# Patient Record
Sex: MALE | Race: WHITE | NOT HISPANIC OR LATINO | Employment: OTHER | URBAN - METROPOLITAN AREA
[De-identification: names, ages, dates, MRNs, and addresses within clinical notes are randomized per-mention and may not be internally consistent; named-entity substitution may affect disease eponyms.]

---

## 2017-01-18 ENCOUNTER — OFFICE VISIT (OUTPATIENT)
Dept: FAMILY MEDICINE | Facility: CLINIC | Age: 67
End: 2017-01-18

## 2017-01-18 VITALS
TEMPERATURE: 98.7 F | BODY MASS INDEX: 32.27 KG/M2 | HEART RATE: 76 BPM | RESPIRATION RATE: 15 BRPM | OXYGEN SATURATION: 97 % | HEIGHT: 76 IN | WEIGHT: 265 LBS | SYSTOLIC BLOOD PRESSURE: 162 MMHG | DIASTOLIC BLOOD PRESSURE: 90 MMHG

## 2017-01-18 DIAGNOSIS — I10 BENIGN ESSENTIAL HYPERTENSION: Primary | ICD-10-CM

## 2017-01-18 DIAGNOSIS — Z85.47 HX OF TESTICULAR CANCER: ICD-10-CM

## 2017-01-18 PROCEDURE — 99203 OFFICE O/P NEW LOW 30 MIN: CPT | Performed by: INTERNAL MEDICINE

## 2017-01-18 PROCEDURE — 36415 COLL VENOUS BLD VENIPUNCTURE: CPT | Performed by: INTERNAL MEDICINE

## 2017-01-18 PROCEDURE — 80053 COMPREHEN METABOLIC PANEL: CPT | Performed by: INTERNAL MEDICINE

## 2017-01-18 RX ORDER — LOSARTAN POTASSIUM AND HYDROCHLOROTHIAZIDE 12.5; 1 MG/1; MG/1
TABLET ORAL
Qty: 30 TABLET | Refills: 1 | Status: SHIPPED | OUTPATIENT
Start: 2017-01-18 | End: 2017-06-01

## 2017-01-18 RX ORDER — LOSARTAN POTASSIUM AND HYDROCHLOROTHIAZIDE 12.5; 5 MG/1; MG/1
1 TABLET ORAL DAILY
Qty: 30 TABLET | Refills: 1 | Status: CANCELLED | OUTPATIENT
Start: 2017-01-18

## 2017-01-18 NOTE — NURSING NOTE
"Chief Complaint   Patient presents with     Hypertension       Initial /98 mmHg  Pulse 76  Temp(Src) 98.7  F (37.1  C) (Oral)  Resp 15  Ht 6' 4\" (1.93 m)  Wt 265 lb (120.203 kg)  BMI 32.27 kg/m2  SpO2 97% Estimated body mass index is 32.27 kg/(m^2) as calculated from the following:    Height as of this encounter: 6' 4\" (1.93 m).    Weight as of this encounter: 265 lb (120.203 kg).  BP completed using cuff size: large    "

## 2017-01-18 NOTE — Clinical Note
New pt , elevated BLOOD PRESSURE; was to start med and return for Nurse BLOOD PRESSURE visit in 2 weeks.

## 2017-01-18 NOTE — LETTER
March 20, 2017      Kevan Connelly  [address] [address] [address]        Dear Mr. Kevan Connelly,    Labs reviewed. Continue current meds and healthy lifestyle choices; diet and exercise.     Sincerely,     Davina Reaves MD

## 2017-01-18 NOTE — PATIENT INSTRUCTIONS
"After starting Losartan, please schedule or appear for a \"nurse-only\" follow-up in 2-3 weeks to recheck your blood pressure. A nurse-only visit is of no charge to you.  "

## 2017-01-18 NOTE — MR AVS SNAPSHOT
"              After Visit Summary   1/18/2017    Kevan Connelly    MRN: [id number]           Patient Information     Date Of Birth          1950        Visit Information        Provider Department      1/18/2017 11:30 AM Davina Reaves MD [facility]        Today's Diagnoses     Benign essential hypertension    -  1     Hx of testicular cancer           Care Instructions    After starting Losartan, please schedule or appear for a \"nurse-only\" follow-up in 2-3 weeks to recheck your blood pressure. A nurse-only visit is of no charge to you.        Follow-ups after your visit        Who to contact     If you have questions or need follow up information about today's clinic visit or your schedule please contact [facility] directly at [phone].  Normal or non-critical lab and imaging results will be communicated to you by MyChart, letter or phone within 4 business days after the clinic has received the results. If you do not hear from us within 7 days, please contact the clinic through Centrafusehart or phone. If you have a critical or abnormal lab result, we will notify you by phone as soon as possible.  Submit refill requests through Go800 or call your pharmacy and they will forward the refill request to us. Please allow 3 business days for your refill to be completed.          Additional Information About Your Visit        Centrafusehart Information     [email or web address] lets you send messages to your doctor, view your test results, renew your prescriptions, schedule appointments and more. To sign up, go to [email or web address] . Click on \"Log in\" on the left side of the screen, which will take you to the Welcome page. Then click on \"Sign up Now\" on the right side of the page.     You will be asked to enter the access code listed below, as well as some personal information. Please follow the directions to create your username and password.     Your access code is: [email or web address]  Expires: " "2017 12:30 PM     Your access code will  in 90 days. If you need help or a new code, please call your [facility] or [phone].        Care EveryWhere ID     This is your Care EveryWhere ID. This could be used by other organizations to access your [facility] medical records  [id number]        Your Vitals Were     Pulse Temperature Respirations Height BMI (Body Mass Index) Pulse Oximetry    76 98.7  F (37.1  C) (Oral) 15 6' 4\" (1.93 m) 32.27 kg/m2 97%       Blood Pressure from Last 3 Encounters:   17 162/90    Weight from Last 3 Encounters:   17 265 lb (120.203 kg)              We Performed the Following     Comprehensive metabolic panel          Today's Medication Changes          These changes are accurate as of: 17 12:30 PM.  If you have any questions, ask your nurse or doctor.               Start taking these medicines.        Dose/Directions    losartan-hydrochlorothiazide 100-12.5 MG per tablet   Commonly known as:  HYZAAR   Used for:  Benign essential hypertension   Started by:  Davina Reaves MD        Start with 1/2 tablet daily  For 2 weeks then increase to full tablet if still elevated.   Quantity:  30 tablet   Refills:  1            Where to get your medicines      These medications were sent to [facility] [address] [place] [address]     Phone:  [phone]    - losartan-hydrochlorothiazide 100-12.5 MG per tablet             Primary Care Provider Office Phone # Fax #    Davina Reaves [phone]       [facility] [address]  [facility] [address]        Thank you!     Thank you for choosing [facility]  for your care. Our goal is always to provide you with excellent care. Hearing back from our patients is one way we can continue to improve our services. Please take a few minutes to complete the written survey that you may receive in the " mail after your visit with us. Thank you!             Your Updated Medication List - Protect others around you: Learn how to safely use, store and throw away your medicines at www.disposemymeds.org.          This list is accurate as of: 1/18/17 12:30 PM.  Always use your most recent med list.                   Brand Name Dispense Instructions for use    losartan-hydrochlorothiazide 100-12.5 MG per tablet    HYZAAR    30 tablet    Start with 1/2 tablet daily  For 2 weeks then increase to full tablet if still elevated.

## 2017-01-18 NOTE — PROGRESS NOTES
New to [facility]  No insurance- addressed concerns.  SUBJECTIVE:                                                    Kevan Connelly is a 66 year old male who presents to clinic today for the following health issues:      Hypertension Follow-up    Outpatient blood pressures are not being checked but feels that they are high     Low Salt Diet: not monitoring salt    BP Readings from Last 3 Encounters:   01/18/17 162/90           Amount of exercise or physical activity: does do weight training at home and walks the stairs in his building.     Problems taking medications regularly: No    Medication side effects: states in the past has tried Lisinopril but it caused a severe cough.     Diet: regular (no restrictions)      HISTORY OF PRESENT ILLNESS:    Hypertension:  The patient is here today to discuss his blood pressure. Although he has not been checking his blood pressure, he can feel that it is high. Historically, he has tried Lisinopril and a hydrochlorothiazide combination medication, both of which were helpful, but he requests a different medication than Lisinopril because it caused chronic cough. He reports no troublesome side effects from the hydrochlorothiazide combination medication and would consider trying it again. However, he cannot recall the combination medication's name. No chest pain, shortness of breath, skipping beats, or headaches.      Problem list and histories reviewed & adjusted, as indicated.  Additional history: as documented    Labs reviewed in EPIC  Problem list, Medication list, Allergies, and Medical/Social/Surgical histories reviewed in [facility] and updated as appropriate.      REVIEW OF SYSTEMS:  C: NEGATIVE for fever, chills, or change in weight  R: NEGATIVE for significant cough or SOB  CV: POSITIVE for Hypertension; NEGATIVE for chest pain, palpitations or peripheral edema  : Hx of Testicular Cancer - Treated with 17 rounds of Radiation; NEGATIVE for unusual urinary symptoms  M:  "NEGATIVE for significant arthralgias or myalgia  N: NEGATIVE for weakness, dizziness or paresthesias  P: NEGATIVE for changes in mood or affect    This document serves as a record of the services and decisions personally performed and made by Davina Reaves MD. It was created on her behalf by Berna Franco, a trained medical scribe. The creation of this document is based the provider's statements to the medical scribe.  Berna Franco, January 18, 2017 12:12 PM     OBJECTIVE:                                                    /90 mmHg  Pulse 76  Temp(Src) 98.7  F (37.1  C) (Oral)  Resp 15  Ht 1.93 m (6' 4\")  Wt 120.203 kg (265 lb)  BMI 32.27 kg/m2  SpO2 97%  Body mass index is 32.27 kg/(m^2).    EXAM:  GENERAL: Patient appears healthy, alert and not distressed.  EYES: Eyes appear grossly normal to inspection, with normal conjunctivae and sclerae  RESP: Lungs are clear to auscultation - no rales, rhonchi or wheezes present  CV: Regular rate and rhythm, normal S1 S2 heart sounds, no ectopy, no peripheral edema present, peripheral pulses normal, no carotid bruit.  ABDOMEN: Soft and nontender throughout, no hepatosplenomegaly, no masses, normal bowel sounds  MS: No gross musculoskeletal defects noted, no edema, gait is age appropriate without ataxia  NEURO: Patient exhibits normal strength and tone, normal speech and mentation  PSYCH: Mentation appears normal, affect is normal/bright      Diagnostic Test Results:  No results found for this or any previous visit (from the past 24 hour(s)).      ASSESSMENT/PLAN:                                                    (I10) Benign essential hypertension  (primary encounter diagnosis)  Comment: Prior use of Lisinopril - chronic cough; Off of medications for several years; Start taking half a tablet of Losartan-HCTZ 100-12.5 MG once daily and follow-up in 2-3 weeks for a nurse blood pressure visit. Anticipate needing full dose to get to goal;  Plan: Comprehensive " metabolic panel,         losartan-hydrochlorothiazide (HYZAAR) 100-12.5 MG per tablet          (Z85.47) Hx of testicular cancer  Comment: Right sided - diagnosed and treated in 1990; Treated with 17 rounds of Radiation therapy.  Plan: noted    MEDICATIONS:   Orders Placed This Encounter   Medications     losartan-hydrochlorothiazide (HYZAAR) 100-12.5 MG per tablet     Sig: Start with 1/2 tablet daily  For 2 weeks then increase to full tablet if still elevated.     Dispense:  30 tablet     Refill:  1     There are no discontinued medications.       - Continue other medications without change  FUTURE APPOINTMENTS:       - Follow-up nurse-only visit in 2-3 weeks to recheck blood pressure    The information in this document, created by a medical scribe for me, accurately reflects the services I personally performed and the decisions made by me. I have reviewed and approved this document for accuracy.  Dr. Davina Reaves, 12:30 PM, January 18, 2017    Davina Reaves MD  Internal Medicine  [facility]

## 2017-01-19 LAB
ALBUMIN SERPL-MCNC: 3.9 G/DL (ref 3.4–5)
ALP SERPL-CCNC: 61 U/L (ref 40–150)
ALT SERPL W P-5'-P-CCNC: 23 U/L (ref 0–70)
ANION GAP SERPL CALCULATED.3IONS-SCNC: 6 MMOL/L (ref 3–14)
AST SERPL W P-5'-P-CCNC: 13 U/L (ref 0–45)
BILIRUB SERPL-MCNC: 1 MG/DL (ref 0.2–1.3)
BUN SERPL-MCNC: 21 MG/DL (ref 7–30)
CALCIUM SERPL-MCNC: 9 MG/DL (ref 8.5–10.1)
CHLORIDE SERPL-SCNC: 108 MMOL/L (ref 94–109)
CO2 SERPL-SCNC: 27 MMOL/L (ref 20–32)
CREAT SERPL-MCNC: 1.23 MG/DL (ref 0.66–1.25)
GFR SERPL CREATININE-BSD FRML MDRD: 59 ML/MIN/1.7M2
GLUCOSE SERPL-MCNC: 92 MG/DL (ref 70–99)
POTASSIUM SERPL-SCNC: 4.5 MMOL/L (ref 3.4–5.3)
PROT SERPL-MCNC: 6.9 G/DL (ref 6.8–8.8)
SODIUM SERPL-SCNC: 141 MMOL/L (ref 133–144)

## 2017-02-07 ENCOUNTER — ALLIED HEALTH/NURSE VISIT (OUTPATIENT)
Dept: NURSING | Facility: CLINIC | Age: 67
End: 2017-02-07

## 2017-02-07 VITALS — SYSTOLIC BLOOD PRESSURE: 124 MMHG | HEART RATE: 74 BPM | DIASTOLIC BLOOD PRESSURE: 84 MMHG

## 2017-02-07 DIAGNOSIS — Z01.30 BP CHECK: Primary | ICD-10-CM

## 2017-02-07 PROCEDURE — 99207 ZZC NO CHARGE NURSE ONLY: CPT

## 2017-02-07 NOTE — NURSING NOTE
Kevan Connelly is a 66 year old male who comes in today for a Blood Pressure check because of new medication and ongoing blood pressure monitoring.    Vitals as recorded, a large cuff was used.    Patient is taking medication as prescribed  Patient is tolerating medications well.  Patient is not monitoring Blood Pressure at home.      Current complaints: none    Disposition: results routed to Dr. Reaves    Pt stated that he will come back in 2-3 weeks for another BP check. If BP is still good at this time, pt would like to discuss a different medication that he could possibly be started on, one that is a little less expensive.   Bertha Eduardo MA

## 2017-03-27 ENCOUNTER — ALLIED HEALTH/NURSE VISIT (OUTPATIENT)
Dept: NURSING | Facility: CLINIC | Age: 67
End: 2017-03-27

## 2017-03-27 VITALS — HEART RATE: 76 BPM | SYSTOLIC BLOOD PRESSURE: 124 MMHG | DIASTOLIC BLOOD PRESSURE: 80 MMHG

## 2017-03-27 DIAGNOSIS — Z01.30 BP CHECK: Primary | ICD-10-CM

## 2017-03-27 PROCEDURE — 99207 ZZC NO CHARGE NURSE ONLY: CPT

## 2017-03-27 NOTE — MR AVS SNAPSHOT
"              After Visit Summary   3/27/2017    Kevan Connelly    MRN: [id number]           Patient Information     Date Of Birth          1950        Visit Information        Provider Department      3/27/2017 9:00 AM  [facility]        Today's Diagnoses     BP check    -  1       Follow-ups after your visit        Who to contact     If you have questions or need follow up information about today's clinic visit or your schedule please contact [facility] directly at [phone].  Normal or non-critical lab and imaging results will be communicated to you by MyChart, letter or phone within 4 business days after the clinic has received the results. If you do not hear from us within 7 days, please contact the clinic through Modalityhart or phone. If you have a critical or abnormal lab result, we will notify you by phone as soon as possible.  Submit refill requests through Reeher or call your pharmacy and they will forward the refill request to us. Please allow 3 business days for your refill to be completed.          Additional Information About Your Visit        MyChart Information     Reeher lets you send messages to your doctor, view your test results, renew your prescriptions, schedule appointments and more. To sign up, go to [email or web address] . Click on \"Log in\" on the left side of the screen, which will take you to the Welcome page. Then click on \"Sign up Now\" on the right side of the page.     You will be asked to enter the access code listed below, as well as some personal information. Please follow the directions to create your username and password.     Your access code is: [email or web address]  Expires: 2017  1:30 PM     Your access code will  in 90 days. If you need help or a new code, please call your [facility] or [phone].        Care EveryWhere ID     This is your Care EveryWhere ID. This could be used by other organizations to access your " [facility] medical records  [id number]        Your Vitals Were     Pulse                   76            Blood Pressure from Last 3 Encounters:   03/27/17 124/80   02/07/17 124/84   01/18/17 162/90    Weight from Last 3 Encounters:   01/18/17 265 lb (120.2 kg)              Today, you had the following     No orders found for display       Primary Care Provider Office Phone # Fax #    Davina Reaves [phone]       [facility] [address]  [facility] [address]        Thank you!     Thank you for choosing [facility]  for your care. Our goal is always to provide you with excellent care. Hearing back from our patients is one way we can continue to improve our services. Please take a few minutes to complete the written survey that you may receive in the mail after your visit with us. Thank you!             Your Updated Medication List - Protect others around you: Learn how to safely use, store and throw away your medicines at www.disposemymeds.org.          This list is accurate as of: 3/27/17  9:11 AM.  Always use your most recent med list.                   Brand Name Dispense Instructions for use    losartan-hydrochlorothiazide 100-12.5 MG per tablet    HYZAAR    30 tablet    Start with 1/2 tablet daily  For 2 weeks then increase to full tablet if still elevated.

## 2017-03-27 NOTE — NURSING NOTE
Kevan Connelly is a 66 year old male who comes in today for a Blood Pressure check because of ongoing blood pressure monitoring.    Vitals as recorded, a large cuff was used.    Patient is taking medication as prescribed  Patient is tolerating medications well.  Patient is not monitoring Blood Pressure at home.      Current complaints: none    Disposition: results routed to MD/AP    Pt stated that since his BP readings have been good, he would like to discuss changing to a different medication that is less expensive.   Bertha Eduardo MA

## 2017-03-29 ENCOUNTER — TELEPHONE (OUTPATIENT)
Dept: FAMILY MEDICINE | Facility: CLINIC | Age: 67
End: 2017-03-29

## 2017-03-30 NOTE — TELEPHONE ENCOUNTER
Previously on Lisinopril/HCTZ but pt reported cough.  Pt could ask pharmacist if it would be cheaper to split the Losartan and HCTZ up.  Otherwise, follow up appt (pt seen on one occasion) to assess other options.

## 2017-03-30 NOTE — TELEPHONE ENCOUNTER
Losartan is the less expensive in that class of drug. Discussed pharmacy options with patient and compared with [facility] and [facility]. Patient did try [facility], but would have to be member for the 4.00 list of medications.     Patient will transfer prescription to pharmacy of choice when deciding where to go. Currently taking 1/2 tab of Losartan - HCTZ. Advised when ready for refill and still taking 1/2 tab, will discuss dosage at that time.    Vero Huff RN

## 2017-05-30 ENCOUNTER — ALLIED HEALTH/NURSE VISIT (OUTPATIENT)
Dept: NURSING | Facility: CLINIC | Age: 67
End: 2017-05-30

## 2017-05-30 VITALS — HEART RATE: 84 BPM | SYSTOLIC BLOOD PRESSURE: 130 MMHG | DIASTOLIC BLOOD PRESSURE: 79 MMHG

## 2017-05-30 DIAGNOSIS — I10 BENIGN ESSENTIAL HYPERTENSION: ICD-10-CM

## 2017-05-30 DIAGNOSIS — I10 BENIGN ESSENTIAL HYPERTENSION: Primary | ICD-10-CM

## 2017-05-30 PROCEDURE — 99207 ZZC NO CHARGE NURSE ONLY: CPT

## 2017-05-30 NOTE — MR AVS SNAPSHOT
"              After Visit Summary   2017    Kevan Connelly    MRN: [id number]           Patient Information     Date Of Birth          1950        Visit Information        Provider Department      2017 11:30 AM  NURSE [facility]        Today's Diagnoses     Benign essential hypertension    -  1       Follow-ups after your visit        Who to contact     If you have questions or need follow up information about today's clinic visit or your schedule please contact [facility] directly at [phone].  Normal or non-critical lab and imaging results will be communicated to you by Aristos Logichart, letter or phone within 4 business days after the clinic has received the results. If you do not hear from us within 7 days, please contact the clinic through Aristos Logichart or phone. If you have a critical or abnormal lab result, we will notify you by phone as soon as possible.  Submit refill requests through Powerlytics or call your pharmacy and they will forward the refill request to us. Please allow 3 business days for your refill to be completed.          Additional Information About Your Visit        MyChart Information     Powerlytics lets you send messages to your doctor, view your test results, renew your prescriptions, schedule appointments and more. To sign up, go to [email or web address] . Click on \"Log in\" on the left side of the screen, which will take you to the Welcome page. Then click on \"Sign up Now\" on the right side of the page.     You will be asked to enter the access code listed below, as well as some personal information. Please follow the directions to create your username and password.     Your access code is: [email or web address]  Expires: 2017 11:58 AM     Your access code will  in 90 days. If you need help or a new code, please call your [facility] or [phone].        Care EveryWhere ID     This is your Care EveryWhere ID. This could be used by other " organizations to access your [facility] medical records  [id number]        Your Vitals Were     Pulse                   84            Blood Pressure from Last 3 Encounters:   05/30/17 130/79   03/27/17 124/80   02/07/17 124/84    Weight from Last 3 Encounters:   01/18/17 265 lb (120.2 kg)              Today, you had the following     No orders found for display       Primary Care Provider Office Phone # Fax #    Davina Reaves [phone]       [facility] [address]  [facility] [address]        Thank you!     Thank you for choosing [facility]  for your care. Our goal is always to provide you with excellent care. Hearing back from our patients is one way we can continue to improve our services. Please take a few minutes to complete the written survey that you may receive in the mail after your visit with us. Thank you!             Your Updated Medication List - Protect others around you: Learn how to safely use, store and throw away your medicines at www.disposemymeds.org.          This list is accurate as of: 5/30/17 11:58 AM.  Always use your most recent med list.                   Brand Name Dispense Instructions for use    losartan-hydrochlorothiazide 100-12.5 MG per tablet    HYZAAR    30 tablet    Start with 1/2 tablet daily  For 2 weeks then increase to full tablet if still elevated.

## 2017-05-30 NOTE — NURSING NOTE
Kevan Connelly is a 66 year old male who comes in today for a Blood Pressure check because of ongoing blood pressure monitoring.    *Document pulse and BP  *Use new set of vitals button for multiple readings.  *Use extended vitals for orthostatic    Vitals as recorded, a large cuff was used.    Patient is taking medication as prescribed  Patient is tolerating medications well.  Patient is not monitoring Blood Pressure at home.     Current complaints: none    Disposition: results routed to MD/MYNOR

## 2017-05-30 NOTE — PROGRESS NOTES
Kevan Connelly is a 66 year old male who comes in today for a Blood Pressure check because of {BPREASONS:354473}.    *Document pulse and BP  *Use new set of vitals button for multiple readings.  *Use extended vitals for orthostatic    Vitals as recorded, a {BP CUFFS:765702} cuff was used.    Patient {IS/IS NOT:9024} taking medication as prescribed  Patient {IS/IS NOT:9024} tolerating medications well.  Patient {IS/IS NOT:9024} monitoring Blood Pressure at home.  Average readings if yes are ***    Current complaints: {BPSX:930203}    Disposition: {MCN DISPOSITIONS:949790}

## 2017-06-01 RX ORDER — LOSARTAN POTASSIUM AND HYDROCHLOROTHIAZIDE 12.5; 1 MG/1; MG/1
1 TABLET ORAL DAILY
Qty: 30 TABLET | Refills: 3 | COMMUNITY
Start: 2017-06-01 | End: 2017-10-09

## 2017-07-07 ENCOUNTER — ALLIED HEALTH/NURSE VISIT (OUTPATIENT)
Dept: NURSING | Facility: CLINIC | Age: 67
End: 2017-07-07

## 2017-07-07 VITALS — DIASTOLIC BLOOD PRESSURE: 84 MMHG | HEART RATE: 64 BPM | SYSTOLIC BLOOD PRESSURE: 128 MMHG

## 2017-07-07 DIAGNOSIS — I10 BENIGN ESSENTIAL HYPERTENSION: Primary | ICD-10-CM

## 2017-07-07 PROCEDURE — 99207 ZZC NO CHARGE NURSE ONLY: CPT

## 2017-07-07 NOTE — PROGRESS NOTES
Kevan Connelly is a 66 year old male who comes in today for a Blood Pressure check because of ongoing blood pressure monitoring.    *Document pulse and BP  *Use new set of vitals button for multiple readings.  *Use extended vitals for orthostatic    Vitals as recorded, a large cuff was used.    Patient is taking medication as prescribed  Patient is tolerating medications well.  Patient is not monitoring Blood Pressure at home.      Current complaints: none    Disposition: results routed to MD/MYNOR COBB M.A.

## 2017-07-07 NOTE — MR AVS SNAPSHOT
"              After Visit Summary   7/7/2017    Kevan Connelly    MRN: [id number]           Patient Information     Date Of Birth          1950        Visit Information        Provider Department      7/7/2017 8:30 AM  NURSE [facility]        Today's Diagnoses     Benign essential hypertension    -  1       Follow-ups after your visit        Your next 10 appointments already scheduled     Jul 07, 2017  8:30 AM CDT   Nurse Only with  NURSE   [facility]    [address] [facility] [address] [phone]              Who to contact     If you have questions or need follow up information about today's clinic visit or your schedule please contact [facility] directly at [phone].  Normal or non-critical lab and imaging results will be communicated to you by MyChart, letter or phone within 4 business days after the clinic has received the results. If you do not hear from us within 7 days, please contact the clinic through MyChart or phone. If you have a critical or abnormal lab result, we will notify you by phone as soon as possible.  Submit refill requests through PayDragon or call your pharmacy and they will forward the refill request to us. Please allow 3 business days for your refill to be completed.          Additional Information About Your Visit        [employer] Information     PayDragon lets you send messages to your doctor, view your test results, renew your prescriptions, schedule appointments and more. To sign up, go to [email or web address] . Click on \"Log in\" on the left side of the screen, which will take you to the Welcome page. Then click on \"Sign up Now\" on the right side of the page.     You will be asked to enter the access code listed below, as well as some personal information. Please follow the directions to create your username and password.     Your access code is: [email or web address]  Expires: 8/28/2017 " 11:58 AM     Your access code will  in 90 days. If you need help or a new code, please call your [place] clinic or [phone].        Care EveryWhere ID     This is your Care EveryWhere ID. This could be used by other organizations to access your [place] medical records  [id number]        Your Vitals Were     Pulse                   64            Blood Pressure from Last 3 Encounters:   17 128/84   17 130/79   17 124/80    Weight from Last 3 Encounters:   17 265 lb (120.2 kg)              Today, you had the following     No orders found for display       Primary Care Provider Office Phone # Fax #    Davina Zeny Reaves [phone]       [facility] [address] [address] [facility] [address]        Equal Access to Services     POLINA MEJIA : Hadii aad ku hadasho Soomaali, waaxda luqadaha, qaybta kaalmada adeegyada, waxay maryin hayaabay mkie . So [facility] [phone].    ATENCIÓN: Si habla español, tiene a luna disposición servicios gratuitos de asistencia lingüística. Ernesto al [phone].    We comply with applicable federal civil rights laws and [place] laws. We do not discriminate on the basis of race, color, national origin, age, disability sex, sexual orientation or gender identity.            Thank you!     Thank you for choosing [facility]  for your care. Our goal is always to provide you with excellent care. Hearing back from our patients is one way we can continue to improve our services. Please take a few minutes to complete the written survey that you may receive in the mail after your visit with us. Thank you!             Your Updated Medication List - Protect others around you: Learn how to safely use, store and throw away your medicines at www.disposemymeds.org.          This list is accurate as of: 17  8:20 AM.  Always use your most recent med list.                   Brand Name Dispense Instructions for use  Diagnosis    losartan-hydrochlorothiazide 100-12.5 MG per tablet    HYZAAR    30 tablet    1/2 tablet daily    Benign essential hypertension

## 2017-10-09 ENCOUNTER — OFFICE VISIT (OUTPATIENT)
Dept: FAMILY MEDICINE | Facility: CLINIC | Age: 67
End: 2017-10-09
Payer: MEDICARE

## 2017-10-09 VITALS
WEIGHT: 280 LBS | RESPIRATION RATE: 16 BRPM | BODY MASS INDEX: 34.08 KG/M2 | DIASTOLIC BLOOD PRESSURE: 78 MMHG | HEART RATE: 85 BPM | SYSTOLIC BLOOD PRESSURE: 118 MMHG | OXYGEN SATURATION: 98 % | TEMPERATURE: 98 F

## 2017-10-09 DIAGNOSIS — Z51.81 ENCOUNTER FOR MONITORING DIURETIC THERAPY: ICD-10-CM

## 2017-10-09 DIAGNOSIS — Z85.47 HX OF TESTICULAR CANCER: ICD-10-CM

## 2017-10-09 DIAGNOSIS — Z79.899 ENCOUNTER FOR MONITORING DIURETIC THERAPY: ICD-10-CM

## 2017-10-09 DIAGNOSIS — N52.35 ERECTILE DYSFUNCTION FOLLOWING RADIATION THERAPY: ICD-10-CM

## 2017-10-09 DIAGNOSIS — I10 BENIGN ESSENTIAL HYPERTENSION: Primary | ICD-10-CM

## 2017-10-09 DIAGNOSIS — Z12.11 SPECIAL SCREENING FOR MALIGNANT NEOPLASMS, COLON: ICD-10-CM

## 2017-10-09 DIAGNOSIS — Z23 ENCOUNTER FOR IMMUNIZATION: ICD-10-CM

## 2017-10-09 DIAGNOSIS — Z13.6 CARDIOVASCULAR SCREENING; LDL GOAL LESS THAN 130: ICD-10-CM

## 2017-10-09 DIAGNOSIS — Z11.59 ENCOUNTER FOR HEPATITIS C SCREENING TEST FOR LOW RISK PATIENT: ICD-10-CM

## 2017-10-09 PROCEDURE — 80061 LIPID PANEL: CPT | Performed by: INTERNAL MEDICINE

## 2017-10-09 PROCEDURE — 80053 COMPREHEN METABOLIC PANEL: CPT | Performed by: INTERNAL MEDICINE

## 2017-10-09 PROCEDURE — 82043 UR ALBUMIN QUANTITATIVE: CPT | Performed by: INTERNAL MEDICINE

## 2017-10-09 PROCEDURE — 36415 COLL VENOUS BLD VENIPUNCTURE: CPT | Performed by: INTERNAL MEDICINE

## 2017-10-09 PROCEDURE — G0472 HEP C SCREEN HIGH RISK/OTHER: HCPCS | Performed by: INTERNAL MEDICINE

## 2017-10-09 PROCEDURE — 99215 OFFICE O/P EST HI 40 MIN: CPT | Performed by: INTERNAL MEDICINE

## 2017-10-09 RX ORDER — SILDENAFIL CITRATE 20 MG/1
20 TABLET ORAL DAILY PRN
Qty: 30 TABLET | Refills: 3 | Status: ON HOLD | OUTPATIENT
Start: 2017-10-09 | End: 2022-05-16

## 2017-10-09 RX ORDER — LOSARTAN POTASSIUM AND HYDROCHLOROTHIAZIDE 12.5; 1 MG/1; MG/1
1 TABLET ORAL DAILY
Qty: 45 TABLET | Refills: 3 | Status: SHIPPED | OUTPATIENT
Start: 2017-10-09 | End: 2018-12-29

## 2017-10-09 NOTE — NURSING NOTE
"Chief Complaint   Patient presents with     Hypertension     Consult       Initial /78  Pulse 85  Temp 98  F (36.7  C) (Oral)  Resp 16  Wt 280 lb (127 kg)  SpO2 98%  BMI 34.08 kg/m2 Estimated body mass index is 34.08 kg/(m^2) as calculated from the following:    Height as of 1/18/17: 6' 4\" (1.93 m).    Weight as of this encounter: 280 lb (127 kg).  Medication Reconciliation: complete      "

## 2017-10-09 NOTE — PROGRESS NOTES
SUBJECTIVE:   Kevan Connelly is a 67 year old male who presents to clinic today for the following health issues:      Hypertension Follow-up    Outpatient blood pressures are not being checked.    Low Salt Diet: not monitoring salt    Patient is currently taking Losartan-HCTZ 100-12.5 mg qd   BP Readings from Last 3 Encounters:   10/09/17 118/78   07/07/17 128/84   05/30/17 130/79       Amount of exercise or physical activity: None    Problems taking medications regularly: No    Medication side effects: none    Diet: regular (no restrictions)    Problem list and histories reviewed & adjusted, as indicated.  Additional history: as documented    Labs reviewed in EPIC    Reviewed and updated as needed this visit by clinical staffTobacco  Allergies  Meds  Med Hx  Surg Hx  Fam Hx  Soc Hx      Reviewed and updated as needed this visit by Provider       ROS:  CONSTITUTIONAL: NEGATIVE for fever, chills, change in weight  INTEGUMENTARY/SKIN: NEGATIVE for worrisome rashes, moles or lesions  ENT/MOUTH: NEGATIVE for ear, mouth and throat problems  RESP:NEGATIVE for significant cough or SOB  CV: Hypertension - use of losartan-HCTZ; NEGATIVE for chest pain, palpitations or peripheral edema  GI: NEGATIVE for nausea, abdominal pain, heartburn, or change in bowel habits  : POSITIVE for erectile dysfunction; Hx of testicular cancer, right-sided (1990) - treated with 17 rounds of radiation; negative for dysuria, hematuria, decreased urinary stream  MUSCULOSKELETAL: NEGATIVE for significant arthralgias or myalgia  NEURO: NEGATIVE for weakness, dizziness or paresthesias  PSYCHIATRIC: NEGATIVE for changes in mood or affect    This document serves as a record of the services and decisions personally performed and made by Davina Reaves MD. It was created on her behalf by Dianne Michael, a trained medical scribe. The creation of this document is based on the provider's statements to the medical scribe.   Dianne Michael,  8:59 AM, October 9, 2017    OBJECTIVE:     /78  Pulse 85  Temp 98  F (36.7  C) (Oral)  Resp 16  Wt 127 kg (280 lb)  SpO2 98%  BMI 34.08 kg/m2  Body mass index is 34.08 kg/(m^2).     EXAM:  GENERAL: healthy, alert and no distress  HENT: normal cephalic/atraumatic, ear canals and TM's normal, nose and mouth without ulcers or lesions, oropharynx clear and oral mucous membranes moist  RESP: lungs clear to auscultation - no rales, rhonchi or wheezes  CV: regular rates and rhythm, normal S1 S2, no murmur, peripheral pulses strong and no peripheral edema  ABDOMEN: soft, nontender, without hepatosplenomegaly or masses and bowel sounds normal  MS: extremities normal- no gross deformities noted  SKIN: no suspicious lesions or rashes  NEURO: Normal strength and tone, sensory exam grossly normal and mentation intact  PSYCH: mentation appears normal and affect normal/bright    Diagnostic Test Results:  none     ASSESSMENT/PLAN:     (I10) Benign essential hypertension  (primary encounter diagnosis)  Comment: past use of ACE- Cough; off meds for several years; start 1/2 tab and nurse bp visit in 2 weeks.  Plan: losartan-hydrochlorothiazide (HYZAAR) 100-12.5         MG per tablet, Albumin Random Urine         Quantitative with Creat Ratio    (N52.35) Erectile dysfunction following radiation therapy  Comment: multifactorial; XRT 1990 related to Testicular cancer; has taken viagra in the past (approx 5 years ago) and would like to try it again ; he specifically requests the generic Sildenafil as recommended by pharmacy; we discussed the lower dose is designed to take several times per day for treatment of pulmonary hyperension  Plan: sildenafil (REVATIO) 20 MG tablet    Monitor diuretic therapy  Pt on diuretic for hypertension management.  Continue same meds; lab orders to include renal function and potassium.    Hx of testicular cancer  Received radiation treatment  Reporting issues with erectile dysfunction as a  "result.    (Z12.11) Special screening for malignant neoplasms, colon  Comment: pt is overdue for a colonoscopy (10/5/2002); agrees to look into getting this scheduled at this time; referral provided.  Plan: GASTROENTEROLOGY ADULT REF PROCEDURE ONLY    (Z11.59) Encounter for hepatitis C screening test for low risk patient  Comment: Hepatitis C screening recommended for adults born between 1945 and 1965  Plan: Hepatitis C antibody    (Z13.6) CARDIOVASCULAR SCREENING; LDL GOAL LESS THAN 130  Comment: LDL at goal in past; pt is not fasting for labs today, had a couple pieces of \"monkey\" bread for breakfast  Plan: Comprehensive metabolic panel, Lipid panel         reflex to direct LDL    (Z23) Encounter for immunization  Comment: Due for Tetansu, Sr influenza and PCV-13; PPSV due 2018 (6-12 months)  Plan: immunizations.    MEDICATIONS:   Orders Placed This Encounter   Medications     losartan-hydrochlorothiazide (HYZAAR) 100-12.5 MG per tablet     Sig: Take 1 tablet by mouth daily 1/2 tablet daily     Dispense:  45 tablet     Refill:  3     sildenafil (REVATIO) 20 MG tablet     Sig: Take 1 tablet (20 mg) by mouth daily as needed     Dispense:  30 tablet     Refill:  3     Medications Discontinued During This Encounter   Medication Reason     losartan-hydrochlorothiazide (HYZAAR) 100-12.5 MG per tablet Reorder       Davina Reaves MD  Internal Medicine  [facility] [place]    The information in this document, created by a medical scribe for me, accurately reflects the services I personally performed and the decisions made by me. I have reviewed and approved this document for accuracy.  Dr. Davina Reaves, 9:15 AM, October 9, 2017    "

## 2017-10-09 NOTE — MR AVS SNAPSHOT
After Visit Summary   10/9/2017    Kevan Connelly    MRN: [id number]           Patient Information     Date Of Birth          1950        Visit Information        Provider Department      10/9/2017 8:30 AM Davina Reaves MD [facility] [place]        Today's Diagnoses     Benign essential hypertension    -  1    Erectile dysfunction following radiation therapy        Special screening for malignant neoplasms, colon        Encounter for hepatitis C screening test for low risk patient        CARDIOVASCULAR SCREENING; LDL GOAL LESS THAN 130        Encounter for immunization          Care Instructions    Due for immunizations:L  Due for Tetansu, Sr influenza and PCV-13; PPSV due 2018( 6-12 months)    Regular exercise encouraged   Healthy diet encouraged          Follow-ups after your visit        Additional Services     GASTROENTEROLOGY ADULT REF PROCEDURE ONLY       Last Lab Result: Creatinine (mg/dL)       Date                     Value                 01/18/2017               1.23             ----------  There is no height or weight on file to calculate BMI.      Patient will be contacted to schedule procedure.     Please be aware that coverage of these services is subject to the terms and limitations of your health insurance plan.  Call member services at your health plan with any benefit or coverage questions.  Any procedures must be performed at a [place] facility OR coordinated by your clinic's referral office.    Please bring the following with you to your appointment:    (1) Any X-Rays, CTs or MRIs which have been performed.  Contact the facility where they were done to arrange for  prior to your scheduled appointment.    (2) List of current medications   (3) This referral request   (4) Any documents/labs given to you for this referral                  Who to contact     If you have questions or need follow up information about today's clinic visit or your schedule  "please contact [facility] directly at [phone].  Normal or non-critical lab and imaging results will be communicated to you by MyChart, letter or phone within 4 business days after the clinic has received the results. If you do not hear from us within 7 days, please contact the clinic through Boomrhart or phone. If you have a critical or abnormal lab result, we will notify you by phone as soon as possible.  Submit refill requests through AppSocially or call your pharmacy and they will forward the refill request to us. Please allow 3 business days for your refill to be completed.          Additional Information About Your Visit        [employer] Information     AppSocially lets you send messages to your doctor, view your test results, renew your prescriptions, schedule appointments and more. To sign up, go to [email or web address] . Click on \"Log in\" on the left side of the screen, which will take you to the Welcome page. Then click on \"Sign up Now\" on the right side of the page.     You will be asked to enter the access code listed below, as well as some personal information. Please follow the directions to create your username and password.     Your access code is: [email or web address]  Expires: 2018  9:12 AM     Your access code will  in 90 days. If you need help or a new code, please call your [place] clinic or [phone].        Care EveryWhere ID     This is your Care EveryWhere ID. This could be used by other organizations to access your [place] medical records  [id number]        Your Vitals Were     Pulse Temperature Respirations Pulse Oximetry BMI (Body Mass Index)       85 98  F (36.7  C) (Oral) 16 98% 34.08 kg/m2        Blood Pressure from Last 3 Encounters:   10/09/17 118/78   17 128/84   17 130/79    Weight from Last 3 Encounters:   10/09/17 280 lb (127 kg)   17 265 lb (120.2 kg)              We Performed the Following     Albumin Random Urine Quantitative with Creat " Ratio     Comprehensive metabolic panel     GASTROENTEROLOGY ADULT REF PROCEDURE ONLY     Hepatitis C antibody     Lipid panel reflex to direct LDL          Today's Medication Changes          These changes are accurate as of: 10/9/17  9:12 AM.  If you have any questions, ask your nurse or doctor.               Start taking these medicines.        Dose/Directions    sildenafil 20 MG tablet   Commonly known as:  REVATIO   Used for:  Erectile dysfunction following radiation therapy   Started by:  Davina Reaves MD        Dose:  20 mg   Take 1 tablet (20 mg) by mouth daily as needed   Quantity:  30 tablet   Refills:  3            Where to get your medicines      These medications were sent to [facility] [facility] [address] [facility]  [address] [facility] [address]     Phone:  [phone]     losartan-hydrochlorothiazide 100-12.5 MG per tablet    sildenafil 20 MG tablet                Primary Care Provider Office Phone # Fax #    Davina Reaves [phone] [address] [facility] [address]        Equal Access to Services     [facility] HospitalCOLIN AH: Hadii aad ku hadasho Soomaali, waaxda luqadaha, qaybta kaalmada adeegyada, waxay idiin hayaan adeeg kharash la'gael . So [facility] [phone].    ATENCIÓN: Si habla español, tiene a luna disposición servicios gratuitos de asistencia lingüística. [facility] [phone].    We comply with applicable federal civil rights laws and [place] laws. We do not discriminate on the basis of race, color, national origin, age, disability, sex, sexual orientation, or gender identity.            Thank you!     Thank you for choosing [facility]  for your care. Our goal is always to provide you with excellent care. Hearing back from our patients is one way we can continue to improve our services. Please take a few minutes to complete the written survey that you may receive in the mail after your visit with us.  Thank you!             Your Updated Medication List - Protect others around you: Learn how to safely use, store and throw away your medicines at www.disposemymeds.org.          This list is accurate as of: 10/9/17  9:12 AM.  Always use your most recent med list.                   Brand Name Dispense Instructions for use Diagnosis    losartan-hydrochlorothiazide 100-12.5 MG per tablet    HYZAAR    45 tablet    Take 1 tablet by mouth daily 1/2 tablet daily    Benign essential hypertension       sildenafil 20 MG tablet    REVATIO    30 tablet    Take 1 tablet (20 mg) by mouth daily as needed    Erectile dysfunction following radiation therapy

## 2017-10-10 LAB
ALBUMIN SERPL-MCNC: 3.8 G/DL (ref 3.4–5)
ALP SERPL-CCNC: 52 U/L (ref 40–150)
ALT SERPL W P-5'-P-CCNC: 27 U/L (ref 0–70)
ANION GAP SERPL CALCULATED.3IONS-SCNC: 9 MMOL/L (ref 3–14)
AST SERPL W P-5'-P-CCNC: 13 U/L (ref 0–45)
BILIRUB SERPL-MCNC: 1 MG/DL (ref 0.2–1.3)
BUN SERPL-MCNC: 21 MG/DL (ref 7–30)
CALCIUM SERPL-MCNC: 8.7 MG/DL (ref 8.5–10.1)
CHLORIDE SERPL-SCNC: 109 MMOL/L (ref 94–109)
CHOLEST SERPL-MCNC: 171 MG/DL
CO2 SERPL-SCNC: 23 MMOL/L (ref 20–32)
CREAT SERPL-MCNC: 1.28 MG/DL (ref 0.66–1.25)
CREAT UR-MCNC: 143 MG/DL
GFR SERPL CREATININE-BSD FRML MDRD: 56 ML/MIN/1.7M2
GLUCOSE SERPL-MCNC: 102 MG/DL (ref 70–99)
HCV AB SERPL QL IA: NONREACTIVE
HDLC SERPL-MCNC: 45 MG/DL
LDLC SERPL CALC-MCNC: 92 MG/DL
MICROALBUMIN UR-MCNC: 8 MG/L
MICROALBUMIN/CREAT UR: 5.34 MG/G CR (ref 0–17)
NONHDLC SERPL-MCNC: 126 MG/DL
POTASSIUM SERPL-SCNC: 4.6 MMOL/L (ref 3.4–5.3)
PROT SERPL-MCNC: 6.7 G/DL (ref 6.8–8.8)
SODIUM SERPL-SCNC: 141 MMOL/L (ref 133–144)
TRIGL SERPL-MCNC: 170 MG/DL

## 2017-10-15 PROBLEM — Z79.899 ENCOUNTER FOR MONITORING DIURETIC THERAPY: Status: ACTIVE | Noted: 2017-10-15

## 2017-10-15 PROBLEM — N52.35 ERECTILE DYSFUNCTION FOLLOWING RADIATION THERAPY: Status: ACTIVE | Noted: 2017-10-15

## 2017-10-15 PROBLEM — Z51.81 ENCOUNTER FOR MONITORING DIURETIC THERAPY: Status: ACTIVE | Noted: 2017-10-15

## 2017-10-19 ENCOUNTER — TELEPHONE (OUTPATIENT)
Dept: FAMILY MEDICINE | Facility: CLINIC | Age: 67
End: 2017-10-19

## 2017-11-28 ENCOUNTER — HOSPITAL ENCOUNTER (OUTPATIENT)
Facility: CLINIC | Age: 67
Discharge: HOME | End: 2017-11-28
Attending: INTERNAL MEDICINE | Admitting: INTERNAL MEDICINE
Payer: MEDICARE

## 2017-11-28 VITALS
SYSTOLIC BLOOD PRESSURE: 112 MMHG | DIASTOLIC BLOOD PRESSURE: 85 MMHG | HEIGHT: 76 IN | RESPIRATION RATE: 13 BRPM | WEIGHT: 270 LBS | BODY MASS INDEX: 32.88 KG/M2 | OXYGEN SATURATION: 95 %

## 2017-11-28 LAB — COLONOSCOPY: NORMAL

## 2017-11-28 PROCEDURE — 88305 TISSUE EXAM BY PATHOLOGIST: CPT | Performed by: INTERNAL MEDICINE

## 2017-11-28 PROCEDURE — 25000128 H RX IP 250 OP 636: Performed by: INTERNAL MEDICINE

## 2017-11-28 PROCEDURE — G0500 MOD SEDAT ENDO SERVICE >5YRS: HCPCS | Performed by: INTERNAL MEDICINE

## 2017-11-28 PROCEDURE — 88305 TISSUE EXAM BY PATHOLOGIST: CPT | Mod: 26 | Performed by: INTERNAL MEDICINE

## 2017-11-28 PROCEDURE — 45385 COLONOSCOPY W/LESION REMOVAL: CPT | Performed by: INTERNAL MEDICINE

## 2017-11-28 RX ORDER — LIDOCAINE 40 MG/G
CREAM TOPICAL
Status: DISCONTINUED | OUTPATIENT
Start: 2017-11-28 | End: 2017-11-28 | Stop reason: HOSPADM

## 2017-11-28 RX ORDER — NALOXONE HYDROCHLORIDE 0.4 MG/ML
.1-.4 INJECTION, SOLUTION INTRAMUSCULAR; INTRAVENOUS; SUBCUTANEOUS
Status: DISCONTINUED | OUTPATIENT
Start: 2017-11-28 | End: 2017-11-28 | Stop reason: HOSPADM

## 2017-11-28 RX ORDER — ONDANSETRON 2 MG/ML
4 INJECTION INTRAMUSCULAR; INTRAVENOUS EVERY 6 HOURS PRN
Status: DISCONTINUED | OUTPATIENT
Start: 2017-11-28 | End: 2017-11-28 | Stop reason: HOSPADM

## 2017-11-28 RX ORDER — ONDANSETRON 4 MG/1
4 TABLET, ORALLY DISINTEGRATING ORAL EVERY 6 HOURS PRN
Status: DISCONTINUED | OUTPATIENT
Start: 2017-11-28 | End: 2017-11-28 | Stop reason: HOSPADM

## 2017-11-28 RX ORDER — FLUMAZENIL 0.1 MG/ML
0.2 INJECTION, SOLUTION INTRAVENOUS
Status: DISCONTINUED | OUTPATIENT
Start: 2017-11-28 | End: 2017-11-28 | Stop reason: HOSPADM

## 2017-11-28 RX ORDER — FENTANYL CITRATE 50 UG/ML
INJECTION, SOLUTION INTRAMUSCULAR; INTRAVENOUS PRN
Status: DISCONTINUED | OUTPATIENT
Start: 2017-11-28 | End: 2017-11-28 | Stop reason: HOSPADM

## 2017-11-28 RX ORDER — ONDANSETRON 2 MG/ML
4 INJECTION INTRAMUSCULAR; INTRAVENOUS
Status: DISCONTINUED | OUTPATIENT
Start: 2017-11-28 | End: 2017-11-28 | Stop reason: HOSPADM

## 2017-11-28 NOTE — LETTER
November 14, 2017  Kevan Connelly  [address] [address] [address]        Thank you for choosing [facility]. You are scheduled for the following service.   Date:  Tuesday, November 28       Procedure: COLONOSCOPY  Doctor:   Dr. Karl Horne         Arrival Time:   11:30am *check in at Emergency/Endoscopy desk*  Procedure Time:  12noon    Location:   [facility]        Endoscopy Department, First Floor (Enter through ER Doors) *         [address] [place], [place] [address]      [phone] or [phone] ([facility]) to reschedule    NuLYTELY  PREP  Colonoscopy is the most accurate test to detect colon polyps and colon cancer; and the only test where polyps can be removed. During this procedure, a doctor examines the lining of your large intestine and rectum through a flexible tube.     Transportation  Arrange for a ride for the day of your procedures with a responsible adult.  A taxi ride is not an option unless you are accompanied by a responsible adult. If you fail to arrange transportation with a responsible adult, your procedure will be cancelled and rescheduled.    Fill your enclosed prescription for NuLYTELY  at your local pharmacy. Please call our office at [phone] if you did not receive a prescription.      PREPARATION FOR COLONOSCOPY    7 days before:    Discontinue fiber supplements and medications containing iron. This includes Metamucil  and Fibercon ; and multivitamins with iron.  3 days before:    Begin a low-fiber diet. A low-fiber diet helps making the cleanout more effective. For additional details on low-fiber diet, please refer to the table on the last page.  2 days before:    Continue the low-fiber diet.     Drink at least 8 glasses of water throughout the day.     Stop eating solid foods at 11:45 pm.  1 day before:    In the morning: begin a clear liquid diet (liquids you can see through).      Examples of a clear liquid diet include: water, clear broth or bouillon, Gatorade, Pedialyte or Powerade, carbonated and non-carbonated soft drinks (Sprite , 7-Up , ginger ale), strained fruit juices without pulp (apple, white grape, white cranberry), Jell-O  and popsicles.     The following are not allowed on a clear liquid diet: red liquids, alcoholic beverages, coffee, dairy products (milk, creamer, and yogurt), protein shakes, creamy broths, juice with pulp and chewing tobacco.    At 4pm: drink 1 (one) 8 oz glass of NuLYTELY  solution every 15 minutes until half the bottle (approximately 8 glasses of 8 oz) is gone. Keep the solution refrigerated. Do not drink any other liquids while you are drinking the NuLYTELY  solution.    Over the course of the evening, drink an additional   liter of clear liquids and continue clear liquid diet.    COLON CLEANSING TIPS: drink adequate amounts of fluids before and after your colon cleansing to prevent dehydration. Stay near a toilet because you will have diarrhea. Even if you are sitting on the toilet, continue to drink the cleansing solution every 15 minutes. If you feel nauseous or vomit, rinse your mouth with water, take a 15 to 30-minute-break and then continue drinking the solution. You will be uncomfortable until the stool has flushed from your colon (in about 2 to 4 hours). You may feel chilled.    DAY OF YOUR PROCEDURE  You may take all of your morning medications including blood pressure medications, blood thinners (if you have not been instructed to stop these by our office), methadone, and anti-seizure medications with sips of water 3 hours prior to your procedure or earlier. Do not take insulin or vitamins prior to your procedure. Continue the clear liquid diet.    6 hours prior: drink 1 (one) 8 oz glass of NuLYTELY  solution every 15 minutes until the remaining solution (approximately 8 glasses of 8 oz) is gone. Keep the solution refrigerated.      4 hours  prior:   o STOP consuming all liquids   o Do not take anything by mouth during this time.   o Allow extra time to travel to your procedure as you may need to stop and use a restroom along the way.  You are ready for the procedure, if you followed all instructions and your stool is no longer formed, but clear or yellow liquid. If you are unsure whether your colon is clean, please call our office at [phone] before you leave for your appointment.    Bring the following to your procedure:  - Insurance Card/Photo ID.   - List of current medications including over-the-counter medications and supplements.   - Your rescue inhaler if you currently use one to control asthma.    Canceling or rescheduling your appointment:   If you must cancel or reschedule your appointment, please call [phone] as soon as possible.    COLONOSCOPY PRE-PROCEDURE CHECKLIST  If you have diabetes, ask your regular doctor for diet and medication restrictions.  If you take an anticoagulant or anti-platelet medication (such as Coumadin , Lovenox , Pradaxa , Xarelto , Eliquis , etc.), please call your primary doctor for advice on holding this medication.  If you take aspirin you may continue to do so.  If you are or may be pregnant, please discuss the risks and benefits of this procedure with your doctor.    What happens during a colonoscopy?    Plan to spend up to two hours, starting at registration time, at the endoscopy center the day of your procedure. The colonoscopy takes an average of 15 to 30 minutes. Recovery time is about 30 minutes.    Before the exam:    You will change into a gown.    Your medical history and medication list will be reviewed with you, unless that has been done over the phone prior to the procedure.     A nurse will insert an intravenous (IV) line into your hand or arm.    The doctor will meet with you and will give you a consent form to sign.    During the exam:     Medicine will be given through the IV line to  help you relax.     Your heart rate and oxygen levels will be monitored. If your blood pressure is low, you may be given fluids through the IV line.     The doctor will insert a flexible hollow tube, called a colonoscope, into your rectum. The scope will be advanced slowly through the large intestine (colon).    You may have a feeling of fullness or pressure.     If an abnormal tissue or a polyp is found, the doctor may remove it through the endoscope for closer examination, or biopsy. Tissue removal is painless.    After the exam:           Any tissue samples removed during the exam will be sent to a lab for evaluation. It may take 5-7 working days for you to be notified of the results.     A nurse will provide you with complete discharge instructions before you leave the endoscopy center. Be sure to ask the nurse for specific instructions if you take blood thinners such as Aspirin, Coumadin or Plavix.     The doctor will prepare a full report for you and for the physician who referred you for the procedure.     Your doctor will talk with you about the initial results of your exam.      Medication given during the exam will prohibit you from driving for the rest of the day.     Following the exam, you may resume your normal diet. Your first meal should be light, no greasy foods. Avoid alcohol until the next day.     You may resume your regular activities the day after the procedure.     LOW-FIBER DIET  Foods RECOMMENDED Foods to AVOID   Breads, Cereal, Rice and Pasta:   White bread, rolls, biscuits, croissant and ijeoma toast.   Waffles, Vincentian toast and pancakes.   White rice, noodles, pasta, macaroni and peeled cooked potatoes.   Plain crackers and saltines.   Cooked cereals: farina, cream of rice.   Cold cereals: Puffed Rice , Rice Krispies , Corn Flakes  and Special K    Breads, Cereal, Rice and Pasta:   Breads or rolls with nuts, seeds or fruit.   Whole wheat, pumpernickel, rye breads and cornbread.   Potatoes  with skin, brown or wild rice, and kasha (buckwheat).     Vegetables:   Tender cooked and canned vegetables without seeds: carrots, asparagus tips, green or wax beans, pumpkin, spinach, lima beans. Vegetables:   Raw or steamed vegetables.   Vegetables with seeds.   Sauerkraut.   Winter squash, peas, broccoli, Brussel sprouts, cabbage, onions, cauliflower, baked beans, peas and corn.   Fruits:   Strained fruit juice.   Canned fruit, except pineapple.   Ripe bananas and melon. Fruits:   Prunes and prune juice.   Raw fruits.   Dried fruits: figs, dates and raisins.   Milk/Dairy:   Milk: plain or flavored.   Yogurt, custard and ice cream.   Cheese and cottage cheese Milk/Dairy:     Meat and other proteins:   ground, well-cooked tender beef, lamb, ham, veal, pork, fish, poultry and organ meats.   Eggs.   Peanut butter without nuts. Meat and other proteins:   Tough, fibrous meats with gristle.   Dry beans, peas and lentils.   Peanut butter with nuts.   Tofu.   Fats, Snack, Sweets, Condiments and Beverages:   Margarine, butter, oils, mayonnaise, sour cream and salad dressing, plain gravy.   Sugar, hard candy, clear jelly, honey and syrup.   Spices, cooked herbs, bouillon, broth and soups made with allowed vegetable, ketchup and mustard.   Coffee, tea and carbonated drinks.   Plain cakes, cookies and pretzels.   Gelatin, plain puddings, custard, ice cream, sherbet and popsicles. Fats, Snack, Sweets, Condiments and Beverages:   Nuts, seeds and coconut.   Jam, marmalade and preserves.   Pickles, olives, relish and horseradish.   All desserts containing nuts, seeds, dried fruit and coconut; or made from whole grains or bran.   Candy made with nuts or seeds.   Popcorn.       DIRECTIONS TO THE ENDOSCOPY DEPARTMENT    From the north ([facility])  Take [address] South, exit on [address] . Get into the left hand yudith, turn left (east), go one-half mile to [address] and turn left. Go north to the first  stoplight, take a right on [address] and follow it to the Emergency entrance.    From the south ([facility])  Take 35N to the [address]E split and exit on [address] . On [address] , turn left (west) to [address]. Turn right (north) on [address]. Go north to the first stoplight, take a right on [address] and follow it to the Emergency entrance.    From the east via 35E ([facility])  Take [address] south to [place] [address] exit. Turn right on [address] . Go west to [address]. Turn right (north) on [address]. Go to the first stoplight, take a right and follow on [address] to the Emergency entrance.    From the east via [address] ([facility])  Take [address] to [address]. Turn left (south) on [address] to [address]. Turn left (east) on [address] and follow it to the Emergency entrance.    From the west via [address] ([place], [place])  Take [address] east to [address]. Turn right (south) on [address] to [address]. Turn left (east) on [address] and follow it to the Emergency entrance.

## 2017-11-28 NOTE — H&P
Pre-Endoscopy History and Physical     Kevan Connelly MRN# [id number]   YOB: 1950 Age: 67 year old     Date of Procedure: 11/28/2017  Primary care provider: Davina Reaves  Type of Endoscopy: Colonoscopy with possible biopsy, possible polypectomy  Reason for Procedure: screen  Type of Anesthesia Anticipated: Conscious Sedation    HPI:    Kevan is a 67 year old male who will be undergoing the above procedure.      A history and physical has been performed. The patient's medications and allergies have been reviewed. The risks and benefits of the procedure and the sedation options and risks were discussed with the patient.  All questions were answered and informed consent was obtained.      He denies a personal or family history of anesthesia complications or bleeding disorders.     Patient Active Problem List   Diagnosis     Hx of testicular cancer     Benign essential hypertension     Erectile dysfunction following radiation therapy     Encounter for monitoring diuretic therapy        Past Medical History:   Diagnosis Date     Cancer (H)     testicular cancer     Hypertension         Past Surgical History:   Procedure Laterality Date     GENITOURINARY SURGERY  1990    testicular cancer     ORTHOPEDIC SURGERY  1970's    right knee surgery        Social History   Substance Use Topics     Smoking status: Never Smoker     Smokeless tobacco: Never Used     Alcohol use Yes       Family History   Problem Relation Age of Onset     Colon Cancer Father      Coronary Artery Disease Maternal Grandmother        Prior to Admission medications    Medication Sig Start Date End Date Taking? Authorizing Provider   losartan-hydrochlorothiazide (HYZAAR) 100-12.5 MG per tablet Take 1 tablet by mouth daily 1/2 tablet daily 10/9/17  Yes Davina Reaves MD   sildenafil (REVATIO) 20 MG tablet Take 1 tablet (20 mg) by mouth daily as needed 10/9/17   Davina Reaves MD       Allergies   Allergen Reactions  "    Lisinopril Cough     Very persistent cough        REVIEW OF SYSTEMS:   5 point ROS negative except as noted above in HPI, including Gen., Resp., CV, GI &  system review.    PHYSICAL EXAM:   Ht 1.93 m (6' 4\")  Wt 122.5 kg (270 lb)  BMI 32.87 kg/m2 Estimated body mass index is 32.87 kg/(m^2) as calculated from the following:    Height as of this encounter: 1.93 m (6' 4\").    Weight as of this encounter: 122.5 kg (270 lb).   GENERAL APPEARANCE: alert, and oriented  MENTAL STATUS: alert  AIRWAY EXAM: Mallampatti Class I (visualization of the soft palate, fauces, uvula, anterior and posterior pillars)  RESP: lungs clear to auscultation - no rales, rhonchi or wheezes  CV: regular rates and rhythm  DIAGNOSTICS:    Not indicated    IMPRESSION   ASA Class 2 - Mild systemic disease    PLAN:   Plan for Colonoscopy with possible biopsy, possible polypectomy. We discussed the risks, benefits and alternatives and the patient wished to proceed.    The above has been forwarded to the consulting provider.      Signed Electronically by: Karl Horne  November 28, 2017          "

## 2017-11-29 LAB — COPATH REPORT: NORMAL

## 2017-12-09 PROBLEM — K63.5 COLON POLYPS: Status: ACTIVE | Noted: 2017-12-09

## 2018-08-01 ENCOUNTER — OFFICE VISIT (OUTPATIENT)
Dept: FAMILY MEDICINE | Facility: CLINIC | Age: 68
End: 2018-08-01
Payer: MEDICARE

## 2018-08-01 VITALS
WEIGHT: 277 LBS | BODY MASS INDEX: 33.73 KG/M2 | RESPIRATION RATE: 16 BRPM | SYSTOLIC BLOOD PRESSURE: 138 MMHG | DIASTOLIC BLOOD PRESSURE: 86 MMHG | HEIGHT: 76 IN | TEMPERATURE: 98.4 F | HEART RATE: 80 BPM | OXYGEN SATURATION: 98 %

## 2018-08-01 DIAGNOSIS — I10 BENIGN ESSENTIAL HYPERTENSION: ICD-10-CM

## 2018-08-01 DIAGNOSIS — M62.838 TRAPEZIUS MUSCLE SPASM: Primary | ICD-10-CM

## 2018-08-01 PROCEDURE — 99213 OFFICE O/P EST LOW 20 MIN: CPT | Performed by: PHYSICIAN ASSISTANT

## 2018-08-01 RX ORDER — CYCLOBENZAPRINE HCL 10 MG
10 TABLET ORAL
Qty: 14 TABLET | Refills: 1 | Status: SHIPPED | OUTPATIENT
Start: 2018-08-01 | End: 2019-01-22

## 2018-08-01 NOTE — PROGRESS NOTES
SUBJECTIVE:   Kevan Connelly is a 67 year old male who presents to clinic today for the following health issues:      Musculoskeletal problem/pain      Duration: about 2 weeks    Description  Location: left side upper back/shoulder    Intensity:  Mild-mod, dull ache    Accompanying signs and symptoms: occ radiation of pain to neck and down spine, sometimes it feels numb when the pain is radiating, sometimes there is a shooting pain if he moves a certain way    History  Previous similar problem: no   Previous evaluation:  none    Precipitating or alleviating factors:  Trauma or overuse: no   Aggravating factors include: rolling over in bed at night, sitting for long periods of time    Therapies tried and outcome: heat and ibuprofen help a little bit, and ice and topical lidocaine don't help    Patient with Left trapezius pain for past 2 weeks.   No trauma or overuse of area. No previous injury to that area.  Pain peaked about 3-4 days ago but has reduced since then.  Pain described as a dull discomfort. Rated at 1/10 at rest.  Pain radiates to neck and occasionally into scapular area.  Worse with certain movements such as turning his head or sleeping on his right side.  He has tried baby aspirin, ibuprofen 1000 mg once, and lidocaine patch without relief of symptoms.  States that hot shower will relieve pain for short period.  Denies CP, SOB, palpations, radiculopathy symptoms, fever/chills, or other symptoms.    Problem list and histories reviewed & adjusted, as indicated.  Additional history: as documented    Patient Active Problem List   Diagnosis     Hx of testicular cancer     Benign essential hypertension     Erectile dysfunction following radiation therapy     Encounter for monitoring diuretic therapy     Colon polyps     Past Surgical History:   Procedure Laterality Date     GENITOURINARY SURGERY  1990    testicular cancer     ORTHOPEDIC SURGERY  1970's    right knee surgery        Social History  "  Substance Use Topics     Smoking status: Never Smoker     Smokeless tobacco: Never Used     Alcohol use Yes     Family History   Problem Relation Age of Onset     Colon Cancer Father      Coronary Artery Disease Maternal Grandmother          Current Outpatient Prescriptions   Medication Sig Dispense Refill     losartan-hydrochlorothiazide (HYZAAR) 100-12.5 MG per tablet Take 1 tablet by mouth daily 1/2 tablet daily 45 tablet 3     sildenafil (REVATIO) 20 MG tablet Take 1 tablet (20 mg) by mouth daily as needed 30 tablet 3     Allergies   Allergen Reactions     Lisinopril Cough     Very persistent cough       Reviewed and updated as needed this visit by clinical staff  Tobacco  Allergies  Meds  Med Hx  Surg Hx  Fam Hx  Soc Hx      Reviewed and updated as needed this visit by Provider         ROS:  Constitutional, HEENT, cardiovascular, pulmonary, gi and gu systems are negative, except as otherwise noted.    OBJECTIVE:     /86 (BP Location: Right arm, Patient Position: Chair, Cuff Size: Adult Large)  Pulse 80  Temp 98.4  F (36.9  C) (Oral)  Resp 16  Ht 6' 4\" (1.93 m)  Wt 277 lb (125.6 kg)  SpO2 98%  BMI 33.72 kg/m2  Body mass index is 33.72 kg/(m^2).  GENERAL: healthy, alert and no distress  NECK: no adenopathy, no asymmetry, masses, or scars and thyroid normal to palpation. Decreased active ROM of neck due to stiffness. Full passive ROM.  RESP: lungs clear to auscultation - no rales, rhonchi or wheezes  CV: regular rate and rhythm, normal S1 S2, no S3 or S4, no murmur, click or rub  MS: no gross musculoskeletal defects noted, no edema. Full active ROM and 5/5 strength in bilateral UE. Neurovascular intact.   BACK: no CVA tenderness, no paralumbar tenderness. Tenderness to palpation of left trapezius.    Diagnostic Test Results:  none     ASSESSMENT/PLAN:   1. Trapezius muscle spasm  Left shoulder and neck pain for past two weeks. Consistent with trapezius muscle spasm  Gave referral to " PT  Prescribed flexeril 10mg to use PRN at night for pain relief. Instructed patient to not drink alcohol or drive on this medication.  Recommended patient take ibuprofen 800mg TID with meals PRN for pain relief. Also recommended using hot packs on the area.  Follow up if pain worsens or does not improve.  - [facility]  - cyclobenzaprine (FLEXERIL) 10 MG tablet; Take 1 tablet (10 mg) by mouth nightly as needed for muscle spasms  Dispense: 14 tablet; Refill: 1    2. Benign essential hypertension  BP elevated on initial reading today. Improved on second reading at end of visit.   Patient states home readings are around 130/80s.   Continue to monitor.     I have discussed the patient's presenting complaint(s) with the physician assistant student and agree with the history, physical exam and plan as documented above. Her progress note reflects our assessment and plan.    Risks, benefits and alternatives were discussed with patient. Agreeable to the plan of care.        Lela Rutledge PA-C  [facility]

## 2018-08-01 NOTE — MR AVS SNAPSHOT
After Visit Summary   8/1/2018    Kevan Connelly    MRN: [id number]           Patient Information     Date Of Birth          1950        Visit Information        Provider Department      8/1/2018 11:10 AM Lela Rutledge PA-C [facility]        Today's Diagnoses     Trapezius muscle spasm    -  1    Benign essential hypertension           Follow-ups after your visit        Additional Services     [facility], HAND, AND CHIROPRACTIC REFERRAL       **This order will print in the [facility] Scheduling Office**    Physical Therapy, Hand Therapy and Chiropractic Care are available through:    *[facility]  *[facility]  *[facility]    Call one number to schedule at any of the above locations: [phone].    Your provider has referred you to: Physical Therapy at [facility] or [facility]    Indication/Reason for Referral: Neck Pain  Onset of Illness: 2 weeks  Therapy Orders: Evaluate and Treat  Special Programs:   Special Request:     Taina Her      Additional Comments for the Therapist or Chiropractor:     Please be aware that coverage of these services is subject to the terms and limitations of your health insurance plan.  Call member services at your health plan with any benefit or coverage questions.      Please bring the following to your appointment:    *Your personal calendar for scheduling future appointments  *Comfortable clothing                  Follow-up notes from your care team     Return in about 1 week (around 8/8/2018) for If symptoms worsen or fail to improve.      Who to contact     If you have questions or need follow up information about today's clinic visit or your schedule please contact [facility] directly at [phone].  Normal or non-critical lab and imaging results will be communicated to you by MyChart, letter or phone within 4 business days after the clinic has received the results. If you do  "not hear from us within 7 days, please contact the clinic through [employer] or phone. If you have a critical or abnormal lab result, we will notify you by phone as soon as possible.  Submit refill requests through [employer] or call your pharmacy and they will forward the refill request to us. Please allow 3 business days for your refill to be completed.          Additional Information About Your Visit        [employer] Information     [employer] lets you send messages to your doctor, view your test results, renew your prescriptions, schedule appointments and more. To sign up, go to [email or web address] . Click on \"Log in\" on the left side of the screen, which will take you to the Welcome page. Then click on \"Sign up Now\" on the right side of the page.     You will be asked to enter the access code listed below, as well as some personal information. Please follow the directions to create your username and password.     Your access code is: [email or web address]  Expires: 10/30/2018 11:01 AM     Your access code will  in 90 days. If you need help or a new code, please call your [place] clinic or [phone].        Care EveryWhere ID     This is your Care EveryWhere ID. This could be used by other organizations to access your [place] medical records  [id number]        Your Vitals Were     Pulse Temperature Respirations Height Pulse Oximetry BMI (Body Mass Index)    80 98.4  F (36.9  C) (Oral) 16 6' 4\" (1.93 m) 98% 33.72 kg/m2       Blood Pressure from Last 3 Encounters:   18 (!) 152/98   17 112/85   10/09/17 118/78    Weight from Last 3 Encounters:   18 277 lb (125.6 kg)   17 270 lb (122.5 kg)   10/09/17 280 lb (127 kg)              We Performed the Following     [facility], HAND, AND CHIROPRACTIC REFERRAL          Today's Medication Changes          These changes are accurate as of 18 11:42 AM.  If you have any questions, ask your nurse or doctor.               Start taking these medicines.        " Dose/Directions    cyclobenzaprine 10 MG tablet   Commonly known as:  FLEXERIL   Used for:  Trapezius muscle spasm   Started by:  Lela Rutledge PA-C        Dose:  10 mg   Take 1 tablet (10 mg) by mouth nightly as needed for muscle spasms   Quantity:  14 tablet   Refills:  1            Where to get your medicines      These medications were sent to [facility] [place], [place] - [address]  [address] [facility] [address]     Phone:  [phone]     cyclobenzaprine 10 MG tablet                Primary Care Provider Office Phone # Fax #    Davina Zeny Reaves [phone] [address]  [facility] [address]        Equal Access to Services     [facility]: Hadii david esquivel hadasho Soomaali, waaxda luqadaha, qaybta kaalmada adeegyada, kerrie mike . So [facility] [phone].    ATENCIÓN: Si habla español, tiene a luna disposición servicios gratuitos de asistencia lingüística. [facility] [phone].    We comply with applicable federal civil rights laws and [place] laws. We do not discriminate on the basis of race, color, national origin, age, disability, sex, sexual orientation, or gender identity.            Thank you!     Thank you for choosing [facility]  for your care. Our goal is always to provide you with excellent care. Hearing back from our patients is one way we can continue to improve our services. Please take a few minutes to complete the written survey that you may receive in the mail after your visit with us. Thank you!             Your Updated Medication List - Protect others around you: Learn how to safely use, store and throw away your medicines at www.disposemymeds.org.          This list is accurate as of 8/1/18 11:42 AM.  Always use your most recent med list.                   Brand Name Dispense Instructions for use Diagnosis    cyclobenzaprine 10 MG tablet    FLEXERIL    14 tablet    Take 1  tablet (10 mg) by mouth nightly as needed for muscle spasms    Trapezius muscle spasm       losartan-hydrochlorothiazide 100-12.5 MG per tablet    HYZAAR    45 tablet    Take 1 tablet by mouth daily 1/2 tablet daily    Benign essential hypertension       sildenafil 20 MG tablet    REVATIO    30 tablet    Take 1 tablet (20 mg) by mouth daily as needed    Erectile dysfunction following radiation therapy

## 2018-12-29 DIAGNOSIS — I10 BENIGN ESSENTIAL HYPERTENSION: ICD-10-CM

## 2018-12-30 NOTE — TELEPHONE ENCOUNTER
"Requested Prescriptions   Pending Prescriptions Disp Refills     losartan-hydrochlorothiazide (HYZAAR) 100-12.5 MG tablet [Pharmacy Med Name: LOSARTAN/-12.5MG TAB]  Last Written Prescription Date:  10/09/2017  Last Fill Quantity: 45 tablet,  # refills: 3   Last office visit: 8/1/2018 with prescribing provider:  Lela Rutledge PA-C    Future Office Visit:   Next 5 appointments (look out 90 days)    Jan 22, 2019  7:30 AM CST  PHYSICAL with Davina Reaves MD  [facility] [address] [facility] [address] [phone]          45 tablet 3     Sig: TAKE ONE TABLET BY MOUTH ONCE DAILY TAKE  1/2  TABLET  DAILY  AS  DIRECTED    Angiotensin-II Receptors Failed - 12/29/2018 11:53 AM       Failed - Normal serum creatinine on file in past 12 months    Recent Labs   Lab Test 10/09/17  0915   CR 1.28*            Failed - Normal serum potassium on file in past 12 months    Recent Labs   Lab Test 10/09/17  0915   POTASSIUM 4.6                   Passed - Blood pressure under 140/90 in past 12 months    BP Readings from Last 3 Encounters:   08/01/18 138/86   11/28/17 112/85   10/09/17 118/78                Passed - Recent (12 mo) or future (30 days) visit within the authorizing provider's specialty    Patient had office visit in the last 12 months or has a visit in the next 30 days with authorizing provider or within the authorizing provider's specialty.  See \"Patient Info\" tab in inbasket, or \"Choose Columns\" in Meds & Orders section of the refill encounter.             Passed - Patient is age 18 or older          "

## 2018-12-31 RX ORDER — LOSARTAN POTASSIUM AND HYDROCHLOROTHIAZIDE 12.5; 1 MG/1; MG/1
1 TABLET ORAL DAILY
Qty: 45 TABLET | Refills: 3 | Status: SHIPPED | OUTPATIENT
Start: 2018-12-31 | End: 2019-01-22 | Stop reason: ALTCHOICE

## 2018-12-31 NOTE — TELEPHONE ENCOUNTER
Routing refill request to provider for review/approval because:  Failed RN Refill Protocol: Labs out of range    Kaylee E., Triage RN

## 2019-01-22 ENCOUNTER — OFFICE VISIT (OUTPATIENT)
Dept: FAMILY MEDICINE | Facility: CLINIC | Age: 69
End: 2019-01-22
Payer: COMMERCIAL

## 2019-01-22 VITALS
BODY MASS INDEX: 33.97 KG/M2 | DIASTOLIC BLOOD PRESSURE: 74 MMHG | HEART RATE: 89 BPM | RESPIRATION RATE: 18 BRPM | WEIGHT: 273.2 LBS | HEIGHT: 75 IN | SYSTOLIC BLOOD PRESSURE: 128 MMHG | TEMPERATURE: 97.8 F | OXYGEN SATURATION: 99 %

## 2019-01-22 DIAGNOSIS — I10 BENIGN ESSENTIAL HYPERTENSION: ICD-10-CM

## 2019-01-22 DIAGNOSIS — Z85.47 HX OF TESTICULAR CANCER: ICD-10-CM

## 2019-01-22 DIAGNOSIS — Z13.6 CARDIOVASCULAR SCREENING; LDL GOAL LESS THAN 130: ICD-10-CM

## 2019-01-22 DIAGNOSIS — Z51.81 ENCOUNTER FOR MONITORING DIURETIC THERAPY: ICD-10-CM

## 2019-01-22 DIAGNOSIS — Z23 TETANUS-DIPHTHERIA (TD) VACCINATION: ICD-10-CM

## 2019-01-22 DIAGNOSIS — Z00.00 MEDICARE ANNUAL WELLNESS VISIT, SUBSEQUENT: Primary | ICD-10-CM

## 2019-01-22 DIAGNOSIS — N52.35 ERECTILE DYSFUNCTION FOLLOWING RADIATION THERAPY: ICD-10-CM

## 2019-01-22 DIAGNOSIS — Z23 NEED FOR PROPHYLACTIC VACCINATION AND INOCULATION AGAINST INFLUENZA: ICD-10-CM

## 2019-01-22 DIAGNOSIS — Z79.899 ENCOUNTER FOR MONITORING DIURETIC THERAPY: ICD-10-CM

## 2019-01-22 LAB
ALBUMIN SERPL-MCNC: 4 G/DL (ref 3.4–5)
ALP SERPL-CCNC: 51 U/L (ref 40–150)
ALT SERPL W P-5'-P-CCNC: 27 U/L (ref 0–70)
ANION GAP SERPL CALCULATED.3IONS-SCNC: 6 MMOL/L (ref 3–14)
AST SERPL W P-5'-P-CCNC: 21 U/L (ref 0–45)
BILIRUB SERPL-MCNC: 1.3 MG/DL (ref 0.2–1.3)
BUN SERPL-MCNC: 27 MG/DL (ref 7–30)
CALCIUM SERPL-MCNC: 9 MG/DL (ref 8.5–10.1)
CHLORIDE SERPL-SCNC: 108 MMOL/L (ref 94–109)
CHOLEST SERPL-MCNC: 179 MG/DL
CO2 SERPL-SCNC: 23 MMOL/L (ref 20–32)
CREAT SERPL-MCNC: 1.3 MG/DL (ref 0.66–1.25)
CREAT UR-MCNC: 175 MG/DL
GFR SERPL CREATININE-BSD FRML MDRD: 57 ML/MIN/{1.73_M2}
GLUCOSE SERPL-MCNC: 94 MG/DL (ref 70–99)
HDLC SERPL-MCNC: 42 MG/DL
LDLC SERPL CALC-MCNC: 120 MG/DL
MICROALBUMIN UR-MCNC: 7 MG/L
MICROALBUMIN/CREAT UR: 3.8 MG/G CR (ref 0–17)
NONHDLC SERPL-MCNC: 137 MG/DL
POTASSIUM SERPL-SCNC: 4.5 MMOL/L (ref 3.4–5.3)
PROT SERPL-MCNC: 7.2 G/DL (ref 6.8–8.8)
SODIUM SERPL-SCNC: 137 MMOL/L (ref 133–144)
TRIGL SERPL-MCNC: 84 MG/DL

## 2019-01-22 PROCEDURE — 82043 UR ALBUMIN QUANTITATIVE: CPT | Performed by: INTERNAL MEDICINE

## 2019-01-22 PROCEDURE — 90670 PCV13 VACCINE IM: CPT | Performed by: INTERNAL MEDICINE

## 2019-01-22 PROCEDURE — 90471 IMMUNIZATION ADMIN: CPT | Performed by: INTERNAL MEDICINE

## 2019-01-22 PROCEDURE — 80053 COMPREHEN METABOLIC PANEL: CPT | Performed by: INTERNAL MEDICINE

## 2019-01-22 PROCEDURE — G0439 PPPS, SUBSEQ VISIT: HCPCS | Performed by: INTERNAL MEDICINE

## 2019-01-22 PROCEDURE — 36415 COLL VENOUS BLD VENIPUNCTURE: CPT | Performed by: INTERNAL MEDICINE

## 2019-01-22 PROCEDURE — 90714 TD VACC NO PRESV 7 YRS+ IM: CPT | Mod: GY | Performed by: INTERNAL MEDICINE

## 2019-01-22 PROCEDURE — 80061 LIPID PANEL: CPT | Performed by: INTERNAL MEDICINE

## 2019-01-22 PROCEDURE — G0009 ADMIN PNEUMOCOCCAL VACCINE: HCPCS | Mod: 59 | Performed by: INTERNAL MEDICINE

## 2019-01-22 PROCEDURE — 99213 OFFICE O/P EST LOW 20 MIN: CPT | Mod: 25 | Performed by: INTERNAL MEDICINE

## 2019-01-22 RX ORDER — LOSARTAN POTASSIUM AND HYDROCHLOROTHIAZIDE 12.5; 5 MG/1; MG/1
1 TABLET ORAL DAILY
Qty: 90 TABLET | Refills: 3 | Status: SHIPPED | OUTPATIENT
Start: 2019-01-22 | End: 2019-01-29 | Stop reason: ALTCHOICE

## 2019-01-22 ASSESSMENT — ENCOUNTER SYMPTOMS
HEADACHES: 0
SHORTNESS OF BREATH: 0
JOINT SWELLING: 0
CONSTIPATION: 0
PALPITATIONS: 0
HEMATURIA: 0
MYALGIAS: 0
EYE PAIN: 0
DIZZINESS: 0
PARESTHESIAS: 0
CHILLS: 0
DIARRHEA: 0
NAUSEA: 0
SORE THROAT: 0
ARTHRALGIAS: 0
FREQUENCY: 0
COUGH: 0
HEMATOCHEZIA: 0
HEARTBURN: 0
DYSURIA: 0
ABDOMINAL PAIN: 0

## 2019-01-22 ASSESSMENT — ACTIVITIES OF DAILY LIVING (ADL): CURRENT_FUNCTION: NO ASSISTANCE NEEDED

## 2019-01-22 ASSESSMENT — MIFFLIN-ST. JEOR: SCORE: 2096.92

## 2019-01-22 NOTE — PATIENT INSTRUCTIONS
Preventive Health Recommendations:     See your health care provider every year to    Review health changes.     Discuss preventive care.      Review your medicines if your doctor has prescribed any.    Talk with your health care provider about whether you should have a test to screen for prostate cancer (PSA).    Every 3 years, have a diabetes test (fasting glucose). If you are at risk for diabetes, you should have this test more often.    Every 5 years, have a cholesterol test. Have this test more often if you are at risk for high cholesterol or heart disease.     Every 10 years, have a colonoscopy. Or, have a yearly FIT test (stool test). These exams will check for colon cancer.    Talk to with your health care provider about screening for Abdominal Aortic Aneurysm if you have a family history of AAA or have a history of smoking.  Shots:     Get a flu shot each year.     Get a tetanus shot every 10 years.     Talk to your doctor about your pneumonia vaccines. There are now two you should receive - Pneumovax (PPSV 23) and Prevnar (PCV 13).    Talk to your pharmacist about a shingles vaccine.     Talk to your doctor about the hepatitis B vaccine.  Nutrition:     Eat at least 5 servings of fruits and vegetables each day.     Eat whole-grain bread, whole-wheat pasta and brown rice instead of white grains and rice.     Get adequate Calcium and Vitamin D.   Lifestyle    Exercise for at least 150 minutes a week (30 minutes a day, 5 days a week). This will help you control your weight and prevent disease.     Limit alcohol to one drink per day.     No smoking.     Wear sunscreen to prevent skin cancer.     See your dentist every six months for an exam and cleaning.     See your eye doctor every 1 to 2 years to screen for conditions such as glaucoma, macular degeneration and cataracts.    Personalized Prevention Plan  You are due for the preventive services outlined below.  Your care team is available to assist you in  scheduling these services.  If you have already completed any of these items, please share that information with your care team to update in your medical record.    Health Maintenance Due   Topic Date Due     Zoster (Chicken Pox) Vaccine (1 of 2) 10/05/2002     Diptheria Tetanus Pertussis (DTAP/TDAP/TD) Vaccine (2 - Td) 11/12/2012     AORTIC ANEURYSM SCREENING (SYSTEM ASSIGNED)  10/05/2017     FALL RISK ASSESSMENT  10/09/2018     Depression Assessment 2 - yearly  10/09/2018

## 2019-01-22 NOTE — PROGRESS NOTES
"SUBJECTIVE:   Kevan Connelly is a 68 year old male who presents for Preventive Visit.  Are you in the first 12 months of your Medicare coverage?  No    Annual Wellness Visit     In general, how would you rate your overall health?  Good    Frequency of exercise:  2-3 days/week    Duration of exercise:  30-45 minutes    Do you usually eat at least 4 servings of fruit and vegetables a day, include whole grains    & fiber and avoid regularly eating high fat or \"junk\" foods?  No    Taking medications regularly:  Yes    Medication side effects:  None    Ability to successfully perform activities of daily living:  No assistance needed    Home Safety:  No safety concerns identified    Hearing Impairment:  Find that men's voices are easier to understand than woman's    In the past 6 months, have you been bothered by leaking of urine?  No    In general, how would you rate your overall mental or emotional health?  Excellent    PHQ-2 Total Score: 0    Additional concerns today:  No    Do you feel safe in your environment? Yes    Do you have a Health Care Directive? No: Advance care planning was reviewed with patient; patient declined at this time.      Fall risk  Fallen 2 or more times in the past year?: No  Any fall with injury in the past year?: No    Cognitive Screening   1) Repeat 3 items (Leader, Season, Table)    2) Clock draw: NORMAL  3) 3 item recall: Recalls 3 objects  Results: 3 items recalled: COGNITIVE IMPAIRMENT LESS LIKELY    Mini-CogTM Copyright S Ap[employer]. Licensed by the author for use in [facility]; reprinted with permission (amy@.[facility]). All rights reserved.      Do you have sleep apnea, excessive snoring or daytime drowsiness?: no    Reviewed and updated as needed this visit by clinical staff  Tobacco  Allergies  Meds  Problems  Med Hx  Surg Hx  Fam Hx  Soc Hx          Reviewed and updated as needed this visit by Provider  Tobacco  Allergies  Meds  Problems  Med Hx  Surg Hx  Fam " Hx        Social History     Tobacco Use     Smoking status: Never Smoker     Smokeless tobacco: Never Used   Substance Use Topics     Alcohol use: Yes       Alcohol Use 1/22/2019   If you drink alcohol do you typically have greater than 3 drinks per day OR greater than 7 drinks per week? No       Current providers sharing in care for this patient include:   Patient Care Team:  Davina Reaves MD as PCP - General (Internal Medicine)  Lela Rutledge PA-C as PCP - Assigned PCP    The following health maintenance items are reviewed in Epic and correct as of today:  Health Maintenance   Topic Date Due     ZOSTER IMMUNIZATION (1 of 2) 10/05/2002     AORTIC ANEURYSM SCREENING (SYSTEM ASSIGNED)  10/05/2017     FALL RISK ASSESSMENT  01/22/2020     PHQ-2 Q1 YR  01/22/2020     COLONOSCOPY Q3 YR  11/28/2020     ADVANCE DIRECTIVE PLANNING Q5 YRS  01/18/2022     LIPID SCREEN Q5 YR MALE (SYSTEM ASSIGNED)  01/22/2024     DTAP/TDAP/TD IMMUNIZATION (3 - Td) 01/22/2029     INFLUENZA VACCINE  Completed     HEPATITIS C SCREENING  Completed     IPV IMMUNIZATION  Aged Out     MENINGITIS IMMUNIZATION  Aged Out     Labs reviewed in EPIC  BP Readings from Last 3 Encounters:       01/22/19 128/74   08/01/18 138/86    Wt Readings from Last 3 Encounters:   01/22/19 123.9 kg (273 lb 3.2 oz)   08/01/18 125.6 kg (277 lb)   11/28/17 122.5 kg (270 lb)                  Patient Active Problem List   Diagnosis     Hx of testicular cancer     Benign essential hypertension     Erectile dysfunction following radiation therapy     Encounter for monitoring diuretic therapy     Colon polyps     Past Surgical History:   Procedure Laterality Date     GENITOURINARY SURGERY  1990    testicular cancer     ORTHOPEDIC SURGERY  1970's    right knee surgery        Social History     Tobacco Use     Smoking status: Never Smoker     Smokeless tobacco: Never Used   Substance Use Topics     Alcohol use: Yes     Family History   Problem Relation Age of  "Onset     Colon Cancer Father      Coronary Artery Disease Maternal Grandmother          Current Outpatient Medications   Medication Sig Dispense Refill     losartan-hydrochlorothiazide (HYZAAR) 50-12.5 MG tablet Take 1 tablet by mouth daily 90 tablet 3     sildenafil (REVATIO) 20 MG tablet Take 1 tablet (20 mg) by mouth daily as needed 30 tablet 3     Allergies   Allergen Reactions     Lisinopril Cough     Very persistent cough       Review of Systems   Constitutional: Negative for chills.   HENT: Negative for congestion, ear pain, hearing loss and sore throat.    Eyes: Negative for pain and visual disturbance.   Respiratory: Negative for cough and shortness of breath.    Cardiovascular: Negative for chest pain, palpitations and peripheral edema.   Gastrointestinal: Negative for abdominal pain, constipation, diarrhea, heartburn, hematochezia and nausea.   Genitourinary: Positive for impotence. Negative for discharge, dysuria, frequency, genital sores, hematuria and urgency.   Musculoskeletal: Negative for arthralgias, joint swelling and myalgias.   Skin: Negative for rash.   Neurological: Negative for dizziness, headaches and paresthesias.   Psychiatric/Behavioral: Negative for mood changes.         OBJECTIVE:   /74   Pulse 89   Temp 97.8  F (36.6  C) (Oral)   Resp 18   Ht 1.892 m (6' 2.5\")   Wt 123.9 kg (273 lb 3.2 oz)   SpO2 99%   BMI 34.61 kg/m   Estimated body mass index is 34.61 kg/m  as calculated from the following:    Height as of this encounter: 1.892 m (6' 2.5\").    Weight as of this encounter: 123.9 kg (273 lb 3.2 oz).  Physical Exam  GENERAL: healthy, alert and no distress  NECK: no adenopathy, no asymmetry, masses, or scars and thyroid normal to palpation  RESP: lungs clear to auscultation - no rales, rhonchi or wheezes  CV: regular rate and rhythm, normal S1 S2, no S3 or S4, no murmur, click or rub, no peripheral edema and peripheral pulses strong  ABDOMEN: soft, nontender, no " hepatosplenomegaly, no masses and bowel sounds normal  MS: no gross musculoskeletal defects noted, no edema  SKIN: no suspicious lesions or rashes  NEURO: Normal strength and tone, mentation intact and speech normal  PSYCH: mentation appears normal, affect normal/bright      ASSESSMENT / PLAN:   (Z00.00) Medicare annual wellness visit, subsequent  (primary encounter diagnosis)  Comment: wellness visit   Plan: reviewed HCM    (Z85.47) Hx of testicular cancer  Comment: hx, no recurrence  Plan:     (Z51.81,  Z79.899) Encounter for monitoring diuretic therapy  Comment: pt on Hyzaar; monitor potassium and renal function  Plan:     (N52.35) Erectile dysfunction following radiation therapy  Comment: noted; related to past treatment for testicular cancer.   Plan: may use Viagra if desired; he is not on any nitrates.     (I10) Benign essential hypertension  Comment: well controlled; periodic monitoring  Plan: losartan-hydrochlorothiazide (HYZAAR) 50-12.5         MG tablet, Albumin Random Urine Quantitative         with Creat Ratio, Comprehensive metabolic panel          (Z13.6) CARDIOVASCULAR SCREENING; LDL GOAL LESS THAN 130  Comment:   Plan: Comprehensive metabolic panel, Lipid panel         reflex to direct LDL Fasting          (Z23) Need for prophylactic vaccination and inoculation against influenza  Comment: Pt request. CDC guidelines met   Plan: Pneumococcal vaccine 13 valent PCV13 IM         (Prevnar) [97782], Vaccine Administration,         Initial [40848]          (Z23) Tetanus-diphtheria (Td) vaccination  Comment: due to up date Tetanus; prefers TD rather than TDAP  Plan: TD PRESERV FREE, IM (7+ YRS)          End of Life Planning:  Patient currently has an advanced directive: as noted    COUNSELING:  Reviewed preventive health counseling, as reflected in patient instructions       Regular exercise       Healthy diet/nutrition       Immunizations    Vaccinated for: Influenza and Pneumococcal          BP Readings  "from Last 1 Encounters:   01/29/19 102/62     Estimated body mass index is 34.61 kg/m  as calculated from the following:    Height as of this encounter: 1.892 m (6' 2.5\").    Weight as of this encounter: 123.9 kg (273 lb 3.2 oz).      Weight management plan: Discussed healthy diet and exercise guidelines     reports that  has never smoked. he has never used smokeless tobacco.      Appropriate preventive services were discussed with this patient, including applicable screening as appropriate for cardiovascular disease, diabetes, osteopenia/osteoporosis, and glaucoma.  As appropriate for age/gender, discussed screening for colorectal cancer, prostate cancer, breast cancer, and cervical cancer. Checklist reviewing preventive services available has been given to the patient.    Reviewed patients plan of care and provided an AVS. The Basic Care Plan (routine screening as documented in Health Maintenance) for Kevan meets the Care Plan requirement. This Care Plan has been established and reviewed with the Patient.    Counseling Resources:  ATP IV Guidelines  Pooled Cohorts Equation Calculator  Breast Cancer Risk Calculator  FRAX Risk Assessment  ICSI Preventive Guidelines  Dietary Guidelines for Americans, 2010  USDA's MyPlate  ASA Prophylaxis  Lung CA Screening    Davina Reaves MD  Internal Medicine   [facility] [place]  15 minutes in addition to HEALTH CARE MAINTENANCE are spent with patient, over 50% of that time spent providing counselling, discussing and reviewing medical conditions/concerns, meds and potential side effects.    "

## 2019-01-29 ENCOUNTER — ALLIED HEALTH/NURSE VISIT (OUTPATIENT)
Dept: NURSING | Facility: CLINIC | Age: 69
End: 2019-01-29
Payer: COMMERCIAL

## 2019-01-29 VITALS — HEART RATE: 62 BPM | DIASTOLIC BLOOD PRESSURE: 62 MMHG | SYSTOLIC BLOOD PRESSURE: 102 MMHG

## 2019-01-29 DIAGNOSIS — I10 BENIGN ESSENTIAL HYPERTENSION: Primary | ICD-10-CM

## 2019-01-29 PROCEDURE — 99207 ZZC NO CHARGE NURSE ONLY: CPT

## 2019-01-29 RX ORDER — LOSARTAN POTASSIUM 50 MG/1
50 TABLET ORAL DAILY
Qty: 30 TABLET | Refills: 0 | Status: SHIPPED | OUTPATIENT
Start: 2019-01-29 | End: 2019-02-11

## 2019-01-29 NOTE — PROGRESS NOTES
Pt in for nurse visit due to low BLOOD PRESSURE. See nurse only visit for today. 1/29/2019   discussed with nurse and can change to Losartan, remove diuretic, monitor for other health changes and then reassess bp in 2 week with PCP

## 2019-01-29 NOTE — PROGRESS NOTES
Huddled with Dr. Reaves and medication changed   will provide #30, see how that goes and then see him in 2 weeks on that medication;  monitor for other changes, illness, etc.     Huddled with Dr. Reaves and new prescription was sent to [facility].  Called and left a message for pt that it was sent to our pharmacy.     appt scheduled for 2-3 weeks from now with Dr. Reaves.

## 2019-02-11 ENCOUNTER — OFFICE VISIT (OUTPATIENT)
Dept: FAMILY MEDICINE | Facility: CLINIC | Age: 69
End: 2019-02-11
Payer: COMMERCIAL

## 2019-02-11 VITALS
WEIGHT: 277 LBS | HEART RATE: 61 BPM | DIASTOLIC BLOOD PRESSURE: 70 MMHG | BODY MASS INDEX: 35.09 KG/M2 | OXYGEN SATURATION: 100 % | SYSTOLIC BLOOD PRESSURE: 128 MMHG | RESPIRATION RATE: 17 BRPM

## 2019-02-11 DIAGNOSIS — E66.01 MORBID OBESITY (H): ICD-10-CM

## 2019-02-11 DIAGNOSIS — I10 ESSENTIAL HYPERTENSION: Primary | ICD-10-CM

## 2019-02-11 DIAGNOSIS — I10 BENIGN ESSENTIAL HYPERTENSION: ICD-10-CM

## 2019-02-11 PROCEDURE — 99213 OFFICE O/P EST LOW 20 MIN: CPT | Performed by: INTERNAL MEDICINE

## 2019-02-11 RX ORDER — LOSARTAN POTASSIUM 50 MG/1
50 TABLET ORAL DAILY
Qty: 90 TABLET | Refills: 3 | Status: SHIPPED | OUTPATIENT
Start: 2019-02-11 | End: 2020-02-25

## 2019-02-11 NOTE — PROGRESS NOTES
Chief Complaint   Patient presents with     Hypertension       SUBJECTIVE:   Kevan Connelly is a 68 year old male who presents to clinic today for the following health issues:      Hypertension Follow-up      Outpatient blood pressures being checked at home 110/74 to 134/82     Low Salt Diet: no added salt      Amount of exercise or physical activity: 2-3 days/week for an average of 45-60 minutes    Problems taking medications regularly: No    Medication side effects: is not sure but has had some swelling in his right lower leg and foot- improved    Diet: is trying to do 2000 to 2400 calories per day   Trying to increase protein.     Current Outpatient Medications   Medication Sig Dispense Refill     losartan (COZAAR) 50 MG tablet Take 1 tablet (50 mg) by mouth daily 30 tablet 0     sildenafil (REVATIO) 20 MG tablet Take 1 tablet (20 mg) by mouth daily as needed 30 tablet 3     Diuretic has been removed. Feeling better on this regimen      Problem list and histories reviewed & adjusted, as indicated.  Additional history: as documented    Patient Active Problem List   Diagnosis     Hx of testicular cancer     Benign essential hypertension     Erectile dysfunction following radiation therapy     Encounter for monitoring diuretic therapy     Colon polyps     Obesity (BMI 35.0-39.9) with comorbidity (H)     Past Surgical History:   Procedure Laterality Date     GENITOURINARY SURGERY  1990    testicular cancer     ORTHOPEDIC SURGERY  1970's    right knee surgery        Social History     Tobacco Use     Smoking status: Never Smoker     Smokeless tobacco: Never Used   Substance Use Topics     Alcohol use: Yes     Family History   Problem Relation Age of Onset     Colon Cancer Father      Coronary Artery Disease Maternal Grandmother          Current Outpatient Medications   Medication Sig Dispense Refill     losartan (COZAAR) 50 MG tablet Take 1 tablet (50 mg) by mouth daily 90 tablet 3     sildenafil (REVATIO) 20 MG  tablet Take 1 tablet (20 mg) by mouth daily as needed 30 tablet 3     Allergies   Allergen Reactions     Lisinopril Cough     Very persistent cough     BP Readings from Last 3 Encounters:   02/11/19 128/70   01/29/19 102/62   01/22/19 128/74    Wt Readings from Last 3 Encounters:   02/11/19 125.6 kg (277 lb)   01/22/19 123.9 kg (273 lb 3.2 oz)   08/01/18 125.6 kg (277 lb)                  Labs reviewed in EPIC    Reviewed and updated as needed this visit by clinical staff  Tobacco  Allergies  Meds  Med Hx  Surg Hx  Fam Hx  Soc Hx      Reviewed and updated as needed this visit by Provider  Tobacco  Allergies  Meds  Problems  Med Hx  Surg Hx  Fam Hx         ROS:  CONSTITUTIONAL: feeling well;   RESP: NEGATIVE for significant cough or SOB  CV: bp better controlled; diuretic removed;   GI: denies GI complaints  MUSCULOSKELETAL: initially noticed some swelling  PSYCHIATRIC: no changes    OBJECTIVE:     /70   Pulse 61   Resp 17   Wt 125.6 kg (277 lb)   SpO2 100%   BMI 35.09 kg/m    Body mass index is 35.09 kg/m .  GENERAL: healthy, alert and no distress  RESP: lungs clear to auscultation - no rales, rhonchi or wheezes  CV: regular rate and rhythm, normal S1 S2, no S3 or S4, no murmur, click or rub, no peripheral edema and peripheral pulses strong  MS: no gross musculoskeletal defects noted, no edema  PSYCH: mentation appears normal, affect normal/bright      ASSESSMENT/PLAN:     (I10) Essential hypertension  (primary encounter diagnosis)  Comment: BLOOD PRESSURE well controlled on ARB also no longer on diuretic; no low bp readings; may have had slight edema initially but has normalized with bp well controlled.   Plan: Basic metabolic panel;  losartan (COZAAR) 50 MG tablet        Continue on same medication    (E66.01) Morbid obesity (H)  Comment: Body mass index is 35.09 kg/m .   Plan: continue regular activity and healthy diet      Davina Reaves MD  Internal Medicine   [facility]  [place]

## 2019-05-30 ENCOUNTER — OFFICE VISIT (OUTPATIENT)
Dept: PEDIATRICS | Facility: CLINIC | Age: 69
End: 2019-05-30
Payer: COMMERCIAL

## 2019-05-30 ENCOUNTER — TELEPHONE (OUTPATIENT)
Dept: FAMILY MEDICINE | Facility: CLINIC | Age: 69
End: 2019-05-30

## 2019-05-30 VITALS
HEART RATE: 58 BPM | OXYGEN SATURATION: 97 % | BODY MASS INDEX: 33.42 KG/M2 | WEIGHT: 268.8 LBS | DIASTOLIC BLOOD PRESSURE: 80 MMHG | TEMPERATURE: 97 F | HEIGHT: 75 IN | SYSTOLIC BLOOD PRESSURE: 114 MMHG

## 2019-05-30 DIAGNOSIS — R06.9 ABNORMALITY OF BREATHING: ICD-10-CM

## 2019-05-30 DIAGNOSIS — I48.91 ATRIAL FIBRILLATION, UNSPECIFIED TYPE (H): Primary | ICD-10-CM

## 2019-05-30 DIAGNOSIS — R07.9 CHEST PAIN, UNSPECIFIED TYPE: ICD-10-CM

## 2019-05-30 LAB — NT-PROBNP SERPL-MCNC: 3787 PG/ML (ref 0–125)

## 2019-05-30 PROCEDURE — 99214 OFFICE O/P EST MOD 30 MIN: CPT | Performed by: INTERNAL MEDICINE

## 2019-05-30 PROCEDURE — 36415 COLL VENOUS BLD VENIPUNCTURE: CPT | Performed by: INTERNAL MEDICINE

## 2019-05-30 PROCEDURE — 93000 ELECTROCARDIOGRAM COMPLETE: CPT | Performed by: INTERNAL MEDICINE

## 2019-05-30 PROCEDURE — 84443 ASSAY THYROID STIM HORMONE: CPT | Performed by: INTERNAL MEDICINE

## 2019-05-30 PROCEDURE — 80048 BASIC METABOLIC PNL TOTAL CA: CPT | Performed by: INTERNAL MEDICINE

## 2019-05-30 PROCEDURE — 83880 ASSAY OF NATRIURETIC PEPTIDE: CPT | Performed by: INTERNAL MEDICINE

## 2019-05-30 RX ORDER — WARFARIN SODIUM 5 MG/1
5 TABLET ORAL DAILY
Qty: 30 TABLET | Refills: 1 | Status: SHIPPED | OUTPATIENT
Start: 2019-05-30 | End: 2019-06-19

## 2019-05-30 RX ORDER — METOPROLOL SUCCINATE 25 MG/1
25 TABLET, EXTENDED RELEASE ORAL DAILY
Qty: 30 TABLET | Refills: 1 | Status: SHIPPED | OUTPATIENT
Start: 2019-05-30 | End: 2019-06-05

## 2019-05-30 ASSESSMENT — MIFFLIN-ST. JEOR: SCORE: 2076.96

## 2019-05-30 NOTE — PROGRESS NOTES
Subjective     Kevan Connelly is a 68 year old male who presents to clinic today for the following health issues:    HPI   Insomnia      Duration: 4-6 weeks    Description  Frequency of insomnia:  nightly  Time to fall asleep: 30 minutes  Middle of night awakening:  nightly  Early morning awakening:  nightly    Accompanying signs and symptoms:  Fatigue, waking with SOB and chest discomfort    History  Similar episodes in past:  no   Previous evaluation/sleep study:  no     Precipitating or alleviating factors:  New stressful situation: no   Caffeine intake after lunchtime: YES- rarely  OTC decongestants: no   Any new medications: no, but hydrochlorothiazide stopped in February    Therapies tried and outcome: none    Patient reports for about 4-6 weeks he is only getting 2 hours of sleep, waking with shortness of breath; he has difficulty breathing but not in a sweat, slight discomfort in his chest like a slight pressure/burning but not like heart burn. He then has trouble going back to sleep. Sitting upright and getting up relieves the shortness of breath and chest sx abates a little bit but is still there.Comes back when he lies back down.   Has occasional shortness of breath when climbing stairs or bending over to tie shoes. Occasional chest discomfort during the day. Chest discomfort happens with normal activity. Is not exercising now since he has not had energy or motivation. Occasional swelling worse than typical but not as bad normally. Was taken off hydrochlorothiazide recently since her was hypotensive. FHx of heart attack in maternal grandmother and hypertension. Is not eating as much since he has decreased appetite but has been trying to eat better. BP monitor has not been working at home. Occasionally will get heart burn but not as much as prior.  Has noticed his heart beating irregularly occasionally.       Patient Active Problem List   Diagnosis     Hx of testicular cancer     Benign essential  hypertension     Erectile dysfunction following radiation therapy     Encounter for monitoring diuretic therapy     Colon polyps     Obesity (BMI 35.0-39.9) with comorbidity (H)     Past Surgical History:   Procedure Laterality Date     GENITOURINARY SURGERY  1990    testicular cancer     ORTHOPEDIC SURGERY  1970's    right knee surgery        Social History     Tobacco Use     Smoking status: Never Smoker     Smokeless tobacco: Never Used   Substance Use Topics     Alcohol use: Yes     Family History   Problem Relation Age of Onset     Colon Cancer Father      Coronary Artery Disease Maternal Grandmother          Current Outpatient Medications   Medication Sig Dispense Refill     losartan (COZAAR) 50 MG tablet Take 1 tablet (50 mg) by mouth daily 90 tablet 3     sildenafil (REVATIO) 20 MG tablet Take 1 tablet (20 mg) by mouth daily as needed 30 tablet 3     Allergies   Allergen Reactions     Lisinopril Cough     Very persistent cough     BP Readings from Last 3 Encounters:   05/30/19 114/80   02/11/19 128/70   01/29/19 102/62    Wt Readings from Last 3 Encounters:   05/30/19 121.9 kg (268 lb 12.8 oz)   02/11/19 125.6 kg (277 lb)   01/22/19 123.9 kg (273 lb 3.2 oz)                    Reviewed and updated as needed this visit by Provider         Review of Systems   ROS COMP: Constitutional, HEENT, cardiovascular, pulmonary, GI, , musculoskeletal, neuro, skin, endocrine and psych systems are negative, except as otherwise noted.    This document serves as a record of the services and decisions personally performed and made by Magui Phillips MD. It was created on her behalf by Craole Flores, a trained medical scribe. The creation of this document is based the provider's statements to the medical scribe.    Carole Flores May 30, 2019 10:28 AM      Objective    /80 (BP Location: Right arm, Patient Position: Sitting, Cuff Size: Adult Large)   Pulse 58   Temp 97  F (36.1  C) (Tympanic)   Ht 1.892 m (6'  "2.5\")   Wt 121.9 kg (268 lb 12.8 oz)   SpO2 97%   BMI 34.05 kg/m    Body mass index is 34.05 kg/m .  Physical Exam   GENERAL:  alert and no distress  RESP: lungs clear to auscultation - no rales, rhonchi or wheezes  CV: Irregularly Irregular , normal S1 S2, no S3 or S4, no murmur, click or rub.  HR significantly faster on auscultation than HR on vitals.    ABDOMEN: soft, nontender,  and bowel sounds normal  MS: no gross musculoskeletal defects noted, 1+ edema bilaterally   PSYCH: mentation appears normal, affect normal/bright        Diagnostic Test Results:  Labs reviewed in Epic  No results found for this or any previous visit (from the past 24 hour(s)).  EKG - Atrial Fibrillation           Assessment & Plan     (I48.91) Atrial fibrillation, unspecified type (H)  (primary encounter diagnosis)  -- EKG showed afib, new dx for patient. Reviewed dx with patient today including risks of a fib  --reviewed typical causes for afib, usual treatments including cardioversion vs rate control.  Due to duration of symptoms patient is currently not a cardioversion candidate; will start on metoprolol for rate control.  Could benefit from cardiology consult in 1-2 months to see if he would be a candidate for cardioversion   -schedule echocardiogram to evaluate for cardiomegaly and valvular issues that could be contributing; these would also limit his viability as a candidate for cardioversion.    -- labs today to evaluate thyroid and electrolytes  -- unclear if chest pressure is due to angina from tachycardia.  Will order stress test.  Must be lexiscan due to afib.    -- based on CHADS-vasc 2 score patient qualifies for anticoagulation  -- will start on Xarelto; patient to contact me if not covered under insurance and will switch   -- suspect this is why his BP dropped recently   -- advised patient he needs to get a functioning BP cuff or get his checked in order to check BP over the next few weeks along with HR   -- " "schedule with cardiologist in 2 months   -pt will benefit from short interval f/u with PCP to monitor symptoms   Plan: rivaroxaban ANTICOAGULANT (XARELTO) 20 MG TABS         tablet, metoprolol succinate ER (TOPROL-XL) 25         MG 24 hr tablet, Basic metabolic panel, TSH         with free T4 reflex, BNP-N terminal pro,         Echocardiogram Complete, NM Lexiscan stress         test      (R07.9) Chest pain, unspecified type  -- see above discussion   Plan: NM Lexiscan stress test      (R06.9) Abnormality of breathing   -- see above discussion   Plan: BNP-N terminal pro            Follow up with PCP Dr. Reaves on        BMI:   Estimated body mass index is 34.05 kg/m  as calculated from the following:    Height as of this encounter: 1.892 m (6' 2.5\").    Weight as of this encounter: 121.9 kg (268 lb 12.8 oz).   Weight management plan: Discussed healthy diet and exercise guidelines    No follow-ups on file.      The information in this document, created by the medical scribe for me, accurately reflects the services I personally performed and the decisions made by me. I have reviewed and approved this document for accuracy prior to leaving the patient care area.  Magui Phillips MD  [facility]    "

## 2019-05-30 NOTE — TELEPHONE ENCOUNTER
Reason for Call:  Other prescription    Detailed comments: was given a script for Xeralto and the med is to expensive. It is covered by insurance but he has a 400 dollar deductable and will not pay that much for the med. He would like an alternative that is not so expensive.     Phone Number Patient can be reached at: Home number on file [phone] (home)    Best Time: any    Can we leave a detailed message on this number? YES    Call taken on 5/30/2019 at 12:55 PM by Lovely Rodriguez

## 2019-05-30 NOTE — PATIENT INSTRUCTIONS
Bring blood pressures and heart rates into clinic in 1 week for appointment with Dr. Reaves.     Start on Metoprolol 25 MG (can take morning or dinner) and get a functioning BP cuff. Either bring it into a pharmacy and see if they can adjust it or get a new one.     Start on Xarelto (with dinner) (anticoagulation medication). If you insurance does not cover let me know and I will change it.     Schedule echocardiogram and stress test (stop metoprolol one day prior to the procedure).      Schedule with cardiologist in about a month or two about opinion on flipping your heart out of a fib.

## 2019-05-30 NOTE — TELEPHONE ENCOUNTER
Hes going to have to do coumadin then.  I'll send in a prescription.  Please help him schedule an INR appt on Monday. He should take coumadin at bedtime.   Magui Phillips MD

## 2019-05-30 NOTE — TELEPHONE ENCOUNTER
Please advise.     Triage spoke with pharmacy medication issues is d/t  High cost deductible. Would you like to try apixaban (Eliquis) to see if this is a little cheaper?    Katelyn JON RN, BSN, PHN

## 2019-05-31 LAB
ANION GAP SERPL CALCULATED.3IONS-SCNC: 7 MMOL/L (ref 3–14)
BUN SERPL-MCNC: 23 MG/DL (ref 7–30)
CALCIUM SERPL-MCNC: 9.1 MG/DL (ref 8.5–10.1)
CHLORIDE SERPL-SCNC: 109 MMOL/L (ref 94–109)
CO2 SERPL-SCNC: 22 MMOL/L (ref 20–32)
CREAT SERPL-MCNC: 1.3 MG/DL (ref 0.66–1.25)
GFR SERPL CREATININE-BSD FRML MDRD: 57 ML/MIN/{1.73_M2}
GLUCOSE SERPL-MCNC: 90 MG/DL (ref 70–99)
POTASSIUM SERPL-SCNC: 4.6 MMOL/L (ref 3.4–5.3)
SODIUM SERPL-SCNC: 138 MMOL/L (ref 133–144)
TSH SERPL DL<=0.005 MIU/L-ACNC: 1.86 MU/L (ref 0.4–4)

## 2019-06-02 ENCOUNTER — TELEPHONE (OUTPATIENT)
Dept: PEDIATRICS | Facility: CLINIC | Age: 69
End: 2019-06-02

## 2019-06-02 ENCOUNTER — TELEPHONE (OUTPATIENT)
Dept: FAMILY MEDICINE | Facility: CLINIC | Age: 69
End: 2019-06-02

## 2019-06-02 DIAGNOSIS — I50.9 ACUTE CONGESTIVE HEART FAILURE, UNSPECIFIED HEART FAILURE TYPE (H): Primary | ICD-10-CM

## 2019-06-02 RX ORDER — POTASSIUM CHLORIDE 1500 MG/1
20 TABLET, EXTENDED RELEASE ORAL DAILY
Qty: 30 TABLET | Refills: 0 | Status: SHIPPED | OUTPATIENT
Start: 2019-06-02 | End: 2019-07-02

## 2019-06-02 RX ORDER — FUROSEMIDE 40 MG
40 TABLET ORAL DAILY
Qty: 30 TABLET | Refills: 0 | Status: SHIPPED | OUTPATIENT
Start: 2019-06-02 | End: 2019-07-02

## 2019-06-02 NOTE — TELEPHONE ENCOUNTER
Talked with patient.  Bp running 120-150 with HR in 80's.  Shortness of breath is not improved but not worse. He does not feel any palpitations.    Based on lack of improvement in shortness of breath with improvement in HR and elevated BNP will start him on lasix and potassium.  Will need BMP at visit next week.      D/w patient if worsening he needs to go to the ER for IV lasix and HR control.      Magui Phillips MD

## 2019-06-02 NOTE — TELEPHONE ENCOUNTER
I called patient and left him a message. If he has not called back by Monday please call him.   I would like to know how he is feeling, specifically:    1) BP and Heart rates  2) symptoms of shortness of breath and chest pressure    You can let him know that his electrolytes and thyroid tests are normal.  His BNP, a test for heart failure, is elevated.  If he is still short of breath, especially at night I think we need to start him on a low dose of a diuretic for a few days.      I would like for him to either see me or Dr. Reaves the end of this week.  Please help him schedule this.   Its ok to add him to the end of my schedule on Friday if needed, but would give preference to him being seen by his PCP.      Maugi Phillips MD

## 2019-06-03 ENCOUNTER — ANTICOAGULATION THERAPY VISIT (OUTPATIENT)
Dept: NURSING | Facility: CLINIC | Age: 69
End: 2019-06-03
Payer: COMMERCIAL

## 2019-06-03 DIAGNOSIS — Z79.01 LONG TERM CURRENT USE OF ANTICOAGULANT THERAPY: Primary | ICD-10-CM

## 2019-06-03 DIAGNOSIS — I48.91 ATRIAL FIBRILLATION (H): ICD-10-CM

## 2019-06-03 LAB — INR POINT OF CARE: 1.5 (ref 0.86–1.14)

## 2019-06-03 PROCEDURE — 99207 ZZC NO CHARGE NURSE ONLY: CPT

## 2019-06-03 PROCEDURE — 36416 COLLJ CAPILLARY BLOOD SPEC: CPT

## 2019-06-03 PROCEDURE — 85610 PROTHROMBIN TIME: CPT | Mod: QW

## 2019-06-03 NOTE — PROGRESS NOTES
ANTICOAGULATION INITIAL CLINIC VISIT    Patient Name:  Kevan Connelly  Date:  6/3/2019  Referred by: Dr. Phillips  Contact Type:  Face to Face  Patient is accompanied in the office by his wife.    SUBJECTIVE:      Patient Findings     Comments:   The patient was assessed for   diet, medication,   missed or extra doses,   bruising or bleeding,   with no problem findings.    He lives close to the  Clinic and would like to have his INRs done there.          Clinical Outcomes     Comments:   The patient was assessed for   diet, medication,   missed or extra doses,   bruising or bleeding,   with no problem findings.    He lives close to the  Clinic and would like to have his INRs done there.            OBJECTIVE    INR Protime   Date Value Ref Range Status   2019 1.5 (A) 0.86 - 1.14 Final       ASSESSMENT / PLAN  INR assessment THER    Recheck INR In: 4 DAYS    INR Location Clinic      Anticoagulation Summary  As of 6/3/2019    INR goal:   2.0-3.0   TTR:   --   INR used for dosin.5! (6/3/2019)   Warfarin maintenance plan:   No maintenance plan   Full warfarin instructions:   6/3: 7.5 mg; : 7.5 mg; 6: 7.5 mg; 6/6: 7.5 mg   Plan last modified:   Noris Sher, RN (6/3/2019)   Next INR check:   2019   Target end date:   Indefinite    Indications    Long term current use of anticoagulant therapy [Z79.01]  Atrial fibrillation (H) [I48.91]             Anticoagulation Episode Summary     INR check location:   Anticoagulation Clinic    Preferred lab:       Send INR reminders to:   [facility]    Comments:   5mg tabs - mariam /       Anticoagulation Care Providers     Provider Role Specialty Phone number    Davina Reaves MD  Internal Medicine [phone]            See the Encounter Report to view Anticoagulation Flowsheet and Dosing Calendar (Go to Encounters tab in chart review, and find the Anticoagulation Therapy Visit)    Coumadin education was completed today.  Topics covered  include:  -Introduction to coumadin  -Proper Administration  -INR Testing  -Sign/Symptoms of Bleeding  -Signs/Symptoms of Clot Formation or Stroke  -Dietary Intake of Vitamin K  -Drug Interactions  -Anticoagulation Identification (bracelet, necklace or wallet card)  -Future Surgery  -Effects of Alcohol, Tobacco, and Exercise on Coumadin    Coumadin Education Booklet and Coumadin Identification Wallet Card were given to the patient.    INR is sub therapeutic today.  Patient will take 7.5 mg MTuWTh which will increase weekly total to 45mg.  Follow up in 4 days or sooner if needed.     Discussed signs of clotting with patient and when to seek care.     Dosage adjustment made based on physician directed care plan.      Noris Sher RN

## 2019-06-05 ENCOUNTER — OFFICE VISIT (OUTPATIENT)
Dept: FAMILY MEDICINE | Facility: CLINIC | Age: 69
End: 2019-06-05
Payer: COMMERCIAL

## 2019-06-05 VITALS
DIASTOLIC BLOOD PRESSURE: 74 MMHG | TEMPERATURE: 97.6 F | SYSTOLIC BLOOD PRESSURE: 128 MMHG | HEART RATE: 100 BPM | OXYGEN SATURATION: 96 % | RESPIRATION RATE: 17 BRPM | WEIGHT: 267.5 LBS | BODY MASS INDEX: 33.89 KG/M2

## 2019-06-05 DIAGNOSIS — R79.89 ELEVATED BRAIN NATRIURETIC PEPTIDE (BNP) LEVEL: ICD-10-CM

## 2019-06-05 DIAGNOSIS — I50.9 ACUTE HEART FAILURE, UNSPECIFIED HEART FAILURE TYPE (H): ICD-10-CM

## 2019-06-05 DIAGNOSIS — I48.91 ATRIAL FIBRILLATION, UNSPECIFIED TYPE (H): Primary | ICD-10-CM

## 2019-06-05 DIAGNOSIS — G47.9 SLEEP DISTURBANCE: ICD-10-CM

## 2019-06-05 DIAGNOSIS — R06.02 SOB (SHORTNESS OF BREATH): ICD-10-CM

## 2019-06-05 LAB
ANION GAP SERPL CALCULATED.3IONS-SCNC: 11 MMOL/L (ref 3–14)
BUN SERPL-MCNC: 29 MG/DL (ref 7–30)
CALCIUM SERPL-MCNC: 9.1 MG/DL (ref 8.5–10.1)
CHLORIDE SERPL-SCNC: 111 MMOL/L (ref 94–109)
CO2 SERPL-SCNC: 21 MMOL/L (ref 20–32)
CREAT SERPL-MCNC: 1.54 MG/DL (ref 0.66–1.25)
GFR SERPL CREATININE-BSD FRML MDRD: 46 ML/MIN/{1.73_M2}
GLUCOSE SERPL-MCNC: 101 MG/DL (ref 70–99)
NT-PROBNP SERPL-MCNC: 4674 PG/ML (ref 0–125)
POTASSIUM SERPL-SCNC: 5 MMOL/L (ref 3.4–5.3)
SODIUM SERPL-SCNC: 143 MMOL/L (ref 133–144)

## 2019-06-05 PROCEDURE — 80048 BASIC METABOLIC PNL TOTAL CA: CPT | Performed by: INTERNAL MEDICINE

## 2019-06-05 PROCEDURE — 83880 ASSAY OF NATRIURETIC PEPTIDE: CPT | Performed by: INTERNAL MEDICINE

## 2019-06-05 PROCEDURE — 99215 OFFICE O/P EST HI 40 MIN: CPT | Performed by: INTERNAL MEDICINE

## 2019-06-05 PROCEDURE — 36415 COLL VENOUS BLD VENIPUNCTURE: CPT | Performed by: INTERNAL MEDICINE

## 2019-06-05 RX ORDER — METOPROLOL SUCCINATE 25 MG/1
25 TABLET, EXTENDED RELEASE ORAL 2 TIMES DAILY
Qty: 60 TABLET | Refills: 1 | Status: SHIPPED | OUTPATIENT
Start: 2019-06-05 | End: 2019-06-17 | Stop reason: DRUGHIGH

## 2019-06-05 NOTE — PROGRESS NOTES
"Subjective     Kevan Connelly is a 68 year old male who presents to clinic today for the following health issues:    HPI   Chief Complaint   Patient presents with     Atrial Fib     follow up on A fib and on coumadin     Sleep Problem     Shortness of Breath         Amount of exercise or physical activity: None    Problems taking medications regularly: No    Medication side effects: none    Diet: regular (no restrictions)      SOB: has been persistent and trouble walking up stairs and can walk about 1/2 block before getting sob and having to rest.   Blood pressures have been running 119-151/    Sleep: has been having trouble for about 6-8 weeks. Is able to fall asleep and then wakes up every couple hours and notes a \"wave sensation in his chest that starts at the sternum and moves upward and makes if feel like he has to gasp to get his breath.              Chart reviewed- pt seen in [place] 5/31/2019    (I48.91) Atrial fibrillation, unspecified type (H)  (primary encounter diagnosis)  -- EKG showed afib, new dx for patient. Reviewed dx with patient today including risks of a fib  --reviewed typical causes for afib, usual treatments including cardioversion vs rate control.  Due to duration of symptoms patient is currently not a cardioversion candidate; will start on metoprolol for rate control.  Could benefit from cardiology consult in 1-2 months to see if he would be a candidate for cardioversion   -schedule echocardiogram to evaluate for cardiomegaly and valvular issues that could be contributing; these would also limit his viability as a candidate for cardioversion.    -- labs today to evaluate thyroid and electrolytes  -- unclear if chest pressure is due to angina from tachycardia.  Will order stress test.  Must be lexiscan due to afib.    -- based on CHADS-vasc 2 score patient qualifies for anticoagulation  -- will start on Xarelto; patient to contact me if not covered under insurance and will switch   -- " suspect this is why his BP dropped recently   -- advised patient he needs to get a functioning BP cuff or get his checked in order to check BP over the next few weeks along with HR   -- schedule with cardiologist in 2 months   -pt will benefit from short interval f/u with PCP to monitor symptoms   Plan: rivaroxaban ANTICOAGULANT (XARELTO) 20 MG TABS         tablet, metoprolol succinate ER (TOPROL-XL) 25         MG 24 hr tablet, Basic metabolic panel, TSH         with free T4 reflex, BNP-N terminal pro,         Echocardiogram Complete, NM Lexiscan stress         test     Reviewed EKG-135 rate, ECHO,    Patient Active Problem List   Diagnosis     Hx of testicular cancer     Benign essential hypertension     Erectile dysfunction following radiation therapy     Encounter for monitoring diuretic therapy     Colon polyps     Obesity (BMI 35.0-39.9) with comorbidity (H)     Long term current use of anticoagulant therapy     Atrial fibrillation (H)     CHF (congestive heart failure) (H)     Past Surgical History:   Procedure Laterality Date     ANESTHESIA CARDIOVERSION N/A 6/26/2019    Procedure: ANESTHESIA, FOR CARDIOVERSION DR. LE;  Surgeon: GENERIC ANESTHESIA PROVIDER;  Location:  OR     GENITOURINARY SURGERY  1990    testicular cancer     ORTHOPEDIC SURGERY  1970's    right knee surgery        Social History     Tobacco Use     Smoking status: Never Smoker     Smokeless tobacco: Never Used   Substance Use Topics     Alcohol use: Yes     Family History   Problem Relation Age of Onset     Colon Cancer Father      Coronary Artery Disease Maternal Grandmother          Current Outpatient Medications   Medication Sig Dispense Refill     losartan (COZAAR) 50 MG tablet Take 1 tablet (50 mg) by mouth daily 90 tablet 3     sildenafil (REVATIO) 20 MG tablet Take 1 tablet (20 mg) by mouth daily as needed 30 tablet 3     amiodarone (PACERONE/CODARONE) 200 MG tablet Amiodarone 400 mg PO BID for 3 days, then decrease to 400 mg  PO daily for 4 days, and then decrease to 200 mg PO daily. 60 tablet 0     furosemide (LASIX) 40 MG tablet Take 1 tablet (40 mg) by mouth daily 30 tablet 0     metoprolol succinate ER (TOPROL-XL) 50 MG 24 hr tablet 50 mg PO bid 60 tablet 1     potassium chloride ER (K-TAB) 20 MEQ CR tablet Take 1 tablet (20 mEq) by mouth daily 30 tablet 0     warfarin (COUMADIN) 5 MG tablet Take 5 mg by mouth daily or as directed by INR Clinic 90 tablet 0     Allergies   Allergen Reactions     Lisinopril Cough     Very persistent cough     BP Readings from Last 3 Encounters:   07/02/19 147/81   06/26/19 (!) 114/93   06/21/19 (!) 128/94    Wt Readings from Last 3 Encounters:   07/02/19 120.7 kg (266 lb)   06/26/19 122.5 kg (270 lb)   06/21/19 122.5 kg (270 lb)              Reviewed and updated as needed this visit by Provider  Tobacco  Allergies  Meds  Problems  Med Hx  Surg Hx  Fam Hx         Review of Systems   ROS COMP: CONSTITUTIONAL: NEGATIVE for fever, chills, change in weight  ENT/MOUTH: NEGATIVE for ear, mouth and throat problems  RESP: NEGATIVE for significant cough or SOB  CV: atrial fibrillation, tachycardia; .dyspnea;   GI: NEGATIVE for nausea, abdominal pain, heartburn, or change in bowel habits  MUSCULOSKELETAL: NEGATIVE for significant arthralgias or myalgia  NEURO: NEGATIVE for weakness, dizziness or paresthesias  ENDOCRINE: NEGATIVE for temperature intolerance, skin/hair changes  HEME/ALLERGY/IMMUNE: NEGATIVE for bleeding problems  PSYCHIATRIC: NEGATIVE for changes in mood or affect      Objective    /82   Pulse 100   Temp 97.6  F (36.4  C) (Oral)   Resp 17   Wt 121.3 kg (267 lb 8 oz)   SpO2 96%   BMI 33.89 kg/m    Body mass index is 33.89 kg/m .  Physical Exam   GENERAL: healthy, alert and no distress  NECK: no adenopathy, no asymmetry, masses, or scars and thyroid normal to palpation  RESP: lungs clear to auscultation - no rales, rhonchi or wheezes  CV: tachycardia; .irreg .irreg   ABDOMEN: soft,  "nontender, no hepatosplenomegaly, no masses and bowel sounds normal  MS: no gross musculoskeletal defects noted, no edema  SKIN: no bruising  NEURO: Normal strength and tone, mentation intact and speech normal  PSYCH: mentation appears normal, affect normal/bright        Assessment & Plan     (R79.89) Elevated brain natriuretic peptide (BNP) level  (primary encounter diagnosis)  Comment: noted with new onset Atrial fibrillation; has ECHO and stress test scheduled; cardiology consulted,  BNP was significantly elevated ; reassess  Plan: BNP-N terminal pro          (I48.91) Atrial fibrillation, unspecified type (H)  Comment: New Dx 5/30/2019; opted for  Warfarin and INR mgmt due to cost. set to see INR-RM on Friday this week; rate control with Toprol-XL  Plan: BNP-N terminal pro, Basic metabolic panel,         metoprolol succinate ER (TOPROL-XL) 25 MG 24 hr        tablet          (I50.9) Acute heart failure, unspecified heart failure type (H)   Comment: noted with new onset Atrial Fibrillation.  Plan: BNP-N terminal pro monitor; diuretic use reviewed    (R06.02) SOB (shortness of breath)  Comment: suspect it is related to new Atrial Fibrillation; also on diuretic ( started 3-4 days ago) monitor symptoms as BB increased.  ECHO and stress test set up  Plan: reviewed use of medications; many new meds related to new Atrial Fibrillation.    (G47.9) Sleep disturbance  Comment: fairly new issue; may be related to A. Fib;  monitor symptoms; ok to try Melatonin short term for now  Plan: Melatonin use reviewed     BMI:   Estimated body mass index is 33.89 kg/m  as calculated from the following:    Height as of 5/30/19: 1.892 m (6' 2.5\").    Weight as of this encounter: 121.3 kg (267 lb 8 oz).   Weight management plan: Discussed healthy diet and exercise guidelines        Return in about 1 month (around 7/3/2019).    Davina Reaves MD  Internal Medicine   [facility] [place]  40 minutes is spent with patient, over " 50% of that time spent providing counselling, discussing and reviewing meds and potential side effects.

## 2019-06-07 ENCOUNTER — ANTICOAGULATION THERAPY VISIT (OUTPATIENT)
Dept: NURSING | Facility: CLINIC | Age: 69
End: 2019-06-07
Payer: COMMERCIAL

## 2019-06-07 DIAGNOSIS — I48.91 ATRIAL FIBRILLATION (H): ICD-10-CM

## 2019-06-07 DIAGNOSIS — Z79.01 LONG TERM CURRENT USE OF ANTICOAGULANT THERAPY: ICD-10-CM

## 2019-06-07 LAB — INR POINT OF CARE: 3.2 (ref 0.86–1.14)

## 2019-06-07 PROCEDURE — 85610 PROTHROMBIN TIME: CPT | Mod: QW

## 2019-06-07 PROCEDURE — 36416 COLLJ CAPILLARY BLOOD SPEC: CPT

## 2019-06-07 PROCEDURE — 99207 ZZC NO CHARGE NURSE ONLY: CPT

## 2019-06-07 NOTE — PROGRESS NOTES
ANTICOAGULATION FOLLOW-UP CLINIC VISIT    Patient Name:  Kevan Connelly  Date:  6/7/2019  Contact Type:  Face to Face    SUBJECTIVE:  Patient Findings     Comments:   New a-fib patient, feeling very tired with the new diagnosis. Reviewed printed education materials, no questions or concerns at this point. The patient was assessed for diet, medication, and activity level changes, missed or extra doses, bruising or bleeding, with no problem findings.  Kaylee LEONARD RN  Anticoagulation Clinic  [place]          Clinical Outcomes     Negatives:   Major bleeding event, Thromboembolic event, Anticoagulation-related hospital admission, Anticoagulation-related ED visit, Anticoagulation-related fatality    Comments:   New a-fib patient, feeling very tired with the new diagnosis. Reviewed printed education materials, no questions or concerns at this point. The patient was assessed for diet, medication, and activity level changes, missed or extra doses, bruising or bleeding, with no problem findings.  Kaylee LEONARD RN  Anticoagulation Clinic  [place]             OBJECTIVE    INR Protime   Date Value Ref Range Status   06/07/2019 3.2 (A) 0.86 - 1.14 Final       ASSESSMENT / PLAN  INR assessment SUPRA    Recheck INR In: 5 DAYS    INR Location Clinic      Anticoagulation Summary  As of 6/7/2019    INR goal:   2.0-3.0   TTR:   --   INR used for dosing:   3.2! (6/7/2019)   Warfarin maintenance plan:   7.5 mg (5 mg x 1.5) every Mon, Wed, Fri; 5 mg (5 mg x 1) all other days   Full warfarin instructions:   6/7: 5 mg; Otherwise 7.5 mg every Mon, Wed, Fri; 5 mg all other days   Weekly warfarin total:   42.5 mg   Plan last modified:   Kaylee Ramey RN (6/7/2019)   Next INR check:   6/12/2019   Target end date:   Indefinite    Indications    Long term current use of anticoagulant therapy [Z79.01]  Atrial fibrillation (H) [I48.91]             Anticoagulation Episode Summary     INR check location:   Anticoagulation Clinic     Preferred lab:       Send INR reminders to:   [facility]    Comments:   5mg tabs - mariam /       Anticoagulation Care Providers     Provider Role Specialty Phone number    Davina Reaves MD  Internal Medicine [phone]            See the Encounter Report to view Anticoagulation Flowsheet and Dosing Calendar (Go to Encounters tab in chart review, and find the Anticoagulation Therapy Visit)    Dosage adjustment made based on physician directed care plan.    Kaylee Ramey RN

## 2019-06-10 ENCOUNTER — HOSPITAL ENCOUNTER (OUTPATIENT)
Dept: NUCLEAR MEDICINE | Facility: CLINIC | Age: 69
Setting detail: NUCLEAR MEDICINE
End: 2019-06-10
Attending: INTERNAL MEDICINE
Payer: COMMERCIAL

## 2019-06-10 ENCOUNTER — HOSPITAL ENCOUNTER (OUTPATIENT)
Dept: CARDIOLOGY | Facility: CLINIC | Age: 69
Discharge: HOME | End: 2019-06-10
Attending: INTERNAL MEDICINE | Admitting: INTERNAL MEDICINE
Payer: COMMERCIAL

## 2019-06-10 VITALS — SYSTOLIC BLOOD PRESSURE: 127 MMHG | HEART RATE: 118 BPM | DIASTOLIC BLOOD PRESSURE: 96 MMHG

## 2019-06-10 DIAGNOSIS — I48.91 ATRIAL FIBRILLATION, UNSPECIFIED TYPE (H): ICD-10-CM

## 2019-06-10 DIAGNOSIS — R07.9 CHEST PAIN, UNSPECIFIED TYPE: ICD-10-CM

## 2019-06-10 PROCEDURE — 93018 CV STRESS TEST I&R ONLY: CPT | Performed by: INTERNAL MEDICINE

## 2019-06-10 PROCEDURE — 78452 HT MUSCLE IMAGE SPECT MULT: CPT | Mod: 26 | Performed by: INTERNAL MEDICINE

## 2019-06-10 PROCEDURE — 25000128 H RX IP 250 OP 636

## 2019-06-10 PROCEDURE — 78452 HT MUSCLE IMAGE SPECT MULT: CPT

## 2019-06-10 PROCEDURE — 93017 CV STRESS TEST TRACING ONLY: CPT

## 2019-06-10 PROCEDURE — 93016 CV STRESS TEST SUPVJ ONLY: CPT | Performed by: INTERNAL MEDICINE

## 2019-06-10 PROCEDURE — A9502 TC99M TETROFOSMIN: HCPCS | Performed by: INTERNAL MEDICINE

## 2019-06-10 PROCEDURE — 34300033 ZZH RX 343: Performed by: INTERNAL MEDICINE

## 2019-06-10 RX ORDER — REGADENOSON 0.08 MG/ML
INJECTION, SOLUTION INTRAVENOUS
Status: COMPLETED
Start: 2019-06-10 | End: 2019-06-10

## 2019-06-10 RX ADMIN — REGADENOSON 0.4 MG: 0.08 INJECTION, SOLUTION INTRAVENOUS at 10:22

## 2019-06-10 RX ADMIN — TETROFOSMIN 10.4 MCI.: 1.38 INJECTION, POWDER, LYOPHILIZED, FOR SOLUTION INTRAVENOUS at 09:00

## 2019-06-10 RX ADMIN — TETROFOSMIN 31.2 MCI.: 1.38 INJECTION, POWDER, LYOPHILIZED, FOR SOLUTION INTRAVENOUS at 10:33

## 2019-06-10 NOTE — PROGRESS NOTES
Pre-procedure:  Are you having any pain or shortness of breath (prior to starting)? SOB (pt states it is common with his AFib)  Initial vital signs: /96, , RR 18  Allergies reviewed: yes   Rhythm: A. fib  Medications taken within 48 hours of procedure: No   Any nitrates within the last 48 hours:No  Last Caffeine: 48 hours ago  Lung sounds: CTA, no wheezing, crackles or rtx  Health History (COPD, Asthma, etc): N/A           Procedure: Lexiscan  Reaction/symptoms after receiving Saranya injection: Shortness of breath  Intensity of Pain: 0  Rhythm: AFib  1. Vital Signs:/93, , RR 18  2. Vital Signs:/95, , RR 18    Reversal agent: N/A    Post:   Resolution of symptoms?: YES  Vital signs: /96 , , RR 18  Rhythm: AFib  Walk: NO  Comment: Pt states he is feeling back to baseline  Return to Radiology

## 2019-06-12 ENCOUNTER — ANTICOAGULATION THERAPY VISIT (OUTPATIENT)
Dept: NURSING | Facility: CLINIC | Age: 69
End: 2019-06-12
Payer: COMMERCIAL

## 2019-06-12 DIAGNOSIS — I48.91 ATRIAL FIBRILLATION (H): ICD-10-CM

## 2019-06-12 DIAGNOSIS — Z79.01 LONG TERM CURRENT USE OF ANTICOAGULANT THERAPY: ICD-10-CM

## 2019-06-12 LAB — INR POINT OF CARE: 4.3 (ref 0.86–1.14)

## 2019-06-12 PROCEDURE — 85610 PROTHROMBIN TIME: CPT | Mod: QW

## 2019-06-12 PROCEDURE — 36416 COLLJ CAPILLARY BLOOD SPEC: CPT

## 2019-06-12 PROCEDURE — 99207 ZZC NO CHARGE NURSE ONLY: CPT

## 2019-06-12 NOTE — PROGRESS NOTES
ANTICOAGULATION FOLLOW-UP CLINIC VISIT    Patient Name:  Kevan Connelly  Date:  2019  Contact Type:  Face to Face    SUBJECTIVE:  Patient Findings     Comments:   Patient early in anticoagulation therapy. Will use calendar this week to checo when and what dark green veggies he is eating to help him determine his maintenance diet. Reduced today's dose of warfarin and adjusted maintenance dosing per protocol. Will recheck INR in 1 week.  Kaylee LEONARD RN  Anticoagulation Clinic  [place]          Clinical Outcomes     Negatives:   Major bleeding event, Thromboembolic event, Anticoagulation-related hospital admission, Anticoagulation-related ED visit, Anticoagulation-related fatality    Comments:   Patient early in anticoagulation therapy. Will use calendar this week to checo when and what dark green veggies he is eating to help him determine his maintenance diet. Reduced today's dose of warfarin and adjusted maintenance dosing per protocol. Will recheck INR in 1 week.  Kaylee LEONARD RN  Anticoagulation Clinic  [place]             OBJECTIVE    INR Protime   Date Value Ref Range Status   2019 4.3 (A) 0.86 - 1.14 Final       ASSESSMENT / PLAN  INR assessment SUPRA    Recheck INR In: 1 WEEK    INR Location Clinic      Anticoagulation Summary  As of 2019    INR goal:   2.0-3.0   TTR:   0.0 % (3 d)   INR used for dosin.3! (2019)   Warfarin maintenance plan:   7.5 mg (5 mg x 1.5) every Mon; 5 mg (5 mg x 1) all other days   Full warfarin instructions:   : 2.5 mg; Otherwise 7.5 mg every Mon; 5 mg all other days   Weekly warfarin total:   37.5 mg   Plan last modified:   Kaylee Ramey RN (2019)   Next INR check:   2019   Target end date:   Indefinite    Indications    Long term current use of anticoagulant therapy [Z79.01]  Atrial fibrillation (H) [I48.91]             Anticoagulation Episode Summary     INR check location:   Anticoagulation Clinic    Preferred lab:       Send INR  reminders to:   [facility]    Comments:   5mg tabs - mariam /       Anticoagulation Care Providers     Provider Role Specialty Phone number    Davina Reaves MD  Internal Medicine [phone]            See the Encounter Report to view Anticoagulation Flowsheet and Dosing Calendar (Go to Encounters tab in chart review, and find the Anticoagulation Therapy Visit)    Dosage adjustment made based on physician directed care plan.    Kaylee Ramey RN

## 2019-06-17 ENCOUNTER — TELEPHONE (OUTPATIENT)
Dept: FAMILY MEDICINE | Facility: CLINIC | Age: 69
End: 2019-06-17

## 2019-06-17 DIAGNOSIS — I48.91 ATRIAL FIBRILLATION, UNSPECIFIED TYPE (H): Primary | ICD-10-CM

## 2019-06-17 DIAGNOSIS — I50.9 ACUTE HEART FAILURE, UNSPECIFIED HEART FAILURE TYPE (H): ICD-10-CM

## 2019-06-17 DIAGNOSIS — I10 BENIGN ESSENTIAL HYPERTENSION: ICD-10-CM

## 2019-06-17 RX ORDER — METOPROLOL SUCCINATE 50 MG/1
TABLET, EXTENDED RELEASE ORAL
Qty: 60 TABLET | Refills: 1 | Status: SHIPPED | OUTPATIENT
Start: 2019-06-17 | End: 2019-07-02

## 2019-06-17 NOTE — TELEPHONE ENCOUNTER
Notes on stress test from Dr. Phillips: FYI- his EF is really low.  I previously ordered an echo, not sure when its scheduled.  I've not let him know the result.     Huddled with Dr. Reaves. She will call the patient with results. Asked this writer to call Cardiology to see if appt can be moved up from 8/2/19, in light of EF 29%.     Called Cardiology at [phone].   Transferred to Dr. Hernandez's Nurse, Swapna: She can have him seen sooner by another provider if ok? Friday, June 21st at at 9:15 in [place]. Dr. Vanessa. She will call patient to see if he is ok to reschedule.     Kaylee LEONARD, Triage RN

## 2019-06-17 NOTE — TELEPHONE ENCOUNTER
Reason for Call:  Request for results: Test    Name of test or procedure: Chemical Stress Test    Date of test of procedure: 6/12/19    Location of the test or procedure:  Lab    OK to leave the result message on voice mail or with a family member? YES    Phone number Patient can be reached at:  Cell number on file:    Telephone Information:   Mobile [phone]       Additional comments: Pt would like a call back to receive his test results     Call taken on 6/17/2019 at 8:04 AM by Ramona Mcdonough

## 2019-06-18 NOTE — TELEPHONE ENCOUNTER
Reviewed results of stress test with pt. Via phone.   EF 29%, perfusion preserved   His heart rate continues to be 120's  He is willing to increase Metoprolol Succinate.  Current dose is Metoprolol XR 25 mg BID.  Increase to Metoprolol XR 50 mg  BID and titrate to 100 mg BID as tolerated; this will allow the HR to slow down in order to improve ECHO imaging and chamber measurements.   Suspect cardiology will ultimately change to Coreg or other rate lowering agent.     Cardiology appt has been moved up . He is aware of new time and will plan to  Arrive 15 minutes prior to appt. On 6/21/2019 at [facility] to see Dr. Vanessa.     He has INR appt on Wednesday and will also have BLOOD PRESSURE and HR checked that day.     Davina Reaves MD  Internal Medicine  electronically signed

## 2019-06-19 ENCOUNTER — ALLIED HEALTH/NURSE VISIT (OUTPATIENT)
Dept: NURSING | Facility: CLINIC | Age: 69
End: 2019-06-19

## 2019-06-19 ENCOUNTER — ANTICOAGULATION THERAPY VISIT (OUTPATIENT)
Dept: NURSING | Facility: CLINIC | Age: 69
End: 2019-06-19
Payer: COMMERCIAL

## 2019-06-19 VITALS — HEART RATE: 59 BPM | SYSTOLIC BLOOD PRESSURE: 130 MMHG | DIASTOLIC BLOOD PRESSURE: 74 MMHG | RESPIRATION RATE: 16 BRPM

## 2019-06-19 DIAGNOSIS — I48.91 ATRIAL FIBRILLATION (H): ICD-10-CM

## 2019-06-19 DIAGNOSIS — Z79.01 LONG TERM CURRENT USE OF ANTICOAGULANT THERAPY: ICD-10-CM

## 2019-06-19 DIAGNOSIS — R00.9 HEART RATE PROBLEM: Primary | ICD-10-CM

## 2019-06-19 DIAGNOSIS — I48.91 ATRIAL FIBRILLATION, UNSPECIFIED TYPE (H): ICD-10-CM

## 2019-06-19 LAB — INR POINT OF CARE: 3.6 (ref 0.86–1.14)

## 2019-06-19 PROCEDURE — 85610 PROTHROMBIN TIME: CPT | Mod: QW

## 2019-06-19 PROCEDURE — 99207 ZZC NO CHARGE NURSE ONLY: CPT | Performed by: INTERNAL MEDICINE

## 2019-06-19 PROCEDURE — 36416 COLLJ CAPILLARY BLOOD SPEC: CPT

## 2019-06-19 PROCEDURE — 99207 ZZC NO CHARGE NURSE ONLY: CPT

## 2019-06-19 RX ORDER — WARFARIN SODIUM 5 MG/1
TABLET ORAL
Qty: 90 TABLET | Refills: 0 | Status: SHIPPED | OUTPATIENT
Start: 2019-06-19 | End: 2019-08-28

## 2019-06-19 NOTE — NURSING NOTE
"Patient in clinic today for INR testing. PCP asked this nurse to check blood pressure and HR. As patient's HR in a-fib has been elevated and he has an echo scheduled for tomorrow and a high hr will make it difficult to get clear results. She increased metoprolol to 50mg BID to titrate up to 100 mg BID as tolerated.  Blood pressure machine tried 3 times to get bp and hr but ended with error. Pulse oximeter also couldn't fix a regular heart rate. Took Manual bp and hr for final vitals. Checked HR twice to ensure adequacy as it is very irregular. It was 55 first time and 59 second time. States since making the med change yesterday, he doesn't \"notice as much of a presence on my chest\". Otherwise, no dizziness or lightheadedness noted, even when changing positions.    Upon review of med list patient states he misunderstood the dosing directions and he went directly to 100 mg BID.     Huddled with Dr. Reaves: as long as patient is not dizzy upon standing, he should continue with current dose and proceed with echo tomorrow. Patient stated understanding and is in agreement with plan.    Kaylee LEONARD Triage RN    "

## 2019-06-19 NOTE — PROGRESS NOTES
ANTICOAGULATION FOLLOW-UP CLINIC VISIT    Patient Name:  Kevan Connelly  Date:  6/19/2019  Contact Type:  Face to Face    SUBJECTIVE:  Patient Findings     Comments:   A day or two after last visit he did have a big clot in his right nares twice, but no nose bleed. Been ok since then. Patient did not eat many veggies this week but plans to add more into his diet, understands green veggies with vit K will lower his INR. Did decrease his warfarin today and the maintenance dose and will recheck INR in 2 weeks. Patient stated understanding and is in agreement with plan.  Kaylee LEONARD RN  Anticoagulation Clinic  [place]            Clinical Outcomes     Negatives:   Major bleeding event, Thromboembolic event, Anticoagulation-related hospital admission, Anticoagulation-related ED visit, Anticoagulation-related fatality    Comments:   A day or two after last visit he did have a big clot in his right nares twice, but no nose bleed. Been ok since then. Patient did not eat many veggies this week but plans to add more into his diet, understands green veggies with vit K will lower his INR. Did decrease his warfarin today and the maintenance dose and will recheck INR in 2 weeks. Patient stated understanding and is in agreement with plan.  Kaylee LEONARD RN  Anticoagulation Clinic  [place]               OBJECTIVE    INR Protime   Date Value Ref Range Status   06/19/2019 3.6 (A) 0.86 - 1.14 Final       ASSESSMENT / PLAN  INR assessment SUPRA    Recheck INR In: 2 WEEKS    INR Location Clinic      Anticoagulation Summary  As of 6/19/2019    INR goal:   2.0-3.0   TTR:   0.0 % (1.4 wk)   INR used for dosing:   3.6! (6/19/2019)   Warfarin maintenance plan:   5 mg (5 mg x 1) every day   Full warfarin instructions:   6/26: 2.5 mg; Otherwise 5 mg every day   Weekly warfarin total:   35 mg   Plan last modified:   Kaylee Ramey RN (6/19/2019)   Next INR check:   7/3/2019   Target end date:   Indefinite    Indications    Long term  current use of anticoagulant therapy [Z79.01]  Atrial fibrillation (H) [I48.91]             Anticoagulation Episode Summary     INR check location:   Anticoagulation Clinic    Preferred lab:       Send INR reminders to:   [facility]    Comments:   5mg tabs - mariam /       Anticoagulation Care Providers     Provider Role Specialty Phone number    Davina Reaves MD  Internal Medicine [phone]            See the Encounter Report to view Anticoagulation Flowsheet and Dosing Calendar (Go to Encounters tab in chart review, and find the Anticoagulation Therapy Visit)    Dosage adjustment made based on physician directed care plan.    Kaylee Ramey RN

## 2019-06-20 ENCOUNTER — HOSPITAL ENCOUNTER (OUTPATIENT)
Dept: CARDIOLOGY | Facility: CLINIC | Age: 69
Discharge: HOME | End: 2019-06-20
Attending: INTERNAL MEDICINE | Admitting: INTERNAL MEDICINE
Payer: COMMERCIAL

## 2019-06-20 DIAGNOSIS — I48.91 ATRIAL FIBRILLATION, UNSPECIFIED TYPE (H): ICD-10-CM

## 2019-06-20 PROCEDURE — 40000264 ECHOCARDIOGRAM COMPLETE

## 2019-06-20 PROCEDURE — 25500064 ZZH RX 255 OP 636: Performed by: INTERNAL MEDICINE

## 2019-06-20 PROCEDURE — 93306 TTE W/DOPPLER COMPLETE: CPT | Mod: 26 | Performed by: INTERNAL MEDICINE

## 2019-06-20 RX ADMIN — HUMAN ALBUMIN MICROSPHERES AND PERFLUTREN 3 ML: 10; .22 INJECTION, SOLUTION INTRAVENOUS at 08:53

## 2019-06-21 ENCOUNTER — OFFICE VISIT (OUTPATIENT)
Dept: CARDIOLOGY | Facility: CLINIC | Age: 69
End: 2019-06-21
Payer: COMMERCIAL

## 2019-06-21 VITALS
SYSTOLIC BLOOD PRESSURE: 128 MMHG | HEIGHT: 76 IN | WEIGHT: 270 LBS | OXYGEN SATURATION: 98 % | HEART RATE: 84 BPM | DIASTOLIC BLOOD PRESSURE: 94 MMHG | BODY MASS INDEX: 32.88 KG/M2

## 2019-06-21 DIAGNOSIS — I48.91 ATRIAL FIBRILLATION, UNSPECIFIED TYPE (H): Primary | ICD-10-CM

## 2019-06-21 PROCEDURE — 99204 OFFICE O/P NEW MOD 45 MIN: CPT | Performed by: INTERNAL MEDICINE

## 2019-06-21 ASSESSMENT — MIFFLIN-ST. JEOR: SCORE: 2106.21

## 2019-06-21 NOTE — LETTER
6/21/2019    Davina Reaves MD  [id number] [facility] [address]    RE: Kevan Connelly       Dear Colleague,    I had the pleasure of seeing Kevan Connelly in the [facility].    HPI and Plan:   See dictation(#[id number])    Orders Placed This Encounter   Procedures     Follow-Up with Cardiac Advanced Practice Provider     Cardioversion     Transesophageal Echocardiogram       No orders of the defined types were placed in this encounter.      There are no discontinued medications.      Encounter Diagnosis   Name Primary?     Atrial fibrillation, unspecified type (H) Yes       CURRENT MEDICATIONS:  Current Outpatient Medications   Medication Sig Dispense Refill     furosemide (LASIX) 40 MG tablet Take 1 tablet (40 mg) by mouth daily 30 tablet 0     losartan (COZAAR) 50 MG tablet Take 1 tablet (50 mg) by mouth daily 90 tablet 3     metoprolol succinate ER (TOPROL-XL) 50 MG 24 hr tablet 50 mg bid, titrate to 100 mg bid with goal of minimum HR 70 and Minimum ; see cardiology 6/21/2019 60 tablet 1     potassium chloride ER (K-TAB) 20 MEQ CR tablet Take 1 tablet (20 mEq) by mouth daily 30 tablet 0     sildenafil (REVATIO) 20 MG tablet Take 1 tablet (20 mg) by mouth daily as needed 30 tablet 3     warfarin (COUMADIN) 5 MG tablet Take 5 mg by mouth daily or as directed by INR Clinic 90 tablet 0       ALLERGIES     Allergies   Allergen Reactions     Lisinopril Cough     Very persistent cough       PAST MEDICAL HISTORY:  Past Medical History:   Diagnosis Date     Atrial fibrillation (H) 6/3/2019     Benign essential hypertension 1/18/2017    past use of ACE- Cough;  Had been on ARB-diuretic but experienced lower bp readings;  BLOOD PRESSURE now well controlled on ARB also no longer on diuretic; no low bp readings; may have had slight edema initially but has normalized with bp well controlled on ARB alone.      Cancer (H)     testicular cancer     CHF (congestive heart  failure) (H) 6/5/2019     Long term current use of anticoagulant therapy 6/3/2019     Obesity (BMI 35.0-39.9) with comorbidity (H) 2/11/2019       PAST SURGICAL HISTORY:  Past Surgical History:   Procedure Laterality Date     GENITOURINARY SURGERY  1990    testicular cancer     ORTHOPEDIC SURGERY  1970's    right knee surgery        FAMILY HISTORY:  Family History   Problem Relation Age of Onset     Colon Cancer Father      Coronary Artery Disease Maternal Grandmother        SOCIAL HISTORY:  Social History     Socioeconomic History     Marital status:      Spouse name: None     Number of children: None     Years of education: None     Highest education level: None   Occupational History     None   Social Needs     Financial resource strain: None     Food insecurity:     Worry: None     Inability: None     Transportation needs:     Medical: None     Non-medical: None   Tobacco Use     Smoking status: Never Smoker     Smokeless tobacco: Never Used   Substance and Sexual Activity     Alcohol use: Yes     Drug use: No     Sexual activity: Yes     Partners: Female   Lifestyle     Physical activity:     Days per week: None     Minutes per session: None     Stress: None   Relationships     Social connections:     Talks on phone: None     Gets together: None     Attends Baptism service: None     Active member of club or organization: None     Attends meetings of clubs or organizations: None     Relationship status: None     Intimate partner violence:     Fear of current or ex partner: None     Emotionally abused: None     Physically abused: None     Forced sexual activity: None   Other Topics Concern     Parent/sibling w/ CABG, MI or angioplasty before 65F 55M? Yes   Social History Narrative     None       Review of Systems:  Skin:  Negative       Eyes:  Negative      ENT:  Negative      Respiratory:  Positive for shortness of breath;dyspnea on exertion     Cardiovascular:  Negative for;chest  "pain;lightheadedness;dizziness Positive for;palpitations;edema;fatigue    Gastroenterology: Negative      Genitourinary:  Negative      Musculoskeletal:  Negative      Neurologic:  Negative      Psychiatric:  Negative      Heme/Lymph/Imm:  Negative      Endocrine:  Negative        Physical Exam:  Vitals: BP (!) 128/94 (BP Location: Right arm, Patient Position: Sitting, Cuff Size: Adult Regular)   Pulse 84   Ht 1.93 m (6' 4\")   Wt 122.5 kg (270 lb)   SpO2 98%   BMI 32.87 kg/m       Constitutional:           Skin:             Head:           Eyes:           Lymph:      ENT:           Neck:           Respiratory:            Cardiac:                                                           GI:           Extremities and Muscular Skeletal:                 Neurological:           Psych:             CC  No referring provider defined for this encounter.                    Thank you for allowing me to participate in the care of your patient.      Sincerely,     Tom Vanessa MD     [facility]    cc:   No referring provider defined for this encounter.        "

## 2019-06-21 NOTE — PROGRESS NOTES
HPI and Plan:   See dictation(#[id number])    Orders Placed This Encounter   Procedures     Follow-Up with Cardiac Advanced Practice Provider     Cardioversion     Transesophageal Echocardiogram       No orders of the defined types were placed in this encounter.      There are no discontinued medications.      Encounter Diagnosis   Name Primary?     Atrial fibrillation, unspecified type (H) Yes       CURRENT MEDICATIONS:  Current Outpatient Medications   Medication Sig Dispense Refill     furosemide (LASIX) 40 MG tablet Take 1 tablet (40 mg) by mouth daily 30 tablet 0     losartan (COZAAR) 50 MG tablet Take 1 tablet (50 mg) by mouth daily 90 tablet 3     metoprolol succinate ER (TOPROL-XL) 50 MG 24 hr tablet 50 mg bid, titrate to 100 mg bid with goal of minimum HR 70 and Minimum ; see cardiology 6/21/2019 60 tablet 1     potassium chloride ER (K-TAB) 20 MEQ CR tablet Take 1 tablet (20 mEq) by mouth daily 30 tablet 0     sildenafil (REVATIO) 20 MG tablet Take 1 tablet (20 mg) by mouth daily as needed 30 tablet 3     warfarin (COUMADIN) 5 MG tablet Take 5 mg by mouth daily or as directed by INR Clinic 90 tablet 0       ALLERGIES     Allergies   Allergen Reactions     Lisinopril Cough     Very persistent cough       PAST MEDICAL HISTORY:  Past Medical History:   Diagnosis Date     Atrial fibrillation (H) 6/3/2019     Benign essential hypertension 1/18/2017    past use of ACE- Cough;  Had been on ARB-diuretic but experienced lower bp readings;  BLOOD PRESSURE now well controlled on ARB also no longer on diuretic; no low bp readings; may have had slight edema initially but has normalized with bp well controlled on ARB alone.      Cancer (H)     testicular cancer     CHF (congestive heart failure) (H) 6/5/2019     Long term current use of anticoagulant therapy 6/3/2019     Obesity (BMI 35.0-39.9) with comorbidity (H) 2/11/2019       PAST SURGICAL HISTORY:  Past Surgical History:   Procedure Laterality Date      GENITOURINARY SURGERY  1990    testicular cancer     ORTHOPEDIC SURGERY  1970's    right knee surgery        FAMILY HISTORY:  Family History   Problem Relation Age of Onset     Colon Cancer Father      Coronary Artery Disease Maternal Grandmother        SOCIAL HISTORY:  Social History     Socioeconomic History     Marital status:      Spouse name: None     Number of children: None     Years of education: None     Highest education level: None   Occupational History     None   Social Needs     Financial resource strain: None     Food insecurity:     Worry: None     Inability: None     Transportation needs:     Medical: None     Non-medical: None   Tobacco Use     Smoking status: Never Smoker     Smokeless tobacco: Never Used   Substance and Sexual Activity     Alcohol use: Yes     Drug use: No     Sexual activity: Yes     Partners: Female   Lifestyle     Physical activity:     Days per week: None     Minutes per session: None     Stress: None   Relationships     Social connections:     Talks on phone: None     Gets together: None     Attends Confucianism service: None     Active member of club or organization: None     Attends meetings of clubs or organizations: None     Relationship status: None     Intimate partner violence:     Fear of current or ex partner: None     Emotionally abused: None     Physically abused: None     Forced sexual activity: None   Other Topics Concern     Parent/sibling w/ CABG, MI or angioplasty before 65F 55M? Yes   Social History Narrative     None       Review of Systems:  Skin:  Negative       Eyes:  Negative      ENT:  Negative      Respiratory:  Positive for shortness of breath;dyspnea on exertion     Cardiovascular:  Negative for;chest pain;lightheadedness;dizziness Positive for;palpitations;edema;fatigue    Gastroenterology: Negative      Genitourinary:  Negative      Musculoskeletal:  Negative      Neurologic:  Negative      Psychiatric:  Negative      Heme/Lymph/Imm:  Negative  "     Endocrine:  Negative        Physical Exam:  Vitals: BP (!) 128/94 (BP Location: Right arm, Patient Position: Sitting, Cuff Size: Adult Regular)   Pulse 84   Ht 1.93 m (6' 4\")   Wt 122.5 kg (270 lb)   SpO2 98%   BMI 32.87 kg/m      Constitutional:           Skin:             Head:           Eyes:           Lymph:      ENT:           Neck:           Respiratory:            Cardiac:                                                           GI:           Extremities and Muscular Skeletal:                 Neurological:           Psych:             CC  No referring provider defined for this encounter.                  "

## 2019-06-21 NOTE — LETTER
6/21/2019      Davina Reaves MD  [address]  [facility] [address]      RE: Kevan Connelly       Dear Colleague,    I had the pleasure of seeing Kevan Connelly in the [facility].    Service Date: 06/21/2019      REASON FOR CARDIOLOGY VISIT:  Newly diagnosed atrial fibrillation and cardiomyopathy.      HISTORY OF PRESENT ILLNESS:  Mr. Connelly is a very pleasant 68-year-old gentleman who is establishing cardiology care.  The patient was recently diagnosed with cardiomyopathy and atrial fibrillation.  He is accompanied by his wife.  The patient tells me he was feeling quite well to March this year, and then he started slowly feeling shortness of breath with exertion like climbing stairs, he started also noting orthopnea and had to sit up in the middle of the night and he would also feel some fluttering sensation in his chest, especially when he would lie down.  End of May, patient was seen by Dr. Phillips and was diagnosed with new atrial fibrillation with rapid ventricular rate.  He was started on Lasix, he was started on metoprolol, losartan.  Initially he was on Xarelto, but because of cost issues this was switched to Coumadin.  He underwent echocardiogram that showed moderately reduced LV systolic function with LVEF of 33%.  There was moderate global hypokinesia.  The LV was mildly dilated, mildly decreased RV systolic function.  The left atrium was noted to be severely enlarged and right atrium to be moderate to severely dilated, mild mitral regurgitation.  Subsequently, the patient's metoprolol dose was increased, and now for the last few days he is taking 200 mg cumulative dose of Toprol- in the morning, 100 in the night.  He also underwent a Lexiscan stress test which showed no evidence of ischemia or infarct.  LVEF was 29%.  The patient tells me that after his annual physical examination in February this year, he joined a [employer], and was  exercising on a regular basis and felt quite well until March and April this year.  He can still feel shortness of breath with exertion.  He also gets fatigued.  His orthopnea has improved.  He actually lost 10 pounds of weight in last 2-3 weeks.  He denies any bleeding issues.  No trouble swallowing food or any history of esophageal stricture or peptic ulcer disease.  He does not use tobacco.  His CHADS2-VASc score is 3 (age, hypertension, congestive heart failure).  He does not have diabetes or previous history of stroke or known coronary artery disease.  One of his brothers had coronary artery disease in mid to late 60s.  His wife does report that he sometimes mildly snores, but patient otherwise feels quite well during the daytime without any particular daytime somnolence.  No significant alcohol consumption.      PHYSICAL EXAMINATION:   VITAL SIGNS:  Blood pressure 128/94, heart rate 84 irregular, weight 270 pounds, BMI 32.87.   GENERAL:  The patient appears pleasant, comfortable.   NECK:  Normal JVP, no bruit.   CARDIOVASCULAR SYSTEM:  Irregular, normal rate, no murmur, rub or gallop, JVP does not appear elevated, negative hepatojugular reflux.   RESPIRATORY SYSTEM:  Clear to auscultation bilaterally.   GASTROINTESTINAL SYSTEM:  Abdomen soft, nontender.   EXTREMITIES:  No pitting pedal edema.   NEUROLOGICAL:  Alert, oriented x3.   PSYCH:  Normal affect.   SKIN:  No obvious rash.   HEENT:  No pallor or icterus.      EKG done on 05/30 was personally reviewed by me and shows atrial fibrillation with rapid ventricular rate with ventricular rates of 135 beats per minute.      IMPRESSION AND PLAN:  A very pleasant 68-year-old gentleman with newly diagnosed atrial fibrillation with rapid ventricular rate, newly diagnosed cardiomyopathy with moderately reduced LV systolic function with other comorbidities of hypertension, underlying chronic kidney disease stage III.  I had a long discussion with the patient regarding  the nature of his symptoms.  My clinical suspicion is that the cardiomyopathy is most likely tachycardia-mediated, especially that the stress test showed no evidence of ischemia or infarct and he does not have any particular anginal symptoms.  We discussed at length about rate versus rhythm control strategy.  We also talked about stroke prophylaxis.  Given elevated CHADS2-VASc score, oral anticoagulation is recommended.  Due to cost issues, patient is on Coumadin.  Regarding rate versus rhythm control strategy, given that he has cardiomyopathy and ventricular rates are difficult to control even with a higher dose of beta blocker and he is symptomatic, probably from both atrial fibrillation and also high-dose beta blocker, we discussed option of rhythm control strategy.  We discussed option of oral anticoagulation-guided rhythm control versus IBETH-guided.  Given that he is symptomatic, I would prefer to have an expedited rhythm control strategy, and for this we chose the option of IBETH-guided cardioversion.  Common adverse effects of IBETH-guided cardioversion including but not limited to risk of esophageal injury, risk of sedation, risk of kori-cardioversion stroke and skin burns were discussed with the patient.  We will try to get this done in the next few days.  At this time, I recommend continuing metoprolol at the current dose, losartan and Lasix.  He should also continue Coumadin.  I also recommend close followup subsequent to cardioversion.  We also discussed that his left atrium is severely enlarged, so that does somewhat decrease the chances of successful maintenance of sinus rhythm.  In case he is not able to maintain sinus rhythm, other options like use of antiarrhythmic drug versus even AV node ablation with pacemaker implantation could be discussed.  At that time, I would recommend review with Electrophysiology.  In the followup, I recommend that patient be seen by an BRYAN within a week to 10 days after the  cardioversion, and he will also need a subsequent followup of his LV function, preferably in 6-8 weeks after successful cardioversion, which could be further set up in the next followup visit.  I would also suggest a sleep study, but this could also be pursued subsequently once the cardioversion is accomplished.      1.  Newly diagnosed atrial fibrillation with rapid ventricular rate, symptomatic.  CHADS2-VASc score of 3.   2.  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  He appears euvolemic.  A Lexiscan stress test showed no evidence of ischemia or infarct.   3.  Hypertension.   4.  Chronic kidney disease being followed through primary care physician's office.      RECOMMENDATIONS:   1.  Continue Coumadin and Toprol-XL and losartan at the current doses.   2.  Continue Coumadin.   3.  IBETH-guided cardioversion for reasons noted above.   4.  Follow up with BRYAN in 1-2 weeks subsequent to IBETH-guided cardioversion.      Thank you for involving me in the care of Mr. Connelly.  Fifty minutes of total time was spent, with more than 50% counseling and coordination of care.         MAURA LEAVITT MD             D: 2019   T: 2019   MT: ANITHA      Name:     GILBERTO CONNELLY   MRN:      [id number]        Account:      [id number]   :      1950           Service Date: 2019      Document: [id number]         Outpatient Encounter Medications as of 2019   Medication Sig Dispense Refill     furosemide (LASIX) 40 MG tablet Take 1 tablet (40 mg) by mouth daily 30 tablet 0     losartan (COZAAR) 50 MG tablet Take 1 tablet (50 mg) by mouth daily 90 tablet 3     metoprolol succinate ER (TOPROL-XL) 50 MG 24 hr tablet 50 mg bid, titrate to 100 mg bid with goal of minimum HR 70 and Minimum ; see cardiology 2019 (Patient taking differently: 100 mg 2 times daily 50 mg bid, titrate to 100 mg bid with goal of minimum HR 70 and Minimum ; see  cardiology 6/21/2019) 60 tablet 1     potassium chloride ER (K-TAB) 20 MEQ CR tablet Take 1 tablet (20 mEq) by mouth daily 30 tablet 0     sildenafil (REVATIO) 20 MG tablet Take 1 tablet (20 mg) by mouth daily as needed 30 tablet 3     warfarin (COUMADIN) 5 MG tablet Take 5 mg by mouth daily or as directed by INR Clinic 90 tablet 0     No facility-administered encounter medications on file as of 6/21/2019.        Again, thank you for allowing me to participate in the care of your patient.      Sincerely,    Tom Vanessa MD     [facility]

## 2019-06-21 NOTE — PROGRESS NOTES
Service Date: 06/21/2019      REASON FOR CARDIOLOGY VISIT:  Newly diagnosed atrial fibrillation and cardiomyopathy.      HISTORY OF PRESENT ILLNESS:  Mr. Connelly is a very pleasant 68-year-old gentleman who is establishing cardiology care.  The patient was recently diagnosed with cardiomyopathy and atrial fibrillation.  He is accompanied by his wife.  The patient tells me he was feeling quite well to March this year, and then he started slowly feeling shortness of breath with exertion like climbing stairs, he started also noting orthopnea and had to sit up in the middle of the night and he would also feel some fluttering sensation in his chest, especially when he would lie down.  End of May, patient was seen by Dr. Phillips and was diagnosed with new atrial fibrillation with rapid ventricular rate.  He was started on Lasix, he was started on metoprolol, losartan.  Initially he was on Xarelto, but because of cost issues this was switched to Coumadin.  He underwent echocardiogram that showed moderately reduced LV systolic function with LVEF of 33%.  There was moderate global hypokinesia.  The LV was mildly dilated, mildly decreased RV systolic function.  The left atrium was noted to be severely enlarged and right atrium to be moderate to severely dilated, mild mitral regurgitation.  Subsequently, the patient's metoprolol dose was increased, and now for the last few days he is taking 200 mg cumulative dose of Toprol- in the morning, 100 in the night.  He also underwent a Lexiscan stress test which showed no evidence of ischemia or infarct.  LVEF was 29%.  The patient tells me that after his annual physical examination in February this year, he joined a [employer], and was exercising on a regular basis and felt quite well until March and April this year.  He can still feel shortness of breath with exertion.  He also gets fatigued.  His orthopnea has improved.  He actually lost 10 pounds of weight in last 2-3  weeks.  He denies any bleeding issues.  No trouble swallowing food or any history of esophageal stricture or peptic ulcer disease.  He does not use tobacco.  His CHADS2-VASc score is 3 (age, hypertension, congestive heart failure).  He does not have diabetes or previous history of stroke or known coronary artery disease.  One of his brothers had coronary artery disease in mid to late 60s.  His wife does report that he sometimes mildly snores, but patient otherwise feels quite well during the daytime without any particular daytime somnolence.  No significant alcohol consumption.      PHYSICAL EXAMINATION:   VITAL SIGNS:  Blood pressure 128/94, heart rate 84 irregular, weight 270 pounds, BMI 32.87.   GENERAL:  The patient appears pleasant, comfortable.   NECK:  Normal JVP, no bruit.   CARDIOVASCULAR SYSTEM:  Irregular, normal rate, no murmur, rub or gallop, JVP does not appear elevated, negative hepatojugular reflux.   RESPIRATORY SYSTEM:  Clear to auscultation bilaterally.   GASTROINTESTINAL SYSTEM:  Abdomen soft, nontender.   EXTREMITIES:  No pitting pedal edema.   NEUROLOGICAL:  Alert, oriented x3.   PSYCH:  Normal affect.   SKIN:  No obvious rash.   HEENT:  No pallor or icterus.      EKG done on 05/30 was personally reviewed by me and shows atrial fibrillation with rapid ventricular rate with ventricular rates of 135 beats per minute.      IMPRESSION AND PLAN:  A very pleasant 68-year-old gentleman with newly diagnosed atrial fibrillation with rapid ventricular rate, newly diagnosed cardiomyopathy with moderately reduced LV systolic function with other comorbidities of hypertension, underlying chronic kidney disease stage III.  I had a long discussion with the patient regarding the nature of his symptoms.  My clinical suspicion is that the cardiomyopathy is most likely tachycardia-mediated, especially that the stress test showed no evidence of ischemia or infarct and he does not have any particular anginal  symptoms.  We discussed at length about rate versus rhythm control strategy.  We also talked about stroke prophylaxis.  Given elevated CHADS2-VASc score, oral anticoagulation is recommended.  Due to cost issues, patient is on Coumadin.  Regarding rate versus rhythm control strategy, given that he has cardiomyopathy and ventricular rates are difficult to control even with a higher dose of beta blocker and he is symptomatic, probably from both atrial fibrillation and also high-dose beta blocker, we discussed option of rhythm control strategy.  We discussed option of oral anticoagulation-guided rhythm control versus IBETH-guided.  Given that he is symptomatic, I would prefer to have an expedited rhythm control strategy, and for this we chose the option of IBETH-guided cardioversion.  Common adverse effects of IBETH-guided cardioversion including but not limited to risk of esophageal injury, risk of sedation, risk of kori-cardioversion stroke and skin burns were discussed with the patient.  We will try to get this done in the next few days.  At this time, I recommend continuing metoprolol at the current dose, losartan and Lasix.  He should also continue Coumadin.  I also recommend close followup subsequent to cardioversion.  We also discussed that his left atrium is severely enlarged, so that does somewhat decrease the chances of successful maintenance of sinus rhythm.  In case he is not able to maintain sinus rhythm, other options like use of antiarrhythmic drug versus even AV node ablation with pacemaker implantation could be discussed.  At that time, I would recommend review with Electrophysiology.  In the followup, I recommend that patient be seen by an BRYAN within a week to 10 days after the cardioversion, and he will also need a subsequent followup of his LV function, preferably in 6-8 weeks after successful cardioversion, which could be further set up in the next followup visit.  I would also suggest a sleep study, but  this could also be pursued subsequently once the cardioversion is accomplished.      1.  Newly diagnosed atrial fibrillation with rapid ventricular rate, symptomatic.  CHADS2-VASc score of 3.   2.  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  He appears euvolemic.  A Lexiscan stress test showed no evidence of ischemia or infarct.   3.  Hypertension.   4.  Chronic kidney disease being followed through primary care physician's office.      RECOMMENDATIONS:   1.  Continue Coumadin and Toprol-XL and losartan at the current doses.   2.  Continue Coumadin.   3.  IBETH-guided cardioversion for reasons noted above.   4.  Follow up with BRYAN in 1-2 weeks subsequent to IBETH-guided cardioversion.      Thank you for involving me in the care of Mr. Connelly.  Fifty minutes of total time was spent, with more than 50% counseling and coordination of care.         MAURA LEAVITT MD             D: 2019   T: 2019   MT: ANITHA      Name:     GILBERTO CONNELLY   MRN:      [id number]        Account:      [id number]   :      1950           Service Date: 2019      Document: [id number]

## 2019-06-24 ENCOUNTER — TELEPHONE (OUTPATIENT)
Dept: CARDIOLOGY | Facility: CLINIC | Age: 69
End: 2019-06-24

## 2019-06-24 NOTE — TELEPHONE ENCOUNTER
IBETH w/MARVA  Scheduled: 06-26-19  Location: [facility]  Check-in time: 630 am  Procedure time: 830 am       Called patient to discuss and review prep instructions, patient was not available. I left patient a detailed message on his secure VM. Message included the instructions below. Patient was advised to call back if he had any questions. Call back number provided.     Prep instructions...     NPO after midnight, the night before the procedure.     No antacids on the morning of the procedure.     Medications that can be taken on the morning of the procedure (with small sips of water): Losartan and Toprol XL       Other medications should be taken when patient returns home.       Patient will need a ride home and a person stay with him for 24 hours after the procedure.      TGanereida WHARTON

## 2019-06-26 ENCOUNTER — HOSPITAL ENCOUNTER (OUTPATIENT)
Dept: CARDIOLOGY | Facility: CLINIC | Age: 69
End: 2019-06-26
Attending: INTERNAL MEDICINE | Admitting: INTERNAL MEDICINE
Payer: COMMERCIAL

## 2019-06-26 ENCOUNTER — ANESTHESIA (OUTPATIENT)
Dept: MEDSURG UNIT | Facility: CLINIC | Age: 69
End: 2019-06-26
Payer: COMMERCIAL

## 2019-06-26 ENCOUNTER — ANESTHESIA EVENT (OUTPATIENT)
Dept: MEDSURG UNIT | Facility: CLINIC | Age: 69
End: 2019-06-26
Payer: COMMERCIAL

## 2019-06-26 ENCOUNTER — HOSPITAL ENCOUNTER (OUTPATIENT)
Dept: MEDSURG UNIT | Facility: CLINIC | Age: 69
End: 2019-06-26
Attending: INTERNAL MEDICINE | Admitting: INTERNAL MEDICINE
Payer: COMMERCIAL

## 2019-06-26 ENCOUNTER — TELEPHONE (OUTPATIENT)
Dept: CARDIOLOGY | Facility: CLINIC | Age: 69
End: 2019-06-26

## 2019-06-26 ENCOUNTER — HOSPITAL ENCOUNTER (OUTPATIENT)
Facility: CLINIC | Age: 69
Discharge: HOME | End: 2019-06-26
Attending: INTERNAL MEDICINE | Admitting: INTERNAL MEDICINE
Payer: COMMERCIAL

## 2019-06-26 VITALS
DIASTOLIC BLOOD PRESSURE: 93 MMHG | RESPIRATION RATE: 14 BRPM | SYSTOLIC BLOOD PRESSURE: 114 MMHG | WEIGHT: 270 LBS | BODY MASS INDEX: 32.88 KG/M2 | TEMPERATURE: 97.9 F | HEART RATE: 101 BPM | HEIGHT: 76 IN | OXYGEN SATURATION: 96 %

## 2019-06-26 DIAGNOSIS — I48.91 ATRIAL FIBRILLATION (H): Primary | ICD-10-CM

## 2019-06-26 DIAGNOSIS — I48.91 ATRIAL FIBRILLATION, UNSPECIFIED TYPE (H): ICD-10-CM

## 2019-06-26 LAB
INR PPP: 2.35 (ref 0.86–1.14)
MAGNESIUM SERPL-MCNC: 2.3 MG/DL (ref 1.6–2.3)
POTASSIUM SERPL-SCNC: 5 MMOL/L (ref 3.4–5.3)

## 2019-06-26 PROCEDURE — 25000128 H RX IP 250 OP 636: Performed by: NURSE ANESTHETIST, CERTIFIED REGISTERED

## 2019-06-26 PROCEDURE — 85610 PROTHROMBIN TIME: CPT | Performed by: INTERNAL MEDICINE

## 2019-06-26 PROCEDURE — 25000125 ZZHC RX 250: Performed by: INTERNAL MEDICINE

## 2019-06-26 PROCEDURE — 40000065 ZZH STATISTIC EKG NON-CHARGEABLE

## 2019-06-26 PROCEDURE — 99153 MOD SED SAME PHYS/QHP EA: CPT

## 2019-06-26 PROCEDURE — 93312 ECHO TRANSESOPHAGEAL: CPT | Mod: 26 | Performed by: INTERNAL MEDICINE

## 2019-06-26 PROCEDURE — 93325 DOPPLER ECHO COLOR FLOW MAPG: CPT | Mod: 26 | Performed by: INTERNAL MEDICINE

## 2019-06-26 PROCEDURE — 40000235 ZZH STATISTIC TELEMETRY

## 2019-06-26 PROCEDURE — 76377 3D RENDER W/INTRP POSTPROCES: CPT

## 2019-06-26 PROCEDURE — 83735 ASSAY OF MAGNESIUM: CPT | Performed by: INTERNAL MEDICINE

## 2019-06-26 PROCEDURE — 25000128 H RX IP 250 OP 636: Performed by: INTERNAL MEDICINE

## 2019-06-26 PROCEDURE — 84132 ASSAY OF SERUM POTASSIUM: CPT | Performed by: INTERNAL MEDICINE

## 2019-06-26 PROCEDURE — 37000008 ZZH ANESTHESIA TECHNICAL FEE, 1ST 30 MIN

## 2019-06-26 PROCEDURE — 40000857 ZZH STATISTIC TEE INCLUDES SEDATION

## 2019-06-26 PROCEDURE — 25800030 ZZH RX IP 258 OP 636: Performed by: INTERNAL MEDICINE

## 2019-06-26 PROCEDURE — 93005 ELECTROCARDIOGRAM TRACING: CPT

## 2019-06-26 PROCEDURE — 36415 COLL VENOUS BLD VENIPUNCTURE: CPT | Performed by: INTERNAL MEDICINE

## 2019-06-26 PROCEDURE — 93010 ELECTROCARDIOGRAM REPORT: CPT | Performed by: INTERNAL MEDICINE

## 2019-06-26 PROCEDURE — 92960 CARDIOVERSION ELECTRIC EXT: CPT | Performed by: INTERNAL MEDICINE

## 2019-06-26 PROCEDURE — 40000010 ZZH STATISTIC ANES STAT CODE-CRNA PER MINUTE

## 2019-06-26 PROCEDURE — 93320 DOPPLER ECHO COMPLETE: CPT | Mod: 26 | Performed by: INTERNAL MEDICINE

## 2019-06-26 RX ORDER — FENTANYL CITRATE 50 UG/ML
25 INJECTION, SOLUTION INTRAMUSCULAR; INTRAVENOUS
Status: DISCONTINUED | OUTPATIENT
Start: 2019-06-26 | End: 2019-06-26 | Stop reason: HOSPADM

## 2019-06-26 RX ORDER — ATROPINE SULFATE 0.1 MG/ML
.5-1 INJECTION INTRAVENOUS
Status: DISCONTINUED | OUTPATIENT
Start: 2019-06-26 | End: 2019-06-26 | Stop reason: HOSPADM

## 2019-06-26 RX ORDER — PROPOFOL 10 MG/ML
INJECTION, EMULSION INTRAVENOUS PRN
Status: DISCONTINUED | OUTPATIENT
Start: 2019-06-26 | End: 2019-06-26

## 2019-06-26 RX ORDER — POTASSIUM CHLORIDE 1500 MG/1
40 TABLET, EXTENDED RELEASE ORAL
Status: DISCONTINUED | OUTPATIENT
Start: 2019-06-26 | End: 2019-06-26 | Stop reason: HOSPADM

## 2019-06-26 RX ORDER — SODIUM CHLORIDE 9 MG/ML
1000 INJECTION, SOLUTION INTRAVENOUS CONTINUOUS
Status: DISCONTINUED | OUTPATIENT
Start: 2019-06-26 | End: 2019-06-26 | Stop reason: HOSPADM

## 2019-06-26 RX ORDER — MAGNESIUM SULFATE HEPTAHYDRATE 40 MG/ML
2 INJECTION, SOLUTION INTRAVENOUS
Status: DISCONTINUED | OUTPATIENT
Start: 2019-06-26 | End: 2019-06-26 | Stop reason: HOSPADM

## 2019-06-26 RX ORDER — FENTANYL CITRATE 50 UG/ML
25 INJECTION, SOLUTION INTRAMUSCULAR; INTRAVENOUS ONCE
Status: DISCONTINUED | OUTPATIENT
Start: 2019-06-26 | End: 2019-06-26 | Stop reason: HOSPADM

## 2019-06-26 RX ORDER — POTASSIUM CHLORIDE 1500 MG/1
20 TABLET, EXTENDED RELEASE ORAL
Status: DISCONTINUED | OUTPATIENT
Start: 2019-06-26 | End: 2019-06-26 | Stop reason: HOSPADM

## 2019-06-26 RX ORDER — LIDOCAINE HYDROCHLORIDE 40 MG/ML
1.5 SOLUTION TOPICAL ONCE
Status: COMPLETED | OUTPATIENT
Start: 2019-06-26 | End: 2019-06-26

## 2019-06-26 RX ORDER — GLYCOPYRROLATE 0.2 MG/ML
0.1 INJECTION, SOLUTION INTRAMUSCULAR; INTRAVENOUS ONCE
Status: COMPLETED | OUTPATIENT
Start: 2019-06-26 | End: 2019-06-26

## 2019-06-26 RX ORDER — NALOXONE HYDROCHLORIDE 0.4 MG/ML
.1-.4 INJECTION, SOLUTION INTRAMUSCULAR; INTRAVENOUS; SUBCUTANEOUS
Status: DISCONTINUED | OUTPATIENT
Start: 2019-06-26 | End: 2019-06-26 | Stop reason: HOSPADM

## 2019-06-26 RX ORDER — FLUMAZENIL 0.1 MG/ML
0.2 INJECTION, SOLUTION INTRAVENOUS
Status: DISCONTINUED | OUTPATIENT
Start: 2019-06-26 | End: 2019-06-26 | Stop reason: HOSPADM

## 2019-06-26 RX ADMIN — MIDAZOLAM 3 MG: 1 INJECTION INTRAMUSCULAR; INTRAVENOUS at 09:10

## 2019-06-26 RX ADMIN — LIDOCAINE HYDROCHLORIDE 1.5 ML: 40 SOLUTION TOPICAL at 08:27

## 2019-06-26 RX ADMIN — PROPOFOL 130 MG: 10 INJECTION, EMULSION INTRAVENOUS at 09:46

## 2019-06-26 RX ADMIN — SODIUM CHLORIDE 1000 ML: 9 INJECTION, SOLUTION INTRAVENOUS at 08:04

## 2019-06-26 RX ADMIN — GLYCOPYRROLATE 0.1 MG: 0.2 INJECTION, SOLUTION INTRAMUSCULAR; INTRAVENOUS at 08:22

## 2019-06-26 RX ADMIN — FENTANYL CITRATE 75 MCG: 50 INJECTION INTRAMUSCULAR; INTRAVENOUS at 09:09

## 2019-06-26 RX ADMIN — TOPICAL ANESTHETIC 0.5 ML: 200 SPRAY DENTAL; PERIODONTAL at 08:35

## 2019-06-26 ASSESSMENT — ENCOUNTER SYMPTOMS: DYSRHYTHMIAS: 1

## 2019-06-26 ASSESSMENT — MIFFLIN-ST. JEOR: SCORE: 2106.21

## 2019-06-26 ASSESSMENT — COPD QUESTIONNAIRES: COPD: 0

## 2019-06-26 ASSESSMENT — LIFESTYLE VARIABLES: TOBACCO_USE: 0

## 2019-06-26 NOTE — ANESTHESIA CARE TRANSFER NOTE
Patient: Kevan Connelly    * No procedures listed *    Diagnosis: * No pre-op diagnosis entered *  Diagnosis Additional Information: No value filed.    Anesthesia Type:   General     Note:  Airway :Face Mask  Patient transferred to:Telemetry/Step Down Unit  Handoff Report: Identifed the Patient, Identified the Reponsible Provider, Reviewed the pertinent medical history, Discussed the surgical course, Reviewed Intra-OP anesthesia mangement and issues during anesthesia, Set expectations for post-procedure period and Allowed opportunity for questions and acknowledgement of understanding      Vitals: (Last set prior to Anesthesia Care Transfer)    CRNA VITALS  6/26/2019 0922 - 6/26/2019 1004      6/26/2019             NIBP:  102/78    Pulse:  105    Ht Rate:  105    EKG:  Atrial fibrillation                Electronically Signed By: ANNMARIE Cruz CRNA  June 26, 2019  10:04 AM

## 2019-06-26 NOTE — ANESTHESIA POSTPROCEDURE EVALUATION
Patient: Kevan Connelly    * No procedures listed *    Diagnosis:* No pre-op diagnosis entered *  Diagnosis Additional Information: No value filed.    Anesthesia Type:  General    Note:  Anesthesia Post Evaluation    Patient location during evaluation: Bedside and Echo Lab  Patient participation: Able to fully participate in evaluation  Level of consciousness: awake  Pain management: adequate  Airway patency: patent  Cardiovascular status: acceptable  Respiratory status: acceptable  Hydration status: acceptable  PONV: none     Anesthetic complications: None          Last vitals:  There were no vitals filed for this visit.      Electronically Signed By: Elmer Griggs MD  June 26, 2019  3:41 PM

## 2019-06-26 NOTE — TELEPHONE ENCOUNTER
Received a call from Mr. Connelly requesting to check in after his cardioversion today, which was unfortunately unsuccessful in converting him to sinus rhythm. He was understandably a bit disappointed about this because he can feel that he is constantly in atrial fibrillation and has some fatigue and shortness of breath with this. His pulse has been around 90 to 120 bpm in his checks at home and he thinks his BP has been running a bit higher than his usual as well. It was 128/94 when he was in for his cardioversion, but he didn't have the numbers on hand from his recent home checks. He's on metoprolol succinate 100 mg BID and coumadin. He's scheduled to see Marbella Temple NP in follow up on 7/3/19, but he wanted to see if there is anything else that could be done in the meantime. Will send an update to Dr. Vanessa to review and advise. EFREN Carey RN - 06/26/19, 2:54 PM

## 2019-06-26 NOTE — PROCEDURES
IBETH & Cardioversion Note    Indication: Atrial Fibrillation     Informed consent was signed by the patient.  A timeout was taken.    Sedation for IBETH:  Versed 3 mg  Fentanyl 75mcg    Anesthesia was present for cardioversion, see separate documentation for their sedation.    Findings:  LV: Mildly dilated. EF ~40-45%  RV: Normal size and mildly dysfunctional  LA: mod-severely dilated  Mitral valve: Mild Mitral regur  Atrial septum: Mildly positive bubble study.   Aorta: normal  No SUDHA clot noted.     Cardioversion:  See anesthesia documentation for sedation during cardioversion.    Baseline rhythm: Atrial Fibrillation  Synchronized cardioversion performed at 200J.  Unable to convert pt to sinus rhythm even after three tries.     Post-DCCV rhythm: Atrial Fibrillation    No complications.    Marshall Dueñas MD  Text Page   June 26, 2019

## 2019-06-26 NOTE — ANESTHESIA PREPROCEDURE EVALUATION
Anesthesia Pre-Procedure Evaluation    Patient: Kevan Connelly   MRN: [id number] : 1950          Preoperative Diagnosis: Atrial Fibrillation    Procedure: Cardioversion        Past Medical History:   Diagnosis Date     Atrial fibrillation (H) 6/3/2019     Benign essential hypertension 2017    past use of ACE- Cough;  Had been on ARB-diuretic but experienced lower bp readings;  BLOOD PRESSURE now well controlled on ARB also no longer on diuretic; no low bp readings; may have had slight edema initially but has normalized with bp well controlled on ARB alone.      Cancer (H)     testicular cancer     CHF (congestive heart failure) (H) 2019     Long term current use of anticoagulant therapy 6/3/2019     Obesity (BMI 35.0-39.9) with comorbidity (H) 2019     Past Surgical History:   Procedure Laterality Date     GENITOURINARY SURGERY      testicular cancer     ORTHOPEDIC SURGERY  's    right knee surgery        Anesthesia Evaluation     . Pt has had prior anesthetic. Type: General    No history of anesthetic complications          ROS/MED HX    ENT/Pulmonary:      (-) tobacco use, asthma, COPD and sleep apnea   Neurologic:       Cardiovascular:     (+) hypertension----. : . CHF Last EF: 30% . . :. dysrhythmias a-fib, .       METS/Exercise Tolerance:     Hematologic:         Musculoskeletal:         GI/Hepatic:        (-) GERD and liver disease   Renal/Genitourinary:      (-) renal disease   Endo:     (+) Obesity, .      Psychiatric:         Infectious Disease:         Malignancy:         Other:                          Physical Exam  Normal systems: cardiovascular and pulmonary    Airway   Mallampati: II  TM distance: >3 FB  Neck ROM: full    Dental   (+) chipped and missing    Cardiovascular       Pulmonary             Lab Results   Component Value Date     2019    POTASSIUM 5.0 2019    CHLORIDE 111 (H) 2019    CO2 21 2019    BUN 29 2019    CR 1.54 (H)  "06/05/2019     (H) 06/05/2019    CHU 9.1 06/05/2019    MAG 2.3 06/26/2019    ALBUMIN 4.0 01/22/2019    PROTTOTAL 7.2 01/22/2019    ALT 27 01/22/2019    AST 21 01/22/2019    ALKPHOS 51 01/22/2019    BILITOTAL 1.3 01/22/2019    INR 2.35 (H) 06/26/2019    TSH 1.86 05/30/2019       Preop Vitals  BP Readings from Last 3 Encounters:   06/26/19 112/84   06/21/19 (!) 128/94   06/19/19 130/74    Pulse Readings from Last 3 Encounters:   06/26/19 114   06/21/19 84   06/19/19 59      Resp Readings from Last 3 Encounters:   06/26/19 12   06/19/19 16   06/05/19 17    SpO2 Readings from Last 3 Encounters:   06/26/19 95%   06/21/19 98%   06/05/19 96%      Temp Readings from Last 1 Encounters:   06/26/19 36.6  C (97.9  F) (Oral)    Ht Readings from Last 1 Encounters:   06/26/19 1.93 m (6' 4\")      Wt Readings from Last 1 Encounters:   06/26/19 122.5 kg (270 lb)    Estimated body mass index is 32.87 kg/m  as calculated from the following:    Height as of an earlier encounter on 6/26/19: 1.93 m (6' 4\").    Weight as of an earlier encounter on 6/26/19: 122.5 kg (270 lb).       Anesthesia Plan      History & Physical Review  History and physical reviewed and following examination; no interval change.    ASA Status:  3 .    NPO Status:  > 8 hours    Plan for General with Intravenous induction.          Postoperative Care  Postoperative pain management:  IV analgesics.      Consents  Anesthetic plan, risks, benefits and alternatives discussed with:  Patient..                 Elmer Griggs MD  "

## 2019-06-26 NOTE — DISCHARGE INSTRUCTIONS
IBETH  (Transesophageal Echocardiogram)  with Cardioversion Discharge Instructions    After you go home:      Have an adult stay with you for 6 hours.       For 24 hours - due to the sedation you received:    Relax and take it easy.    Do NOT make any important or legal decisions.    Do NOT drive or operate machines at home or at work.    Do NOT drink alcohol.    Diet:      You may resume your normal diet, but no scratchy foods for two days.    If your throat is sore, eat cold, bland or soft foods.    You may have heartburn if the tube used in the exam entered your stomach.  If so:   - Do not eat acidic and spicy foods.   - Do not eat three hours before bedtime.  Clear liquids are okay.   - When lying down, use two pillows to raise your head.    Medicines:      Take your medications, including blood thinners, unless your provider tells you not to.    If you have stopped any medicines, check with your provider about when to restart them.    You may take Tylenol (Acetaminophen) if your throat is sore.    You may take antacids if you have heartburn.      Follow Up Appointments:      Follow up with your cardiologist at [facility] of patient preference as instructed.    Follow up with your primary care provider as needed.    If you ve had a cardioversion:    The skin on your chest or back may feel tender for 48 hours.  If your skin is tender, you may:    Use a cold pack on the site. Never use ice directly on your skin. Use the cold pack for 20 minutes. Remove it for at least 30 minutes before re-using.    Apply 1% hydrocortisone cream to the skin (sold at drug stores)    Take Advil (Ibuprofen) or Tylenol (Acetaminophen).      Call the clinic if:      You have heartburn that is severe or lasts more than 72 hours.    You have a sore throat that feels worse after 72 hours.    You have shortness of breath, neck pain, chest pain, fever, chills, coughing up blood, or other unusual signs.    Call your cardiologist right  away if you have an irregular heartbeat, shortness of breath or feel dizzy.    Questions or concerns      [facility] [place]:    [phone] [facility] (7 days a week)

## 2019-06-26 NOTE — PROGRESS NOTES
Care Suites Admission Nursing Note    Reason for admission: IBETH DCCV  CS arrival time: 0645  Accompanied by: wife rizwana  Name/phone of DC :[phone] [place]  Medications held: na  Consent signed: no  Abnormal assessment/labs: no  If abnormal, provider notified: na  Education/questions answered: yes   Plan: proceed      Care Suites Procedure Nursing Note    Procedure: IBETH DCCV  Procedure started time: 0900  Procedure completed time: 0922 IBETH  Concerns/abnormal assessment: no  Plan: monitor.  Pt alexis IBETH well. Total Versed 3mg and Fentanyl 75mic. Ok to proceed per Dr. Dueñas. Anesthesia pgd.      Care Suites Post-Procedure Note    Procedure: DCCV  CS arrival time: na  Accompanied by: na  Concerns/abnormal assessment after procedure: DCCV done with 200 J X 3 Pt did not convert to SR.  Plan: monitor.    Care Suites Discharge Nursing Note    Education/questions answered: yes  Patient DC location: door 1  Accompanied by: wife son  CS discharge time: 1045

## 2019-06-27 ENCOUNTER — TELEPHONE (OUTPATIENT)
Dept: CARDIOLOGY | Facility: CLINIC | Age: 69
End: 2019-06-27

## 2019-06-27 RX ORDER — DIGOXIN 125 MCG
125 TABLET ORAL DAILY
Qty: 90 TABLET | Refills: 1 | Status: SHIPPED | OUTPATIENT
Start: 2019-06-27 | End: 2019-07-02

## 2019-06-27 NOTE — TELEPHONE ENCOUNTER
Patient notified of Dr. Vanessa recommendations patient has agreed to start digoxin and will have a level at time of his BRYAN visit with Marbella next week.  RX sent to [facility].

## 2019-06-27 NOTE — TELEPHONE ENCOUNTER
I recommend adding digoxin 125 mcg q daily and check digoxin level with next week clinic visit. As I mentioned during his clinic visit that if CV is not successful I would recommend review with electrophysiology. Could you also please arrange a review with EP- till that time the goal would be to continue rate control and options of AAD vs afib abaltion vs AV node ablation +PPM can be explored with EP.    Thanks  Tom

## 2019-06-27 NOTE — TELEPHONE ENCOUNTER
Patient has agreed to see EP first available was July 25, 2019 and he was wondering if Dr Vanessa would be ok with seeing EP 4 weeks away.  Will forward to Dr. Vanessa for clarification.  7-5-19 BRYAN visit and will have digoxin level.

## 2019-06-29 LAB — INTERPRETATION ECG - MUSE: NORMAL

## 2019-07-02 ENCOUNTER — OFFICE VISIT (OUTPATIENT)
Dept: CARDIOLOGY | Facility: CLINIC | Age: 69
End: 2019-07-02
Attending: INTERNAL MEDICINE
Payer: COMMERCIAL

## 2019-07-02 VITALS
BODY MASS INDEX: 32.39 KG/M2 | HEIGHT: 76 IN | SYSTOLIC BLOOD PRESSURE: 147 MMHG | HEART RATE: 68 BPM | WEIGHT: 266 LBS | DIASTOLIC BLOOD PRESSURE: 81 MMHG

## 2019-07-02 DIAGNOSIS — I48.91 ATRIAL FIBRILLATION, UNSPECIFIED TYPE (H): Primary | ICD-10-CM

## 2019-07-02 DIAGNOSIS — I10 BENIGN ESSENTIAL HYPERTENSION: ICD-10-CM

## 2019-07-02 DIAGNOSIS — I48.91 ATRIAL FIBRILLATION (H): ICD-10-CM

## 2019-07-02 DIAGNOSIS — I50.9 ACUTE HEART FAILURE, UNSPECIFIED HEART FAILURE TYPE (H): ICD-10-CM

## 2019-07-02 DIAGNOSIS — I50.9 ACUTE CONGESTIVE HEART FAILURE, UNSPECIFIED HEART FAILURE TYPE (H): ICD-10-CM

## 2019-07-02 LAB — DIGOXIN SERPL-MCNC: 0.4 UG/L (ref 0.5–2)

## 2019-07-02 PROCEDURE — 99205 OFFICE O/P NEW HI 60 MIN: CPT | Performed by: INTERNAL MEDICINE

## 2019-07-02 PROCEDURE — 80162 ASSAY OF DIGOXIN TOTAL: CPT | Performed by: INTERNAL MEDICINE

## 2019-07-02 PROCEDURE — 36415 COLL VENOUS BLD VENIPUNCTURE: CPT | Performed by: INTERNAL MEDICINE

## 2019-07-02 PROCEDURE — 93000 ELECTROCARDIOGRAM COMPLETE: CPT | Performed by: INTERNAL MEDICINE

## 2019-07-02 RX ORDER — AMIODARONE HYDROCHLORIDE 200 MG/1
TABLET ORAL
Qty: 60 TABLET | Refills: 0 | Status: SHIPPED | OUTPATIENT
Start: 2019-07-02 | End: 2019-07-22

## 2019-07-02 RX ORDER — METOPROLOL SUCCINATE 50 MG/1
TABLET, EXTENDED RELEASE ORAL
Qty: 60 TABLET | Refills: 1 | COMMUNITY
Start: 2019-07-02 | End: 2019-07-22

## 2019-07-02 RX ORDER — POTASSIUM CHLORIDE 1500 MG/1
20 TABLET, EXTENDED RELEASE ORAL DAILY
Qty: 30 TABLET | Refills: 0 | Status: SHIPPED | OUTPATIENT
Start: 2019-07-02 | End: 2019-07-22

## 2019-07-02 RX ORDER — FUROSEMIDE 40 MG
40 TABLET ORAL DAILY
Qty: 30 TABLET | Refills: 0 | Status: SHIPPED | OUTPATIENT
Start: 2019-07-02 | End: 2019-07-22

## 2019-07-02 ASSESSMENT — MIFFLIN-ST. JEOR: SCORE: 2088.07

## 2019-07-02 NOTE — PROGRESS NOTES
Electrophysiology/ Clinic Note         H&P and Plan:     REASON FOR VISIT: Electrophysiology evaluation.      HISTORY OF PRESENT ILLNESS: Mr. 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 fib.     Patient informs us since April of this year he started noticing shortness of breath on minimal exertion fatigue.  He was also complaining of episodes of orthopnea at night and inability to sleep.  He was evaluated in clinic and was found to have atrial fibrillation.  An echocardiogram revealed severe LV dysfunction (EF around 33%) and a nuclear stress test was negative for ischemia.    He was started on rate control strategy with metoprolol anticoagulation with Coumadin in addition to heart failure medications.  He underwent IBEHT cardioversion on June 26.  Unfortunately he failed to convert to normal rhythm despite of 3 shock attempts.  He was then referred here for evaluation.    The moment he is doing well.  He continues to have dyspnea on minimal exertion.  He denies any sensation of palpitations.  He denies any other symptoms such as chest pain, lightheadedness, near-syncope or syncope.    He is currently taking accommodation metoprolol and digoxin.  EKG is compatible with atrial fibrillation.      ASSESSMENT AND PLAN:   1.  Persistent atrial fibrillation and heart failure with signs of tachycardia mediated cardiomyopathy.  I favor another attempt of rhythm control strategy as patient is symptomatic with A. fib.  Unfortunately, due to LV dysfunction, amiodarone is the only antiarrhythmic feasible at this time.  He understands that amiodarone is not the best medication long-term and all the potential side effects related to amiodarone.  We will plan to use amiodarone for short-term conversion to have conversion.    "I recommend the following:    -Stop digoxin.  -Decrease metoprolol to 50 g twice daily.  -Start amiodarone load protocol.  -Pursue cardioversion in 2 weeks.  -Patient will have INR check tomorrow.  He will need close INR follow-ups due to the addition of amiodarone.  -Follow-up in clinic in a month after cardioversion.  -Repeat echo once patient back to normal rythm.  If cardiac function is back to normal, we will switch antiarrhythmics.   -Evaluate for sleep apnea as an outpatient.      Lamont Hannon MD    Physical Exam:  Vitals: /81   Pulse 68   Ht 1.93 m (6' 4\")   Wt 120.7 kg (266 lb)   BMI 32.38 kg/m      Constitutional:  AAO x3.  Pt is in NAD.  HEAD: normocephalic.  SKIN: Skin normal color, texture and turgor with no lesions or eruptions.  Eyes: PERRL, EOMI.  ENT:  Supple, normal JVP. No lymphadenopathy or thyroid enlargement.  Chest:  CTAB.  Cardiac:  IIR, variable sound of S1 and Normal S2. No murmurs rubs or gallop.   Abdomen:  Normal BS.  Soft, non-tender and non-distended.  No rebound or guarding.    Extremities:  Pedious pulses palpable B/L.  No LE edema noticed.   Neurological: Strength and sensation grossly symmetric and intact throughout.       CURRENT MEDICATIONS:  Current Outpatient Medications   Medication Sig Dispense Refill     digoxin (LANOXIN) 125 MCG tablet Take 1 tablet (125 mcg) by mouth daily 90 tablet 1     furosemide (LASIX) 40 MG tablet Take 1 tablet (40 mg) by mouth daily 30 tablet 0     losartan (COZAAR) 50 MG tablet Take 1 tablet (50 mg) by mouth daily 90 tablet 3     metoprolol succinate ER (TOPROL-XL) 50 MG 24 hr tablet 50 mg bid, titrate to 100 mg bid with goal of minimum HR 70 and Minimum ; see cardiology 6/21/2019 (Patient taking differently: 100 mg 2 times daily 50 mg bid, titrate to 100 mg bid with goal of minimum HR 70 and Minimum ; see cardiology 6/21/2019) 60 tablet 1     potassium chloride ER (K-TAB) 20 MEQ CR tablet Take 1 tablet (20 mEq) by mouth " daily 30 tablet 0     sildenafil (REVATIO) 20 MG tablet Take 1 tablet (20 mg) by mouth daily as needed 30 tablet 3     warfarin (COUMADIN) 5 MG tablet Take 5 mg by mouth daily or as directed by INR Clinic 90 tablet 0       ALLERGIES     Allergies   Allergen Reactions     Lisinopril Cough     Very persistent cough       PAST MEDICAL HISTORY:  Past Medical History:   Diagnosis Date     Atrial fibrillation (H) 6/3/2019     Benign essential hypertension 1/18/2017    past use of ACE- Cough;  Had been on ARB-diuretic but experienced lower bp readings;  BLOOD PRESSURE now well controlled on ARB also no longer on diuretic; no low bp readings; may have had slight edema initially but has normalized with bp well controlled on ARB alone.      Cancer (H)     testicular cancer     CHF (congestive heart failure) (H) 6/5/2019     Long term current use of anticoagulant therapy 6/3/2019     Obesity (BMI 35.0-39.9) with comorbidity (H) 2/11/2019       PAST SURGICAL HISTORY:  Past Surgical History:   Procedure Laterality Date     ANESTHESIA CARDIOVERSION N/A 6/26/2019    Procedure: ANESTHESIA, FOR CARDIOVERSION DR. LE;  Surgeon: [facility] ANESTHESIA PROVIDER;  Location:  OR     GENITOURINARY SURGERY  1990    testicular cancer     ORTHOPEDIC SURGERY  1970's    right knee surgery        FAMILY HISTORY:  Family History   Problem Relation Age of Onset     Colon Cancer Father      Coronary Artery Disease Maternal Grandmother        SOCIAL HISTORY:  Social History     Socioeconomic History     Marital status:      Spouse name: None     Number of children: None     Years of education: None     Highest education level: None   Occupational History     None   Social Needs     Financial resource strain: None     Food insecurity:     Worry: None     Inability: None     Transportation needs:     Medical: None     Non-medical: None   Tobacco Use     Smoking status: Never Smoker     Smokeless tobacco: Never Used   Substance and Sexual  Activity     Alcohol use: Yes     Drug use: No     Sexual activity: Yes     Partners: Female   Lifestyle     Physical activity:     Days per week: None     Minutes per session: None     Stress: None   Relationships     Social connections:     Talks on phone: None     Gets together: None     Attends Bahai service: None     Active member of club or organization: None     Attends meetings of clubs or organizations: None     Relationship status: None     Intimate partner violence:     Fear of current or ex partner: None     Emotionally abused: None     Physically abused: None     Forced sexual activity: None   Other Topics Concern     Parent/sibling w/ CABG, MI or angioplasty before 65F 55M? Yes   Social History Narrative     None       Review of Systems:  Skin:  Negative     Eyes:  Negative    ENT:  Negative    Respiratory:  Positive for shortness of breath;dyspnea on exertion  Cardiovascular:  Negative for;chest pain;lightheadedness;dizziness Positive for;palpitations;edema;fatigue  Gastroenterology: Negative    Genitourinary:  Negative    Musculoskeletal:  Negative    Neurologic:  Negative    Psychiatric:  Negative    Heme/Lymph/Imm:  Negative    Endocrine:  Negative        Recent Lab Results:  LIPID RESULTS:  Lab Results   Component Value Date    CHOL 179 01/22/2019    HDL 42 01/22/2019     (H) 01/22/2019    TRIG 84 01/22/2019       LIVER ENZYME RESULTS:  Lab Results   Component Value Date    AST 21 01/22/2019    ALT 27 01/22/2019       CBC RESULTS:  No results found for: WBC, RBC, HGB, HCT, MCV, MCH, MCHC, RDW, PLT    BMP RESULTS:  Lab Results   Component Value Date     06/05/2019    POTASSIUM 5.0 06/26/2019    CHLORIDE 111 (H) 06/05/2019    CO2 21 06/05/2019    ANIONGAP 11 06/05/2019     (H) 06/05/2019    BUN 29 06/05/2019    CR 1.54 (H) 06/05/2019    GFRESTIMATED 46 (L) 06/05/2019    GFRESTBLACK 53 (L) 06/05/2019    CHU 9.1 06/05/2019        A1C RESULTS:  No results found for: A1C    INR  RESULTS:  Lab Results   Component Value Date    INR 2.35 (H) 06/26/2019    INR 3.6 (A) 06/19/2019    INR 4.3 (A) 06/12/2019         ECHOCARDIOGRAM  No results found for this or any previous visit (from the past 8760 hour(s)).      Orders Placed This Encounter   Procedures     EKG 12-lead complete w/read - Clinics (performed today)     No orders of the defined types were placed in this encounter.    There are no discontinued medications.      Encounter Diagnosis   Name Primary?     Atrial fibrillation, unspecified type (H) Yes         CC  Tom Vanessa MD  [id number]  [place] [id number]

## 2019-07-02 NOTE — LETTER
7/2/2019    Davina Reaves MD  [address]  [facility] [address]    RE: Kevan Connelly       Dear Colleague,    I had the pleasure of seeing Kevan Connelly in the [facility].    Electrophysiology/ Clinic Note         H&P and Plan:     REASON FOR VISIT: Electrophysiology evaluation.      HISTORY OF PRESENT ILLNESS: Mr. 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 fib.     Patient informs us since April of this year he started noticing shortness of breath on minimal exertion fatigue.  He was also complaining of episodes of orthopnea at night and inability to sleep.  He was evaluated in clinic and was found to have atrial fibrillation.  An echocardiogram revealed severe LV dysfunction (EF around 33%) and a nuclear stress test was negative for ischemia.    He was started on rate control strategy with metoprolol anticoagulation with Coumadin in addition to heart failure medications.  He underwent IBETH cardioversion on June 26.  Unfortunately he failed to convert to normal rhythm despite of 3 shock attempts.  He was then referred here for evaluation.    The moment he is doing well.  He continues to have dyspnea on minimal exertion.  He denies any sensation of palpitations.  He denies any other symptoms such as chest pain, lightheadedness, near-syncope or syncope.    He is currently taking accommodation metoprolol and digoxin.  EKG is compatible with atrial fibrillation.      ASSESSMENT AND PLAN:   1.  Persistent atrial fibrillation and heart failure with signs of tachycardia mediated cardiomyopathy.  I favor another attempt of rhythm control strategy as patient is symptomatic with A. fib.  Unfortunately, due to LV dysfunction, amiodarone is the only antiarrhythmic  "feasible at this time.  He understands that amiodarone is not the best medication long-term and all the potential side effects related to amiodarone.  We will plan to use amiodarone for short-term conversion to have conversion.   I recommend the following:    -Stop digoxin.  -Decrease metoprolol to 50 g twice daily.  -Start amiodarone load protocol.  -Pursue cardioversion in 2 weeks.  -Patient will have INR check tomorrow.  He will need close INR follow-ups due to the addition of amiodarone.  -Follow-up in clinic in a month after cardioversion.  -Repeat echo once patient back to normal rythm.  If cardiac function is back to normal, we will switch antiarrhythmics.   -Evaluate for sleep apnea as an outpatient.      Lamont Hannon MD    Physical Exam:  Vitals: /81   Pulse 68   Ht 1.93 m (6' 4\")   Wt 120.7 kg (266 lb)   BMI 32.38 kg/m       Constitutional:  AAO x3.  Pt is in NAD.  HEAD: normocephalic.  SKIN: Skin normal color, texture and turgor with no lesions or eruptions.  Eyes: PERRL, EOMI.  ENT:  Supple, normal JVP. No lymphadenopathy or thyroid enlargement.  Chest:  CTAB.  Cardiac:  IIR, variable sound of S1 and Normal S2. No murmurs rubs or gallop.   Abdomen:  Normal BS.  Soft, non-tender and non-distended.  No rebound or guarding.    Extremities:  Pedious pulses palpable B/L.  No LE edema noticed.   Neurological: Strength and sensation grossly symmetric and intact throughout.       CURRENT MEDICATIONS:  Current Outpatient Medications   Medication Sig Dispense Refill     digoxin (LANOXIN) 125 MCG tablet Take 1 tablet (125 mcg) by mouth daily 90 tablet 1     furosemide (LASIX) 40 MG tablet Take 1 tablet (40 mg) by mouth daily 30 tablet 0     losartan (COZAAR) 50 MG tablet Take 1 tablet (50 mg) by mouth daily 90 tablet 3     metoprolol succinate ER (TOPROL-XL) 50 MG 24 hr tablet 50 mg bid, titrate to 100 mg bid with goal of minimum HR 70 and Minimum ; see cardiology 6/21/2019 (Patient taking " differently: 100 mg 2 times daily 50 mg bid, titrate to 100 mg bid with goal of minimum HR 70 and Minimum ; see cardiology 6/21/2019) 60 tablet 1     potassium chloride ER (K-TAB) 20 MEQ CR tablet Take 1 tablet (20 mEq) by mouth daily 30 tablet 0     sildenafil (REVATIO) 20 MG tablet Take 1 tablet (20 mg) by mouth daily as needed 30 tablet 3     warfarin (COUMADIN) 5 MG tablet Take 5 mg by mouth daily or as directed by INR Clinic 90 tablet 0       ALLERGIES     Allergies   Allergen Reactions     Lisinopril Cough     Very persistent cough       PAST MEDICAL HISTORY:  Past Medical History:   Diagnosis Date     Atrial fibrillation (H) 6/3/2019     Benign essential hypertension 1/18/2017    past use of ACE- Cough;  Had been on ARB-diuretic but experienced lower bp readings;  BLOOD PRESSURE now well controlled on ARB also no longer on diuretic; no low bp readings; may have had slight edema initially but has normalized with bp well controlled on ARB alone.      Cancer (H)     testicular cancer     CHF (congestive heart failure) (H) 6/5/2019     Long term current use of anticoagulant therapy 6/3/2019     Obesity (BMI 35.0-39.9) with comorbidity (H) 2/11/2019       PAST SURGICAL HISTORY:  Past Surgical History:   Procedure Laterality Date     ANESTHESIA CARDIOVERSION N/A 6/26/2019    Procedure: ANESTHESIA, FOR CARDIOVERSION DR. LE;  Surgeon: [facility] ANESTHESIA PROVIDER;  Location:  OR     GENITOURINARY SURGERY  1990    testicular cancer     ORTHOPEDIC SURGERY  1970's    right knee surgery        FAMILY HISTORY:  Family History   Problem Relation Age of Onset     Colon Cancer Father      Coronary Artery Disease Maternal Grandmother        SOCIAL HISTORY:  Social History     Socioeconomic History     Marital status:      Spouse name: None     Number of children: None     Years of education: None     Highest education level: None   Occupational History     None   Social Needs     Financial resource strain:  None     Food insecurity:     Worry: None     Inability: None     Transportation needs:     Medical: None     Non-medical: None   Tobacco Use     Smoking status: Never Smoker     Smokeless tobacco: Never Used   Substance and Sexual Activity     Alcohol use: Yes     Drug use: No     Sexual activity: Yes     Partners: Female   Lifestyle     Physical activity:     Days per week: None     Minutes per session: None     Stress: None   Relationships     Social connections:     Talks on phone: None     Gets together: None     Attends Latter-day service: None     Active member of club or organization: None     Attends meetings of clubs or organizations: None     Relationship status: None     Intimate partner violence:     Fear of current or ex partner: None     Emotionally abused: None     Physically abused: None     Forced sexual activity: None   Other Topics Concern     Parent/sibling w/ CABG, MI or angioplasty before 65F 55M? Yes   Social History Narrative     None       Review of Systems:  Skin:  Negative     Eyes:  Negative    ENT:  Negative    Respiratory:  Positive for shortness of breath;dyspnea on exertion  Cardiovascular:  Negative for;chest pain;lightheadedness;dizziness Positive for;palpitations;edema;fatigue  Gastroenterology: Negative    Genitourinary:  Negative    Musculoskeletal:  Negative    Neurologic:  Negative    Psychiatric:  Negative    Heme/Lymph/Imm:  Negative    Endocrine:  Negative        Recent Lab Results:  LIPID RESULTS:  Lab Results   Component Value Date    CHOL 179 01/22/2019    HDL 42 01/22/2019     (H) 01/22/2019    TRIG 84 01/22/2019       LIVER ENZYME RESULTS:  Lab Results   Component Value Date    AST 21 01/22/2019    ALT 27 01/22/2019       CBC RESULTS:  No results found for: WBC, RBC, HGB, HCT, MCV, MCH, MCHC, RDW, PLT    BMP RESULTS:  Lab Results   Component Value Date     06/05/2019    POTASSIUM 5.0 06/26/2019    CHLORIDE 111 (H) 06/05/2019    CO2 21 06/05/2019    ANIONGAP  11 06/05/2019     (H) 06/05/2019    BUN 29 06/05/2019    CR 1.54 (H) 06/05/2019    GFRESTIMATED 46 (L) 06/05/2019    GFRESTBLACK 53 (L) 06/05/2019    CHU 9.1 06/05/2019        A1C RESULTS:  No results found for: A1C    INR RESULTS:  Lab Results   Component Value Date    INR 2.35 (H) 06/26/2019    INR 3.6 (A) 06/19/2019    INR 4.3 (A) 06/12/2019         ECHOCARDIOGRAM  No results found for this or any previous visit (from the past 8760 hour(s)).      Orders Placed This Encounter   Procedures     EKG 12-lead complete w/read - Clinics (performed today)     No orders of the defined types were placed in this encounter.    There are no discontinued medications.      Encounter Diagnosis   Name Primary?     Atrial fibrillation, unspecified type (H) Yes                         Thank you for allowing me to participate in the care of your patient.    Sincerely,     Lamont Hannon MD     [facility]

## 2019-07-02 NOTE — LETTER
7/2/2019    Davina Reaves MD  [address]  [facility]ly [facility] [address]    RE: Kevan Connelly       Dear Colleague,    I had the pleasure of seeing Kevan Connelly in the [facility].    Electrophysiology/ Clinic Note         H&P and Plan:     REASON FOR VISIT: Electrophysiology evaluation.      HISTORY OF PRESENT ILLNESS: Mr. 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 fib.     Patient informs us since April of this year he started noticing shortness of breath on minimal exertion fatigue.  He was also complaining of episodes of orthopnea at night and inability to sleep.  He was evaluated in clinic and was found to have atrial fibrillation.  An echocardiogram revealed severe LV dysfunction (EF around 33%) and a nuclear stress test was negative for ischemia.    He was started on rate control strategy with metoprolol anticoagulation with Coumadin in addition to heart failure medications.  He underwent IBETH cardioversion on June 26.  Unfortunately he failed to convert to normal rhythm despite of 3 shock attempts.  He was then referred here for evaluation.    The moment he is doing well.  He continues to have dyspnea on minimal exertion.  He denies any sensation of palpitations.  He denies any other symptoms such as chest pain, lightheadedness, near-syncope or syncope.    He is currently taking accommodation metoprolol and digoxin.  EKG is compatible with atrial fibrillation.      ASSESSMENT AND PLAN:   1.  Persistent atrial fibrillation and heart failure with signs of tachycardia mediated cardiomyopathy.  I favor another attempt of rhythm control strategy as patient is symptomatic with A. fib.  Unfortunately, due to LV dysfunction, amiodarone is the only antiarrhythmic  "feasible at this time.  He understands that amiodarone is not the best medication long-term and all the potential side effects related to amiodarone.  We will plan to use amiodarone for short-term conversion to have conversion.   I recommend the following:    -Stop digoxin.  -Decrease metoprolol to 50 g twice daily.  -Start amiodarone load protocol.  -Pursue cardioversion in 2 weeks.  -Patient will have INR check tomorrow.  He will need close INR follow-ups due to the addition of amiodarone.  -Follow-up in clinic in a month after cardioversion.  -Repeat echo once patient back to normal rythm.  If cardiac function is back to normal, we will switch antiarrhythmics.   -Evaluate for sleep apnea as an outpatient.      Lamont Hannon MD    Physical Exam:  Vitals: /81   Pulse 68   Ht 1.93 m (6' 4\")   Wt 120.7 kg (266 lb)   BMI 32.38 kg/m       Constitutional:  AAO x3.  Pt is in NAD.  HEAD: normocephalic.  SKIN: Skin normal color, texture and turgor with no lesions or eruptions.  Eyes: PERRL, EOMI.  ENT:  Supple, normal JVP. No lymphadenopathy or thyroid enlargement.  Chest:  CTAB.  Cardiac:  IIR, variable sound of S1 and Normal S2. No murmurs rubs or gallop.   Abdomen:  Normal BS.  Soft, non-tender and non-distended.  No rebound or guarding.    Extremities:  Pedious pulses palpable B/L.  No LE edema noticed.   Neurological: Strength and sensation grossly symmetric and intact throughout.       CURRENT MEDICATIONS:  Current Outpatient Medications   Medication Sig Dispense Refill     digoxin (LANOXIN) 125 MCG tablet Take 1 tablet (125 mcg) by mouth daily 90 tablet 1     furosemide (LASIX) 40 MG tablet Take 1 tablet (40 mg) by mouth daily 30 tablet 0     losartan (COZAAR) 50 MG tablet Take 1 tablet (50 mg) by mouth daily 90 tablet 3     metoprolol succinate ER (TOPROL-XL) 50 MG 24 hr tablet 50 mg bid, titrate to 100 mg bid with goal of minimum HR 70 and Minimum ; see cardiology 6/21/2019 (Patient taking " differently: 100 mg 2 times daily 50 mg bid, titrate to 100 mg bid with goal of minimum HR 70 and Minimum ; see cardiology 6/21/2019) 60 tablet 1     potassium chloride ER (K-TAB) 20 MEQ CR tablet Take 1 tablet (20 mEq) by mouth daily 30 tablet 0     sildenafil (REVATIO) 20 MG tablet Take 1 tablet (20 mg) by mouth daily as needed 30 tablet 3     warfarin (COUMADIN) 5 MG tablet Take 5 mg by mouth daily or as directed by INR Clinic 90 tablet 0       ALLERGIES     Allergies   Allergen Reactions     Lisinopril Cough     Very persistent cough       PAST MEDICAL HISTORY:  Past Medical History:   Diagnosis Date     Atrial fibrillation (H) 6/3/2019     Benign essential hypertension 1/18/2017    past use of ACE- Cough;  Had been on ARB-diuretic but experienced lower bp readings;  BLOOD PRESSURE now well controlled on ARB also no longer on diuretic; no low bp readings; may have had slight edema initially but has normalized with bp well controlled on ARB alone.      Cancer (H)     testicular cancer     CHF (congestive heart failure) (H) 6/5/2019     Long term current use of anticoagulant therapy 6/3/2019     Obesity (BMI 35.0-39.9) with comorbidity (H) 2/11/2019       PAST SURGICAL HISTORY:  Past Surgical History:   Procedure Laterality Date     ANESTHESIA CARDIOVERSION N/A 6/26/2019    Procedure: ANESTHESIA, FOR CARDIOVERSION DR. LE;  Surgeon: GENERIC ANESTHESIA PROVIDER;  Location:  OR     GENITOURINARY SURGERY  1990    testicular cancer     ORTHOPEDIC SURGERY  1970's    right knee surgery        FAMILY HISTORY:  Family History   Problem Relation Age of Onset     Colon Cancer Father      Coronary Artery Disease Maternal Grandmother        SOCIAL HISTORY:  Social History     Socioeconomic History     Marital status:      Spouse name: None     Number of children: None     Years of education: None     Highest education level: None   Occupational History     None   Social Needs     Financial resource strain:  None     Food insecurity:     Worry: None     Inability: None     Transportation needs:     Medical: None     Non-medical: None   Tobacco Use     Smoking status: Never Smoker     Smokeless tobacco: Never Used   Substance and Sexual Activity     Alcohol use: Yes     Drug use: No     Sexual activity: Yes     Partners: Female   Lifestyle     Physical activity:     Days per week: None     Minutes per session: None     Stress: None   Relationships     Social connections:     Talks on phone: None     Gets together: None     Attends Hinduism service: None     Active member of club or organization: None     Attends meetings of clubs or organizations: None     Relationship status: None     Intimate partner violence:     Fear of current or ex partner: None     Emotionally abused: None     Physically abused: None     Forced sexual activity: None   Other Topics Concern     Parent/sibling w/ CABG, MI or angioplasty before 65F 55M? Yes   Social History Narrative     None       Review of Systems:  Skin:  Negative     Eyes:  Negative    ENT:  Negative    Respiratory:  Positive for shortness of breath;dyspnea on exertion  Cardiovascular:  Negative for;chest pain;lightheadedness;dizziness Positive for;palpitations;edema;fatigue  Gastroenterology: Negative    Genitourinary:  Negative    Musculoskeletal:  Negative    Neurologic:  Negative    Psychiatric:  Negative    Heme/Lymph/Imm:  Negative    Endocrine:  Negative        Recent Lab Results:  LIPID RESULTS:  Lab Results   Component Value Date    CHOL 179 01/22/2019    HDL 42 01/22/2019     (H) 01/22/2019    TRIG 84 01/22/2019       LIVER ENZYME RESULTS:  Lab Results   Component Value Date    AST 21 01/22/2019    ALT 27 01/22/2019       CBC RESULTS:  No results found for: WBC, RBC, HGB, HCT, MCV, MCH, MCHC, RDW, PLT    BMP RESULTS:  Lab Results   Component Value Date     06/05/2019    POTASSIUM 5.0 06/26/2019    CHLORIDE 111 (H) 06/05/2019    CO2 21 06/05/2019    ANIONGAP  11 06/05/2019     (H) 06/05/2019    BUN 29 06/05/2019    CR 1.54 (H) 06/05/2019    GFRESTIMATED 46 (L) 06/05/2019    GFRESTBLACK 53 (L) 06/05/2019    CHU 9.1 06/05/2019        A1C RESULTS:  No results found for: A1C    INR RESULTS:  Lab Results   Component Value Date    INR 2.35 (H) 06/26/2019    INR 3.6 (A) 06/19/2019    INR 4.3 (A) 06/12/2019         ECHOCARDIOGRAM  No results found for this or any previous visit (from the past 8760 hour(s)).      Orders Placed This Encounter   Procedures     EKG 12-lead complete w/read - Clinics (performed today)     No orders of the defined types were placed in this encounter.    There are no discontinued medications.      Encounter Diagnosis   Name Primary?     Atrial fibrillation, unspecified type (H) Yes         CC  Tom Vanessa MD  [address]  [place] [id number]                Thank you for allowing me to participate in the care of your patient.      Sincerely,     Lamont Hannon MD     [facility]    cc:   Tom Vanessa MD  [address]  [place] [id number]

## 2019-07-02 NOTE — PATIENT INSTRUCTIONS
- Stop Digoxin.  - Decrease Metoprolol to 50 mg BID  - Start Amiodarone.  - Cardioversion in 1-2 weeks.

## 2019-07-03 ENCOUNTER — ANTICOAGULATION THERAPY VISIT (OUTPATIENT)
Dept: NURSING | Facility: CLINIC | Age: 69
End: 2019-07-03
Payer: COMMERCIAL

## 2019-07-03 DIAGNOSIS — I48.91 ATRIAL FIBRILLATION (H): ICD-10-CM

## 2019-07-03 DIAGNOSIS — Z79.01 LONG TERM CURRENT USE OF ANTICOAGULANT THERAPY: ICD-10-CM

## 2019-07-03 LAB — INR POINT OF CARE: 2.6 (ref 0.86–1.14)

## 2019-07-03 PROCEDURE — 99207 ZZC NO CHARGE NURSE ONLY: CPT

## 2019-07-03 PROCEDURE — 85610 PROTHROMBIN TIME: CPT | Mod: QW

## 2019-07-03 PROCEDURE — 36416 COLLJ CAPILLARY BLOOD SPEC: CPT

## 2019-07-03 NOTE — Clinical Note
Patient starting on amiodarone and needs close f/u on Monday - sending to you to check and can have him back later in the week for next check as you deem necessary.

## 2019-07-03 NOTE — PROGRESS NOTES
ANTICOAGULATION FOLLOW-UP CLINIC VISIT    Patient Name:  Kevan Connelly  Date:  7/3/2019  Contact Type:  Face to Face    SUBJECTIVE:  Patient Findings     Positives:   Change in medications (amiodarone added x2 weeks)    Comments:   Patient had unsuccessful cardioversion. Started on amiodarone for 2 weeks then will attempt 2nd cardioversion. Therapeutic today, will continue same dose. Will monitor INR closely - going to [facility] on Monday.  Kaylee LEONARD RN  Anticoagulation Clinic  [place]          Clinical Outcomes     Comments:   Patient had unsuccessful cardioversion. Started on amiodarone for 2 weeks then will attempt 2nd cardioversion. Therapeutic today, will continue same dose. Will monitor INR closely - going to [facility] on Monday.  Kaylee LEONARD RN  Anticoagulation Clinic  [place]             OBJECTIVE    INR Protime   Date Value Ref Range Status   2019 2.6 (A) 0.86 - 1.14 Final       ASSESSMENT / PLAN  INR assessment THER    Recheck INR In: 5 DAYS    INR Location Clinic      Anticoagulation Summary  As of 7/3/2019    INR goal:   2.0-3.0   TTR:   43.8 % (3.4 wk)   INR used for dosin.6 (7/3/2019)   Warfarin maintenance plan:   5 mg (5 mg x 1) every day   Full warfarin instructions:   5 mg every day   Weekly warfarin total:   35 mg   No change documented:   Kaylee Ramey RN   Plan last modified:   Kaylee Ramey RN (2019)   Next INR check:   2019   Target end date:   Indefinite    Indications    Long term current use of anticoagulant therapy [Z79.01]  Atrial fibrillation (H) [I48.91]             Anticoagulation Episode Summary     INR check location:   Anticoagulation Clinic    Preferred lab:       Send INR reminders to:   CONNIE LOPEZ    Comments:   5mg tabs - mariam /       Anticoagulation Care Providers     Provider Role Specialty Phone number    Davina Reaves MD  Internal Medicine [phone]            See the Encounter Report to view  Anticoagulation Flowsheet and Dosing Calendar (Go to Encounters tab in chart review, and find the Anticoagulation Therapy Visit)    Kaylee Ramey RN

## 2019-07-08 ENCOUNTER — ANTICOAGULATION THERAPY VISIT (OUTPATIENT)
Dept: NURSING | Facility: CLINIC | Age: 69
End: 2019-07-08
Payer: COMMERCIAL

## 2019-07-08 DIAGNOSIS — Z79.01 LONG TERM CURRENT USE OF ANTICOAGULANT THERAPY: ICD-10-CM

## 2019-07-08 DIAGNOSIS — I48.91 ATRIAL FIBRILLATION (H): ICD-10-CM

## 2019-07-08 LAB — INR POINT OF CARE: 2.8 (ref 0.86–1.14)

## 2019-07-08 PROCEDURE — 36416 COLLJ CAPILLARY BLOOD SPEC: CPT

## 2019-07-08 PROCEDURE — 85610 PROTHROMBIN TIME: CPT | Mod: QW

## 2019-07-08 PROCEDURE — 99207 ZZC NO CHARGE NURSE ONLY: CPT

## 2019-07-08 NOTE — PROGRESS NOTES
ANTICOAGULATION FOLLOW-UP CLINIC VISIT    Patient Name:  Kevan Connelly  Date:  2019  Contact Type:  Face to Face    SUBJECTIVE:  Patient Findings     Positives:   Upcoming invasive procedure (Scheduled for 2nd cardioversion on 19), Change in medications (Started tapering dose of Amiodarone on 19)    Comments:   INR is starting to climb, patient scheduled for INR again in 2 days at his home clinic (), AV INR schedule is full in 3 days.        Clinical Outcomes     Negatives:   Major bleeding event, Thromboembolic event, Anticoagulation-related hospital admission, Anticoagulation-related ED visit, Anticoagulation-related fatality    Comments:   INR is starting to climb, patient scheduled for INR again in 2 days at his home clinic (), AV INR schedule is full in 3 days.           OBJECTIVE    INR Protime   Date Value Ref Range Status   2019 2.8 (A) 0.86 - 1.14 Final       ASSESSMENT / PLAN  INR assessment THER    Recheck INR In: 2 DAYS    INR Location Clinic      Anticoagulation Summary  As of 2019    INR goal:   2.0-3.0   TTR:   53.5 % (4.1 wk)   INR used for dosin.8 (2019)   Warfarin maintenance plan:   5 mg (5 mg x 1) every day   Full warfarin instructions:   5 mg every day   Weekly warfarin total:   35 mg   No change documented:   Ramona Delcid, RN   Plan last modified:   Kaylee Ramey RN (2019)   Next INR check:   7/10/2019   Target end date:   Indefinite    Indications    Long term current use of anticoagulant therapy [Z79.01]  Atrial fibrillation (H) [I48.91]             Anticoagulation Episode Summary     INR check location:   Anticoagulation Clinic    Preferred lab:       Send INR reminders to:   CONNIE LOPEZ    Comments:   5mg tabs - mariam /       Anticoagulation Care Providers     Provider Role Specialty Phone number    Davina Reaves MD  Internal Medicine [phone]            See the Encounter Report to view Anticoagulation Flowsheet and Dosing  Calendar (Go to Encounters tab in chart review, and find the Anticoagulation Therapy Visit)        Ramona Delcid RN

## 2019-07-10 ENCOUNTER — ANTICOAGULATION THERAPY VISIT (OUTPATIENT)
Dept: NURSING | Facility: CLINIC | Age: 69
End: 2019-07-10
Payer: COMMERCIAL

## 2019-07-10 DIAGNOSIS — Z79.01 LONG TERM CURRENT USE OF ANTICOAGULANT THERAPY: Primary | ICD-10-CM

## 2019-07-10 DIAGNOSIS — I48.91 ATRIAL FIBRILLATION (H): ICD-10-CM

## 2019-07-10 LAB — INR POINT OF CARE: 3.2 (ref 0.86–1.14)

## 2019-07-10 PROCEDURE — 36416 COLLJ CAPILLARY BLOOD SPEC: CPT

## 2019-07-10 PROCEDURE — 99207 ZZC NO CHARGE NURSE ONLY: CPT

## 2019-07-10 PROCEDURE — 85610 PROTHROMBIN TIME: CPT | Mod: QW

## 2019-07-10 NOTE — Clinical Note
Is it ok to check INR when patient comes in for DCCV 7/17/19? Order placed and this  INR nurse can call patient later in day for warfarin dosing instructions. Thanks!

## 2019-07-10 NOTE — PROGRESS NOTES
ANTICOAGULATION FOLLOW-UP CLINIC VISIT    Patient Name:  Kevan Connelly  Date:  7/10/2019  Contact Type:  Face to Face    SUBJECTIVE:  Patient Findings     Positives:   Change in medications    Comments:   Patient started on amiodarone last week. Supratherapeutic today but states he is noticing he has more energy and does not get out of breath like he used to and sleeping better. Started with 4 tablets of amiodarone, this week is down to 1 tablet. Will reduce warfarin per protocol and would like to test INR again in 1 week. Patient has DCCV planned for 7/17/19. At last attempt they checked his INR before the procedure. So added future order for INR to be checked 7/17/19. Will call patient that afternoon to discuss dosing and next INR check. Patient stated understanding and is in agreement with plan.    Routing to Cardiology RNs to verify ok to check INR at [facility] appt 7/17/19.    Kaylee LEONARD RN  Anticoagulation Clinic  Brinda LEONARD, Triage RN          Clinical Outcomes     Comments:   Patient started on amiodarone last week. Supratherapeutic today but states he is noticing he has more energy and does not get out of breath like he used to and sleeping better. Started with 4 tablets of amiodarone, this week is down to 1 tablet. Will reduce warfarin per protocol and would like to test INR again in 1 week. Patient has DCCV planned for 7/17/19. At last attempt they checked his INR before the procedure. So added future order for INR to be checked 7/17/19. Will call patient that afternoon to discuss dosing and next INR check. Patient stated understanding and is in agreement with plan.    Routing to Cardiology RNs to verify ok to check INR at [facility] appt 7/17/19.    Kaylee LEONARD RN  Anticoagulation Clinic  Brinda LEONARD, Triage RN             OBJECTIVE    INR Protime   Date Value Ref Range Status   07/10/2019 3.2 (A) 0.86 - 1.14 Final       ASSESSMENT / PLAN  INR assessment SUPRA    Recheck INR In: 1  WEEK    INR Location Clinic      Anticoagulation Summary  As of 7/10/2019    INR goal:   2.0-3.0   TTR:   53.2 % (1 mo)   INR used for dosing:   3.2! (7/10/2019)   Warfarin maintenance plan:   2.5 mg (5 mg x 0.5) every Mon; 5 mg (5 mg x 1) all other days   Full warfarin instructions:   7/10: 2.5 mg; Otherwise 2.5 mg every Mon; 5 mg all other days   Weekly warfarin total:   32.5 mg   Plan last modified:   Kaylee Ramey RN (7/10/2019)   Next INR check:   7/17/2019   Target end date:   Indefinite    Indications    Long term current use of anticoagulant therapy [Z79.01]  Atrial fibrillation (H) [I48.91]             Anticoagulation Episode Summary     INR check location:   Anticoagulation Clinic    Preferred lab:       Send INR reminders to:   CONNIE LOPEZ    Comments:   5mg tabs - mariam /       Anticoagulation Care Providers     Provider Role Specialty Phone number    Davina Reaves MD  Internal Medicine [phone]            See the Encounter Report to view Anticoagulation Flowsheet and Dosing Calendar (Go to Encounters tab in chart review, and find the Anticoagulation Therapy Visit)    Dosage adjustment made based on physician directed care plan.    Kaylee Ramey RN

## 2019-07-17 ENCOUNTER — HOSPITAL ENCOUNTER (OUTPATIENT)
Facility: CLINIC | Age: 69
Discharge: HOME | End: 2019-07-17
Attending: INTERNAL MEDICINE | Admitting: INTERNAL MEDICINE
Payer: COMMERCIAL

## 2019-07-17 ENCOUNTER — ANESTHESIA EVENT (OUTPATIENT)
Dept: SURGERY | Facility: CLINIC | Age: 69
End: 2019-07-17
Payer: COMMERCIAL

## 2019-07-17 ENCOUNTER — HOSPITAL ENCOUNTER (OUTPATIENT)
Dept: CARDIOLOGY | Facility: CLINIC | Age: 69
End: 2019-07-17
Attending: INTERNAL MEDICINE
Payer: COMMERCIAL

## 2019-07-17 ENCOUNTER — ANESTHESIA (OUTPATIENT)
Dept: SURGERY | Facility: CLINIC | Age: 69
End: 2019-07-17
Payer: COMMERCIAL

## 2019-07-17 VITALS
OXYGEN SATURATION: 97 % | RESPIRATION RATE: 15 BRPM | DIASTOLIC BLOOD PRESSURE: 100 MMHG | SYSTOLIC BLOOD PRESSURE: 136 MMHG | HEART RATE: 62 BPM

## 2019-07-17 DIAGNOSIS — I48.91 ATRIAL FIBRILLATION, UNSPECIFIED TYPE (H): ICD-10-CM

## 2019-07-17 LAB
INR PPP: 2.3 (ref 0.86–1.14)
MAGNESIUM SERPL-MCNC: 2 MG/DL (ref 1.6–2.3)
POTASSIUM SERPL-SCNC: 4.4 MMOL/L (ref 3.4–5.3)

## 2019-07-17 PROCEDURE — 92960 CARDIOVERSION ELECTRIC EXT: CPT

## 2019-07-17 PROCEDURE — 93010 ELECTROCARDIOGRAM REPORT: CPT | Performed by: INTERNAL MEDICINE

## 2019-07-17 PROCEDURE — 85610 PROTHROMBIN TIME: CPT | Performed by: INTERNAL MEDICINE

## 2019-07-17 PROCEDURE — 83735 ASSAY OF MAGNESIUM: CPT | Performed by: INTERNAL MEDICINE

## 2019-07-17 PROCEDURE — 84132 ASSAY OF SERUM POTASSIUM: CPT | Performed by: INTERNAL MEDICINE

## 2019-07-17 PROCEDURE — 25000125 ZZHC RX 250: Performed by: NURSE ANESTHETIST, CERTIFIED REGISTERED

## 2019-07-17 PROCEDURE — 37000008 ZZH ANESTHESIA TECHNICAL FEE, 1ST 30 MIN

## 2019-07-17 RX ORDER — NALOXONE HYDROCHLORIDE 0.4 MG/ML
.1-.4 INJECTION, SOLUTION INTRAMUSCULAR; INTRAVENOUS; SUBCUTANEOUS
Status: DISCONTINUED | OUTPATIENT
Start: 2019-07-17 | End: 2019-07-17 | Stop reason: HOSPADM

## 2019-07-17 RX ORDER — ATROPINE SULFATE 0.1 MG/ML
.5-1 INJECTION INTRAVENOUS
Status: DISCONTINUED | OUTPATIENT
Start: 2019-07-17 | End: 2019-07-17 | Stop reason: HOSPADM

## 2019-07-17 RX ORDER — POTASSIUM CHLORIDE 1500 MG/1
20 TABLET, EXTENDED RELEASE ORAL
Status: DISCONTINUED | OUTPATIENT
Start: 2019-07-17 | End: 2019-07-17 | Stop reason: HOSPADM

## 2019-07-17 RX ORDER — FLUMAZENIL 0.1 MG/ML
0.2 INJECTION, SOLUTION INTRAVENOUS
Status: DISCONTINUED | OUTPATIENT
Start: 2019-07-17 | End: 2019-07-17 | Stop reason: HOSPADM

## 2019-07-17 RX ORDER — POTASSIUM CHLORIDE 1500 MG/1
40 TABLET, EXTENDED RELEASE ORAL
Status: DISCONTINUED | OUTPATIENT
Start: 2019-07-17 | End: 2019-07-17 | Stop reason: HOSPADM

## 2019-07-17 RX ORDER — SODIUM CHLORIDE 9 MG/ML
INJECTION, SOLUTION INTRAVENOUS CONTINUOUS
Status: DISCONTINUED | OUTPATIENT
Start: 2019-07-17 | End: 2019-07-17 | Stop reason: HOSPADM

## 2019-07-17 RX ADMIN — METHOHEXITAL SODIUM 100 MG: 500 INJECTION, POWDER, LYOPHILIZED, FOR SOLUTION INTRAMUSCULAR; INTRAVENOUS; RECTAL at 11:50

## 2019-07-17 ASSESSMENT — ENCOUNTER SYMPTOMS: DYSRHYTHMIAS: 1

## 2019-07-17 NOTE — PROCEDURES
DC Cardioversion Procedure Note:    Informed consent obtained.  Pads placed in AP position.  Anesthesia used, please see their documentation.    Synchronized, biphasic 200J shock x 1 delivered with manual compression was successful in converting atrial fibrillation to normal sinus rhythm without bradycardia or pauses.    No apparent complications.

## 2019-07-17 NOTE — IP AVS SNAPSHOT
[facility]  [address]  [facility] [address]  Phone:  [phone]                                    After Visit Summary   7/17/2019    Kevan Connelly    MRN: [id number]           After Visit Summary Signature Page    I have received my discharge instructions, and my questions have been answered. I have discussed any challenges I see with this plan with the nurse or doctor.    ..........................................................................................................................................  Patient/Patient Representative Signature      ..........................................................................................................................................  Patient Representative Print Name and Relationship to Patient    ..................................................               ................................................  Date                                   Time    ..........................................................................................................................................  Reviewed by Signature/Title    ...................................................              ..............................................  Date                                               Time          22EPIC Rev 08/18

## 2019-07-17 NOTE — IP AVS SNAPSHOT
MRN:[id number]                      After Visit Summary   7/17/2019    Kevan Connelly    MRN: [id number]           Visit Information        Provider Department      7/17/2019 11:30 AM RH PROCEDURE ROOM [facility]           Review of your medicines      Notice    This visit is during an admission. Changes to the med list made in this visit will be reflected in the After Visit Summary of the admission.           Protect others around you: Learn how to safely use, store and throw away your medicines at www.disposemymeds.org.       Follow-ups after your visit       Your next 10 appointments already scheduled    Jul 24, 2019  4:00 PM CDT  An[facility] [address] [address] [phone]      Aug 07, 2019  8:50 AM CDT  [facility] EP RETURN with ANNMARIE [facility] ([facility]) [address]  [facility] [id number] [phone]         Care Instructions       Further instructions from your care team         Discharge Instructions for Cardioversion  Your healthcare provider performed a procedure called cardioversion. Your healthcare provider used a controlled electric shock or a medicine to briefly stop all electrical activity in your heart. This helped restore your heart s normal rhythm. Here are some instructions to follow while you recover.  Home care    Because cardioversion typically requires sedation, you won't be able to drive home. You will need a ride. Wait at least 24 hours before driving a car or operating heavy machinery after receiving sedating medicines.    Don t be alarmed if the skin on your chest is irritated or feels like it is sunburned. Your healthcare provider may prescribe a soothing lotion to relieve this discomfort. These minor symptoms will go away in  a few days.    Ask your healthcare provider about medicines to keep your heart rhythm steady.    If you were prescribed medicine, take it as instructed by your healthcare provider. Don t skip doses or take double doses. Cardioversion requires blood thinners for at least 4 weeks to prevent a delayed risk of stroke when treating atrial fibrillation or atrial flutter. Be sure you discuss which medicine you are taking to prevent stroke. Ask when you need to have your medicine levels checked, and whether you may be able to stop taking it in the future or whether it is recommended that you take it for life. Some of these blood-thinning medicines will have the dose adjusted, and interact with other medicines or foods. Your healthcare team will give you full instructions on what to watch out for. Report bleeding or symptoms of stroke immediately to your healthcare team and seek emergency medical attention.    Learn to take your own pulse. Keep a record of your results. Ask your healthcare provider when you should seek emergency medical attention. He or she will tell you which pulse rate reading is dangerous.     Keep in mind this procedure may need to be repeated if the abnormal heart rhythm returns. After the procedure, your healthcare provider will tell you if the treatment worked or if you will need further treatments or medication.  Follow-up care  Make a follow-up appointment, or as directed.  Call 911  Call 911 right away if you have:    Chest pain    Shortness of breath    Loss of vision, speech, or strength or coordination in any body part   When to call your healthcare provider  Call your healthcare provider right away if you:    Feel faint, dizzy, or lightheaded    Have chest pain with increased activity    Have irregular heartbeat or fast pulse    Have bleeding issues from blood-thinning medicines  Date Last Reviewed: 3/1/2017    1533-1155 [employer]. [address] [place] [address]. All  rights reserved. This information is not intended as a substitute for professional medical care. Always follow your healthcare professional's instructions.          Additional Information About Your Visit       Care EveryWhere ID    This is your Care EveryWhere ID. This could be used by other organizations to access your [place] medical records  [id number]       Your Vitals Were     Blood Pressure   141/100      Pulse   62    Respirations   14    Pulse Oximetry   96%           Primary Care Provider Office Phone # Fax #    Davina Zeny Reaves [phone]      Equal Access to Services    [facility] : Hadii aad ku hadasho Soomaali, waaxda luqadaha, qaybta kaalmada adeegyada, waxay maryin haygael mike . So [facility] [phone].    ATENCIÓN: Si habla español, tiene a luna disposición servicios gratuitos de asistencia lingüística. Llame al [phone].    We comply with applicable federal civil rights laws and [place] laws. We do not discriminate on the basis of race, color, national origin, age, disability, sex, sexual orientation, or gender identity.           Thank you!    Thank you for choosing [facility] for your care. Our goal is always to provide you with excellent care. Hearing back from our patients is one way we can continue to improve our services. Please take a few minutes to complete the written survey that you may receive in the mail after you visit. If you would like to speak to someone directly about your visit please contact Patient Relations at [phone]. Thank you!           Medication List     Notice    This visit is during an admission. Changes to the med list made in this visit will be reflected in the After Visit Summary of the admission.

## 2019-07-17 NOTE — PROGRESS NOTES
Patient tolerated recovery from cardioversion without any difficulties. Family present for discharge instructions. No questions at this time. 12 lead EKG remains continues to show sinus rhythm, rate in the 60's. BP continues to be elevated at 140/100. Instructed to take daily medications upon arrival to his home and to keep all follow up appointments as provided. Discharged to home with family, wife and son.

## 2019-07-17 NOTE — ANESTHESIA PREPROCEDURE EVALUATION
Anesthesia Pre-Procedure Evaluation    Patient: Kevan Connelly   MRN: [id number] : 1950          Preoperative Diagnosis: .    Procedure(s):  ANESTHESIA, FOR CARDIOVERSION    Past Medical History:   Diagnosis Date     Atrial fibrillation (H) 6/3/2019     Benign essential hypertension 2017    past use of ACE- Cough;  Had been on ARB-diuretic but experienced lower bp readings;  BLOOD PRESSURE now well controlled on ARB also no longer on diuretic; no low bp readings; may have had slight edema initially but has normalized with bp well controlled on ARB alone.      Cancer (H)     testicular cancer     CHF (congestive heart failure) (H) 2019     Long term current use of anticoagulant therapy 6/3/2019     Obesity (BMI 35.0-39.9) with comorbidity (H) 2019     Past Surgical History:   Procedure Laterality Date     ANESTHESIA CARDIOVERSION N/A 2019    Procedure: ANESTHESIA, FOR CARDIOVERSION DR. LE;  Surgeon: GENERIC ANESTHESIA PROVIDER;  Location: [facility] OR     GENITOURINARY SURGERY      testicular cancer     ORTHOPEDIC SURGERY      right knee surgery      Anesthesia Evaluation     .             ROS/MED HX    ENT/Pulmonary:     (+)JAMES risk factors , . .    Neurologic:  - neg neurologic ROS     Cardiovascular:     (+) hypertension----. : . CHF . . :. dysrhythmias a-fib, .       METS/Exercise Tolerance:     Hematologic:  - neg hematologic  ROS   (+) Other Hematologic Disorder-anticoagulated      Musculoskeletal:  - neg musculoskeletal ROS       GI/Hepatic:  - neg GI/hepatic ROS       Renal/Genitourinary:  - ROS Renal section negative       Endo:     (+) Obesity, .      Psychiatric:  - neg psychiatric ROS       Infectious Disease:  - neg infectious disease ROS       Malignancy:   (+) Malignancy History of Other          Other:    - neg other ROS                      Physical Exam      Airway   Mallampati: II  TM distance: >3 FB  Neck ROM: full    Dental     Cardiovascular   Rhythm and  "rate: irregular      Pulmonary             Lab Results   Component Value Date     06/05/2019    POTASSIUM 4.4 07/17/2019    CHLORIDE 111 (H) 06/05/2019    CO2 21 06/05/2019    BUN 29 06/05/2019    CR 1.54 (H) 06/05/2019     (H) 06/05/2019    CHU 9.1 06/05/2019    MAG 2.0 07/17/2019    ALBUMIN 4.0 01/22/2019    PROTTOTAL 7.2 01/22/2019    ALT 27 01/22/2019    AST 21 01/22/2019    ALKPHOS 51 01/22/2019    BILITOTAL 1.3 01/22/2019    INR 2.30 (H) 07/17/2019    TSH 1.86 05/30/2019       Preop Vitals  BP Readings from Last 3 Encounters:   07/17/19 (!) 137/107   07/02/19 147/81   06/26/19 (!) 114/93    Pulse Readings from Last 3 Encounters:   07/17/19 72   07/02/19 68   06/26/19 101      Resp Readings from Last 3 Encounters:   07/17/19 15   06/26/19 14   06/19/19 16    SpO2 Readings from Last 3 Encounters:   07/17/19 96%   06/26/19 96%   06/21/19 98%      Temp Readings from Last 1 Encounters:   06/26/19 97.9  F (36.6  C) (Oral)    Ht Readings from Last 1 Encounters:   07/02/19 1.93 m (6' 4\")      Wt Readings from Last 1 Encounters:   07/02/19 120.7 kg (266 lb)    Estimated body mass index is 32.38 kg/m  as calculated from the following:    Height as of 7/2/19: 1.93 m (6' 4\").    Weight as of 7/2/19: 120.7 kg (266 lb).       Anesthesia Plan      History & Physical Review      ASA Status:  3 .    NPO Status:  > 8 hours    Plan for General with Intravenous induction.          Postoperative Care      Consents  Anesthetic plan, risks, benefits and alternatives discussed with:  Patient..                 ANNMARIE Long CRNA                    .  "

## 2019-07-17 NOTE — DISCHARGE INSTRUCTIONS
Discharge Instructions for Cardioversion  Your healthcare provider performed a procedure called cardioversion. Your healthcare provider used a controlled electric shock or a medicine to briefly stop all electrical activity in your heart. This helped restore your heart s normal rhythm. Here are some instructions to follow while you recover.  Home care    Because cardioversion typically requires sedation, you won't be able to drive home. You will need a ride. Wait at least 24 hours before driving a car or operating heavy machinery after receiving sedating medicines.    Don t be alarmed if the skin on your chest is irritated or feels like it is sunburned. Your healthcare provider may prescribe a soothing lotion to relieve this discomfort. These minor symptoms will go away in a few days.    Ask your healthcare provider about medicines to keep your heart rhythm steady.    If you were prescribed medicine, take it as instructed by your healthcare provider. Don t skip doses or take double doses. Cardioversion requires blood thinners for at least 4 weeks to prevent a delayed risk of stroke when treating atrial fibrillation or atrial flutter. Be sure you discuss which medicine you are taking to prevent stroke. Ask when you need to have your medicine levels checked, and whether you may be able to stop taking it in the future or whether it is recommended that you take it for life. Some of these blood-thinning medicines will have the dose adjusted, and interact with other medicines or foods. Your healthcare team will give you full instructions on what to watch out for. Report bleeding or symptoms of stroke immediately to your healthcare team and seek emergency medical attention.    Learn to take your own pulse. Keep a record of your results. Ask your healthcare provider when you should seek emergency medical attention. He or she will tell you which pulse rate reading is dangerous.     Keep in mind this procedure may need to be  repeated if the abnormal heart rhythm returns. After the procedure, your healthcare provider will tell you if the treatment worked or if you will need further treatments or medication.  Follow-up care  Make a follow-up appointment, or as directed.  Call 911  Call 911 right away if you have:    Chest pain    Shortness of breath    Loss of vision, speech, or strength or coordination in any body part   When to call your healthcare provider  Call your healthcare provider right away if you:    Feel faint, dizzy, or lightheaded    Have chest pain with increased activity    Have irregular heartbeat or fast pulse    Have bleeding issues from blood-thinning medicines  Date Last Reviewed: 3/1/2017    2369-3366 [employer]. [address] [place] [address], [place] [address]. All rights reserved. This information is not intended as a substitute for professional medical care. Always follow your healthcare professional's instructions.

## 2019-07-18 ENCOUNTER — ANTICOAGULATION THERAPY VISIT (OUTPATIENT)
Dept: NURSING | Facility: CLINIC | Age: 69
End: 2019-07-18
Payer: COMMERCIAL

## 2019-07-18 DIAGNOSIS — Z79.01 LONG TERM CURRENT USE OF ANTICOAGULANT THERAPY: ICD-10-CM

## 2019-07-18 DIAGNOSIS — I48.91 ATRIAL FIBRILLATION (H): ICD-10-CM

## 2019-07-18 LAB — INTERPRETATION ECG - MUSE: NORMAL

## 2019-07-18 PROCEDURE — 99207 ZZC NO CHARGE NURSE ONLY: CPT | Performed by: INTERNAL MEDICINE

## 2019-07-18 NOTE — PROGRESS NOTES
ANTICOAGULATION FOLLOW-UP CLINIC VISIT    Patient Name:  Kevan Connelly  Date:  2019  Contact Type:  Telephone    SUBJECTIVE:  Patient Findings     Positives:   Change in health    Comments:   Called patient: He states he had DCCV yesterday and converted over quite easily. He continues with amiodarone, 1 pill daily. The patient was assessed for diet, medication, and activity level changes, missed or extra doses, bruising or bleeding, with no problem findings.  Will continue with maintenance dose and recheck INR in 1 week, as previously scheduled. Patient stated understanding and is in agreement with plan.    Kaylee LEONARD RN  Anticoagulation Clinic  [place]          Clinical Outcomes     Comments:   Called patient: He states he had DCCV yesterday and converted over quite easily. He continues with amiodarone, 1 pill daily. The patient was assessed for diet, medication, and activity level changes, missed or extra doses, bruising or bleeding, with no problem findings.  Will continue with maintenance dose and recheck INR in 1 week, as previously scheduled. Patient stated understanding and is in agreement with plan.    Kaylee LEONARD RN  Anticoagulation Clinic  [place]             OBJECTIVE    INR   Date Value Ref Range Status   2019 2.30 (H) 0.86 - 1.14 Final       ASSESSMENT / PLAN  INR assessment THER    Recheck INR In: 1 WEEK    INR Location Clinic      Anticoagulation Summary  As of 2019    INR goal:   2.0-3.0   TTR:   58.0 % (1.3 mo)   INR used for dosin.30 (2019)   Warfarin maintenance plan:   2.5 mg (5 mg x 0.5) every Mon; 5 mg (5 mg x 1) all other days   Full warfarin instructions:   2.5 mg every Mon; 5 mg all other days   Weekly warfarin total:   32.5 mg   No change documented:   Kaylee Ramey RN   Plan last modified:   Kaylee Ramey RN (7/10/2019)   Next INR check:   2019   Target end date:   Indefinite    Indications    Long term current use of anticoagulant therapy  [Z79.01]  Atrial fibrillation (H) [I48.91]             Anticoagulation Episode Summary     INR check location:   Anticoagulation Clinic    Preferred lab:       Send INR reminders to:   CONNIE LOPEZ    Comments:   5mg tabs - mariam /       Anticoagulation Care Providers     Provider Role Specialty Phone number    Davina Reaves MD  Internal Medicine [phone]            See the Encounter Report to view Anticoagulation Flowsheet and Dosing Calendar (Go to Encounters tab in chart review, and find the Anticoagulation Therapy Visit)    Kaylee Ramey RN

## 2019-07-18 NOTE — PROGRESS NOTES
I am so sorry, I tried calling one day and could not get through and then time got away.  Looks like his INR was perfect this morning for his INR.   Thank you!

## 2019-07-22 ENCOUNTER — TELEPHONE (OUTPATIENT)
Dept: CARDIOLOGY | Facility: CLINIC | Age: 69
End: 2019-07-22

## 2019-07-22 DIAGNOSIS — I10 BENIGN ESSENTIAL HYPERTENSION: ICD-10-CM

## 2019-07-22 DIAGNOSIS — I50.9 ACUTE CONGESTIVE HEART FAILURE, UNSPECIFIED HEART FAILURE TYPE (H): ICD-10-CM

## 2019-07-22 DIAGNOSIS — I48.91 ATRIAL FIBRILLATION, UNSPECIFIED TYPE (H): ICD-10-CM

## 2019-07-22 DIAGNOSIS — I50.9 ACUTE HEART FAILURE, UNSPECIFIED HEART FAILURE TYPE (H): ICD-10-CM

## 2019-07-22 RX ORDER — FUROSEMIDE 40 MG
40 TABLET ORAL DAILY
Qty: 30 TABLET | Refills: 0 | Status: SHIPPED | OUTPATIENT
Start: 2019-07-22 | End: 2019-08-07

## 2019-07-22 RX ORDER — AMIODARONE HYDROCHLORIDE 200 MG/1
200 TABLET ORAL DAILY
Qty: 60 TABLET | Refills: 3 | Status: SHIPPED | OUTPATIENT
Start: 2019-07-22 | End: 2019-08-14

## 2019-07-22 RX ORDER — METOPROLOL SUCCINATE 50 MG/1
50 TABLET, EXTENDED RELEASE ORAL 2 TIMES DAILY
Qty: 60 TABLET | Refills: 1 | Status: SHIPPED | OUTPATIENT
Start: 2019-07-22 | End: 2019-09-23

## 2019-07-22 RX ORDER — POTASSIUM CHLORIDE 1500 MG/1
20 TABLET, EXTENDED RELEASE ORAL DAILY
Qty: 30 TABLET | Refills: 0 | Status: SHIPPED | OUTPATIENT
Start: 2019-07-22 | End: 2019-08-07

## 2019-07-22 NOTE — TELEPHONE ENCOUNTER
Pt called and stated that he needs refills of his Amiodarone, Metoprolol, Lasix and KDur.  Unsure now long pt will be on the mediation and doses, will only send monthly and limited refills.  Discussing the pt Metoprolol, pt states that he has been taking 100 mg bid, but the prescriptions states 50 mg bid. Pt asked that the correct dose be sent to pharmacy.  Pt will f/u with Hilda on 8/7. JNelsonDIO

## 2019-07-24 ENCOUNTER — ANTICOAGULATION THERAPY VISIT (OUTPATIENT)
Dept: NURSING | Facility: CLINIC | Age: 69
End: 2019-07-24
Payer: COMMERCIAL

## 2019-07-24 DIAGNOSIS — Z79.01 LONG TERM CURRENT USE OF ANTICOAGULANT THERAPY: ICD-10-CM

## 2019-07-24 DIAGNOSIS — I48.91 ATRIAL FIBRILLATION (H): ICD-10-CM

## 2019-07-24 LAB — INR POINT OF CARE: 1.7 (ref 0.86–1.14)

## 2019-07-24 PROCEDURE — 85610 PROTHROMBIN TIME: CPT | Mod: QW

## 2019-07-24 PROCEDURE — 36416 COLLJ CAPILLARY BLOOD SPEC: CPT

## 2019-07-24 PROCEDURE — 99207 ZZC NO CHARGE NURSE ONLY: CPT

## 2019-07-24 NOTE — PROGRESS NOTES
ANTICOAGULATION FOLLOW-UP CLINIC VISIT    Patient Name:  Kevan Connelly  Date:  7/24/2019  Contact Type:  Face to Face    SUBJECTIVE:  Patient Findings     Positives:   Change in health, Change in diet/appetite    Comments:   Patient feels he had more green salads over the last week - with cabbage, mixed greens and kale. Probably will continue with less than this going forward. He is feeling much better after the cardioversion. He will meet with the [facility] in another two weeks and hopes to go back to exercise and a daily protein shake with breakfast. So we have a temporary/correctable factor with the diet and also an ongoing factor with his improving health after the cardioversion. Will increase maintenance dose by 7% and see patient back for INR in 10 days. If this takes him supratherapeutic, will need to look at a different sized pill going forward. Patient stated understanding and is in agreement with plan.    Kaylee LEONARD RN  Anticoagulation Clinic  [place]          Clinical Outcomes     Comments:   Patient feels he had more green salads over the last week - with cabbage, mixed greens and kale. Probably will continue with less than this going forward. He is feeling much better after the cardioversion. He will meet with the Heart Clinic in another two weeks and hopes to go back to exercise and a daily protein shake with breakfast. So we have a temporary/correctable factor with the diet and also an ongoing factor with his improving health after the cardioversion. Will increase maintenance dose by 7% and see patient back for INR in 10 days. If this takes him supratherapeutic, will need to look at a different sized pill going forward. Patient stated understanding and is in agreement with plan.    Kaylee LEONARD RN  Anticoagulation Clinic  [place]             OBJECTIVE    INR Protime   Date Value Ref Range Status   07/24/2019 1.7 (A) 0.86 - 1.14 Final       ASSESSMENT / PLAN  INR assessment SUB    Recheck  INR In: 10 DAYS    INR Location Clinic      Anticoagulation Summary  As of 2019    INR goal:   2.0-3.0   TTR:   56.8 % (1.5 mo)   INR used for dosin.7! (2019)   Warfarin maintenance plan:   5 mg (5 mg x 1) every day   Full warfarin instructions:   5 mg every day   Weekly warfarin total:   35 mg   Plan last modified:   Kaylee Ramey RN (2019)   Next INR check:   2019   Target end date:   Indefinite    Indications    Long term current use of anticoagulant therapy [Z79.01]  Atrial fibrillation (H) [I48.91]             Anticoagulation Episode Summary     INR check location:   Anticoagulation Clinic    Preferred lab:       Send INR reminders to:   CONNIE LOPEZ    Comments:   5mg tabs - mariam /       Anticoagulation Care Providers     Provider Role Specialty Phone number    Davina Reaves MD  Internal Medicine [phone]            See the Encounter Report to view Anticoagulation Flowsheet and Dosing Calendar (Go to Encounters tab in chart review, and find the Anticoagulation Therapy Visit)    Dosage adjustment made based on physician directed care plan.    Kaylee Ramey, DIO

## 2019-07-29 ENCOUNTER — TELEPHONE (OUTPATIENT)
Dept: CARDIOLOGY | Facility: CLINIC | Age: 69
End: 2019-07-29

## 2019-07-29 NOTE — TELEPHONE ENCOUNTER
Patient called with questions for upcoming appt with BRYAN.  Patient coming in for follow up s/p successful DCCV done on 7/17.    1.  Can I resume exercise?  2.  Can I have a cocktail?  Reviewed above with Hilda and also reviewed last OV with Dr Hannon on 7/2.  Per Hilda verbal recommendations:  1. Resume exercise as tolerated  2. Ok for 1-2 cocktails daily as long as this is not a trigger for his AF  3. Recommend sleep study per OV with Dr Hannon 7/2  4. Repeat Echo after patient in NSR.  This will be discussed further at upcoming appt on 8/7  Reviewed above with patient and he provided verbal understanding.  He is declining sleep study at this time as he does not feel that it is an issue.  No further questions at this time.  DIO Schrader

## 2019-08-02 ENCOUNTER — ANTICOAGULATION THERAPY VISIT (OUTPATIENT)
Dept: NURSING | Facility: CLINIC | Age: 69
End: 2019-08-02
Payer: COMMERCIAL

## 2019-08-02 DIAGNOSIS — Z79.01 LONG TERM CURRENT USE OF ANTICOAGULANT THERAPY: ICD-10-CM

## 2019-08-02 DIAGNOSIS — I48.91 ATRIAL FIBRILLATION (H): ICD-10-CM

## 2019-08-02 LAB — INR POINT OF CARE: 2.4 (ref 0.86–1.14)

## 2019-08-02 PROCEDURE — 36416 COLLJ CAPILLARY BLOOD SPEC: CPT

## 2019-08-02 PROCEDURE — 85610 PROTHROMBIN TIME: CPT | Mod: QW

## 2019-08-02 PROCEDURE — 99207 ZZC NO CHARGE NURSE ONLY: CPT

## 2019-08-02 NOTE — PROGRESS NOTES
ANTICOAGULATION FOLLOW-UP CLINIC VISIT    Patient Name:  Kevan Connelly  Date:  2019  Contact Type:  Face to Face    SUBJECTIVE:  Patient Findings     Comments:   Patient has been cleared by cardiology to begin exercising and has given the ok to have 1-2 drinks of alcohol per night if he desires. Patient will bring in ingredients of protein drink at next visit and keep track of how much he is exercising. Otherwise, The patient was assessed for diet, medication, and activity level changes, missed or extra doses, bruising or bleeding, with no problem findings.  Kaylee LEONARD RN  Anticoagulation Clinic  [place]          Clinical Outcomes     Comments:   Patient has been cleared by cardiology to begin exercising and has given the ok to have 1-2 drinks of alcohol per night if he desires. Patient will bring in ingredients of protein drink at next visit and keep track of how much he is exercising. Otherwise, The patient was assessed for diet, medication, and activity level changes, missed or extra doses, bruising or bleeding, with no problem findings.  Kaylee LEONARD RN  Anticoagulation Clinic  [place]             OBJECTIVE    INR Protime   Date Value Ref Range Status   2019 2.4 (A) 0.86 - 1.14 Final       ASSESSMENT / PLAN  INR assessment THER    Recheck INR In: 2 WEEKS    INR Location Clinic      Anticoagulation Summary  As of 2019    INR goal:   2.0-3.0   TTR:   56.9 % (1.8 mo)   INR used for dosin.4 (2019)   Warfarin maintenance plan:   5 mg (5 mg x 1) every day   Full warfarin instructions:   5 mg every day   Weekly warfarin total:   35 mg   No change documented:   Kaylee Ramey RN   Plan last modified:   Kaylee Ramey RN (2019)   Next INR check:   2019   Target end date:   Indefinite    Indications    Long term current use of anticoagulant therapy [Z79.01]  Atrial fibrillation (H) [I48.91]             Anticoagulation Episode Summary     INR check location:    Anticoagulation Clinic    Preferred lab:       Send INR reminders to:   CONNIE LOPEZ    Comments:   5mg tabs - mariam / patient likes early morning appointments on Wed or Fri      Anticoagulation Care Providers     Provider Role Specialty Phone number    Jelly Davina Moura MD  Internal Medicine [phone]            See the Encounter Report to view Anticoagulation Flowsheet and Dosing Calendar (Go to Encounters tab in chart review, and find the Anticoagulation Therapy Visit)    Kaylee Ramey RN

## 2019-08-02 NOTE — PATIENT INSTRUCTIONS
Remember to bring in your protein drink ingredients and next visit and make note of how much more you are exercizing.

## 2019-08-06 NOTE — PROGRESS NOTES
HPI:  Kevan Connelly is a 68 year old male who presents for follow up after undergoing a cardioversion.  He is a patient of Dr. Hannon.  His medical history includes     1. Atrial fibrillation.  Diagnosed on 5/30/19 with cardiomyopathy.  Underwent an unsuccessful DCCV in 6/2019.  He saw Dr. Hannon  and was started on amiodarone load and underwent a successful cardioversion (7/2019).  Warfarin for anticoagulation due to cost of DOAC  2. Cardiomyopathy.  EF 33%  3. Hypertension  4. Obesity  5. Testicular cancer with subsequent ED    Diagnostics:    ECHO (6/2019) revealed He underwent echocardiogram that showed moderately reduced LV systolic function with LVEF of 33%.  There was moderate global hypokinesia.  The LV was mildly dilated, mildly decreased RV systolic function.  The left atrium was noted to be severely enlarged and right atrium to be moderate to severely dilated, mild mitral regurgitation.      Lexiscan stress test (6/2019) showed no evidence of ischemia or infarct.  LVEF was 29%.       He met with Dr. Vanessa in June 2019 after being diagnosed with cardiomyopathy and atrial fibrillation.  In March 2019 he started to feel unwell with shortness of breath with exertion.  He met with his primary provider and was diagnosed with atrial fibrillation started on beta-blocker and Xarelto.  The cost of the Xarelto was cost prohibitive and he was changed to Coumadin. He underwent an unsuccessful cardioversion in 6/26/2019 and later met with Dr Hannon who started amiodarone load and scheduled a repeat cardioversion.   On 7/17 he underwent a successful cardioversion.      Today he presents with his wife stating he feels great since his cardioversion.  He denies feeling palpitations, shortness of breath, chest discomfort when he lies down.  These were all his symptoms related to his atrial fibrillation.   He is taking his medications as prescribed he has currently taking warfarin and tolerating the medication and denies  bleeding, hematuria, hematochezia, epistaxis and signs/symptoms of stroke.  He would like to start exercising again of which I encouraged him to do.  He denies chest pain or pressure,  dizziness, syncope, angina, dyspnea at rest or with exertion,  palpitations, orthopnea, PND, or edema.    ASSESSMENT AND PLAN    Atrial fibrillation    For Stroke Prophylaxis for CHADSVASC=(HF, age, HTN) he is currently taking warfarin with goal INR 2-3.  He has been therapeutic 90% of the time.  DOAC's were too expensive    For rhythm control he underwent a cardioversion (6/2019) which was unsuccessful.  Nazario was started on amiodarone with a load and underwent a successful cardioversion on 7/17/2019.      Today his ECG reveals sinus rhythm with a ventricular rate of 65 bpm, QT/QTc 440/448, QRS equals 108 ms and he does not have any symptoms associated with his atrial fibrillation.    The plan is to repeat an echocardiogram and if it has improved will stop amiodarone and start flecainide    Cardiomyopathy    At the time of diagnosis with his atrial fibrillation his EF was found to be 33%.      He underwent a nuclear stress test which was negative for ischemia.    Cardiomyopathy is thought to be tachycardia induced.  He underwent a cardioversion on 7/17/19 and has remained in normal sinus rhythm since.      We will have him repeat his echocardiogram in approximately 1 week and reassess his EF with a goal of discontinuing amiodarone and starting a new antiarrhythmic medication.       Patient is euvolemic on exam today he is taking Lasix 40 mg daily and potassium chloride 20 equivalents daily.      Will reduce his Lasix to 20 mg daily and potassium chloride to 20 mEq every other day.  Will repeat BMP in 1 week.    In the mean time he is on GDMT of ARB and BB    Amiodarone therapy    Due to short duration of amiodarone use.  Patient has not had PFTs completed.    TSH on 1.86 (5/30/2019)    LFTs on 1/2019 show ALT 27 and AST  21    Hypertension    Controlled with BB and ARB    Plan:    Repeat echo    Reduce Lasix to 20 mg daily and potassium chloride to 20 mEq every other day.      repeat BMP in 1 week.    If ejection fraction has improved to at least 50%.  Will see patient back in 1 week to stop amiodarone and start flecainide 75 ms     Will have him return for ECG in 3-5 days and if he is tolerating then schedule a stress ECG to assess for pro arrhythmias    Follow up with Dr. Hannon in 3 months    Need to discuss lifestyle interventions with patient including sleep apnea.       Patient expresses understanding and agreement with the plan.     I appreciate the chance to help with Kevan Connelly Please let me know if you have any questions or concerns.    Hilda Wahl, APRN, CNP    This note was completed in part using Dragon voice recognition software. Although reviewed after completion, some word and grammatical errors may occur.    Orders Placed This Encounter   Procedures     Basic metabolic panel     EKG 12-lead complete w/read - Clinics (performed today)     Echocardiogram Complete     Orders Placed This Encounter   Medications     acetaminophen (TYLENOL) 500 MG tablet     Sig: Take 500 mg by mouth every 6 hours as needed for mild pain     furosemide (LASIX) 40 MG tablet     Sig: Take 0.5 tablets (20 mg) by mouth daily     Dispense:  30 tablet     Refill:  1     Pt will call for refills     potassium chloride ER (K-TAB) 20 MEQ CR tablet     Sig: Take 1 tablet (20 mEq) by mouth every other day     Dispense:  30 tablet     Refill:  0     Medications Discontinued During This Encounter   Medication Reason     furosemide (LASIX) 40 MG tablet      potassium chloride ER (K-TAB) 20 MEQ CR tablet          Encounter Diagnoses   Name Primary?     Atrial fibrillation, unspecified type (H)      Acute congestive heart failure, unspecified heart failure type (H)        CURRENT MEDICATIONS:  Current Outpatient Medications   Medication Sig  Dispense Refill     acetaminophen (TYLENOL) 500 MG tablet Take 500 mg by mouth every 6 hours as needed for mild pain       amiodarone (PACERONE/CODARONE) 200 MG tablet Take 1 tablet (200 mg) by mouth daily 60 tablet 3     furosemide (LASIX) 40 MG tablet Take 0.5 tablets (20 mg) by mouth daily 30 tablet 1     losartan (COZAAR) 50 MG tablet Take 1 tablet (50 mg) by mouth daily 90 tablet 3     metoprolol succinate ER (TOPROL-XL) 50 MG 24 hr tablet Take 1 tablet (50 mg) by mouth 2 times daily 50 mg PO bid 60 tablet 1     potassium chloride ER (K-TAB) 20 MEQ CR tablet Take 1 tablet (20 mEq) by mouth every other day 30 tablet 0     sildenafil (REVATIO) 20 MG tablet Take 1 tablet (20 mg) by mouth daily as needed 30 tablet 3     warfarin (COUMADIN) 5 MG tablet Take 5 mg by mouth daily or as directed by INR Clinic 90 tablet 0       ALLERGIES     Allergies   Allergen Reactions     Lisinopril Cough     Very persistent cough       PAST MEDICAL HISTORY:  Past Medical History:   Diagnosis Date     Atrial fibrillation (H) 6/3/2019     Benign essential hypertension 1/18/2017    past use of ACE- Cough;  Had been on ARB-diuretic but experienced lower bp readings;  BLOOD PRESSURE now well controlled on ARB also no longer on diuretic; no low bp readings; may have had slight edema initially but has normalized with bp well controlled on ARB alone.      Cancer (H)     testicular cancer     CHF (congestive heart failure) (H) 6/5/2019     Long term current use of anticoagulant therapy 6/3/2019     Obesity (BMI 35.0-39.9) with comorbidity (H) 2/11/2019       PAST SURGICAL HISTORY:  Past Surgical History:   Procedure Laterality Date     ANESTHESIA CARDIOVERSION N/A 6/26/2019    Procedure: ANESTHESIA, FOR CARDIOVERSION DR. LE;  Surgeon: GENERIC ANESTHESIA PROVIDER;  Location:  OR     ANESTHESIA CARDIOVERSION N/A 7/17/2019    Procedure: ANESTHESIA, FOR CARDIOVERSION;  Surgeon: GENERIC ANESTHESIA PROVIDER;  Location:  OR      GENITOURINARY SURGERY  1990    testicular cancer     ORTHOPEDIC SURGERY  1970's    right knee surgery        FAMILY HISTORY:  Family History   Problem Relation Age of Onset     Colon Cancer Father      Coronary Artery Disease Maternal Grandmother        SOCIAL HISTORY:  Social History     Socioeconomic History     Marital status:      Spouse name: None     Number of children: None     Years of education: None     Highest education level: None   Occupational History     None   Social Needs     Financial resource strain: None     Food insecurity:     Worry: None     Inability: None     Transportation needs:     Medical: None     Non-medical: None   Tobacco Use     Smoking status: Never Smoker     Smokeless tobacco: Never Used   Substance and Sexual Activity     Alcohol use: Yes     Drug use: No     Sexual activity: Yes     Partners: Female   Lifestyle     Physical activity:     Days per week: None     Minutes per session: None     Stress: None   Relationships     Social connections:     Talks on phone: None     Gets together: None     Attends Denominational service: None     Active member of club or organization: None     Attends meetings of clubs or organizations: None     Relationship status: None     Intimate partner violence:     Fear of current or ex partner: None     Emotionally abused: None     Physically abused: None     Forced sexual activity: None   Other Topics Concern     Parent/sibling w/ CABG, MI or angioplasty before 65F 55M? Yes   Social History Narrative     None       Review of Systems:  Skin:  Negative     Eyes:  Positive for glasses  ENT:  Negative    Respiratory:  Negative    Cardiovascular:  Negative    Gastroenterology: Negative    Genitourinary:  Positive for urinary frequency  Musculoskeletal:  Negative    Neurologic:  Negative    Psychiatric:  Negative    Heme/Lymph/Imm:  Negative    Endocrine:  Negative      Physical Exam:  Vitals: /80 (BP Location: Right arm, Patient Position:  "Sitting, Cuff Size: Adult Large)   Pulse 64   Ht 1.93 m (6' 4\")   Wt 123.4 kg (272 lb)   SpO2 96%   BMI 33.11 kg/m      Constitutional:  cooperative, alert and oriented, well developed, well nourished, in no acute distress        Skin:  warm and dry to the touch, no apparent skin lesions or masses noted        Head:  normocephalic, no masses or lesions        Eyes:  pupils equal and round        ENT:  no pallor or cyanosis        Neck:  JVP normal        Chest:  clear to auscultation        Cardiac: normal S1 and S2                  Abdomen:  abdomen soft obese      Vascular: pulses full and equal     right radial artery;2+             left radial artery;2+                  Extremities and Back:  no deformities, clubbing, cyanosis, erythema observed;no edema        Neurological:  no gross motor deficits          Recent Lab Results:  LIPID RESULTS:  Lab Results   Component Value Date    CHOL 179 01/22/2019    HDL 42 01/22/2019     (H) 01/22/2019    TRIG 84 01/22/2019       LIVER ENZYME RESULTS:  Lab Results   Component Value Date    AST 21 01/22/2019    ALT 27 01/22/2019       CBC RESULTS:  No results found for: WBC, RBC, HGB, HCT, MCV, MCH, MCHC, RDW, PLT    BMP RESULTS:  Lab Results   Component Value Date     06/05/2019    POTASSIUM 4.4 07/17/2019    CHLORIDE 111 (H) 06/05/2019    CO2 21 06/05/2019    ANIONGAP 11 06/05/2019     (H) 06/05/2019    BUN 29 06/05/2019    CR 1.54 (H) 06/05/2019    GFRESTIMATED 46 (L) 06/05/2019    GFRESTBLACK 53 (L) 06/05/2019    CHU 9.1 06/05/2019        A1C RESULTS:  No results found for: A1C    INR RESULTS:  Lab Results   Component Value Date    INR 2.4 (A) 08/02/2019    INR 1.7 (A) 07/24/2019    INR 2.30 (H) 07/17/2019    INR 2.35 (H) 06/26/2019           CC  Lamont Hannon MD  [id number]  [place] [id number]                "

## 2019-08-07 ENCOUNTER — OFFICE VISIT (OUTPATIENT)
Dept: CARDIOLOGY | Facility: CLINIC | Age: 69
End: 2019-08-07
Attending: INTERNAL MEDICINE
Payer: COMMERCIAL

## 2019-08-07 VITALS
OXYGEN SATURATION: 96 % | DIASTOLIC BLOOD PRESSURE: 80 MMHG | HEIGHT: 76 IN | WEIGHT: 272 LBS | BODY MASS INDEX: 33.12 KG/M2 | HEART RATE: 64 BPM | SYSTOLIC BLOOD PRESSURE: 138 MMHG

## 2019-08-07 DIAGNOSIS — I48.91 ATRIAL FIBRILLATION, UNSPECIFIED TYPE (H): ICD-10-CM

## 2019-08-07 DIAGNOSIS — I50.9 ACUTE CONGESTIVE HEART FAILURE, UNSPECIFIED HEART FAILURE TYPE (H): ICD-10-CM

## 2019-08-07 PROCEDURE — 93000 ELECTROCARDIOGRAM COMPLETE: CPT | Performed by: NURSE PRACTITIONER

## 2019-08-07 PROCEDURE — 99214 OFFICE O/P EST MOD 30 MIN: CPT | Performed by: NURSE PRACTITIONER

## 2019-08-07 RX ORDER — POTASSIUM CHLORIDE 1500 MG/1
20 TABLET, EXTENDED RELEASE ORAL EVERY OTHER DAY
Qty: 30 TABLET | Refills: 0 | Status: SHIPPED | OUTPATIENT
Start: 2019-08-07 | End: 2019-12-13

## 2019-08-07 RX ORDER — ACETAMINOPHEN 500 MG
500 TABLET ORAL EVERY 6 HOURS PRN
COMMUNITY

## 2019-08-07 RX ORDER — FUROSEMIDE 40 MG
20 TABLET ORAL DAILY
Qty: 30 TABLET | Refills: 1 | Status: SHIPPED | OUTPATIENT
Start: 2019-08-07 | End: 2019-12-13

## 2019-08-07 ASSESSMENT — MIFFLIN-ST. JEOR: SCORE: 2115.28

## 2019-08-07 NOTE — LETTER
8/7/2019    Davina Reaves MD  [address]  [facility] [address]    RE: Kevan Connlely       Dear Colleague,    I had the pleasure of seeing Kevan Connelly in the [facility].    HPI:  Kevan Connelly is a 68 year old male who presents for follow up after undergoing a cardioversion.  He is a patient of Dr. Hannon.  His medical history includes     1. Atrial fibrillation.  Diagnosed on 5/30/19 with cardiomyopathy.  Underwent an unsuccessful DCCV in 6/2019.  He saw Dr. Hannon  and was started on amiodarone load and underwent a successful cardioversion (7/2019).  Warfarin for anticoagulation due to cost of DOAC  2. Cardiomyopathy.  EF 33%  3. Hypertension  4. Obesity  5. Testicular cancer with subsequent ED    Diagnostics:    ECHO (6/2019) revealed He underwent echocardiogram that showed moderately reduced LV systolic function with LVEF of 33%.  There was moderate global hypokinesia.  The LV was mildly dilated, mildly decreased RV systolic function.  The left atrium was noted to be severely enlarged and right atrium to be moderate to severely dilated, mild mitral regurgitation.      Lexiscan stress test (6/2019) showed no evidence of ischemia or infarct.  LVEF was 29%.       He met with Dr. Vanessa in June 2019 after being diagnosed with cardiomyopathy and atrial fibrillation.  In March 2019 he started to feel unwell with shortness of breath with exertion.  He met with his primary provider and was diagnosed with atrial fibrillation started on beta-blocker and Xarelto.  The cost of the Xarelto was cost prohibitive and he was changed to Coumadin. He underwent an unsuccessful cardioversion in 6/26/2019 and later met with Dr Hannon who started amiodarone load and scheduled a repeat cardioversion.   On 7/17 he underwent a successful cardioversion.      Today he presents with his wife stating he feels great since his cardioversion.  He denies feeling palpitations, shortness of  breath, chest discomfort when he lies down.  These were all his symptoms related to his atrial fibrillation.   He is taking his medications as prescribed he has currently taking warfarin and tolerating the medication and denies bleeding, hematuria, hematochezia, epistaxis and signs/symptoms of stroke.  He would like to start exercising again of which I encouraged him to do.  He denies chest pain or pressure,  dizziness, syncope, angina, dyspnea at rest or with exertion,  palpitations, orthopnea, PND, or edema.    ASSESSMENT AND PLAN    Atrial fibrillation    For Stroke Prophylaxis for CHADSVASC=(HF, age, HTN) he is currently taking warfarin with goal INR 2-3.  He has been therapeutic 90% of the time.  DOAC's were too expensive    For rhythm control he underwent a cardioversion (6/2019) which was unsuccessful.  Nazario was started on amiodarone with a load and underwent a successful cardioversion on 7/17/2019.      Today his ECG reveals sinus rhythm with a ventricular rate of 65 bpm, QT/QTc 440/448, QRS equals 108 ms and he does not have any symptoms associated with his atrial fibrillation.    The plan is to repeat an echocardiogram and if it has improved will stop amiodarone and start flecainide    Cardiomyopathy    At the time of diagnosis with his atrial fibrillation his EF was found to be 33%.      He underwent a nuclear stress test which was negative for ischemia.    Cardiomyopathy is thought to be tachycardia induced.  He underwent a cardioversion on 7/17/19 and has remained in normal sinus rhythm since.      We will have him repeat his echocardiogram in approximately 1 week and reassess his EF with a goal of discontinuing amiodarone and starting a new antiarrhythmic medication.       Patient is euvolemic on exam today he is taking Lasix 40 mg daily and potassium chloride 20 equivalents daily.      Will reduce his Lasix to 20 mg daily and potassium chloride to 20 mEq every other day.  Will repeat BMP in 1  week.    In the mean time he is on GDMT of ARB and BB    Amiodarone therapy    Due to short duration of amiodarone use.  Patient has not had PFTs completed.    TSH on 1.86 (5/30/2019)    LFTs on 1/2019 show ALT 27 and AST 21    Hypertension    Controlled with BB and ARB    Plan:    Repeat echo    Reduce Lasix to 20 mg daily and potassium chloride to 20 mEq every other day.      repeat BMP in 1 week.    If ejection fraction has improved to at least 50%.  Will see patient back in 1 week to stop amiodarone and start flecainide 75 ms     Will have him return for ECG in 3-5 days and if he is tolerating then schedule a stress ECG to assess for pro arrhythmias    Follow up with Dr. Hannon in 3 months    Need to discuss lifestyle interventions with patient including sleep apnea.       Patient expresses understanding and agreement with the plan.     I appreciate the chance to help with Kevan Connelly Please let me know if you have any questions or concerns.    Hilda Wahl, APRN, CNP    This note was completed in part using Dragon voice recognition software. Although reviewed after completion, some word and grammatical errors may occur.    Orders Placed This Encounter   Procedures     Basic metabolic panel     EKG 12-lead complete w/read - Clinics (performed today)     Echocardiogram Complete     Orders Placed This Encounter   Medications     acetaminophen (TYLENOL) 500 MG tablet     Sig: Take 500 mg by mouth every 6 hours as needed for mild pain     furosemide (LASIX) 40 MG tablet     Sig: Take 0.5 tablets (20 mg) by mouth daily     Dispense:  30 tablet     Refill:  1     Pt will call for refills     potassium chloride ER (K-TAB) 20 MEQ CR tablet     Sig: Take 1 tablet (20 mEq) by mouth every other day     Dispense:  30 tablet     Refill:  0     Medications Discontinued During This Encounter   Medication Reason     furosemide (LASIX) 40 MG tablet      potassium chloride ER (K-TAB) 20 MEQ CR tablet          Encounter  Diagnoses   Name Primary?     Atrial fibrillation, unspecified type (H)      Acute congestive heart failure, unspecified heart failure type (H)        CURRENT MEDICATIONS:  Current Outpatient Medications   Medication Sig Dispense Refill     acetaminophen (TYLENOL) 500 MG tablet Take 500 mg by mouth every 6 hours as needed for mild pain       amiodarone (PACERONE/CODARONE) 200 MG tablet Take 1 tablet (200 mg) by mouth daily 60 tablet 3     furosemide (LASIX) 40 MG tablet Take 0.5 tablets (20 mg) by mouth daily 30 tablet 1     losartan (COZAAR) 50 MG tablet Take 1 tablet (50 mg) by mouth daily 90 tablet 3     metoprolol succinate ER (TOPROL-XL) 50 MG 24 hr tablet Take 1 tablet (50 mg) by mouth 2 times daily 50 mg PO bid 60 tablet 1     potassium chloride ER (K-TAB) 20 MEQ CR tablet Take 1 tablet (20 mEq) by mouth every other day 30 tablet 0     sildenafil (REVATIO) 20 MG tablet Take 1 tablet (20 mg) by mouth daily as needed 30 tablet 3     warfarin (COUMADIN) 5 MG tablet Take 5 mg by mouth daily or as directed by INR Clinic 90 tablet 0       ALLERGIES     Allergies   Allergen Reactions     Lisinopril Cough     Very persistent cough       PAST MEDICAL HISTORY:  Past Medical History:   Diagnosis Date     Atrial fibrillation (H) 6/3/2019     Benign essential hypertension 1/18/2017    past use of ACE- Cough;  Had been on ARB-diuretic but experienced lower bp readings;  BLOOD PRESSURE now well controlled on ARB also no longer on diuretic; no low bp readings; may have had slight edema initially but has normalized with bp well controlled on ARB alone.      Cancer (H)     testicular cancer     CHF (congestive heart failure) (H) 6/5/2019     Long term current use of anticoagulant therapy 6/3/2019     Obesity (BMI 35.0-39.9) with comorbidity (H) 2/11/2019       PAST SURGICAL HISTORY:  Past Surgical History:   Procedure Laterality Date     ANESTHESIA CARDIOVERSION N/A 6/26/2019    Procedure: ANESTHESIA, FOR CARDIOVERSION   MIMI;  Surgeon: GENERIC ANESTHESIA PROVIDER;  Location: [facility] OR     ANESTHESIA CARDIOVERSION N/A 7/17/2019    Procedure: ANESTHESIA, FOR CARDIOVERSION;  Surgeon: GENERIC ANESTHESIA PROVIDER;  Location: [facility] OR     GENITOURINARY SURGERY  1990    testicular cancer     ORTHOPEDIC SURGERY  1970's    right knee surgery        FAMILY HISTORY:  Family History   Problem Relation Age of Onset     Colon Cancer Father      Coronary Artery Disease Maternal Grandmother        SOCIAL HISTORY:  Social History     Socioeconomic History     Marital status:      Spouse name: None     Number of children: None     Years of education: None     Highest education level: None   Occupational History     None   Social Needs     Financial resource strain: None     Food insecurity:     Worry: None     Inability: None     Transportation needs:     Medical: None     Non-medical: None   Tobacco Use     Smoking status: Never Smoker     Smokeless tobacco: Never Used   Substance and Sexual Activity     Alcohol use: Yes     Drug use: No     Sexual activity: Yes     Partners: Female   Lifestyle     Physical activity:     Days per week: None     Minutes per session: None     Stress: None   Relationships     Social connections:     Talks on phone: None     Gets together: None     Attends Jew service: None     Active member of club or organization: None     Attends meetings of clubs or organizations: None     Relationship status: None     Intimate partner violence:     Fear of current or ex partner: None     Emotionally abused: None     Physically abused: None     Forced sexual activity: None   Other Topics Concern     Parent/sibling w/ CABG, MI or angioplasty before 65F 55M? Yes   Social History Narrative     None       Review of Systems:  Skin:  Negative     Eyes:  Positive for glasses  ENT:  Negative    Respiratory:  Negative    Cardiovascular:  Negative    Gastroenterology: Negative    Genitourinary:  Positive for urinary  "frequency  Musculoskeletal:  Negative    Neurologic:  Negative    Psychiatric:  Negative    Heme/Lymph/Imm:  Negative    Endocrine:  Negative      Physical Exam:  Vitals: /80 (BP Location: Right arm, Patient Position: Sitting, Cuff Size: Adult Large)   Pulse 64   Ht 1.93 m (6' 4\")   Wt 123.4 kg (272 lb)   SpO2 96%   BMI 33.11 kg/m       Constitutional:  cooperative, alert and oriented, well developed, well nourished, in no acute distress        Skin:  warm and dry to the touch, no apparent skin lesions or masses noted        Head:  normocephalic, no masses or lesions        Eyes:  pupils equal and round        ENT:  no pallor or cyanosis        Neck:  JVP normal        Chest:  clear to auscultation        Cardiac: normal S1 and S2                  Abdomen:  abdomen soft obese      Vascular: pulses full and equal     right radial artery;2+             left radial artery;2+                  Extremities and Back:  no deformities, clubbing, cyanosis, erythema observed;no edema        Neurological:  no gross motor deficits          Recent Lab Results:  LIPID RESULTS:  Lab Results   Component Value Date    CHOL 179 01/22/2019    HDL 42 01/22/2019     (H) 01/22/2019    TRIG 84 01/22/2019       LIVER ENZYME RESULTS:  Lab Results   Component Value Date    AST 21 01/22/2019    ALT 27 01/22/2019       CBC RESULTS:  No results found for: WBC, RBC, HGB, HCT, MCV, MCH, MCHC, RDW, PLT    BMP RESULTS:  Lab Results   Component Value Date     06/05/2019    POTASSIUM 4.4 07/17/2019    CHLORIDE 111 (H) 06/05/2019    CO2 21 06/05/2019    ANIONGAP 11 06/05/2019     (H) 06/05/2019    BUN 29 06/05/2019    CR 1.54 (H) 06/05/2019    GFRESTIMATED 46 (L) 06/05/2019    GFRESTBLACK 53 (L) 06/05/2019    CHU 9.1 06/05/2019        A1C RESULTS:  No results found for: A1C    INR RESULTS:  Lab Results   Component Value Date    INR 2.4 (A) 08/02/2019    INR 1.7 (A) 07/24/2019    INR 2.30 (H) 07/17/2019    INR 2.35 (H) " 06/26/2019           Thank you for allowing me to participate in the care of your patient.    Sincerely,     ANNMARIE [facility]

## 2019-08-07 NOTE — PATIENT INSTRUCTIONS
Please have your ECHO completed in the next week  Have your labs done next week when I see you    If your EF improves from your ECHO I will see you on Wednesday at 1:50      If you have problems or question regarding your [phone] (clarisa ugarte, and kaelyn)

## 2019-08-07 NOTE — LETTER
8/7/2019    Davina Reaves MD  [address]  [facility] [address]    RE: Kevan Connelly       Dear Colleague,    I had the pleasure of seeing Kevan Connelly in the [facility].    HPI:  Kevan Connelly is a 68 year old male who presents for follow up after undergoing a cardioversion.  He is a patient of Dr. Hannon.  His medical history includes     1. Atrial fibrillation.  Diagnosed on 5/30/19 with cardiomyopathy.  Underwent an unsuccessful DCCV in 6/2019.  He saw Dr. Hannon  and was started on amiodarone load and underwent a successful cardioversion (7/2019).  Warfarin for anticoagulation due to cost of DOAC  2. Cardiomyopathy.  EF 33%  3. Hypertension  4. Obesity  5. Testicular cancer with subsequent ED    Diagnostics:    ECHO (6/2019) revealed He underwent echocardiogram that showed moderately reduced LV systolic function with LVEF of 33%.  There was moderate global hypokinesia.  The LV was mildly dilated, mildly decreased RV systolic function.  The left atrium was noted to be severely enlarged and right atrium to be moderate to severely dilated, mild mitral regurgitation.      Lexiscan stress test (6/2019) showed no evidence of ischemia or infarct.  LVEF was 29%.       He met with Dr. Vanessa in June 2019 after being diagnosed with cardiomyopathy and atrial fibrillation.  In March 2019 he started to feel unwell with shortness of breath with exertion.  He met with his primary provider and was diagnosed with atrial fibrillation started on beta-blocker and Xarelto.  The cost of the Xarelto was cost prohibitive and he was changed to Coumadin. He underwent an unsuccessful cardioversion in 6/26/2019 and later met with Dr Hannon who started amiodarone load and scheduled a repeat cardioversion.   On 7/17 he underwent a successful cardioversion.      Today he presents with his wife stating he feels great since his cardioversion.  He denies feeling palpitations, shortness of  breath, chest discomfort when he lies down.  These were all his symptoms related to his atrial fibrillation.   He is taking his medications as prescribed he has currently taking warfarin and tolerating the medication and denies bleeding, hematuria, hematochezia, epistaxis and signs/symptoms of stroke.  He would like to start exercising again of which I encouraged him to do.  He denies chest pain or pressure,  dizziness, syncope, angina, dyspnea at rest or with exertion,  palpitations, orthopnea, PND, or edema.    ASSESSMENT AND PLAN    Atrial fibrillation    For Stroke Prophylaxis for CHADSVASC=(HF, age, HTN) he is currently taking warfarin with goal INR 2-3.  He has been therapeutic 90% of the time.  DOAC's were too expensive    For rhythm control he underwent a cardioversion (6/2019) which was unsuccessful.  Nazario was started on amiodarone with a load and underwent a successful cardioversion on 7/17/2019.      Today his ECG reveals sinus rhythm with a ventricular rate of 65 bpm, QT/QTc 440/448, QRS equals 108 ms and he does not have any symptoms associated with his atrial fibrillation.    The plan is to repeat an echocardiogram and if it has improved will stop amiodarone and start flecainide    Cardiomyopathy    At the time of diagnosis with his atrial fibrillation his EF was found to be 33%.      He underwent a nuclear stress test which was negative for ischemia.    Cardiomyopathy is thought to be tachycardia induced.  He underwent a cardioversion on 7/17/19 and has remained in normal sinus rhythm since.      We will have him repeat his echocardiogram in approximately 1 week and reassess his EF with a goal of discontinuing amiodarone and starting a new antiarrhythmic medication.       Patient is euvolemic on exam today he is taking Lasix 40 mg daily and potassium chloride 20 equivalents daily.      Will reduce his Lasix to 20 mg daily and potassium chloride to 20 mEq every other day.  Will repeat BMP in 1  week.    In the mean time he is on GDMT of ARB and BB    Amiodarone therapy    Due to short duration of amiodarone use.  Patient has not had PFTs completed.    TSH on 1.86 (5/30/2019)    LFTs on 1/2019 show ALT 27 and AST 21    Hypertension    Controlled with BB and ARB    Plan:    Repeat echo    Reduce Lasix to 20 mg daily and potassium chloride to 20 mEq every other day.      repeat BMP in 1 week.    If ejection fraction has improved to at least 50%.  Will see patient back in 1 week to stop amiodarone and start flecainide 75 ms     Will have him return for ECG in 3-5 days and if he is tolerating then schedule a stress ECG to assess for pro arrhythmias    Follow up with Dr. Hannon in 3 months    Need to discuss lifestyle interventions with patient including sleep apnea.       Patient expresses understanding and agreement with the plan.     I appreciate the chance to help with Kevan Connelly Please let me know if you have any questions or concerns.    Hilda Wahl, APRN, CNP    This note was completed in part using Dragon voice recognition software. Although reviewed after completion, some word and grammatical errors may occur.    Orders Placed This Encounter   Procedures     Basic metabolic panel     EKG 12-lead complete w/read - Clinics (performed today)     Echocardiogram Complete     Orders Placed This Encounter   Medications     acetaminophen (TYLENOL) 500 MG tablet     Sig: Take 500 mg by mouth every 6 hours as needed for mild pain     furosemide (LASIX) 40 MG tablet     Sig: Take 0.5 tablets (20 mg) by mouth daily     Dispense:  30 tablet     Refill:  1     Pt will call for refills     potassium chloride ER (K-TAB) 20 MEQ CR tablet     Sig: Take 1 tablet (20 mEq) by mouth every other day     Dispense:  30 tablet     Refill:  0     Medications Discontinued During This Encounter   Medication Reason     furosemide (LASIX) 40 MG tablet      potassium chloride ER (K-TAB) 20 MEQ CR tablet          Encounter  Diagnoses   Name Primary?     Atrial fibrillation, unspecified type (H)      Acute congestive heart failure, unspecified heart failure type (H)        CURRENT MEDICATIONS:  Current Outpatient Medications   Medication Sig Dispense Refill     acetaminophen (TYLENOL) 500 MG tablet Take 500 mg by mouth every 6 hours as needed for mild pain       amiodarone (PACERONE/CODARONE) 200 MG tablet Take 1 tablet (200 mg) by mouth daily 60 tablet 3     furosemide (LASIX) 40 MG tablet Take 0.5 tablets (20 mg) by mouth daily 30 tablet 1     losartan (COZAAR) 50 MG tablet Take 1 tablet (50 mg) by mouth daily 90 tablet 3     metoprolol succinate ER (TOPROL-XL) 50 MG 24 hr tablet Take 1 tablet (50 mg) by mouth 2 times daily 50 mg PO bid 60 tablet 1     potassium chloride ER (K-TAB) 20 MEQ CR tablet Take 1 tablet (20 mEq) by mouth every other day 30 tablet 0     sildenafil (REVATIO) 20 MG tablet Take 1 tablet (20 mg) by mouth daily as needed 30 tablet 3     warfarin (COUMADIN) 5 MG tablet Take 5 mg by mouth daily or as directed by INR Clinic 90 tablet 0       ALLERGIES     Allergies   Allergen Reactions     Lisinopril Cough     Very persistent cough       PAST MEDICAL HISTORY:  Past Medical History:   Diagnosis Date     Atrial fibrillation (H) 6/3/2019     Benign essential hypertension 1/18/2017    past use of ACE- Cough;  Had been on ARB-diuretic but experienced lower bp readings;  BLOOD PRESSURE now well controlled on ARB also no longer on diuretic; no low bp readings; may have had slight edema initially but has normalized with bp well controlled on ARB alone.      Cancer (H)     testicular cancer     CHF (congestive heart failure) (H) 6/5/2019     Long term current use of anticoagulant therapy 6/3/2019     Obesity (BMI 35.0-39.9) with comorbidity (H) 2/11/2019       PAST SURGICAL HISTORY:  Past Surgical History:   Procedure Laterality Date     ANESTHESIA CARDIOVERSION N/A 6/26/2019    Procedure: ANESTHESIA, FOR CARDIOVERSION   MIMI;  Surgeon: GENERIC ANESTHESIA PROVIDER;  Location: [facility] OR     ANESTHESIA CARDIOVERSION N/A 7/17/2019    Procedure: ANESTHESIA, FOR CARDIOVERSION;  Surgeon: GENERIC ANESTHESIA PROVIDER;  Location: [facility] OR     GENITOURINARY SURGERY  1990    testicular cancer     ORTHOPEDIC SURGERY  1970's    right knee surgery        FAMILY HISTORY:  Family History   Problem Relation Age of Onset     Colon Cancer Father      Coronary Artery Disease Maternal Grandmother        SOCIAL HISTORY:  Social History     Socioeconomic History     Marital status:      Spouse name: None     Number of children: None     Years of education: None     Highest education level: None   Occupational History     None   Social Needs     Financial resource strain: None     Food insecurity:     Worry: None     Inability: None     Transportation needs:     Medical: None     Non-medical: None   Tobacco Use     Smoking status: Never Smoker     Smokeless tobacco: Never Used   Substance and Sexual Activity     Alcohol use: Yes     Drug use: No     Sexual activity: Yes     Partners: Female   Lifestyle     Physical activity:     Days per week: None     Minutes per session: None     Stress: None   Relationships     Social connections:     Talks on phone: None     Gets together: None     Attends Baptism service: None     Active member of club or organization: None     Attends meetings of clubs or organizations: None     Relationship status: None     Intimate partner violence:     Fear of current or ex partner: None     Emotionally abused: None     Physically abused: None     Forced sexual activity: None   Other Topics Concern     Parent/sibling w/ CABG, MI or angioplasty before 65F 55M? Yes   Social History Narrative     None       Review of Systems:  Skin:  Negative     Eyes:  Positive for glasses  ENT:  Negative    Respiratory:  Negative    Cardiovascular:  Negative    Gastroenterology: Negative    Genitourinary:  Positive for urinary  "frequency  Musculoskeletal:  Negative    Neurologic:  Negative    Psychiatric:  Negative    Heme/Lymph/Imm:  Negative    Endocrine:  Negative      Physical Exam:  Vitals: /80 (BP Location: Right arm, Patient Position: Sitting, Cuff Size: Adult Large)   Pulse 64   Ht 1.93 m (6' 4\")   Wt 123.4 kg (272 lb)   SpO2 96%   BMI 33.11 kg/m       Constitutional:  cooperative, alert and oriented, well developed, well nourished, in no acute distress        Skin:  warm and dry to the touch, no apparent skin lesions or masses noted        Head:  normocephalic, no masses or lesions        Eyes:  pupils equal and round        ENT:  no pallor or cyanosis        Neck:  JVP normal        Chest:  clear to auscultation        Cardiac: normal S1 and S2                  Abdomen:  abdomen soft obese      Vascular: pulses full and equal     right radial artery;2+             left radial artery;2+                  Extremities and Back:  no deformities, clubbing, cyanosis, erythema observed;no edema        Neurological:  no gross motor deficits          Recent Lab Results:  LIPID RESULTS:  Lab Results   Component Value Date    CHOL 179 01/22/2019    HDL 42 01/22/2019     (H) 01/22/2019    TRIG 84 01/22/2019       LIVER ENZYME RESULTS:  Lab Results   Component Value Date    AST 21 01/22/2019    ALT 27 01/22/2019       CBC RESULTS:  No results found for: WBC, RBC, HGB, HCT, MCV, MCH, MCHC, RDW, PLT    BMP RESULTS:  Lab Results   Component Value Date     06/05/2019    POTASSIUM 4.4 07/17/2019    CHLORIDE 111 (H) 06/05/2019    CO2 21 06/05/2019    ANIONGAP 11 06/05/2019     (H) 06/05/2019    BUN 29 06/05/2019    CR 1.54 (H) 06/05/2019    GFRESTIMATED 46 (L) 06/05/2019    GFRESTBLACK 53 (L) 06/05/2019    CHU 9.1 06/05/2019        A1C RESULTS:  No results found for: A1C    INR RESULTS:  Lab Results   Component Value Date    INR 2.4 (A) 08/02/2019    INR 1.7 (A) 07/24/2019    INR 2.30 (H) 07/17/2019    INR 2.35 (H) " 06/26/2019           CC  Lamont Hannon MD  [address]  [place] [id number]                  Thank you for allowing me to participate in the care of your patient.      Sincerely,     ANNMARIE Peterson CNP     [facility]    cc:   Lamont Hannon MD  [address]  [place] [id number]

## 2019-08-14 ENCOUNTER — HOSPITAL ENCOUNTER (OUTPATIENT)
Dept: CARDIOLOGY | Facility: CLINIC | Age: 69
Discharge: HOME | End: 2019-08-14
Attending: NURSE PRACTITIONER | Admitting: NURSE PRACTITIONER
Payer: COMMERCIAL

## 2019-08-14 ENCOUNTER — OFFICE VISIT (OUTPATIENT)
Dept: CARDIOLOGY | Facility: CLINIC | Age: 69
End: 2019-08-14
Payer: COMMERCIAL

## 2019-08-14 VITALS
SYSTOLIC BLOOD PRESSURE: 116 MMHG | DIASTOLIC BLOOD PRESSURE: 78 MMHG | WEIGHT: 273.9 LBS | BODY MASS INDEX: 33.35 KG/M2 | HEIGHT: 76 IN | HEART RATE: 72 BPM

## 2019-08-14 DIAGNOSIS — Z51.81 ENCOUNTER FOR MONITORING FLECAINIDE THERAPY: ICD-10-CM

## 2019-08-14 DIAGNOSIS — I48.91 ATRIAL FIBRILLATION, UNSPECIFIED TYPE (H): ICD-10-CM

## 2019-08-14 DIAGNOSIS — I50.9 ACUTE CONGESTIVE HEART FAILURE, UNSPECIFIED HEART FAILURE TYPE (H): ICD-10-CM

## 2019-08-14 DIAGNOSIS — Z79.899 ENCOUNTER FOR MONITORING FLECAINIDE THERAPY: ICD-10-CM

## 2019-08-14 DIAGNOSIS — I48.0 PAROXYSMAL ATRIAL FIBRILLATION (H): Primary | ICD-10-CM

## 2019-08-14 LAB
ANION GAP SERPL CALCULATED.3IONS-SCNC: 5 MMOL/L (ref 3–14)
BUN SERPL-MCNC: 20 MG/DL (ref 7–30)
CALCIUM SERPL-MCNC: 8.8 MG/DL (ref 8.5–10.1)
CHLORIDE SERPL-SCNC: 108 MMOL/L (ref 94–109)
CO2 SERPL-SCNC: 25 MMOL/L (ref 20–32)
CREAT SERPL-MCNC: 1.31 MG/DL (ref 0.66–1.25)
GFR SERPL CREATININE-BSD FRML MDRD: 56 ML/MIN/{1.73_M2}
GLUCOSE SERPL-MCNC: 86 MG/DL (ref 70–99)
POTASSIUM SERPL-SCNC: 4.3 MMOL/L (ref 3.4–5.3)
SODIUM SERPL-SCNC: 138 MMOL/L (ref 133–144)

## 2019-08-14 PROCEDURE — 36415 COLL VENOUS BLD VENIPUNCTURE: CPT | Performed by: INTERNAL MEDICINE

## 2019-08-14 PROCEDURE — 99215 OFFICE O/P EST HI 40 MIN: CPT | Mod: 25 | Performed by: NURSE PRACTITIONER

## 2019-08-14 PROCEDURE — 93321 DOPPLER ECHO F-UP/LMTD STD: CPT | Mod: 26 | Performed by: INTERNAL MEDICINE

## 2019-08-14 PROCEDURE — 93308 TTE F-UP OR LMTD: CPT | Mod: 26 | Performed by: INTERNAL MEDICINE

## 2019-08-14 PROCEDURE — 25500064 ZZH RX 255 OP 636: Performed by: NURSE PRACTITIONER

## 2019-08-14 PROCEDURE — 80048 BASIC METABOLIC PNL TOTAL CA: CPT | Performed by: INTERNAL MEDICINE

## 2019-08-14 PROCEDURE — 40000264 ECHOCARDIOGRAM LIMITED

## 2019-08-14 PROCEDURE — 93325 DOPPLER ECHO COLOR FLOW MAPG: CPT | Mod: 26 | Performed by: INTERNAL MEDICINE

## 2019-08-14 RX ORDER — FLECAINIDE ACETATE 150 MG/1
TABLET ORAL
Qty: 30 TABLET | Refills: 11 | Status: SHIPPED | OUTPATIENT
Start: 2019-08-14 | End: 2019-11-13

## 2019-08-14 RX ADMIN — HUMAN ALBUMIN MICROSPHERES AND PERFLUTREN 3 ML: 10; .22 INJECTION, SOLUTION INTRAVENOUS at 11:45

## 2019-08-14 ASSESSMENT — MIFFLIN-ST. JEOR: SCORE: 2123.9

## 2019-08-14 NOTE — LETTER
8/14/2019    [facility]ina Reaves [place]  [address] [facility] [address]    RE: Kevan Connelly       Dear Colleague,    I had the pleasure of seeing Kevan Connelly in the [facility].    HPI:  Kevan Connelly is a 68 year old male who presents for follow  up to discuss ECHO results.   He is a patient of Dr. Hannon.  His medical history includes      1. Atrial fibrillation.  Diagnosed on 5/30/19 with cardiomyopathy EF was 33%.  Underwent an unsuccessful DCCV in 6/2019.  He saw Dr. Hannon  and was started on amiodarone load and underwent a successful cardioversion (7/2019).  Warfarin for anticoagulation due to cost of DOAC  2. Cardiomyopathy.  EF 33% (6/2018) Recent ECHO 50-55% (8/2019)  3. Hypertension  4. Obesity  5. Testicular cancer with subsequent ED    Diagnostics:    Limited ECHO (8/2019) revealed EF 50-55% with mild to moderately dilated L atrium    ECHO (6/2019) revealed moderately reduced LV systolic function with LVEF of 33%.  There was moderate global hypokinesia.  The LV was mildly dilated, mildly decreased RV systolic function.  The left atrium was noted to be severely enlarged and right atrium to be moderate to severely dilated, mild mitral regurgitation.      Lexiscan stress test (6/2019) showed no evidence of ischemia or infarct.  LVEF was 29%.          In March 2019, he started to feel unwell with shortness of breath with exertion.  He met with his primary provider and was diagnosed with atrial fibrillation started on beta-blocker and Xarelto.  The cost of the Xarelto was cost prohibitive and he was changed to Coumadin. He met with Dr. Vanessa in June 2019 after being diagnosed with cardiomyopathy and atrial fibrillation.   He underwent an unsuccessful cardioversion on 6/26/2019 and later met with Dr Hannon who started amiodarone load and scheduled a repeat cardioversion.   On 7/17/19 he underwent a successful cardioversion.      He then presented in clinic on  8/7/2019 feeling significantly better since his cardioversion and in NSR.  The plan was to return to clinic after an echocardiogram with the hope that his EF improved and he can stop amiodarone and start flecainide as an antiarrhythmic medication, his Lasix and potassium were also decreased.    Today his ECHO was reviewed and his EF increased from 33% to 50-55%.   His BMP was also discussed as he decreased his lasix from 40 to 20 mg daily and KCl to every other day.  His weight and BP have remained stable.  She has not had any palpitations and is now starting to work more as he feels better.  He denies chest pain or pressure, dizziness, syncope, angina, dyspnea at rest or with exertion, palpitations, orthopnea, PND, or abdominal edema.  He has chronic LE edema and wears compression stocking on the left leg; he feels diuretics help with the edema as well.  He is tolerating his warfarin and denies bleeding, hematuria, hematochezia, epistaxis and signs/symptoms of stroke     ASSESSMENT AND PLAN    Atrial fibrillation    For Stroke Prophylaxis for CHADSVASC=(HF, age, HTN) he is currently taking warfarin with goal INR 2-3.  He has been therapeutic 90% of the time.  DOAC's were too expensive    For rhythm control he underwent a cardioversion (6/2019) which was unsuccessful.  He was started on amiodarone with a load and underwent a successful cardioversion on 7/17/2019.      On 8/7/2019, his ECG reveals sinus rhythm with a ventricular rate of 65 bpm, QT/QTc 440/448, QRS equals 108 ms    His ECHO (8/2019) revealed EF 50-55%     Spoke with Dr. Hannon regarding starting flecainide and he approved       Cardiomyopathy    At the time of diagnosis with his atrial fibrillation his EF was found to be 33%.      He underwent a nuclear stress test which was negative for ischemia.    Cardiomyopathy is thought to be tachycardia induced.  He underwent a cardioversion on 7/17/19 and has remained in normal sinus rhythm since.      Repeat  ECHO reveals EF 50-55%      Patient is euvolemic on exam today while on lasix 20 mg daily and potassium chloride 20 mEq every other day.       His BMP was consistent with previous BMP's.  Creatinine 1.3.    Continue GDMT of ARB (losartan 50 mg daily) and BB (Metoprolol XL 50 mg twice daily)     Amiodarone therapy    Due to short duration of amiodarone use.  Patient has not had PFTs completed.    TSH on 1.86 (5/30/2019)    LFTs on 1/2019 show ALT 27 and AST 21     Hypertension    Controlled with BB and ARB and lasix 20 mg daily    If he wants to stop lasix then recommend increasing his losartan.      Plan:    Stop amiodarone    In 48 hours start flecainide 75 mg every 12 hours and continue metoprolol XL 50 mg twice daily.     Repeat ECG in 3-5 days.  The baseline QRS was 108 ms.  If the QRS is less than 25% of baseline (135 ms) then he can proceed to a stress ECG.  If he is bradycardic then decrease metoprolol XL from 100 mg daily to 50 mg daily     If his ECG is longer than 135 ms than Dr. Hannon needs to be consulted.     Follow up with ANNMARIE Carey CNP in 2 weeks after the stress ECG    Follow up with Dr. Hannon in 3 months as the amiodarone will be washed out of his system.     Patient expresses understanding and agreement with the plan.     I appreciate the chance to help with Kevan Connelly Please let me know if you have any questions or concerns.    ANNMARIE Carey CNP    This note was completed in part using Dragon voice recognition software. Although reviewed after completion, some word and grammatical errors may occur.    Orders Placed This Encounter   Procedures     Follow-Up with Cardiac Advanced Practice Provider     Follow-Up with Electrophysiologist     EKG 12-lead, tracing only     Exercise Stress Test (Stress ECG)     Orders Placed This Encounter   Medications     flecainide (TAMBOCOR) 150 MG tablet     Sig: Take 75 mg (1/2 tablet) every 12 hours.     Dispense:  30 tablet     Refill:   11     Medications Discontinued During This Encounter   Medication Reason     amiodarone (PACERONE/CODARONE) 200 MG tablet          Encounter Diagnoses   Name Primary?     Paroxysmal atrial fibrillation (H) Yes     Encounter for monitoring flecainide therapy        CURRENT MEDICATIONS:  Current Outpatient Medications   Medication Sig Dispense Refill     acetaminophen (TYLENOL) 500 MG tablet Take 500 mg by mouth every 6 hours as needed for mild pain       flecainide (TAMBOCOR) 150 MG tablet Take 75 mg (1/2 tablet) every 12 hours. 30 tablet 11     furosemide (LASIX) 40 MG tablet Take 0.5 tablets (20 mg) by mouth daily 30 tablet 1     losartan (COZAAR) 50 MG tablet Take 1 tablet (50 mg) by mouth daily 90 tablet 3     metoprolol succinate ER (TOPROL-XL) 50 MG 24 hr tablet Take 1 tablet (50 mg) by mouth 2 times daily 50 mg PO bid 60 tablet 1     potassium chloride ER (K-TAB) 20 MEQ CR tablet Take 1 tablet (20 mEq) by mouth every other day 30 tablet 0     sildenafil (REVATIO) 20 MG tablet Take 1 tablet (20 mg) by mouth daily as needed 30 tablet 3     warfarin (COUMADIN) 5 MG tablet Take 5 mg by mouth daily or as directed by INR Clinic 90 tablet 0       ALLERGIES     Allergies   Allergen Reactions     Lisinopril Cough     Very persistent cough       PAST MEDICAL HISTORY:  Past Medical History:   Diagnosis Date     Atrial fibrillation (H) 6/3/2019     Benign essential hypertension 1/18/2017    past use of ACE- Cough;  Had been on ARB-diuretic but experienced lower bp readings;  BLOOD PRESSURE now well controlled on ARB also no longer on diuretic; no low bp readings; may have had slight edema initially but has normalized with bp well controlled on ARB alone.      Cancer (H)     testicular cancer     CHF (congestive heart failure) (H) 6/5/2019     Long term current use of anticoagulant therapy 6/3/2019     Obesity (BMI 35.0-39.9) with comorbidity (H) 2/11/2019       PAST SURGICAL HISTORY:  Past Surgical History:   Procedure  Laterality Date     ANESTHESIA CARDIOVERSION N/A 6/26/2019    Procedure: ANESTHESIA, FOR CARDIOVERSION DR. LE;  Surgeon: GENERIC ANESTHESIA PROVIDER;  Location: [facility] OR     ANESTHESIA CARDIOVERSION N/A 7/17/2019    Procedure: ANESTHESIA, FOR CARDIOVERSION;  Surgeon: GENERIC ANESTHESIA PROVIDER;  Location: [facility] OR     GENITOURINARY SURGERY  1990    testicular cancer     ORTHOPEDIC SURGERY  1970's    right knee surgery        FAMILY HISTORY:  Family History   Problem Relation Age of Onset     Colon Cancer Father      Coronary Artery Disease Maternal Grandmother        SOCIAL HISTORY:  Social History     Socioeconomic History     Marital status:      Spouse name: None     Number of children: None     Years of education: None     Highest education level: None   Occupational History     None   Social Needs     Financial resource strain: None     Food insecurity:     Worry: None     Inability: None     Transportation needs:     Medical: None     Non-medical: None   Tobacco Use     Smoking status: Never Smoker     Smokeless tobacco: Never Used   Substance and Sexual Activity     Alcohol use: Yes     Comment: occ     Drug use: No     Sexual activity: Yes     Partners: Female   Lifestyle     Physical activity:     Days per week: None     Minutes per session: None     Stress: None   Relationships     Social connections:     Talks on phone: None     Gets together: None     Attends Sikh service: None     Active member of club or organization: None     Attends meetings of clubs or organizations: None     Relationship status: None     Intimate partner violence:     Fear of current or ex partner: None     Emotionally abused: None     Physically abused: None     Forced sexual activity: None   Other Topics Concern     Parent/sibling w/ CABG, MI or angioplasty before 65F 55M? Yes   Social History Narrative     None       Review of Systems:  Skin:  Negative     Eyes:  Positive for glasses  ENT:  Negative    Respiratory:   "Negative    Cardiovascular:  Negative Positive for  Gastroenterology: Negative    Genitourinary:  Negative    Musculoskeletal:  Negative    Neurologic:  Negative    Psychiatric:  Negative    Heme/Lymph/Imm:  Negative    Endocrine:  Negative      Physical Exam:  Vitals: /78 (BP Location: Right arm, Patient Position: Chair, Cuff Size: Adult Large)   Pulse 72   Ht 1.93 m (6' 4\")   Wt 124.2 kg (273 lb 14.4 oz)   BMI 33.34 kg/m       Constitutional:  cooperative, alert and oriented, well developed, well nourished, in no acute distress        Skin:  warm and dry to the touch, no apparent skin lesions or masses noted        Head:  normocephalic, no masses or lesions        Eyes:  pupils equal and round        ENT:  no pallor or cyanosis        Neck:  JVP normal        Chest:  clear to auscultation        Cardiac: normal S1 and S2                  Abdomen:  abdomen soft obese      Vascular: pulses full and equal     right radial artery;2+             left radial artery;2+                  Extremities and Back:  no deformities, clubbing, cyanosis, erythema observed;no edema        Neurological:  no gross motor deficits          Recent Lab Results:  LIPID RESULTS:  Lab Results   Component Value Date    CHOL 179 01/22/2019    HDL 42 01/22/2019     (H) 01/22/2019    TRIG 84 01/22/2019       LIVER ENZYME RESULTS:  Lab Results   Component Value Date    AST 21 01/22/2019    ALT 27 01/22/2019       CBC RESULTS:  No results found for: WBC, RBC, HGB, HCT, MCV, MCH, MCHC, RDW, PLT    BMP RESULTS:  Lab Results   Component Value Date     08/14/2019    POTASSIUM 4.3 08/14/2019    CHLORIDE 108 08/14/2019    CO2 25 08/14/2019    ANIONGAP 5 08/14/2019    GLC 86 08/14/2019    BUN 20 08/14/2019    CR 1.31 (H) 08/14/2019    GFRESTIMATED 56 (L) 08/14/2019    GFRESTBLACK 65 08/14/2019    CHU 8.8 08/14/2019        A1C RESULTS:  No results found for: A1C    INR RESULTS:  Lab Results   Component Value Date    INR 2.4 (A) " 08/02/2019    INR 1.7 (A) 07/24/2019    INR 2.30 (H) 07/17/2019    INR 2.35 (H) 06/26/2019           Thank you for allowing me to participate in the care of your patient.    Sincerely,     ANNMARIE Peterson [facility]

## 2019-08-14 NOTE — PATIENT INSTRUCTIONS
Stop amiodarone    Start flecainide 75 mg every 12 hours on Friday.   On Tuesday or Wednesday come in for an ECG  (some one will call you with the results)    will need a stress ECG prior to seeing me.    Follow up with Hilda in beginning of Sept and then you .      I will have you follow up with Dr. Hannon in November

## 2019-08-14 NOTE — LETTER
8/14/2019    Davina Reaves [place]  [id number] [address] [facility] [id number]    RE: Kevan Connelly       Dear Colleague,    I had the pleasure of seeing Kevan Connelly in the [facility].    HPI:  Kevan Connelly is a 68 year old male who presents for follow  up to discuss ECHO results.   He is a patient of Dr. Hannon.  His medical history includes      1. Atrial fibrillation.  Diagnosed on 5/30/19 with cardiomyopathy EF was 33%.  Underwent an unsuccessful DCCV in 6/2019.  He saw Dr. Hannon  and was started on amiodarone load and underwent a successful cardioversion (7/2019).  Warfarin for anticoagulation due to cost of DOAC  2. Cardiomyopathy.  EF 33% (6/2018) Recent ECHO 50-55% (8/2019)  3. Hypertension  4. Obesity  5. Testicular cancer with subsequent ED    Diagnostics:    Limited ECHO (8/2019) revealed EF 50-55% with mild to moderately dilated L atrium    ECHO (6/2019) revealed moderately reduced LV systolic function with LVEF of 33%.  There was moderate global hypokinesia.  The LV was mildly dilated, mildly decreased RV systolic function.  The left atrium was noted to be severely enlarged and right atrium to be moderate to severely dilated, mild mitral regurgitation.      Lexiscan stress test (6/2019) showed no evidence of ischemia or infarct.  LVEF was 29%.          In March 2019, he started to feel unwell with shortness of breath with exertion.  He met with his primary provider and was diagnosed with atrial fibrillation started on beta-blocker and Xarelto.  The cost of the Xarelto was cost prohibitive and he was changed to Coumadin. He met with Dr. Vanessa in June 2019 after being diagnosed with cardiomyopathy and atrial fibrillation.   He underwent an unsuccessful cardioversion on 6/26/2019 and later met with Dr Hannon who started amiodarone load and scheduled a repeat cardioversion.   On 7/17/19 he underwent a successful cardioversion.      He then presented in clinic on  8/7/2019 feeling significantly better since his cardioversion and in NSR.  The plan was to return to clinic after an echocardiogram with the hope that his EF improved and he can stop amiodarone and start flecainide as an antiarrhythmic medication, his Lasix and potassium were also decreased.    Today his ECHO was reviewed and his EF increased from 33% to 50-55%.   His BMP was also discussed as he decreased his lasix from 40 to 20 mg daily and KCl to every other day.  His weight and BP have remained stable.  She has not had any palpitations and is now starting to work more as he feels better.  He denies chest pain or pressure, dizziness, syncope, angina, dyspnea at rest or with exertion, palpitations, orthopnea, PND, or abdominal edema.  He has chronic LE edema and wears compression stocking on the left leg; he feels diuretics help with the edema as well.  He is tolerating his warfarin and denies bleeding, hematuria, hematochezia, epistaxis and signs/symptoms of stroke     ASSESSMENT AND PLAN    Atrial fibrillation    For Stroke Prophylaxis for CHADSVASC=(HF, age, HTN) he is currently taking warfarin with goal INR 2-3.  He has been therapeutic 90% of the time.  DOAC's were too expensive    For rhythm control he underwent a cardioversion (6/2019) which was unsuccessful.  He was started on amiodarone with a load and underwent a successful cardioversion on 7/17/2019.      On 8/7/2019, his ECG reveals sinus rhythm with a ventricular rate of 65 bpm, QT/QTc 440/448, QRS equals 108 ms    His ECHO (8/2019) revealed EF 50-55%     Spoke with Dr. Hannon regarding starting flecainide and he approved       Cardiomyopathy    At the time of diagnosis with his atrial fibrillation his EF was found to be 33%.      He underwent a nuclear stress test which was negative for ischemia.    Cardiomyopathy is thought to be tachycardia induced.  He underwent a cardioversion on 7/17/19 and has remained in normal sinus rhythm since.      Repeat  ECHO reveals EF 50-55%      Patient is euvolemic on exam today while on lasix 20 mg daily and potassium chloride 20 mEq every other day.       His BMP was consistent with previous BMP's.  Creatinine 1.3.    Continue GDMT of ARB (losartan 50 mg daily) and BB (Metoprolol XL 50 mg twice daily)     Amiodarone therapy    Due to short duration of amiodarone use.  Patient has not had PFTs completed.    TSH on 1.86 (5/30/2019)    LFTs on 1/2019 show ALT 27 and AST 21     Hypertension    Controlled with BB and ARB and lasix 20 mg daily    If he wants to stop lasix then recommend increasing his losartan.      Plan:    Stop amiodarone    In 48 hours start flecainide 75 mg every 12 hours and continue metoprolol XL 50 mg twice daily.     Repeat ECG in 3-5 days.  The baseline QRS was 108 ms.  If the QRS is less than 25% of baseline (135 ms) then he can proceed to a stress ECG.  If he is bradycardic then decrease metoprolol XL from 100 mg daily to 50 mg daily     If his ECG is longer than 135 ms than Dr. Hannon needs to be consulted.     Follow up with ANNMARIE Carey CNP in 2 weeks after the stress ECG    Follow up with Dr. Hannon in 3 months as the amiodarone will be washed out of his system.     Patient expresses understanding and agreement with the plan.     I appreciate the chance to help with Kevan Connelly Please let me know if you have any questions or concerns.    ANNMARIE Carey CNP    This note was completed in part using Dragon voice recognition software. Although reviewed after completion, some word and grammatical errors may occur.    Orders Placed This Encounter   Procedures     Follow-Up with Cardiac Advanced Practice Provider     Follow-Up with Electrophysiologist     EKG 12-lead, tracing only     Exercise Stress Test (Stress ECG)     Orders Placed This Encounter   Medications     flecainide (TAMBOCOR) 150 MG tablet     Sig: Take 75 mg (1/2 tablet) every 12 hours.     Dispense:  30 tablet     Refill:   11     Medications Discontinued During This Encounter   Medication Reason     amiodarone (PACERONE/CODARONE) 200 MG tablet          Encounter Diagnoses   Name Primary?     Paroxysmal atrial fibrillation (H) Yes     Encounter for monitoring flecainide therapy        CURRENT MEDICATIONS:  Current Outpatient Medications   Medication Sig Dispense Refill     acetaminophen (TYLENOL) 500 MG tablet Take 500 mg by mouth every 6 hours as needed for mild pain       flecainide (TAMBOCOR) 150 MG tablet Take 75 mg (1/2 tablet) every 12 hours. 30 tablet 11     furosemide (LASIX) 40 MG tablet Take 0.5 tablets (20 mg) by mouth daily 30 tablet 1     losartan (COZAAR) 50 MG tablet Take 1 tablet (50 mg) by mouth daily 90 tablet 3     metoprolol succinate ER (TOPROL-XL) 50 MG 24 hr tablet Take 1 tablet (50 mg) by mouth 2 times daily 50 mg PO bid 60 tablet 1     potassium chloride ER (K-TAB) 20 MEQ CR tablet Take 1 tablet (20 mEq) by mouth every other day 30 tablet 0     sildenafil (REVATIO) 20 MG tablet Take 1 tablet (20 mg) by mouth daily as needed 30 tablet 3     warfarin (COUMADIN) 5 MG tablet Take 5 mg by mouth daily or as directed by INR Clinic 90 tablet 0       ALLERGIES     Allergies   Allergen Reactions     Lisinopril Cough     Very persistent cough       PAST MEDICAL HISTORY:  Past Medical History:   Diagnosis Date     Atrial fibrillation (H) 6/3/2019     Benign essential hypertension 1/18/2017    past use of ACE- Cough;  Had been on ARB-diuretic but experienced lower bp readings;  BLOOD PRESSURE now well controlled on ARB also no longer on diuretic; no low bp readings; may have had slight edema initially but has normalized with bp well controlled on ARB alone.      Cancer (H)     testicular cancer     CHF (congestive heart failure) (H) 6/5/2019     Long term current use of anticoagulant therapy 6/3/2019     Obesity (BMI 35.0-39.9) with comorbidity (H) 2/11/2019       PAST SURGICAL HISTORY:  Past Surgical History:   Procedure  Laterality Date     ANESTHESIA CARDIOVERSION N/A 6/26/2019    Procedure: ANESTHESIA, FOR CARDIOVERSION DR. LE;  Surgeon: GENERIC ANESTHESIA PROVIDER;  Location: [facility] OR     ANESTHESIA CARDIOVERSION N/A 7/17/2019    Procedure: ANESTHESIA, FOR CARDIOVERSION;  Surgeon: GENERIC ANESTHESIA PROVIDER;  Location: [facility] OR     GENITOURINARY SURGERY  1990    testicular cancer     ORTHOPEDIC SURGERY  1970's    right knee surgery        FAMILY HISTORY:  Family History   Problem Relation Age of Onset     Colon Cancer Father      Coronary Artery Disease Maternal Grandmother        SOCIAL HISTORY:  Social History     Socioeconomic History     Marital status:      Spouse name: None     Number of children: None     Years of education: None     Highest education level: None   Occupational History     None   Social Needs     Financial resource strain: None     Food insecurity:     Worry: None     Inability: None     Transportation needs:     Medical: None     Non-medical: None   Tobacco Use     Smoking status: Never Smoker     Smokeless tobacco: Never Used   Substance and Sexual Activity     Alcohol use: Yes     Comment: occ     Drug use: No     Sexual activity: Yes     Partners: Female   Lifestyle     Physical activity:     Days per week: None     Minutes per session: None     Stress: None   Relationships     Social connections:     Talks on phone: None     Gets together: None     Attends Restoration service: None     Active member of club or organization: None     Attends meetings of clubs or organizations: None     Relationship status: None     Intimate partner violence:     Fear of current or ex partner: None     Emotionally abused: None     Physically abused: None     Forced sexual activity: None   Other Topics Concern     Parent/sibling w/ CABG, MI or angioplasty before 65F 55M? Yes   Social History Narrative     None       Review of Systems:  Skin:  Negative     Eyes:  Positive for glasses  ENT:  Negative    Respiratory:   "Negative    Cardiovascular:  Negative Positive for  Gastroenterology: Negative    Genitourinary:  Negative    Musculoskeletal:  Negative    Neurologic:  Negative    Psychiatric:  Negative    Heme/Lymph/Imm:  Negative    Endocrine:  Negative      Physical Exam:  Vitals: /78 (BP Location: Right arm, Patient Position: Chair, Cuff Size: Adult Large)   Pulse 72   Ht 1.93 m (6' 4\")   Wt 124.2 kg (273 lb 14.4 oz)   BMI 33.34 kg/m       Constitutional:  cooperative, alert and oriented, well developed, well nourished, in no acute distress        Skin:  warm and dry to the touch, no apparent skin lesions or masses noted        Head:  normocephalic, no masses or lesions        Eyes:  pupils equal and round        ENT:  no pallor or cyanosis        Neck:  JVP normal        Chest:  clear to auscultation        Cardiac: normal S1 and S2                  Abdomen:  abdomen soft obese      Vascular: pulses full and equal     right radial artery;2+             left radial artery;2+                  Extremities and Back:  no deformities, clubbing, cyanosis, erythema observed;no edema        Neurological:  no gross motor deficits          Recent Lab Results:  LIPID RESULTS:  Lab Results   Component Value Date    CHOL 179 01/22/2019    HDL 42 01/22/2019     (H) 01/22/2019    TRIG 84 01/22/2019       LIVER ENZYME RESULTS:  Lab Results   Component Value Date    AST 21 01/22/2019    ALT 27 01/22/2019       CBC RESULTS:  No results found for: WBC, RBC, HGB, HCT, MCV, MCH, MCHC, RDW, PLT    BMP RESULTS:  Lab Results   Component Value Date     08/14/2019    POTASSIUM 4.3 08/14/2019    CHLORIDE 108 08/14/2019    CO2 25 08/14/2019    ANIONGAP 5 08/14/2019    GLC 86 08/14/2019    BUN 20 08/14/2019    CR 1.31 (H) 08/14/2019    GFRESTIMATED 56 (L) 08/14/2019    GFRESTBLACK 65 08/14/2019    CHU 8.8 08/14/2019        A1C RESULTS:  No results found for: A1C    INR RESULTS:  Lab Results   Component Value Date    INR 2.4 (A) " 08/02/2019    INR 1.7 (A) 07/24/2019    INR 2.30 (H) 07/17/2019    INR 2.35 (H) 06/26/2019           CC  ANNMARIE Dumont CNP  [address]  [place] [id number]                  Thank you for allowing me to participate in the care of your patient.      Sincerely,     ANNMARIE Peterson CNP     [facility]    cc:   ANNMARIE Dumont CNP  [address]  [place] [id number]

## 2019-08-14 NOTE — PROGRESS NOTES
HPI:  Kevan Connelly is a 68 year old male who presents for follow up to discuss ECHO results.   He is a patient of Dr. Hannon.  His medical history includes      1. Atrial fibrillation.  Diagnosed on 5/30/19 with cardiomyopathy EF was 33%.  Underwent an unsuccessful DCCV in 6/2019.  He saw Dr. Hannon  and was started on amiodarone load and underwent a successful cardioversion (7/2019).  Warfarin for anticoagulation due to cost of DOAC  2. Cardiomyopathy.  EF 33% (6/2018) Recent ECHO 50-55% (8/2019)  3. Hypertension  4. Obesity  5. Testicular cancer with subsequent ED    Diagnostics:    Limited ECHO (8/2019) revealed EF 50-55% with mild to moderately dilated L atrium    ECHO (6/2019) revealed moderately reduced LV systolic function with LVEF of 33%.  There was moderate global hypokinesia.  The LV was mildly dilated, mildly decreased RV systolic function.  The left atrium was noted to be severely enlarged and right atrium to be moderate to severely dilated, mild mitral regurgitation.      Lexiscan stress test (6/2019) showed no evidence of ischemia or infarct.  LVEF was 29%.          In March 2019, he started to feel unwell with shortness of breath with exertion.  He met with his primary provider and was diagnosed with atrial fibrillation started on beta-blocker and Xarelto.  The cost of the Xarelto was cost prohibitive and he was changed to Coumadin. He met with Dr. Vanessa in June 2019 after being diagnosed with cardiomyopathy and atrial fibrillation.   He underwent an unsuccessful cardioversion on 6/26/2019 and later met with Dr Hannon who started amiodarone load and scheduled a repeat cardioversion.   On 7/17/19 he underwent a successful cardioversion.      He then presented in clinic on 8/7/2019 feeling significantly better since his cardioversion and in NSR.  The plan was to return to clinic after an echocardiogram with the hope that his EF improved and he can stop amiodarone and start flecainide as an  antiarrhythmic medication, his Lasix and potassium were also decreased.    Today his ECHO was reviewed and his EF increased from 33% to 50-55%.   His BMP was also discussed as he decreased his lasix from 40 to 20 mg daily and KCl to every other day.  His weight and BP have remained stable.  She has not had any palpitations and is now starting to work more as he feels better.  He denies chest pain or pressure, dizziness, syncope, angina, dyspnea at rest or with exertion, palpitations, orthopnea, PND, or abdominal edema.  He has chronic LE edema and wears compression stocking on the left leg; he feels diuretics help with the edema as well.  He is tolerating his warfarin and denies bleeding, hematuria, hematochezia, epistaxis and signs/symptoms of stroke     ASSESSMENT AND PLAN    Atrial fibrillation    For Stroke Prophylaxis for CHADSVASC=(HF, age, HTN) he is currently taking warfarin with goal INR 2-3.  He has been therapeutic 90% of the time.  DOAC's were too expensive    For rhythm control he underwent a cardioversion (6/2019) which was unsuccessful.  He was started on amiodarone with a load and underwent a successful cardioversion on 7/17/2019.      On 8/7/2019, his ECG reveals sinus rhythm with a ventricular rate of 65 bpm, QT/QTc 440/448, QRS equals 108 ms    His ECHO (8/2019) revealed EF 50-55%     Spoke with Dr. Hannon regarding starting flecainide and he approved       Cardiomyopathy    At the time of diagnosis with his atrial fibrillation his EF was found to be 33%.      He underwent a nuclear stress test which was negative for ischemia.    Cardiomyopathy is thought to be tachycardia induced.  He underwent a cardioversion on 7/17/19 and has remained in normal sinus rhythm since.      Repeat ECHO reveals EF 50-55%      Patient is euvolemic on exam today while on lasix 20 mg daily and potassium chloride 20 mEq every other day.       His BMP was consistent with previous BMP's.  Creatinine 1.3.    Continue GDMT  of ARB (losartan 50 mg daily) and BB (Metoprolol XL 50 mg twice daily)     Amiodarone therapy    Due to short duration of amiodarone use.  Patient has not had PFTs completed.    TSH on 1.86 (5/30/2019)    LFTs on 1/2019 show ALT 27 and AST 21     Hypertension    Controlled with BB and ARB and lasix 20 mg daily    If he wants to stop lasix then recommend increasing his losartan.      Plan:    Stop amiodarone    In 48 hours start flecainide 75 mg every 12 hours and continue metoprolol XL 50 mg twice daily.     Repeat ECG in 3-5 days.  The baseline QRS was 108 ms.  If the QRS is less than 25% of baseline (135 ms) then he can proceed to a stress ECG.  If he is bradycardic then decrease metoprolol XL from 100 mg daily to 50 mg daily     If his ECG is longer than 135 ms than Dr. Hannon needs to be consulted.     Follow up with ANNMARIE Carey CNP in 2 weeks after the stress ECG    Follow up with Dr. Hannon in 3 months as the amiodarone will be washed out of his system.     Patient expresses understanding and agreement with the plan.     I appreciate the chance to help with Kevan Connelly Please let me know if you have any questions or concerns.    ANNMARIE Carey CNP    This note was completed in part using Dragon voice recognition software. Although reviewed after completion, some word and grammatical errors may occur.    Orders Placed This Encounter   Procedures     Follow-Up with Cardiac Advanced Practice Provider     Follow-Up with Electrophysiologist     EKG 12-lead, tracing only     Exercise Stress Test (Stress ECG)     Orders Placed This Encounter   Medications     flecainide (TAMBOCOR) 150 MG tablet     Sig: Take 75 mg (1/2 tablet) every 12 hours.     Dispense:  30 tablet     Refill:  11     Medications Discontinued During This Encounter   Medication Reason     amiodarone (PACERONE/CODARONE) 200 MG tablet          Encounter Diagnoses   Name Primary?     Paroxysmal atrial fibrillation (H) Yes      Encounter for monitoring flecainide therapy        CURRENT MEDICATIONS:  Current Outpatient Medications   Medication Sig Dispense Refill     acetaminophen (TYLENOL) 500 MG tablet Take 500 mg by mouth every 6 hours as needed for mild pain       flecainide (TAMBOCOR) 150 MG tablet Take 75 mg (1/2 tablet) every 12 hours. 30 tablet 11     furosemide (LASIX) 40 MG tablet Take 0.5 tablets (20 mg) by mouth daily 30 tablet 1     losartan (COZAAR) 50 MG tablet Take 1 tablet (50 mg) by mouth daily 90 tablet 3     metoprolol succinate ER (TOPROL-XL) 50 MG 24 hr tablet Take 1 tablet (50 mg) by mouth 2 times daily 50 mg PO bid 60 tablet 1     potassium chloride ER (K-TAB) 20 MEQ CR tablet Take 1 tablet (20 mEq) by mouth every other day 30 tablet 0     sildenafil (REVATIO) 20 MG tablet Take 1 tablet (20 mg) by mouth daily as needed 30 tablet 3     warfarin (COUMADIN) 5 MG tablet Take 5 mg by mouth daily or as directed by INR Clinic 90 tablet 0       ALLERGIES     Allergies   Allergen Reactions     Lisinopril Cough     Very persistent cough       PAST MEDICAL HISTORY:  Past Medical History:   Diagnosis Date     Atrial fibrillation (H) 6/3/2019     Benign essential hypertension 1/18/2017    past use of ACE- Cough;  Had been on ARB-diuretic but experienced lower bp readings;  BLOOD PRESSURE now well controlled on ARB also no longer on diuretic; no low bp readings; may have had slight edema initially but has normalized with bp well controlled on ARB alone.      Cancer (H)     testicular cancer     CHF (congestive heart failure) (H) 6/5/2019     Long term current use of anticoagulant therapy 6/3/2019     Obesity (BMI 35.0-39.9) with comorbidity (H) 2/11/2019       PAST SURGICAL HISTORY:  Past Surgical History:   Procedure Laterality Date     ANESTHESIA CARDIOVERSION N/A 6/26/2019    Procedure: ANESTHESIA, FOR CARDIOVERSION DR. LE;  Surgeon: [facility] ANESTHESIA PROVIDER;  Location:  OR     ANESTHESIA CARDIOVERSION N/A 7/17/2019     Procedure: ANESTHESIA, FOR CARDIOVERSION;  Surgeon: [facility] ANESTHESIA PROVIDER;  Location: [facility] OR     GENITOURINARY SURGERY  1990    testicular cancer     ORTHOPEDIC SURGERY  1970's    right knee surgery        FAMILY HISTORY:  Family History   Problem Relation Age of Onset     Colon Cancer Father      Coronary Artery Disease Maternal Grandmother        SOCIAL HISTORY:  Social History     Socioeconomic History     Marital status:      Spouse name: None     Number of children: None     Years of education: None     Highest education level: None   Occupational History     None   Social Needs     Financial resource strain: None     Food insecurity:     Worry: None     Inability: None     Transportation needs:     Medical: None     Non-medical: None   Tobacco Use     Smoking status: Never Smoker     Smokeless tobacco: Never Used   Substance and Sexual Activity     Alcohol use: Yes     Comment: occ     Drug use: No     Sexual activity: Yes     Partners: Female   Lifestyle     Physical activity:     Days per week: None     Minutes per session: None     Stress: None   Relationships     Social connections:     Talks on phone: None     Gets together: None     Attends Zoroastrianism service: None     Active member of club or organization: None     Attends meetings of clubs or organizations: None     Relationship status: None     Intimate partner violence:     Fear of current or ex partner: None     Emotionally abused: None     Physically abused: None     Forced sexual activity: None   Other Topics Concern     Parent/sibling w/ CABG, MI or angioplasty before 65F 55M? Yes   Social History Narrative     None       Review of Systems:  Skin:  Negative     Eyes:  Positive for glasses  ENT:  Negative    Respiratory:  Negative    Cardiovascular:  Negative Positive for  Gastroenterology: Negative    Genitourinary:  Negative    Musculoskeletal:  Negative    Neurologic:  Negative    Psychiatric:  Negative    Heme/Lymph/Imm:  Negative   "  Endocrine:  Negative      Physical Exam:  Vitals: /78 (BP Location: Right arm, Patient Position: Chair, Cuff Size: Adult Large)   Pulse 72   Ht 1.93 m (6' 4\")   Wt 124.2 kg (273 lb 14.4 oz)   BMI 33.34 kg/m      Constitutional:  cooperative, alert and oriented, well developed, well nourished, in no acute distress        Skin:  warm and dry to the touch, no apparent skin lesions or masses noted        Head:  normocephalic, no masses or lesions        Eyes:  pupils equal and round        ENT:  no pallor or cyanosis        Neck:  JVP normal        Chest:  clear to auscultation        Cardiac: normal S1 and S2                  Abdomen:  abdomen soft obese      Vascular: pulses full and equal     right radial artery;2+             left radial artery;2+                  Extremities and Back:  no deformities, clubbing, cyanosis, erythema observed;no edema        Neurological:  no gross motor deficits          Recent Lab Results:  LIPID RESULTS:  Lab Results   Component Value Date    CHOL 179 01/22/2019    HDL 42 01/22/2019     (H) 01/22/2019    TRIG 84 01/22/2019       LIVER ENZYME RESULTS:  Lab Results   Component Value Date    AST 21 01/22/2019    ALT 27 01/22/2019       CBC RESULTS:  No results found for: WBC, RBC, HGB, HCT, MCV, MCH, MCHC, RDW, PLT    BMP RESULTS:  Lab Results   Component Value Date     08/14/2019    POTASSIUM 4.3 08/14/2019    CHLORIDE 108 08/14/2019    CO2 25 08/14/2019    ANIONGAP 5 08/14/2019    GLC 86 08/14/2019    BUN 20 08/14/2019    CR 1.31 (H) 08/14/2019    GFRESTIMATED 56 (L) 08/14/2019    GFRESTBLACK 65 08/14/2019    CHU 8.8 08/14/2019        A1C RESULTS:  No results found for: A1C    INR RESULTS:  Lab Results   Component Value Date    INR 2.4 (A) 08/02/2019    INR 1.7 (A) 07/24/2019    INR 2.30 (H) 07/17/2019    INR 2.35 (H) 06/26/2019           CC  Hilda Wahl, APRN CNP  [address]  [place] [address]                "

## 2019-08-16 ENCOUNTER — ANTICOAGULATION THERAPY VISIT (OUTPATIENT)
Dept: NURSING | Facility: CLINIC | Age: 69
End: 2019-08-16
Payer: COMMERCIAL

## 2019-08-16 DIAGNOSIS — Z79.01 LONG TERM CURRENT USE OF ANTICOAGULANT THERAPY: ICD-10-CM

## 2019-08-16 DIAGNOSIS — I48.91 ATRIAL FIBRILLATION (H): ICD-10-CM

## 2019-08-16 LAB — INR POINT OF CARE: 1.4 (ref 0.86–1.14)

## 2019-08-16 PROCEDURE — 99207 ZZC NO CHARGE NURSE ONLY: CPT

## 2019-08-16 PROCEDURE — 36416 COLLJ CAPILLARY BLOOD SPEC: CPT

## 2019-08-16 PROCEDURE — 85610 PROTHROMBIN TIME: CPT | Mod: QW

## 2019-08-16 NOTE — PROGRESS NOTES
ANTICOAGULATION FOLLOW-UP CLINIC VISIT    Patient Name:  Kevan Connelly  Date:  2019  Contact Type:  Face to Face    SUBJECTIVE:  Patient Findings     Positives:   Change in medications, Change in diet/appetite    Comments:   Stopped amiodarone yesterday, starrted flecanide this morning, reduced lasix and potassium. Patient is aware that the amiodarone will drastically affect his INR over the next few months.  Patient subtherapeutic today and Patient denies any identifiable changes that caused the subtherapeutic INR.  No big changes in diet, a little more salads, has upped activity but nothing drastic. Several changes going on - will increase dose per protocol today and see patient back for next check in 5 days. Patient stated understanding and is in agreement with plan.    Kaylee LEONARD RN  Anticoagulation Clinic  [place]          Clinical Outcomes     Comments:   Stopped amiodarone yesterday, starrted flecanide this morning, reduced lasix and potassium. Patient is aware that the amiodarone will drastically affect his INR over the next few months.  Patient subtherapeutic today and Patient denies any identifiable changes that caused the subtherapeutic INR.  No big changes in diet, a little more salads, has upped activity but nothing drastic. Several changes going on - will increase dose per protocol today and see patient back for next check in 5 days. Patient stated understanding and is in agreement with plan.    Kaylee LEONARD RN  Anticoagulation Clinic  [place]             OBJECTIVE    INR Protime   Date Value Ref Range Status   2019 1.4 (A) 0.86 - 1.14 Final       ASSESSMENT / PLAN  INR assessment SUB    Recheck INR In: 5 DAYS    INR Location Clinic      Anticoagulation Summary  As of 2019    INR goal:   2.0-3.0   TTR:   53.4 % (2.3 mo)   INR used for dosin.4! (2019)   Warfarin maintenance plan:   5 mg (5 mg x 1) every day   Full warfarin instructions:   : 10 mg; Otherwise 5 mg  every day   Weekly warfarin total:   35 mg   Plan last modified:   Kaylee Ramey RN (7/24/2019)   Next INR check:   8/21/2019   Target end date:   Indefinite    Indications    Long term current use of anticoagulant therapy [Z79.01]  Atrial fibrillation (H) [I48.91]             Anticoagulation Episode Summary     INR check location:   Anticoagulation Clinic    Preferred lab:       Send INR reminders to:   CONNIE LOPEZ    Comments:   5mg tabs / DCCV 7/17/19 / early morning appointments on Wed or Fri      Anticoagulation Care Providers     Provider Role Specialty Phone number    Davina Reaves MD  Internal Medicine [phone]            See the Encounter Report to view Anticoagulation Flowsheet and Dosing Calendar (Go to Encounters tab in chart review, and find the Anticoagulation Therapy Visit)    Dosage adjustment made based on physician directed care plan.    Kaylee Ramey, RN

## 2019-08-20 DIAGNOSIS — I48.0 PAROXYSMAL ATRIAL FIBRILLATION (H): ICD-10-CM

## 2019-08-20 PROCEDURE — 93005 ELECTROCARDIOGRAM TRACING: CPT | Performed by: NURSE PRACTITIONER

## 2019-08-21 ENCOUNTER — ANTICOAGULATION THERAPY VISIT (OUTPATIENT)
Dept: NURSING | Facility: CLINIC | Age: 69
End: 2019-08-21
Payer: COMMERCIAL

## 2019-08-21 DIAGNOSIS — I48.91 ATRIAL FIBRILLATION, UNSPECIFIED TYPE (H): ICD-10-CM

## 2019-08-21 DIAGNOSIS — Z79.01 LONG TERM CURRENT USE OF ANTICOAGULANT THERAPY: ICD-10-CM

## 2019-08-21 LAB — INR POINT OF CARE: 2.2 (ref 0.86–1.14)

## 2019-08-21 PROCEDURE — 99207 ZZC NO CHARGE NURSE ONLY: CPT

## 2019-08-21 PROCEDURE — 36416 COLLJ CAPILLARY BLOOD SPEC: CPT

## 2019-08-21 PROCEDURE — 85610 PROTHROMBIN TIME: CPT | Mod: QW

## 2019-08-21 NOTE — PROGRESS NOTES
ANTICOAGULATION FOLLOW-UP CLINIC VISIT    Patient Name:  Kevan Connelly  Date:  2019  Contact Type:  Face to Face    SUBJECTIVE:  Patient Findings     Comments:   Patient denies any changes in diet medications. Doing well on current dose no signs of bleeding or bruising.        Clinical Outcomes     Comments:   Patient denies any changes in diet medications. Doing well on current dose no signs of bleeding or bruising.           OBJECTIVE    INR Protime   Date Value Ref Range Status   2019 2.2 (A) 0.86 - 1.14 Final       ASSESSMENT / PLAN  INR assessment THER    Recheck INR In: 1 WEEK    INR Location Clinic      Anticoagulation Summary  As of 2019    INR goal:   2.0-3.0   TTR:   51.4 % (2.4 mo)   INR used for dosin.2 (2019)   Warfarin maintenance plan:   5 mg (5 mg x 1) every day   Full warfarin instructions:   : 7.5 mg; : 7.5 mg; : 7.5 mg; Otherwise 5 mg every day   Weekly warfarin total:   35 mg   Plan last modified:   Kaylee Ramey RN (2019)   Next INR check:   2019   Target end date:   Indefinite    Indications    Long term current use of anticoagulant therapy [Z79.01]  Atrial fibrillation (H) [I48.91]             Anticoagulation Episode Summary     INR check location:   Anticoagulation Clinic    Preferred lab:       Send INR reminders to:   CONNIE LOPEZ    Comments:   5mg tabs / DCCV 19 / early morning appointments on Wed or Fri      Anticoagulation Care Providers     Provider Role Specialty Phone number    Davina Reaves MD  Internal Medicine [phone]            See the Encounter Report to view Anticoagulation Flowsheet and Dosing Calendar (Go to Encounters tab in chart review, and find the Anticoagulation Therapy Visit)     Patient realizes Amiodarone may take some time to completely be ineffectual related to INR. INR still at lower end of therapeutic at 2.2.    Dosage adjustment made based on Physician directed Care Plan.      Sandy  SAWYER Aguayo, RN

## 2019-08-28 ENCOUNTER — HOSPITAL ENCOUNTER (OUTPATIENT)
Dept: CARDIOLOGY | Facility: CLINIC | Age: 69
Discharge: HOME | End: 2019-08-28
Attending: NURSE PRACTITIONER | Admitting: NURSE PRACTITIONER
Payer: COMMERCIAL

## 2019-08-28 ENCOUNTER — ANTICOAGULATION THERAPY VISIT (OUTPATIENT)
Dept: NURSING | Facility: CLINIC | Age: 69
End: 2019-08-28
Payer: COMMERCIAL

## 2019-08-28 DIAGNOSIS — Z79.899 ENCOUNTER FOR MONITORING FLECAINIDE THERAPY: ICD-10-CM

## 2019-08-28 DIAGNOSIS — I48.91 ATRIAL FIBRILLATION (H): ICD-10-CM

## 2019-08-28 DIAGNOSIS — Z79.01 LONG TERM CURRENT USE OF ANTICOAGULANT THERAPY: ICD-10-CM

## 2019-08-28 DIAGNOSIS — I48.0 PAROXYSMAL ATRIAL FIBRILLATION (H): ICD-10-CM

## 2019-08-28 DIAGNOSIS — Z51.81 ENCOUNTER FOR MONITORING FLECAINIDE THERAPY: ICD-10-CM

## 2019-08-28 DIAGNOSIS — I48.91 ATRIAL FIBRILLATION, UNSPECIFIED TYPE (H): ICD-10-CM

## 2019-08-28 LAB — INR POINT OF CARE: 3.7 (ref 0.86–1.14)

## 2019-08-28 PROCEDURE — 93016 CV STRESS TEST SUPVJ ONLY: CPT | Performed by: INTERNAL MEDICINE

## 2019-08-28 PROCEDURE — 93018 CV STRESS TEST I&R ONLY: CPT | Performed by: INTERNAL MEDICINE

## 2019-08-28 PROCEDURE — 85610 PROTHROMBIN TIME: CPT | Mod: QW

## 2019-08-28 PROCEDURE — 99207 ZZC NO CHARGE NURSE ONLY: CPT

## 2019-08-28 PROCEDURE — 36416 COLLJ CAPILLARY BLOOD SPEC: CPT

## 2019-08-28 PROCEDURE — 93017 CV STRESS TEST TRACING ONLY: CPT

## 2019-08-28 RX ORDER — WARFARIN SODIUM 5 MG/1
TABLET ORAL
Qty: 90 TABLET | Refills: 0
Start: 2019-08-28 | End: 2019-10-24

## 2019-08-28 NOTE — PROGRESS NOTES
Treadmill stress test completed. Patient walked for 4:42 on a standard mandy protocol. No arrhythmia noted during test.

## 2019-08-28 NOTE — PROGRESS NOTES
ANTICOAGULATION FOLLOW-UP CLINIC VISIT    Patient Name:  Kevan Connelly  Date:  8/28/2019  Contact Type:  Face to Face    SUBJECTIVE:  Patient Findings     Comments:   The patient was assessed for diet, medication, and activity level changes, missed or extra doses, bruising or bleeding, with no problem findings. Adjusted maintenance dose to be 1/2 tab less per week than it was the last 7 days (42.5 to 40mg weekly) and reduced todays dose by half since he was supratherapeutic. Patient still adjusting after stopping amiodarone. Otherwise, feeling good, has stress test today.  Kaylee LEONARD RN  Anticoagulation Clinic  [place]          Clinical Outcomes     Comments:   The patient was assessed for diet, medication, and activity level changes, missed or extra doses, bruising or bleeding, with no problem findings. Adjusted maintenance dose to be 1/2 tab less per week than it was the last 7 days (42.5 to 40mg weekly) and reduced todays dose by half since he was supratherapeutic. Patient still adjusting after stopping amiodarone. Otherwise, feeling good, has stress test today.  Kaylee LEONARD RN  Anticoagulation Clinic  [place]             OBJECTIVE    INR Protime   Date Value Ref Range Status   08/28/2019 3.7 (A) 0.86 - 1.14 Final       ASSESSMENT / PLAN  INR assessment SUPRA    Recheck INR In: 2 WEEKS    INR Location Clinic      Anticoagulation Summary  As of 8/28/2019    INR goal:   2.0-3.0   TTR:   51.6 % (2.7 mo)   INR used for dosing:   3.7! (8/28/2019)   Warfarin maintenance plan:   7.5 mg (5 mg x 1.5) every Mon, Fri; 5 mg (5 mg x 1) all other days   Full warfarin instructions:   8/28: 2.5 mg; Otherwise 7.5 mg every Mon, Fri; 5 mg all other days   Weekly warfarin total:   40 mg   Plan last modified:   Kaylee Ramey RN (8/28/2019)   Next INR check:   9/11/2019   Target end date:   Indefinite    Indications    Long term current use of anticoagulant therapy [Z79.01]  Atrial fibrillation (H) [I48.91]              Anticoagulation Episode Summary     INR check location:   Anticoagulation Clinic    Preferred lab:       Send INR reminders to:   CONNIE LOPEZ    Comments:   5mg tabs / DCCV 7/17/19 / early morning appointments on Wed or Fri      Anticoagulation Care Providers     Provider Role Specialty Phone number    Davina Reaves MD  Internal Medicine [phone]            See the Encounter Report to view Anticoagulation Flowsheet and Dosing Calendar (Go to Encounters tab in chart review, and find the Anticoagulation Therapy Visit)    Dosage adjustment made based on physician directed care plan.    Kaylee Ramey RN

## 2019-09-04 NOTE — PROGRESS NOTES
HPI:  Kevan Connelly is a 68 year old male who presents for follow up to changing medications.   He is a patient of Dr. Hannon.  His medical history includes      1. Atrial fibrillation.  Diagnosed on 5/30/19 with cardiomyopathy EF was 33%.  Underwent an unsuccessful DCCV in 6/2019.  He saw Dr. Hannon  and was started on amiodarone load and underwent a successful cardioversion (7/2019).  Warfarin for anticoagulation due to cost of DOAC  2. Cardiomyopathy.  EF 33% (6/2018) Recent ECHO 50-55% (8/2019)  3. Hypertension  4. Obesity  5. Testicular cancer with subsequent ED     Diagnostics:    Limited ECHO (8/2019) revealed EF 50-55% with mild to moderately dilated L atrium    ECHO (6/2019) revealed moderately reduced LV systolic function with LVEF of 33%.  There was moderate global hypokinesia.  The LV was mildly dilated, mildly decreased RV systolic function.  The left atrium was noted to be severely enlarged and right atrium to be moderate to severely dilated, mild mitral regurgitation.      Lexiscan stress test (6/2019) showed no evidence of ischemia or infarct.  LVEF was 29%.     Exercise stress test (8/2019) revealed no arrhythmias, he exercised for 4 minutes and 42 seconds for a Alvarez treadmill score of 4.5.          In March 2019, he started to feel unwell with shortness of breath with exertion.  He met with his primary provider and was diagnosed with atrial fibrillation started on beta-blocker and Xarelto.  The cost of the Xarelto was cost prohibitive and he was changed to Coumadin. He met with Dr. Vanessa in June 2019 after being diagnosed with cardiomyopathy and atrial fibrillation.   He underwent an unsuccessful cardioversion on 6/26/2019 and later met with Dr Hannon who started amiodarone load and scheduled a repeat cardioversion.   On 7/17/19 he underwent a successful cardioversion.       On 8/7/2019 he presented feeling significantly better since his cardioversion and in NSR.  The plan was to return to clinic  after an echocardiogram with the hope that his EF improved and he can stop amiodarone and start flecainide as an antiarrhythmic medication, his Lasix and potassium were also decreased.    Today he presents feeling well.  He has no complaints with the flecainide and denies palpitations, chest discomfort/pain, shortness of breath dyspnea on rest or exertion, dizziness, syncope, angina, orthopnea, PND, or edema.  He suffers from chronic lower extremity edema and had been on hydrochlorothiazide in the past and now on Lasix which controls his lower extremity edema nicely.  He is tolerating his warfarin and denies bleeding, hematuria, hematochezia, epistaxis and signs/symptoms of stroke.      ASSESSMENT AND PLAN    Atrial fibrillation    For Stroke Prophylaxis for CHADSVASC=(HF, age, HTN) he is currently taking warfarin with goal INR 2-3.  He has been therapeutic 90% of the time.  DOAC's were too expensive    For rhythm control he underwent a cardioversion (6/2019) which was unsuccessful.  He was started on amiodarone with a load and underwent a successful cardioversion on 7/17/2019.      On 8/7/2019, his ECG reveals sinus rhythm with a ventricular rate of 65 bpm, QT/QTc 440/448, QRS equals 108 ms    His ECHO (8/2019) revealed EF 50-55%     On 8/14/2019 he started flecainide 75 mg every 12 hours and continued metoprolol XL 50 mg twice daily.    On 8/20/2019 his ECG revealed sinus rhythm with a ventricular rate of 66 bpm, QT/QTc 432/442 ms, QRS equals 112 ms.    Exercise stress echo revealed no arrhythmias or prolonged QRS    Follow-up with Dr. Hannon in 3 months as the amiodarone will have washed out of his system by then.     Cardiomyopathy    At the time of diagnosis with his atrial fibrillation his EF was found to be 33%.      He underwent a nuclear stress test which was negative for ischemia.    Cardiomyopathy is thought to be tachycardia induced.  He underwent a cardioversion on 7/17/19 and has remained in normal  sinus rhythm since.      Repeat ECHO reveals EF 50-55%      Patient is euvolemic on exam today while on lasix 20 mg daily and potassium chloride 20 mEq every other day.    Patient states he does not take his Lasix when he travels.  Advised him then to skip his potassium as well.    Continue GDMT of ARB (losartan 50 mg daily) and BB (Metoprolol XL 50 mg twice daily)        Hypertension    Controlled with BB and ARB and lasix 20 mg daily    If he wants to stop lasix then recommend increasing his losartan.      Plan:    Continue flecainide 75 mg every 12 hours and continue metoprolol XL 50 mg twice daily.     Follow up with Dr. Hannon in 3 months as the amiodarone will be washed out of his system.     Patient expresses understanding and agreement with the plan.     I appreciate the chance to help with Kevan Connelly Please let me know if you have any questions or concerns.    ANNMARIE Carey, CNP    This note was completed in part using Dragon voice recognition software. Although reviewed after completion, some word and grammatical errors may occur.    Orders Placed This Encounter   Procedures     Basic metabolic panel     No orders of the defined types were placed in this encounter.    There are no discontinued medications.      Encounter Diagnosis   Name Primary?     Paroxysmal atrial fibrillation (H)        CURRENT MEDICATIONS:  Current Outpatient Medications   Medication Sig Dispense Refill     acetaminophen (TYLENOL) 500 MG tablet Take 500 mg by mouth every 6 hours as needed for mild pain       flecainide (TAMBOCOR) 150 MG tablet Take 75 mg (1/2 tablet) every 12 hours. 30 tablet 11     furosemide (LASIX) 40 MG tablet Take 0.5 tablets (20 mg) by mouth daily 30 tablet 1     losartan (COZAAR) 50 MG tablet Take 1 tablet (50 mg) by mouth daily 90 tablet 3     metoprolol succinate ER (TOPROL-XL) 50 MG 24 hr tablet Take 1 tablet (50 mg) by mouth 2 times daily 50 mg PO bid 60 tablet 1     potassium chloride ER  (K-TAB) 20 MEQ CR tablet Take 1 tablet (20 mEq) by mouth every other day 30 tablet 0     sildenafil (REVATIO) 20 MG tablet Take 1 tablet (20 mg) by mouth daily as needed 30 tablet 3     warfarin (COUMADIN) 5 MG tablet Take 1.5 tablets (7.5mg) by mouth on Mon & Fri; take 1 tablet (5mg) all other days; or as directed by INR Clinic 90 tablet 0       ALLERGIES     Allergies   Allergen Reactions     Lisinopril Cough     Very persistent cough       PAST MEDICAL HISTORY:  Past Medical History:   Diagnosis Date     Atrial fibrillation (H) 6/3/2019     Benign essential hypertension 1/18/2017    past use of ACE- Cough;  Had been on ARB-diuretic but experienced lower bp readings;  BLOOD PRESSURE now well controlled on ARB also no longer on diuretic; no low bp readings; may have had slight edema initially but has normalized with bp well controlled on ARB alone.      Cancer (H)     testicular cancer     CHF (congestive heart failure) (H) 6/5/2019     Long term current use of anticoagulant therapy 6/3/2019     Obesity (BMI 35.0-39.9) with comorbidity (H) 2/11/2019       PAST SURGICAL HISTORY:  Past Surgical History:   Procedure Laterality Date     ANESTHESIA CARDIOVERSION N/A 6/26/2019    Procedure: ANESTHESIA, FOR CARDIOVERSION DR. LE;  Surgeon: GENERIC ANESTHESIA PROVIDER;  Location:  OR     ANESTHESIA CARDIOVERSION N/A 7/17/2019    Procedure: ANESTHESIA, FOR CARDIOVERSION;  Surgeon: GENERIC ANESTHESIA PROVIDER;  Location:  OR     GENITOURINARY SURGERY  1990    testicular cancer     ORTHOPEDIC SURGERY  1970's    right knee surgery        FAMILY HISTORY:  Family History   Problem Relation Age of Onset     Colon Cancer Father      Coronary Artery Disease Maternal Grandmother        SOCIAL HISTORY:  Social History     Socioeconomic History     Marital status:      Spouse name: None     Number of children: None     Years of education: None     Highest education level: None   Occupational History     None   Social  "Needs     Financial resource strain: None     Food insecurity:     Worry: None     Inability: None     Transportation needs:     Medical: None     Non-medical: None   Tobacco Use     Smoking status: Never Smoker     Smokeless tobacco: Never Used   Substance and Sexual Activity     Alcohol use: Yes     Comment: occ     Drug use: No     Sexual activity: Yes     Partners: Female   Lifestyle     Physical activity:     Days per week: None     Minutes per session: None     Stress: None   Relationships     Social connections:     Talks on phone: None     Gets together: None     Attends Cheondoism service: None     Active member of club or organization: None     Attends meetings of clubs or organizations: None     Relationship status: None     Intimate partner violence:     Fear of current or ex partner: None     Emotionally abused: None     Physically abused: None     Forced sexual activity: None   Other Topics Concern     Parent/sibling w/ CABG, MI or angioplasty before 65F 55M? Yes   Social History Narrative     None       Review of Systems:  Skin:  Negative     Eyes:  Positive for glasses  ENT:  Negative    Respiratory:  Negative    Cardiovascular:  Negative    Gastroenterology: Negative    Genitourinary:  Negative    Musculoskeletal:  Negative    Neurologic:  Negative    Psychiatric:  Negative    Heme/Lymph/Imm:  Negative    Endocrine:  Negative      Physical Exam:  Vitals: /72 (BP Location: Right arm, Patient Position: Chair, Cuff Size: Adult Large)   Pulse 64   Ht 1.93 m (6' 4\")   Wt 124.7 kg (275 lb)   BMI 33.47 kg/m      Constitutional:  cooperative, alert and oriented, well developed, well nourished, in no acute distress        Skin:  warm and dry to the touch        Head:  normocephalic, no masses or lesions        Eyes:  pupils equal and round        ENT:  no pallor or cyanosis        Neck:  JVP normal        Chest:  clear to auscultation        Cardiac: regular rhythm                  Abdomen:  abdomen " soft        Vascular: pulses full and equal     right radial artery;2+             left radial artery;2+                  Extremities and Back:  no edema;no deformities, clubbing, cyanosis, erythema observed        Neurological:  no gross motor deficits          Recent Lab Results:  LIPID RESULTS:  Lab Results   Component Value Date    CHOL 179 01/22/2019    HDL 42 01/22/2019     (H) 01/22/2019    TRIG 84 01/22/2019       LIVER ENZYME RESULTS:  Lab Results   Component Value Date    AST 21 01/22/2019    ALT 27 01/22/2019       CBC RESULTS:  No results found for: WBC, RBC, HGB, HCT, MCV, MCH, MCHC, RDW, PLT    BMP RESULTS:  Lab Results   Component Value Date     08/14/2019    POTASSIUM 4.3 08/14/2019    CHLORIDE 108 08/14/2019    CO2 25 08/14/2019    ANIONGAP 5 08/14/2019    GLC 86 08/14/2019    BUN 20 08/14/2019    CR 1.31 (H) 08/14/2019    GFRESTIMATED 56 (L) 08/14/2019    GFRESTBLACK 65 08/14/2019    CHU 8.8 08/14/2019        A1C RESULTS:  No results found for: A1C    INR RESULTS:  Lab Results   Component Value Date    INR 3.7 (A) 08/28/2019    INR 2.2 (A) 08/21/2019    INR 2.30 (H) 07/17/2019    INR 2.35 (H) 06/26/2019           MARAH Wahl, APRN CNP  [address]  [place] [address]

## 2019-09-05 ENCOUNTER — OFFICE VISIT (OUTPATIENT)
Dept: CARDIOLOGY | Facility: CLINIC | Age: 69
End: 2019-09-05
Attending: NURSE PRACTITIONER
Payer: COMMERCIAL

## 2019-09-05 VITALS
SYSTOLIC BLOOD PRESSURE: 126 MMHG | BODY MASS INDEX: 33.49 KG/M2 | HEIGHT: 76 IN | WEIGHT: 275 LBS | DIASTOLIC BLOOD PRESSURE: 72 MMHG | HEART RATE: 64 BPM

## 2019-09-05 DIAGNOSIS — I48.0 PAROXYSMAL ATRIAL FIBRILLATION (H): ICD-10-CM

## 2019-09-05 PROCEDURE — 99214 OFFICE O/P EST MOD 30 MIN: CPT | Performed by: NURSE PRACTITIONER

## 2019-09-05 ASSESSMENT — MIFFLIN-ST. JEOR: SCORE: 2128.89

## 2019-09-05 NOTE — LETTER
9/5/2019    Davina Reaves MD  [address]  [facility] [address]    RE: Kevan Connelly       Dear Colleague,    I had the pleasure of seeing Kevan Connelly in the [facility].    HPI:  Kevan Connelly is a 68 year old male who presents for follow up to changing medications.   He is a patient of Dr. Hannon.  His medical history includes      1. Atrial fibrillation.  Diagnosed on 5/30/19 with cardiomyopathy EF was 33%.  Underwent an unsuccessful DCCV in 6/2019.  He saw Dr. Hannon  and was started on amiodarone load and underwent a successful cardioversion (7/2019).  Warfarin for anticoagulation due to cost of DOAC  2. Cardiomyopathy.  EF 33% (6/2018) Recent ECHO 50-55% (8/2019)  3. Hypertension  4. Obesity  5. Testicular cancer with subsequent ED     Diagnostics:    Limited ECHO (8/2019) revealed EF 50-55% with mild to moderately dilated L atrium    ECHO (6/2019) revealed moderately reduced LV systolic function with LVEF of 33%.  There was moderate global hypokinesia.  The LV was mildly dilated, mildly decreased RV systolic function.  The left atrium was noted to be severely enlarged and right atrium to be moderate to severely dilated, mild mitral regurgitation.      Lexiscan stress test (6/2019) showed no evidence of ischemia or infarct.  LVEF was 29%.     Exercise stress test (8/2019) revealed no arrhythmias, he exercised for 4 minutes and 42 seconds for a Alvarez treadmill score of 4.5.          In March 2019, he started to feel unwell with shortness of breath with exertion.  He met with his primary provider and was diagnosed with atrial fibrillation started on beta-blocker and Xarelto.  The cost of the Xarelto was cost prohibitive and he was changed to Coumadin. He met with Dr. Vanessa in June 2019 after being diagnosed with cardiomyopathy and atrial fibrillation.   He underwent an unsuccessful cardioversion on 6/26/2019 and later met with Dr Hannon who started  amiodarone load and scheduled a repeat cardioversion.   On 7/17/19 he underwent a successful cardioversion.       On 8/7/2019 he presented feeling significantly better since his cardioversion and in NSR.  The plan was to return to clinic after an echocardiogram with the hope that his EF improved and he can stop amiodarone and start flecainide as an antiarrhythmic medication, his Lasix and potassium were also decreased.    Today he presents feeling well.  He has no complaints with the flecainide and denies palpitations, chest discomfort/pain, shortness of breath dyspnea on rest or exertion, dizziness, syncope, angina, orthopnea, PND, or edema.  He suffers from chronic lower extremity edema and had been on hydrochlorothiazide in the past and now on Lasix which controls his lower extremity edema nicely.  He is tolerating his warfarin and denies bleeding, hematuria, hematochezia, epistaxis and signs/symptoms of stroke.      ASSESSMENT AND PLAN    Atrial fibrillation    For Stroke Prophylaxis for CHADSVASC=(HF, age, HTN) he is currently taking warfarin with goal INR 2-3.  He has been therapeutic 90% of the time.  DOAC's were too expensive    For rhythm control he underwent a cardioversion (6/2019) which was unsuccessful.  He was started on amiodarone with a load and underwent a successful cardioversion on 7/17/2019.      On 8/7/2019, his ECG reveals sinus rhythm with a ventricular rate of 65 bpm, QT/QTc 440/448, QRS equals 108 ms    His ECHO (8/2019) revealed EF 50-55%     On 8/14/2019 he started flecainide 75 mg every 12 hours and continued metoprolol XL 50 mg twice daily.    On 8/20/2019 his ECG revealed sinus rhythm with a ventricular rate of 66 bpm, QT/QTc 432/442 ms, QRS equals 112 ms.    Exercise stress echo revealed no arrhythmias or prolonged QRS    Follow-up with Dr. Hannon in 3 months as the amiodarone will have washed out of his system by then.     Cardiomyopathy    At the time of diagnosis with his atrial  fibrillation his EF was found to be 33%.      He underwent a nuclear stress test which was negative for ischemia.    Cardiomyopathy is thought to be tachycardia induced.  He underwent a cardioversion on 7/17/19 and has remained in normal sinus rhythm since.      Repeat ECHO reveals EF 50-55%      Patient is euvolemic on exam today while on lasix 20 mg daily and potassium chloride 20 mEq every other day.    Patient states he does not take his Lasix when he travels.  Advised him then to skip his potassium as well.    Continue GDMT of ARB (losartan 50 mg daily) and BB (Metoprolol XL 50 mg twice daily)        Hypertension    Controlled with BB and ARB and lasix 20 mg daily    If he wants to stop lasix then recommend increasing his losartan.      Plan:    Continue flecainide 75 mg every 12 hours and continue metoprolol XL 50 mg twice daily.     Follow up with Dr. Hannon in 3 months as the amiodarone will be washed out of his system.     Patient expresses understanding and agreement with the plan.     I appreciate the chance to help with Kevan Connelly Please let me know if you have any questions or concerns.    ANNMARIE Carey, CNP    This note was completed in part using Dragon voice recognition software. Although reviewed after completion, some word and grammatical errors may occur.    Orders Placed This Encounter   Procedures     Basic metabolic panel     No orders of the defined types were placed in this encounter.    There are no discontinued medications.      Encounter Diagnosis   Name Primary?     Paroxysmal atrial fibrillation (H)        CURRENT MEDICATIONS:  Current Outpatient Medications   Medication Sig Dispense Refill     acetaminophen (TYLENOL) 500 MG tablet Take 500 mg by mouth every 6 hours as needed for mild pain       flecainide (TAMBOCOR) 150 MG tablet Take 75 mg (1/2 tablet) every 12 hours. 30 tablet 11     furosemide (LASIX) 40 MG tablet Take 0.5 tablets (20 mg) by mouth daily 30 tablet 1      losartan (COZAAR) 50 MG tablet Take 1 tablet (50 mg) by mouth daily 90 tablet 3     metoprolol succinate ER (TOPROL-XL) 50 MG 24 hr tablet Take 1 tablet (50 mg) by mouth 2 times daily 50 mg PO bid 60 tablet 1     potassium chloride ER (K-TAB) 20 MEQ CR tablet Take 1 tablet (20 mEq) by mouth every other day 30 tablet 0     sildenafil (REVATIO) 20 MG tablet Take 1 tablet (20 mg) by mouth daily as needed 30 tablet 3     warfarin (COUMADIN) 5 MG tablet Take 1.5 tablets (7.5mg) by mouth on Mon & Fri; take 1 tablet (5mg) all other days; or as directed by INR Clinic 90 tablet 0       ALLERGIES     Allergies   Allergen Reactions     Lisinopril Cough     Very persistent cough       PAST MEDICAL HISTORY:  Past Medical History:   Diagnosis Date     Atrial fibrillation (H) 6/3/2019     Benign essential hypertension 1/18/2017    past use of ACE- Cough;  Had been on ARB-diuretic but experienced lower bp readings;  BLOOD PRESSURE now well controlled on ARB also no longer on diuretic; no low bp readings; may have had slight edema initially but has normalized with bp well controlled on ARB alone.      Cancer (H)     testicular cancer     CHF (congestive heart failure) (H) 6/5/2019     Long term current use of anticoagulant therapy 6/3/2019     Obesity (BMI 35.0-39.9) with comorbidity (H) 2/11/2019       PAST SURGICAL HISTORY:  Past Surgical History:   Procedure Laterality Date     ANESTHESIA CARDIOVERSION N/A 6/26/2019    Procedure: ANESTHESIA, FOR CARDIOVERSION DR. LE;  Surgeon: GENERIC ANESTHESIA PROVIDER;  Location:  OR     ANESTHESIA CARDIOVERSION N/A 7/17/2019    Procedure: ANESTHESIA, FOR CARDIOVERSION;  Surgeon: GENERIC ANESTHESIA PROVIDER;  Location:  OR     GENITOURINARY SURGERY  1990    testicular cancer     ORTHOPEDIC SURGERY  1970's    right knee surgery        FAMILY HISTORY:  Family History   Problem Relation Age of Onset     Colon Cancer Father      Coronary Artery Disease Maternal Grandmother        SOCIAL  "HISTORY:  Social History     Socioeconomic History     Marital status:      Spouse name: None     Number of children: None     Years of education: None     Highest education level: None   Occupational History     None   Social Needs     Financial resource strain: None     Food insecurity:     Worry: None     Inability: None     Transportation needs:     Medical: None     Non-medical: None   Tobacco Use     Smoking status: Never Smoker     Smokeless tobacco: Never Used   Substance and Sexual Activity     Alcohol use: Yes     Comment: occ     Drug use: No     Sexual activity: Yes     Partners: Female   Lifestyle     Physical activity:     Days per week: None     Minutes per session: None     Stress: None   Relationships     Social connections:     Talks on phone: None     Gets together: None     Attends Rastafarian service: None     Active member of club or organization: None     Attends meetings of clubs or organizations: None     Relationship status: None     Intimate partner violence:     Fear of current or ex partner: None     Emotionally abused: None     Physically abused: None     Forced sexual activity: None   Other Topics Concern     Parent/sibling w/ CABG, MI or angioplasty before 65F 55M? Yes   Social History Narrative     None       Review of Systems:  Skin:  Negative     Eyes:  Positive for glasses  ENT:  Negative    Respiratory:  Negative    Cardiovascular:  Negative    Gastroenterology: Negative    Genitourinary:  Negative    Musculoskeletal:  Negative    Neurologic:  Negative    Psychiatric:  Negative    Heme/Lymph/Imm:  Negative    Endocrine:  Negative      Physical Exam:  Vitals: /72 (BP Location: Right arm, Patient Position: Chair, Cuff Size: Adult Large)   Pulse 64   Ht 1.93 m (6' 4\")   Wt 124.7 kg (275 lb)   BMI 33.47 kg/m       Constitutional:  cooperative, alert and oriented, well developed, well nourished, in no acute distress        Skin:  warm and dry to the touch        Head:  " normocephalic, no masses or lesions        Eyes:  pupils equal and round        ENT:  no pallor or cyanosis        Neck:  JVP normal        Chest:  clear to auscultation        Cardiac: regular rhythm                  Abdomen:  abdomen soft        Vascular: pulses full and equal     right radial artery;2+             left radial artery;2+                  Extremities and Back:  no edema;no deformities, clubbing, cyanosis, erythema observed        Neurological:  no gross motor deficits          Recent Lab Results:  LIPID RESULTS:  Lab Results   Component Value Date    CHOL 179 01/22/2019    HDL 42 01/22/2019     (H) 01/22/2019    TRIG 84 01/22/2019       LIVER ENZYME RESULTS:  Lab Results   Component Value Date    AST 21 01/22/2019    ALT 27 01/22/2019       CBC RESULTS:  No results found for: WBC, RBC, HGB, HCT, MCV, MCH, MCHC, RDW, PLT    BMP RESULTS:  Lab Results   Component Value Date     08/14/2019    POTASSIUM 4.3 08/14/2019    CHLORIDE 108 08/14/2019    CO2 25 08/14/2019    ANIONGAP 5 08/14/2019    GLC 86 08/14/2019    BUN 20 08/14/2019    CR 1.31 (H) 08/14/2019    GFRESTIMATED 56 (L) 08/14/2019    GFRESTBLACK 65 08/14/2019    CHU 8.8 08/14/2019        A1C RESULTS:  No results found for: A1C    INR RESULTS:  Lab Results   Component Value Date    INR 3.7 (A) 08/28/2019    INR 2.2 (A) 08/21/2019    INR 2.30 (H) 07/17/2019    INR 2.35 (H) 06/26/2019           CC  Hilda Wahl, ANNMARIE CNP  [address]  [place] [address]                        Thank you for allowing me to participate in the care of your patient.    Sincerely,     ANNMARIE Peterson CNP     [facility]

## 2019-09-11 ENCOUNTER — ANTICOAGULATION THERAPY VISIT (OUTPATIENT)
Dept: NURSING | Facility: CLINIC | Age: 69
End: 2019-09-11
Payer: COMMERCIAL

## 2019-09-11 DIAGNOSIS — Z79.01 LONG TERM CURRENT USE OF ANTICOAGULANT THERAPY: ICD-10-CM

## 2019-09-11 DIAGNOSIS — I48.91 ATRIAL FIBRILLATION (H): ICD-10-CM

## 2019-09-11 LAB — INR POINT OF CARE: 3.9 (ref 0.86–1.14)

## 2019-09-11 PROCEDURE — 99207 ZZC NO CHARGE NURSE ONLY: CPT

## 2019-09-11 PROCEDURE — 36416 COLLJ CAPILLARY BLOOD SPEC: CPT

## 2019-09-11 PROCEDURE — 85610 PROTHROMBIN TIME: CPT | Mod: QW

## 2019-09-11 NOTE — PROGRESS NOTES
ANTICOAGULATION FOLLOW-UP CLINIC VISIT    Patient Name:  Kevan Connelly  Date:  9/11/2019  Contact Type:  Face to Face    SUBJECTIVE:  Patient Findings     Positives:   Change in medications    Comments:   The patient was assessed for diet, medication, and activity level changes, missed or extra doses, bruising or bleeding, with no problem findings. Had Cardiology appt after stress test. No changes. Will continue with flecanide and metoprolol as before. Still waiting for amiodarone to wash out of system (around end of Nov). Supratherapeutic today despite dose change 2 weeks ago. Will hold today's dose and reduce maintenance by 6%, per protocol and see patient back in 2 weeks.  Kaylee LEONARD RN  Anticoagulation Clinic  [place]            Clinical Outcomes     Comments:   The patient was assessed for diet, medication, and activity level changes, missed or extra doses, bruising or bleeding, with no problem findings. Had Cardiology appt after stress test. No changes. Will continue with flecanide and metoprolol as before. Still waiting for amiodarone to wash out of system (around end of Nov). Supratherapeutic today despite dose change 2 weeks ago. Will hold today's dose and reduce maintenance by 6%, per protocol and see patient back in 2 weeks.  Kaylee LEONARD RN  Anticoagulation Clinic  [place]               OBJECTIVE    INR Protime   Date Value Ref Range Status   09/11/2019 3.9 (A) 0.86 - 1.14 Final       ASSESSMENT / PLAN  INR assessment SUPRA    Recheck INR In: 2 WEEKS    INR Location Clinic      Anticoagulation Summary  As of 9/11/2019    INR goal:   2.0-3.0   TTR:   43.9 % (3.1 mo)   INR used for dosing:   3.9! (9/11/2019)   Warfarin maintenance plan:   7.5 mg (5 mg x 1.5) every Fri; 5 mg (5 mg x 1) all other days   Full warfarin instructions:   9/11: Hold; Otherwise 7.5 mg every Fri; 5 mg all other days   Weekly warfarin total:   37.5 mg   Plan last modified:   Kaylee Ramey RN (9/11/2019)   Next INR check:    9/25/2019   Target end date:   Indefinite    Indications    Long term current use of anticoagulant therapy [Z79.01]  Atrial fibrillation (H) [I48.91]             Anticoagulation Episode Summary     INR check location:   Anticoagulation Clinic    Preferred lab:       Send INR reminders to:   CONNIE LOPEZ    Comments:   5mg tabs / DCCV 7/17/19 / early morning appointments on Wed or Fri      Anticoagulation Care Providers     Provider Role Specialty Phone number    Davina Reaves MD  Internal Medicine [phone]            See the Encounter Report to view Anticoagulation Flowsheet and Dosing Calendar (Go to Encounters tab in chart review, and find the Anticoagulation Therapy Visit)    Dosage adjustment made based on physician directed care plan.    Kaylee Ramey RN

## 2019-09-18 ENCOUNTER — OFFICE VISIT (OUTPATIENT)
Dept: FAMILY MEDICINE | Facility: CLINIC | Age: 69
End: 2019-09-18
Payer: COMMERCIAL

## 2019-09-18 VITALS
DIASTOLIC BLOOD PRESSURE: 78 MMHG | RESPIRATION RATE: 18 BRPM | OXYGEN SATURATION: 94 % | HEART RATE: 67 BPM | TEMPERATURE: 98 F | BODY MASS INDEX: 33.73 KG/M2 | SYSTOLIC BLOOD PRESSURE: 118 MMHG | WEIGHT: 277 LBS | HEIGHT: 76 IN

## 2019-09-18 DIAGNOSIS — D17.30 LIPOMA OF SKIN AND SUBCUTANEOUS TISSUE: Primary | ICD-10-CM

## 2019-09-18 PROCEDURE — 99213 OFFICE O/P EST LOW 20 MIN: CPT | Performed by: PHYSICIAN ASSISTANT

## 2019-09-18 ASSESSMENT — MIFFLIN-ST. JEOR: SCORE: 2137.96

## 2019-09-18 NOTE — PATIENT INSTRUCTIONS
Patient Education     Understanding a Lipoma    A lipoma is a benign lump under the skin that s made of fat. It s not cancer. It feels soft like rubber when you press it, and in most cases it doesn t hurt. Some people have more than one. A lipoma grows slowly over time and doesn t cause many problems. Lipomas occur most often in adults from ages 40 to 60, and more often in men.  How to say it  Ly-POH-nazario   What causes a lipoma?  The cause is not yet known. Experts are still learning more. It may be partly caused by a problem in a gene. They can run in families. Familial multiple lipomatosis is when 2 or more family members have many lipomas.  Symptoms of a lipoma  The main symptom of a lipoma is a soft lump under the skin that doesn t hurt unless it is pressing on a nerve. It may be small, around 1/4 inch across. Or it may be larger, up to 4 inches across or more.  There are different kinds of lipomas. The most common kind occurs under the skin of the shoulders, chest, back, belly, or under the arms. In some cases, a lipoma can occur on the legs. In rare cases, one may occur deeper in the body or in a muscle.  Treatment for a lipoma  In most cases, a lipoma doesn t need treatment. Your healthcare provider may look at it during regular checkups to see if it changes.  But if the lipoma is painful or you want it removed for cosmetic reasons, it can be removed with surgery. The surgery is called excision. The lipoma will most likely not grow back after surgery. During surgery, the area around the lipoma is numbed. If you have a deep lipoma, you may need medicine called regional anesthesia to numb a larger area. Or you may need medicine called general anesthesia to put you to sleep during the procedure. Then the doctor makes a cut over the area of the lipoma. He or she removes the lump of fat. The cut is then closed with stitches.  Possible complications of a lipoma  A large lipoma inside the body can press on organs,  nerves, or other tissues and cause problems. For example, it can cause problems with breathing or digestion.  Living with a lipoma  Your healthcare provider may look at the lipoma during regular checkups to see if it changes or is causing problems.  When to call your healthcare provider  Call your healthcare provider right away if you have any of these:    Lipoma that grows quickly, causes pain, or feels hard    Growth of new lipomas   Date Last Reviewed: 5/1/2016 2000-2018 [employer]. [address] [place], [place] [address]. All rights reserved. This information is not intended as a substitute for professional medical care. Always follow your healthcare professional's instructions.

## 2019-09-18 NOTE — PROGRESS NOTES
"Subjective     Kevan Connelly is a 68 year old male who presents to clinic today for the following health issues:    HPI   Concern - left upper thigh lump  Onset: noticed 5 years ago. Patient states that it has tripled in size.    Description:   Large semi-hard mass     Intensity: mild    Progression of Symptoms:  worsening and constant    Accompanying Signs & Symptoms:  no    Previous history of similar problem:   no    Precipitating factors:   Worsened by: n/a    Alleviating factors:  Improved by: n/a    Therapies Tried and outcome: nothing    Patient has noticed a lump in the left upper thigh.  Present about 5 years and now growing.  Not really painful.      Reviewed and updated as needed this visit by Provider         Review of Systems   ROS COMP: Constitutional, HEENT, cardiovascular, pulmonary, gi and gu systems are negative, except as otherwise noted.      Objective    /78 (BP Location: Right arm, Patient Position: Sitting, Cuff Size: Adult Large)   Pulse 67   Temp 98  F (36.7  C) (Oral)   Resp 18   Ht 1.93 m (6' 4\")   Wt 125.6 kg (277 lb)   SpO2 94%   BMI 33.72 kg/m    Body mass index is 33.72 kg/m .  Physical Exam   GENERAL: healthy, alert and no distress  MS: LUE exam shows a large lipoma measuring approx 4x5cm, non tender  SKIN: no suspicious lesions or rashes  PSYCH: mentation appears normal, affect normal/bright    Diagnostic Test Results:  none         Assessment & Plan     1. Lipoma of skin and subcutaneous tissue  He will likely observe and for now not consult with surgery.  This referral is placed for him to use if needed.  - GENERAL SURG ADULT REFERRAL     BMI:   Estimated body mass index is 33.72 kg/m  as calculated from the following:    Height as of this encounter: 1.93 m (6' 4\").    Weight as of this encounter: 125.6 kg (277 lb).           Return for Follow up, for Specialty appointment.    Uriel Blake PA-C  [facility]    "

## 2019-09-23 ENCOUNTER — TELEPHONE (OUTPATIENT)
Dept: FAMILY MEDICINE | Facility: CLINIC | Age: 69
End: 2019-09-23

## 2019-09-23 DIAGNOSIS — I50.9 ACUTE HEART FAILURE, UNSPECIFIED HEART FAILURE TYPE (H): ICD-10-CM

## 2019-09-23 DIAGNOSIS — I48.91 ATRIAL FIBRILLATION, UNSPECIFIED TYPE (H): ICD-10-CM

## 2019-09-23 DIAGNOSIS — I10 BENIGN ESSENTIAL HYPERTENSION: ICD-10-CM

## 2019-09-23 RX ORDER — METOPROLOL SUCCINATE 50 MG/1
50 TABLET, EXTENDED RELEASE ORAL 2 TIMES DAILY
Qty: 180 TABLET | Refills: 0 | Status: SHIPPED | OUTPATIENT
Start: 2019-09-23 | End: 2019-12-19

## 2019-09-23 NOTE — TELEPHONE ENCOUNTER
Referral received from MAYLIN DONOVAN  For Lipoma.    Attempt #1:    Called patient at [phone].  No answer - left message for patient to return call to clinic at [phone].  Ana

## 2019-09-25 ENCOUNTER — ANTICOAGULATION THERAPY VISIT (OUTPATIENT)
Dept: NURSING | Facility: CLINIC | Age: 69
End: 2019-09-25
Payer: COMMERCIAL

## 2019-09-25 DIAGNOSIS — Z79.01 LONG TERM CURRENT USE OF ANTICOAGULANT THERAPY: ICD-10-CM

## 2019-09-25 DIAGNOSIS — I48.91 ATRIAL FIBRILLATION (H): ICD-10-CM

## 2019-09-25 LAB — INR POINT OF CARE: 2.6 (ref 0.86–1.14)

## 2019-09-25 PROCEDURE — 85610 PROTHROMBIN TIME: CPT | Mod: QW

## 2019-09-25 PROCEDURE — 36416 COLLJ CAPILLARY BLOOD SPEC: CPT

## 2019-09-25 PROCEDURE — 99207 ZZC NO CHARGE NURSE ONLY: CPT

## 2019-09-25 NOTE — PROGRESS NOTES
ANTICOAGULATION FOLLOW-UP CLINIC VISIT    Patient Name:  Kevan Connelly  Date:  2019  Contact Type:  Face to Face    SUBJECTIVE:  Patient Findings     Comments:   New maintenance dose working well for patient. The patient was assessed for diet, medication, and activity level changes, missed or extra doses, bruising or bleeding, with no problem findings.  Kaylee LEONARD RN  Anticoagulation Clinic  [place]          Clinical Outcomes     Negatives:   Major bleeding event, Thromboembolic event, Anticoagulation-related hospital admission, Anticoagulation-related ED visit, Anticoagulation-related fatality    Comments:   New maintenance dose working well for patient. The patient was assessed for diet, medication, and activity level changes, missed or extra doses, bruising or bleeding, with no problem findings.  Kaylee LEONARD RN  Anticoagulation Clinic  [place]             OBJECTIVE    INR Protime   Date Value Ref Range Status   2019 2.6 (A) 0.86 - 1.14 Final       ASSESSMENT / PLAN  INR assessment THER    Recheck INR In: 2 WEEKS    INR Location Clinic      Anticoagulation Summary  As of 2019    INR goal:   2.0-3.0   TTR:   42.2 % (3.6 mo)   INR used for dosin.6 (2019)   Warfarin maintenance plan:   7.5 mg (5 mg x 1.5) every Fri; 5 mg (5 mg x 1) all other days   Full warfarin instructions:   7.5 mg every Fri; 5 mg all other days   Weekly warfarin total:   37.5 mg   No change documented:   Kaylee Ramey RN   Plan last modified:   Kaylee Ramey RN (2019)   Next INR check:   10/11/2019   Target end date:   Indefinite    Indications    Long term current use of anticoagulant therapy [Z79.01]  Atrial fibrillation (H) [I48.91]             Anticoagulation Episode Summary     INR check location:   Anticoagulation Clinic    Preferred lab:       Send INR reminders to:   CONNIE LOPEZ    Comments:   5mg tabs / DCCV 19 / early morning appointments on Wed or Fri      Anticoagulation Care  Providers     Provider Role Specialty Phone number    Jelly Davina Moura MD  Internal Medicine [phone]            See the Encounter Report to view Anticoagulation Flowsheet and Dosing Calendar (Go to Encounters tab in chart review, and find the Anticoagulation Therapy Visit)    Kaylee Ramey, RN

## 2019-10-01 NOTE — TELEPHONE ENCOUNTER
Attempt #2:    Called patient at [phone].  No answer - left message for patient to return call to clinic.  Ana

## 2019-10-09 NOTE — TELEPHONE ENCOUNTER
Attempt #3:    Called patient at [phone].  No answer - left message for patient to return call to clinic.  Ana     No return call received from patient - closing encounter.

## 2019-10-11 ENCOUNTER — ANTICOAGULATION THERAPY VISIT (OUTPATIENT)
Dept: NURSING | Facility: CLINIC | Age: 69
End: 2019-10-11
Payer: COMMERCIAL

## 2019-10-11 DIAGNOSIS — I48.91 ATRIAL FIBRILLATION (H): ICD-10-CM

## 2019-10-11 DIAGNOSIS — Z79.01 LONG TERM CURRENT USE OF ANTICOAGULANT THERAPY: ICD-10-CM

## 2019-10-11 LAB — INR POINT OF CARE: 2 (ref 0.86–1.14)

## 2019-10-11 PROCEDURE — 85610 PROTHROMBIN TIME: CPT | Mod: QW

## 2019-10-11 PROCEDURE — 36416 COLLJ CAPILLARY BLOOD SPEC: CPT

## 2019-10-11 PROCEDURE — 99207 ZZC NO CHARGE NURSE ONLY: CPT

## 2019-10-11 NOTE — PROGRESS NOTES
ANTICOAGULATION FOLLOW-UP CLINIC VISIT    Patient Name:  Kevan Connelly  Date:  10/11/2019  Contact Type:  Face to Face    SUBJECTIVE:  Patient Findings     Comments:   The patient was assessed for diet, medication, and activity level changes, bruising or bleeding, with no problem findings. Patient did miss one dose nearly 2 weeks ago. Discussed if this happens again he can take that dose the next day in addition to his scheduled dose. Patient stated understanding and is in agreement with plan.  Kaylee LEONARD RN  Anticoagulation Clinic  [place]            Clinical Outcomes     Negatives:   Major bleeding event, Thromboembolic event, Anticoagulation-related hospital admission, Anticoagulation-related ED visit, Anticoagulation-related fatality    Comments:   The patient was assessed for diet, medication, and activity level changes, bruising or bleeding, with no problem findings. Patient did miss one dose nearly 2 weeks ago. Discussed if this happens again he can take that dose the next day in addition to his scheduled dose. Patient stated understanding and is in agreement with plan.  Kaylee ELONARD RN  Anticoagulation Clinic  [place]               OBJECTIVE    INR Protime   Date Value Ref Range Status   10/11/2019 2.0 (A) 0.86 - 1.14 Final       ASSESSMENT / PLAN  INR assessment THER    Recheck INR In: 3 WEEKS    INR Location Clinic      Anticoagulation Summary  As of 10/11/2019    INR goal:   2.0-3.0   TTR:   49.7 % (4.1 mo)   INR used for dosin.0 (10/11/2019)   Warfarin maintenance plan:   7.5 mg (5 mg x 1.5) every Fri; 5 mg (5 mg x 1) all other days   Full warfarin instructions:   7.5 mg every Fri; 5 mg all other days   Weekly warfarin total:   37.5 mg   No change documented:   Kaylee Ramey RN   Plan last modified:   Kaylee Ramey RN (2019)   Next INR check:   2019   Target end date:   Indefinite    Indications    Long term current use of anticoagulant therapy [Z79.01]  Atrial  fibrillation (H) [I48.91]             Anticoagulation Episode Summary     INR check location:   Anticoagulation Clinic    Preferred lab:       Send INR reminders to:   CONNIE LOPEZ    Comments:   5mg tabs / DCCV 7/17/19 / early morning appointments on Wed or Fri      Anticoagulation Care Providers     Provider Role Specialty Phone number    Davina Reaves MD  Internal Medicine [phone]            See the Encounter Report to view Anticoagulation Flowsheet and Dosing Calendar (Go to Encounters tab in chart review, and find the Anticoagulation Therapy Visit)    Kaylee Ramey RN

## 2019-10-24 DIAGNOSIS — I48.91 ATRIAL FIBRILLATION, UNSPECIFIED TYPE (H): ICD-10-CM

## 2019-10-24 RX ORDER — WARFARIN SODIUM 5 MG/1
TABLET ORAL
Qty: 110 TABLET | Refills: 0 | Status: SHIPPED | OUTPATIENT
Start: 2019-10-24 | End: 2020-01-17

## 2019-10-24 NOTE — TELEPHONE ENCOUNTER
Reviewed last refill and last [facility] visit. INR was therapeutic at last check on 10/11/19. Patient scheduled for next INR 11/1/19.  Prescription approved per [facility] Refill Protocol.  Checked with pharmacy and they can fill a 90-day supply tonight.  Called patient to notify.    Kaylee LEONARD RN  Anticoagulation Clinic  [place]

## 2019-11-01 ENCOUNTER — ANTICOAGULATION THERAPY VISIT (OUTPATIENT)
Dept: NURSING | Facility: CLINIC | Age: 69
End: 2019-11-01
Payer: COMMERCIAL

## 2019-11-01 DIAGNOSIS — I48.91 ATRIAL FIBRILLATION (H): ICD-10-CM

## 2019-11-01 DIAGNOSIS — Z79.01 LONG TERM CURRENT USE OF ANTICOAGULANT THERAPY: ICD-10-CM

## 2019-11-01 LAB — INR POINT OF CARE: 3.1 (ref 0.86–1.14)

## 2019-11-01 PROCEDURE — 99207 ZZC NO CHARGE NURSE ONLY: CPT

## 2019-11-01 PROCEDURE — 85610 PROTHROMBIN TIME: CPT | Mod: QW

## 2019-11-01 PROCEDURE — 36416 COLLJ CAPILLARY BLOOD SPEC: CPT

## 2019-11-01 NOTE — PROGRESS NOTES
ANTICOAGULATION FOLLOW-UP CLINIC VISIT    Patient Name:  Kevan Connelly  Date:  11/1/2019  Contact Type:  Face to Face    SUBJECTIVE:  Patient Findings     Positives:   Change in diet/appetite    Comments:   Patient usually eats a couple green salads every week but they were traveling and he did not do that this week which explains the INR of 3.1. Otherwise, The patient was assessed for diet, medication, and activity level changes, missed or extra doses, bruising or bleeding, with no problem findings. He will return to his dark green salads and add some pea pods after discussion of green veggies and the ones he enjoys.   Kaylee LEONARD RN  Anticoagulation Clinic  [place]          Clinical Outcomes     Comments:   Patient usually eats a couple green salads every week but they were traveling and he did not do that this week which explains the INR of 3.1. Otherwise, The patient was assessed for diet, medication, and activity level changes, missed or extra doses, bruising or bleeding, with no problem findings. He will return to his dark green salads and add some pea pods after discussion of green veggies and the ones he enjoys.   Kaylee LEONARD RN  Anticoagulation Clinic  [place]             OBJECTIVE    INR Protime   Date Value Ref Range Status   11/01/2019 3.1 (A) 0.86 - 1.14 Final       ASSESSMENT / PLAN  INR assessment SUPRA    Recheck INR In: 2 WEEKS    INR Location Clinic      Anticoagulation Summary  As of 11/1/2019    INR goal:   2.0-3.0   TTR:   55.6 % (4.8 mo)   INR used for dosing:   3.1! (11/1/2019)   Warfarin maintenance plan:   7.5 mg (5 mg x 1.5) every Fri; 5 mg (5 mg x 1) all other days   Full warfarin instructions:   7.5 mg every Fri; 5 mg all other days   Weekly warfarin total:   37.5 mg   No change documented:   Kaylee Ramey, RN   Plan last modified:   Kaylee Ramey RN (9/11/2019)   Next INR check:   11/15/2019   Target end date:   Indefinite    Indications    Long term current use of  anticoagulant therapy [Z79.01]  Atrial fibrillation (H) [I48.91]             Anticoagulation Episode Summary     INR check location:   Anticoagulation Clinic    Preferred lab:       Send INR reminders to:   CONNIE LOPEZ    Comments:   5mg tabs / DCCV 7/17/19 / early morning appointments on Wed or Fri      Anticoagulation Care Providers     Provider Role Specialty Phone number    Davina Reaves MD  Internal Medicine [phone]            See the Encounter Report to view Anticoagulation Flowsheet and Dosing Calendar (Go to Encounters tab in chart review, and find the Anticoagulation Therapy Visit)    Kaylee Ramey RN

## 2019-11-13 ENCOUNTER — OFFICE VISIT (OUTPATIENT)
Dept: CARDIOLOGY | Facility: CLINIC | Age: 69
End: 2019-11-13
Attending: NURSE PRACTITIONER
Payer: COMMERCIAL

## 2019-11-13 ENCOUNTER — HOSPITAL ENCOUNTER (OUTPATIENT)
Dept: CARDIOLOGY | Facility: CLINIC | Age: 69
Discharge: HOME | End: 2019-11-13
Attending: INTERNAL MEDICINE | Admitting: INTERNAL MEDICINE
Payer: COMMERCIAL

## 2019-11-13 VITALS
WEIGHT: 280.6 LBS | BODY MASS INDEX: 34.17 KG/M2 | HEIGHT: 76 IN | HEART RATE: 68 BPM | DIASTOLIC BLOOD PRESSURE: 86 MMHG | SYSTOLIC BLOOD PRESSURE: 132 MMHG

## 2019-11-13 DIAGNOSIS — I48.0 PAROXYSMAL ATRIAL FIBRILLATION (H): ICD-10-CM

## 2019-11-13 LAB
ANION GAP SERPL CALCULATED.3IONS-SCNC: 5 MMOL/L (ref 3–14)
BUN SERPL-MCNC: 22 MG/DL (ref 7–30)
CALCIUM SERPL-MCNC: 8.8 MG/DL (ref 8.5–10.1)
CHLORIDE SERPL-SCNC: 109 MMOL/L (ref 94–109)
CO2 SERPL-SCNC: 23 MMOL/L (ref 20–32)
CREAT SERPL-MCNC: 1.13 MG/DL (ref 0.66–1.25)
GFR SERPL CREATININE-BSD FRML MDRD: 67 ML/MIN/{1.73_M2}
GLUCOSE SERPL-MCNC: 101 MG/DL (ref 70–99)
POTASSIUM SERPL-SCNC: 4 MMOL/L (ref 3.4–5.3)
SODIUM SERPL-SCNC: 137 MMOL/L (ref 133–144)

## 2019-11-13 PROCEDURE — 0296T ZIO PATCH HOLTER ADULT PEDIATRIC GREATER THAN 48 HRS: CPT

## 2019-11-13 PROCEDURE — 99214 OFFICE O/P EST MOD 30 MIN: CPT | Performed by: INTERNAL MEDICINE

## 2019-11-13 PROCEDURE — 36415 COLL VENOUS BLD VENIPUNCTURE: CPT | Performed by: NURSE PRACTITIONER

## 2019-11-13 PROCEDURE — 80048 BASIC METABOLIC PNL TOTAL CA: CPT | Performed by: NURSE PRACTITIONER

## 2019-11-13 PROCEDURE — 93000 ELECTROCARDIOGRAM COMPLETE: CPT | Performed by: INTERNAL MEDICINE

## 2019-11-13 PROCEDURE — 0298T ZIO PATCH HOLTER ADULT PEDIATRIC GREATER THAN 48 HRS: CPT | Performed by: INTERNAL MEDICINE

## 2019-11-13 RX ORDER — FLECAINIDE ACETATE 100 MG/1
100 TABLET ORAL 2 TIMES DAILY
Qty: 60 TABLET | Refills: 3 | Status: ON HOLD | OUTPATIENT
Start: 2019-11-13 | End: 2020-01-04

## 2019-11-13 ASSESSMENT — MIFFLIN-ST. JEOR: SCORE: 2149.29

## 2019-11-13 NOTE — PROGRESS NOTES
Electrophysiology/ Clinic Note         H&P and Plan:     REASON FOR VISIT: Electrophysiology evaluation.      HISTORY OF PRESENT ILLNESS: Mr. 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    Patient presented with heart failure symptoms back in April of this year.  At the time, he was found to have persistent atrial fibrillation and severe LV dysfunction (EF around 33%).  He underwent cardioversion, however procedure was unsuccessful.  Later, he met me back in July.  We started him on amiodarone and he underwent a second attempt of cardioversion which was successful.      After restoration of normal rhythm, cardiac function normalized.  In September, amiodarone was discontinued and he was started on a combination of metoprolol and flecainide.    Today, he informs he continues to do well.  He has no complaints during his visit of chest pain, shortness of breath, lightheadedness, near-syncope or syncope.    He admitted forgetting to take flecainide and metoprolol last night, and noticed that he was back in A. fib on physical exam.  EKG done today confirm atrial fibrillation.    Previous studies:  -EKG (08/20/2019): Normal sinus rhythm.  First-degree AV block (OR of 276 ms).  QRS measuring 112 ms.  -Exercise stress test (08/28/2019): Target heart rate was not achieved.  However test was not negative for ischemia or pro-arrhythmias.  -Limited echo (08/14/2019): Normal LV function.  EF estimated 50-55%.    SSESSMENT AND PLAN:   1.  Persistent atrial fibrillation.  There is responded well to rhythm control strategy.  Heart failure symptoms are improved/resolved and cardiac function is back to normal.  However today he seems to be back in atrial fibrillation.  Unclear if A. fib is related to the fact that he skipped medications last night.    I recommend  "the following:  -Increase flecainide from 75 to 100 mg twice daily.  Continue metoprolol at current dose.  -ZIO Patch monitor for week to evaluate A. fib burden.  -If patient continues to have atrial fibrillation despite of antiarrhythmic therapy, will consider ablation.    Of note, today we discussed the ablation procedure.  I explained the procedure in details.  He understand there is a 70% to success rate in a 3% risk of complication.      We will follow-up the results from the monitor and finalize plan.    2.  Embolic prevention.  Chads vas score of 2-3.  Continue anticoagulation indefinitely.      Lamont Hannon MD    Physical Exam:  Vitals: /86 (BP Location: Right arm, Patient Position: Sitting, Cuff Size: Adult Large)   Pulse 68   Ht 1.93 m (6' 4\")   Wt 127.3 kg (280 lb 9.6 oz)   BMI 34.16 kg/m      Constitutional:  AAO x3.  Pt is in NAD.  HEAD: normocephalic.  SKIN: Skin normal color, texture and turgor with no lesions or eruptions.  Eyes: PERRL, EOMI.  ENT:  Supple, normal JVP. No lymphadenopathy or thyroid enlargement.  Chest:  CTAB.  Cardiac:  IIR, variable sound of S1 and Normal S2.   No murmurs rubs or gallop.   Abdomen:  Normal BS.  Soft, non-tender and non-distended.  No rebound or guarding.    Extremities:  Pedious pulses palpable B/L.  No LE edema noticed.   Neurological: Strength and sensation grossly symmetric and intact throughout.       CURRENT MEDICATIONS:  Current Outpatient Medications   Medication Sig Dispense Refill     acetaminophen (TYLENOL) 500 MG tablet Take 500 mg by mouth every 6 hours as needed for mild pain       flecainide (TAMBOCOR) 150 MG tablet Take 75 mg (1/2 tablet) every 12 hours. 30 tablet 11     furosemide (LASIX) 40 MG tablet Take 0.5 tablets (20 mg) by mouth daily 30 tablet 1     losartan (COZAAR) 50 MG tablet Take 1 tablet (50 mg) by mouth daily 90 tablet 3     metoprolol succinate ER (TOPROL-XL) 50 MG 24 hr tablet Take 1 tablet (50 mg) by mouth 2 times daily " 50 mg PO bid 180 tablet 0     potassium chloride ER (K-TAB) 20 MEQ CR tablet Take 1 tablet (20 mEq) by mouth every other day 30 tablet 0     sildenafil (REVATIO) 20 MG tablet Take 1 tablet (20 mg) by mouth daily as needed 30 tablet 3     warfarin ANTICOAGULANT (COUMADIN) 5 MG tablet Take 1.5 tablets (7.5mg) by mouth on Fri; take 1 tablet (5mg) all other days; or as directed by INR Clinic 110 tablet 0       ALLERGIES     Allergies   Allergen Reactions     Lisinopril Cough     Very persistent cough       PAST MEDICAL HISTORY:  Past Medical History:   Diagnosis Date     Atrial fibrillation (H) 6/3/2019     Benign essential hypertension 1/18/2017    past use of ACE- Cough;  Had been on ARB-diuretic but experienced lower bp readings;  BLOOD PRESSURE now well controlled on ARB also no longer on diuretic; no low bp readings; may have had slight edema initially but has normalized with bp well controlled on ARB alone.      Cancer (H)     testicular cancer     CHF (congestive heart failure) (H) 6/5/2019     Long term current use of anticoagulant therapy 6/3/2019     Obesity (BMI 35.0-39.9) with comorbidity (H) 2/11/2019       PAST SURGICAL HISTORY:  Past Surgical History:   Procedure Laterality Date     ANESTHESIA CARDIOVERSION N/A 6/26/2019    Procedure: ANESTHESIA, FOR CARDIOVERSION DR. LE;  Surgeon: GENERIC ANESTHESIA PROVIDER;  Location:  OR     ANESTHESIA CARDIOVERSION N/A 7/17/2019    Procedure: ANESTHESIA, FOR CARDIOVERSION;  Surgeon: GENERIC ANESTHESIA PROVIDER;  Location:  OR     GENITOURINARY SURGERY  1990    testicular cancer     ORTHOPEDIC SURGERY  1970's    right knee surgery        FAMILY HISTORY:  Family History   Problem Relation Age of Onset     Colon Cancer Father      Coronary Artery Disease Maternal Grandmother        SOCIAL HISTORY:  Social History     Socioeconomic History     Marital status:      Spouse name: Not on file     Number of children: Not on file     Years of education: Not on  file     Highest education level: Not on file   Occupational History     Not on file   Social Needs     Financial resource strain: Not on file     Food insecurity:     Worry: Not on file     Inability: Not on file     Transportation needs:     Medical: Not on file     Non-medical: Not on file   Tobacco Use     Smoking status: Never Smoker     Smokeless tobacco: Never Used   Substance and Sexual Activity     Alcohol use: Yes     Comment: occ     Drug use: No     Sexual activity: Yes     Partners: Female   Lifestyle     Physical activity:     Days per week: Not on file     Minutes per session: Not on file     Stress: Not on file   Relationships     Social connections:     Talks on phone: Not on file     Gets together: Not on file     Attends Jewish service: Not on file     Active member of club or organization: Not on file     Attends meetings of clubs or organizations: Not on file     Relationship status: Not on file     Intimate partner violence:     Fear of current or ex partner: Not on file     Emotionally abused: Not on file     Physically abused: Not on file     Forced sexual activity: Not on file   Other Topics Concern     Parent/sibling w/ CABG, MI or angioplasty before 65F 55M? Yes   Social History Narrative     Not on file       Review of Systems:  Skin:        Eyes:       ENT:       Respiratory:       Cardiovascular:       Gastroenterology:      Genitourinary:       Musculoskeletal:       Neurologic:       Psychiatric:       Heme/Lymph/Imm:       Endocrine:           Recent Lab Results:  LIPID RESULTS:  Lab Results   Component Value Date    CHOL 179 01/22/2019    HDL 42 01/22/2019     (H) 01/22/2019    TRIG 84 01/22/2019       LIVER ENZYME RESULTS:  Lab Results   Component Value Date    AST 21 01/22/2019    ALT 27 01/22/2019       CBC RESULTS:  No results found for: WBC, RBC, HGB, HCT, MCV, MCH, MCHC, RDW, PLT    BMP RESULTS:  Lab Results   Component Value Date     08/14/2019    POTASSIUM  4.3 08/14/2019    CHLORIDE 108 08/14/2019    CO2 25 08/14/2019    ANIONGAP 5 08/14/2019    GLC 86 08/14/2019    BUN 20 08/14/2019    CR 1.31 (H) 08/14/2019    GFRESTIMATED 56 (L) 08/14/2019    GFRESTBLACK 65 08/14/2019    CHU 8.8 08/14/2019        A1C RESULTS:  No results found for: A1C    INR RESULTS:  Lab Results   Component Value Date    INR 3.1 (A) 11/01/2019    INR 2.0 (A) 10/11/2019    INR 2.30 (H) 07/17/2019    INR 2.35 (H) 06/26/2019         ECHOCARDIOGRAM  No results found for this or any previous visit (from the past 8760 hour(s)).      No orders of the defined types were placed in this encounter.    No orders of the defined types were placed in this encounter.    There are no discontinued medications.      Encounter Diagnosis   Name Primary?     Paroxysmal atrial fibrillation (H)          MARAH Wahl, APRN CNP  [address]  [place] [id number]

## 2019-11-13 NOTE — LETTER
11/13/2019    Davina Reaves MD  [address]  [facility] [address]    RE: Kevan Connelly       Dear Colleague,    I had the pleasure of seeing Kevan Connelly in the [facility].    Electrophysiology/ Clinic Note         H&P and Plan:     REASON FOR VISIT: Electrophysiology evaluation.      HISTORY OF PRESENT ILLNESS: Mr. 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    Patient presented with heart failure symptoms back in April of this year.  At the time, he was found to have persistent atrial fibrillation and severe LV dysfunction (EF around 33%).  He underwent cardioversion, however procedure was unsuccessful.  Later, he met me back in July.  We started him on amiodarone and he underwent a second attempt of cardioversion which was successful.      After restoration of normal rhythm, cardiac function normalized.  In September, amiodarone was discontinued and he was started on a combination of metoprolol and flecainide.    Today, he informs he continues to do well.  He has no complaints during his visit of chest pain, shortness of breath, lightheadedness, near-syncope or syncope.    He admitted forgetting to take flecainide and metoprolol last night, and noticed that he was back in A. fib on physical exam.  EKG done today confirm atrial fibrillation.    Previous studies:  -EKG (08/20/2019): Normal sinus rhythm.  First-degree AV block (CA of 276 ms).  QRS measuring 112 ms.  -Exercise stress test (08/28/2019): Target heart rate was not achieved.  However test was not negative for ischemia or pro-arrhythmias.  -Limited echo (08/14/2019): Normal LV function.  EF estimated 50-55%.    SSESSMENT AND PLAN:   1.  Persistent atrial fibrillation.  There is responded well to rhythm control strategy.  Heart failure  "symptoms are improved/resolved and cardiac function is back to normal.  However today he seems to be back in atrial fibrillation.  Unclear if A. fib is related to the fact that he skipped medications last night.    I recommend the following:  -Increase flecainide from 75 to 100 mg twice daily.  Continue metoprolol at current dose.  -ZIO Patch monitor for week to evaluate A. fib burden.  -If patient continues to have atrial fibrillation despite of antiarrhythmic therapy, will consider ablation.    Of note, today we discussed the ablation procedure.  I explained the procedure in details.  He understand there is a 70% to success rate in a 3% risk of complication.      We will follow-up the results from the monitor and finalize plan.    2.  Embolic prevention.  Chads vas score of 2-3.  Continue anticoagulation indefinitely.      Lamont Hannon MD    Physical Exam:  Vitals: /86 (BP Location: Right arm, Patient Position: Sitting, Cuff Size: Adult Large)   Pulse 68   Ht 1.93 m (6' 4\")   Wt 127.3 kg (280 lb 9.6 oz)   BMI 34.16 kg/m       Constitutional:  AAO x3.  Pt is in NAD.  HEAD: normocephalic.  SKIN: Skin normal color, texture and turgor with no lesions or eruptions.  Eyes: PERRL, EOMI.  ENT:  Supple, normal JVP. No lymphadenopathy or thyroid enlargement.  Chest:  CTAB.  Cardiac:  IIR, variable sound of S1 and Normal S2.   No murmurs rubs or gallop.   Abdomen:  Normal BS.  Soft, non-tender and non-distended.  No rebound or guarding.    Extremities:  Pedious pulses palpable B/L.  No LE edema noticed.   Neurological: Strength and sensation grossly symmetric and intact throughout.       CURRENT MEDICATIONS:  Current Outpatient Medications   Medication Sig Dispense Refill     acetaminophen (TYLENOL) 500 MG tablet Take 500 mg by mouth every 6 hours as needed for mild pain       flecainide (TAMBOCOR) 150 MG tablet Take 75 mg (1/2 tablet) every 12 hours. 30 tablet 11     furosemide (LASIX) 40 MG tablet Take 0.5 " tablets (20 mg) by mouth daily 30 tablet 1     losartan (COZAAR) 50 MG tablet Take 1 tablet (50 mg) by mouth daily 90 tablet 3     metoprolol succinate ER (TOPROL-XL) 50 MG 24 hr tablet Take 1 tablet (50 mg) by mouth 2 times daily 50 mg PO bid 180 tablet 0     potassium chloride ER (K-TAB) 20 MEQ CR tablet Take 1 tablet (20 mEq) by mouth every other day 30 tablet 0     sildenafil (REVATIO) 20 MG tablet Take 1 tablet (20 mg) by mouth daily as needed 30 tablet 3     warfarin ANTICOAGULANT (COUMADIN) 5 MG tablet Take 1.5 tablets (7.5mg) by mouth on Fri; take 1 tablet (5mg) all other days; or as directed by INR Clinic 110 tablet 0       ALLERGIES     Allergies   Allergen Reactions     Lisinopril Cough     Very persistent cough       PAST MEDICAL HISTORY:  Past Medical History:   Diagnosis Date     Atrial fibrillation (H) 6/3/2019     Benign essential hypertension 1/18/2017    past use of ACE- Cough;  Had been on ARB-diuretic but experienced lower bp readings;  BLOOD PRESSURE now well controlled on ARB also no longer on diuretic; no low bp readings; may have had slight edema initially but has normalized with bp well controlled on ARB alone.      Cancer (H)     testicular cancer     CHF (congestive heart failure) (H) 6/5/2019     Long term current use of anticoagulant therapy 6/3/2019     Obesity (BMI 35.0-39.9) with comorbidity (H) 2/11/2019       PAST SURGICAL HISTORY:  Past Surgical History:   Procedure Laterality Date     ANESTHESIA CARDIOVERSION N/A 6/26/2019    Procedure: ANESTHESIA, FOR CARDIOVERSION DR. LE;  Surgeon: GENERIC ANESTHESIA PROVIDER;  Location:  OR     ANESTHESIA CARDIOVERSION N/A 7/17/2019    Procedure: ANESTHESIA, FOR CARDIOVERSION;  Surgeon: GENERIC ANESTHESIA PROVIDER;  Location:  OR     GENITOURINARY SURGERY  1990    testicular cancer     ORTHOPEDIC SURGERY  1970's    right knee surgery        FAMILY HISTORY:  Family History   Problem Relation Age of Onset     Colon Cancer Father       Coronary Artery Disease Maternal Grandmother        SOCIAL HISTORY:  Social History     Socioeconomic History     Marital status:      Spouse name: Not on file     Number of children: Not on file     Years of education: Not on file     Highest education level: Not on file   Occupational History     Not on file   Social Needs     Financial resource strain: Not on file     Food insecurity:     Worry: Not on file     Inability: Not on file     Transportation needs:     Medical: Not on file     Non-medical: Not on file   Tobacco Use     Smoking status: Never Smoker     Smokeless tobacco: Never Used   Substance and Sexual Activity     Alcohol use: Yes     Comment: occ     Drug use: No     Sexual activity: Yes     Partners: Female   Lifestyle     Physical activity:     Days per week: Not on file     Minutes per session: Not on file     Stress: Not on file   Relationships     Social connections:     Talks on phone: Not on file     Gets together: Not on file     Attends Mandaeism service: Not on file     Active member of club or organization: Not on file     Attends meetings of clubs or organizations: Not on file     Relationship status: Not on file     Intimate partner violence:     Fear of current or ex partner: Not on file     Emotionally abused: Not on file     Physically abused: Not on file     Forced sexual activity: Not on file   Other Topics Concern     Parent/sibling w/ CABG, MI or angioplasty before 65F 55M? Yes   Social History Narrative     Not on file       Review of Systems:  Skin:        Eyes:       ENT:       Respiratory:       Cardiovascular:       Gastroenterology:      Genitourinary:       Musculoskeletal:       Neurologic:       Psychiatric:       Heme/Lymph/Imm:       Endocrine:           Recent Lab Results:  LIPID RESULTS:  Lab Results   Component Value Date    CHOL 179 01/22/2019    HDL 42 01/22/2019     (H) 01/22/2019    TRIG 84 01/22/2019       LIVER ENZYME RESULTS:  Lab Results    Component Value Date    AST 21 01/22/2019    ALT 27 01/22/2019       CBC RESULTS:  No results found for: WBC, RBC, HGB, HCT, MCV, MCH, MCHC, RDW, PLT    BMP RESULTS:  Lab Results   Component Value Date     08/14/2019    POTASSIUM 4.3 08/14/2019    CHLORIDE 108 08/14/2019    CO2 25 08/14/2019    ANIONGAP 5 08/14/2019    GLC 86 08/14/2019    BUN 20 08/14/2019    CR 1.31 (H) 08/14/2019    GFRESTIMATED 56 (L) 08/14/2019    GFRESTBLACK 65 08/14/2019    CHU 8.8 08/14/2019        A1C RESULTS:  No results found for: A1C    INR RESULTS:  Lab Results   Component Value Date    INR 3.1 (A) 11/01/2019    INR 2.0 (A) 10/11/2019    INR 2.30 (H) 07/17/2019    INR 2.35 (H) 06/26/2019         ECHOCARDIOGRAM  No results found for this or any previous visit (from the past 8760 hour(s)).      No orders of the defined types were placed in this encounter.    No orders of the defined types were placed in this encounter.    There are no discontinued medications.      Encounter Diagnosis   Name Primary?     Paroxysmal atrial fibrillation (H)              Thank you for allowing me to participate in the care of your patient.    Sincerely,     Lamont Hannon MD     [facility]

## 2019-11-15 ENCOUNTER — ANTICOAGULATION THERAPY VISIT (OUTPATIENT)
Dept: NURSING | Facility: CLINIC | Age: 69
End: 2019-11-15
Payer: COMMERCIAL

## 2019-11-15 DIAGNOSIS — Z79.01 LONG TERM CURRENT USE OF ANTICOAGULANT THERAPY: ICD-10-CM

## 2019-11-15 DIAGNOSIS — I48.91 ATRIAL FIBRILLATION (H): ICD-10-CM

## 2019-11-15 LAB — INR POINT OF CARE: 2.9 (ref 0.86–1.14)

## 2019-11-15 PROCEDURE — 85610 PROTHROMBIN TIME: CPT | Mod: QW

## 2019-11-15 PROCEDURE — 36416 COLLJ CAPILLARY BLOOD SPEC: CPT

## 2019-11-15 PROCEDURE — 99207 ZZC NO CHARGE NURSE ONLY: CPT

## 2019-11-15 NOTE — PROGRESS NOTES
ANTICOAGULATION FOLLOW-UP CLINIC VISIT    Patient Name:  Kevan Connelly  Date:  11/15/2019  Contact Type:  Face to Face    SUBJECTIVE:  Patient Findings     Positives:   Change in health, Change in medications    Comments:   Patient was found to be in a-fib at last cardiology appointment. Increased dose of flecanide. Patient doesn't feel it as much this time but is beginning to notice. Wearing Zio patch this week. Otherwise, The patient was assessed for diet, medication, and activity level changes, missed or extra doses, bruising or bleeding, with no problem findings. Going to [facility] Dec 6-.  Kaylee LEONARD RN  Anticoagulation Clinic  [place]          Clinical Outcomes     Comments:   Patient was found to be in a-fib at last cardiology appointment. Increased dose of flecanide. Patient doesn't feel it as much this time but is beginning to notice. Wearing Zio patch this week. Otherwise, The patient was assessed for diet, medication, and activity level changes, missed or extra doses, bruising or bleeding, with no problem findings. Going to [facility] Dec 6-.  Kaylee LEONARD RN  Anticoagulation Clinic  [place]             OBJECTIVE    INR Protime   Date Value Ref Range Status   11/15/2019 2.9 (A) 0.86 - 1.14 Final       ASSESSMENT / PLAN  INR assessment THER    Recheck INR In: 3 WEEKS    INR Location Clinic      Anticoagulation Summary  As of 11/15/2019    INR goal:   2.0-3.0   TTR:   55.2 % (5.3 mo)   INR used for dosin.9 (11/15/2019)   Warfarin maintenance plan:   7.5 mg (5 mg x 1.5) every Fri; 5 mg (5 mg x 1) all other days   Full warfarin instructions:   7.5 mg every Fri; 5 mg all other days   Weekly warfarin total:   37.5 mg   No change documented:   Kaylee Ramey RN   Plan last modified:   Kaylee Ramey RN (2019)   Next INR check:   2019   Priority:   Maintenance   Target end date:   Indefinite    Indications    Long term current use of anticoagulant therapy [Z79.01]  Atrial fibrillation (H)  [I48.91]             Anticoagulation Episode Summary     INR check location:   Anticoagulation Clinic    Preferred lab:       Send INR reminders to:   CONNIE LOPEZ    Comments:   5mg tabs / DCCV 7/17/19 / early morning appointments on Wed or Fri      Anticoagulation Care Providers     Provider Role Specialty Phone number    Davina Reaves MD  Internal Medicine [phone]            See the Encounter Report to view Anticoagulation Flowsheet and Dosing Calendar (Go to Encounters tab in chart review, and find the Anticoagulation Therapy Visit)    Kaylee Ramey RN

## 2019-12-02 ENCOUNTER — TELEPHONE (OUTPATIENT)
Dept: CARDIOLOGY | Facility: CLINIC | Age: 69
End: 2019-12-02

## 2019-12-02 DIAGNOSIS — I48.0 PAROXYSMAL ATRIAL FIBRILLATION (H): Primary | ICD-10-CM

## 2019-12-02 NOTE — TELEPHONE ENCOUNTER
----- Message from Lamont Hannon MD sent at 12/2/2019 10:33 AM CST -----  He remains in Afib and failed therapy with Flecainide.  I recommend ablation.  He should have a IBETH and CT prior to ablation.  He may want to come back and see an Keegan prior to procedure.        Please update patient on results and plan.    Thank you,    Lamont Hannon MD    Call from pt today asking for Zp results. I did explain the test results and recommendations per Dr Hannon. He had questions re: the AFib ablation so he was transferred to AF nurses. Sue LangenbrunnerRN

## 2019-12-02 NOTE — TELEPHONE ENCOUNTER
Spoke to patient regarding afib ablation.  Offered him next available day for procedure on 1/3/20.  Informed patient that I needed to check to make sure anesthesia was available.  Informed patient that IBETH and CT was needed as well as an appt for H&P.  Informed patient that scheduling will be calling him to get this all set up.  Patient aware that location for above will be [place].  Orders placed in epic.  DIO Schrader

## 2019-12-04 ENCOUNTER — ANTICOAGULATION THERAPY VISIT (OUTPATIENT)
Dept: NURSING | Facility: CLINIC | Age: 69
End: 2019-12-04
Payer: COMMERCIAL

## 2019-12-04 DIAGNOSIS — Z79.01 LONG TERM CURRENT USE OF ANTICOAGULANT THERAPY: ICD-10-CM

## 2019-12-04 DIAGNOSIS — I48.91 ATRIAL FIBRILLATION (H): ICD-10-CM

## 2019-12-04 LAB — INR POINT OF CARE: 2.9 (ref 0.86–1.14)

## 2019-12-04 PROCEDURE — 99207 ZZC NO CHARGE NURSE ONLY: CPT

## 2019-12-04 PROCEDURE — 36416 COLLJ CAPILLARY BLOOD SPEC: CPT

## 2019-12-04 PROCEDURE — 85610 PROTHROMBIN TIME: CPT | Mod: QW

## 2019-12-04 NOTE — PROGRESS NOTES
ANTICOAGULATION FOLLOW-UP CLINIC VISIT    Patient Name:  Kevan Connelly  Date:  12/4/2019  Contact Type:  Face to Face    SUBJECTIVE:  Patient Findings     Positives:   Upcoming invasive procedure    Comments:   Patient feeling a little tired with the continued A-fib. Has an ablation scheduled for 1/3/19. Will be seeing Cardiology Dec 13 to get instructions (pre-op?) regarding his warfarin. Advised will probably need to start hold Dominick, Dec 29th. Patient stated understanding and is in agreement with plan. This writer will check her notes after that visit for holding instructions and if she wants INR checked in clinic before. Will contact patient to review plans with warfarin/INR. Otherwise, The patient was assessed for diet, medication, and activity level changes, missed or extra doses, bruising or bleeding, with no problem findings.  Kaylee LEONARD RN  Anticoagulation Clinic  [place]          Clinical Outcomes     Comments:   Patient feeling a little tired with the continued A-fib. Has an ablation scheduled for 1/3/19. Will be seeing Cardiology Dec 13 to get instructions (pre-op?) regarding his warfarin. Advised will probably need to start hold Dominick, Dec 29th. Patient stated understanding and is in agreement with plan. This writer will check her notes after that visit for holding instructions and if she wants INR checked in clinic before. Will contact patient to review plans with warfarin/INR. Otherwise, The patient was assessed for diet, medication, and activity level changes, missed or extra doses, bruising or bleeding, with no problem findings.  Kaylee LEONARD RN  Anticoagulation Clinic  [place]             OBJECTIVE    INR Protime   Date Value Ref Range Status   12/04/2019 2.9 (A) 0.86 - 1.14 Final       ASSESSMENT / PLAN  INR assessment THER    Recheck INR In: 4 WEEKS    INR Location Clinic      Anticoagulation Summary  As of 12/4/2019    INR goal:   2.0-3.0   TTR:   59.9 % (5.9 mo)   INR used for dosing:    2.9 (12/4/2019)   Warfarin maintenance plan:   7.5 mg (5 mg x 1.5) every Fri; 5 mg (5 mg x 1) all other days   Full warfarin instructions:   12/29: Hold; 12/30: Hold; 12/31: Hold; 1/1: Hold; 1/2: Hold; Otherwise 7.5 mg every Fri; 5 mg all other days   Weekly warfarin total:   37.5 mg   Plan last modified:   Kaylee Ramey RN (9/11/2019)   Next INR check:   1/10/2020   Priority:   Maintenance   Target end date:   Indefinite    Indications    Long term current use of anticoagulant therapy [Z79.01]  Atrial fibrillation (H) [I48.91]             Anticoagulation Episode Summary     INR check location:   Anticoagulation Clinic    Preferred lab:       Send INR reminders to:   CONNIE LOPEZ    Comments:   5mg tabs / DCCV 7/17/19 / early morning appointments on Wed or Fri      Anticoagulation Care Providers     Provider Role Specialty Phone number    Davina Reaves MD  Internal Medicine [phone]            See the Encounter Report to view Anticoagulation Flowsheet and Dosing Calendar (Go to Encounters tab in chart review, and find the Anticoagulation Therapy Visit)    Kaylee Ramey, DIO

## 2019-12-06 ENCOUNTER — TELEPHONE (OUTPATIENT)
Dept: CARDIOLOGY | Facility: CLINIC | Age: 69
End: 2019-12-06

## 2019-12-06 NOTE — TELEPHONE ENCOUNTER
Called Keron to help him schedule INR for December 31. No answer. LVM to return call.    Kaylee LEONARD RN  Anticoagulation Clinic  [place]

## 2019-12-06 NOTE — TELEPHONE ENCOUNTER
Pt left voicemail message to ask about scheduling an INR appt prior to his afib ablation. He usually has INR's managed at the [facility]. He is scheduled for a IBETH on 1/2/20 and Afib ablation on 1/3/20. Reviewed timing of INR with [facility] team RN, who states that pt should continue to take his usual dose of Warfarin with an INR check 2-3 days before the procedure with results called to Dr Hannon's Afib team ([phone]) to determine if any Warfarin dosing adjustment would be needed prior to the procedures.  Left pt a voicemail message to call his INR clinic to schedule an INR appt on 12/31 with the results to be called to the above number for Dr Hannon to review and advise regarding pre procedure dosing. Also reviewed that he should not hold warfarin prior to that INR check. Spoke with [facility] nurse to update her on the plan. Adrianna

## 2019-12-09 NOTE — PROGRESS NOTES
Received a call from [facility] who took a message that per Dr. Reeves's nurse, the patient should have his INR checked in clinic on Dec 31st and results called to their team ([phone]). No need to hold warfarin for ablation scheduled for 1/3/20. See telephone encounter 12/6/19.     Patient scheduled now for 12/31/19 at 11:45 a.m.    Kaylee LEONARD RN  Anticoagulation Clinic  [place]

## 2019-12-12 NOTE — PROGRESS NOTES
HPI:  Kevan Connelly is a 68 year old male who presents for H&P for atrial fibrillation ablation.  He is a patient of Dr. Hannon.  His medical history includes      1. Atrial fibrillation.  Diagnosed on 5/30/19 with cardiomyopathy EF was 33%.  Was on amiodarone and changed to flecainide with normal EF.  refractory to flecainide.    Warfarin for anticoagulation due to cost of DOAC  2. Cardiomyopathy.  EF 33% (6/2018) Recent ECHO 50-55% (8/2019)  3. Hypertension  4. Obesity  5. Testicular cancer with subsequent ED     Diagnostics:    Limited ECHO (8/2019) revealed EF 50-55% with mild to moderately dilated L atrium    ECHO (6/2019) revealed moderately reduced LV systolic function with LVEF of 33%.  There was moderate global hypokinesia.  The LV was mildly dilated, mildly decreased RV systolic function.  The left atrium was noted to be severely enlarged and right atrium to be moderate to severely dilated, mild mitral regurgitation.      Lexiscan stress test (6/2019) showed no evidence of ischemia or infarct.  LVEF was 29%.     Exercise stress test (8/2019) revealed no arrhythmias, he exercised for 4 minutes and 42 seconds for a Alvarez treadmill score of 4.5.         In March 2019, he started to feel unwell with shortness of breath with exertion.  He met with his primary provider and was diagnosed with atrial fibrillation started on beta-blocker and Xarelto.  The cost of the Xarelto was cost prohibitive and he was changed to Coumadin.     He met with Dr. Vanessa in June 2019 after being diagnosed with cardiomyopathy and atrial fibrillation. He underwent an unsuccessful cardioversion on 6/26/2019 and later met with Dr Hannon who started amiodarone load and scheduled a repeat cardioversion.      On 7/17/19 he underwent a successful cardioversion.       On 8/7/2019 he presented feeling significantly better after his cardioversion and in NSR.      In September 2019, he was started on flecainide and metoprolol.  When he saw   Ronnie in November he was in atrial fibrillation because he forgot to take his medications at that time his flecainide was increased from 75 mg to 100 mg twice daily.  A repeat Zio patch revealed patient being in the 100% atrial fibrillation and an atrial fibrillation ablation was recommended.    Today his blood pressure was elevated in clinic.  At home his BP is usually 130's/80's mmHg.  He is not currently exercising.   He denies chest pain or pressure,dizziness, syncope, angina, dyspnea at rest or with exertion, palpitations.  He denies symptoms of heart failure including orthopnea, PND, and edema.  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  After resolution of tachycardia induced cardiomyopathy he was started on flecainide with metoprolol and later was found to be in atrial fibrillation despite increase flecainide dosage.  For rhythm control, he and Dr. Hannon have decided to pursue an atrial fibrillation ablation.    Discussed complications of an atrial fibrillation ablation include but are not limited to vascular injury, excessive bleeding requiring blood transfusion, groin hematoma, aortic injury at the time of transseptal puncture, diaphragm injury, cardiac tamponade requiring pericardiocentesis or open heart surgery, esophageal injury, pulmonary vein stenosis, and thromboembolic complications or strokes.  Patient understands there is 3-4% risk of complication associated with procedure.   Explained general anesthesia patients receive breathing tube and le catheter. Access will be bilateral groins and return to work a few days.   After going home there will  need to be another adult to stay after discharge for 24 more hours, due to sedation no driving for 2 days, no making any important or legal decisions, no driving or operating machines at home or at work and no drinking alcohol.  After an extensive discussion, the patient would like to proceed with a circumferential pulmonary vein isolation.  Consent will be signed by the procedure physician.    Prior to his ablation he will have a CT angiogram heart and IBETH    All risks and benefits for transesophageal echocardiogram have been explained to the patient.  This includes but is not limited to damage to the oral cavity, esophageal perforation, GI bleeding, pharyngeal hematoma, transient bronchospasm, transient hypoxia, arrhthymias (NSVT, transient atrial fibrillation), vomiting, hemoptysis, and complications from anesthesia. Patient understands and wishes to proceed with it.  Consent will be signed by the procedure physician.     Cardiomyopathy    At the time of diagnosis with his atrial fibrillation his EF was found to be 33%.      Repeat ECHO reveals EF 50-55%      Euvolemic on exam and takes lasix 20 mg daily and potassium chloride 20 mEq as needed for weight gain, shortness of breath, and swelling    Continue GDMT of ARB (losartan 50 mg daily) and BB (Metoprolol XL 50 mg twice daily)        Hypertension    Elevated in clinic today despite BB and ARB. He will have BP check at his PCP office and call if his BP is above 140/80 mmHg    Plan:    Proceed to IBETH, CT and ablation    Continue current medications    Follow up with nurse blood pressure check and call if BP is consistently above 140/80 mmHg      Patient expresses understanding and agreement with the plan.     I appreciate the chance to help with Kevan Connelly Please let me know if you have any questions or concerns.    Hilda Wahl, APRN, CNP    This note was completed in part using Dragon voice recognition software. Although reviewed after completion,  some word and grammatical errors may occur.    No orders of the defined types were placed in this encounter.    Orders Placed This Encounter   Medications     furosemide (LASIX) 40 MG tablet     Sig: Take 0.5 tablets (20 mg) by mouth daily as needed (for swelling or weight gain)     Dispense:  30 tablet     Refill:  1     potassium chloride ER (K-TAB) 20 MEQ CR tablet     Sig: Take 1 tablet (20 mEq) by mouth daily as needed (take when lasix is taken)     Dispense:  30 tablet     Refill:  0     Medications Discontinued During This Encounter   Medication Reason     furosemide (LASIX) 40 MG tablet      potassium chloride ER (K-TAB) 20 MEQ CR tablet          Encounter Diagnosis   Name Primary?     Acute congestive heart failure, unspecified heart failure type (H)        CURRENT MEDICATIONS:  Current Outpatient Medications   Medication Sig Dispense Refill     acetaminophen (TYLENOL) 500 MG tablet Take 500 mg by mouth every 6 hours as needed for mild pain       flecainide (TAMBOCOR) 100 MG tablet Take 1 tablet (100 mg) by mouth 2 times daily 60 tablet 3     furosemide (LASIX) 40 MG tablet Take 0.5 tablets (20 mg) by mouth daily as needed (for swelling or weight gain) 30 tablet 1     losartan (COZAAR) 50 MG tablet Take 1 tablet (50 mg) by mouth daily 90 tablet 3     metoprolol succinate ER (TOPROL-XL) 50 MG 24 hr tablet Take 1 tablet (50 mg) by mouth 2 times daily 50 mg PO bid 180 tablet 0     potassium chloride ER (K-TAB) 20 MEQ CR tablet Take 1 tablet (20 mEq) by mouth daily as needed (take when lasix is taken) 30 tablet 0     sildenafil (REVATIO) 20 MG tablet Take 1 tablet (20 mg) by mouth daily as needed 30 tablet 3     warfarin ANTICOAGULANT (COUMADIN) 5 MG tablet Take 1.5 tablets (7.5mg) by mouth on Fri; take 1 tablet (5mg) all other days; or as directed by INR Clinic 110 tablet 0       ALLERGIES     Allergies   Allergen Reactions     Lisinopril Cough     Very persistent cough       PAST MEDICAL HISTORY:  Past  Medical History:   Diagnosis Date     Atrial fibrillation (H) 6/3/2019     Benign essential hypertension 1/18/2017    past use of ACE- Cough;  Had been on ARB-diuretic but experienced lower bp readings;  BLOOD PRESSURE now well controlled on ARB also no longer on diuretic; no low bp readings; may have had slight edema initially but has normalized with bp well controlled on ARB alone.      Cancer (H)     testicular cancer     CHF (congestive heart failure) (H) 6/5/2019     Long term current use of anticoagulant therapy 6/3/2019     Obesity (BMI 35.0-39.9) with comorbidity (H) 2/11/2019       PAST SURGICAL HISTORY:  Past Surgical History:   Procedure Laterality Date     ANESTHESIA CARDIOVERSION N/A 6/26/2019    Procedure: ANESTHESIA, FOR CARDIOVERSION DR. LE;  Surgeon: GENERIC ANESTHESIA PROVIDER;  Location:  OR     ANESTHESIA CARDIOVERSION N/A 7/17/2019    Procedure: ANESTHESIA, FOR CARDIOVERSION;  Surgeon: GENERIC ANESTHESIA PROVIDER;  Location:  OR     GENITOURINARY SURGERY  1990    testicular cancer     ORTHOPEDIC SURGERY  1970's    right knee surgery        FAMILY HISTORY:  Family History   Problem Relation Age of Onset     Colon Cancer Father      Coronary Artery Disease Maternal Grandmother        SOCIAL HISTORY:  Social History     Socioeconomic History     Marital status:      Spouse name: None     Number of children: None     Years of education: None     Highest education level: None   Occupational History     None   Social Needs     Financial resource strain: None     Food insecurity:     Worry: None     Inability: None     Transportation needs:     Medical: None     Non-medical: None   Tobacco Use     Smoking status: Never Smoker     Smokeless tobacco: Never Used   Substance and Sexual Activity     Alcohol use: Yes     Comment: occ     Drug use: No     Sexual activity: Yes     Partners: Female   Lifestyle     Physical activity:     Days per week: None     Minutes per session: None      "Stress: None   Relationships     Social connections:     Talks on phone: None     Gets together: None     Attends Orthodoxy service: None     Active member of club or organization: None     Attends meetings of clubs or organizations: None     Relationship status: None     Intimate partner violence:     Fear of current or ex partner: None     Emotionally abused: None     Physically abused: None     Forced sexual activity: None   Other Topics Concern     Parent/sibling w/ CABG, MI or angioplasty before 65F 55M? Yes   Social History Narrative     None       Review of Systems:  Skin:  Negative     Eyes:  Positive for glasses  ENT:  Negative    Respiratory:  Positive for dyspnea on exertion  Cardiovascular:  Negative    Gastroenterology: Negative    Genitourinary:  Negative    Musculoskeletal:  Negative    Neurologic:  Negative    Psychiatric:  Negative    Heme/Lymph/Imm:  Negative    Endocrine:  Negative      Physical Exam:  Vitals: /80   Pulse 76   Ht 1.93 m (6' 4\")   Wt 127 kg (280 lb)   BMI 34.08 kg/m      Constitutional:  cooperative, alert and oriented, well developed, well nourished, in no acute distress        Skin:  warm and dry to the touch        Head:  normocephalic, no masses or lesions        Eyes:  pupils equal and round        ENT:  no pallor or cyanosis        Neck:  JVP normal        Chest:  clear to auscultation        Cardiac: regular rhythm                  Abdomen:  abdomen soft obese      Vascular: pulses full and equal     right radial artery;2+             left radial artery;2+                  Extremities and Back:  no edema;no deformities, clubbing, cyanosis, erythema observed        Neurological:  no gross motor deficits          Recent Lab Results:  LIPID RESULTS:  Lab Results   Component Value Date    CHOL 179 01/22/2019    HDL 42 01/22/2019     (H) 01/22/2019    TRIG 84 01/22/2019       LIVER ENZYME RESULTS:  Lab Results   Component Value Date    AST 21 01/22/2019    ALT 27 " 01/22/2019       CBC RESULTS:  No results found for: WBC, RBC, HGB, HCT, MCV, MCH, MCHC, RDW, PLT    BMP RESULTS:  Lab Results   Component Value Date     11/13/2019    POTASSIUM 4.0 11/13/2019    CHLORIDE 109 11/13/2019    CO2 23 11/13/2019    ANIONGAP 5 11/13/2019     (H) 11/13/2019    BUN 22 11/13/2019    CR 1.13 11/13/2019    GFRESTIMATED 67 11/13/2019    GFRESTBLACK 77 11/13/2019    CHU 8.8 11/13/2019        A1C RESULTS:  No results found for: A1C    INR RESULTS:  Lab Results   Component Value Date    INR 2.9 (A) 12/04/2019    INR 2.9 (A) 11/15/2019    INR 2.30 (H) 07/17/2019    INR 2.35 (H) 06/26/2019           CC  No referring provider defined for this encounter.

## 2019-12-13 ENCOUNTER — TELEPHONE (OUTPATIENT)
Dept: CARDIOLOGY | Facility: CLINIC | Age: 69
End: 2019-12-13

## 2019-12-13 ENCOUNTER — OFFICE VISIT (OUTPATIENT)
Dept: CARDIOLOGY | Facility: CLINIC | Age: 69
End: 2019-12-13
Payer: COMMERCIAL

## 2019-12-13 VITALS
HEART RATE: 76 BPM | SYSTOLIC BLOOD PRESSURE: 135 MMHG | WEIGHT: 280 LBS | BODY MASS INDEX: 34.1 KG/M2 | DIASTOLIC BLOOD PRESSURE: 80 MMHG | HEIGHT: 76 IN

## 2019-12-13 DIAGNOSIS — I50.9 ACUTE CONGESTIVE HEART FAILURE, UNSPECIFIED HEART FAILURE TYPE (H): ICD-10-CM

## 2019-12-13 PROCEDURE — 99214 OFFICE O/P EST MOD 30 MIN: CPT | Performed by: NURSE PRACTITIONER

## 2019-12-13 RX ORDER — POTASSIUM CHLORIDE 1500 MG/1
20 TABLET, EXTENDED RELEASE ORAL DAILY PRN
Qty: 30 TABLET | Refills: 0 | COMMUNITY
Start: 2019-12-13 | End: 2020-03-09

## 2019-12-13 RX ORDER — FUROSEMIDE 40 MG
20 TABLET ORAL DAILY PRN
Qty: 30 TABLET | Refills: 1 | Status: SHIPPED | OUTPATIENT
Start: 2019-12-13 | End: 2020-03-09

## 2019-12-13 ASSESSMENT — MIFFLIN-ST. JEOR: SCORE: 2146.57

## 2019-12-13 NOTE — LETTER
12/13/2019    Davina Reaves MD  [address]  [facility] [address]    RE: Kevan Connelly       Dear Colleague,    I had the pleasure of seeing Kevan Connelly in the [facility].    HPI:  Kevan Connelly is a 68 year old male who presents for H&P for atrial fibrillation ablation.  He is a patient of Dr. Hannon.  His medical history includes      1. Atrial fibrillation.  Diagnosed on 5/30/19 with cardiomyopathy EF was 33%.  Was on amiodarone and changed to flecainide with normal EF.  refractory to flecainide.    Warfarin for anticoagulation due to cost of DOAC  2. Cardiomyopathy.  EF 33% (6/2018) Recent ECHO 50-55% (8/2019)  3. Hypertension  4. Obesity  5. Testicular cancer with subsequent ED     Diagnostics:    Limited ECHO (8/2019) revealed EF 50-55% with mild to moderately dilated L atrium    ECHO (6/2019) revealed moderately reduced LV systolic function with LVEF of 33%.  There was moderate global hypokinesia.  The LV was mildly dilated, mildly decreased RV systolic function.  The left atrium was noted to be severely enlarged and right atrium to be moderate to severely dilated, mild mitral regurgitation.      Lexiscan stress test (6/2019) showed no evidence of ischemia or infarct.  LVEF was 29%.     Exercise stress test (8/2019) revealed no arrhythmias, he exercised for 4 minutes and 42 seconds for a Alvarez treadmill score of 4.5.         In March 2019, he started to feel unwell with shortness of breath with exertion.  He met with his primary provider and was diagnosed with atrial fibrillation started on beta-blocker and Xarelto.  The cost of the Xarelto was cost prohibitive and he was changed to Coumadin.     He met with Dr. Vanessa in June 2019 after being diagnosed with cardiomyopathy and atrial fibrillation. He underwent an unsuccessful cardioversion on 6/26/2019 and later met with Dr Hannon who started amiodarone load and scheduled a repeat cardioversion.      On  7/17/19 he underwent a successful cardioversion.       On 8/7/2019 he presented feeling significantly better after his cardioversion and in NSR.      In September 2019, he was started on flecainide and metoprolol.  When he saw Dr. Truijllo in November he was in atrial fibrillation because he forgot to take his medications at that time his flecainide was increased from 75 mg to 100 mg twice daily.  A repeat Zio patch revealed patient being in the 100% atrial fibrillation and an atrial fibrillation ablation was recommended.    Today his blood pressure was elevated in clinic.  At home his BP is usually 130's/80's mmHg.  He is not currently exercising.   He denies chest pain or pressure,dizziness, syncope, angina, dyspnea at rest or with exertion, palpitations.  He denies symptoms of heart failure including orthopnea, PND, and edema.  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  After resolution of tachycardia induced cardiomyopathy he was started on flecainide with metoprolol and later was found to be in atrial fibrillation despite increase flecainide dosage.  For rhythm control, he and Dr. Trujillo have decided to pursue an atrial fibrillation ablation.    Discussed complications of an atrial fibrillation ablation include but are not limited to vascular injury, excessive bleeding requiring blood transfusion, groin hematoma, aortic injury at the time of transseptal puncture, diaphragm injury, cardiac tamponade requiring pericardiocentesis or open heart surgery, esophageal injury, pulmonary vein stenosis, and thromboembolic complications or strokes.  Patient understands there is  3-4% risk of complication associated with procedure.   Explained general anesthesia patients receive breathing tube and le catheter. Access will be bilateral groins and return to work a few days.   After going home there will need to be  another adult to stay after discharge for 24 more hours, due to sedation no driving for 2 days, no making any important or legal decisions, no driving or operating machines at home or at work and no drinking alcohol.  After an extensive discussion, the patient would like to proceed with a circumferential pulmonary vein isolation.  Consent will be signed by the procedure physician.    Prior to his ablation he will have a CT angiogram heart and IBETH    All risks and benefits for transesophageal echocardiogram have been explained to the patient.  This includes but is not limited to damage to the oral cavity, esophageal perforation, GI bleeding, pharyngeal hematoma, transient bronchospasm, transient hypoxia, arrhthymias (NSVT, transient atrial fibrillation), vomiting, hemoptysis, and complications from anesthesia. Patient understands and wishes to proceed with it.  Consent will be signed by the procedure physician.     Cardiomyopathy    At the time of diagnosis with his atrial fibrillation his EF was found to be 33%.      Repeat ECHO reveals EF 50-55%      Euvolemic on exam and takes lasix 20 mg daily and potassium chloride 20 mEq as needed for weight gain, shortness of breath, and swelling    Continue GDMT of ARB (losartan 50 mg daily) and BB (Metoprolol XL 50 mg twice daily)        Hypertension    Elevated in clinic today despite BB and ARB. He will have BP check at his PCP office and call if his BP is above 140/80 mmHg    Plan:    Proceed to IBETH, CT and ablation    Continue current medications    Follow up with nurse blood pressure check and call if BP is consistently above 140/80 mmHg      Patient expresses understanding and agreement with the plan.     I appreciate the chance to  help with Kevan Connelly Please let me know if you have any questions or concerns.    ANNMARIE Carey, CNP    This note was completed in part using Dragon voice recognition software. Although reviewed after completion, some word and grammatical errors may occur.    No orders of the defined types were placed in this encounter.    Orders Placed This Encounter   Medications     furosemide (LASIX) 40 MG tablet     Sig: Take 0.5 tablets (20 mg) by mouth daily as needed (for swelling or weight gain)     Dispense:  30 tablet     Refill:  1     potassium chloride ER (K-TAB) 20 MEQ CR tablet     Sig: Take 1 tablet (20 mEq) by mouth daily as needed (take when lasix is taken)     Dispense:  30 tablet     Refill:  0     Medications Discontinued During This Encounter   Medication Reason     furosemide (LASIX) 40 MG tablet      potassium chloride ER (K-TAB) 20 MEQ CR tablet          Encounter Diagnosis   Name Primary?     Acute congestive heart failure, unspecified heart failure type (H)        CURRENT MEDICATIONS:  Current Outpatient Medications   Medication Sig Dispense Refill     acetaminophen (TYLENOL) 500 MG tablet Take 500 mg by mouth every 6 hours as needed for mild pain       flecainide (TAMBOCOR) 100 MG tablet Take 1 tablet (100 mg) by mouth 2 times daily 60 tablet 3     furosemide (LASIX) 40 MG tablet Take 0.5 tablets (20 mg) by mouth daily as needed (for swelling or weight gain) 30 tablet 1     losartan (COZAAR) 50 MG tablet Take 1 tablet (50 mg) by mouth daily 90 tablet 3     metoprolol succinate ER (TOPROL-XL) 50 MG 24 hr tablet Take 1 tablet (50 mg) by mouth 2 times daily 50 mg PO bid 180 tablet 0     potassium chloride ER (K-TAB) 20 MEQ CR tablet Take 1 tablet (20 mEq) by mouth daily as needed (take when lasix is taken) 30 tablet 0     sildenafil (REVATIO) 20 MG tablet Take 1 tablet (20 mg) by mouth daily as needed 30 tablet 3     warfarin ANTICOAGULANT (COUMADIN) 5 MG tablet Take 1.5 tablets (7.5mg) by  mouth on Fri; take 1 tablet (5mg) all other days; or as directed by INR Clinic 110 tablet 0       ALLERGIES     Allergies   Allergen Reactions     Lisinopril Cough     Very persistent cough       PAST MEDICAL HISTORY:  Past Medical History:   Diagnosis Date     Atrial fibrillation (H) 6/3/2019     Benign essential hypertension 1/18/2017    past use of ACE- Cough;  Had been on ARB-diuretic but experienced lower bp readings;  BLOOD PRESSURE now well controlled on ARB also no longer on diuretic; no low bp readings; may have had slight edema initially but has normalized with bp well controlled on ARB alone.      Cancer (H)     testicular cancer     CHF (congestive heart failure) (H) 6/5/2019     Long term current use of anticoagulant therapy 6/3/2019     Obesity (BMI 35.0-39.9) with comorbidity (H) 2/11/2019       PAST SURGICAL HISTORY:  Past Surgical History:   Procedure Laterality Date     ANESTHESIA CARDIOVERSION N/A 6/26/2019    Procedure: ANESTHESIA, FOR CARDIOVERSION DR. LE;  Surgeon: GENERIC ANESTHESIA PROVIDER;  Location:  OR     ANESTHESIA CARDIOVERSION N/A 7/17/2019    Procedure: ANESTHESIA, FOR CARDIOVERSION;  Surgeon: GENERIC ANESTHESIA PROVIDER;  Location:  OR     GENITOURINARY SURGERY  1990    testicular cancer     ORTHOPEDIC SURGERY  1970's    right knee surgery        FAMILY HISTORY:  Family History   Problem Relation Age of Onset     Colon Cancer Father      Coronary Artery Disease Maternal Grandmother        SOCIAL HISTORY:  Social History     Socioeconomic History     Marital status:      Spouse name: None     Number of children: None     Years of education: None     Highest education level: None   Occupational History     None   Social Needs     Financial resource strain: None     Food insecurity:     Worry: None     Inability: None     Transportation needs:     Medical: None     Non-medical: None   Tobacco Use     Smoking status: Never Smoker     Smokeless tobacco: Never Used  "  Substance and Sexual Activity     Alcohol use: Yes     Comment: occ     Drug use: No     Sexual activity: Yes     Partners: Female   Lifestyle     Physical activity:     Days per week: None     Minutes per session: None     Stress: None   Relationships     Social connections:     Talks on phone: None     Gets together: None     Attends Jew service: None     Active member of club or organization: None     Attends meetings of clubs or organizations: None     Relationship status: None     Intimate partner violence:     Fear of current or ex partner: None     Emotionally abused: None     Physically abused: None     Forced sexual activity: None   Other Topics Concern     Parent/sibling w/ CABG, MI or angioplasty before 65F 55M? Yes   Social History Narrative     None       Review of Systems:  Skin:  Negative     Eyes:  Positive for glasses  ENT:  Negative    Respiratory:  Positive for dyspnea on exertion  Cardiovascular:  Negative    Gastroenterology: Negative    Genitourinary:  Negative    Musculoskeletal:  Negative    Neurologic:  Negative    Psychiatric:  Negative    Heme/Lymph/Imm:  Negative    Endocrine:  Negative      Physical Exam:  Vitals: /80   Pulse 76   Ht 1.93 m (6' 4\")   Wt 127 kg (280 lb)   BMI 34.08 kg/m       Constitutional:  cooperative, alert and oriented, well developed, well nourished, in no acute distress        Skin:  warm and dry to the touch        Head:  normocephalic, no masses or lesions        Eyes:  pupils equal and round        ENT:  no pallor or cyanosis        Neck:  JVP normal        Chest:  clear to auscultation        Cardiac: regular rhythm                  Abdomen:  abdomen soft obese      Vascular: pulses full and equal     right radial artery;2+             left radial artery;2+                  Extremities and Back:  no edema;no deformities, clubbing, cyanosis, erythema observed        Neurological:  no gross motor deficits          Recent Lab Results:  LIPID " RESULTS:  Lab Results   Component Value Date    CHOL 179 01/22/2019    HDL 42 01/22/2019     (H) 01/22/2019    TRIG 84 01/22/2019       LIVER ENZYME RESULTS:  Lab Results   Component Value Date    AST 21 01/22/2019    ALT 27 01/22/2019       CBC RESULTS:  No results found for: WBC, RBC, HGB, HCT, MCV, MCH, MCHC, RDW, PLT    BMP RESULTS:  Lab Results   Component Value Date     11/13/2019    POTASSIUM 4.0 11/13/2019    CHLORIDE 109 11/13/2019    CO2 23 11/13/2019    ANIONGAP 5 11/13/2019     (H) 11/13/2019    BUN 22 11/13/2019    CR 1.13 11/13/2019    GFRESTIMATED 67 11/13/2019    GFRESTBLACK 77 11/13/2019    CHU 8.8 11/13/2019        A1C RESULTS:  No results found for: A1C    INR RESULTS:  Lab Results   Component Value Date    INR 2.9 (A) 12/04/2019    INR 2.9 (A) 11/15/2019    INR 2.30 (H) 07/17/2019    INR 2.35 (H) 06/26/2019         Thank you for allowing me to participate in the care of your patient.    Sincerely,     ANNMARIE Peterson [facility]

## 2019-12-13 NOTE — PATIENT INSTRUCTIONS
Call you primary care provider and check your blood pressure.  Bring you cuff with you.   If it remains high either call us or Dr. Greenfield office for adjustments.     If you have problems or questions please call the RNs (Ashlee Tran, & Darya) at [phone]    Consider purchasing a pulse oximeter with or without wave forms

## 2019-12-19 DIAGNOSIS — I48.91 ATRIAL FIBRILLATION, UNSPECIFIED TYPE (H): ICD-10-CM

## 2019-12-19 DIAGNOSIS — I50.9 ACUTE HEART FAILURE, UNSPECIFIED HEART FAILURE TYPE (H): ICD-10-CM

## 2019-12-19 DIAGNOSIS — I10 BENIGN ESSENTIAL HYPERTENSION: ICD-10-CM

## 2019-12-19 RX ORDER — METOPROLOL SUCCINATE 50 MG/1
50 TABLET, EXTENDED RELEASE ORAL 2 TIMES DAILY
Qty: 180 TABLET | Refills: 1 | Status: ON HOLD | OUTPATIENT
Start: 2019-12-19 | End: 2020-01-04

## 2019-12-31 ENCOUNTER — ANTICOAGULATION THERAPY VISIT (OUTPATIENT)
Dept: NURSING | Facility: CLINIC | Age: 69
End: 2019-12-31
Payer: COMMERCIAL

## 2019-12-31 ENCOUNTER — TELEPHONE (OUTPATIENT)
Dept: CARDIOLOGY | Facility: CLINIC | Age: 69
End: 2019-12-31

## 2019-12-31 DIAGNOSIS — Z79.01 LONG TERM CURRENT USE OF ANTICOAGULANT THERAPY: ICD-10-CM

## 2019-12-31 DIAGNOSIS — I48.0 PAROXYSMAL ATRIAL FIBRILLATION (H): ICD-10-CM

## 2019-12-31 DIAGNOSIS — Z79.01 LONG TERM CURRENT USE OF ANTICOAGULANTS WITH INR GOAL OF 2.0-3.0: Primary | ICD-10-CM

## 2019-12-31 DIAGNOSIS — I48.91 ATRIAL FIBRILLATION (H): ICD-10-CM

## 2019-12-31 LAB — INR POINT OF CARE: 2.6 (ref 0.86–1.14)

## 2019-12-31 PROCEDURE — 99207 ZZC NO CHARGE NURSE ONLY: CPT

## 2019-12-31 PROCEDURE — 85610 PROTHROMBIN TIME: CPT | Mod: QW

## 2019-12-31 PROCEDURE — 36416 COLLJ CAPILLARY BLOOD SPEC: CPT

## 2019-12-31 NOTE — PROGRESS NOTES
ANTICOAGULATION FOLLOW-UP CLINIC VISIT    Patient Name:  Kevan Connelly  Date:  12/31/2019  Contact Type:  Face to Face    SUBJECTIVE:  Patient Findings     Positives:   Missed doses    Comments:   There was some confusion on the patient's part with warfarin instructions and patient did hold the warfarin on Sunday and Monday. See telephone encounter 12/6/19 - patient was advised not to hold over VM by Cardiology. This INR nurse attempted to call patient to inform as well, but no answer and left VM to return call.    Called today's INR of 2.6  to Dr. Pacheco's office, they took a message and a covering provider, Dr. Gutierrez will call INR clinic with recommendations. However, if INR is below therapeutic, they will not do the ablation. Advised patient to take 10 mg tonight and will call him with Cardiologist's recommendations when we receive them. Patient states ok to leave detailed message on cell phone. Otherwise, the patient was assessed for diet, medication, and activity level changes, missed or extra doses, bruising or bleeding, with no problem findings.  Kaylee LEONARD RN  Anticoagulation Clinic  [place]          Clinical Outcomes     Comments:   There was some confusion on the patient's part with warfarin instructions and patient did hold the warfarin on Sunday and Monday. See telephone encounter 12/6/19 - patient was advised not to hold over VM by Cardiology. This INR nurse attempted to call patient to inform as well, but no answer and left VM to return call.    Called today's INR of 2.6  to Dr. Pacheco's office, they took a message and a covering provider, Dr. Gutierrez will call INR clinic with recommendations. However, if INR is below therapeutic, they will not do the ablation. Advised patient to take 10 mg tonight and will call him with Cardiologist's recommendations when we receive them. Patient states ok to leave detailed message on cell phone. Otherwise, the patient was assessed for diet, medication, and  activity level changes, missed or extra doses, bruising or bleeding, with no problem findings.  Kaylee LEONARD RN  Anticoagulation Clinic  [place]             OBJECTIVE    INR Protime   Date Value Ref Range Status   2019 2.6 (A) 0.86 - 1.14 Final       ASSESSMENT / PLAN  INR assessment THER    Recheck INR In: 10 DAYS    INR Location Clinic      Anticoagulation Summary  As of 2019    INR goal:   2.0-3.0   TTR:   65.2 % (6.8 mo)   INR used for dosin.6 (2019)   Warfarin maintenance plan:   7.5 mg (5 mg x 1.5) every Fri; 5 mg (5 mg x 1) all other days   Full warfarin instructions:   : 10 mg; Otherwise 7.5 mg every Fri; 5 mg all other days   Weekly warfarin total:   37.5 mg   Plan last modified:   Kaylee Ramey RN (2019)   Next INR check:   1/10/2020   Priority:   Maintenance   Target end date:   Indefinite    Indications    Long term current use of anticoagulant therapy [Z79.01]  Atrial fibrillation (H) [I48.91]             Anticoagulation Episode Summary     INR check location:   Anticoagulation Clinic    Preferred lab:       Send INR reminders to:   CONNIE LOPEZ    Comments:   5mg tabs / DCCV / / early morning appointments on Wed or Fri      Anticoagulation Care Providers     Provider Role Specialty Phone number    Davina Reaves MD  Internal Medicine [phone]            See the Encounter Report to view Anticoagulation Flowsheet and Dosing Calendar (Go to Encounters tab in chart review, and find the Anticoagulation Therapy Visit)    Dosage adjustment made based on physician directed care plan.    Kaylee Ramey RN

## 2019-12-31 NOTE — TELEPHONE ENCOUNTER
Darya from Cardiology returned call. Dr. Gutierrez would just like to have another INR done on Thursday. This writer asked if it could be done at the hospital when patient is in for his IBETH/CT Heart Angio.  Lab order for INR has been entered, Pat will work on scheduling.     Called patient to notify. He will take the 10 mg warfarin tonight, 5 mg warfarin tomorrow and have INR checked at hospital when in on Thursday.    Kaylee LEONARD RN  Anticoagulation Clinic  [place]

## 2019-12-31 NOTE — TELEPHONE ENCOUNTER
Received call from DIO Page INR Nurse reporting patients INR today was 2.6.  She was concerned as patient held his warfarin on Sunday 12/29 and Monday 12/30 for reason unknown as he was not instructed to.  Patient is scheduled to have an afib ablation on 1/3 with Dr Hannon.  Patients maintenance dose is warfarin 5mg po on M, Tu, W, Th, Sa and Su and 7.5mg po on Friday.  Due to him holding medication on 12/29 and 12/30 this will affect his level in next couple of days.  Will have Dr livingston review above in Dr Hannon's absence for recommendations.  DIO Schrader

## 2019-12-31 NOTE — TELEPHONE ENCOUNTER
This INR Nurse did advise patient to take 10 mg warfarin tonight (instead of usual 5 mg) and expect a call either from INR Clinic (if we hear back from Dr. Gutierrez before 5:00 tonight), or from Cardiology, if further warfarin dosing changes are recommended. Patient stated it is ok to leave a detailed message on his cell phone.     Of note, looks like patient was left detailed VM by Cardiology RN on 12/6/19 regarding pre-procedure warfarin dosing and not to hold. INR nurse also attempted to call patient same day and left VM to return call. Also had Cardiology visit 12/13/19, so not sure why patient thought he was to hold warfarin...    Kaylee LEONARD RN  Anticoagulation Clinic  [place]

## 2019-12-31 NOTE — TELEPHONE ENCOUNTER
Will call [facility] on 1/2 prior to patient's scheduled IBETH and have them do INR.  DIO Schrader

## 2020-01-02 ENCOUNTER — HOSPITAL ENCOUNTER (OUTPATIENT)
Dept: CARDIOLOGY | Facility: CLINIC | Age: 70
Discharge: HOME | End: 2020-01-02
Attending: INTERNAL MEDICINE | Admitting: INTERNAL MEDICINE
Payer: COMMERCIAL

## 2020-01-02 ENCOUNTER — HOSPITAL ENCOUNTER (OUTPATIENT)
Dept: CARDIOLOGY | Facility: CLINIC | Age: 70
End: 2020-01-02
Attending: INTERNAL MEDICINE | Admitting: INTERNAL MEDICINE
Payer: COMMERCIAL

## 2020-01-02 ENCOUNTER — HOSPITAL ENCOUNTER (OUTPATIENT)
Facility: CLINIC | Age: 70
Discharge: HOME | End: 2020-01-02
Attending: INTERNAL MEDICINE | Admitting: INTERNAL MEDICINE
Payer: COMMERCIAL

## 2020-01-02 ENCOUNTER — TELEPHONE (OUTPATIENT)
Dept: CARDIOLOGY | Facility: CLINIC | Age: 70
End: 2020-01-02

## 2020-01-02 VITALS
SYSTOLIC BLOOD PRESSURE: 126 MMHG | DIASTOLIC BLOOD PRESSURE: 81 MMHG | HEIGHT: 76 IN | HEART RATE: 60 BPM | WEIGHT: 278 LBS | BODY MASS INDEX: 33.85 KG/M2 | OXYGEN SATURATION: 94 % | TEMPERATURE: 97.9 F | RESPIRATION RATE: 18 BRPM

## 2020-01-02 DIAGNOSIS — I48.0 PAROXYSMAL ATRIAL FIBRILLATION (H): Primary | ICD-10-CM

## 2020-01-02 DIAGNOSIS — I48.0 PAROXYSMAL ATRIAL FIBRILLATION (H): ICD-10-CM

## 2020-01-02 LAB — INR BLD: 3.2 (ref 0.86–1.14)

## 2020-01-02 PROCEDURE — 25800030 ZZH RX IP 258 OP 636: Performed by: INTERNAL MEDICINE

## 2020-01-02 PROCEDURE — 99152 MOD SED SAME PHYS/QHP 5/>YRS: CPT | Performed by: INTERNAL MEDICINE

## 2020-01-02 PROCEDURE — 25000128 H RX IP 250 OP 636: Performed by: INTERNAL MEDICINE

## 2020-01-02 PROCEDURE — 93320 DOPPLER ECHO COMPLETE: CPT | Mod: 26 | Performed by: INTERNAL MEDICINE

## 2020-01-02 PROCEDURE — 93320 DOPPLER ECHO COMPLETE: CPT

## 2020-01-02 PROCEDURE — 36415 COLL VENOUS BLD VENIPUNCTURE: CPT

## 2020-01-02 PROCEDURE — 75572 CT HRT W/3D IMAGE: CPT

## 2020-01-02 PROCEDURE — 36415 COLL VENOUS BLD VENIPUNCTURE: CPT | Performed by: INTERNAL MEDICINE

## 2020-01-02 PROCEDURE — 85610 PROTHROMBIN TIME: CPT | Mod: QW | Performed by: INTERNAL MEDICINE

## 2020-01-02 PROCEDURE — 93325 DOPPLER ECHO COLOR FLOW MAPG: CPT | Mod: 26 | Performed by: INTERNAL MEDICINE

## 2020-01-02 PROCEDURE — 75572 CT HRT W/3D IMAGE: CPT | Mod: 26 | Performed by: INTERNAL MEDICINE

## 2020-01-02 PROCEDURE — 40000556 ZZH STATISTIC PERIPHERAL IV START W US GUIDANCE

## 2020-01-02 PROCEDURE — 40000857 ZZH STATISTIC TEE INCLUDES SEDATION

## 2020-01-02 PROCEDURE — 93312 ECHO TRANSESOPHAGEAL: CPT | Mod: 26 | Performed by: INTERNAL MEDICINE

## 2020-01-02 PROCEDURE — 25000125 ZZHC RX 250: Performed by: INTERNAL MEDICINE

## 2020-01-02 PROCEDURE — 40000235 ZZH STATISTIC TELEMETRY

## 2020-01-02 RX ORDER — LIDOCAINE 40 MG/G
CREAM TOPICAL
Status: CANCELLED | OUTPATIENT
Start: 2020-01-02

## 2020-01-02 RX ORDER — LIDOCAINE 50 MG/G
OINTMENT TOPICAL ONCE
Status: COMPLETED | OUTPATIENT
Start: 2020-01-02 | End: 2020-01-02

## 2020-01-02 RX ORDER — DEXTROSE MONOHYDRATE 25 G/50ML
9.5 INJECTION, SOLUTION INTRAVENOUS
Status: DISCONTINUED | OUTPATIENT
Start: 2020-01-02 | End: 2020-01-02 | Stop reason: HOSPADM

## 2020-01-02 RX ORDER — LIDOCAINE 40 MG/G
CREAM TOPICAL
Status: DISCONTINUED | OUTPATIENT
Start: 2020-01-02 | End: 2020-01-02 | Stop reason: HOSPADM

## 2020-01-02 RX ORDER — LIDOCAINE HYDROCHLORIDE 40 MG/ML
1.5 SOLUTION TOPICAL ONCE
Status: COMPLETED | OUTPATIENT
Start: 2020-01-02 | End: 2020-01-02

## 2020-01-02 RX ORDER — FENTANYL CITRATE 50 UG/ML
25 INJECTION, SOLUTION INTRAMUSCULAR; INTRAVENOUS
Status: DISCONTINUED | OUTPATIENT
Start: 2020-01-02 | End: 2020-01-02 | Stop reason: HOSPADM

## 2020-01-02 RX ORDER — SODIUM CHLORIDE 9 MG/ML
INJECTION, SOLUTION INTRAVENOUS CONTINUOUS PRN
Status: DISCONTINUED | OUTPATIENT
Start: 2020-01-02 | End: 2020-01-02 | Stop reason: HOSPADM

## 2020-01-02 RX ORDER — FLUMAZENIL 0.1 MG/ML
0.2 INJECTION, SOLUTION INTRAVENOUS
Status: DISCONTINUED | OUTPATIENT
Start: 2020-01-02 | End: 2020-01-02 | Stop reason: HOSPADM

## 2020-01-02 RX ORDER — SODIUM CHLORIDE, SODIUM LACTATE, POTASSIUM CHLORIDE, CALCIUM CHLORIDE 600; 310; 30; 20 MG/100ML; MG/100ML; MG/100ML; MG/100ML
INJECTION, SOLUTION INTRAVENOUS CONTINUOUS
Status: CANCELLED | OUTPATIENT
Start: 2020-01-02

## 2020-01-02 RX ORDER — IOPAMIDOL 755 MG/ML
500 INJECTION, SOLUTION INTRAVASCULAR ONCE
Status: COMPLETED | OUTPATIENT
Start: 2020-01-02 | End: 2020-01-02

## 2020-01-02 RX ORDER — GLYCOPYRROLATE 0.2 MG/ML
0.1 INJECTION, SOLUTION INTRAMUSCULAR; INTRAVENOUS ONCE
Status: COMPLETED | OUTPATIENT
Start: 2020-01-02 | End: 2020-01-02

## 2020-01-02 RX ORDER — NALOXONE HYDROCHLORIDE 0.4 MG/ML
.1-.4 INJECTION, SOLUTION INTRAMUSCULAR; INTRAVENOUS; SUBCUTANEOUS
Status: DISCONTINUED | OUTPATIENT
Start: 2020-01-02 | End: 2020-01-02 | Stop reason: HOSPADM

## 2020-01-02 RX ADMIN — SODIUM CHLORIDE: 9 INJECTION, SOLUTION INTRAVENOUS at 08:27

## 2020-01-02 RX ADMIN — MIDAZOLAM HYDROCHLORIDE 2 MG: 1 INJECTION, SOLUTION INTRAMUSCULAR; INTRAVENOUS at 08:38

## 2020-01-02 RX ADMIN — FENTANYL CITRATE 25 MCG: 50 INJECTION, SOLUTION INTRAMUSCULAR; INTRAVENOUS at 08:42

## 2020-01-02 RX ADMIN — SODIUM CHLORIDE 100 ML: 9 INJECTION, SOLUTION INTRAVENOUS at 11:14

## 2020-01-02 RX ADMIN — LIDOCAINE HYDROCHLORIDE 1.5 ML: 40 SOLUTION TOPICAL at 08:29

## 2020-01-02 RX ADMIN — FENTANYL CITRATE 25 MCG: 50 INJECTION, SOLUTION INTRAMUSCULAR; INTRAVENOUS at 08:38

## 2020-01-02 RX ADMIN — FENTANYL CITRATE 12.5 MCG: 50 INJECTION, SOLUTION INTRAMUSCULAR; INTRAVENOUS at 08:48

## 2020-01-02 RX ADMIN — MIDAZOLAM HYDROCHLORIDE 1 MG: 1 INJECTION, SOLUTION INTRAMUSCULAR; INTRAVENOUS at 08:46

## 2020-01-02 RX ADMIN — GLYCOPYRROLATE 0.1 MG: 0.2 INJECTION, SOLUTION INTRAMUSCULAR; INTRAVENOUS at 08:27

## 2020-01-02 RX ADMIN — TOPICAL ANESTHETIC 0.5 ML: 200 SPRAY DENTAL; PERIODONTAL at 08:32

## 2020-01-02 RX ADMIN — IOPAMIDOL 100 ML: 755 INJECTION, SOLUTION INTRAVENOUS at 11:13

## 2020-01-02 ASSESSMENT — MIFFLIN-ST. JEOR: SCORE: 2137.5

## 2020-01-02 NOTE — PROGRESS NOTES
[facility] Discharge Nursing Note    Education/questions answered: yes.  Pt would be ready to go home but will let him stay until 1045 for 1100 appointment in clinic  Patient DC location: home  Accompanied by: wife  CS discharge time: plan 1045

## 2020-01-02 NOTE — PROGRESS NOTES
[facility] Admission Nursing Note    Reason for admission: Here with wife for IBETH and to clinic after for CT scan.  Explinaed pre procedure and answered questions.  Discharge instructions given with verbalized understanding.  Resting on cart with call light in reach.  CS arrival time: 0655  Accompanied by: wife  Name/phone of DC : Darya [phone]  Medications held: lewis  Consent signed: will be done with MD  Abnormal assessment/labs: na  If abnormal, provider notified: lewis  Education/questions answered: yes  Plan: 0830 IBETH

## 2020-01-02 NOTE — PRE-PROCEDURE
GENERAL PRE-PROCEDURE:   Procedure:  Transesophageal echocardiogram  Date/Time:  1/2/2020 8:25 AM    Verbal consent obtained?: Yes    Risks and benefits: Risks, benefits and alternatives were discussed    DC Plan: Appropriate discharge home plan in place for patients who are going home after procedure   Consent given by:  Patient  Patient states understanding of procedure being performed: Yes    Patient's understanding of procedure matches consent: Yes    Procedure consent matches procedure scheduled: Yes    Expected level of sedation:  Moderate  Appropriately NPO:  Yes  ASA Class:  Class 2- mild systemic disease, no acute problems, no functional limitations  Mallampati  :  Grade 2- soft palate, base of uvula, tonsillar pillars, and portion of posterior pharyngeal wall visible  Lungs:  Lungs clear with good breath sounds bilaterally  Heart:  Normal heart sounds and rate  History & Physical reviewed:  History and physical reviewed and no updates needed  Statement of review:  I have reviewed the lab findings, diagnostic data, medications, and the plan for sedation

## 2020-01-02 NOTE — PROCEDURES
[facility]    Procedure: *Transesophageal Echocardiogram  Date/Time: 1/2/2020 8:59 AM  Performed by: Vivi Grewal MD  Authorized by: Vivi Grewal MD     UNIVERSAL PROTOCOL   Site Marked: NA  Prior Images Obtained and Reviewed:  Yes  Required items: Required blood products, implants, devices and special equipment available    Patient identity confirmed:  Verbally with patient  Patient was reevaluated immediately before administering moderate or deep sedation or anesthesia  Confirmation Checklist:  Patient's identity using two indicators  Time out: Immediately prior to the procedure a time out was called    Universal Protocol: the Joint Commission Universal Protocol was followed           ANESTHESIA    Local Anesthetic: Lidocaine spray      SEDATION    Patient Sedated: Yes    Sedation:  Fentanyl and midazolam  Vital signs: Vital signs monitored during sedation    PROCEDURE   Patient Tolerance:  Patient tolerated the procedure well with no immediate complications  Describe Procedure: IBETH performed to evaluate for intracardiac thrombus pre-ablation. Intubation without difficulty. No immediate complications. Patient tolerated procedure well. No thrombus visualized within the LA, RA, and left atrial thrombus. No significant valvular dysfunction. Normal LVEF. Total fentanyl 62.5 mcg and midazolam 3 mg.   Length of time physician/provider present for 1:1 monitoring during sedation: 20

## 2020-01-02 NOTE — DISCHARGE INSTRUCTIONS
IBETH  (Transesophageal Echocardiogram)  Discharge Instructions    After you go home:      Have an adult stay with you for 6 hours.       For 24 hours - due to the sedation you received:    Relax and take it easy.    Do NOT make any important or legal decisions.    Do NOT drive or operate machines at home or at work.    Do NOT drink alcohol.    Diet:    You may resume your normal diet, but no scratchy foods for two days.    If your throat is sore, eat cold, bland or soft foods.    You may have heartburn if the tube used in the exam entered your stomach.  If so:   - Do not eat acidic and spicy foods.   - Do not eat three hours before bedtime. Clear liquids are okay.   - When lying down, use two pillows to raise your head.    Medicines:      Take your medications, including blood thinners, unless your provider tells you not to.    If you have stopped any medicines, check with your provider about when to restart them.    You may take Tylenol (Acetaminophen) if your throat is sore.    You may take antacids if you have heartburn.      Follow Up Appointments:      Follow up with your cardiologist at [facility] of patient preference as instructed.    Follow up with your primary care provider as needed.    Call the clinic if:      You have heartburn that is severe or lasts more than 72 hours.    You have a sore throat that feels worse after 72 hours.    You have shortness of breath, neck pain, chest pain, fever, chills, coughing up blood, or other unusual signs.    Questions or concerns      [facility] [place]:    [phone] [facility] (7 days a week)

## 2020-01-02 NOTE — PROGRESS NOTES
Care Suites Procedure Nursing Note    Procedure: IBETH by Dr Grewal.  Pt tolerated well with IV sedation of Versed 3 mg and Fentanyl 62.5 mcg.  See IBETH flow sheet.  Pt woke quickly.  Wife is here in room.  IV team looking for #18 IV that he is scheduled for CT scan in the Heart Clinic at 1100.  Procedure started time: 0838  Procedure completed time: 0858  Concerns/abnormal assessment: none  Plan: scheduled for CT scan in the Heart Clinic at 1100.

## 2020-01-02 NOTE — TELEPHONE ENCOUNTER
01/02/20 Called pt regarding Afib Ablation  tomorrow. Notified of arrival time ( 630 am) , NPO after midnight with sips of water for am meds. Discussed meds to be held( none) and that patient will need a  for ride home. Per verbal order, Dr Hannon recommends 2.5 mg Coumadin tonight for INR of 3.2 today. Pt expressed understanding. Bran 523 pm

## 2020-01-03 ENCOUNTER — ANESTHESIA (OUTPATIENT)
Dept: CARDIOLOGY | Facility: CLINIC | Age: 70
End: 2020-01-03
Payer: COMMERCIAL

## 2020-01-03 ENCOUNTER — HOSPITAL ENCOUNTER (OUTPATIENT)
Facility: CLINIC | Age: 70
Discharge: HOME | End: 2020-01-04
Attending: INTERNAL MEDICINE | Admitting: INTERNAL MEDICINE
Payer: COMMERCIAL

## 2020-01-03 ENCOUNTER — SURGERY (OUTPATIENT)
Age: 70
End: 2020-01-03
Payer: COMMERCIAL

## 2020-01-03 ENCOUNTER — ANESTHESIA EVENT (OUTPATIENT)
Dept: CARDIOLOGY | Facility: CLINIC | Age: 70
End: 2020-01-03
Payer: COMMERCIAL

## 2020-01-03 DIAGNOSIS — I48.0 PAROXYSMAL ATRIAL FIBRILLATION (H): ICD-10-CM

## 2020-01-03 LAB
ANION GAP SERPL CALCULATED.3IONS-SCNC: 7 MMOL/L (ref 3–14)
BUN SERPL-MCNC: 21 MG/DL (ref 7–30)
CALCIUM SERPL-MCNC: 8.4 MG/DL (ref 8.5–10.1)
CHLORIDE SERPL-SCNC: 110 MMOL/L (ref 94–109)
CO2 SERPL-SCNC: 21 MMOL/L (ref 20–32)
CREAT SERPL-MCNC: 1.11 MG/DL (ref 0.66–1.25)
ERYTHROCYTE [DISTWIDTH] IN BLOOD BY AUTOMATED COUNT: 12.6 % (ref 10–15)
GFR SERPL CREATININE-BSD FRML MDRD: 68 ML/MIN/{1.73_M2}
GLUCOSE SERPL-MCNC: 95 MG/DL (ref 70–99)
HCT VFR BLD AUTO: 44.2 % (ref 40–53)
HGB BLD-MCNC: 16 G/DL (ref 13.3–17.7)
INR BLD: 3.1 (ref 0.86–1.14)
INR PPP: NORMAL (ref 0.86–1.14)
KCT BLD-ACNC: 175 SEC (ref 75–150)
KCT BLD-ACNC: 183 SEC (ref 75–150)
KCT BLD-ACNC: 263 SEC (ref 75–150)
KCT BLD-ACNC: 304 SEC (ref 75–150)
KCT BLD-ACNC: 336 SEC (ref 75–150)
KCT BLD-ACNC: 348 SEC (ref 75–150)
KCT BLD-ACNC: 348 SEC (ref 75–150)
KCT BLD-ACNC: 376 SEC (ref 75–150)
MCH RBC QN AUTO: 32.8 PG (ref 26.5–33)
MCHC RBC AUTO-ENTMCNC: 36.2 G/DL (ref 31.5–36.5)
MCV RBC AUTO: 91 FL (ref 78–100)
PLATELET # BLD AUTO: 169 10E9/L (ref 150–450)
POTASSIUM SERPL-SCNC: 4.1 MMOL/L (ref 3.4–5.3)
RBC # BLD AUTO: 4.88 10E12/L (ref 4.4–5.9)
SODIUM SERPL-SCNC: 138 MMOL/L (ref 133–144)
WBC # BLD AUTO: 5.8 10E9/L (ref 4–11)

## 2020-01-03 PROCEDURE — 37000008 ZZH ANESTHESIA TECHNICAL FEE, 1ST 30 MIN: Performed by: INTERNAL MEDICINE

## 2020-01-03 PROCEDURE — 93662 INTRACARDIAC ECG (ICE): CPT | Mod: 26 | Performed by: INTERNAL MEDICINE

## 2020-01-03 PROCEDURE — 40000935 ZZH STATISTIC OUTPATIENT (NON-OBS) EVE

## 2020-01-03 PROCEDURE — 40000235 ZZH STATISTIC TELEMETRY

## 2020-01-03 PROCEDURE — 80048 BASIC METABOLIC PNL TOTAL CA: CPT | Performed by: INTERNAL MEDICINE

## 2020-01-03 PROCEDURE — 40000934 ZZH STATISTIC OUTPATIENT (NON-OBS) DAY

## 2020-01-03 PROCEDURE — 85610 PROTHROMBIN TIME: CPT | Mod: QW | Performed by: INTERNAL MEDICINE

## 2020-01-03 PROCEDURE — 25000128 H RX IP 250 OP 636: Performed by: INTERNAL MEDICINE

## 2020-01-03 PROCEDURE — 25000125 ZZHC RX 250: Performed by: NURSE ANESTHETIST, CERTIFIED REGISTERED

## 2020-01-03 PROCEDURE — 36415 COLL VENOUS BLD VENIPUNCTURE: CPT

## 2020-01-03 PROCEDURE — 25000128 H RX IP 250 OP 636: Performed by: NURSE ANESTHETIST, CERTIFIED REGISTERED

## 2020-01-03 PROCEDURE — 93010 ELECTROCARDIOGRAM REPORT: CPT | Performed by: INTERNAL MEDICINE

## 2020-01-03 PROCEDURE — C1733 CATH, EP, OTHR THAN COOL-TIP: HCPCS | Performed by: INTERNAL MEDICINE

## 2020-01-03 PROCEDURE — 37000009 ZZH ANESTHESIA TECHNICAL FEE, EACH ADDTL 15 MIN: Performed by: INTERNAL MEDICINE

## 2020-01-03 PROCEDURE — 25000566 ZZH SEVOFLURANE, EA 15 MIN: Performed by: INTERNAL MEDICINE

## 2020-01-03 PROCEDURE — 93656 COMPRE EP EVAL ABLTJ ATR FIB: CPT | Performed by: INTERNAL MEDICINE

## 2020-01-03 PROCEDURE — 40000065 ZZH STATISTIC EKG NON-CHARGEABLE

## 2020-01-03 PROCEDURE — 93005 ELECTROCARDIOGRAM TRACING: CPT

## 2020-01-03 PROCEDURE — 25800030 ZZH RX IP 258 OP 636: Performed by: INTERNAL MEDICINE

## 2020-01-03 PROCEDURE — 93613 INTRACARDIAC EPHYS 3D MAPG: CPT | Performed by: INTERNAL MEDICINE

## 2020-01-03 PROCEDURE — 85347 COAGULATION TIME ACTIVATED: CPT

## 2020-01-03 PROCEDURE — C1732 CATH, EP, DIAG/ABL, 3D/VECT: HCPCS | Performed by: INTERNAL MEDICINE

## 2020-01-03 PROCEDURE — 27210794 ZZH OR GENERAL SUPPLY STERILE: Performed by: INTERNAL MEDICINE

## 2020-01-03 PROCEDURE — C1769 GUIDE WIRE: HCPCS | Performed by: INTERNAL MEDICINE

## 2020-01-03 PROCEDURE — C1894 INTRO/SHEATH, NON-LASER: HCPCS | Performed by: INTERNAL MEDICINE

## 2020-01-03 PROCEDURE — 25000132 ZZH RX MED GY IP 250 OP 250 PS 637: Performed by: INTERNAL MEDICINE

## 2020-01-03 PROCEDURE — C1730 CATH, EP, 19 OR FEW ELECT: HCPCS | Performed by: INTERNAL MEDICINE

## 2020-01-03 PROCEDURE — 93662 INTRACARDIAC ECG (ICE): CPT | Performed by: INTERNAL MEDICINE

## 2020-01-03 PROCEDURE — 40000852 ZZH STATISTIC HEART CATH LAB OR EP LAB

## 2020-01-03 PROCEDURE — 25000125 ZZHC RX 250: Performed by: INTERNAL MEDICINE

## 2020-01-03 PROCEDURE — 25800030 ZZH RX IP 258 OP 636: Performed by: NURSE ANESTHETIST, CERTIFIED REGISTERED

## 2020-01-03 PROCEDURE — C1759 CATH, INTRA ECHOCARDIOGRAPHY: HCPCS | Performed by: INTERNAL MEDICINE

## 2020-01-03 PROCEDURE — 85027 COMPLETE CBC AUTOMATED: CPT | Performed by: INTERNAL MEDICINE

## 2020-01-03 PROCEDURE — 25000125 ZZHC RX 250

## 2020-01-03 PROCEDURE — 40000936 ZZH STATISTIC OUTPATIENT (NON-OBS) NIGHT

## 2020-01-03 RX ORDER — SODIUM CHLORIDE, SODIUM LACTATE, POTASSIUM CHLORIDE, CALCIUM CHLORIDE 600; 310; 30; 20 MG/100ML; MG/100ML; MG/100ML; MG/100ML
INJECTION, SOLUTION INTRAVENOUS CONTINUOUS
Status: DISCONTINUED | OUTPATIENT
Start: 2020-01-03 | End: 2020-01-03 | Stop reason: HOSPADM

## 2020-01-03 RX ORDER — HYDROMORPHONE HYDROCHLORIDE 1 MG/ML
.3-.5 INJECTION, SOLUTION INTRAMUSCULAR; INTRAVENOUS; SUBCUTANEOUS EVERY 10 MIN PRN
Status: DISCONTINUED | OUTPATIENT
Start: 2020-01-03 | End: 2020-01-03

## 2020-01-03 RX ORDER — EPHEDRINE SULFATE 50 MG/ML
INJECTION, SOLUTION INTRAMUSCULAR; INTRAVENOUS; SUBCUTANEOUS PRN
Status: DISCONTINUED | OUTPATIENT
Start: 2020-01-03 | End: 2020-01-03

## 2020-01-03 RX ORDER — FENTANYL CITRATE 50 UG/ML
25-50 INJECTION, SOLUTION INTRAMUSCULAR; INTRAVENOUS
Status: DISCONTINUED | OUTPATIENT
Start: 2020-01-03 | End: 2020-01-03

## 2020-01-03 RX ORDER — HEPARIN SODIUM 200 [USP'U]/100ML
100-600 INJECTION, SOLUTION INTRAVENOUS CONTINUOUS PRN
Status: DISCONTINUED | OUTPATIENT
Start: 2020-01-03 | End: 2020-01-03 | Stop reason: HOSPADM

## 2020-01-03 RX ORDER — FENTANYL CITRATE 50 UG/ML
INJECTION, SOLUTION INTRAMUSCULAR; INTRAVENOUS PRN
Status: DISCONTINUED | OUTPATIENT
Start: 2020-01-03 | End: 2020-01-03

## 2020-01-03 RX ORDER — DOBUTAMINE HYDROCHLORIDE 200 MG/100ML
5-40 INJECTION INTRAVENOUS CONTINUOUS PRN
Status: DISCONTINUED | OUTPATIENT
Start: 2020-01-03 | End: 2020-01-03 | Stop reason: HOSPADM

## 2020-01-03 RX ORDER — PANTOPRAZOLE SODIUM 40 MG/1
40 TABLET, DELAYED RELEASE ORAL
Status: DISCONTINUED | OUTPATIENT
Start: 2020-01-04 | End: 2020-01-04 | Stop reason: HOSPADM

## 2020-01-03 RX ORDER — FENTANYL CITRATE 50 UG/ML
25-50 INJECTION, SOLUTION INTRAMUSCULAR; INTRAVENOUS
Status: CANCELLED | OUTPATIENT
Start: 2020-01-03

## 2020-01-03 RX ORDER — CEFAZOLIN SODIUM 1 G/3ML
1 INJECTION, POWDER, FOR SOLUTION INTRAMUSCULAR; INTRAVENOUS
Status: DISCONTINUED | OUTPATIENT
Start: 2020-01-03 | End: 2020-01-03 | Stop reason: HOSPADM

## 2020-01-03 RX ORDER — PROTAMINE SULFATE 10 MG/ML
INJECTION, SOLUTION INTRAVENOUS
Status: DISCONTINUED | OUTPATIENT
Start: 2020-01-03 | End: 2020-01-03 | Stop reason: HOSPADM

## 2020-01-03 RX ORDER — ONDANSETRON 2 MG/ML
4 INJECTION INTRAMUSCULAR; INTRAVENOUS EVERY 30 MIN PRN
Status: DISCONTINUED | OUTPATIENT
Start: 2020-01-03 | End: 2020-01-03

## 2020-01-03 RX ORDER — LIDOCAINE 40 MG/G
CREAM TOPICAL
Status: DISCONTINUED | OUTPATIENT
Start: 2020-01-03 | End: 2020-01-03 | Stop reason: HOSPADM

## 2020-01-03 RX ORDER — AMIODARONE HYDROCHLORIDE 200 MG/1
400 TABLET ORAL 2 TIMES DAILY
Status: DISCONTINUED | OUTPATIENT
Start: 2020-01-03 | End: 2020-01-04 | Stop reason: HOSPADM

## 2020-01-03 RX ORDER — POTASSIUM CHLORIDE 1500 MG/1
20 TABLET, EXTENDED RELEASE ORAL DAILY PRN
Status: DISCONTINUED | OUTPATIENT
Start: 2020-01-03 | End: 2020-01-04 | Stop reason: HOSPADM

## 2020-01-03 RX ORDER — HYDRALAZINE HYDROCHLORIDE 20 MG/ML
2.5-5 INJECTION INTRAMUSCULAR; INTRAVENOUS EVERY 10 MIN PRN
Status: DISCONTINUED | OUTPATIENT
Start: 2020-01-03 | End: 2020-01-03

## 2020-01-03 RX ORDER — SODIUM CHLORIDE, SODIUM LACTATE, POTASSIUM CHLORIDE, CALCIUM CHLORIDE 600; 310; 30; 20 MG/100ML; MG/100ML; MG/100ML; MG/100ML
INJECTION, SOLUTION INTRAVENOUS CONTINUOUS
Status: DISCONTINUED | OUTPATIENT
Start: 2020-01-03 | End: 2020-01-03

## 2020-01-03 RX ORDER — METOPROLOL SUCCINATE 50 MG/1
50 TABLET, EXTENDED RELEASE ORAL 2 TIMES DAILY
Status: DISCONTINUED | OUTPATIENT
Start: 2020-01-03 | End: 2020-01-04

## 2020-01-03 RX ORDER — ONDANSETRON 4 MG/1
4 TABLET, ORALLY DISINTEGRATING ORAL EVERY 30 MIN PRN
Status: DISCONTINUED | OUTPATIENT
Start: 2020-01-03 | End: 2020-01-03

## 2020-01-03 RX ORDER — WARFARIN SODIUM 2.5 MG/1
2.5 TABLET ORAL
Status: COMPLETED | OUTPATIENT
Start: 2020-01-03 | End: 2020-01-03

## 2020-01-03 RX ORDER — ALBUTEROL SULFATE 0.83 MG/ML
2.5 SOLUTION RESPIRATORY (INHALATION) EVERY 4 HOURS PRN
Status: DISCONTINUED | OUTPATIENT
Start: 2020-01-03 | End: 2020-01-03

## 2020-01-03 RX ORDER — NALOXONE HYDROCHLORIDE 0.4 MG/ML
.1-.4 INJECTION, SOLUTION INTRAMUSCULAR; INTRAVENOUS; SUBCUTANEOUS
Status: DISCONTINUED | OUTPATIENT
Start: 2020-01-03 | End: 2020-01-04 | Stop reason: HOSPADM

## 2020-01-03 RX ORDER — HEPARIN SODIUM 1000 [USP'U]/ML
INJECTION, SOLUTION INTRAVENOUS; SUBCUTANEOUS
Status: DISCONTINUED | OUTPATIENT
Start: 2020-01-03 | End: 2020-01-03 | Stop reason: HOSPADM

## 2020-01-03 RX ORDER — OXYCODONE AND ACETAMINOPHEN 5; 325 MG/1; MG/1
1 TABLET ORAL EVERY 4 HOURS PRN
Status: DISCONTINUED | OUTPATIENT
Start: 2020-01-03 | End: 2020-01-04 | Stop reason: HOSPADM

## 2020-01-03 RX ORDER — ACETAMINOPHEN 500 MG
500 TABLET ORAL EVERY 6 HOURS PRN
Status: DISCONTINUED | OUTPATIENT
Start: 2020-01-03 | End: 2020-01-04 | Stop reason: HOSPADM

## 2020-01-03 RX ORDER — FUROSEMIDE 20 MG
20 TABLET ORAL DAILY PRN
Status: DISCONTINUED | OUTPATIENT
Start: 2020-01-03 | End: 2020-01-04 | Stop reason: HOSPADM

## 2020-01-03 RX ORDER — ONDANSETRON 2 MG/ML
INJECTION INTRAMUSCULAR; INTRAVENOUS PRN
Status: DISCONTINUED | OUTPATIENT
Start: 2020-01-03 | End: 2020-01-03

## 2020-01-03 RX ORDER — MEPERIDINE HYDROCHLORIDE 25 MG/ML
12.5 INJECTION INTRAMUSCULAR; INTRAVENOUS; SUBCUTANEOUS
Status: DISCONTINUED | OUTPATIENT
Start: 2020-01-03 | End: 2020-01-03

## 2020-01-03 RX ORDER — PROPOFOL 10 MG/ML
INJECTION, EMULSION INTRAVENOUS PRN
Status: DISCONTINUED | OUTPATIENT
Start: 2020-01-03 | End: 2020-01-03

## 2020-01-03 RX ORDER — LIDOCAINE HYDROCHLORIDE 20 MG/ML
INJECTION, SOLUTION INFILTRATION; PERINEURAL PRN
Status: DISCONTINUED | OUTPATIENT
Start: 2020-01-03 | End: 2020-01-03

## 2020-01-03 RX ORDER — HEPARIN SODIUM 200 [USP'U]/100ML
100-500 INJECTION, SOLUTION INTRAVENOUS CONTINUOUS PRN
Status: DISCONTINUED | OUTPATIENT
Start: 2020-01-03 | End: 2020-01-03 | Stop reason: HOSPADM

## 2020-01-03 RX ORDER — NALOXONE HYDROCHLORIDE 0.4 MG/ML
.1-.4 INJECTION, SOLUTION INTRAMUSCULAR; INTRAVENOUS; SUBCUTANEOUS
Status: DISCONTINUED | OUTPATIENT
Start: 2020-01-03 | End: 2020-01-03

## 2020-01-03 RX ORDER — LOSARTAN POTASSIUM 50 MG/1
50 TABLET ORAL DAILY
Status: DISCONTINUED | OUTPATIENT
Start: 2020-01-04 | End: 2020-01-04 | Stop reason: HOSPADM

## 2020-01-03 RX ADMIN — LIDOCAINE HYDROCHLORIDE 100 MG: 20 INJECTION, SOLUTION INFILTRATION; PERINEURAL at 08:34

## 2020-01-03 RX ADMIN — OXYCODONE HYDROCHLORIDE AND ACETAMINOPHEN 1 TABLET: 5; 325 TABLET ORAL at 17:03

## 2020-01-03 RX ADMIN — HEPARIN SODIUM 12000 UNITS: 1000 INJECTION, SOLUTION INTRAVENOUS; SUBCUTANEOUS at 09:21

## 2020-01-03 RX ADMIN — SUCCINYLCHOLINE CHLORIDE 120 MG: 20 INJECTION, SOLUTION INTRAMUSCULAR; INTRAVENOUS; PARENTERAL at 08:35

## 2020-01-03 RX ADMIN — FENTANYL CITRATE 50 MCG: 50 INJECTION, SOLUTION INTRAMUSCULAR; INTRAVENOUS at 09:59

## 2020-01-03 RX ADMIN — MIDAZOLAM 2 MG: 1 INJECTION INTRAMUSCULAR; INTRAVENOUS at 08:26

## 2020-01-03 RX ADMIN — LIDOCAINE HYDROCHLORIDE 10 ML: 10 INJECTION, SOLUTION EPIDURAL; INFILTRATION; INTRACAUDAL; PERINEURAL at 08:56

## 2020-01-03 RX ADMIN — Medication 10 MG: at 08:35

## 2020-01-03 RX ADMIN — PHENYLEPHRINE HYDROCHLORIDE 150 MCG: 10 INJECTION INTRAVENOUS at 08:40

## 2020-01-03 RX ADMIN — PHENYLEPHRINE HYDROCHLORIDE 100 MCG: 10 INJECTION INTRAVENOUS at 11:28

## 2020-01-03 RX ADMIN — PHENYLEPHRINE HYDROCHLORIDE 100 MCG: 10 INJECTION INTRAVENOUS at 08:45

## 2020-01-03 RX ADMIN — PHENYLEPHRINE HYDROCHLORIDE 100 MCG: 10 INJECTION INTRAVENOUS at 08:48

## 2020-01-03 RX ADMIN — PHENYLEPHRINE HYDROCHLORIDE 0.25 MCG/KG/MIN: 10 INJECTION INTRAVENOUS at 08:38

## 2020-01-03 RX ADMIN — HEPARIN SODIUM 3000 UNITS: 1000 INJECTION, SOLUTION INTRAVENOUS; SUBCUTANEOUS at 12:17

## 2020-01-03 RX ADMIN — PHENYLEPHRINE HYDROCHLORIDE 100 MCG: 10 INJECTION INTRAVENOUS at 08:42

## 2020-01-03 RX ADMIN — METOPROLOL SUCCINATE 50 MG: 50 TABLET, EXTENDED RELEASE ORAL at 20:28

## 2020-01-03 RX ADMIN — FENTANYL CITRATE 50 MCG: 50 INJECTION, SOLUTION INTRAMUSCULAR; INTRAVENOUS at 12:31

## 2020-01-03 RX ADMIN — Medication 5 MG: at 08:46

## 2020-01-03 RX ADMIN — LIDOCAINE HYDROCHLORIDE 10 ML: 10 INJECTION, SOLUTION EPIDURAL; INFILTRATION; INTRACAUDAL; PERINEURAL at 08:55

## 2020-01-03 RX ADMIN — PROPOFOL 50 MG: 10 INJECTION, EMULSION INTRAVENOUS at 08:35

## 2020-01-03 RX ADMIN — PROPOFOL 200 MG: 10 INJECTION, EMULSION INTRAVENOUS at 08:34

## 2020-01-03 RX ADMIN — AMIODARONE HYDROCHLORIDE 400 MG: 200 TABLET ORAL at 20:28

## 2020-01-03 RX ADMIN — ONDANSETRON 4 MG: 2 INJECTION INTRAMUSCULAR; INTRAVENOUS at 12:43

## 2020-01-03 RX ADMIN — FENTANYL CITRATE 100 MCG: 50 INJECTION, SOLUTION INTRAMUSCULAR; INTRAVENOUS at 08:34

## 2020-01-03 RX ADMIN — PROPOFOL 30 MG: 10 INJECTION, EMULSION INTRAVENOUS at 09:57

## 2020-01-03 RX ADMIN — HEPARIN SODIUM 3000 UNITS: 1000 INJECTION, SOLUTION INTRAVENOUS; SUBCUTANEOUS at 10:51

## 2020-01-03 RX ADMIN — PROTAMINE SULFATE 40 MG: 10 INJECTION, SOLUTION INTRAVENOUS at 12:57

## 2020-01-03 RX ADMIN — SODIUM CHLORIDE, POTASSIUM CHLORIDE, SODIUM LACTATE AND CALCIUM CHLORIDE: 600; 310; 30; 20 INJECTION, SOLUTION INTRAVENOUS at 12:26

## 2020-01-03 RX ADMIN — PHENYLEPHRINE HYDROCHLORIDE 150 MCG: 10 INJECTION INTRAVENOUS at 08:38

## 2020-01-03 RX ADMIN — SODIUM CHLORIDE, POTASSIUM CHLORIDE, SODIUM LACTATE AND CALCIUM CHLORIDE: 600; 310; 30; 20 INJECTION, SOLUTION INTRAVENOUS at 07:00

## 2020-01-03 RX ADMIN — WARFARIN SODIUM 2.5 MG: 2.5 TABLET ORAL at 17:03

## 2020-01-03 RX ADMIN — PROTAMINE SULFATE 60 MG: 10 INJECTION, SOLUTION INTRAVENOUS at 12:41

## 2020-01-03 ASSESSMENT — MIFFLIN-ST. JEOR: SCORE: 2137.5

## 2020-01-03 ASSESSMENT — ENCOUNTER SYMPTOMS: DYSRHYTHMIAS: 1

## 2020-01-03 NOTE — Clinical Note
Arrhythmia Type: atrial fibrillation.   Method of Cardioversion: synchronous.   The arrhythmia was terminated.   Energy shock delivered: 200 joules.   Time shock delivered: 09:54.   Post cardioversion rhythm: atrial fibrillation.

## 2020-01-03 NOTE — ANESTHESIA POSTPROCEDURE EVALUATION
Patient: Kevan Connelly    Procedure(s):  EP Ablation Focal AFIB    Diagnosis:paroxysmal atrial fibrillation  Diagnosis Additional Information: No value filed.    Anesthesia Type:  General, ETT    Note:  Anesthesia Post Evaluation    Patient location during evaluation: PACU  Patient participation: Able to fully participate in evaluation  Level of consciousness: awake  Pain management: adequate  Airway patency: patent  Cardiovascular status: acceptable  Respiratory status: acceptable  Hydration status: acceptable  PONV: none     Anesthetic complications: None          Last vitals:  Vitals:    01/03/20 1410 01/03/20 1420 01/03/20 1430   BP: 122/76 123/77    Pulse: 66 66    Resp: 12 9 9   Temp:   36.3  C (97.4  F)   SpO2: 100% 100% 99%         Electronically Signed By: Magui Doty MD, MD  January 3, 2020  2:34 PM

## 2020-01-03 NOTE — PROGRESS NOTES
Care Suites Admission Nursing Note    Reason for admission: AFib Abaltion.  Explained pre procedure and answered questions for pt and wife.Resting in bed with call light in reach.  CS arrival time: 0630  Accompanied by: wife  Name/phone of DC : Darya [phone]  Medications held: lewis  Consent signed: waiting for MD  Abnormal assessment/labs: pending  If abnormal, provider notified: lewis  Education/questions answered: yes  Plan: 0830 Ablation

## 2020-01-03 NOTE — DISCHARGE INSTRUCTIONS
A Fib Ablation Discharge Instructions    After you go home:    Have an adult stay with you until tomorrow.    You may eat your normal diet, unless your doctor tells you otherwise.    RELAX and take it easy for 3 days.        For 24-48 hours (due to the sedation you received):    DO NOT DRIVE FOR 2 DAYS!     Do NOT make any important or legal decisions.    Do NOT drive or operate machines at home or at work.    Do NOT drink alcohol.    Care of Puncture Site:    Check the puncture site every 1-2 hours while awake.    For 2-3 days, when you cough, sneeze, laugh or move your bowels, hold your hand over the puncture site and press firmly.    Change band aid daily for at least 3 days. If there is minor oozing, apply another band aid and remove it after 12 hours.     It is normal to have a small bruise or pea size lump at the site.    You may shower. Do NOT take a bath, or use a hot tub or pool until groin site heals, which may take up to a week.  Do NOT scrub the site. Do not use lotion or powder near the puncture site.    Activity:    Do NOT lift, push or pull more than 10 pounds (equal to a gallon of milk) for 3 days.    NO repetitive motions such as loading , vacuuming, raking, shoveling.     Bleeding:    If you start bleeding from the groin site, lie down flat and press firmly on the site for 10 minutes or until bleeding stops.     Once bleeding stops, lay flat for 1-2 hours.    Call your A Fib nurse if bleeding does not stop or after hours will need to go to ER.       Go to ER or Call 911 right away if you have heavy bleeding or bleeding that does not stop.    Medications:    Take your medications, including blood thinners, unless your doctor tells you not to.    If you have stopped any other medicines, check with your nurse or provider about when to restart them.    If you have pain, you may takeTylenol (acetaminophen) and if this does not help may take Advil (ibuprofen-400 mg with food).    Call the A Fib  RN if:    Chest pain not relieved by tylenol or ibuprofen    Difficulty swallowing and/or coughing up blood    Shortness of breath    Increased groin pain or a large or growing hard lump around the site.    Groin site is red, swollen, hot or tender.    Blood or fluid is draining from the groin site.    You have chills or a fever greater than 101 F (38 C).    Your leg feels numb, cool or changes color.    If groin pain is not relieved by Tylenol or Ibuprofen.    Recurrent irregular or fast heart rate lasting over 2-3 hours.    Any questions or concerns.    Heart rhythms:  You may have some irregular heartbeats. These feel very strong. They may make you feel that the A Fib is going to start again.  Give it time. The irregular beats should occur less often.    Follow Up Appointments:    1 week follow up- Friday, January 10th at 2:30 pm with Melia Childress    3 month follow up ( March 2020)  with Dr Hannon in [facility]. We will call you to schedule when that schedule is available.      [facility]   A Fib clinic RN's Darya Tran [phone] (Mon-Fri, 8:00-5:00)   [phone] (7 days a week) after hours for on call Cardiologist.

## 2020-01-03 NOTE — Clinical Note
Arrhythmia Type: atrial fibrillation.   Method of Cardioversion: synchronous.   The arrhythmia was terminated.   Energy shock delivered: 200 joules.   Time shock delivered: 12:40.   Post cardioversion rhythm: sinus rhythm.

## 2020-01-03 NOTE — PRE-PROCEDURE
GENERAL PRE-PROCEDURE:   Procedure:  Afib ablation  Date/Time:  1/3/2020 7:40 AM    Written consent obtained?: Yes    Risks and benefits: Risks, benefits and alternatives were discussed    Consent given by:  Patient  Patient states understanding of procedure being performed: Yes    Patient's understanding of procedure matches consent: Yes    Procedure consent matches procedure scheduled: Yes    Expected level of sedation:  Moderate  Appropriately NPO:  Yes  ASA Class:  Class 3- Severe systemic disease, definite functional limitations  Mallampati  :  Grade 2- soft palate, base of uvula, tonsillar pillars, and portion of posterior pharyngeal wall visible  Lungs:  Lungs clear with good breath sounds bilaterally  Heart:  Normal heart sounds and rate  History & Physical reviewed:  History and physical reviewed and no updates needed  Statement of review:  I have reviewed the lab findings, diagnostic data, medications, and the plan for sedation

## 2020-01-03 NOTE — Clinical Note
Potential access sites were evaluated for patency using ultrasound.   The right femoral vein and left femoral vein were selected. Access was obtained under with Sonosite guidance using a micropuncture 21 guage needle with direct visualization of needle entry.

## 2020-01-03 NOTE — Clinical Note
Arrhythmia Type: atrial fibrillation.   Method of Cardioversion: synchronous.   The arrhythmia was terminated.   Energy shock delivered: 200 joules.   Time shock delivered: 09:53.   Post cardioversion rhythm: atrial fibrillation.

## 2020-01-03 NOTE — PROGRESS NOTES
Pt VSS. A/O. Pt arrived on unit around 14:50. Has two femoral puncture sites, one of the right and one on the left. Both dressings are clean, dry, and intact. Some dry blood around sites. Pedal pulses are 2+ and all extremities are warm. Pt is to be flat until 17:30. Denies pain. Is very pleasant.

## 2020-01-03 NOTE — Clinical Note
Arrhythmia Type: atrial fibrillation.   Method of Cardioversion: synchronous.   The arrhythmia was terminated.   Energy shock delivered: 360 joules.   Time shock delivered: 11:22.   Post cardioversion rhythm: sinus rhythm.

## 2020-01-03 NOTE — ANESTHESIA CARE TRANSFER NOTE
Patient: Kevan Connelly    Procedure(s):  EP Ablation Focal AFIB    Diagnosis: paroxysmal atrial fibrillation  Diagnosis Additional Information: No value filed.    Anesthesia Type:   General, ETT     Note:  Airway :Face Mask  Patient transferred to:PACU  Comments: To PACU with face mask, 8l/min O2, spontaneous respirations. VSS. Report to RNHandoff Report: Identifed the Patient, Identified the Reponsible Provider, Reviewed the pertinent medical history, Discussed the surgical course, Reviewed Intra-OP anesthesia mangement and issues during anesthesia, Set expectations for post-procedure period and Allowed opportunity for questions and acknowledgement of understanding      Vitals: (Last set prior to Anesthesia Care Transfer)    CRNA VITALS  1/3/2020 1258 - 1/3/2020 1341      1/3/2020             Resp Rate (set):  10                Electronically Signed By: ANNMARIE Grey CRNA  January 3, 2020  1:41 PM

## 2020-01-03 NOTE — ANESTHESIA PREPROCEDURE EVALUATION
Anesthesia Pre-Procedure Evaluation    Patient: Kevan Connelly   MRN: [id number] : 1950          Preoperative Diagnosis: paroxysmal atrial fibrillation    Procedure(s):  EP Ablation Focal AFIB    Past Medical History:   Diagnosis Date     Atrial fibrillation (H) 6/3/2019     Benign essential hypertension 2017    past use of ACE- Cough;  Had been on ARB-diuretic but experienced lower bp readings;  BLOOD PRESSURE now well controlled on ARB also no longer on diuretic; no low bp readings; may have had slight edema initially but has normalized with bp well controlled on ARB alone.      Cancer (H)     testicular cancer     CHF (congestive heart failure) (H) 2019     Long term current use of anticoagulant therapy 6/3/2019     Obesity (BMI 35.0-39.9) with comorbidity (H) 2019     Past Surgical History:   Procedure Laterality Date     ANESTHESIA CARDIOVERSION N/A 2019    Procedure: ANESTHESIA, FOR CARDIOVERSION DR. LE;  Surgeon: GENERIC ANESTHESIA PROVIDER;  Location: [facility] OR     ANESTHESIA CARDIOVERSION N/A 2019    Procedure: ANESTHESIA, FOR CARDIOVERSION;  Surgeon: GENERIC ANESTHESIA PROVIDER;  Location: [facility] OR     CLOSED REDUCTION SHOULDER Left      GENITOURINARY SURGERY      testicular cancer     ORTHOPEDIC SURGERY      right knee surgery        Anesthesia Evaluation     .             ROS/MED HX    ENT/Pulmonary:      (-) sleep apnea   Neurologic:       Cardiovascular:     (+) hypertension----. : . CHF . . :. dysrhythmias a-fib, .       METS/Exercise Tolerance:     Hematologic:         Musculoskeletal:         GI/Hepatic:        (-) GERD   Renal/Genitourinary:         Endo:     (+) Obesity, .      Psychiatric:         Infectious Disease:         Malignancy:   (+) Malignancy History of Other  Other CA Testicular CA status post         Other:                          Physical Exam  Normal systems: cardiovascular, pulmonary and dental    Airway   Mallampati: II  TM distance: >3  "FB  Neck ROM: full    Dental     Cardiovascular   Rhythm and rate: regular and normal      Pulmonary    breath sounds clear to auscultation            Lab Results   Component Value Date     11/13/2019    POTASSIUM 4.0 11/13/2019    CHLORIDE 109 11/13/2019    CO2 23 11/13/2019    BUN 22 11/13/2019    CR 1.13 11/13/2019     (H) 11/13/2019    CHU 8.8 11/13/2019    MAG 2.0 07/17/2019    ALBUMIN 4.0 01/22/2019    PROTTOTAL 7.2 01/22/2019    ALT 27 01/22/2019    AST 21 01/22/2019    ALKPHOS 51 01/22/2019    BILITOTAL 1.3 01/22/2019    INR 3.2 (H) 01/02/2020    TSH 1.86 05/30/2019       Preop Vitals  BP Readings from Last 3 Encounters:   01/03/20 (!) 142/84   01/02/20 126/81   12/13/19 135/80    Pulse Readings from Last 3 Encounters:   01/03/20 66   01/02/20 60   12/13/19 76      Resp Readings from Last 3 Encounters:   01/03/20 18   01/02/20 18   09/18/19 18    SpO2 Readings from Last 3 Encounters:   01/03/20 96%   01/02/20 94%   09/18/19 94%      Temp Readings from Last 1 Encounters:   01/03/20 36.1  C (97  F) (Oral)    Ht Readings from Last 1 Encounters:   01/03/20 1.93 m (6' 4\")      Wt Readings from Last 1 Encounters:   01/03/20 126.1 kg (278 lb)    Estimated body mass index is 33.84 kg/m  as calculated from the following:    Height as of this encounter: 1.93 m (6' 4\").    Weight as of this encounter: 126.1 kg (278 lb).       Anesthesia Plan      History & Physical Review  History and physical reviewed and following examination; no interval change.    ASA Status:  3 .    NPO Status:  > 8 hours    Plan for General and ETT with Intravenous induction. Maintenance will be Balanced.    PONV prophylaxis:  Ondansetron (or other 5HT-3) and Dexamethasone or Solumedrol       Postoperative Care  Postoperative pain management:  IV analgesics.      Consents  Anesthetic plan, risks, benefits and alternatives discussed with:  Patient..                 Magui Doty MD, MD  "

## 2020-01-04 ENCOUNTER — NURSE TRIAGE (OUTPATIENT)
Dept: NURSING | Facility: CLINIC | Age: 70
End: 2020-01-04

## 2020-01-04 VITALS
BODY MASS INDEX: 33.78 KG/M2 | RESPIRATION RATE: 16 BRPM | HEIGHT: 76 IN | HEART RATE: 66 BPM | OXYGEN SATURATION: 98 % | SYSTOLIC BLOOD PRESSURE: 141 MMHG | WEIGHT: 277.4 LBS | TEMPERATURE: 96.8 F | DIASTOLIC BLOOD PRESSURE: 83 MMHG

## 2020-01-04 PROCEDURE — 40000934 ZZH STATISTIC OUTPATIENT (NON-OBS) DAY

## 2020-01-04 PROCEDURE — 99213 OFFICE O/P EST LOW 20 MIN: CPT | Performed by: INTERNAL MEDICINE

## 2020-01-04 PROCEDURE — 25000132 ZZH RX MED GY IP 250 OP 250 PS 637: Performed by: INTERNAL MEDICINE

## 2020-01-04 PROCEDURE — 93005 ELECTROCARDIOGRAM TRACING: CPT

## 2020-01-04 PROCEDURE — 40000065 ZZH STATISTIC EKG NON-CHARGEABLE

## 2020-01-04 RX ORDER — AMIODARONE HYDROCHLORIDE 400 MG/1
400 TABLET ORAL 2 TIMES DAILY
Qty: 90 TABLET | Refills: 0 | Status: SHIPPED | OUTPATIENT
Start: 2020-01-04 | End: 2020-01-10

## 2020-01-04 RX ORDER — METOPROLOL SUCCINATE 50 MG/1
50 TABLET, EXTENDED RELEASE ORAL DAILY
Status: DISCONTINUED | OUTPATIENT
Start: 2020-01-05 | End: 2020-01-04 | Stop reason: HOSPADM

## 2020-01-04 RX ORDER — PANTOPRAZOLE SODIUM 40 MG/1
40 TABLET, DELAYED RELEASE ORAL
Qty: 30 TABLET | Refills: 1 | Status: SHIPPED | OUTPATIENT
Start: 2020-01-05 | End: 2020-03-09

## 2020-01-04 RX ORDER — METOPROLOL SUCCINATE 50 MG/1
50 TABLET, EXTENDED RELEASE ORAL DAILY
Start: 2020-01-05 | End: 2020-09-16

## 2020-01-04 RX ADMIN — PANTOPRAZOLE SODIUM 40 MG: 40 TABLET, DELAYED RELEASE ORAL at 07:55

## 2020-01-04 RX ADMIN — AMIODARONE HYDROCHLORIDE 400 MG: 200 TABLET ORAL at 07:55

## 2020-01-04 RX ADMIN — LOSARTAN POTASSIUM 50 MG: 50 TABLET, FILM COATED ORAL at 07:55

## 2020-01-04 ASSESSMENT — MIFFLIN-ST. JEOR: SCORE: 2134.78

## 2020-01-04 NOTE — PLAN OF CARE
Pt. A&OX4. VSS on Ra, ex hypotension. Denies pain. Groin sites CDL, some dried blood in Right site. CMS/Skin intact. Denies n/v. Tolerating regular diet. Up A+1 with gb/w. Tele A-flutter. Plan for discharge today @ 11am. Continue to monitor.

## 2020-01-04 NOTE — DISCHARGE SUMMARY
[facility]    Discharge Summary  Cardiology    Date of Admission:  1/3/2020  Date of Discharge:  1/4/2020  Discharging Provider: Nishant Adam MD    Discharge Diagnoses   Patient Active Problem List    Diagnosis Date Noted     Paroxysmal atrial fibrillation (H) 12/02/2019     Priority: Medium     Added automatically from request for surgery [id number]       CHF (congestive heart failure) (H) 06/05/2019     Priority: Medium     Long term current use of anticoagulant therapy 06/03/2019     Priority: Medium     Atrial fibrillation (H) 06/03/2019     Priority: Medium     Obesity (BMI 35.0-39.9) with comorbidity (H) 02/11/2019     Priority: Medium     Colon polyps 12/09/2017     Priority: Medium     tranverse and sigmoid colon       Erectile dysfunction following radiation therapy 10/15/2017     Priority: Medium     multifactorial; XRT 1990 related to Testicular cancer       Encounter for monitoring diuretic therapy 10/15/2017     Priority: Medium     Hx of testicular cancer 01/18/2017     Priority: Medium     right sided 1990; treated with 17 XRT       Benign essential hypertension 01/18/2017     Priority: Medium     past use of ACE- Cough;  Had been on ARB-diuretic but experienced lower bp readings;  BLOOD PRESSURE now well controlled on ARB also no longer on diuretic; no low bp readings; may have had slight edema initially but has normalized with bp well controlled on ARB alone.          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 He was initially placed on amiodarone and underwent successful cardioversion. After cardioversion, cardiac function normalized and amiodarone was switched to flecainide.  Unfortunately, he failed therapy with flecainide having recurrence of atrial fibrillation, and was referred for first attempt of A. fib ablation.     He  underwent successful atrial fibrillation ablation on 1/3/2020 and was cardioverted to sinus rhythm. He was observed overnight and had no issues and remained in sinus rhythm. His flecainide was discontinued and his metoprolol was reduced to once a day until follow up with Cardiology. He was started on amiodarone with the plan to continue for 3 months. At time of follow up his amiodarone may be reduced to 200 mg daily. He was discharged to home with outpatient follow up as arranged.     ADDENDUM:  Pharmacy recommended sooner INR check given that he is now started on amiodarone and with his outpatient doses of Coumadin.  He was therapeutic with an INR 3.1 yesterday.  I advised INR check on Monday with his anticoagulation clinic.  He will contact them regarding pushing up his appointment from Friday to Monday.    Hospital Course   Kevan Connelly was admitted on 1/3/2020.  The following problems were addressed during his hospitalization:    Principal Problem:    Paroxysmal atrial fibrillation (H)    # Discharge Pain Plan:   - During his hospitalization, Kevan experienced pain due to catheter sites.  The pain plan for discharge was discussed with Kevan and the plan was created in a collaborative fashion.    - Pharmacologic adjuvants:  Acetaminophen    Nishant Adam MD, MD    Significant Results and Procedures   Atrial fibrillation ablation 1/3/2020:  PROCEDURES PERFORMED:  1. General anesthesia.  2. Cardiac fluoroscopy.  3. Intra-cardiac echocardiogram (ICE).  4. Transseptal approach.  5. Electrophysiology study with left atrium recording/pacing via coronary sinus catheter.  6. 3D-electroanatomic mapping.  7. Atrial fibrillation ablation.    Pending Results   None    Code Status   Full Code    Primary Care Physician   Davina Reaves    Physical Exam   Temp: 96.8  F (36  C) Temp src: Oral BP: (!) 141/83 Pulse: 66 Heart Rate: 64 Resp: 16 SpO2: 98 % O2 Device: None (Room air) Oxygen Delivery: 2 LPM  Vitals:     01/03/20 0620 01/04/20 0343   Weight: 126.1 kg (278 lb) 125.8 kg (277 lb 6.4 oz)     Vital Signs with Ranges  Temp:  [96.3  F (35.7  C)-97.7  F (36.5  C)] 96.8  F (36  C)  Pulse:  [66-71] 66  Heart Rate:  [62-85] 64  Resp:  [7-20] 16  BP: ()/(57-85) 141/83  SpO2:  [94 %-100 %] 98 %  I/O last 3 completed shifts:  In: 1720 [P.O.:420; I.V.:1300]  Out: 2550 [Urine:2550]    Constitutional: No apparent distress.   Eyes: No xanthelasma or conjunctivitis  HEENT: Moist oral mucosa  Respiratory: Clear to auscultation bilaterally. No crackles or wheezes.  Cardiovascular: Regular rate and rhythm. Normal S1 and S2. No murmurs. No extra heart sounds. No JVD.   Lymph/Hematologic: No purpura or petechiae.  Skin: No stasis dermatitis. No major rashes. Bilateral groin sites clean and dry. Mild bruising of right groin site with none on left.   Extremities: No peripheral edema.  Neurologic: Moving all extremities. No facial assymmetry.  Psychiatric: Alert and oriented. Answers questions appropriately.    Time Spent on this Encounter   INishant MD, personally saw the patient today and spent less than or equal to 30 minutes discharging this patient.    Discharge Disposition   Discharged to home  Condition at discharge: Stable    Consultations This Hospital Stay   PHARMACY TO  [facility] PATIENT    Discharge Orders      Reason for your hospital stay    Atrial fibrillation ablation.     Follow-up and recommended labs and tests     None.     Activity    Your activity upon discharge: activity as tolerated and no driving for today     Full Code     Diet    Follow this diet upon discharge: Cardiac     Discharge Medications   Current Discharge Medication List      START taking these medications    Details   amiodarone (PACERONE) 400 MG tablet Take 1 tablet (400 mg) by mouth 2 times daily for 2 days (4 doses). Then go to 400 mg daily until follow up with Cardiology.  Qty: 90 tablet, Refills: 0    Associated Diagnoses: Paroxysmal  atrial fibrillation (H)      pantoprazole (PROTONIX) 40 MG EC tablet Take 1 tablet (40 mg) by mouth every morning (before breakfast)  Qty: 30 tablet, Refills: 1    Associated Diagnoses: Paroxysmal atrial fibrillation (H)         CONTINUE these medications which have CHANGED    Details   metoprolol succinate ER (TOPROL-XL) 50 MG 24 hr tablet Take 1 tablet (50 mg) by mouth daily    Associated Diagnoses: Paroxysmal atrial fibrillation (H)         CONTINUE these medications which have NOT CHANGED    Details   acetaminophen (TYLENOL) 500 MG tablet Take 500 mg by mouth every 6 hours as needed for mild pain      furosemide (LASIX) 40 MG tablet Take 0.5 tablets (20 mg) by mouth daily as needed (for swelling or weight gain)  Qty: 30 tablet, Refills: 1    Associated Diagnoses: Acute congestive heart failure, unspecified heart failure type (H)      losartan (COZAAR) 50 MG tablet Take 1 tablet (50 mg) by mouth daily  Qty: 90 tablet, Refills: 3    Associated Diagnoses: Benign essential hypertension      potassium chloride ER (K-TAB) 20 MEQ CR tablet Take 1 tablet (20 mEq) by mouth daily as needed (take when lasix is taken)  Qty: 30 tablet, Refills: 0    Associated Diagnoses: Acute congestive heart failure, unspecified heart failure type (H)      warfarin ANTICOAGULANT (COUMADIN) 5 MG tablet Take 1.5 tablets (7.5mg) by mouth on Fri; take 1 tablet (5mg) all other days; or as directed by INR Clinic  Qty: 110 tablet, Refills: 0    Associated Diagnoses: Atrial fibrillation, unspecified type (H)      sildenafil (REVATIO) 20 MG tablet Take 1 tablet (20 mg) by mouth daily as needed  Qty: 30 tablet, Refills: 3    Associated Diagnoses: Erectile dysfunction following radiation therapy         STOP taking these medications       flecainide (TAMBOCOR) 100 MG tablet Comments:   Reason for Stopping:             Allergies   Allergies   Allergen Reactions     Lisinopril Cough     Very persistent cough     Data   Results for orders placed or  performed during the hospital encounter of 01/02/20   *Transesophageal Echocardiogram    Narrative    Vivi Grewal MD     1/2/2020  9:02 AM  [facility]    Procedure: *Transesophageal Echocardiogram  Date/Time: 1/2/2020 8:59 AM  Performed by: Vivi Grewal MD  Authorized by: Vivi Grewal MD     UNIVERSAL PROTOCOL   Site Marked: NA  Prior Images Obtained and Reviewed:  Yes  Required items: Required blood products, implants, devices and special   equipment available    Patient identity confirmed:  Verbally with patient  Patient was reevaluated immediately before administering moderate or deep   sedation or anesthesia  Confirmation Checklist:  Patient's identity using two indicators  Time out: Immediately prior to the procedure a time out was called    Universal Protocol: the Joint Commission Universal Protocol was followed           ANESTHESIA    Local Anesthetic: Lidocaine spray      SEDATION    Patient Sedated: Yes    Sedation:  Fentanyl and midazolam  Vital signs: Vital signs monitored during sedation    PROCEDURE   Patient Tolerance:  Patient tolerated the procedure well with no immediate   complications  Describe Procedure: IBETH performed to evaluate for intracardiac thrombus   pre-ablation. Intubation without difficulty. No immediate complications.   Patient tolerated procedure well. No thrombus visualized within the LA,   RA, and left atrial thrombus. No significant valvular dysfunction. Normal   LVEF. Total fentanyl 62.5 mcg and midazolam 3 mg.   Length of time physician/provider present for 1:1 monitoring during   sedation: 20

## 2020-01-04 NOTE — PLAN OF CARE
A&O, VSS on RA, LS clear, on Regular diet, tolerating it well, denies nausea, continent of both B and B, CMS intact, Up with A1 using Gb and walker, Pain 1/10 is managed with [place] . Lap sites on groin area CDI.

## 2020-01-04 NOTE — PROGRESS NOTES
Patient discharged to home on 1/4/20. Discharge instruction given to patient. Verbalizes  Understanding of new medication, follow up appointments, diet, activities, puncture care. No further questions at the moment. Per phramacy recommendation MD suggested to to schedule on 1/6th for INR check. Patient understood.   Patient's ride coming at 11 am. Will transfer once the ride is here. Patient getting ready and waiting in his room.     Patient was transferred  to door #6 at 11.04 Am.

## 2020-01-05 NOTE — TELEPHONE ENCOUNTER
Keron calls and says that he had a cardiac ablasion yesterday and was told to get an INR done on Monday. RN then checked Epic and saw that the  Did advise pt. To have an INR check on Monday at his anticoagulation clinic. RN then conferenced pt., with , for assistance in scheduling this appointment.  Reason for Disposition    [1] Follow-up call to recent contact AND [2] information only call, no triage required    Additional Information    Negative: [1] Caller is not with the adult (patient) AND [2] reporting urgent symptoms    Negative: Lab result questions    Negative: Medication questions    Negative: Caller can't be reached by phone    Negative: Caller has already spoken to PCP or another triager    Negative: RN needs further essential information from caller in order to complete triage    Negative: Requesting regular office appointment    Negative: [1] Caller requesting NON-URGENT health information AND [2] PCP's office is the best resource    Negative: Health Information question, no triage required and triager able to answer question    Negative: General information question, no triage required and triager able to answer question    Negative: Question about upcoming scheduled test, no triage required and triager able to answer question    Negative: [1] Caller is not with the adult (patient) AND [2] probable NON-URGENT symptoms    Protocols used: INFORMATION ONLY CALL-A-

## 2020-01-06 ENCOUNTER — ANTICOAGULATION THERAPY VISIT (OUTPATIENT)
Dept: NURSING | Facility: CLINIC | Age: 70
End: 2020-01-06
Payer: COMMERCIAL

## 2020-01-06 ENCOUNTER — TELEPHONE (OUTPATIENT)
Dept: CARDIOLOGY | Facility: CLINIC | Age: 70
End: 2020-01-06

## 2020-01-06 DIAGNOSIS — I48.91 ATRIAL FIBRILLATION (H): ICD-10-CM

## 2020-01-06 DIAGNOSIS — Z79.01 LONG TERM CURRENT USE OF ANTICOAGULANT THERAPY: ICD-10-CM

## 2020-01-06 LAB — INR POINT OF CARE: 2.6 (ref 0.86–1.14)

## 2020-01-06 PROCEDURE — 85610 PROTHROMBIN TIME: CPT | Mod: QW

## 2020-01-06 PROCEDURE — 99207 ZZC NO CHARGE NURSE ONLY: CPT

## 2020-01-06 PROCEDURE — 36416 COLLJ CAPILLARY BLOOD SPEC: CPT

## 2020-01-06 NOTE — TELEPHONE ENCOUNTER
01/06/20 LM for pt f/u Afib Ablation 01/03/20 . Requested callback with questions or problems. Reminded of follow up appt 1/10 at 230 pm KHerroRN 507 pm

## 2020-01-06 NOTE — PROGRESS NOTES
ANTICOAGULATION FOLLOW-UP CLINIC VISIT    Patient Name:  Kevan Connelly  Date:  2020  Contact Type:  Face to Face    SUBJECTIVE:  Patient Findings     Positives:   Change in health, Change in medications, Hospital admission    Comments:   Patient had ablation on 1/3/19. Feeling ok, in SR with HR 60-62. Amiodarone was restarted, pantoprazole was started (both may increase INR when given with warfarin), metoprolol was reduced by half and flecainide was discontinued (both no noted interaction with warfarin per Up To Date. No unusual bruising/bleeding, puncture site is healing well, no stated bleeding or s/sx of infection per patient. Therapeutic today, advised recheck of INR in 4 days. Patient stated understanding and is in agreement with plan.  Kaylee Ramey RN  Anticoagulation Clinic-[facility]          Clinical Outcomes     Comments:   Patient had ablation on 1/3/19. Feeling ok, in SR with HR 60-62. Amiodarone was restarted, pantoprazole was started (both may increase INR when given with warfarin), metoprolol was reduced by half and flecainide was discontinued (both no noted interaction with warfarin per Up To Date. No unusual bruising/bleeding, puncture site is healing well, no stated bleeding or s/sx of infection per patient. Therapeutic today, advised recheck of INR in 4 days. Patient stated understanding and is in agreement with plan.  Kaylee Ramey RN  Anticoagulation Clinic-[facility]             OBJECTIVE    INR Protime   Date Value Ref Range Status   2020 2.6 (A) 0.86 - 1.14 Final       ASSESSMENT / PLAN  INR assessment THER    Recheck INR In: 4 DAYS    INR Location Clinic      Anticoagulation Summary  As of 2020    INR goal:   2.0-3.0   TTR:   65.1 % (7 mo)   INR used for dosin.6 (2020)   Warfarin maintenance plan:   7.5 mg (5 mg x 1.5) every Fri; 5 mg (5 mg x 1) all other days   Full warfarin instructions:   7.5 mg every Fri; 5 mg all  other days   Weekly warfarin total:   37.5 mg   No change documented:   Kaylee Ramey RN   Plan last modified:   Kaylee Ramey RN (9/11/2019)   Next INR check:   1/10/2020   Priority:   Maintenance   Target end date:   Indefinite    Indications    Long term current use of anticoagulant therapy [Z79.01]  Atrial fibrillation (H) [I48.91]             Anticoagulation Episode Summary     INR check location:   Anticoagulation Clinic    Preferred lab:       Send INR reminders to:   CONNIE LOPEZ    Comments:   5mg tabs / DCCV 7/17/19 / early morning appointments on Wed or Fri      Anticoagulation Care Providers     Provider Role Specialty Phone number    Davina Reaves MD  Internal Medicine [phone]            See the Encounter Report to view Anticoagulation Flowsheet and Dosing Calendar (Go to Encounters tab in chart review, and find the Anticoagulation Therapy Visit)    Kaylee Ramey, DIO

## 2020-01-07 LAB
INTERPRETATION ECG - MUSE: NORMAL
INTERPRETATION ECG - MUSE: NORMAL

## 2020-01-10 ENCOUNTER — ANTICOAGULATION THERAPY VISIT (OUTPATIENT)
Dept: NURSING | Facility: CLINIC | Age: 70
End: 2020-01-10
Payer: COMMERCIAL

## 2020-01-10 ENCOUNTER — OFFICE VISIT (OUTPATIENT)
Dept: CARDIOLOGY | Facility: CLINIC | Age: 70
End: 2020-01-10
Payer: COMMERCIAL

## 2020-01-10 VITALS
SYSTOLIC BLOOD PRESSURE: 140 MMHG | BODY MASS INDEX: 34.43 KG/M2 | HEIGHT: 76 IN | WEIGHT: 282.7 LBS | HEART RATE: 70 BPM | DIASTOLIC BLOOD PRESSURE: 80 MMHG

## 2020-01-10 DIAGNOSIS — Z79.899 ON AMIODARONE THERAPY: ICD-10-CM

## 2020-01-10 DIAGNOSIS — I25.10 CORONARY ARTERY CALCIFICATION: ICD-10-CM

## 2020-01-10 DIAGNOSIS — I48.91 ATRIAL FIBRILLATION (H): ICD-10-CM

## 2020-01-10 DIAGNOSIS — Z79.01 LONG TERM CURRENT USE OF ANTICOAGULANT THERAPY: ICD-10-CM

## 2020-01-10 DIAGNOSIS — I48.0 PAROXYSMAL ATRIAL FIBRILLATION (H): Primary | ICD-10-CM

## 2020-01-10 LAB — INR POINT OF CARE: 2.8 (ref 0.86–1.14)

## 2020-01-10 PROCEDURE — 99207 ZZC NO CHARGE NURSE ONLY: CPT

## 2020-01-10 PROCEDURE — 85610 PROTHROMBIN TIME: CPT | Mod: QW

## 2020-01-10 PROCEDURE — 99214 OFFICE O/P EST MOD 30 MIN: CPT | Performed by: NURSE PRACTITIONER

## 2020-01-10 PROCEDURE — 93000 ELECTROCARDIOGRAM COMPLETE: CPT | Performed by: NURSE PRACTITIONER

## 2020-01-10 PROCEDURE — 36416 COLLJ CAPILLARY BLOOD SPEC: CPT

## 2020-01-10 RX ORDER — AMIODARONE HYDROCHLORIDE 200 MG/1
200 TABLET ORAL DAILY
Qty: 30 TABLET | Refills: 3 | COMMUNITY
Start: 2020-01-10 | End: 2020-05-22

## 2020-01-10 RX ORDER — AMIODARONE HYDROCHLORIDE 200 MG/1
200 TABLET ORAL 2 TIMES DAILY
COMMUNITY
End: 2020-01-10

## 2020-01-10 ASSESSMENT — MIFFLIN-ST. JEOR: SCORE: 2158.82

## 2020-01-10 NOTE — PROGRESS NOTES
HPI:  Kevan Connelly is a 69 year old male who presents after undergoing an atrial fibrillation ablation.  He is a patient of Dr. Hannon and his past medical history includes    1. Atrial fibrillation.  Diagnosed on 5/30/19 with cardiomyopathy EF was 33%.  Was on amiodarone and changed to flecainide with normal EF.  refractory to flecainide.     Underwent an atrial fibrillation ablation on 1/3/2020.  2. Nonocclusive coronary artery disease.  Per CT angiogram heart 1/2020 revealed coronary calcification the LAD, circumflex and RCA.  3. Mildly dilated aortic root and ascending aorta.  Per CT angiogram (1/2020) revealed 4.22 x 3.88 cm and 3.64 cm x 3.73 cm respectively.  4. Cardiomyopathy.  EF 33% (6/2018) Recent ECHO 50-55% (8/2019)  5. Hypertension  6. Obesity  7. Testicular cancer with subsequent ED     Diagnostics:    Limited ECHO (8/2019) revealed EF 50-55% with mild to moderately dilated L atrium    ECHO (6/2019) revealed moderately reduced LV systolic function with LVEF of 33%.  There was moderate global hypokinesia.  The LV was mildly dilated, mildly decreased RV systolic function.  The left atrium was noted to be severely enlarged and right atrium to be moderate to severely dilated, mild mitral regurgitation.      Lexiscan stress test (6/2019) showed no evidence of ischemia or infarct.  LVEF was 29%.     Exercise stress test (8/2019) revealed no arrhythmias, he exercised for 4 minutes and 42 seconds for a Alvarez treadmill score of 4.5.    CT angiogram heart (1/2/2020) revealed coronary calcification in the LAD, circumflex and RCA.,  Mildly dilated aortic root measuring 4.22 x 3.88 cm.  The dilated ascending aorta measuring 3.64 cm x 3.73 cm.   Radiology report found scattered mediastinal and right Sujatha calcifications thought to be granulomas    IBETH (1/2/2020) revealed EF 55-60%    In March 2019, he started to feel unwell with shortness of breath with exertion.  He met with his primary provider and was  diagnosed with atrial fibrillation started on beta-blocker and Xarelto.  The cost of the Xarelto was cost prohibitive and he was changed to Coumadin.     He met with Dr. Vanessa in June 2019 after being diagnosed with cardiomyopathy and atrial fibrillation. He underwent an unsuccessful cardioversion on 6/26/2019 and later met with Dr Hannon who started amiodarone load and  and scheduled a repeat cardioversion.       On 7/17/19 he underwent a successful cardioversion.       On 8/7/2019 he presented feeling significantly better after his cardioversion and in NSR.       In September 2019, he was started on flecainide and metoprolol.  When he saw Dr. Hannon in November he was in atrial fibrillation because he forgot to take his medications at that time his flecainide was increased from 75 mg to 100 mg twice daily.  A repeat Zio patch revealed patient being in the 100% atrial fibrillation and an atrial fibrillation ablation was recommended.    Today he presents stating he has not had any atrial fibrillation since ablation.   He is taking his medications as prescribed including his Eliquis and denies bleeding, hematuria, hematochezia, epistaxis and signs/symptoms of stroke.  Pt reports that his right groin site is slightly weepy but otherwise he denies chest pain or pressure, dizziness, syncope, angina, dyspnea at rest or with exertion, palpitations, orthopnea, PND, or edema.     ASSESSMENT AND PLAN    Atrial fibrillation    For anticoagulation for CHADS VASC 3 (age, HTN, HF) he takes warfarin with goal INR 2-3. His INR has been therapeutic for the past month.   He will notify the INR clinic his amiodarone dosage is changing.      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  After resolution of tachycardia induced cardiomyopathy he was started on flecainide with metoprolol and later was found to be in atrial fibrillation despite increase flecainide dosage.        On 1/3/2020, he underwent a PVI ablation, flecainide was discontinued and he was started on amiodarone 200 mg BID x 7 days and decrease amiodarone to 200 mg daily for 3 months.      He will be on Protonix 40 mg daily for 30 days.        lifestyle interventions discussed including weight loss, moderate exercise.  Pt denies symptoms of sleep apnea    Amiodarone therapy    TSH, ALT, ALT will be drawn today    No PFT as amiodarone will be short duration.      Cardiomyopathy    At the time of diagnosis with his atrial fibrillation his EF was found to be 33%.      Repeat ECHO (8/2019) reveals EF 50-55%      Euvolemic on exam and takes lasix 20 mg daily and potassium chloride 20 mEq as needed for weight gain, shortness of breath, and swelling    Continue GDMT of ARB (losartan 50 mg daily) and BB (Metoprolol XL 50 mg twice daily)     Hypertension    Elevated in clinic today despite BB and ARB. He will have BP check at his PCP office and call if his BP is above 140/80 mmHg    Non-occlusive coronary artery disease    Per CT angiogram heart 1/2020 revealed coronary calcification the LAD, circumflex and RCA.    Asymptomatic    Continue BB, ARB.  He is not on an aspirin due to being on Warfarin    Fasting labs completed.  Will call pt with results and likely start atorvastatin 20 mg daily with repeat lipids and ALT in 8 weeks after starting a statin.     Discussed lifestyle interventions of Mediterranean diet, moderate exercise and weight loss    Plan:    Decrease amiodarone to 200 mg daily for 3 months.      Follow up with Dr. Hannon in 3 months    Fasting lipids TSH, ALT, AST     Start statin after lipids available.  Once statin started follow up lipid level in 8 weeks    Continue to monitor BP at home  Call if blood pressure is greater than 140/80 mmHg    Patient expresses understanding and agreement with the plan.     I appreciate the chance to help with Kevan Cnonelly Please let me know if you have any questions or  concerns.    Hilda Wahl, APRN, CNP    This note was completed in part using Dragon voice recognition software. Although reviewed after completion, some word and grammatical errors may occur.    Orders Placed This Encounter   Procedures     Lipid Profile     ALT     Lipid panel reflex to direct LDL Fasting     ALT     AST     TSH with free T4 reflex     EKG 12-lead complete w/read - Clinics (performed today)     Orders Placed This Encounter   Medications     DISCONTD: amiodarone (PACERONE/CODARONE) 200 MG tablet     Sig: Take 200 mg by mouth 2 times daily     amiodarone (PACERONE/CODARONE) 200 MG tablet     Sig: Take 1 tablet (200 mg) by mouth daily     Dispense:  30 tablet     Refill:  3     Medications Discontinued During This Encounter   Medication Reason     amiodarone (PACERONE) 400 MG tablet Medication Reconciliation Clean Up     amiodarone (PACERONE/CODARONE) 200 MG tablet          Encounter Diagnoses   Name Primary?     Paroxysmal atrial fibrillation (H) Yes     Coronary artery calcification      On amiodarone therapy        CURRENT MEDICATIONS:  Current Outpatient Medications   Medication Sig Dispense Refill     acetaminophen (TYLENOL) 500 MG tablet Take 500 mg by mouth every 6 hours as needed for mild pain       amiodarone (PACERONE/CODARONE) 200 MG tablet Take 1 tablet (200 mg) by mouth daily 30 tablet 3     furosemide (LASIX) 40 MG tablet Take 0.5 tablets (20 mg) by mouth daily as needed (for swelling or weight gain) 30 tablet 1     losartan (COZAAR) 50 MG tablet Take 1 tablet (50 mg) by mouth daily 90 tablet 3     metoprolol succinate ER (TOPROL-XL) 50 MG 24 hr tablet Take 1 tablet (50 mg) by mouth daily       pantoprazole (PROTONIX) 40 MG EC tablet Take 1 tablet (40 mg) by mouth every morning (before breakfast) 30 tablet 1     potassium chloride ER (K-TAB) 20 MEQ CR tablet Take 1 tablet (20 mEq) by mouth daily as needed (take when lasix is taken) 30 tablet 0     sildenafil (REVATIO) 20 MG tablet  Take 1 tablet (20 mg) by mouth daily as needed 30 tablet 3     warfarin ANTICOAGULANT (COUMADIN) 5 MG tablet Take 1.5 tablets (7.5mg) by mouth on Fri; take 1 tablet (5mg) all other days; or as directed by INR Clinic 110 tablet 0       ALLERGIES     Allergies   Allergen Reactions     Lisinopril Cough     Very persistent cough       PAST MEDICAL HISTORY:  Past Medical History:   Diagnosis Date     Atrial fibrillation (H) 6/3/2019     Benign essential hypertension 1/18/2017    past use of ACE- Cough;  Had been on ARB-diuretic but experienced lower bp readings;  BLOOD PRESSURE now well controlled on ARB also no longer on diuretic; no low bp readings; may have had slight edema initially but has normalized with bp well controlled on ARB alone.      Cancer (H)     testicular cancer     CHF (congestive heart failure) (H) 6/5/2019     Long term current use of anticoagulant therapy 6/3/2019     Obesity (BMI 35.0-39.9) with comorbidity (H) 2/11/2019       PAST SURGICAL HISTORY:  Past Surgical History:   Procedure Laterality Date     ANESTHESIA CARDIOVERSION N/A 6/26/2019    Procedure: ANESTHESIA, FOR CARDIOVERSION DR. LE;  Surgeon: GENERIC ANESTHESIA PROVIDER;  Location:  OR     ANESTHESIA CARDIOVERSION N/A 7/17/2019    Procedure: ANESTHESIA, FOR CARDIOVERSION;  Surgeon: GENERIC ANESTHESIA PROVIDER;  Location: [facility] OR     CLOSED REDUCTION SHOULDER Left      EP ABLATION FOCAL AFIB N/A 1/3/2020    Procedure: EP Ablation Focal AFIB;  Surgeon: Lamont Hannon MD;  Location:  [facility]     GENITOURINARY SURGERY  1990    testicular cancer     ORTHOPEDIC SURGERY  1970's    right knee surgery        FAMILY HISTORY:  Family History   Problem Relation Age of Onset     Colon Cancer Father      Coronary Artery Disease Maternal Grandmother        SOCIAL HISTORY:  Social History     Socioeconomic History     Marital status:      Spouse name: None     Number of children: None     Years of education: None      "Highest education level: None   Occupational History     None   Social Needs     Financial resource strain: None     Food insecurity:     Worry: None     Inability: None     Transportation needs:     Medical: None     Non-medical: None   Tobacco Use     Smoking status: Never Smoker     Smokeless tobacco: Never Used   Substance and Sexual Activity     Alcohol use: Yes     Comment: every 1-2 months     Drug use: No     Sexual activity: Yes     Partners: Female   Lifestyle     Physical activity:     Days per week: None     Minutes per session: None     Stress: None   Relationships     Social connections:     Talks on phone: None     Gets together: None     Attends Christian service: None     Active member of club or organization: None     Attends meetings of clubs or organizations: None     Relationship status: None     Intimate partner violence:     Fear of current or ex partner: None     Emotionally abused: None     Physically abused: None     Forced sexual activity: None   Other Topics Concern     Parent/sibling w/ CABG, MI or angioplasty before 65F 55M? Yes   Social History Narrative     None       Review of Systems:  Skin:  Negative     Eyes:  Positive for glasses  ENT:  Negative    Respiratory:  Negative    Cardiovascular:    Positive for;edema  Gastroenterology: Negative    Genitourinary:  Negative    Musculoskeletal:  Negative    Neurologic:  Negative    Psychiatric:  Negative    Heme/Lymph/Imm:  Negative    Endocrine:  Negative      Physical Exam:  Vitals: BP (!) 140/80   Pulse 70   Ht 1.93 m (6' 4\")   Wt 128.2 kg (282 lb 11.2 oz)   BMI 34.41 kg/m      Constitutional:  cooperative, alert and oriented, well developed, well nourished, in no acute distress        Skin:  warm and dry to the touch   ecchymosis on right groin which is healing.  Small hematoma size of a pea    Head:  normocephalic, no masses or lesions        Eyes:  pupils equal and round        ENT:  no pallor or cyanosis        Neck:  JVP " normal        Chest:  clear to auscultation        Cardiac: regular rhythm                  Abdomen:  abdomen soft obese      Vascular: pulses full and equal     right radial artery;2+             left radial artery;2+           right femoral bruit (-)      Extremities and Back:  no edema;no deformities, clubbing, cyanosis, erythema observed        Neurological:  no gross motor deficits          Recent Lab Results:  LIPID RESULTS:  Lab Results   Component Value Date    CHOL 179 01/22/2019    HDL 42 01/22/2019     (H) 01/22/2019    TRIG 84 01/22/2019       LIVER ENZYME RESULTS:  Lab Results   Component Value Date    AST 21 01/22/2019    ALT 27 01/22/2019       CBC RESULTS:  Lab Results   Component Value Date    WBC 5.8 01/03/2020    RBC 4.88 01/03/2020    HGB 16.0 01/03/2020    HCT 44.2 01/03/2020    MCV 91 01/03/2020    MCH 32.8 01/03/2020    MCHC 36.2 01/03/2020    RDW 12.6 01/03/2020     01/03/2020       BMP RESULTS:  Lab Results   Component Value Date     01/03/2020    POTASSIUM 4.1 01/03/2020    CHLORIDE 110 (H) 01/03/2020    CO2 21 01/03/2020    ANIONGAP 7 01/03/2020    GLC 95 01/03/2020    BUN 21 01/03/2020    CR 1.11 01/03/2020    GFRESTIMATED 68 01/03/2020    GFRESTBLACK 79 01/03/2020    CHU 8.4 (L) 01/03/2020        A1C RESULTS:  No results found for: A1C    INR RESULTS:  Lab Results   Component Value Date    INR 2.8 (A) 01/10/2020    INR 2.6 (A) 01/06/2020    INR Canceled, Test credited 01/03/2020    INR 2.30 (H) 07/17/2019           CC  No referring provider defined for this encounter.

## 2020-01-10 NOTE — PATIENT INSTRUCTIONS
Have labs done today and someone will call on Monday to let you know     Follow a Mediterranean diet.      Follow up with Dr. Hannon in 3 months    Tell the INR nurse you are changing your amiodarone dosage

## 2020-01-10 NOTE — PROGRESS NOTES
ANTICOAGULATION FOLLOW-UP CLINIC VISIT    Patient Name:  Kevan Connelly  Date:  1/10/2020  Contact Type:  Face to Face    SUBJECTIVE:  Patient Findings     Positives:   Change in medications    Comments:   Patient now on amiodarone and flecainide, both of which may raise INR. Last INR 4 days ago after ablation which put him back in sinus rhythm. Puncture site is still draining a small amount of serosanguinous fluid, it will be looked at today at f/u appt. Otherwise, The patient was assessed for diet, medication, and activity level changes, missed or extra doses, bruising or bleeding, with no problem findings.  Kaylee LEONARD RN  Anticoagulation Clinic  [place]          Clinical Outcomes     Negatives:   Major bleeding event, Thromboembolic event, Anticoagulation-related hospital admission, Anticoagulation-related ED visit, Anticoagulation-related fatality    Comments:   Patient now on amiodarone and flecainide, both of which may raise INR. Last INR 4 days ago after ablation which put him back in sinus rhythm. Puncture site is still draining a small amount of serosanguinous fluid, it will be looked at today at f/u appt. Otherwise, The patient was assessed for diet, medication, and activity level changes, missed or extra doses, bruising or bleeding, with no problem findings.  Kaylee LEONARD RN  Anticoagulation Clinic  [place]             OBJECTIVE    INR Protime   Date Value Ref Range Status   01/10/2020 2.8 (A) 0.86 - 1.14 Final       ASSESSMENT / PLAN  INR assessment THER    Recheck INR In: 1 WEEK    INR Location Clinic      Anticoagulation Summary  As of 1/10/2020    INR goal:   2.0-3.0   TTR:   65.8 % (7.2 mo)   INR used for dosin.8 (1/10/2020)   Warfarin maintenance plan:   7.5 mg (5 mg x 1.5) every Fri; 5 mg (5 mg x 1) all other days   Full warfarin instructions:   7.5 mg every Fri; 5 mg all other days   Weekly warfarin total:   37.5 mg   Plan last modified:   Kaylee Ramey RN (2019)   Next INR  check:   1/17/2020   Priority:   High   Target end date:   Indefinite    Indications    Long term current use of anticoagulant therapy [Z79.01]  Atrial fibrillation (H) [I48.91]             Anticoagulation Episode Summary     INR check location:   Anticoagulation Clinic    Preferred lab:       Send INR reminders to:   CONNIE LOPEZ    Comments:   5mg tabs / DCCV 7/17/19 / early morning appointments on Wed or Fri      Anticoagulation Care Providers     Provider Role Specialty Phone number    Davina Reaves MD  Internal Medicine [phone]            See the Encounter Report to view Anticoagulation Flowsheet and Dosing Calendar (Go to Encounters tab in chart review, and find the Anticoagulation Therapy Visit)    Kaylee Ramey RN

## 2020-01-10 NOTE — LETTER
1/10/2020    Davina Reaves MD  [address]  [facility] [address]    RE: Kevan Connelly       Dear Colleague,    I had the pleasure of seeing Kevan Connelly in the [facility].    HPI:  Kevan Connelly is a 69 year old male who presents after undergoing an atrial fibrillation ablation.  He is a patient of Dr. Hannon and his past medical history includes    1. Atrial fibrillation.  Diagnosed on 5/30/19 with cardiomyopathy EF was 33%.  Was on amiodarone and changed to flecainide with normal EF.  refractory to flecainide.      Underwent an atrial fibrillation ablation on 1/3/2020.  2. Nonocclusive coronary artery disease.  Per CT angiogram heart 1/2020 revealed coronary calcification the LAD, circumflex and RCA.  3. Mildly dilated aortic root and ascending aorta.  Per CT angiogram (1/2020) revealed 4.22 x 3.88 cm and 3.64 cm x 3.73 cm respectively.  4. Cardiomyopathy.  EF 33% (6/2018) Recent ECHO 50-55% (8/2019)  5. Hypertension  6. Obesity  7. Testicular cancer with subsequent ED     Diagnostics:    Limited ECHO (8/2019) revealed EF 50-55% with mild to moderately dilated L atrium    ECHO (6/2019) revealed moderately reduced LV systolic function with LVEF of 33%.  There was moderate global hypokinesia.  The LV was mildly dilated, mildly decreased RV systolic function.  The left atrium was noted to be severely enlarged and right atrium to be moderate to severely dilated, mild mitral regurgitation.      Lexiscan stress test (6/2019) showed no evidence of ischemia or infarct.  LVEF was 29%.     Exercise stress test (8/2019) revealed no arrhythmias, he exercised for 4 minutes and 42 seconds for a Alvarez treadmill score of 4.5.    CT angiogram heart (1/2/2020) revealed coronary calcification in the LAD, circumflex and RCA.,  Mildly dilated aortic root measuring 4.22 x 3.88 cm.  The dilated ascending aorta measuring 3.64 cm x 3.73 cm.   Radiology report found scattered  mediastinal and right Sujatha calcifications thought to be granulomas    IBETH (1/2/2020) revealed EF 55-60%    In March 2019, he started to feel unwell with shortness of breath with exertion.  He met with his primary provider and was diagnosed with atrial fibrillation started on beta-blocker and Xarelto.  The cost of the Xarelto was cost prohibitive and he was changed to Coumadin.     He met with Dr. Vanessa in June 2019 after being diagnosed with cardiomyopathy and atrial fibrillation. He underwent an unsuccessful cardioversion on 6/26/2019 and later met with Dr Hannon who started amiodarone load and  and scheduled a repeat cardioversion.       On 7/17/19 he underwent a successful cardioversion.       On 8/7/2019 he presented feeling significantly better after his cardioversion and in NSR.       In September 2019, he was started on flecainide and metoprolol.  When he saw Dr. Hannon in November he was in atrial fibrillation because he forgot to take his medications at that time his flecainide was increased from 75 mg to 100 mg twice daily.  A repeat Zio patch revealed patient being in the 100% atrial fibrillation and an atrial fibrillation ablation was recommended.    Today he presents stating he has not had any atrial fibrillation since ablation.   He is taking his medications as prescribed including his Eliquis and denies bleeding, hematuria, hematochezia, epistaxis and signs/symptoms of stroke.  Pt reports that his right groin site is slightly weepy but otherwise he denies chest pain or pressure, dizziness, syncope, angina, dyspnea at rest or with exertion, palpitations, orthopnea, PND, or edema.     ASSESSMENT AND PLAN    Atrial fibrillation    For anticoagulation for CHADS VASC 3 (age, HTN, HF) he takes warfarin with goal INR 2-3. His INR has been therapeutic for the past month.   He will notify the INR clinic his amiodarone dosage is changing.      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  After resolution of tachycardia induced cardiomyopathy he was started on flecainide with metoprolol and later was found to be in atrial fibrillation despite increase flecainide dosage.       On 1/3/2020, he underwent a PVI ablation, flecainide was discontinued and he was started on amiodarone 200 mg BID x 7 days and decrease amiodarone to 200 mg daily for 3 months.      He will be on Protonix 40 mg daily for 30 days.        lifestyle interventions discussed including weight loss, moderate exercise.  Pt denies symptoms of sleep apnea    Amiodarone therapy    TSH, ALT, ALT will be drawn today    No PFT as amiodarone will be short duration.      Cardiomyopathy    At the time of diagnosis with his atrial fibrillation his EF was found to be 33%.      Repeat ECHO (8/2019) reveals EF 50-55%      Euvolemic on exam and takes lasix 20 mg daily and potassium chloride 20 mEq as needed for weight gain, shortness of breath, and swelling    Continue GDMT of ARB (losartan 50 mg daily) and BB (Metoprolol XL 50 mg twice daily)     Hypertension    Elevated in clinic today despite BB and ARB. He will have BP check at his PCP office and call if his BP is above 140/80 mmHg    Non-occlusive coronary artery disease    Per CT angiogram heart 1/2020 revealed coronary calcification the LAD, circumflex and RCA.    Asymptomatic    Continue BB, ARB.  He is not on an aspirin due to being on Warfarin    Fasting labs completed.  Will call pt with results and likely start atorvastatin 20 mg daily with repeat lipids and ALT in 8 weeks after starting a statin.     Discussed lifestyle interventions of Mediterranean diet, moderate exercise and weight loss    Plan:    Decrease amiodarone to 200 mg daily for 3 months.      Follow up with Dr. Hannon in 3 months    Fasting lipids TSH, ALT, AST     Start statin after lipids available.  Once statin started follow up lipid level in 8 weeks    Continue to monitor BP at  home  Call if blood pressure is greater than 140/80 mmHg    Patient expresses understanding and agreement with the plan.     I appreciate the chance to help with Kevan Connelly Please let me know if you have any questions or concerns.    ANNMARIE Carey, CNP    This note was completed in part using Dragon voice recognition software. Although reviewed after completion, some word and grammatical errors may occur.    Orders Placed This Encounter   Procedures     Lipid Profile     ALT     Lipid panel reflex to direct LDL Fasting     ALT     AST     TSH with free T4 reflex     EKG 12-lead complete w/read - Clinics (performed today)     Orders Placed This Encounter   Medications     DISCONTD: amiodarone (PACERONE/CODARONE) 200 MG tablet     Sig: Take 200 mg by mouth 2 times daily     amiodarone (PACERONE/CODARONE) 200 MG tablet     Sig: Take 1 tablet (200 mg) by mouth daily     Dispense:  30 tablet     Refill:  3     Medications Discontinued During This Encounter   Medication Reason     amiodarone (PACERONE) 400 MG tablet Medication Reconciliation Clean Up     amiodarone (PACERONE/CODARONE) 200 MG tablet          Encounter Diagnoses   Name Primary?     Paroxysmal atrial fibrillation (H) Yes     Coronary artery calcification      On amiodarone therapy        CURRENT MEDICATIONS:  Current Outpatient Medications   Medication Sig Dispense Refill     acetaminophen (TYLENOL) 500 MG tablet Take 500 mg by mouth every 6 hours as needed for mild pain       amiodarone (PACERONE/CODARONE) 200 MG tablet Take 1 tablet (200 mg) by mouth daily 30 tablet 3     furosemide (LASIX) 40 MG tablet Take 0.5 tablets (20 mg) by mouth daily as needed (for swelling or weight gain) 30 tablet 1     losartan (COZAAR) 50 MG tablet Take 1 tablet (50 mg) by mouth daily 90 tablet 3     metoprolol succinate ER (TOPROL-XL) 50 MG 24 hr tablet Take 1 tablet (50 mg) by mouth daily       pantoprazole (PROTONIX) 40 MG EC tablet Take 1 tablet (40 mg) by  mouth every morning (before breakfast) 30 tablet 1     potassium chloride ER (K-TAB) 20 MEQ CR tablet Take 1 tablet (20 mEq) by mouth daily as needed (take when lasix is taken) 30 tablet 0     sildenafil (REVATIO) 20 MG tablet Take 1 tablet (20 mg) by mouth daily as needed 30 tablet 3     warfarin ANTICOAGULANT (COUMADIN) 5 MG tablet Take 1.5 tablets (7.5mg) by mouth on Fri; take 1 tablet (5mg) all other days; or as directed by INR Clinic 110 tablet 0       ALLERGIES     Allergies   Allergen Reactions     Lisinopril Cough     Very persistent cough       PAST MEDICAL HISTORY:  Past Medical History:   Diagnosis Date     Atrial fibrillation (H) 6/3/2019     Benign essential hypertension 1/18/2017    past use of ACE- Cough;  Had been on ARB-diuretic but experienced lower bp readings;  BLOOD PRESSURE now well controlled on ARB also no longer on diuretic; no low bp readings; may have had slight edema initially but has normalized with bp well controlled on ARB alone.      Cancer (H)     testicular cancer     CHF (congestive heart failure) (H) 6/5/2019     Long term current use of anticoagulant therapy 6/3/2019     Obesity (BMI 35.0-39.9) with comorbidity (H) 2/11/2019       PAST SURGICAL HISTORY:  Past Surgical History:   Procedure Laterality Date     ANESTHESIA CARDIOVERSION N/A 6/26/2019    Procedure: ANESTHESIA, FOR CARDIOVERSION DR. LE;  Surgeon: GENERIC ANESTHESIA PROVIDER;  Location:  OR     ANESTHESIA CARDIOVERSION N/A 7/17/2019    Procedure: ANESTHESIA, FOR CARDIOVERSION;  Surgeon: GENERIC ANESTHESIA PROVIDER;  Location: [facility] OR     CLOSED REDUCTION SHOULDER Left      EP ABLATION FOCAL AFIB N/A 1/3/2020    Procedure: EP Ablation Focal AFIB;  Surgeon: Lamont Hannon MD;  Location:  HEART CARDI[facility] CATH LAB     GENITOURINARY SURGERY  1990    testicular cancer     ORTHOPEDIC SURGERY  1970's    right knee surgery        FAMILY HISTORY:  Family History   Problem Relation Age of Onset     Colon Cancer Father   "    Coronary Artery Disease Maternal Grandmother        SOCIAL HISTORY:  Social History     Socioeconomic History     Marital status:      Spouse name: None     Number of children: None     Years of education: None     Highest education level: None   Occupational History     None   Social Needs     Financial resource strain: None     Food insecurity:     Worry: None     Inability: None     Transportation needs:     Medical: None     Non-medical: None   Tobacco Use     Smoking status: Never Smoker     Smokeless tobacco: Never Used   Substance and Sexual Activity     Alcohol use: Yes     Comment: every 1-2 months     Drug use: No     Sexual activity: Yes     Partners: Female   Lifestyle     Physical activity:     Days per week: None     Minutes per session: None     Stress: None   Relationships     Social connections:     Talks on phone: None     Gets together: None     Attends Anglican service: None     Active member of club or organization: None     Attends meetings of clubs or organizations: None     Relationship status: None     Intimate partner violence:     Fear of current or ex partner: None     Emotionally abused: None     Physically abused: None     Forced sexual activity: None   Other Topics Concern     Parent/sibling w/ CABG, MI or angioplasty before 65F 55M? Yes   Social History Narrative     None       Review of Systems:  Skin:  Negative     Eyes:  Positive for glasses  ENT:  Negative    Respiratory:  Negative    Cardiovascular:    Positive for;edema  Gastroenterology: Negative    Genitourinary:  Negative    Musculoskeletal:  Negative    Neurologic:  Negative    Psychiatric:  Negative    Heme/Lymph/Imm:  Negative    Endocrine:  Negative      Physical Exam:  Vitals: BP (!) 140/80   Pulse 70   Ht 1.93 m (6' 4\")   Wt 128.2 kg (282 lb 11.2 oz)   BMI 34.41 kg/m       Constitutional:  cooperative, alert and oriented, well developed, well nourished, in no acute distress        Skin:  warm and dry to " the touch   ecchymosis on right groin which is healing.  Small hematoma size of a pea    Head:  normocephalic, no masses or lesions        Eyes:  pupils equal and round        ENT:  no pallor or cyanosis        Neck:  JVP normal        Chest:  clear to auscultation        Cardiac: regular rhythm                  Abdomen:  abdomen soft obese      Vascular: pulses full and equal     right radial artery;2+             left radial artery;2+           right femoral bruit (-)      Extremities and Back:  no edema;no deformities, clubbing, cyanosis, erythema observed        Neurological:  no gross motor deficits          Recent Lab Results:  LIPID RESULTS:  Lab Results   Component Value Date    CHOL 179 01/22/2019    HDL 42 01/22/2019     (H) 01/22/2019    TRIG 84 01/22/2019       LIVER ENZYME RESULTS:  Lab Results   Component Value Date    AST 21 01/22/2019    ALT 27 01/22/2019       CBC RESULTS:  Lab Results   Component Value Date    WBC 5.8 01/03/2020    RBC 4.88 01/03/2020    HGB 16.0 01/03/2020    HCT 44.2 01/03/2020    MCV 91 01/03/2020    MCH 32.8 01/03/2020    MCHC 36.2 01/03/2020    RDW 12.6 01/03/2020     01/03/2020       BMP RESULTS:  Lab Results   Component Value Date     01/03/2020    POTASSIUM 4.1 01/03/2020    CHLORIDE 110 (H) 01/03/2020    CO2 21 01/03/2020    ANIONGAP 7 01/03/2020    GLC 95 01/03/2020    BUN 21 01/03/2020    CR 1.11 01/03/2020    GFRESTIMATED 68 01/03/2020    GFRESTBLACK 79 01/03/2020    CHU 8.4 (L) 01/03/2020        A1C RESULTS:  No results found for: A1C    INR RESULTS:  Lab Results   Component Value Date    INR 2.8 (A) 01/10/2020    INR 2.6 (A) 01/06/2020    INR Canceled, Test credited 01/03/2020    INR 2.30 (H) 07/17/2019           CC  No referring provider defined for this encounter.                  Thank you for allowing me to participate in the care of your patient.      Sincerely,     ANNMARIE Peterson [facility]

## 2020-01-13 DIAGNOSIS — Z79.899 ON AMIODARONE THERAPY: ICD-10-CM

## 2020-01-13 DIAGNOSIS — I25.10 CORONARY ARTERY CALCIFICATION: ICD-10-CM

## 2020-01-13 LAB
ALT SERPL W P-5'-P-CCNC: 23 U/L (ref 0–70)
AST SERPL W P-5'-P-CCNC: 17 U/L (ref 0–45)
CHOLEST SERPL-MCNC: 232 MG/DL
HDLC SERPL-MCNC: 38 MG/DL
LDLC SERPL CALC-MCNC: 156 MG/DL
NONHDLC SERPL-MCNC: 194 MG/DL
TRIGL SERPL-MCNC: 192 MG/DL
TSH SERPL DL<=0.005 MIU/L-ACNC: 2.86 MU/L (ref 0.4–4)

## 2020-01-13 PROCEDURE — 84443 ASSAY THYROID STIM HORMONE: CPT | Performed by: NURSE PRACTITIONER

## 2020-01-13 PROCEDURE — 36415 COLL VENOUS BLD VENIPUNCTURE: CPT | Performed by: NURSE PRACTITIONER

## 2020-01-13 PROCEDURE — 84450 TRANSFERASE (AST) (SGOT): CPT | Performed by: NURSE PRACTITIONER

## 2020-01-13 PROCEDURE — 80061 LIPID PANEL: CPT | Performed by: NURSE PRACTITIONER

## 2020-01-13 PROCEDURE — 84460 ALANINE AMINO (ALT) (SGPT): CPT | Performed by: NURSE PRACTITIONER

## 2020-01-15 DIAGNOSIS — I25.10 CORONARY ARTERY CALCIFICATION: Primary | ICD-10-CM

## 2020-01-15 RX ORDER — ATORVASTATIN CALCIUM 20 MG/1
20 TABLET, FILM COATED ORAL DAILY
Qty: 90 TABLET | Refills: 3 | Status: SHIPPED | OUTPATIENT
Start: 2020-01-15 | End: 2020-12-07

## 2020-01-17 ENCOUNTER — ANTICOAGULATION THERAPY VISIT (OUTPATIENT)
Dept: NURSING | Facility: CLINIC | Age: 70
End: 2020-01-17
Payer: COMMERCIAL

## 2020-01-17 DIAGNOSIS — I48.91 ATRIAL FIBRILLATION (H): ICD-10-CM

## 2020-01-17 DIAGNOSIS — I48.91 ATRIAL FIBRILLATION, UNSPECIFIED TYPE (H): ICD-10-CM

## 2020-01-17 DIAGNOSIS — Z79.01 LONG TERM CURRENT USE OF ANTICOAGULANT THERAPY: ICD-10-CM

## 2020-01-17 LAB — INR POINT OF CARE: 4.2 (ref 0.86–1.14)

## 2020-01-17 PROCEDURE — 36416 COLLJ CAPILLARY BLOOD SPEC: CPT

## 2020-01-17 PROCEDURE — 99207 ZZC NO CHARGE NURSE ONLY: CPT

## 2020-01-17 PROCEDURE — 85610 PROTHROMBIN TIME: CPT | Mod: QW

## 2020-01-17 RX ORDER — WARFARIN SODIUM 5 MG/1
TABLET ORAL
Qty: 110 TABLET | Refills: 0 | Status: SHIPPED | OUTPATIENT
Start: 2020-01-17 | End: 2020-01-22

## 2020-01-17 NOTE — PROGRESS NOTES
ANTICOAGULATION FOLLOW-UP CLINIC VISIT    Patient Name:  Kevan Connelly  Date:  2020  Contact Type:  Face to Face    SUBJECTIVE:  Patient Findings     Positives:   Change in alcohol use (sparse), Change in medications (started lipitor 2020, amiodarone decreased to 200mg ), Change in diet/appetite (starting to eat more greens/veggies)    Comments:   Assessed for S/S bleeding, clotting, medication, diet, health, activity and alcohol changes.          Clinical Outcomes     Negatives:   Major bleeding event, Thromboembolic event, Anticoagulation-related hospital admission, Anticoagulation-related ED visit, Anticoagulation-related fatality    Comments:   Assessed for S/S bleeding, clotting, medication, diet, health, activity and alcohol changes.             OBJECTIVE    INR Protime   Date Value Ref Range Status   2020 4.2 (A) 0.86 - 1.14 Final       ASSESSMENT / PLAN  INR assessment SUPRA    Recheck INR In: 5 DAYS    INR Location Clinic      Anticoagulation Summary  As of 2020    INR goal:   2.0-3.0   TTR:   64.2 % (7.4 mo)   INR used for dosin.2! (2020)   Warfarin maintenance plan:   2.5 mg (5 mg x 0.5) every Mon; 5 mg (5 mg x 1) all other days   Full warfarin instructions:   : Hold; Otherwise 2.5 mg every Mon; 5 mg all other days   Weekly warfarin total:   32.5 mg   Plan last modified:   Ana Faith [facility] (2020)   Next INR check:   2020   Priority:   High   Target end date:   Indefinite    Indications    Long term current use of anticoagulant therapy [Z79.01]  Atrial fibrillation (H) [I48.91]             Anticoagulation Episode Summary     INR check location:   Anticoagulation Clinic    Preferred lab:       Send INR reminders to:   CONNIE LOPEZ    Comments:   5mg tabs / DCCV 19 / early morning appointments on Wed or Fri      Anticoagulation Care Providers     Provider Role Specialty Phone number    Davina Reaves MD  Internal Medicine  [phone]            See the Encounter Report to view Anticoagulation Flowsheet and Dosing Calendar (Go to Encounters tab in chart review, and find the Anticoagulation Therapy Visit)    Hold today per protocol start decreasing dose now that we are seeing amiodarone affect.  Also starting mediterranean diet, unclear how much increased greens will affect INR.  Updated maintenance for ~13% dose decrease today, anticipate needing more once we see a clearer picture of amiodarone influence.      Ana Faith [facility]

## 2020-01-22 ENCOUNTER — ANTICOAGULATION THERAPY VISIT (OUTPATIENT)
Dept: NURSING | Facility: CLINIC | Age: 70
End: 2020-01-22
Payer: COMMERCIAL

## 2020-01-22 ENCOUNTER — TELEPHONE (OUTPATIENT)
Dept: CARDIOLOGY | Facility: CLINIC | Age: 70
End: 2020-01-22

## 2020-01-22 DIAGNOSIS — I48.0 PAROXYSMAL ATRIAL FIBRILLATION (H): ICD-10-CM

## 2020-01-22 DIAGNOSIS — I48.91 ATRIAL FIBRILLATION (H): ICD-10-CM

## 2020-01-22 DIAGNOSIS — I48.91 ATRIAL FIBRILLATION, UNSPECIFIED TYPE (H): ICD-10-CM

## 2020-01-22 DIAGNOSIS — I48.92 ATRIAL FLUTTER, UNSPECIFIED TYPE (H): Primary | ICD-10-CM

## 2020-01-22 DIAGNOSIS — Z79.01 LONG TERM CURRENT USE OF ANTICOAGULANT THERAPY: ICD-10-CM

## 2020-01-22 LAB — INR POINT OF CARE: 2.8 (ref 0.86–1.14)

## 2020-01-22 PROCEDURE — 93000 ELECTROCARDIOGRAM COMPLETE: CPT | Performed by: INTERNAL MEDICINE

## 2020-01-22 PROCEDURE — 99207 ZZC NO CHARGE NURSE ONLY: CPT

## 2020-01-22 PROCEDURE — 85610 PROTHROMBIN TIME: CPT | Mod: QW

## 2020-01-22 PROCEDURE — 36416 COLLJ CAPILLARY BLOOD SPEC: CPT

## 2020-01-22 RX ORDER — WARFARIN SODIUM 5 MG/1
TABLET ORAL
Qty: 110 TABLET | Refills: 0
Start: 2020-01-22 | End: 2020-06-01

## 2020-01-22 NOTE — TELEPHONE ENCOUNTER
Pt called and states that he has been in A Fib for the past 2-3 hours. Pt had an A Fib Ablation on 1/3/20. Will have pt go into get an EKG in [place] to confirm rhythm.  Pt did state that his rate has been running about 120 bpm. MicaN     Pt to have EKG at 1545. [place]

## 2020-01-22 NOTE — TELEPHONE ENCOUNTER
EKG completed and reviewed by Dr Hannon.  A Flutter rate 119 bpm.  Dr Hannon would like to see if Flutter breaks overnight and if not will need a Cardioversion.  Pt made aware that he did have episodes of Flutter during the ablation, per Dr Hannon was coming from all over the place.  Will await pt call in the morning. Isabel

## 2020-01-22 NOTE — PROGRESS NOTES
ANTICOAGULATION FOLLOW-UP CLINIC VISIT    Patient Name:  Kevan Connelly  Date:  1/22/2020  Contact Type:  Face to Face    SUBJECTIVE:  Patient Findings     Positives:   Change in diet/appetite    Comments:   At last check 5 days ago, patient's amiodarone kicked in and INR was elevated. He held his warfarin that day and his maintenance dose was reduced. Patient states he has been trying to comply with the Mediterranean Diet and has been eating more salads with Wilson and mixed greens. Today he is therapeutic and will continue with previously prescribed lower maintenance dose. The patient was assessed for diet, medication, and activity level changes, missed or extra doses, bruising or bleeding, with no problem findings. He will return for next INR in 2 weeks.   Kaylee LEONARD RN  Anticoagulation Clinic  [place]          Clinical Outcomes     Negatives:   Major bleeding event, Thromboembolic event, Anticoagulation-related hospital admission, Anticoagulation-related ED visit, Anticoagulation-related fatality    Comments:   At last check 5 days ago, patient's amiodarone kicked in and INR was elevated. He held his warfarin that day and his maintenance dose was reduced. Patient states he has been trying to comply with the Mediterranean Diet and has been eating more salads with Wilson and mixed greens. Today he is therapeutic and will continue with previously prescribed lower maintenance dose. The patient was assessed for diet, medication, and activity level changes, missed or extra doses, bruising or bleeding, with no problem findings. He will return for next INR in 2 weeks.   Kaylee LEONARD RN  Anticoagulation Clinic  [place]             OBJECTIVE    INR Protime   Date Value Ref Range Status   01/22/2020 2.8 (A) 0.86 - 1.14 Final       ASSESSMENT / PLAN  INR assessment THER    Recheck INR In: 2 WEEKS    INR Location Clinic      Anticoagulation Summary  As of 1/22/2020    INR goal:   2.0-3.0   TTR:   63.1 % (7.6 mo)    INR used for dosin.8 (2020)   Warfarin maintenance plan:   2.5 mg (5 mg x 0.5) every Mon; 5 mg (5 mg x 1) all other days   Full warfarin instructions:   2.5 mg every Mon; 5 mg all other days   Weekly warfarin total:   32.5 mg   No change documented:   Kaylee Ramey RN   Plan last modified:   Ana Faith [facility] (2020)   Next INR check:   2020   Priority:   Maintenance   Target end date:   Indefinite    Indications    Long term current use of anticoagulant therapy [Z79.01]  Atrial fibrillation (H) [I48.91]             Anticoagulation Episode Summary     INR check location:   Anticoagulation Clinic    Preferred lab:       Send INR reminders to:   CONNIE LOPEZ    Comments:   5mg tabs / DCCV 19 / early morning appointments on Wed or Fri      Anticoagulation Care Providers     Provider Role Specialty Phone number    Davina Reaves MD  Internal Medicine [phone]            See the Encounter Report to view Anticoagulation Flowsheet and Dosing Calendar (Go to Encounters tab in chart review, and find the Anticoagulation Therapy Visit)    Kaylee Ramey, DIO

## 2020-01-23 DIAGNOSIS — I48.0 PAROXYSMAL ATRIAL FIBRILLATION (H): ICD-10-CM

## 2020-01-23 PROCEDURE — 93000 ELECTROCARDIOGRAM COMPLETE: CPT | Performed by: INTERNAL MEDICINE

## 2020-01-23 NOTE — TELEPHONE ENCOUNTER
EKG completed and reviewed by Dr Hannon. A flutter rate 123.  Dr Hannon would like pt to have one more Cardioversion. Pt is thinking that he would like to have the PPM and be done, but is willing to try one more time.  Pt will have done on Tuesday. Order is placed and pt aware that [place] will call to arrange. Isabel

## 2020-01-23 NOTE — TELEPHONE ENCOUNTER
Pt called and stated that last night his Heart rate was down to 65-70 bpm. This morning heart rate is running  bpm.  Pt states that BP is normal.  Pt will have an EKG at 1100 today.  Pt also states that he is really thinking a PPM is needed.  Will get EKG and then talk with Dr Hannon. Isabel

## 2020-01-28 ENCOUNTER — HOSPITAL ENCOUNTER (OUTPATIENT)
Facility: CLINIC | Age: 70
Discharge: HOME | End: 2020-01-28
Attending: INTERNAL MEDICINE | Admitting: INTERNAL MEDICINE
Payer: COMMERCIAL

## 2020-01-28 ENCOUNTER — ANESTHESIA (OUTPATIENT)
Dept: MEDSURG UNIT | Facility: CLINIC | Age: 70
End: 2020-01-28
Payer: COMMERCIAL

## 2020-01-28 ENCOUNTER — ANESTHESIA (OUTPATIENT)
Dept: SURGERY | Facility: CLINIC | Age: 70
End: 2020-01-28
Payer: COMMERCIAL

## 2020-01-28 ENCOUNTER — ANESTHESIA EVENT (OUTPATIENT)
Dept: SURGERY | Facility: CLINIC | Age: 70
End: 2020-01-28
Payer: COMMERCIAL

## 2020-01-28 ENCOUNTER — HOSPITAL ENCOUNTER (OUTPATIENT)
Dept: MEDSURG UNIT | Facility: CLINIC | Age: 70
End: 2020-01-28
Attending: INTERNAL MEDICINE | Admitting: INTERNAL MEDICINE
Payer: COMMERCIAL

## 2020-01-28 ENCOUNTER — ANESTHESIA EVENT (OUTPATIENT)
Dept: MEDSURG UNIT | Facility: CLINIC | Age: 70
End: 2020-01-28
Payer: COMMERCIAL

## 2020-01-28 VITALS
HEIGHT: 76 IN | RESPIRATION RATE: 18 BRPM | DIASTOLIC BLOOD PRESSURE: 84 MMHG | BODY MASS INDEX: 33 KG/M2 | WEIGHT: 271 LBS | OXYGEN SATURATION: 99 % | TEMPERATURE: 97.9 F | SYSTOLIC BLOOD PRESSURE: 118 MMHG | HEART RATE: 65 BPM

## 2020-01-28 DIAGNOSIS — I48.92 ATRIAL FLUTTER, UNSPECIFIED TYPE (H): ICD-10-CM

## 2020-01-28 LAB
INR PPP: 3.66 (ref 0.86–1.14)
MAGNESIUM SERPL-MCNC: 2.1 MG/DL (ref 1.6–2.3)
POTASSIUM SERPL-SCNC: 4.6 MMOL/L (ref 3.4–5.3)

## 2020-01-28 PROCEDURE — 93005 ELECTROCARDIOGRAM TRACING: CPT

## 2020-01-28 PROCEDURE — 40000235 ZZH STATISTIC TELEMETRY

## 2020-01-28 PROCEDURE — 92960 CARDIOVERSION ELECTRIC EXT: CPT

## 2020-01-28 PROCEDURE — 85610 PROTHROMBIN TIME: CPT | Performed by: INTERNAL MEDICINE

## 2020-01-28 PROCEDURE — 83735 ASSAY OF MAGNESIUM: CPT | Performed by: INTERNAL MEDICINE

## 2020-01-28 PROCEDURE — 25000128 H RX IP 250 OP 636: Performed by: NURSE ANESTHETIST, CERTIFIED REGISTERED

## 2020-01-28 PROCEDURE — 92960 CARDIOVERSION ELECTRIC EXT: CPT | Performed by: INTERNAL MEDICINE

## 2020-01-28 PROCEDURE — 93010 ELECTROCARDIOGRAM REPORT: CPT | Mod: 76 | Performed by: INTERNAL MEDICINE

## 2020-01-28 PROCEDURE — 36415 COLL VENOUS BLD VENIPUNCTURE: CPT

## 2020-01-28 PROCEDURE — 40000065 ZZH STATISTIC EKG NON-CHARGEABLE

## 2020-01-28 PROCEDURE — 84132 ASSAY OF SERUM POTASSIUM: CPT | Performed by: INTERNAL MEDICINE

## 2020-01-28 PROCEDURE — 25800030 ZZH RX IP 258 OP 636: Performed by: INTERNAL MEDICINE

## 2020-01-28 PROCEDURE — 40000852 ZZH STATISTIC HEART CATH LAB OR EP LAB

## 2020-01-28 RX ORDER — MAGNESIUM SULFATE HEPTAHYDRATE 40 MG/ML
2 INJECTION, SOLUTION INTRAVENOUS
Status: DISCONTINUED | OUTPATIENT
Start: 2020-01-28 | End: 2020-01-28 | Stop reason: HOSPADM

## 2020-01-28 RX ORDER — ATROPINE SULFATE 0.1 MG/ML
.5-1 INJECTION INTRAVENOUS
Status: DISCONTINUED | OUTPATIENT
Start: 2020-01-28 | End: 2020-01-28 | Stop reason: HOSPADM

## 2020-01-28 RX ORDER — POTASSIUM CHLORIDE 1500 MG/1
40 TABLET, EXTENDED RELEASE ORAL
Status: DISCONTINUED | OUTPATIENT
Start: 2020-01-28 | End: 2020-01-28 | Stop reason: HOSPADM

## 2020-01-28 RX ORDER — PROPOFOL 10 MG/ML
INJECTION, EMULSION INTRAVENOUS PRN
Status: DISCONTINUED | OUTPATIENT
Start: 2020-01-28 | End: 2020-01-28

## 2020-01-28 RX ORDER — NALOXONE HYDROCHLORIDE 0.4 MG/ML
.1-.4 INJECTION, SOLUTION INTRAMUSCULAR; INTRAVENOUS; SUBCUTANEOUS
Status: DISCONTINUED | OUTPATIENT
Start: 2020-01-28 | End: 2020-01-28 | Stop reason: HOSPADM

## 2020-01-28 RX ORDER — POTASSIUM CHLORIDE 1500 MG/1
20 TABLET, EXTENDED RELEASE ORAL
Status: DISCONTINUED | OUTPATIENT
Start: 2020-01-28 | End: 2020-01-28 | Stop reason: HOSPADM

## 2020-01-28 RX ORDER — SODIUM CHLORIDE 9 MG/ML
INJECTION, SOLUTION INTRAVENOUS CONTINUOUS
Status: DISCONTINUED | OUTPATIENT
Start: 2020-01-28 | End: 2020-01-28 | Stop reason: HOSPADM

## 2020-01-28 RX ORDER — FLUMAZENIL 0.1 MG/ML
0.2 INJECTION, SOLUTION INTRAVENOUS
Status: DISCONTINUED | OUTPATIENT
Start: 2020-01-28 | End: 2020-01-28 | Stop reason: HOSPADM

## 2020-01-28 RX ADMIN — PROPOFOL 70 MG: 10 INJECTION, EMULSION INTRAVENOUS at 10:39

## 2020-01-28 RX ADMIN — SODIUM CHLORIDE: 9 INJECTION, SOLUTION INTRAVENOUS at 09:30

## 2020-01-28 ASSESSMENT — ENCOUNTER SYMPTOMS
DYSRHYTHMIAS: 1
DYSRHYTHMIAS: 1

## 2020-01-28 ASSESSMENT — MIFFLIN-ST. JEOR: SCORE: 2105.5

## 2020-01-28 NOTE — PROCEDURES
[facility]    Procedure: Cardioversion External  Date/Time: 1/28/2020 10:44 AM  Performed by: Antony Hernandez MD  Authorized by: Antony Hernandez MD     UNIVERSAL PROTOCOL   Site Marked: NA  Prior Images Obtained and Reviewed:  Yes  Required items: Required blood products, implants, devices and special equipment available    Patient identity confirmed:  Verbally with patient and arm band  Patient was reevaluated immediately before administering moderate or deep sedation or anesthesia  Confirmation Checklist:  Patient's identity using two indicators, relevant allergies, procedure was appropriate and matched the consent or emergent situation and correct equipment/implants were available  Time out: Immediately prior to the procedure a time out was called    Universal Protocol: the Joint Commission Universal Protocol was followed    Preparation: Patient was prepped and draped in usual sterile fashion           ANESTHESIA  Anesthesia was administered and monitored by anesthesiology.  See anesthesia documentation for details.    SEDATION    Patient Sedated: Yes    Sedation:  Propofol  Vital signs: Vital signs monitored during sedation      PROCEDURE DETAILS  Cardioversion basis: elective  Pre-procedure rhythm: atrial flutter  Patient position: patient was placed in a supine position  Chest area: chest area exposed  Electrodes: pads  Electrodes placed: anterior-posterior  Number of attempts: 1    Details of Attempts:  Single 150 J synchronized biphasic shock administered with anterior posterior pads.  Patient converted to sinus rhythm with a ventricular rate of approximately 80 bpm.  Post-procedure rhythm: normal sinus rhythm  Complications: no complications    PROCEDURE Describe Procedure: The risks and benefits of the procedure  were explained to the patient including the risk of stroke, redness on the chest and aspiration as well as rhythm abnormalities.  I confirmed with the  patient that he had been taking anticoagulation to a therapeutic level with warfarin for the last month.  I reviewed the patient's electrolytes and INR and all of these were within parameters.  The patient was administered a single 150 J synchronized biphasic shock which converted the patient from atrial flutter into sinus rhythm.  The patient remained in sinus rhythm after the procedure.  No complications occurred.  The patient will have to remain on all of these medications but in particular he will need to remain on warfarin for a minimum of 4 weeks post procedure to prevent the risk of stroke and the continuation of warfarin will be decided upon by the patient's electrophysiologist.  A plan for continuation of amiodarone has already been explained to the patient by the EP service.

## 2020-01-28 NOTE — ANESTHESIA PREPROCEDURE EVALUATION
Anesthesia Pre-Procedure Evaluation    Patient: Kevan Connelly   MRN: [id number] : 1950          Preoperative Diagnosis: Atrial flutter (H) [I48.92]    Procedure(s):  ANESTHESIA, FOR CARDIOVERSION    Past Medical History:   Diagnosis Date     Atrial flutter (H)      Benign essential hypertension 2017    past use of ACE- Cough;  Had been on ARB-diuretic but experienced lower bp readings;  BLOOD PRESSURE now well controlled on ARB also no longer on diuretic; no low bp readings; may have had slight edema initially but has normalized with bp well controlled on ARB alone.      Cancer (H)     testicular cancer     CHF (congestive heart failure) (H) 2019     Long term current use of anticoagulant therapy 6/3/2019     Obesity (BMI 35.0-39.9) with comorbidity (H) 2019     Paroxysmal atrial fibrillation (H) 2019     Past Surgical History:   Procedure Laterality Date     ANESTHESIA CARDIOVERSION N/A 2019    Procedure: ANESTHESIA, FOR CARDIOVERSION DR. LE;  Surgeon: GENERIC ANESTHESIA PROVIDER;  Location:  OR     ANESTHESIA CARDIOVERSION N/A 2019    Procedure: ANESTHESIA, FOR CARDIOVERSION;  Surgeon: GENERIC ANESTHESIA PROVIDER;  Location: [facility] OR     CLOSED REDUCTION SHOULDER Left      EP ABLATION FOCAL AFIB N/A 1/3/2020    Procedure: EP Ablation Focal AFIB;  Surgeon: Lamont Hannon MD;  Location:  [facility]     GENITOURINARY SURGERY      testicular cancer     ORTHOPEDIC SURGERY  's    right knee surgery        Anesthesia Evaluation     . Pt has had prior anesthetic. Type: General    No history of anesthetic complications          ROS/MED HX    ENT/Pulmonary:      (-) sleep apnea   Neurologic:       Cardiovascular:     (+) hypertension----. : . CHF . . :. dysrhythmias a-fib, .       METS/Exercise Tolerance:     Hematologic:         Musculoskeletal:         GI/Hepatic:        (-) GERD and liver disease   Renal/Genitourinary:      (-) renal disease  "  Endo:     (+) Obesity, .   (-) Type I DM and Type II DM   Psychiatric:         Infectious Disease:         Malignancy:   (+) Malignancy History of Other  Other CA Testicular CA status post         Other:                          Physical Exam  Normal systems: pulmonary and dental    Airway   Mallampati: II  TM distance: >3 FB  Neck ROM: full    Dental     Cardiovascular   Rhythm and rate: irregular      Pulmonary             Lab Results   Component Value Date    WBC 5.8 01/03/2020    HGB 16.0 01/03/2020    HCT 44.2 01/03/2020     01/03/2020     01/03/2020    POTASSIUM 4.6 01/28/2020    CHLORIDE 110 (H) 01/03/2020    CO2 21 01/03/2020    BUN 21 01/03/2020    CR 1.11 01/03/2020    GLC 95 01/03/2020    CHU 8.4 (L) 01/03/2020    MAG 2.1 01/28/2020    ALBUMIN 4.0 01/22/2019    PROTTOTAL 7.2 01/22/2019    ALT 23 01/13/2020    AST 17 01/13/2020    ALKPHOS 51 01/22/2019    BILITOTAL 1.3 01/22/2019    INR 3.66 (H) 01/28/2020    TSH 2.86 01/13/2020       Preop Vitals  BP Readings from Last 3 Encounters:   01/28/20 (!) 134/92   01/10/20 (!) 140/80   01/04/20 (!) 141/83    Pulse Readings from Last 3 Encounters:   01/28/20 81   01/10/20 70   01/03/20 66      Resp Readings from Last 3 Encounters:   01/28/20 18   01/04/20 16   01/02/20 18    SpO2 Readings from Last 3 Encounters:   01/28/20 99%   01/04/20 98%   01/02/20 94%      Temp Readings from Last 1 Encounters:   01/28/20 36.6  C (97.9  F) (Oral)    Ht Readings from Last 1 Encounters:   01/28/20 1.93 m (6' 3.98\")      Wt Readings from Last 1 Encounters:   01/28/20 122.9 kg (271 lb)    Estimated body mass index is 33 kg/m  as calculated from the following:    Height as of this encounter: 1.93 m (6' 3.98\").    Weight as of this encounter: 122.9 kg (271 lb).       Anesthesia Plan      History & Physical Review  History and physical reviewed and following examination; no interval change.    ASA Status:  3 .    NPO Status:  > 8 hours    Plan for General with " Intravenous induction.          Postoperative Care      Consents  Anesthetic plan, risks, benefits and alternatives discussed with:  Patient..                 Elmer Griggs MD

## 2020-01-28 NOTE — ANESTHESIA CARE TRANSFER NOTE
Patient: Kevan Connelly    * No procedures listed *    Diagnosis: * No pre-op diagnosis entered *  Diagnosis Additional Information: No value filed.    Anesthesia Type:   No value filed.     Note:      Comments: Diagnosis:Atrial Flutter  Procedure: Cardioversion  Cardiologist:Benjamin  Location:[facility]      Vitals: (Last set prior to Anesthesia Care Transfer)    CRNA VITALS  1/28/2020 1016 - 1/28/2020 1105      1/28/2020             SpO2:  100 %                Electronically Signed By: ANNMARIE Atkinson [facility]  January 28, 2020  11:05 AM

## 2020-01-28 NOTE — PROGRESS NOTES
"Reason for Visit: Cardioversion    0910 A/O. Pt denies sleep apnea. No dentures - removable \"flipper\" reported. Removed prior to procedure.  NPO x 12+ hrs. Pt's wife in lobby. Discharge instructions given to pt w/ verbal understanding received. All questions & concerns addressed.     CDV: Pt tolerated well. CDV x 1. See rhythm strips. See Procedural Sedation Flowsheet.     1133 Pt discharged per w/c to private vehicle. All personal belongings sent with pt.          "

## 2020-01-28 NOTE — DISCHARGE INSTRUCTIONS
Cardioversion Discharge Instructions    After you go home:       For 24 hours - due to the sedation you received:      Have an adult stay with you for 24 hours.     Relax and take it easy.    Do NOT make any important or legal decisions.    Do NOT drive or operate machines at home or at work.    Do NOT drink alcohol.    Diet:      Start with clear liquids and progress to your normal diet as you feel able.    Medicines:      Take your medications, including blood thinners, unless your provider tells you not to.    If you have stopped any medications, check with your provider about when to restart them.    Follow Up Appointments:      Follow up with your cardiologist at [facility] of patient preference as instructed.    Follow up with your primary care provider as needed.    Post cardioversion:    The skin on your chest or back may feel tender for 48 hours.  If your skin is tender, you may:      Use a cold pack on the site. Never use ice directly on your skin. Use the cold pack for 20 minutes. Remove it for at least 30 minutes before re-using.    Apply 1% hydrocortisone cream to the skin (sold at drug stores)    Take Advil (Ibuprofen) or Tylenol (Acetaminophen) per your provider's recommendations.      Call your provider if you have:      Weakness, dizziness, lightheadedness, or fainting.    Shortness of breath.    Irregular heartbeat, feelings of your heart fluttering or beating fast, hard or palpitations.     More than minor skin discomfort or redness where the cardioversion pads were placed.    Questions or concerns.      Call 911 if you have:      Pain in your chest, arm, shoulder, neck, or upper back.    You have problems speaking or seeing.    Weakness in your arm or leg.    You are unable to move your arm or leg.    You have uncontrolled bleeding.         [facility] [place]:    [phone] [facility] (7 days a week)

## 2020-01-29 LAB — INTERPRETATION ECG - MUSE: NORMAL

## 2020-01-29 NOTE — ANESTHESIA POSTPROCEDURE EVALUATION
Patient: Kevan Connelly    * No procedures listed *    Diagnosis:* No pre-op diagnosis entered *  Diagnosis Additional Information: No value filed.    Anesthesia Type:  No value filed.    Note:  Anesthesia Post Evaluation    Patient location during evaluation: Bedside  Patient participation: Able to fully participate in evaluation  Level of consciousness: awake and alert  Pain management: adequate  Airway patency: patent  Cardiovascular status: acceptable  Respiratory status: acceptable  Hydration status: acceptable  PONV: none     Anesthetic complications: None          Last vitals:  There were no vitals filed for this visit.      Electronically Signed By: Elmer Griggs MD  January 28, 2020  6:28 PM

## 2020-01-29 NOTE — ANESTHESIA PREPROCEDURE EVALUATION
Anesthesia Pre-Procedure Evaluation    Patient: Kevan Connelly   MRN: [id number] : 1950          Preoperative Diagnosis: * No pre-op diagnosis entered *    * No procedures listed *    Past Medical History:   Diagnosis Date     Atrial flutter (H)      Benign essential hypertension 2017    past use of ACE- Cough;  Had been on ARB-diuretic but experienced lower bp readings;  BLOOD PRESSURE now well controlled on ARB also no longer on diuretic; no low bp readings; may have had slight edema initially but has normalized with bp well controlled on ARB alone.      Cancer (H)     testicular cancer     CHF (congestive heart failure) (H) 2019     Long term current use of anticoagulant therapy 6/3/2019     Obesity (BMI 35.0-39.9) with comorbidity (H) 2019     Paroxysmal atrial fibrillation (H) 2019     Past Surgical History:   Procedure Laterality Date     ANESTHESIA CARDIOVERSION N/A 2019    Procedure: ANESTHESIA, FOR CARDIOVERSION DR. LE;  Surgeon: GENERIC ANESTHESIA PROVIDER;  Location:  OR     ANESTHESIA CARDIOVERSION N/A 2019    Procedure: ANESTHESIA, FOR CARDIOVERSION;  Surgeon: GENERIC ANESTHESIA PROVIDER;  Location:  OR     ANESTHESIA CARDIOVERSION N/A 2020    Procedure: ANESTHESIA, FOR CARDIOVERSION;  Surgeon: GENERIC ANESTHESIA PROVIDER;  Location:  OR     CLOSED REDUCTION SHOULDER Left      EP ABLATION FOCAL AFIB N/A 1/3/2020    Procedure: EP Ablation Focal AFIB;  Surgeon: Lamont Hannon MD;  Location:  HEART CARDIAC CATH LAB     GENITOURINARY SURGERY      testicular cancer     ORTHOPEDIC SURGERY  's    right knee surgery        Anesthesia Evaluation     . Pt has had prior anesthetic. Type: General    No history of anesthetic complications          ROS/MED HX    ENT/Pulmonary:      (-) sleep apnea   Neurologic:       Cardiovascular:     (+) hypertension----. : . CHF . . :. dysrhythmias a-fib, .       METS/Exercise Tolerance:     Hematologic:     "     Musculoskeletal:         GI/Hepatic:        (-) GERD and liver disease   Renal/Genitourinary:      (-) renal disease   Endo:     (+) Obesity, .   (-) Type I DM and Type II DM   Psychiatric:         Infectious Disease:         Malignancy:   (+) Malignancy History of Other  Other CA Testicular CA status post         Other:                          Physical Exam  Normal systems: pulmonary and dental    Airway   Mallampati: II  TM distance: >3 FB  Neck ROM: full    Dental     Cardiovascular   Rhythm and rate: irregular      Pulmonary             Lab Results   Component Value Date    WBC 5.8 01/03/2020    HGB 16.0 01/03/2020    HCT 44.2 01/03/2020     01/03/2020     01/03/2020    POTASSIUM 4.6 01/28/2020    CHLORIDE 110 (H) 01/03/2020    CO2 21 01/03/2020    BUN 21 01/03/2020    CR 1.11 01/03/2020    GLC 95 01/03/2020    CHU 8.4 (L) 01/03/2020    MAG 2.1 01/28/2020    ALBUMIN 4.0 01/22/2019    PROTTOTAL 7.2 01/22/2019    ALT 23 01/13/2020    AST 17 01/13/2020    ALKPHOS 51 01/22/2019    BILITOTAL 1.3 01/22/2019    INR 3.66 (H) 01/28/2020    TSH 2.86 01/13/2020       Preop Vitals  BP Readings from Last 3 Encounters:   01/28/20 118/84   01/10/20 (!) 140/80   01/04/20 (!) 141/83    Pulse Readings from Last 3 Encounters:   01/28/20 65   01/10/20 70   01/03/20 66      Resp Readings from Last 3 Encounters:   01/28/20 18   01/04/20 16   01/02/20 18    SpO2 Readings from Last 3 Encounters:   01/28/20 99%   01/04/20 98%   01/02/20 94%      Temp Readings from Last 1 Encounters:   01/28/20 36.6  C (97.9  F) (Oral)    Ht Readings from Last 1 Encounters:   01/28/20 1.93 m (6' 3.98\")      Wt Readings from Last 1 Encounters:   01/28/20 122.9 kg (271 lb)    Estimated body mass index is 33 kg/m  as calculated from the following:    Height as of an earlier encounter on 1/28/20: 1.93 m (6' 3.98\").    Weight as of an earlier encounter on 1/28/20: 122.9 kg (271 lb).       Anesthesia Plan      History & Physical " Review  History and physical reviewed and following examination; no interval change.    ASA Status:  3 .    NPO Status:  > 8 hours    Plan for General with Intravenous induction.          Postoperative Care      Consents  Anesthetic plan, risks, benefits and alternatives discussed with:  Patient..                 Elmer Griggs MD

## 2020-02-02 LAB — INTERPRETATION ECG - MUSE: NORMAL

## 2020-02-05 ENCOUNTER — ANTICOAGULATION THERAPY VISIT (OUTPATIENT)
Dept: NURSING | Facility: CLINIC | Age: 70
End: 2020-02-05
Payer: COMMERCIAL

## 2020-02-05 DIAGNOSIS — Z79.01 LONG TERM CURRENT USE OF ANTICOAGULANT THERAPY: ICD-10-CM

## 2020-02-05 LAB — INR POINT OF CARE: 4 (ref 0.86–1.14)

## 2020-02-05 PROCEDURE — 85610 PROTHROMBIN TIME: CPT | Mod: QW

## 2020-02-05 PROCEDURE — 36416 COLLJ CAPILLARY BLOOD SPEC: CPT

## 2020-02-05 PROCEDURE — 99207 ZZC NO CHARGE NURSE ONLY: CPT

## 2020-02-05 NOTE — PROGRESS NOTES
ANTICOAGULATION FOLLOW-UP CLINIC VISIT    Patient Name:  Kevan Connelly  Date:  2020  Contact Type:  Face to Face    SUBJECTIVE:  Patient Findings     Positives:   Change in health (cardioversion went well), Change in medications (amiodarone started January kicking in more fully now), Change in diet/appetite (eating more salads & greens)    Comments:   Assessed for S/S bleeding, clotting, medication, diet, health, activity and alcohol changes.          Clinical Outcomes     Negatives:   Major bleeding event, Thromboembolic event, Anticoagulation-related hospital admission, Anticoagulation-related ED visit, Anticoagulation-related fatality    Comments:   Assessed for S/S bleeding, clotting, medication, diet, health, activity and alcohol changes.             OBJECTIVE    INR Protime   Date Value Ref Range Status   2020 4.0 (A) 0.86 - 1.14 Final       ASSESSMENT / PLAN  INR assessment SUPRA    Recheck INR In: 1 WEEK    INR Location Clinic      Anticoagulation Summary  As of 2020    INR goal:   2.0-3.0   TTR:   60.0 % (8 mo)   INR used for dosin.0! (2020)   Warfarin maintenance plan:   2.5 mg (5 mg x 0.5) every Mon, Wed, Fri; 5 mg (5 mg x 1) all other days   Full warfarin instructions:   : Hold; Otherwise 2.5 mg every Mon, Wed, Fri; 5 mg all other days   Weekly warfarin total:   27.5 mg   Plan last modified:   Ana Faith [facility] (2020)   Next INR check:   2020   Priority:   High   Target end date:   Indefinite    Indications    Long term current use of anticoagulant therapy [Z79.01]             Anticoagulation Episode Summary     INR check location:   Anticoagulation Clinic    Preferred lab:       Send INR reminders to:   CONNIE LOPEZ    Comments:   5mg tabs / DCCV 19 / early morning appointments on Wed or Fri      Anticoagulation Care Providers     Provider Role Specialty Phone number    Davina Reaves MD  Internal Medicine [phone]            See the  Encounter Report to view Anticoagulation Flowsheet and Dosing Calendar (Go to Encounters tab in chart review, and find the Anticoagulation Therapy Visit)    Amiodarone started in January now fully affecting INR.  Will hold today, 15% dose decrease planned and recheck in 1 week.  Encouraged to eat normal greens and not go above and beyond, so we can titrate warfarin to his normal intake.    Ana Faith, [facility]

## 2020-02-12 ENCOUNTER — ANTICOAGULATION THERAPY VISIT (OUTPATIENT)
Dept: NURSING | Facility: CLINIC | Age: 70
End: 2020-02-12
Payer: COMMERCIAL

## 2020-02-12 VITALS — WEIGHT: 269 LBS | BODY MASS INDEX: 32.76 KG/M2

## 2020-02-12 DIAGNOSIS — Z79.01 LONG TERM CURRENT USE OF ANTICOAGULANT THERAPY: ICD-10-CM

## 2020-02-12 LAB — INR POINT OF CARE: 1.9 (ref 0.86–1.14)

## 2020-02-12 PROCEDURE — 85610 PROTHROMBIN TIME: CPT | Mod: QW

## 2020-02-12 PROCEDURE — 36416 COLLJ CAPILLARY BLOOD SPEC: CPT

## 2020-02-12 PROCEDURE — 99207 ZZC NO CHARGE NURSE ONLY: CPT

## 2020-02-12 NOTE — PROGRESS NOTES
ANTICOAGULATION FOLLOW-UP CLINIC VISIT    Patient Name:  Kevan Connelly  Date:  2020  Contact Type:  Face to Face    SUBJECTIVE:  Patient Findings     Positives:   Change in health (ablation 2 weeks ao), Missed doses (Monday 2.5mg), Change in medications (amiodarone started in January), Change in diet/appetite (eating K more consistently)    Comments:   Assessed for S/S bleeding, clotting, medication, diet, health, activity and alcohol changes.          Clinical Outcomes     Negatives:   Major bleeding event, Thromboembolic event, Anticoagulation-related hospital admission, Anticoagulation-related ED visit, Anticoagulation-related fatality    Comments:   Assessed for S/S bleeding, clotting, medication, diet, health, activity and alcohol changes.             OBJECTIVE    INR Protime   Date Value Ref Range Status   2020 1.9 (A) 0.86 - 1.14 Final       ASSESSMENT / PLAN  INR assessment SUB    Recheck INR In: 10 DAYS    INR Location Clinic      Anticoagulation Summary  As of 2020    INR goal:   2.0-3.0   TTR:   59.7 % (8.3 mo)   INR used for dosin.9! (2020)   Warfarin maintenance plan:   2.5 mg (5 mg x 0.5) every Mon, Wed, Fri; 5 mg (5 mg x 1) all other days   Full warfarin instructions:   : 5 mg; Otherwise 2.5 mg every Mon, Wed, Fri; 5 mg all other days   Weekly warfarin total:   27.5 mg   Plan last modified:   Ana Faith [facility] (2020)   Next INR check:   2020   Priority:   High   Target end date:   Indefinite    Indications    Long term current use of anticoagulant therapy [Z79.01]             Anticoagulation Episode Summary     INR check location:   Anticoagulation Clinic    Preferred lab:       Send INR reminders to:   CONNIE LOPEZ    Comments:   5mg tabs / DCCV 19 / early morning appointments on Wed or Fri      Anticoagulation Care Providers     Provider Role Specialty Phone number    Davina Reaves MD  Internal Medicine [phone]            See the  Encounter Report to view Anticoagulation Flowsheet and Dosing Calendar (Go to Encounters tab in chart review, and find the Anticoagulation Therapy Visit)    Missed 2.5mg dose Monday, will make up today with 5mg instead of 2.5mg and resume previously planned dose for week, recheck in 10 days to fully assess since now very close to goal INR.      Ana Faith [facility]

## 2020-02-21 ENCOUNTER — ANTICOAGULATION THERAPY VISIT (OUTPATIENT)
Dept: NURSING | Facility: CLINIC | Age: 70
End: 2020-02-21
Payer: COMMERCIAL

## 2020-02-21 DIAGNOSIS — Z79.01 LONG TERM CURRENT USE OF ANTICOAGULANT THERAPY: ICD-10-CM

## 2020-02-21 LAB — INR POINT OF CARE: 2.1 (ref 0.86–1.14)

## 2020-02-21 PROCEDURE — 85610 PROTHROMBIN TIME: CPT | Mod: QW

## 2020-02-21 PROCEDURE — 36416 COLLJ CAPILLARY BLOOD SPEC: CPT

## 2020-02-21 PROCEDURE — 99207 ZZC NO CHARGE NURSE ONLY: CPT

## 2020-02-21 NOTE — PROGRESS NOTES
ANTICOAGULATION FOLLOW-UP CLINIC VISIT    Patient Name:  Kevan Connelly  Date:  2020  Contact Type:  Face to Face    SUBJECTIVE:  Patient Findings     Comments:   Patient had DCCV on , still feeling good, started exercising lightly, eating more greens. Still on amiodarone. Otherwise The patient was assessed for diet, medication, and activity level changes, missed or extra doses, bruising or bleeding, with no problem findings.  Kaylee LEONARD RN  Anticoagulation Clinic  [place]          Clinical Outcomes     Negatives:   Major bleeding event, Thromboembolic event, Anticoagulation-related hospital admission, Anticoagulation-related ED visit, Anticoagulation-related fatality    Comments:   Patient had DCCV on , still feeling good, started exercising lightly, eating more greens. Still on amiodarone. Otherwise The patient was assessed for diet, medication, and activity level changes, missed or extra doses, bruising or bleeding, with no problem findings.  Kaylee LEONARD RN  Anticoagulation Clinic  [place]             OBJECTIVE    INR Protime   Date Value Ref Range Status   2020 2.1 (A) 0.86 - 1.14 Final       ASSESSMENT / PLAN  INR assessment THER    Recheck INR In: 2 WEEKS    INR Location Clinic      Anticoagulation Summary  As of 2020    INR goal:   2.0-3.0   TTR:   59.3 % (8.6 mo)   INR used for dosin.1 (2020)   Warfarin maintenance plan:   2.5 mg (5 mg x 0.5) every Mon, Wed, Fri; 5 mg (5 mg x 1) all other days   Full warfarin instructions:   2.5 mg every Mon, Wed, Fri; 5 mg all other days   Weekly warfarin total:   27.5 mg   No change documented:   Kaylee Ramey RN   Plan last modified:   Ana Faith [facility] (2020)   Next INR check:   3/6/2020   Priority:   Maintenance   Target end date:   Indefinite    Indications    Long term current use of anticoagulant therapy [Z79.01]             Anticoagulation Episode Summary     INR check location:   Anticoagulation Clinic     Preferred lab:       Send INR reminders to:   CONNIE LOPEZ    Comments:   5mg tabs / DCCV 7/17/19 & 1/28/20 / early morning appointments on Wed or Fri      Anticoagulation Care Providers     Provider Role Specialty Phone number    Davina Reaves MD  Internal Medicine [phone]            See the Encounter Report to view Anticoagulation Flowsheet and Dosing Calendar (Go to Encounters tab in chart review, and find the Anticoagulation Therapy Visit)    Kaylee Ramey RN

## 2020-02-26 ENCOUNTER — TELEPHONE (OUTPATIENT)
Dept: CARDIOLOGY | Facility: CLINIC | Age: 70
End: 2020-02-26

## 2020-02-26 DIAGNOSIS — I10 BENIGN ESSENTIAL HYPERTENSION: ICD-10-CM

## 2020-02-26 RX ORDER — LOSARTAN POTASSIUM 50 MG/1
TABLET ORAL
OUTPATIENT
Start: 2020-02-26

## 2020-02-26 NOTE — TELEPHONE ENCOUNTER
02/26/20 Pt called asking when his lab appt in [place] is scheduled. Pt informed that lab is scheduled for 2/23/20 at 8:30 am. Pt has no further questions at this time. [place] 836 am

## 2020-03-06 ENCOUNTER — ANTICOAGULATION THERAPY VISIT (OUTPATIENT)
Dept: NURSING | Facility: CLINIC | Age: 70
End: 2020-03-06
Payer: COMMERCIAL

## 2020-03-06 DIAGNOSIS — Z79.01 LONG TERM CURRENT USE OF ANTICOAGULANT THERAPY: ICD-10-CM

## 2020-03-06 LAB — INR POINT OF CARE: 2.9 (ref 0.86–1.14)

## 2020-03-06 PROCEDURE — 99207 ZZC NO CHARGE NURSE ONLY: CPT

## 2020-03-06 PROCEDURE — 85610 PROTHROMBIN TIME: CPT | Mod: QW

## 2020-03-06 PROCEDURE — 36416 COLLJ CAPILLARY BLOOD SPEC: CPT

## 2020-03-06 NOTE — PROGRESS NOTES
ANTICOAGULATION FOLLOW-UP CLINIC VISIT    Patient Name:  Kevna Connelly  Date:  3/6/2020  Contact Type:  Face to Face    SUBJECTIVE:  Patient Findings     Comments:   Patient is 2 months into his amiodarone. Will continue to see if INR rises as he peaks around 3 months. Patient states he will see Dr. Hannon end  to see if the amiodarone can be discontinued. The patient was assessed for diet, medication, and activity level changes, missed or extra doses, bruising or bleeding, with no problem findings.  Kaylee LEONARD RN  Anticoagulation Clinic  [place]          Clinical Outcomes     Negatives:   Major bleeding event, Thromboembolic event, Anticoagulation-related hospital admission, Anticoagulation-related ED visit, Anticoagulation-related fatality    Comments:   Patient is 2 months into his amiodarone. Will continue to see if INR rises as he peaks around 3 months. Patient states he will see Dr. Hannon end  to see if the amiodarone can be discontinued. The patient was assessed for diet, medication, and activity level changes, missed or extra doses, bruising or bleeding, with no problem findings.  Kaylee LEONARD RN  Anticoagulation Clinic  [place]             OBJECTIVE    INR Protime   Date Value Ref Range Status   2020 2.9 (A) 0.86 - 1.14 Final       ASSESSMENT / PLAN  INR assessment THER    Recheck INR In: 3 WEEKS    INR Location Clinic      Anticoagulation Summary  As of 3/6/2020    INR goal:   2.0-3.0   TTR:   61.4 % (9 mo)   INR used for dosin.9 (3/6/2020)   Warfarin maintenance plan:   2.5 mg (5 mg x 0.5) every Mon, Wed, Fri; 5 mg (5 mg x 1) all other days   Full warfarin instructions:   2.5 mg every Mon, Wed, Fri; 5 mg all other days   Weekly warfarin total:   27.5 mg   No change documented:   Kaylee Ramey RN   Plan last modified:   Ana Faith [facility] (2020)   Next INR check:   3/27/2020   Priority:   Maintenance   Target end date:   Indefinite    Indications    Long  term current use of anticoagulant therapy [Z79.01]             Anticoagulation Episode Summary     INR check location:   Anticoagulation Clinic    Preferred lab:       Send INR reminders to:   CONNIE LOPEZ    Comments:   5mg tabs / DCCV 7/17/19 & 1/28/20 / early morning appointments on Wed or Fri      Anticoagulation Care Providers     Provider Role Specialty Phone number    Davina Reaves MD  Internal Medicine [phone]            See the Encounter Report to view Anticoagulation Flowsheet and Dosing Calendar (Go to Encounters tab in chart review, and find the Anticoagulation Therapy Visit)    Kaylee Ramey RN

## 2020-03-09 ENCOUNTER — OFFICE VISIT (OUTPATIENT)
Dept: FAMILY MEDICINE | Facility: CLINIC | Age: 70
End: 2020-03-09
Payer: COMMERCIAL

## 2020-03-09 VITALS
HEIGHT: 74 IN | DIASTOLIC BLOOD PRESSURE: 76 MMHG | OXYGEN SATURATION: 99 % | BODY MASS INDEX: 34.78 KG/M2 | TEMPERATURE: 98 F | WEIGHT: 271 LBS | RESPIRATION RATE: 18 BRPM | SYSTOLIC BLOOD PRESSURE: 124 MMHG | HEART RATE: 71 BPM

## 2020-03-09 DIAGNOSIS — E66.01 MORBID OBESITY (H): ICD-10-CM

## 2020-03-09 DIAGNOSIS — I48.91 ATRIAL FIBRILLATION, UNSPECIFIED TYPE (H): ICD-10-CM

## 2020-03-09 DIAGNOSIS — N18.30 CHRONIC KIDNEY DISEASE, STAGE 3 (MODERATE): ICD-10-CM

## 2020-03-09 DIAGNOSIS — I10 BENIGN ESSENTIAL HYPERTENSION: Primary | ICD-10-CM

## 2020-03-09 PROCEDURE — 99214 OFFICE O/P EST MOD 30 MIN: CPT | Performed by: INTERNAL MEDICINE

## 2020-03-09 RX ORDER — LOSARTAN POTASSIUM 50 MG/1
50 TABLET ORAL DAILY
Qty: 90 TABLET | Refills: 3 | Status: SHIPPED | OUTPATIENT
Start: 2020-03-09 | End: 2021-03-31

## 2020-03-09 ASSESSMENT — MIFFLIN-ST. JEOR: SCORE: 2074

## 2020-03-09 NOTE — PROGRESS NOTES
Subjective     Kevan Connelly is a 69 year old male who presents to clinic today for the following health issues:    HPI   Hypertension Follow-up      Do you check your blood pressure regularly outside of the clinic? Yes     Are you following a low salt diet? Yes    Are your blood pressures ever more than 140 on the top number (systolic) OR more   than 90 on the bottom number (diastolic), for example 140/90? Yes      How many servings of fruits and vegetables do you eat daily?  2-3    On average, how many sweetened beverages do you drink each day (Examples: soda, juice, sweet tea, etc.  Do NOT count diet or artificially sweetened beverages)?   1    How many days per week do you exercise enough to make your heart beat faster? 3 or less    How many minutes a day do you exercise enough to make your heart beat faster? 30 - 60    How many days per week do you miss taking your medication? 0    Hyperlipidemia Follow-Up      Are you regularly taking any medication or supplement to lower your cholesterol?   yes    Are you having muscle aches or other side effects that you think could be caused by your cholesterol lowering medication?  No   Recent Labs   Lab Test 01/13/20  0743 01/22/19  0837   CHOL 232* 179   HDL 38* 42   * 120*   TRIG 192* 84       Added Lipitor after 1/2020 results.     Follow up labs as scheduled at end of March.     Atrial Fibrillation.    Allergies   Allergen Reactions     Lisinopril Cough     Very persistent cough       Reviewed and updated as needed this visit by Provider  Tobacco  Allergies  Meds  Problems  Med Hx  Surg Hx  Fam Hx         Review of Systems   ROS COMP: CONSTITUTIONAL: NEGATIVE for fever, chills, change in weight  ENT/MOUTH: NEGATIVE for ear, mouth and throat problems  RESP: NEGATIVE for significant cough or SOB  CV: atrial fibrillation; hypertension- well controlled  MUSCULOSKELETAL: NEGATIVE for significant arthralgias or myalgia  NEURO: NEGATIVE for weakness,  "dizziness or paresthesias  ENDOCRINE: NEGATIVE for temperature intolerance, skin/hair changes  HEME/ALLERGY/IMMUNE: anticoagulated due to atrial fibrillation;   PSYCHIATRIC: NEGATIVE for changes in mood or affect      Objective    /76   Pulse 71   Temp 98  F (36.7  C) (Oral)   Resp 18   Ht 1.88 m (6' 2\")   Wt 122.9 kg (271 lb)   SpO2 99%   BMI 34.79 kg/m    Body mass index is 34.79 kg/m .  Physical Exam   GENERAL: healthy, alert and no distress  NECK: no adenopathy, no asymmetry, masses, or scars and thyroid normal to palpation  RESP: lungs clear to auscultation - no rales, rhonchi or wheezes  CV: regular rates and rhythm, normal S1 S2, no S3 or S4 and no peripheral edema  ABDOMEN: soft, nontender and bowel sounds normal  MS: no gross musculoskeletal defects noted, no edema  NEURO: Normal strength and tone, mentation intact and speech normal  PSYCH: mentation appears normal, affect normal/bright    Diagnostic Test Results:  Labs reviewed in Epic        Assessment & Plan     (I48.91) Atrial fibrillation, unspecified type (H)  (primary encounter diagnosis)  Comment: rate controlled; anticoagulated  Plan:     (I10) Benign essential hypertension  Comment: bp well controlled; Losartan well tolerated.   Plan: losartan (COZAAR) 50 MG tablet          (E66.01) Morbid obesity (H)  Comment: Body mass index is 34.79 kg/m .   Plan: keep active and exercise regulalry    (N18.3) Chronic kidney disease, stage 3 (moderate) (H)  Comment:   Lab Results   Component Value Date    CR 1.11 01/03/2020    CR 1.13 11/13/2019     Plan: no changes in medications.      BMI:   Estimated body mass index is 34.79 kg/m  as calculated from the following:    Height as of this encounter: 1.88 m (6' 2\").    Weight as of this encounter: 122.9 kg (271 lb).   Weight management plan: Discussed healthy diet and exercise guidelines        Return in about 3 months (around 6/9/2020) for Wellness visit- encouraged.    Davina Reaves, " MD  Internal Medicine   [facility]

## 2020-03-19 DIAGNOSIS — I48.0 PAROXYSMAL ATRIAL FIBRILLATION (H): Primary | ICD-10-CM

## 2020-03-19 DIAGNOSIS — Z79.01 LONG TERM CURRENT USE OF ANTICOAGULANT THERAPY: ICD-10-CM

## 2020-03-19 DIAGNOSIS — I48.92 ATRIAL FLUTTER (H): ICD-10-CM

## 2020-03-20 ENCOUNTER — TELEPHONE (OUTPATIENT)
Dept: FAMILY MEDICINE | Facility: CLINIC | Age: 70
End: 2020-03-20

## 2020-03-20 DIAGNOSIS — Z79.01 LONG TERM CURRENT USE OF ANTICOAGULANT THERAPY: ICD-10-CM

## 2020-03-20 DIAGNOSIS — I48.0 PAROXYSMAL ATRIAL FIBRILLATION (H): Primary | ICD-10-CM

## 2020-03-20 NOTE — TELEPHONE ENCOUNTER
Called patient to reschedule his 3/25/20 INR appointment to the lab schedule d/t to new policy r/t Covid-19. Patient already has a lab scheduled on 3/23/20 and would like to have INR added on that day.     Routing to [place] to keep any eye out for this lab because there is no INR nurse in [place] on Mondays.    Kaylee LEONARD RN  Anticoagulation Clinic  [place]

## 2020-03-20 NOTE — TELEPHONE ENCOUNTER
Standing order placed for INR.  Result will route to [place].  Bree Blair, RN  Anticoagulation Nurse - [facility], [place]

## 2020-03-23 ENCOUNTER — TELEPHONE (OUTPATIENT)
Dept: FAMILY MEDICINE | Facility: CLINIC | Age: 70
End: 2020-03-23

## 2020-03-23 DIAGNOSIS — I48.0 PAROXYSMAL ATRIAL FIBRILLATION (H): ICD-10-CM

## 2020-03-23 DIAGNOSIS — Z79.01 LONG TERM CURRENT USE OF ANTICOAGULANT THERAPY: ICD-10-CM

## 2020-03-23 DIAGNOSIS — I25.10 CORONARY ARTERY CALCIFICATION: ICD-10-CM

## 2020-03-23 LAB
CAPILLARY BLOOD COLLECTION: NORMAL
INR PPP: 2.4 (ref 0.86–1.14)

## 2020-03-23 PROCEDURE — 36416 COLLJ CAPILLARY BLOOD SPEC: CPT | Performed by: INTERNAL MEDICINE

## 2020-03-23 PROCEDURE — 85610 PROTHROMBIN TIME: CPT | Performed by: INTERNAL MEDICINE

## 2020-03-23 NOTE — TELEPHONE ENCOUNTER
ANTICOAGULATION MANAGEMENT     Patient Name:  Kevan Connelly  Date:  3/23/2020    ASSESSMENT /SUBJECTIVE:      Today's INR result of 2.40 is therapeutic. Goal INR of 2.0-3.0      Warfarin dose taken: Warfarin taken as previously instructed    Diet: No new diet changes affecting INR    Medication changes/ interactions: No new medications/supplements affecting INR    Previous INR: Therapeutic     S/S of bleeding or thromboembolism: No    New injury or illness:  No    Upcoming surgery, procedure or cardioversion:  No    Additional findings: None      PLAN:    Spoke with Kevan regarding INR result and instructed:     Warfarin Dosing Instructions: Continue your current warfarin dose    Instructed patient to follow up no later than: 4 weeks  Lab visit scheduled    Education provided: Donna Cabrales verbalizes understanding and agrees to warfarin dosing plan.    Instructed to call the Anticoagulation Clinic for any changes, questions or concerns. (#[phone])        OBJECTIVE:  INR   Date Value Ref Range Status   2020 2.40 (H) 0.86 - 1.14 Final     Comment:     This test is intended for monitoring Coumadin therapy.  Results are not   accurate in patients with prolonged INR due to factor deficiency.               Anticoagulation Summary  As of 3/23/2020    INR goal:   2.0-3.0   TTR:   63.7 % (9.6 mo)   INR used for dosin.40 (3/23/2020)   Warfarin maintenance plan:   2.5 mg (5 mg x 0.5) every Mon, Wed, Fri; 5 mg (5 mg x 1) all other days   Full warfarin instructions:   2.5 mg every Mon, Wed, Fri; 5 mg all other days   Weekly warfarin total:   27.5 mg   Plan last modified:   Ana Faith RPH (2020)   Next INR check:   2020   Priority:   Maintenance   Target end date:   Indefinite    Indications    Long term current use of anticoagulant therapy [Z79.01]             Anticoagulation Episode Summary     INR check location:   Anticoagulation Clinic    Preferred lab:       Send INR reminders to:    CONNIE LOPEZ    Comments:   5mg tabs / DCCV 7/17/19 & 1/28/20 / early morning appointments on Wed or Fri      Anticoagulation Care Providers     Provider Role Specialty Phone number    Davina Reaves MD  Internal Medicine [phone]

## 2020-03-24 ENCOUNTER — TELEPHONE (OUTPATIENT)
Dept: CARDIOLOGY | Facility: CLINIC | Age: 70
End: 2020-03-24

## 2020-03-24 DIAGNOSIS — I25.10 CORONARY ARTERY CALCIFICATION: ICD-10-CM

## 2020-03-24 LAB
ALT SERPL W P-5'-P-CCNC: 35 U/L (ref 0–70)
CHOLEST SERPL-MCNC: 161 MG/DL
HDLC SERPL-MCNC: 43 MG/DL
LDLC SERPL CALC-MCNC: 94 MG/DL
NONHDLC SERPL-MCNC: 118 MG/DL
TRIGL SERPL-MCNC: 119 MG/DL

## 2020-03-24 PROCEDURE — 80061 LIPID PANEL: CPT | Performed by: NURSE PRACTITIONER

## 2020-03-24 PROCEDURE — 36415 COLL VENOUS BLD VENIPUNCTURE: CPT | Performed by: NURSE PRACTITIONER

## 2020-03-24 PROCEDURE — 84460 ALANINE AMINO (ALT) (SGPT): CPT | Performed by: NURSE PRACTITIONER

## 2020-04-10 ENCOUNTER — TELEPHONE (OUTPATIENT)
Dept: CARDIOLOGY | Facility: CLINIC | Age: 70
End: 2020-04-10

## 2020-04-10 NOTE — TELEPHONE ENCOUNTER
Patient left message about upcoming appt on 4/22 with Dr Hannon.  Reviewed with Dr Hannon and he wants to do virtual visit on the 4/22 and will discuss labs to be done later.  Message left for patient to discuss.  Awaiting return call.  DIO Schrader

## 2020-04-13 ENCOUNTER — TELEPHONE (OUTPATIENT)
Dept: FAMILY MEDICINE | Facility: CLINIC | Age: 70
End: 2020-04-13

## 2020-04-13 NOTE — TELEPHONE ENCOUNTER
Called patient to discuss. States he had a nose bleed recently and he has been noticing some unusual bruising on his hands as well, would like INR from 4/20/20 moved up to this week.  Patient scheduled for tomorrow as lab-only INR.  Kaylee LEONARD RN  Anticoagulation Clinic  [place]

## 2020-04-13 NOTE — TELEPHONE ENCOUNTER
Reason for call:  Other   Patient called regarding (reason for call): appointment  Additional comments: Would like to speak with Kaylee wen up coming appointment. Would like to push the appointment up a few weeks.     Phone number to reach patient:  Cell number on file:    Telephone Information:   Mobile [phone]       Best Time:  any    Can we leave a detailed message on this number?  YES    Travel screening: Not Applicable     Joan Day on 4/13/2020 at 12:25 PM

## 2020-04-14 ENCOUNTER — TELEPHONE (OUTPATIENT)
Dept: FAMILY MEDICINE | Facility: CLINIC | Age: 70
End: 2020-04-14

## 2020-04-14 DIAGNOSIS — I48.0 PAROXYSMAL ATRIAL FIBRILLATION (H): ICD-10-CM

## 2020-04-14 DIAGNOSIS — Z79.01 LONG TERM CURRENT USE OF ANTICOAGULANT THERAPY: ICD-10-CM

## 2020-04-14 DIAGNOSIS — I48.92 ATRIAL FLUTTER (H): ICD-10-CM

## 2020-04-14 DIAGNOSIS — Z79.01 LONG TERM CURRENT USE OF ANTICOAGULANT THERAPY: Primary | ICD-10-CM

## 2020-04-14 LAB
CAPILLARY BLOOD COLLECTION: NORMAL
INR PPP: 2.2 (ref 0.86–1.14)

## 2020-04-14 PROCEDURE — 36416 COLLJ CAPILLARY BLOOD SPEC: CPT | Performed by: INTERNAL MEDICINE

## 2020-04-14 PROCEDURE — 85610 PROTHROMBIN TIME: CPT | Performed by: INTERNAL MEDICINE

## 2020-04-14 NOTE — TELEPHONE ENCOUNTER
ANTICOAGULATION MANAGEMENT     Patient Name:  Kevan Connelly  Date:  2020    ASSESSMENT /SUBJECTIVE:    Today's INR result of 2.2 is therapeutic. Goal INR of 2.0-3.0      Warfarin dose taken: Warfarin taken as previously instructed    Diet: No new diet changes affecting INR    Medication changes/ interactions: No new medications/supplements affecting INR    Previous INR: Therapeutic     S/S of bleeding or thromboembolism: Yes: 1 nosebleed 3 days ago, he was able to apply pressure and per patient it did not last long. Bruising on hands. Came in sooner for INR to make sure he was not supra therapeutic    New injury or illness: No    Upcoming surgery, procedure or cardioversion: No    Additional findings: None      PLAN:    Spoke with Kevan regarding INR result and instructed:     Warfarin Dosing Instructions: Continue your current warfarin dose 2.5mg mon/wed/fri and 5mg rest of week    Instructed patient to follow up no later than: 6 weeks  Lab visit scheduled    Education provided: Target INR goal and significance of current INR result, Importance of therapeutic range, Monitoring for bleeding signs and symptoms and When to seek medical attention/emergency care  Keron will call his PCP if the nosebleeds return. Did recommend patient use a saline nasal spray due to potential for nosebleeds from dryness    Keron verbalizes understanding and agrees to warfarin dosing plan.    Instructed to call the Anticoagulation Clinic for any changes, questions or concerns. (#[phone])        OBJECTIVE:  INR   Date Value Ref Range Status   2020 2.20 (H) 0.86 - 1.14 Final     Comment:     This test is intended for monitoring Coumadin therapy.  Results are not   accurate in patients with prolonged INR due to factor deficiency.               Anticoagulation Summary  As of 2020    INR goal:   2.0-3.0   TTR:   66.3 % (10.3 mo)   INR used for dosin.20 (2020)   Warfarin maintenance plan:   2.5 mg (5 mg x  0.5) every Mon, Wed, Fri; 5 mg (5 mg x 1) all other days   Full warfarin instructions:   2.5 mg every Mon, Wed, Fri; 5 mg all other days   Weekly warfarin total:   27.5 mg   Plan last modified:   Ana Faith [facility] (2/5/2020)   Next INR check:   5/26/2020   Priority:   Maintenance   Target end date:   Indefinite    Indications    Long term current use of anticoagulant therapy [Z79.01]             Anticoagulation Episode Summary     INR check location:   Anticoagulation Clinic    Preferred lab:       Send INR reminders to:   CONNIE HOBBS    Comments:   5mg tabs / DCCV 7/17/19 & 1/28/20 / early morning appointments on Wed or Fri      Anticoagulation Care Providers     Provider Role Specialty Phone number    Davina Reaves MD  Internal Medicine [phone]

## 2020-04-14 NOTE — TELEPHONE ENCOUNTER
Anticoagulation Management    Unable to reach Keron today.    Today's INR result of 2.20 is therapeutic (goal INR of 2.0-3.0).  Result received from: Clinic Lab    Follow up required to confirm warfarin dose taken and assess for changes    Left message to call  [facility] [phone]    Pts INR had initially been scheduled for 4/20/20, but he requested an earlier INR due to some bleeding/bruising he was having per TE from 4/13    Anticoagulation clinic to follow up    Ester Guerrero RN

## 2020-04-22 ENCOUNTER — VIRTUAL VISIT (OUTPATIENT)
Dept: CARDIOLOGY | Facility: CLINIC | Age: 70
End: 2020-04-22
Payer: COMMERCIAL

## 2020-04-22 DIAGNOSIS — I48.0 PAROXYSMAL ATRIAL FIBRILLATION (H): Primary | ICD-10-CM

## 2020-04-22 PROCEDURE — 99213 OFFICE O/P EST LOW 20 MIN: CPT | Mod: 95 | Performed by: INTERNAL MEDICINE

## 2020-04-22 NOTE — LETTER
4/22/2020      RE: Kevan Connelly  [address]  [place] [address]       Dear Colleague,    Thank you for the opportunity to participate in the care of your patient, Kevan Connelly, at the [facility]. Please see a copy of my visit note below.    REASON FOR VISIT: Electrophysiology evaluation.      HISTORY OF PRESENT ILLNESS: Mr. 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  He was initially placed on amiodarone and underwent successful cardioversion.  After cardioversion, cardiac function normalized and amiodarone was switched to flecainide.  Unfortunately, he failed therapy with flecainide having recurrence of atrial fibrillation.  He underwent A. fib ablation 01/03/2020 and is here today for routine follow-up.      Patient underwent 1/03/2020.  He tolerated procedure well, however after procedure had recurrence of flutter.  He was started on amiodarone and underwent cardioversion 01/28/2020.  Today, he informs he continues to do well.  He denies any recurrence of atrial arrhythmia since the episode after the ablation.  He denies any symptoms such as chest pain, shortness of breath, lightheadedness, near-syncope or syncope.    EKG post cardioversion showed normal sinus rhythm with first-degree AV block.     Previous studies:  -EKG (08/20/2019): Normal sinus rhythm.  First-degree AV block (KY of 276 ms).  QRS measuring 112 ms.  -Exercise stress test (08/28/2019): Target heart rate was not achieved.  However test was not negative for ischemia or pro-arrhythmias.  -Limited echo (08/14/2019): Normal LV function.  EF estimated 50-55%.     SSESSMENT AND PLAN:   1.  Persistent atrial fibrillation.    He completed the healing phase after ablation.  No recurrence of atrial arrhythmias  after completion of the healing phase.  I recommend the following:  -Stop amiodarone.  -Continue metoprolol.  -Follow-up in clinic in 3 months with an EKG.     2.  Embolic prevention.  Chads vas score of 2-3.  Continue anticoagulation indefinitely.      Lamont Hannon MD    Physical Exam:  Vitals: There were no vitals taken for this visit.    Physical exam not performed as this was a phone call visit.    CURRENT MEDICATIONS:  Current Outpatient Medications   Medication Sig Dispense Refill     acetaminophen (TYLENOL) 500 MG tablet Take 500 mg by mouth every 6 hours as needed for mild pain       amiodarone (PACERONE/CODARONE) 200 MG tablet Take 1 tablet (200 mg) by mouth daily 30 tablet 3     atorvastatin (LIPITOR) 20 MG tablet Take 1 tablet (20 mg) by mouth daily 90 tablet 3     losartan (COZAAR) 50 MG tablet Take 1 tablet (50 mg) by mouth daily 90 tablet 3     melatonin 3 MG CAPS Take 3 mg by mouth At Bedtime       metoprolol succinate ER (TOPROL-XL) 50 MG 24 hr tablet Take 1 tablet (50 mg) by mouth daily       sildenafil (REVATIO) 20 MG tablet Take 1 tablet (20 mg) by mouth daily as needed 30 tablet 3     warfarin ANTICOAGULANT (COUMADIN) 5 MG tablet Take 1/2 tablet (2.5mg) by mouth on Mon; take 1 tablet (5mg) all other days; or as directed by INR Clinic 110 tablet 0     ALLERGIES     Allergies   Allergen Reactions     Lisinopril Cough     Very persistent cough     PAST MEDICAL HISTORY:  Past Medical History:   Diagnosis Date     Atrial flutter (H)      Benign essential hypertension 1/18/2017    past use of ACE- Cough;  Had been on ARB-diuretic but experienced lower bp readings;  BLOOD PRESSURE now well controlled on ARB also no longer on diuretic; no low bp readings; may have had slight edema initially but has normalized with bp well controlled on ARB alone.      Cancer (H)     testicular cancer     CHF (congestive heart failure) (H) 6/5/2019     Long term current use of anticoagulant therapy 6/3/2019     Obesity  (BMI 35.0-39.9) with comorbidity (H) 2/11/2019     Paroxysmal atrial fibrillation (H) 06/03/2019     PAST SURGICAL HISTORY:  Past Surgical History:   Procedure Laterality Date     ANESTHESIA CARDIOVERSION N/A 6/26/2019    Procedure: ANESTHESIA, FOR CARDIOVERSION DR. LE;  Surgeon: GENERIC ANESTHESIA PROVIDER;  Location: [facility] OR     ANESTHESIA CARDIOVERSION N/A 7/17/2019    Procedure: ANESTHESIA, FOR CARDIOVERSION;  Surgeon: GENERIC ANESTHESIA PROVIDER;  Location: [facility] OR     ANESTHESIA CARDIOVERSION N/A 1/28/2020    Procedure: ANESTHESIA, FOR CARDIOVERSION;  Surgeon: GENERIC ANESTHESIA PROVIDER;  Location: [facility] OR     CLOSED REDUCTION SHOULDER Left      EP ABLATION FOCAL AFIB N/A 1/3/2020    Procedure: EP Ablation Focal AFIB;  Surgeon: Lamont Hannon MD;  Location:  [facility]     GENITOURINARY SURGERY  1990    testicular cancer     ORTHOPEDIC SURGERY  1970's    right knee surgery        FAMILY HISTORY:  Family History   Problem Relation Age of Onset     Colon Cancer Father      Coronary Artery Disease Maternal Grandmother        SOCIAL HISTORY:  Social History     Socioeconomic History     Marital status:      Spouse name: Not on file     Number of children: Not on file     Years of education: Not on file     Highest education level: Not on file   Occupational History     Not on file   Social Needs     Financial resource strain: Not on file     Food insecurity     Worry: Not on file     Inability: Not on file     Transportation needs     Medical: Not on file     Non-medical: Not on file   Tobacco Use     Smoking status: Never Smoker     Smokeless tobacco: Never Used   Substance and Sexual Activity     Alcohol use: Yes     Comment: every 1-2 months     Drug use: No     Sexual activity: Yes     Partners: Female   Lifestyle     Physical activity     Days per week: Not on file     Minutes per session: Not on file     Stress: Not on file   Relationships     Social connections     Talks on phone:  Not on file     Gets together: Not on file     Attends [employer] service: Not on file     Active member of club or organization: Not on file     Attends meetings of clubs or organizations: Not on file     Relationship status: Not on file     Intimate partner violence     Fear of current or ex partner: Not on file     Emotionally abused: Not on file     Physically abused: Not on file     Forced sexual activity: Not on file   Other Topics Concern     Parent/sibling w/ CABG, MI or angioplasty before 65F 55M? Yes   Social History Narrative     Not on file       Review of Systems:  Skin:        Eyes:       ENT:       Respiratory:       Cardiovascular:       Gastroenterology:      Genitourinary:       Musculoskeletal:       Neurologic:       Psychiatric:       Heme/Lymph/Imm:       Endocrine:           Recent Lab Results:  LIPID RESULTS:  Lab Results   Component Value Date    CHOL 161 03/24/2020    HDL 43 03/24/2020    LDL 94 03/24/2020    TRIG 119 03/24/2020       LIVER ENZYME RESULTS:  Lab Results   Component Value Date    AST 17 01/13/2020    ALT 35 03/24/2020       CBC RESULTS:  Lab Results   Component Value Date    WBC 5.8 01/03/2020    RBC 4.88 01/03/2020    HGB 16.0 01/03/2020    HCT 44.2 01/03/2020    MCV 91 01/03/2020    MCH 32.8 01/03/2020    MCHC 36.2 01/03/2020    RDW 12.6 01/03/2020     01/03/2020       BMP RESULTS:  Lab Results   Component Value Date     01/03/2020    POTASSIUM 4.6 01/28/2020    CHLORIDE 110 (H) 01/03/2020    CO2 21 01/03/2020    ANIONGAP 7 01/03/2020    GLC 95 01/03/2020    BUN 21 01/03/2020    CR 1.11 01/03/2020    GFRESTIMATED 68 01/03/2020    GFRESTBLACK 79 01/03/2020    CHU 8.4 (L) 01/03/2020        A1C RESULTS:  No results found for: A1C    INR RESULTS:  Lab Results   Component Value Date    INR 2.20 (H) 04/14/2020    INR 2.40 (H) 03/23/2020       ECHOCARDIOGRAM  No results found for this or any previous visit (from the past 8760 hour(s)).      No orders of the defined  types were placed in this encounter.    No orders of the defined types were placed in this encounter.    There are no discontinued medications.    No diagnosis found.    Please do not hesitate to contact me if you have any questions/concerns.     Sincerely,     Lamont Hannon MD

## 2020-04-22 NOTE — PROGRESS NOTES
Electrophysiology/ Phone Call Clinic Note         H&P and Plan:     Patient opted to conduct today's visit via telephone due to ongoing issues related to ongoing COVID-19 virus pandemic and to minimize social interaction (based on CDC guidelines).      Visit details:  Patient has given verbal consent for this visit.    Interview was done talking to patient over the phone.  Visit start time: 8:23 AM  Visit stop time: *:34 AM  Provider location: Home.  Patient location: Home.     REASON FOR VISIT: Electrophysiology evaluation.      HISTORY OF PRESENT ILLNESS: Mr. 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  He was initially placed on amiodarone and underwent successful cardioversion.  After cardioversion, cardiac function normalized and amiodarone was switched to flecainide.  Unfortunately, he failed therapy with flecainide having recurrence of atrial fibrillation.  He underwent A. fib ablation 01/03/2020 and is here today for routine follow-up.      Patient underwent 1/03/2020.  He tolerated procedure well, however after procedure had recurrence of flutter.  He was started on amiodarone and underwent cardioversion 01/28/2020.  Today, he informs he continues to do well.  He denies any recurrence of atrial arrhythmia since the episode after the ablation.  He denies any symptoms such as chest pain, shortness of breath, lightheadedness, near-syncope or syncope.    EKG post cardioversion showed normal sinus rhythm with first-degree AV block.     Previous studies:  -EKG (08/20/2019): Normal sinus rhythm.  First-degree AV block (LA of 276 ms).  QRS measuring 112 ms.  -Exercise stress test (08/28/2019): Target heart rate was not achieved.  However test was not negative for ischemia or pro-arrhythmias.  -Limited echo (08/14/2019): Normal LV function.  EF estimated 50-55%.     SSESSMENT AND PLAN:   1.   Persistent atrial fibrillation.    He completed the healing phase after ablation.  No recurrence of atrial arrhythmias after completion of the healing phase.  I recommend the following:  -Stop amiodarone.  -Continue metoprolol.  -Follow-up in clinic in 3 months with an EKG.     2.  Embolic prevention.  Chads vas score of 2-3.  Continue anticoagulation indefinitely.      Lamont Hannon MD    Physical Exam:  Vitals: There were no vitals taken for this visit.    Physical exam not performed as this was a phone call visit.      CURRENT MEDICATIONS:  Current Outpatient Medications   Medication Sig Dispense Refill     acetaminophen (TYLENOL) 500 MG tablet Take 500 mg by mouth every 6 hours as needed for mild pain       amiodarone (PACERONE/CODARONE) 200 MG tablet Take 1 tablet (200 mg) by mouth daily 30 tablet 3     atorvastatin (LIPITOR) 20 MG tablet Take 1 tablet (20 mg) by mouth daily 90 tablet 3     losartan (COZAAR) 50 MG tablet Take 1 tablet (50 mg) by mouth daily 90 tablet 3     melatonin 3 MG CAPS Take 3 mg by mouth At Bedtime       metoprolol succinate ER (TOPROL-XL) 50 MG 24 hr tablet Take 1 tablet (50 mg) by mouth daily       sildenafil (REVATIO) 20 MG tablet Take 1 tablet (20 mg) by mouth daily as needed 30 tablet 3     warfarin ANTICOAGULANT (COUMADIN) 5 MG tablet Take 1/2 tablet (2.5mg) by mouth on Mon; take 1 tablet (5mg) all other days; or as directed by INR Clinic 110 tablet 0       ALLERGIES     Allergies   Allergen Reactions     Lisinopril Cough     Very persistent cough       PAST MEDICAL HISTORY:  Past Medical History:   Diagnosis Date     Atrial flutter (H)      Benign essential hypertension 1/18/2017    past use of ACE- Cough;  Had been on ARB-diuretic but experienced lower bp readings;  BLOOD PRESSURE now well controlled on ARB also no longer on diuretic; no low bp readings; may have had slight edema initially but has normalized with bp well controlled on ARB alone.      Cancer (H)     testicular  cancer     CHF (congestive heart failure) (H) 6/5/2019     Long term current use of anticoagulant therapy 6/3/2019     Obesity (BMI 35.0-39.9) with comorbidity (H) 2/11/2019     Paroxysmal atrial fibrillation (H) 06/03/2019       PAST SURGICAL HISTORY:  Past Surgical History:   Procedure Laterality Date     ANESTHESIA CARDIOVERSION N/A 6/26/2019    Procedure: ANESTHESIA, FOR CARDIOVERSION DR. LE;  Surgeon: GENERIC ANESTHESIA PROVIDER;  Location:  OR     ANESTHESIA CARDIOVERSION N/A 7/17/2019    Procedure: ANESTHESIA, FOR CARDIOVERSION;  Surgeon: GENERIC ANESTHESIA PROVIDER;  Location:  OR     ANESTHESIA CARDIOVERSION N/A 1/28/2020    Procedure: ANESTHESIA, FOR CARDIOVERSION;  Surgeon: GENERIC ANESTHESIA PROVIDER;  Location:  OR     CLOSED REDUCTION SHOULDER Left      EP ABLATION FOCAL AFIB N/A 1/3/2020    Procedure: EP Ablation Focal AFIB;  Surgeon: Lamont Hannon MD;  Location:  [facility]     GENITOURINARY SURGERY  1990    testicular cancer     ORTHOPEDIC SURGERY  1970's    right knee surgery        FAMILY HISTORY:  Family History   Problem Relation Age of Onset     Colon Cancer Father      Coronary Artery Disease Maternal Grandmother        SOCIAL HISTORY:  Social History     Socioeconomic History     Marital status:      Spouse name: Not on file     Number of children: Not on file     Years of education: Not on file     Highest education level: Not on file   Occupational History     Not on file   Social Needs     Financial resource strain: Not on file     Food insecurity     Worry: Not on file     Inability: Not on file     Transportation needs     Medical: Not on file     Non-medical: Not on file   Tobacco Use     Smoking status: Never Smoker     Smokeless tobacco: Never Used   Substance and Sexual Activity     Alcohol use: Yes     Comment: every 1-2 months     Drug use: No     Sexual activity: Yes     Partners: Female   Lifestyle     Physical activity     Days per week:  Not on file     Minutes per session: Not on file     Stress: Not on file   Relationships     Social connections     Talks on phone: Not on file     Gets together: Not on file     Attends [employer] service: Not on file     Active member of club or organization: Not on file     Attends meetings of clubs or organizations: Not on file     Relationship status: Not on file     Intimate partner violence     Fear of current or ex partner: Not on file     Emotionally abused: Not on file     Physically abused: Not on file     Forced sexual activity: Not on file   Other Topics Concern     Parent/sibling w/ CABG, MI or angioplasty before 65F 55M? Yes   Social History Narrative     Not on file       Review of Systems:  Skin:        Eyes:       ENT:       Respiratory:       Cardiovascular:       Gastroenterology:      Genitourinary:       Musculoskeletal:       Neurologic:       Psychiatric:       Heme/Lymph/Imm:       Endocrine:           Recent Lab Results:  LIPID RESULTS:  Lab Results   Component Value Date    CHOL 161 03/24/2020    HDL 43 03/24/2020    LDL 94 03/24/2020    TRIG 119 03/24/2020       LIVER ENZYME RESULTS:  Lab Results   Component Value Date    AST 17 01/13/2020    ALT 35 03/24/2020       CBC RESULTS:  Lab Results   Component Value Date    WBC 5.8 01/03/2020    RBC 4.88 01/03/2020    HGB 16.0 01/03/2020    HCT 44.2 01/03/2020    MCV 91 01/03/2020    MCH 32.8 01/03/2020    MCHC 36.2 01/03/2020    RDW 12.6 01/03/2020     01/03/2020       BMP RESULTS:  Lab Results   Component Value Date     01/03/2020    POTASSIUM 4.6 01/28/2020    CHLORIDE 110 (H) 01/03/2020    CO2 21 01/03/2020    ANIONGAP 7 01/03/2020    GLC 95 01/03/2020    BUN 21 01/03/2020    CR 1.11 01/03/2020    GFRESTIMATED 68 01/03/2020    GFRESTBLACK 79 01/03/2020    CHU 8.4 (L) 01/03/2020        A1C RESULTS:  No results found for: A1C    INR RESULTS:  Lab Results   Component Value Date    INR 2.20 (H) 04/14/2020    INR 2.40 (H) 03/23/2020  "        ECHOCARDIOGRAM  No results found for this or any previous visit (from the past 8760 hour(s)).      No orders of the defined types were placed in this encounter.    No orders of the defined types were placed in this encounter.    There are no discontinued medications.      No diagnosis found.      CC  No referring provider defined for this encounter.                Kevan Connelly is a 69 year old male who is being evaluated via a billable telephone visit.      The patient has been notified of following:     \"This telephone visit will be conducted via a call between you and your physician/provider. We have found that certain health care needs can be provided without the need for a physical exam.  This service lets us provide the care you need with a short phone conversation.  If a prescription is necessary we can send it directly to your pharmacy.  If lab work is needed we can place an order for that and you can then stop by our lab to have the test done at a later time.    Telephone visits are billed at different rates depending on your insurance coverage. During this emergency period, for some insurers they may be billed the same as an in-person visit.  Please reach out to your insurance provider with any questions.    If during the course of the call the physician/provider feels a telephone visit is not appropriate, you will not be charged for this service.\"    Patient has given verbal consent for Telephone visit?  Yes    How would you like to obtain your AVS? Mail a copy      Review Of Systems  Skin: negative  Eyes: negative  Ears/Nose/Throat: negative  Respiratory: No shortness of breath, dyspnea on exertion, cough, or hemoptysis  Cardiovascular: negative  Gastrointestinal: negative  Genitourinary: negative  Musculoskeletal: negative  Neurologic: negative  Psychiatric: negative  Hematologic/Lymphatic/Immunologic: negative  Endocrine: negative    Patient reported vitals:  BP:  146/87  Heart rate: " 69  Weight: 267 LBS      O2 97      Radha Reyna CMA    Phone call duration:  minutes

## 2020-04-28 NOTE — PROGRESS NOTES
4/27/2020 virtual visit.  1.  Persistent atrial fibrillation.    He completed the healing phase after ablation.  No recurrence of atrial arrhythmias after completion of the healing phase.  I recommend the following:  -Stop amiodarone.  -Continue metoprolol.  -Follow-up in clinic in 3 months with an EKG.     2.  Embolic prevention.  Chads vas score of 2-3.  Continue anticoagulation indefinitely.      Lamont Hannon MD

## 2020-05-15 ENCOUNTER — TELEPHONE (OUTPATIENT)
Dept: CARDIOLOGY | Facility: CLINIC | Age: 70
End: 2020-05-15

## 2020-05-15 DIAGNOSIS — R60.0 LOCALIZED EDEMA: ICD-10-CM

## 2020-05-15 DIAGNOSIS — R60.9 EDEMA: Primary | ICD-10-CM

## 2020-05-15 RX ORDER — FUROSEMIDE 20 MG
20 TABLET ORAL DAILY
Qty: 3 TABLET | Refills: 0 | Status: SHIPPED | DISCHARGE
Start: 2020-05-15 | End: 2020-05-19

## 2020-05-15 NOTE — TELEPHONE ENCOUNTER
Pt called and states that he has LE Edema intermittently and would like to restart the Lasix if possible.  Pt still has lots of 40 mg tabs and was taking 20 my prior to being stopped. Pt states that he requested potassium from the pharmacy.   Pt does not have any weight gain just LE edema. Will message Hilda about request. JNelsonDIO

## 2020-05-15 NOTE — TELEPHONE ENCOUNTER
Pt states that he took 20 mg today and will take Saturday and Sunday and call Monday with an update.  Pt has potassium tabs and did take one today and will take  Over the weekend.  On Monday when pt calls back will set up for an OV on Friday 5/22. Pt states understanding and agrees to plan. JNelsonDIO

## 2020-05-15 NOTE — TELEPHONE ENCOUNTER
Can you have take lasix 20 mg daily x 3 days with high potassium diet of bananas, oj, or potatoes and start   Monday 5/18  and him set up an appointment with me on Friday May 22.        Thank you    ANNMARIE Peterson CNP on 5/15/2020 at 5:17 PM

## 2020-05-18 NOTE — TELEPHONE ENCOUNTER
5/18/20 Pt called as requested to follow up on LE swelling and results of taking Lasix 20 mg every day on 5/15, 5/16 and 5/17. Pt states swelling has decreased but is not gone. Pt stated swelling was 3/10 on Friday and today it is a 2/10. Pt stated he took 1 tablet of K each day as well. Per Hilda Wahl BRYAN request, pt set up with video visit on 5/22. Will check for clarification w Hilda on whether pt should continue on Lasix until 5/22 and call pt back w recommendations. Pt voiced understanding and agreement w plan. Bran 1050 am

## 2020-05-19 DIAGNOSIS — R60.0 LOCALIZED EDEMA: ICD-10-CM

## 2020-05-19 PROCEDURE — 80048 BASIC METABOLIC PNL TOTAL CA: CPT | Performed by: NURSE PRACTITIONER

## 2020-05-19 PROCEDURE — 36415 COLL VENOUS BLD VENIPUNCTURE: CPT | Performed by: NURSE PRACTITIONER

## 2020-05-19 RX ORDER — FUROSEMIDE 20 MG
20 TABLET ORAL
Qty: 30 TABLET | Refills: 3 | COMMUNITY
Start: 2020-05-19 | End: 2020-07-23

## 2020-05-19 NOTE — TELEPHONE ENCOUNTER
Can you call pt back and have him take lasix 20 mg daily.  I am not sure what to do with the potassium at this point.  Can he please get a BMP today for our appointment tomorrow.  Please also ask him to follow a low sodium diet, wear compression stockings and elevate his feet when sitting.  I will follow up tomorrow.  The orders are in    Thank you,    cecilia

## 2020-05-19 NOTE — TELEPHONE ENCOUNTER
5/19 Spoke w patient and explained recommendations. He would like to go to  [facility] to obtain labs sometime today. Pt will call and schedule. Pt almost out of Potassium but has enough to last until visit josé miguel Stevens on 5/22. Will hold off sending in another rx for now as dose may change based on lab value. Pt voiced understanding and agreement w plan. Bran 832 am

## 2020-05-20 LAB
ANION GAP SERPL CALCULATED.3IONS-SCNC: 8 MMOL/L (ref 3–14)
BUN SERPL-MCNC: 24 MG/DL (ref 7–30)
CALCIUM SERPL-MCNC: 8.5 MG/DL (ref 8.5–10.1)
CHLORIDE SERPL-SCNC: 108 MMOL/L (ref 94–109)
CO2 SERPL-SCNC: 25 MMOL/L (ref 20–32)
CREAT SERPL-MCNC: 1.17 MG/DL (ref 0.66–1.25)
GFR SERPL CREATININE-BSD FRML MDRD: 64 ML/MIN/{1.73_M2}
GLUCOSE SERPL-MCNC: 91 MG/DL (ref 70–99)
POTASSIUM SERPL-SCNC: 4.6 MMOL/L (ref 3.4–5.3)
SODIUM SERPL-SCNC: 141 MMOL/L (ref 133–144)

## 2020-05-22 ENCOUNTER — VIRTUAL VISIT (OUTPATIENT)
Dept: CARDIOLOGY | Facility: CLINIC | Age: 70
End: 2020-05-22
Payer: COMMERCIAL

## 2020-05-22 DIAGNOSIS — I10 BENIGN ESSENTIAL HYPERTENSION: ICD-10-CM

## 2020-05-22 DIAGNOSIS — R60.0 LOCALIZED EDEMA: Primary | ICD-10-CM

## 2020-05-22 PROCEDURE — 99214 OFFICE O/P EST MOD 30 MIN: CPT | Mod: 95 | Performed by: NURSE PRACTITIONER

## 2020-05-22 RX ORDER — POTASSIUM CHLORIDE 1500 MG/1
20 TABLET, EXTENDED RELEASE ORAL DAILY
Qty: 30 TABLET | Refills: 11 | Status: SHIPPED | OUTPATIENT
Start: 2020-05-22 | End: 2020-06-05

## 2020-05-22 NOTE — LETTER
5/22/2020    Davina Reaves MD  [id number] [address] [facility] [place] [facility] [id number]    RE: Kevan Connelly       Dear Colleague,    I had the pleasure of seeing Kevan Connelly in the [facility].    Kevan Connelly is a 69 year old male who is following up after calling the clinic with Lower extremity edema.   This visit is being conducted as a virtual visit due to the emphasis on mitigation of the COVID-19 virus pandemic. The clinician has decided that the risk of an in-office visit outweighs the benefit for this patient.   He is a patient of Dr. Hannon.  His medical history includes     1. Atrial fibrillation.  Diagnosed on 5/30/19 with cardiomyopathy EF was 33%.  Was on amiodarone and changed to flecainide with normal EF.  refractory to flecainide.     Underwent an atrial fibrillation ablation on 1/3/2020.  2. Nonocclusive coronary artery disease.  Per CT angiogram heart 1/2020 revealed coronary calcification the LAD, circumflex and RCA.  3. Mildly dilated aortic root and ascending aorta.  Per CT angiogram (1/2020) revealed 4.22 x 3.88 cm and 3.64 cm x 3.73 cm respectively.  4. Cardiomyopathy.  EF 33% (6/2018) Recent ECHO 50-55% (8/2019)  5. Hypertension  6. Obesity  7. Testicular cancer with subsequent ED     Diagnostics:    Limited ECHO (8/2019) revealed EF 50-55% with mild to moderately dilated L atrium    ECHO (6/2019) revealed moderately reduced LV systolic function with LVEF of 33%.  There was moderate global hypokinesia.  The LV was mildly dilated, mildly decreased RV systolic function.  The left atrium was noted to be severely enlarged and right atrium to be moderate to severely dilated, mild mitral regurgitation.      Lexiscan stress test (6/2019) showed no evidence of ischemia or infarct.  LVEF was 29%.     Exercise stress test (8/2019) revealed no arrhythmias, he exercised for 4 minutes and 42 seconds for a Alvarez treadmill score of 4.5.    CT angiogram heart (1/2/2020)  revealed coronary calcification in the LAD, circumflex and RCA.,  Mildly dilated aortic root measuring 4.22 x 3.88 cm.  The dilated ascending aorta measuring 3.64 cm x 3.73 cm.   Radiology report found scattered mediastinal and right Sujatha calcifications thought to be granulomas    IBETH (1/2/2020) revealed EF 55-60%     In March 2019, he started to feel unwell with shortness of breath with exertion.  He met with his primary provider and was diagnosed with atrial fibrillation started on beta-blocker and Xarelto.  The cost of the Xarelto was cost prohibitive and he was changed to Coumadin.     He met with Dr. Vanessa in June 2019 after being diagnosed with cardiomyopathy and atrial fibrillation. He underwent an unsuccessful cardioversion on 6/26/2019 and later met with Dr Hannon who started amiodarone load and  and scheduled a repeat cardioversion.       On 7/17/19 he underwent a successful cardioversion.       On 8/7/2019 he presented feeling significantly better after his cardioversion and in NSR.       In September 2019, he was started on flecainide and metoprolol.  When he saw Dr. Hannon in November he was in atrial fibrillation because he forgot to take his medications at that time his flecainide was increased from 75 mg to 100 mg twice daily.  A repeat Zio patch revealed patient being in the 100% atrial fibrillation and an atrial fibrillation ablation was recommended.     On 1/3/2020, patient underwent a PVI ablation and unfortunately had a recurrence on 1/28/2020 and underwent a cardioverison    In 3/2020, he lasix was stopped    On 4/22/2020, pt met with Dr. Hannon and his amiodarone was discontinued    On 5/15/2020, pt called the clinic with complaints of intermittent LE edema wanting to restart lasix.     Today he states 5 days ago he started to develop bilateral legs edema.  His weight is stable and he denies any shortness of breath or dyspnea on excursion, palpitations, or symptoms associated with atrial  fibrillation.    In the past he would get increase edema in the spring and summer.    He did start lasix and his edema is down slightly but still present.    Blood pressure at home is usually 140/85 mmHg and Heart rate is usually 68 bpm per his apple watch.    He is not able to exercise regularly due to back pain.   Overall denies chest pain or pressure, dizziness, syncope, angina,  orthopnea, or PND.     Reports taking medications as prescribed including Xarelto and denies bleeding, hematuria, melena, epistaxis and signs/symptoms of stroke.      Physical Exam   Constitutional: He is oriented to person, place, and time. He appears well-developed and well-nourished.   Neurological: He is alert and oriented to person, place, and time.   Psychiatric: He has a normal mood and affect. His behavior is normal. Judgment and thought content normal.     Other parts of the physical exam were deferred due to public health crisis      ASSESSMENT AND PLAN    Atrial fibrillation    For anticoagulation for CHADS VASC 3 (age, HTN, HF) he takes warfarin with goal INR 2-3. His INR has been therapeutic since 2/2020.         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  After resolution of tachycardia induced cardiomyopathy he was started on flecainide with metoprolol and later was found to be in atrial fibrillation despite increase flecainide dosage. On 1/3/2020, he underwent a PVI ablation, flecainide was discontinued and he was started on amiodarone which was discontinued on 4/20202.   He has not had a recurrence of atrial fibrillation.      Cardiomyopathy    At the time of diagnosis with his atrial fibrillation his EF was found to be 33%.  Repeat ECHO (8/2019) reveals EF 50-55%      Continue GDMT of ARB (losartan 50 mg daily) and BB (Metoprolol XL 50 mg daily)     Hypertension    Overall controlled at home while taking losartan and metoprolol     Non-occlusive coronary  artery disease    Per CT angiogram heart 1/2020 revealed coronary calcification the LAD, circumflex and RCA.    Asymptomatic    Continue BB, ARB.  He is not on an aspirin due to being on Warfarin    Lower extremity edema    He had been off lasix however his LE edema redeveloped.  He will take lasix 40 mg daily x 3 days and then decrease to 20 mg daily.  Start potassium chloride 20 mEq daily.  Follow up in 2 weeks with BMP prior.    Plan:      Plan:   take lasix 40 mg daily x 3 days and then decrease to 20 mg daily.  Start potassium chloride 20 mEq daily.  Follow up in 2 weeks with BMP prior.    Discussed wearing compression stockings, elevating legs and following a low sodium diet.    If he thinks he is in atrial fibrillation recommend calling the clinic and have ECG done and 24 hour monitor.         Thank you for allowing me to participate in the care of your patient.    Sincerely,     ANNMARIE Peterson [facility]

## 2020-05-22 NOTE — PATIENT INSTRUCTIONS
take lasix 40 mg daily x 3 days and then decrease to 20 mg daily.    Start potassium chloride 20 mEq daily.    Follow up in 2 weeks (6/5/2020) at 1:10 pm  Get labs at your local clinic prior.     wear compression stockings,   Elevate your legs  Follow a low sodium diet.    If you think your in atrial fibrillation call the clinic and have ECG done and 24 hour monitor placed.      If you have problems or questions please call the RNs (Ashlee Tran & Darya) at [phone]

## 2020-05-22 NOTE — PROGRESS NOTES
"Kevan Connelly is a 69 year old male who is being evaluated via a billable video visit.      The patient has been notified of following:     \"This video visit will be conducted via a call between you and your physician/provider. We have found that certain health care needs can be provided without the need for an in-person physical exam.  This service lets us provide the care you need with a video conversation.  If a prescription is necessary we can send it directly to your pharmacy.  If lab work is needed we can place an order for that and you can then stop by our lab to have the test done at a later time.    Video visits are billed at different rates depending on your insurance coverage.  Please reach out to your insurance provider with any questions.    If during the course of the call the physician/provider feels a video visit is not appropriate, you will not be charged for this service.\"    Patient has given verbal consent for Video visit? Yes    How would you like to obtain your AVS? Mail a copy    Patient would like the video invitation sent by: Text to cell phone: [phone]    Will anyone else be joining your video visit? No        Review Of Systems  Skin: negative  Eyes:  Reading glasses  Ears/Nose/Throat: negative  Respiratory: No shortness of breath, dyspnea on exertion, cough, or hemoptysis  Cardiovascular: lower extremity edema  Gastrointestinal: negative  Genitourinary: negative  Musculoskeletal: negative  Neurologic: negative  Psychiatric: negative  Hematologic/Lymphatic/Immunologic: negative  Endocrine: negative    Patient reported vitals:  BP:  137/76  Heart rate: 71  Weight: 269 lbs    Radha Reyna CMA      Kevan Connelly is a 69 year old male who is following up after calling the clinic with Lower extremity edema.   This visit is being conducted as a virtual visit due to the emphasis on mitigation of the COVID-19 virus pandemic. The clinician has decided that the risk of an in-office " visit outweighs the benefit for this patient.   He is a patient of Dr. Hannon.  His medical history includes     1. Atrial fibrillation.  Diagnosed on 5/30/19 with cardiomyopathy EF was 33%.  Was on amiodarone and changed to flecainide with normal EF.  refractory to flecainide.     Underwent an atrial fibrillation ablation on 1/3/2020.  2. Nonocclusive coronary artery disease.  Per CT angiogram heart 1/2020 revealed coronary calcification the LAD, circumflex and RCA.  3. Mildly dilated aortic root and ascending aorta.  Per CT angiogram (1/2020) revealed 4.22 x 3.88 cm and 3.64 cm x 3.73 cm respectively.  4. Cardiomyopathy.  EF 33% (6/2018) Recent ECHO 50-55% (8/2019)  5. Hypertension  6. Obesity  7. Testicular cancer with subsequent ED     Diagnostics:    Limited ECHO (8/2019) revealed EF 50-55% with mild to moderately dilated L atrium    ECHO (6/2019) revealed moderately reduced LV systolic function with LVEF of 33%.  There was moderate global hypokinesia.  The LV was mildly dilated, mildly decreased RV systolic function.  The left atrium was noted to be severely enlarged and right atrium to be moderate to severely dilated, mild mitral regurgitation.      Lexiscan stress test (6/2019) showed no evidence of ischemia or infarct.  LVEF was 29%.     Exercise stress test (8/2019) revealed no arrhythmias, he exercised for 4 minutes and 42 seconds for a Alvarez treadmill score of 4.5.    CT angiogram heart (1/2/2020) revealed coronary calcification in the LAD, circumflex and RCA.,  Mildly dilated aortic root measuring 4.22 x 3.88 cm.  The dilated ascending aorta measuring 3.64 cm x 3.73 cm.   Radiology report found scattered mediastinal and right Sujatha calcifications thought to be granulomas    IBETH (1/2/2020) revealed EF 55-60%     In March 2019, he started to feel unwell with shortness of breath with exertion.  He met with his primary provider and was diagnosed with atrial fibrillation started on beta-blocker and Xarelto.   The cost of the Xarelto was cost prohibitive and he was changed to Coumadin.     He met with Dr. Vanessa in June 2019 after being diagnosed with cardiomyopathy and atrial fibrillation. He underwent an unsuccessful cardioversion on 6/26/2019 and later met with Dr Hannon who started amiodarone load and  and scheduled a repeat cardioversion.       On 7/17/19 he underwent a successful cardioversion.       On 8/7/2019 he presented feeling significantly better after his cardioversion and in NSR.       In September 2019, he was started on flecainide and metoprolol.  When he saw Dr. Hannon in November he was in atrial fibrillation because he forgot to take his medications at that time his flecainide was increased from 75 mg to 100 mg twice daily.  A repeat Zio patch revealed patient being in the 100% atrial fibrillation and an atrial fibrillation ablation was recommended.     On 1/3/2020, patient underwent a PVI ablation and unfortunately had a recurrence on 1/28/2020 and underwent a cardioverison    In 3/2020, he lasix was stopped    On 4/22/2020, pt met with Dr. Hannon and his amiodarone was discontinued    On 5/15/2020, pt called the clinic with complaints of intermittent LE edema wanting to restart lasix.     Today he states 5 days ago he started to develop bilateral legs edema.  His weight is stable and he denies any shortness of breath or dyspnea on excursion, palpitations, or symptoms associated with atrial fibrillation.    In the past he would get increase edema in the spring and summer.    He did start lasix and his edema is down slightly but still present.    Blood pressure at home is usually 140/85 mmHg and Heart rate is usually 68 bpm per his apple watch.    He is not able to exercise regularly due to back pain.   Overall denies chest pain or pressure, dizziness, syncope, angina,  orthopnea, or PND.     Reports taking medications as prescribed including Xarelto and denies bleeding, hematuria, melena, epistaxis and  signs/symptoms of stroke.      Physical Exam   Constitutional: He is oriented to person, place, and time. He appears well-developed and well-nourished.   Neurological: He is alert and oriented to person, place, and time.   Psychiatric: He has a normal mood and affect. His behavior is normal. Judgment and thought content normal.     Other parts of the physical exam were deferred due to public health crisis      ASSESSMENT AND PLAN    Atrial fibrillation    For anticoagulation for CHADS VASC 3 (age, HTN, HF) he takes warfarin with goal INR 2-3. His INR has been therapeutic since 2/2020.         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  After resolution of tachycardia induced cardiomyopathy he was started on flecainide with metoprolol and later was found to be in atrial fibrillation despite increase flecainide dosage. On 1/3/2020, he underwent a PVI ablation, flecainide was discontinued and he was started on amiodarone which was discontinued on 4/20202.   He has not had a recurrence of atrial fibrillation.      Cardiomyopathy    At the time of diagnosis with his atrial fibrillation his EF was found to be 33%.  Repeat ECHO (8/2019) reveals EF 50-55%      Continue GDMT of ARB (losartan 50 mg daily) and BB (Metoprolol XL 50 mg daily)     Hypertension    Overall controlled at home while taking losartan and metoprolol     Non-occlusive coronary artery disease    Per CT angiogram heart 1/2020 revealed coronary calcification the LAD, circumflex and RCA.    Asymptomatic    Continue BB, ARB.  He is not on an aspirin due to being on Warfarin    Lower extremity edema    He had been off lasix however his LE edema redeveloped.  He will take lasix 40 mg daily x 3 days and then decrease to 20 mg daily.  Start potassium chloride 20 mEq daily.  Follow up in 2 weeks with BMP prior.    Plan:      Plan:   take lasix 40 mg daily x 3 days and then decrease to 20 mg daily.   Start potassium chloride 20 mEq daily.  Follow up in 2 weeks with BMP prior.    Discussed wearing compression stockings, elevating legs and following a low sodium diet.    If he thinks he is in atrial fibrillation recommend calling the clinic and have ECG done and 24 hour monitor.         Video-Visit Details    Type of service:  Video Visit     Video Start Time: 12:33 PM  Video End Time: 12:48 PM    Originating Location (pt. Location): Home    Distant Location (provider location):  [facility]     Platform used for Video Visit: ANNMARIE Rousseau CNP

## 2020-05-26 ENCOUNTER — ANTICOAGULATION THERAPY VISIT (OUTPATIENT)
Dept: NURSING | Facility: CLINIC | Age: 70
End: 2020-05-26

## 2020-05-26 ENCOUNTER — TELEPHONE (OUTPATIENT)
Dept: CARDIOLOGY | Facility: CLINIC | Age: 70
End: 2020-05-26

## 2020-05-26 DIAGNOSIS — I48.0 PAROXYSMAL ATRIAL FIBRILLATION (H): Primary | ICD-10-CM

## 2020-05-26 DIAGNOSIS — I25.10 CORONARY ARTERY CALCIFICATION: ICD-10-CM

## 2020-05-26 DIAGNOSIS — Z79.01 LONG TERM CURRENT USE OF ANTICOAGULANT THERAPY: ICD-10-CM

## 2020-05-26 DIAGNOSIS — R60.0 LOCALIZED EDEMA: ICD-10-CM

## 2020-05-26 DIAGNOSIS — I48.92 ATRIAL FLUTTER (H): ICD-10-CM

## 2020-05-26 DIAGNOSIS — I48.0 PAROXYSMAL ATRIAL FIBRILLATION (H): ICD-10-CM

## 2020-05-26 LAB
ANION GAP SERPL CALCULATED.3IONS-SCNC: 2 MMOL/L (ref 3–14)
BUN SERPL-MCNC: 22 MG/DL (ref 7–30)
CALCIUM SERPL-MCNC: 8.8 MG/DL (ref 8.5–10.1)
CAPILLARY BLOOD COLLECTION: NORMAL
CHLORIDE SERPL-SCNC: 110 MMOL/L (ref 94–109)
CO2 SERPL-SCNC: 27 MMOL/L (ref 20–32)
CREAT SERPL-MCNC: 1.35 MG/DL (ref 0.66–1.25)
GFR SERPL CREATININE-BSD FRML MDRD: 54 ML/MIN/{1.73_M2}
GLUCOSE SERPL-MCNC: 99 MG/DL (ref 70–99)
INR PPP: 2 (ref 0.86–1.14)
POTASSIUM SERPL-SCNC: 4.9 MMOL/L (ref 3.4–5.3)
SODIUM SERPL-SCNC: 139 MMOL/L (ref 133–144)

## 2020-05-26 PROCEDURE — 36416 COLLJ CAPILLARY BLOOD SPEC: CPT | Performed by: INTERNAL MEDICINE

## 2020-05-26 PROCEDURE — 80048 BASIC METABOLIC PNL TOTAL CA: CPT | Performed by: NURSE PRACTITIONER

## 2020-05-26 PROCEDURE — 99207 ZZC NO CHARGE NURSE ONLY: CPT

## 2020-05-26 PROCEDURE — 85610 PROTHROMBIN TIME: CPT | Performed by: INTERNAL MEDICINE

## 2020-05-26 NOTE — TELEPHONE ENCOUNTER
5/26/20 Pt called inquiring about upcoming labs. Pt had BMP drawn today but thought he was also supposed to have Lipids drawn. Nothing ordered and nothing mentioned in Juan Carlos last OV Note. Pt scheduled to have INR drawn 6/1 so will check with Hilda to see if she wants pt to have lipids drawn then. Will call pt back w answer. Pt voiced understanding and agreement w plan. Bran 3:27 pm

## 2020-05-26 NOTE — PROGRESS NOTES
Anesthesia Evaluation     Patient summary reviewed and Nursing notes reviewed   history of anesthetic complications: PONV  NPO Solid Status: > 8 hours  NPO Liquid Status: > 8 hours           Airway   Mallampati: II  TM distance: >3 FB  Neck ROM: full  no difficulty expected  Dental - normal exam     Pulmonary - normal exam   (+) asthma,   Cardiovascular - normal exam    (+) hypertension, hyperlipidemia,       Neuro/Psych  (+) dizziness/light headedness,     GI/Hepatic/Renal/Endo    (+)   hypothyroidism,     Musculoskeletal     Abdominal  - normal exam   Substance History      OB/GYN          Other                        Anesthesia Plan    ASA 3     MAC     Anesthetic plan and risks discussed with patient.       "ANTICOAGULATION FOLLOW-UP CLINIC VISIT    Patient Name:  Kevan Connelly  Date:  5/26/2020  Contact Type:  Telephone    SUBJECTIVE:  Patient Findings     Positives:   Change in medications, Change in diet/appetite    Comments:   Patient discontinued amiodarone 4/22/20. His INR's have been trending down since 3/6/20 from 2.9 to today at 2.0. He also saw his Cardiologist 5/22/20 for bilateral LLE and was started on Lasix and potassium. Patient states the edema is not gone but it is better.  He also notes he has \"been eating better, more veggies and salads with spinach\", which can also be contributing to the lower INR today, along with the discontinued amiodarone. The patient was assessed for and activity level changes, missed or extra doses, bruising or bleeding, with no problem findings.  Consulted with Ana Cox: \"Protocol is to leave dosing the same, but with amiodarone, I would approve going out of protocol and adding an extra 2.5mg/week\". Discussed with patient. Patient stated understanding and is in agreement with plan. Patient repeated back new dosing instructions and will recheck INR in 1 week.  Kaylee LEONARD RN  Anticoagulation Clinic  [place]            Clinical Outcomes     Comments:   Patient discontinued amiodarone 4/22/20. His INR's have been trending down since 3/6/20 from 2.9 to today at 2.0. He also saw his Cardiologist 5/22/20 for bilateral LLE and was started on Lasix and potassium. Patient states the edema is not gone but it is better.  He also notes he has \"been eating better, more veggies and salads with spinach\", which can also be contributing to the lower INR today, along with the discontinued amiodarone. The patient was assessed for and activity level changes, missed or extra doses, bruising or bleeding, with no problem findings.  Consulted with Ana Cox: \"Protocol is to leave dosing the same, but with amiodarone, I would approve going out of protocol and " "adding an extra 2.5mg/week\". Discussed with patient. Patient stated understanding and is in agreement with plan. Patient repeated back new dosing instructions and will recheck INR in 1 week.  Kaylee LEONARD RN  Anticoagulation Clinic  [place]               OBJECTIVE    Recent labs: (last 7 days)     20  0848   INR 2.00*       ASSESSMENT / PLAN  INR assessment THER    Recheck INR In: 1 WEEK    INR Location Clinic      Anticoagulation Summary  As of 2020    INR goal:   2.0-3.0   TTR:   70.3 % (11.7 mo)   INR used for dosin.00 (2020)   Warfarin maintenance plan:   2.5 mg (5 mg x 0.5) every Mon, Fri; 5 mg (5 mg x 1) all other days   Full warfarin instructions:   2.5 mg every Mon, Fri; 5 mg all other days   Weekly warfarin total:   30 mg   Plan last modified:   Kaylee Ramey RN (2020)   Next INR check:   2020   Priority:   Maintenance   Target end date:   Indefinite    Indications    Long term current use of anticoagulant therapy [Z79.01]             Anticoagulation Episode Summary     INR check location:   Anticoagulation Clinic    Preferred lab:       Send INR reminders to:   CONNIE HOBBS    Comments:   5mg tabs / DCCV 19 & 20 / early morning appointments on Wed or Fri      Anticoagulation Care Providers     Provider Role Specialty Phone number    Davina Reaves MD  Internal Medicine [phone]            See the Encounter Report to view Anticoagulation Flowsheet and Dosing Calendar (Go to Encounters tab in chart review, and find the Anticoagulation Therapy Visit)    Dosage adjustment made based on physician directed care plan.    Kaylee Ramey RN                 "

## 2020-05-28 NOTE — TELEPHONE ENCOUNTER
5/28/20  Verbal order from Hilda Wahl BRYAN that pt can have Lipids drawn on 6/1 along with INR. Order placed, pt called and updated. Bran 11:37 am

## 2020-06-01 ENCOUNTER — DOCUMENTATION ONLY (OUTPATIENT)
Dept: FAMILY MEDICINE | Facility: CLINIC | Age: 70
End: 2020-06-01

## 2020-06-01 ENCOUNTER — ANTICOAGULATION THERAPY VISIT (OUTPATIENT)
Dept: NURSING | Facility: CLINIC | Age: 70
End: 2020-06-01

## 2020-06-01 DIAGNOSIS — I48.0 PAROXYSMAL ATRIAL FIBRILLATION (H): ICD-10-CM

## 2020-06-01 DIAGNOSIS — I25.10 CORONARY ARTERY CALCIFICATION: ICD-10-CM

## 2020-06-01 DIAGNOSIS — Z79.01 LONG TERM CURRENT USE OF ANTICOAGULANT THERAPY: ICD-10-CM

## 2020-06-01 DIAGNOSIS — Z79.01 LONG TERM CURRENT USE OF ANTICOAGULANTS WITH INR GOAL OF 2.0-3.0: Primary | ICD-10-CM

## 2020-06-01 DIAGNOSIS — I48.91 ATRIAL FIBRILLATION, UNSPECIFIED TYPE (H): ICD-10-CM

## 2020-06-01 DIAGNOSIS — I48.92 ATRIAL FLUTTER (H): ICD-10-CM

## 2020-06-01 LAB
CHOLEST SERPL-MCNC: 138 MG/DL
HDLC SERPL-MCNC: 44 MG/DL
INR PPP: 2 (ref 0.86–1.14)
LDLC SERPL CALC-MCNC: 75 MG/DL
NONHDLC SERPL-MCNC: 94 MG/DL
TRIGL SERPL-MCNC: 93 MG/DL

## 2020-06-01 PROCEDURE — 99207 ZZC NO CHARGE NURSE ONLY: CPT

## 2020-06-01 PROCEDURE — 85610 PROTHROMBIN TIME: CPT | Performed by: INTERNAL MEDICINE

## 2020-06-01 PROCEDURE — 36415 COLL VENOUS BLD VENIPUNCTURE: CPT | Performed by: INTERNAL MEDICINE

## 2020-06-01 PROCEDURE — 80061 LIPID PANEL: CPT | Performed by: INTERNAL MEDICINE

## 2020-06-01 RX ORDER — WARFARIN SODIUM 5 MG/1
TABLET ORAL
Qty: 90 TABLET | Refills: 0 | Status: SHIPPED | OUTPATIENT
Start: 2020-06-01 | End: 2020-08-31

## 2020-06-01 NOTE — PROGRESS NOTES
Discussed that with dose increase INR held steady, did not climb.  Ongoing factors include improved diet and the amiodarone stopped in April decreasing effects on warfarin.      Suggest up to 10% dose increase, since INR at threshold of range. Re-assess INR next week due to ongoing factors that are known to decrease INR to verify stays in range.    Ana Faith PharmD [facility]  Anticoagulation Clinical Pharmacist    ANTICOAGULATION FOLLOW-UP CLINIC VISIT    Patient Name:  Kevan Connelly  Date:  6/1/2020  Contact Type:  Telephone    SUBJECTIVE:  Patient Findings     Positives:   Change in medications (amiodarone d/c 4/22/20), Change in diet/appetite    Comments:   Consulted again with Pharm JORDYN., Ana Faith, who, though outside if protocol, has advised a 10% increase in warfarin dosing since the affects of discontinuing amiodarone are now kicking in and the patient has also increased his vit K green veggie intake recently. Recheck in 1 week again.    Called patient to discuss: Patient stated understanding and is in agreement with plan. Will increase Monday's 2.5 mg to 5 mg (about an 8.8% increase) and recheck in 1 week. Otherwise, The patient was assessed for diet, medication, and activity level changes, missed or extra doses, bruising or bleeding, with no problem findings. Referral for renewal of INR sent to PCP for review and signature.    Kaylee LEONARD RN  Anticoagulation Clinic  [place]          Clinical Outcomes     Comments:   Consulted again with Sonja COBB., Ana Faith, who, though outside if protocol, has advised a 10% increase in warfarin dosing since the affects of discontinuing amiodarone are now kicking in and the patient has also increased his vit K green veggie intake recently. Recheck in 1 week again.    Called patient to discuss: Patient stated understanding and is in agreement with plan. Will increase Monday's 2.5 mg to 5 mg (about an 8.8% increase) and recheck in 1 week. Otherwise, The  patient was assessed for diet, medication, and activity level changes, missed or extra doses, bruising or bleeding, with no problem findings. Referral for renewal of INR sent to PCP for review and signature.    Kaylee LEONARD RN  Anticoagulation Clinic  [place]             OBJECTIVE    Recent labs: (last 7 days)     20  0829   INR 2.00*       ASSESSMENT / PLAN  INR assessment THER    Recheck INR In: 1 WEEK    INR Location Clinic      Anticoagulation Summary  As of 2020    INR goal:   2.0-3.0   TTR:   70.8 % (11.9 mo)   INR used for dosin.00 (2020)   Warfarin maintenance plan:   2.5 mg (5 mg x 0.5) every Fri; 5 mg (5 mg x 1) all other days   Full warfarin instructions:   2.5 mg every Fri; 5 mg all other days   Weekly warfarin total:   32.5 mg   Plan last modified:   Kaylee Ramey RN (2020)   Next INR check:   2020   Priority:   Maintenance   Target end date:   Indefinite    Indications    Long term current use of anticoagulant therapy [Z79.01]             Anticoagulation Episode Summary     INR check location:   Anticoagulation Clinic    Preferred lab:       Send INR reminders to:   CONNIE HOBBS    Comments:   5mg tabs / DCCV 19 & 20 / early morning appointments on Wed or Fri      Anticoagulation Care Providers     Provider Role Specialty Phone number    Davina Reaves MD  Internal Medicine [phone]            See the Encounter Report to view Anticoagulation Flowsheet and Dosing Calendar (Go to Encounters tab in chart review, and find the Anticoagulation Therapy Visit)    Dosage adjustment made based on physician directed care plan.    Kaylee Ramey RN

## 2020-06-01 NOTE — PROGRESS NOTES
Patients need annual INR referral. LOV: 3/9/20. Please review and sign.     Kaylee LEONARD RN  Anticoagulation Clinic  [place]

## 2020-06-05 ENCOUNTER — VIRTUAL VISIT (OUTPATIENT)
Dept: CARDIOLOGY | Facility: CLINIC | Age: 70
End: 2020-06-05
Attending: NURSE PRACTITIONER
Payer: COMMERCIAL

## 2020-06-05 DIAGNOSIS — I48.0 PAROXYSMAL ATRIAL FIBRILLATION (H): ICD-10-CM

## 2020-06-05 DIAGNOSIS — R60.0 LOCALIZED EDEMA: ICD-10-CM

## 2020-06-05 DIAGNOSIS — I25.10 CORONARY ARTERY CALCIFICATION: ICD-10-CM

## 2020-06-05 DIAGNOSIS — I10 BENIGN ESSENTIAL HYPERTENSION: Primary | ICD-10-CM

## 2020-06-05 PROCEDURE — 99214 OFFICE O/P EST MOD 30 MIN: CPT | Mod: 95 | Performed by: NURSE PRACTITIONER

## 2020-06-05 RX ORDER — POTASSIUM CHLORIDE 1500 MG/1
20 TABLET, EXTENDED RELEASE ORAL
Qty: 30 TABLET | Refills: 3 | Status: SHIPPED | OUTPATIENT
Start: 2020-06-05 | End: 2020-07-23

## 2020-06-05 NOTE — PATIENT INSTRUCTIONS
Move your potassium and lasix to every 3rd day.   This should still help your swelling but not hurt your kidneys    Take your blood pressure on Monday and bring in your cuff to compare     In 2 weeks send a my chart message with your blood pressures.  If your blood pressures are consistently above 140/80 mmHg then I will likely increase your losartan to 100 mg daily and repeat your labs in 7-10 days afterward.        Will cancel your July visit    See Dr. Hannon in December 2020

## 2020-06-05 NOTE — PROGRESS NOTES
"Kevan Connelly is a 69 year old male who is being evaluated via a billable video visit.      The patient has been notified of following:     \"This video visit will be conducted via a call between you and your physician/provider. We have found that certain health care needs can be provided without the need for an in-person physical exam.  This service lets us provide the care you need with a video conversation.  If a prescription is necessary we can send it directly to your pharmacy.  If lab work is needed we can place an order for that and you can then stop by our lab to have the test done at a later time.    Video visits are billed at different rates depending on your insurance coverage.  Please reach out to your insurance provider with any questions.    If during the course of the call the physician/provider feels a video visit is not appropriate, you will not be charged for this service.\"    Patient has given verbal consent for Video visit? Yes    How would you like to obtain your AVS? Mail a copy    Patient would like the video invitation sent by: Text to cell phone: [phone]       Will anyone else be joining your video visit? No       Review Of Systems  Skin: negative  Eyes: neg, glasses  Ears/Nose/Throat: negative  Respiratory:  Negative   Cardiovascular: edema is minor since taking lasix   Gastrointestinal: negative  Genitourinary: frequency  Musculoskeletal: negative  Neurologic: negative  Psychiatric: negative  Hematologic/Lymphatic/Immunologic: negative  Endocrine: negative    Patient reported vitals:  BP: 147/89  Heart rate: 75  Weight: 268 lbs    Radha Reyna CMA      Kevan Connelly is a 69 year old male who is following up after calling the clinic with Lower extremity edema.   This visit is being conducted as a virtual visit due to the emphasis on mitigation of the COVID-19 virus pandemic. The clinician has decided that the risk of an in-office visit outweighs the benefit for this patient.  "  He is a patient of Dr. Hannon.  His medical history includes      1. Atrial fibrillation.  Diagnosed on 5/30/19 with cardiomyopathy EF was 33%.  Was on amiodarone and changed to flecainide with normal EF.  refractory to flecainide.     Underwent an atrial fibrillation ablation on 1/3/2020.  2. Nonocclusive coronary artery disease.  Per CT angiogram heart 1/2020 revealed coronary calcification the LAD, circumflex and RCA.  3. Mildly dilated aortic root and ascending aorta.  Per CT angiogram (1/2020) revealed 4.22 x 3.88 cm and 3.64 cm x 3.73 cm respectively.  4. Cardiomyopathy.  EF 33% (6/2018) Recent ECHO 50-55% (8/2019)  5. Hypertension  6. Obesity  7. Testicular cancer with subsequent ED     Diagnostics:    Limited ECHO (8/2019) revealed EF 50-55% with mild to moderately dilated L atrium    ECHO (6/2019) revealed moderately reduced LV systolic function with LVEF of 33%.  There was moderate global hypokinesia.  The LV was mildly dilated, mildly decreased RV systolic function.  The left atrium was noted to be severely enlarged and right atrium to be moderate to severely dilated, mild mitral regurgitation.      Lexiscan stress test (6/2019) showed no evidence of ischemia or infarct.  LVEF was 29%.     Exercise stress test (8/2019) revealed no arrhythmias, he exercised for 4 minutes and 42 seconds for a Alvarez treadmill score of 4.5.    CT angiogram heart (1/2/2020) revealed coronary calcification in the LAD, circumflex and RCA.,  Mildly dilated aortic root measuring 4.22 x 3.88 cm.  The dilated ascending aorta measuring 3.64 cm x 3.73 cm.   Radiology report found scattered mediastinal and right Sujatha calcifications thought to be granulomas    IBETH (1/2/2020) revealed EF 55-60%     In March 2019, he started to feel unwell with shortness of breath with exertion.  He met with his primary provider and was diagnosed with atrial fibrillation started on beta-blocker and Xarelto.  The cost of the Xarelto was cost prohibitive  and he was changed to Coumadin.     He met with Dr. Vanessa in June 2019 after being diagnosed with cardiomyopathy and atrial fibrillation. He underwent an unsuccessful cardioversion on 6/26/2019 and later met with Dr Hannon who started amiodarone load and scheduled a repeat cardioversion.       On 7/17/19 he underwent a successful cardioversion.       On 8/7/2019 he presented feeling significantly better after his cardioversion and in NSR.       In September 2019, he was started on flecainide and metoprolol.  When he saw Dr. Hannon in November he was in atrial fibrillation because he forgot to take his medications at that time his flecainide was increased from 75 mg to 100 mg twice daily.  A repeat Zio patch revealed patient being in the 100% atrial fibrillation and an atrial fibrillation ablation was recommended.      On 1/3/2020, patient underwent a PVI ablation and unfortunately had a recurrence on 1/28/2020 and underwent a cardioversion     In 3/2020, he lasix was stopped     On 4/22/2020, pt met with Dr. Hannon and his amiodarone was discontinued     On 5/15/2020, pt called the clinic with complaints of intermittent LE edema wanting to restart lasix. On 5/22/2020, he  lasix 40 mg daily x 3 days and then decrease to 20 mg daily.  Start potassium chloride 20 mEq daily.  Follow up with BMP which revealed increase in creatinine.      Today he states his swelling has decreased but still remains.  His blood pressure remains elevated above 140 mmHg.  He will have his blood pressure check at INR clinic on Monday and he will take his cuff for comparison.   He denies any recurrence of atrial fibrillation.   Overall he denies chest pain or pressure, dizziness, syncope, angina, dyspnea at rest or with exertion, palpitations, orthopnea, or PND.  Reports taking medications as prescribed including warfarin and denies bleeding, hematuria, melena, epistaxis and signs/symptoms of stroke.          ASSESSMENT AND PLAN    Atrial  fibrillation    For anticoagulation for CHADS VASC 3 (age, HTN, HF) he takes warfarin with goal INR 2-3. His INR has been therapeutic since 2/2020.         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  After resolution of tachycardia induced cardiomyopathy he was started on flecainide with metoprolol and later was found to be in atrial fibrillation despite increase flecainide dosage. On 1/3/2020, he underwent a PVI ablation, flecainide was discontinued and he was started on amiodarone which was discontinued on 4/2020.   He has not had a recurrence of atrial fibrillation.      Cardiomyopathy    At the time of diagnosis with his atrial fibrillation his EF was found to be 33%.  Repeat ECHO (8/2019) revealed EF 50-55%      Continue GDMT of ARB (losartan 50 mg daily) and BB (Metoprolol XL 50 mg daily)     Hypertension    Elevated at home while taking losartan and metoprolol    Check blood pressure at INR clinic.  Call in 2 weeks with blood pressures.   If blood pressures are consistently above 140/80 mmHg then will increase losartan from 50 mg daily to 100 mg daily and repeat labs in 7-10 days afterward.       Non-occlusive coronary artery disease    Per CT angiogram heart 1/2020 revealed coronary calcification the LAD, circumflex and RCA.    Asymptomatic    Continue BB, ARB, statin.  He is not on an aspirin due to being on Warfarin.  His last LDL was 75 (5/2020)     Lower extremity edema    He had been off lasix however his LE edema redeveloped.      Lasix decreased his LE edema but it is still present and his creatinine increased.  Will decrease lasix 20 mg and potassium 20 mEq to every 3rd day.      Plan:    decrease lasix 20 mg and potassium 20 mEq to every 3rd day.     Check blood pressure at INR clinic.  Call in 2 weeks with blood pressures.   If blood pressures are consistently above 140/80 mmHg then will increase losartan from 50 mg daily to 100 mg  daily and repeat labs in 7-10 days afterward.      Follow up with Dr. Hannon in 6 months.           Video-Visit Details    Type of service:  Video Visit    Video Start Time: 1:16 PM  Video End Time: 1:36 PM    Originating Location (pt. Location): Home    Distant Location (provider location):  [facility]     Platform used for Video Visit: ANNMARIE Ruiz CNP

## 2020-06-05 NOTE — LETTER
6/5/2020    Davina Reaves MD  [address]  [facility] [address]    RE: Kevan Connelly       Dear Colleague,    I had the pleasure of seeing Kevan Connelly in the [facility].    Kevan Connelly is a 69 year old male who is being evaluated via a billable video visit.        Kevan Connelly is a 69 year old male who is following up after calling the clinic with Lower extremity edema.   This visit is being conducted as a virtual visit due to the emphasis on mitigation of the COVID-19 virus pandemic. The clinician has decided that the risk of an in-office visit outweighs the benefit for this patient.   He is a patient of Dr. Hannon.  His medical history includes      1. Atrial fibrillation.  Diagnosed on 5/30/19 with cardiomyopathy EF was 33%.  Was on amiodarone and changed to flecainide with normal EF.  refractory to flecainide.     Underwent an atrial fibrillation ablation on 1/3/2020.  2. Nonocclusive coronary artery disease.  Per CT angiogram heart 1/2020 revealed coronary calcification the LAD, circumflex and RCA.  3. Mildly dilated aortic root and ascending aorta.  Per CT angiogram (1/2020) revealed 4.22 x 3.88 cm and 3.64 cm x 3.73 cm respectively.  4. Cardiomyopathy.  EF 33% (6/2018) Recent ECHO 50-55% (8/2019)  5. Hypertension  6. Obesity  7. Testicular cancer with subsequent ED     Diagnostics:    Limited ECHO (8/2019) revealed EF 50-55% with mild to moderately dilated L atrium    ECHO (6/2019) revealed moderately reduced LV systolic function with LVEF of 33%.  There was moderate global hypokinesia.  The LV was mildly dilated, mildly decreased RV systolic function.  The left atrium was noted to be severely enlarged and right atrium to be moderate to severely dilated, mild mitral regurgitation.      Lexiscan stress test (6/2019) showed no evidence of ischemia or infarct.  LVEF was 29%.     Exercise stress test (8/2019) revealed no arrhythmias, he exercised  for 4 minutes and 42 seconds for a Alvarez treadmill score of 4.5.    CT angiogram heart (1/2/2020) revealed coronary calcification in the LAD, circumflex and RCA.,  Mildly dilated aortic root measuring 4.22 x 3.88 cm.  The dilated ascending aorta measuring 3.64 cm x 3.73 cm.   Radiology report found scattered mediastinal and right Sujatha calcifications thought to be granulomas    IBETH (1/2/2020) revealed EF 55-60%     In March 2019, he started to feel unwell with shortness of breath with exertion.  He met with his primary provider and was diagnosed with atrial fibrillation started on beta-blocker and Xarelto.  The cost of the Xarelto was cost prohibitive and he was changed to Coumadin.     He met with Dr. Vanessa in June 2019 after being diagnosed with cardiomyopathy and atrial fibrillation. He underwent an unsuccessful cardioversion on 6/26/2019 and later met with Dr Hannon who started amiodarone load and scheduled a repeat cardioversion.       On 7/17/19 he underwent a successful cardioversion.       On 8/7/2019 he presented feeling significantly better after his cardioversion and in NSR.       In September 2019, he was started on flecainide and metoprolol.  When he saw Dr. Hannon in November he was in atrial fibrillation because he forgot to take his medications at that time his flecainide was increased from 75 mg to 100 mg twice daily.  A repeat Zio patch revealed patient being in the 100% atrial fibrillation and an atrial fibrillation ablation was recommended.      On 1/3/2020, patient underwent a PVI ablation and unfortunately had a recurrence on 1/28/2020 and underwent a cardioversion     In 3/2020, he lasix was stopped     On 4/22/2020, pt met with Dr. Hannon and his amiodarone was discontinued     On 5/15/2020, pt called the clinic with complaints of intermittent LE edema wanting to restart lasix. On 5/22/2020, he  lasix 40 mg daily x 3 days and then decrease to 20 mg daily.  Start potassium chloride 20 mEq daily.   Follow up with [facility] which revealed increase in creatinine.      Today he states his swelling has decreased but still remains.  His blood pressure remains elevated above 140 mmHg.  He will have his blood pressure check at INR clinic on Monday and he will take his cuff for comparison.   He denies any recurrence of atrial fibrillation.   Overall he denies chest pain or pressure, dizziness, syncope, angina, dyspnea at rest or with exertion, palpitations, orthopnea, or PND.  Reports taking medications as prescribed including warfarin and denies bleeding, hematuria, melena, epistaxis and signs/symptoms of stroke.          ASSESSMENT AND PLAN    Atrial fibrillation    For anticoagulation for CHADS VASC 3 (age, HTN, HF) he takes warfarin with goal INR 2-3. His INR has been therapeutic since 2/2020.         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  After resolution of tachycardia induced cardiomyopathy he was started on flecainide with metoprolol and later was found to be in atrial fibrillation despite increase flecainide dosage. On 1/3/2020, he underwent a PVI ablation, flecainide was discontinued and he was started on amiodarone which was discontinued on 4/2020.   He has not had a recurrence of atrial fibrillation.      Cardiomyopathy    At the time of diagnosis with his atrial fibrillation his EF was found to be 33%.  Repeat ECHO (8/2019) revealed EF 50-55%      Continue GDMT of ARB (losartan 50 mg daily) and BB (Metoprolol XL 50 mg daily)     Hypertension    Elevated at home while taking losartan and metoprolol    Check blood pressure at INR clinic.  Call in 2 weeks with blood pressures.   If blood pressures are consistently above 140/80 mmHg then will increase losartan from 50 mg daily to 100 mg daily and repeat labs in 7-10 days afterward.       Non-occlusive coronary artery disease    Per CT angiogram heart 1/2020 revealed coronary calcification the  LAD, circumflex and RCA.    Asymptomatic    Continue BB, ARB, statin.  He is not on an aspirin due to being on Warfarin.  His last LDL was 75 (5/2020)     Lower extremity edema    He had been off lasix however his LE edema redeveloped.      Lasix decreased his LE edema but it is still present and his creatinine increased.  Will decrease lasix 20 mg and potassium 20 mEq to every 3rd day.      Plan:    decrease lasix 20 mg and potassium 20 mEq to every 3rd day.     Check blood pressure at INR clinic.  Call in 2 weeks with blood pressures.   If blood pressures are consistently above 140/80 mmHg then will increase losartan from 50 mg daily to 100 mg daily and repeat labs in 7-10 days afterward.      Follow up with Dr. Hannon in 6 months.         Thank you for allowing me to participate in the care of your patient.    Sincerely,     ANNMARIE Peterson [facility]

## 2020-06-08 ENCOUNTER — ALLIED HEALTH/NURSE VISIT (OUTPATIENT)
Dept: NURSING | Facility: CLINIC | Age: 70
End: 2020-06-08
Payer: COMMERCIAL

## 2020-06-08 ENCOUNTER — TELEPHONE (OUTPATIENT)
Dept: FAMILY MEDICINE | Facility: CLINIC | Age: 70
End: 2020-06-08

## 2020-06-08 ENCOUNTER — ANTICOAGULATION THERAPY VISIT (OUTPATIENT)
Dept: NURSING | Facility: CLINIC | Age: 70
End: 2020-06-08

## 2020-06-08 VITALS — DIASTOLIC BLOOD PRESSURE: 74 MMHG | SYSTOLIC BLOOD PRESSURE: 124 MMHG

## 2020-06-08 DIAGNOSIS — Z79.01 LONG TERM CURRENT USE OF ANTICOAGULANT THERAPY: ICD-10-CM

## 2020-06-08 DIAGNOSIS — I48.92 ATRIAL FLUTTER (H): ICD-10-CM

## 2020-06-08 DIAGNOSIS — I48.0 PAROXYSMAL ATRIAL FIBRILLATION (H): ICD-10-CM

## 2020-06-08 DIAGNOSIS — I10 BENIGN ESSENTIAL HYPERTENSION: Primary | ICD-10-CM

## 2020-06-08 LAB
CAPILLARY BLOOD COLLECTION: NORMAL
INR PPP: 2.1 (ref 0.86–1.14)

## 2020-06-08 PROCEDURE — 85610 PROTHROMBIN TIME: CPT | Performed by: INTERNAL MEDICINE

## 2020-06-08 PROCEDURE — 99207 ZZC NO CHARGE NURSE ONLY: CPT

## 2020-06-08 PROCEDURE — 36416 COLLJ CAPILLARY BLOOD SPEC: CPT | Performed by: INTERNAL MEDICINE

## 2020-06-08 NOTE — PROGRESS NOTES
ANTICOAGULATION FOLLOW-UP CLINIC VISIT    Patient Name:  Kevan Connelly  Date:  6/8/2020  Contact Type:  Telephone    SUBJECTIVE:  Patient Findings     Comments:   Renewal of INR referral sent to PCP 6/1/20. Awaiting signature, re-routed to provider for review.    Called patient to discuss today's INR results: The patient was assessed for diet, medication, and activity level changes, missed or extra doses, bruising or bleeding, with no problem findings. Reviewed maintenance warfarin dosing with patient. Patient will remain on the same dose until next INR check. No other questions or concerns. Scheduled next lab-only INR in 10 days. Watching INR closely as patient discontinued amiodarone end of April and his INR has been falling and we have been increasing warfarin.    Kaylee LEONARD RN  Anticoagulation Clinic  [place]          Clinical Outcomes     Negatives:   Major bleeding event, Thromboembolic event, Anticoagulation-related hospital admission, Anticoagulation-related ED visit, Anticoagulation-related fatality    Comments:   Renewal of INR referral sent to PCP 6/1/20. Awaiting signature, re-routed to provider for review.    Called patient to discuss today's INR results: The patient was assessed for diet, medication, and activity level changes, missed or extra doses, bruising or bleeding, with no problem findings. Reviewed maintenance warfarin dosing with patient. Patient will remain on the same dose until next INR check. No other questions or concerns. Scheduled next lab-only INR in 10 days. Watching INR closely as patient discontinued amiodarone end of April and his INR has been falling and we have been increasing warfarin.    Kaylee LEONARD RN  Anticoagulation Clinic  [place]             OBJECTIVE    Recent labs: (last 7 days)     06/08/20  0824   INR 2.10*       ASSESSMENT / PLAN  INR assessment THER    Recheck INR In: 10 DAYS    INR Location Clinic      Anticoagulation Summary  As of 6/8/2020    INR goal:    2.0-3.0   TTR:   71.4 % (1 y)   INR used for dosin.10 (2020)   Warfarin maintenance plan:   2.5 mg (5 mg x 0.5) every Fri; 5 mg (5 mg x 1) all other days   Full warfarin instructions:   2.5 mg every Fri; 5 mg all other days   Weekly warfarin total:   32.5 mg   No change documented:   Kaylee Ramey RN   Plan last modified:   Kaylee Ramey RN (2020)   Next INR check:   2020   Priority:   Maintenance   Target end date:   Indefinite    Indications    Long term current use of anticoagulant therapy [Z79.01]             Anticoagulation Episode Summary     INR check location:   Anticoagulation Clinic    Preferred lab:       Send INR reminders to:   CONNIE HOBBS    Comments:   5mg tabs / DCCV 19 & 20 / early morning appointments on Wed or Fri      Anticoagulation Care Providers     Provider Role Specialty Phone number    Davina Reaves MD  Internal Medicine [phone]            See the Encounter Report to view Anticoagulation Flowsheet and Dosing Calendar (Go to Encounters tab in chart review, and find the Anticoagulation Therapy Visit)    Kaylee Ramey, DIO

## 2020-06-08 NOTE — PATIENT INSTRUCTIONS
----- Message from Dory Rivera MD sent at 6/4/2020  4:37 PM CDT -----  20/23 protective  Excellent post tresponse   Due for immunizations:  Due for Tetansu, Sr influenza and PCV-13; PPSV due 2018( 6-12 months)    Regular exercise encouraged   Healthy diet encouraged

## 2020-06-15 PROBLEM — Z79.01 LONG TERM CURRENT USE OF ANTICOAGULANTS WITH INR GOAL OF 2.0-3.0: Status: ACTIVE | Noted: 2020-06-15

## 2020-06-19 ENCOUNTER — ANTICOAGULATION THERAPY VISIT (OUTPATIENT)
Dept: NURSING | Facility: CLINIC | Age: 70
End: 2020-06-19

## 2020-06-19 DIAGNOSIS — Z79.01 LONG TERM CURRENT USE OF ANTICOAGULANT THERAPY: ICD-10-CM

## 2020-06-19 DIAGNOSIS — Z79.01 LONG TERM CURRENT USE OF ANTICOAGULANTS WITH INR GOAL OF 2.0-3.0: ICD-10-CM

## 2020-06-19 DIAGNOSIS — I48.0 PAROXYSMAL ATRIAL FIBRILLATION (H): ICD-10-CM

## 2020-06-19 DIAGNOSIS — I48.92 ATRIAL FLUTTER (H): ICD-10-CM

## 2020-06-19 LAB
CAPILLARY BLOOD COLLECTION: NORMAL
INR PPP: 2.3 (ref 0.86–1.14)

## 2020-06-19 PROCEDURE — 85610 PROTHROMBIN TIME: CPT | Performed by: INTERNAL MEDICINE

## 2020-06-19 PROCEDURE — 36416 COLLJ CAPILLARY BLOOD SPEC: CPT | Performed by: INTERNAL MEDICINE

## 2020-06-19 PROCEDURE — 99207 ZZC NO CHARGE NURSE ONLY: CPT

## 2020-06-19 NOTE — PROGRESS NOTES
ANTICOAGULATION FOLLOW-UP CLINIC VISIT    Patient Name:  Kevan Connelly  Date:  6/19/2020  Contact Type:  Telephone    SUBJECTIVE:  Patient Findings     Positives:   Signs/symptoms of bleeding (nose bleed today)    Comments:   Called patient to discuss today's INR results: Started the day with a nose bleed, normally it stops within 5 minutes, this one has been on/off for 40 minutes. As a kid he did have frequent nosebleeds and as an adult he did have a polyp removed. Reviewed holding constant pressure at bridge of nose for a solid 10 minutes without peeking. Patient states he has been impatient so he will try this. If continuing to bleed after this he can go to ED to have it cauterized. Patient stated understanding and is in agreement with plan. Otherwise, The patient was assessed for diet, medication, and activity level changes, missed or extra doses, bruising, with no problem findings. Reviewed maintenance warfarin dosing with patient. Patient will remain on the same dose until next INR check. No other questions or concerns. Scheduled next lab-only INR  Kaylee LEONARD RN  Anticoagulation Clinic  [place]          Clinical Outcomes     Comments:   Called patient to discuss today's INR results: Started the day with a nose bleed, normally it stops within 5 minutes, this one has been on/off for 40 minutes. As a kid he did have frequent nosebleeds and as an adult he did have a polyp removed. Reviewed holding constant pressure at bridge of nose for a solid 10 minutes without peeking. Patient states he has been impatient so he will try this. If continuing to bleed after this he can go to ED to have it cauterized. Patient stated understanding and is in agreement with plan. Otherwise, The patient was assessed for diet, medication, and activity level changes, missed or extra doses, bruising, with no problem findings. Reviewed maintenance warfarin dosing with patient. Patient will remain on the same dose until next INR  check. No other questions or concerns. Scheduled next lab-only INR  Kaylee LEONARD RN  Anticoagulation Clinic  [place]             OBJECTIVE    Recent labs: (last 7 days)     20  0909   INR 2.30*       ASSESSMENT / PLAN  INR assessment THER    Recheck INR In: 2 WEEKS    INR Location Clinic      Anticoagulation Summary  As of 2020    INR goal:   2.0-3.0   TTR:   74.4 % (1 y)   INR used for dosin.30 (2020)   Warfarin maintenance plan:   2.5 mg (5 mg x 0.5) every Fri; 5 mg (5 mg x 1) all other days   Full warfarin instructions:   2.5 mg every Fri; 5 mg all other days   Weekly warfarin total:   32.5 mg   No change documented:   Kaylee Ramey RN   Plan last modified:   Kaylee Ramey RN (2020)   Next INR check:   7/3/2020   Priority:   Maintenance   Target end date:   Indefinite    Indications    Long term current use of anticoagulant therapy [Z79.01]  Paroxysmal atrial fibrillation (H) [I48.0]  Long term current use of anticoagulants with INR goal of 2.0-3.0 [Z79.01]             Anticoagulation Episode Summary     INR check location:   Anticoagulation Clinic    Preferred lab:       Send INR reminders to:   CONNIE HOBBS    Comments:   5mg tabs / DCCV 19 & 20 / early morning appointments on Wed or Fri      Anticoagulation Care Providers     Provider Role Specialty Phone number    Davina Reaves MD Referring Internal Medicine [phone]            See the Encounter Report to view Anticoagulation Flowsheet and Dosing Calendar (Go to Encounters tab in chart review, and find the Anticoagulation Therapy Visit)    Kaylee Ramey RN

## 2020-07-03 ENCOUNTER — ANTICOAGULATION THERAPY VISIT (OUTPATIENT)
Dept: NURSING | Facility: CLINIC | Age: 70
End: 2020-07-03

## 2020-07-03 DIAGNOSIS — Z79.01 LONG TERM CURRENT USE OF ANTICOAGULANT THERAPY: ICD-10-CM

## 2020-07-03 DIAGNOSIS — Z79.01 LONG TERM CURRENT USE OF ANTICOAGULANTS WITH INR GOAL OF 2.0-3.0: ICD-10-CM

## 2020-07-03 DIAGNOSIS — I48.0 PAROXYSMAL ATRIAL FIBRILLATION (H): ICD-10-CM

## 2020-07-03 DIAGNOSIS — I48.92 ATRIAL FLUTTER (H): ICD-10-CM

## 2020-07-03 LAB
CAPILLARY BLOOD COLLECTION: NORMAL
INR PPP: 2.3 (ref 0.86–1.14)

## 2020-07-03 PROCEDURE — 85610 PROTHROMBIN TIME: CPT | Performed by: INTERNAL MEDICINE

## 2020-07-03 PROCEDURE — 36416 COLLJ CAPILLARY BLOOD SPEC: CPT | Performed by: INTERNAL MEDICINE

## 2020-07-03 PROCEDURE — 99207 ZZC NO CHARGE NURSE ONLY: CPT | Performed by: INTERNAL MEDICINE

## 2020-07-03 NOTE — PROGRESS NOTES
ANTICOAGULATION FOLLOW-UP CLINIC VISIT    Patient Name:  Kevan Connelly  Date:  7/3/2020  Contact Type:  Telephone    SUBJECTIVE:  Patient Findings     Comments:   Called patient to discuss today's INR results: The patient was assessed for diet, medication, and activity level changes, missed or extra doses, bruising or bleeding, with no problem findings. Reviewed maintenance warfarin dosing with patient. Patient will remain on the same dose until next INR check. No other questions or concerns. Scheduled next lab-only INR  Kyalee LEONARD RN  Antico[facility] Clinic  [place]          Clinical Outcomes     Negatives:   Major bleeding event, Thromboembolic event, Anticoagulation-related hospital admission, Anticoagulation-related ED visit, Anticoagulation-related fatality    Comments:   Called patient to discuss today's INR results: The patient was assessed for diet, medication, and activity level changes, missed or extra doses, bruising or bleeding, with no problem findings. Reviewed maintenance warfarin dosing with patient. Patient will remain on the same dose until next INR check. No other questions or concerns. Scheduled next lab-only INR  Kaylee LEONARD RN  Anticoagulation Clinic  [place]             OBJECTIVE    Recent labs: (last 7 days)     20  0851   INR 2.30*       ASSESSMENT / PLAN  INR assessment THER    Recheck INR In: 4 WEEKS    INR Location Clinic      Anticoagulation Summary  As of 7/3/2020    INR goal:   2.0-3.0   TTR:   75.1 % (1 y)   INR used for dosin.30 (7/3/2020)   Warfarin maintenance plan:   2.5 mg (5 mg x 0.5) every Fri; 5 mg (5 mg x 1) all other days   Full warfarin instructions:   2.5 mg every Fri; 5 mg all other days   Weekly warfarin total:   32.5 mg   No change documented:   Kaylee Ramey RN   Plan last modified:   Kaylee Ramey RN (2020)   Next INR check:   2020   Priority:   Maintenance   Target end date:   Indefinite    Indications    Long term current use  of anticoagulant therapy [Z79.01]  Paroxysmal atrial fibrillation (H) [I48.0]  Long term current use of anticoagulants with INR goal of 2.0-3.0 [Z79.01]             Anticoagulation Episode Summary     INR check location:   Anticoagulation Clinic    Preferred lab:       Send INR reminders to:   CONNIE HOBBS    Comments:   5mg tabs / DCCV 7/17/19 & 1/28/20 / early morning appointments on Wed or Fri      Anticoagulation Care Providers     Provider Role Specialty Phone number    Davina Reaves MD Referring Internal Medicine [phone]            See the Encounter Report to view Anticoagulation Flowsheet and Dosing Calendar (Go to Encounters tab in chart review, and find the Anticoagulation Therapy Visit)    Kaylee Ramey RN

## 2020-07-13 ENCOUNTER — TELEPHONE (OUTPATIENT)
Dept: FAMILY MEDICINE | Facility: CLINIC | Age: 70
End: 2020-07-13

## 2020-07-13 NOTE — TELEPHONE ENCOUNTER
Patient calling to see if there are any interactions between Apple cider vinegar and warfarin.  Reviewed all of patient's medications through [facility] and found no interactions.  Patient informed.  He drinks about 2 tablespoons per day which is helping with diet suppression and heartburn.     Bree Blair RN  Anticoagulation Nurse - [facility]

## 2020-07-16 ENCOUNTER — TELEPHONE (OUTPATIENT)
Dept: CARDIOLOGY | Facility: CLINIC | Age: 70
End: 2020-07-16

## 2020-07-17 NOTE — TELEPHONE ENCOUNTER
Can you call pt and ask him to either tell you his blood pressures or send them in via my chart.      They were elevated the last time I spoke with him and I may need to increase his losartan,    Thank you,    Hilda

## 2020-07-17 NOTE — TELEPHONE ENCOUNTER
Pt returned call and currently is in [place] and last BP was taken was 136/82.  Pt will be back on Tuesday and will do some checks and contact clinic with numbers. Isabel

## 2020-07-23 NOTE — TELEPHONE ENCOUNTER
Pt called after his trip to [place].  Pt BP yesterday at 1800 was 129/82 and today at 0630 149/91 and one hour after medication 135/83.  States that HR has been running 70-75 bpm and currently was 70.  Pt states that he has stopped the lasix and potassium, since he does not have any swelling and feels that if he has no swelling he does not feel that he needs to take.  Will update med list after Hilda is aware. JNelsonRN

## 2020-07-31 ENCOUNTER — ANTICOAGULATION THERAPY VISIT (OUTPATIENT)
Dept: NURSING | Facility: CLINIC | Age: 70
End: 2020-07-31

## 2020-07-31 DIAGNOSIS — I48.0 PAROXYSMAL ATRIAL FIBRILLATION (H): ICD-10-CM

## 2020-07-31 DIAGNOSIS — Z79.01 LONG TERM CURRENT USE OF ANTICOAGULANT THERAPY: ICD-10-CM

## 2020-07-31 DIAGNOSIS — I48.92 ATRIAL FLUTTER (H): ICD-10-CM

## 2020-07-31 DIAGNOSIS — Z79.01 LONG TERM CURRENT USE OF ANTICOAGULANTS WITH INR GOAL OF 2.0-3.0: ICD-10-CM

## 2020-07-31 LAB
CAPILLARY BLOOD COLLECTION: NORMAL
INR PPP: 2.4 (ref 0.86–1.14)

## 2020-07-31 PROCEDURE — 85610 PROTHROMBIN TIME: CPT | Performed by: INTERNAL MEDICINE

## 2020-07-31 PROCEDURE — 99207 ZZC NO CHARGE NURSE ONLY: CPT

## 2020-07-31 PROCEDURE — 36416 COLLJ CAPILLARY BLOOD SPEC: CPT | Performed by: INTERNAL MEDICINE

## 2020-07-31 NOTE — PROGRESS NOTES
ANTICOAGULATION FOLLOW-UP CLINIC VISIT    Patient Name:  Kevan Connelly  Date:  2020  Contact Type:  Telephone    SUBJECTIVE:  Patient Findings     Comments:   Called patient to discuss today's INR results: The patient was assessed for diet, medication, and activity level changes, missed or extra doses, bruising or bleeding, with no problem findings. Reviewed maintenance warfarin dosing with patient. Patient will remain on the same dose until next INR check. No other questions or concerns. Scheduled next lab-only INR six weeks.  Kaylee LEONARD RN  Anticoagulation Clinic  [place]          Clinical Outcomes     Negatives:   Major bleeding event, Thromboembolic event, Anticoagulation-related hospital admission, Anticoagulation-related ED visit, Anticoagulation-related fatality    Comments:   Called patient to discuss today's INR results: The patient was assessed for diet, medication, and activity level changes, missed or extra doses, bruising or bleeding, with no problem findings. Reviewed maintenance warfarin dosing with patient. Patient will remain on the same dose until next INR check. No other questions or concerns. Scheduled next lab-only INR six weeks.  Kaylee LEONARD RN  Anticoagulation Clinic  [place]             OBJECTIVE    Recent labs: (last 7 days)     20  0847   INR 2.40*       ASSESSMENT / PLAN  INR assessment THER    Recheck INR In: 6 WEEKS    INR Location Clinic      Anticoagulation Summary  As of 2020    INR goal:   2.0-3.0   TTR:   77.8 % (1 y)   INR used for dosin.40 (2020)   Warfarin maintenance plan:   2.5 mg (5 mg x 0.5) every Fri; 5 mg (5 mg x 1) all other days   Full warfarin instructions:   2.5 mg every Fri; 5 mg all other days   Weekly warfarin total:   32.5 mg   No change documented:   Kaylee Ramey RN   Plan last modified:   Kaylee Ramey RN (2020)   Next INR check:   2020   Priority:   Maintenance   Target end date:   Indefinite    Indications     Long term current use of anticoagulant therapy [Z79.01]  Paroxysmal atrial fibrillation (H) [I48.0]  Long term current use of anticoagulants with INR goal of 2.0-3.0 [Z79.01]             Anticoagulation Episode Summary     INR check location:   Anticoagulation Clinic    Preferred lab:       Send INR reminders to:   CONNIE HOBBS    Comments:   5mg tabs / DCCV 7/17/19 & 1/28/20 / early morning appointments on Wed or Fri      Anticoagulation Care Providers     Provider Role Specialty Phone number    Davina Reaves MD Referring Internal Medicine [phone]            See the Encounter Report to view Anticoagulation Flowsheet and Dosing Calendar (Go to Encounters tab in chart review, and find the Anticoagulation Therapy Visit)    Kaylee Ramey RN

## 2020-08-28 ENCOUNTER — TELEPHONE (OUTPATIENT)
Dept: CARDIOLOGY | Facility: CLINIC | Age: 70
End: 2020-08-28

## 2020-08-28 DIAGNOSIS — I48.0 PAROXYSMAL ATRIAL FIBRILLATION (H): ICD-10-CM

## 2020-08-28 DIAGNOSIS — I48.0 PAROXYSMAL ATRIAL FIBRILLATION (H): Primary | ICD-10-CM

## 2020-08-28 PROCEDURE — 93000 ELECTROCARDIOGRAM COMPLETE: CPT | Performed by: NURSE PRACTITIONER

## 2020-08-28 NOTE — TELEPHONE ENCOUNTER
Patient reports heart started racing approximately 90 minutes ago with HR ranging 110-150's.  Had AF ablation done 1/3/20.  Reports this is the first episode he has had since his ablation.  Denies feeling unwell.  Not aware of what possibly could have triggered event.   Will have patient come in for EKG to verify rhythm.  Scheduled to 12 noon today.  Wellness screen completed.   Wellness Screening Tool    Symptom Screening:    Do you have one of the following NEW symptoms:      Fever (subjective or >100.0)?  No    New cough? No    Shortness of breath? No    Chills? No    New loss of taste or smell? No    Generalized body aches? No    New persistent headache? No    New sore throat? No    Nausea, vomiting or diarrhea? No    Within the past 2 weeks, have you been exposed to someone with a known positive illness below?      COVID - 19 (known or suspected) No    Chicken pox?  No    Measles? No    Pertussis? No    Have you had a positive COVID-19 diagnostic test (nasal swab test) in the last 14 days or are you currently   on self-quarantine restrictions (i.e.travel restriction, exposure, etc?) No        Patient notified of visitor restriction: Yes  Patient informed to wear a mask: Yes    Patient's appointment status: Patient will be seen in clinic for EKG today at 12 noon.  DIO Schrader

## 2020-08-28 NOTE — TELEPHONE ENCOUNTER
Spoke to patient regarding recommendations per Hilda Wahl, BRYAN:  ---Please call Kevan DHRUV Connelly his ECG reveals Afib with RVR (rate 114 bpm).  Please have him take his BP and if over SBP is over 100 mmHg, he should take an EXTRA metoprolol Xl 50 mg daily until he converts.    ----Please call back on Monday if he hasn't converted by then we can discuss antiarrhythmic medications.  If he is feeling terribly I would recommend going to the ED in the next 48 hours vs waiting until Monday.   This will avoid a IBETH  Patient provided verbal understanding regarding above and asked for my chart message to be sent with above data.  DIO Schrader

## 2020-08-28 NOTE — TELEPHONE ENCOUNTER
Please call Kevan Connelly his ECG reveals Afib with RVR (rate 114 bpm).  Please have him take his BP and if over SBP is over 100 mmHg, he should take an EXTRA metoprolol Xl 50 mg daily until he converts.        Please call back on Monday if he hasn't converted by then we can discuss antiarrhythmic medications.  If he is feeling terribly I would recommend going to the ED in the next 48 hours vs waiting until Monday.   This will avoid a IBETH    Thank you,    Hilda Wahl, ANNMARIE CNP on 8/28/2020 at 12:01 PM

## 2020-08-31 DIAGNOSIS — I48.91 ATRIAL FIBRILLATION, UNSPECIFIED TYPE (H): ICD-10-CM

## 2020-08-31 RX ORDER — WARFARIN SODIUM 5 MG/1
TABLET ORAL
Qty: 90 TABLET | Refills: 0 | Status: SHIPPED | OUTPATIENT
Start: 2020-08-31 | End: 2020-12-07

## 2020-08-31 NOTE — TELEPHONE ENCOUNTER
Spoke to patient regarding recommendations per Hilda Wahl, BRYAN:  1.) follow up in clinic with Dr. Hannon to discuss rhythm control.   2) in the meantime, lets rate control him.  We know his heart rates will bounce around a little.   Overall I am fine with rates between  bpm.  I realize that he might see a few higher rates because that is the nature of atrial fibrillation.         3) If this is causing anxiety, I recommend taking off apple watch and only check HR once a day.    4) If he has symptoms and feels he needs to go the ED he should go.    Patient provided verbal understanding regarding above.  Patient is scheduled for video visit on 9/2 at 8:15pm to discuss treatment plan with Dr Hannon.  DIO Schrader

## 2020-08-31 NOTE — TELEPHONE ENCOUNTER
Dr. Hannon,    Please advise on rhythm control.       Kevan Connelly is a 69 year old who was diagnosed with atrial fibrillation and tachycardia induced cardiomyopathy at which point he was placed on amiodarone.  After resolution of the cardiomyopathy he was started on flecainide with metoprolol and later was found to be in atrial fibrillation.   On 1/3/2020, he underwent a PVI ablation, started on amiodarone which was discontinued on 4/2020.    IBETH on 1/2019 revealed EF 55-60%      Pt went into atrial fibrillation 3 days.  He has taken extra metoprolol but has not converted.    He is on warfarin for anticoagulation.    Plan:  1. Restart amiodarone 400 mg twice daily x 4 days, then 200 mg BID x 4 days then 200 mg daily.  2. In 1 week proceed to IBETH and cardioversion    Thank you     ANNMARIE Peterson CNP on 8/31/2020 at 1:13 PM

## 2020-08-31 NOTE — TELEPHONE ENCOUNTER
He should follow-up in clinic to discuss options including continue amiodarone or re-do ablation.     Lamont Hannon

## 2020-08-31 NOTE — TELEPHONE ENCOUNTER
Can you call pt back.   Let him know Dr. Hannon's recommendation.  Dr. Hannon does have an appointment on Wed.       1) follow up in clinic with Dr. Hannon to discuss rhythm control.   2) in the meantime, lets rate control him.  We know his heart rates will bounce around a little.   Overall I am fine with rates between  bpm.  I realize that he might see a few higher rates because that is the nature of atrial fibrillation.         3) If this is causing anxiety, I recommend taking off apple watch and only check HR once a day.    4) If he has symptoms and feels he needs to go the ED he should go.      ANNMARIE Peterson CNP on 8/31/2020 at 4:35 PM

## 2020-08-31 NOTE — TELEPHONE ENCOUNTER
Reviewed last warfarin refill and last INR visit. Patient's INR was therapeutic on 7/31/20, has another lab-only INR scheduled for 9/11/20.   Prescription approved per [facility] Refill Protocol.    Kaylee LEONARD RN  Anticoagulation Clinic  [place]

## 2020-08-31 NOTE — TELEPHONE ENCOUNTER
Patient called with update.  Has been taking extra dose of  metoprolol XL 50mg po since Friday and has not converted back to NSR.  His HR has been fluctuating .  Today /101 (prior to taking his morning metoprolol).  Will update Hilda regarding above.  DIO Schrader

## 2020-09-02 ENCOUNTER — ANTICOAGULATION THERAPY VISIT (OUTPATIENT)
Dept: FAMILY MEDICINE | Facility: CLINIC | Age: 70
End: 2020-09-02

## 2020-09-02 ENCOUNTER — VIRTUAL VISIT (OUTPATIENT)
Dept: CARDIOLOGY | Facility: CLINIC | Age: 70
End: 2020-09-02
Payer: COMMERCIAL

## 2020-09-02 DIAGNOSIS — Z79.01 LONG TERM CURRENT USE OF ANTICOAGULANT THERAPY: ICD-10-CM

## 2020-09-02 DIAGNOSIS — Z79.01 LONG TERM CURRENT USE OF ANTICOAGULANTS WITH INR GOAL OF 2.0-3.0: ICD-10-CM

## 2020-09-02 DIAGNOSIS — I48.19 PERSISTENT ATRIAL FIBRILLATION (H): Primary | ICD-10-CM

## 2020-09-02 DIAGNOSIS — I48.19 PERSISTENT ATRIAL FIBRILLATION (H): ICD-10-CM

## 2020-09-02 DIAGNOSIS — I48.0 PAROXYSMAL ATRIAL FIBRILLATION (H): ICD-10-CM

## 2020-09-02 LAB
CAPILLARY BLOOD COLLECTION: NORMAL
INR PPP: 2.8 (ref 0.86–1.14)

## 2020-09-02 PROCEDURE — 99214 OFFICE O/P EST MOD 30 MIN: CPT | Mod: 95 | Performed by: INTERNAL MEDICINE

## 2020-09-02 PROCEDURE — 36416 COLLJ CAPILLARY BLOOD SPEC: CPT | Performed by: INTERNAL MEDICINE

## 2020-09-02 PROCEDURE — 85610 PROTHROMBIN TIME: CPT | Performed by: INTERNAL MEDICINE

## 2020-09-02 PROCEDURE — 99207 ZZC NO CHARGE NURSE ONLY: CPT | Performed by: INTERNAL MEDICINE

## 2020-09-02 RX ORDER — FLECAINIDE ACETATE 100 MG/1
100 TABLET ORAL 2 TIMES DAILY
Qty: 60 TABLET | Refills: 3 | Status: SHIPPED | OUTPATIENT
Start: 2020-09-02 | End: 2020-09-16

## 2020-09-02 NOTE — LETTER
9/2/2020    Davina Reaves MD  [address]  [facility] [address]    RE: Kevan Connelly       Dear Colleague,    I had the pleasure of seeing Kevan Connelly in the [facility].    Electrophysiology/ Virtual Clinic Note         H&P and Plan:   Patient opted to have a virtual visit due to ongoing issues related to ongoing COVID-19 virus pandemic and to minimize social interaction (based on CDC guidelines).      Visit details:  Patient has given verbal consent for this visit.    Interview was done talking and seeing patient via Internet.   Mode of transmission: Luxodo, Synference.  Visit start time: 8:15 AM  Visit stop time: 8:45 AM   Provider location: Office.  Patient location: Home.       REASON FOR VISIT: Electrophysiology evaluation.      HISTORY OF PRESENT ILLNESS: Mr. 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    Patient initially presented with symptomatic atrial fibrillation and signs of LV dysfunction.  He was placed on amiodarone and underwent successful cardioversion.  After cardioversion, cardiac function normalized and amiodarone was switched to flecainide.  Unfortunately, he failed therapy with flecainide having recurrence of atrial fibrillation.  He underwent A. fib ablation 01/03/2020.    After A. fib ablation, he has recurrence of atrial flutter.  Flecainide was then discontinued he was started on amiodarone.  He underwent successful cardioversion on oh 1/28.  After completing 3 months of recovery.  After ablation, amiodarone was discontinued his p.o. beta-blocker therapy..    In the beginning of August, he presented again with recurrence of atrial fibrillation.  Metoprolol dose was then increased to 100 mg daily and he was referred here for evaluation.    At the moment, he  informs that he can tolerate the A. fib.  However he does not feel great.  He complains of fatigue and dyspnea major exertion.  He denies any other symptoms of chest pain, lightheadedness, near-syncope or syncope.    EKG obtained in August is compatible with atrial fibrillation.     Previous studies:  -EKG (08/20/2019): Normal sinus rhythm.  First-degree AV block (NJ of 276 ms).  QRS measuring 112 ms.  -Exercise stress test (08/28/2019): Target heart rate was not achieved.  However test was not negative for ischemia or pro-arrhythmias.  -Limited echo (08/14/2019): Normal LV function.  EF estimated 50-55%.  -IBETH (1/02/2020): Normal LV function.  EF estimated 55-60%.  There was no significant valve disease.  -Chest CT (01/02/2020): Normal pulmonary vein anatomy.  Aortic root was mildly dilated (4.2 x 3.88 cm).  Coronary calcification was noticed in the LAD.  Scattered mediastinal and right heel are calcifications were noticed suggestive of old granulomas.     ASSESSMENT AND PLAN:   1.  Persistent atrial fibrillation.      Patient had recurrence of A. fib despite of ablation.  He continues to be symptomatic with atrial fibrillation despite of high dose of metoprolol.  Today, we discussed options including continue rate control strategy versus rhythm control strategy.  Regarding rhythm control, we discussed possibility of ablation or start flecainide and pursue another cardioversion.  We also discussed possibility of AV node ablation pacemaker implantation.  After discussing pros and cons of each option, he decided to attempt another cardioversion of flecainide.  I recommend the following:    -Check INR today.  Of note, Coumadin dose has been stable and INR has been therapeutic for several months.  -If INR continues to be therapeutic, we will schedule cardioversion.  He will start flecainide 100 mg twice daily 2 days prior to cardioversion.  -If INR is subtherapeutic, we will schedule IBETH/cardioversion.      2.  Embolic  prevention.  Chads vas score of 2-3.  Continue anticoagulation indefinitely.        Lamont Hannon MD    Physical Exam:  Vitals: There were no vitals taken for this visit.    Constitutional:  Pt is in no acute distress.  Normal body habitus, upright.  HEAD: Normocephalic. No evidence of injury or laceration.   SKIN: Skin normal color with no lesions or eruptions. No xanthelasma.  ENT/Mouth:  Membranes moist. No nasal discharge or bleeding gums.   Neck:  Supple, normal JVP, no evidence of thyromegaly.  Chest/Lungs: No audible wheezing or labored breathing. Normal chest wall expansion. No cough.   Cardiovascular: Normal jugular venous pressure. No evidence of pitting edema bilaterally.   Abdomen: No evidence of abdominal distention. No observed jaundice.  Eyes: PERRL, EOMI. No scleral icterus, normal conjunctivae.  Extremities:  No lower extremity edema noticed.  No cyanosis or clubbing noted.   Neurologic: Normal arm motion bilateral.  No tremors. No evidence of focal defect.   Psychiatric: Alert and oriented x3, calm.         CURRENT MEDICATIONS:  Current Outpatient Medications   Medication Sig Dispense Refill     acetaminophen (TYLENOL) 500 MG tablet Take 500 mg by mouth every 6 hours as needed for mild pain       atorvastatin (LIPITOR) 20 MG tablet Take 1 tablet (20 mg) by mouth daily 90 tablet 3     losartan (COZAAR) 50 MG tablet Take 1 tablet (50 mg) by mouth daily 90 tablet 3     melatonin 3 MG CAPS Take 3 mg by mouth At Bedtime       metoprolol succinate ER (TOPROL-XL) 50 MG 24 hr tablet Take 1 tablet (50 mg) by mouth daily (Patient taking differently: Take 100 mg by mouth daily )       sildenafil (REVATIO) 20 MG tablet Take 1 tablet (20 mg) by mouth daily as needed 30 tablet 3     warfarin ANTICOAGULANT (COUMADIN) 5 MG tablet TAKE 1/2 TABLET (2.5MG) BY MOUTH ON FRIDAYS; TAKE 1 TABLET (5MG) ALL OTHER DAYS OR AS DIRECTED BY INR CLINIC 90 tablet 0       ALLERGIES     Allergies   Allergen Reactions     Lisinopril  Cough     Very persistent cough       PAST MEDICAL HISTORY:  Past Medical History:   Diagnosis Date     Atrial flutter (H)      Benign essential hypertension 1/18/2017    past use of ACE- Cough;  Had been on ARB-diuretic but experienced lower bp readings;  BLOOD PRESSURE now well controlled on ARB also no longer on diuretic; no low bp readings; may have had slight edema initially but has normalized with bp well controlled on ARB alone.      Cancer (H)     testicular cancer     CHF (congestive heart failure) (H) 6/5/2019     Long term current use of anticoagulant therapy 6/3/2019     Obesity (BMI 35.0-39.9) with comorbidity (H) 2/11/2019     Paroxysmal atrial fibrillation (H) 06/03/2019       PAST SURGICAL HISTORY:  Past Surgical History:   Procedure Laterality Date     ANESTHESIA CARDIOVERSION N/A 6/26/2019    Procedure: ANESTHESIA, FOR CARDIOVERSION DR. LE;  Surgeon: GENERIC ANESTHESIA PROVIDER;  Location:  OR     ANESTHESIA CARDIOVERSION N/A 7/17/2019    Procedure: ANESTHESIA, FOR CARDIOVERSION;  Surgeon: GENERIC ANESTHESIA PROVIDER;  Location:  OR     ANESTHESIA CARDIOVERSION N/A 1/28/2020    Procedure: ANESTHESIA, FOR CARDIOVERSION;  Surgeon: GENERIC ANESTHESIA PROVIDER;  Location:  OR     CLOSED REDUCTION SHOULDER Left      EP ABLATION FOCAL AFIB N/A 1/3/2020    Procedure: EP Ablation Focal AFIB;  Surgeon: Lamont Hannon MD;  Location:  HEART CARDI[facility] CATH LAB     GENITOURINARY SURGERY  1990    testicular cancer     ORTHOPEDIC SURGERY  1970's    right knee surgery        FAMILY HISTORY:  Family History   Problem Relation Age of Onset     Colon Cancer Father      Coronary Artery Disease Maternal Grandmother        SOCIAL HISTORY:  Social History     Socioeconomic History     Marital status:      Spouse name: Not on file     Number of children: Not on file     Years of education: Not on file     Highest education level: Not on file   Occupational History     Not on file   Social Needs      Financial resource strain: Not on file     Food insecurity     Worry: Not on file     Inability: Not on file     Transportation needs     Medical: Not on file     Non-medical: Not on file   Tobacco Use     Smoking status: Never Smoker     Smokeless tobacco: Never Used   Substance and Sexual Activity     Alcohol use: Yes     Comment: every 1-2 months     Drug use: No     Sexual activity: Yes     Partners: Female   Lifestyle     Physical activity     Days per week: Not on file     Minutes per session: Not on file     Stress: Not on file   Relationships     Social connections     Talks on phone: Not on file     Gets together: Not on file     Attends Oriental orthodox service: Not on file     Active member of club or organization: Not on file     Attends meetings of clubs or organizations: Not on file     Relationship status: Not on file     Intimate partner violence     Fear of current or ex partner: Not on file     Emotionally abused: Not on file     Physically abused: Not on file     Forced sexual activity: Not on file   Other Topics Concern     Parent/sibling w/ CABG, MI or angioplasty before 65F 55M? Yes   Social History Narrative     Not on file       Review of Systems:  Skin:        Eyes:       ENT:       Respiratory:       Cardiovascular:       Gastroenterology:      Genitourinary:       Musculoskeletal:       Neurologic:       Psychiatric:       Heme/Lymph/Imm:       Endocrine:           Recent Lab Results:  LIPID RESULTS:  Lab Results   Component Value Date    CHOL 138 06/01/2020    HDL 44 06/01/2020    LDL 75 06/01/2020    TRIG 93 06/01/2020       LIVER ENZYME RESULTS:  Lab Results   Component Value Date    AST 17 01/13/2020    ALT 35 03/24/2020       CBC RESULTS:  Lab Results   Component Value Date    WBC 5.8 01/03/2020    RBC 4.88 01/03/2020    HGB 16.0 01/03/2020    HCT 44.2 01/03/2020    MCV 91 01/03/2020    MCH 32.8 01/03/2020    MCHC 36.2 01/03/2020    RDW 12.6 01/03/2020     01/03/2020       BMP  "RESULTS:  Lab Results   Component Value Date     05/26/2020    POTASSIUM 4.9 05/26/2020    CHLORIDE 110 (H) 05/26/2020    CO2 27 05/26/2020    ANIONGAP 2 (L) 05/26/2020    GLC 99 05/26/2020    BUN 22 05/26/2020    CR 1.35 (H) 05/26/2020    GFRESTIMATED 54 (L) 05/26/2020    GFRESTBLACK 62 05/26/2020    CHU 8.8 05/26/2020        A1C RESULTS:  No results found for: A1C    INR RESULTS:  Lab Results   Component Value Date    INR 2.40 (H) 07/31/2020    INR 2.30 (H) 07/03/2020         ECHOCARDIOGRAM  No results found for this or any previous visit (from the past 8760 hour(s)).      No orders of the defined types were placed in this encounter.    No orders of the defined types were placed in this encounter.    There are no discontinued medications.      No diagnosis found.        Kevan Connelly is a 69 year old male who is being evaluated via a billable video visit.      The patient has been notified of following:     \"This video visit will be conducted via a call between you and your physician/provider. We have found that certain health care needs   can be provided without the need for an in-person physical exam.  This service lets us provide the care you need with a video conversation.  If a prescription is necessary we can send it directly to your pharmacy.  If lab work is needed we can place an order for that and you can then stop by our lab to have the test done at a later time.    Video visits are billed at different rates depending on your insurance coverage.  Please reach out to your insurance provider with any questions.    If during the course of the call the physician/provider feels a video visit is not appropriate, you will not be charged for this service.\"    Patient has given verbal consent for Video visit? Yes  How would you like to obtain your AVS? Mail a copy  If you are dropped from the video visit, the video invite should be resent to: Other e-mail: Listen Uprafael  Will anyone else be joining your video " visit? No      Video-Visit Details    Type of service:  Video Visit    Video Start Time:   Video End Time:     Originating Location (pt. Location):     Distant Location (provider location):  [facility]     Platform used for Video Visit:       Review Of Systems  Skin: negative  Eyes: negative  Ears/Nose/Throat: negative  Respiratory: No shortness of breath, dyspnea on exertion, cough, or hemoptysis  Cardiovascular: elevated heart rate   Gastrointestinal: negative  Genitourinary: negative  Musculoskeletal: negative  Neurologic: negative  Psychiatric: negative  Hematologic/Lymphatic/Immunologic: negative  Endocrine: negative    Patient reported vitals:  BP: 121/100  Heart rate: 139  Weight:269lb    Monique Coon [facility]      Thank you for allowing me to participate in the care of your patient.    Sincerely,     Lamont Hannon MD     [facility]

## 2020-09-02 NOTE — PROGRESS NOTES
ANTICOAGULATION FOLLOW-UP CLINIC VISIT    Patient Name:  Kevan Connelly  Date:  2020  Contact Type:  Telephone    SUBJECTIVE:  Patient Findings     Positives:   Change in medications    Comments:   Will be starting flecainide moving forward. No interaction expected per micromedex. Will be having cardioversion in future.          Clinical Outcomes     Negatives:   Major bleeding event, Thromboembolic event, Anticoagulation-related hospital admission, Anticoagulation-related ED visit, Anticoagulation-related fatality    Comments:   Will be starting flecainide moving forward. No interaction expected per micromedex. Will be having cardioversion in future.             OBJECTIVE    Recent labs: (last 7 days)     20  1127   INR 2.80*       ASSESSMENT / PLAN  INR assessment THER    Recheck INR In: 6 WEEKS    INR Location Clinic      Anticoagulation Summary  As of 2020    INR goal:   2.0-3.0   TTR:   83.6 % (1 y)   INR used for dosin.80 (2020)   Warfarin maintenance plan:   2.5 mg (5 mg x 0.5) every Fri; 5 mg (5 mg x 1) all other days   Full warfarin instructions:   2.5 mg every Fri; 5 mg all other days   Weekly warfarin total:   32.5 mg   Plan last modified:   Vivi Clark RN (2020)   Next INR check:   10/14/2020   Priority:   Maintenance   Target end date:   Indefinite    Indications    Long term current use of anticoagulant therapy [Z79.01]  Paroxysmal atrial fibrillation (H) [I48.0]  Long term current use of anticoagulants with INR goal of 2.0-3.0 [Z79.01]             Anticoagulation Episode Summary     INR check location:   Anticoagulation Clinic    Preferred lab:       Send INR reminders to:   CONNIE HOBBS    Comments:   5mg tabs / DCCV 19 & 20 / early morning appointments on Wed or Fri      Anticoagulation Care Providers     Provider Role Specialty Phone number    Davina Reaves MD Referring Internal Medicine [phone]            See the Encounter Report  to view Anticoagulation Flowsheet and Dosing Calendar (Go to Encounters tab in chart review, and find the Anticoagulation Therapy Visit)        Vivi Clark RN

## 2020-09-02 NOTE — PROGRESS NOTES
Electrophysiology/ Virtual Clinic Note         H&P and Plan:   Patient opted to have a virtual visit due to ongoing issues related to ongoing COVID-19 virus pandemic and to minimize social interaction (based on CDC guidelines).      Visit details:  Patient has given verbal consent for this visit.    Interview was done talking and seeing patient via Internet.   Mode of transmission: Amwell, FAGUO.  Visit start time: 8:15 AM  Visit stop time: 8:45 AM   Provider location: Office.  Patient location: Home.       REASON FOR VISIT: Electrophysiology evaluation.      HISTORY OF PRESENT ILLNESS: Mr. 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    Patient initially presented with symptomatic atrial fibrillation and signs of LV dysfunction.  He was placed on amiodarone and underwent successful cardioversion.  After cardioversion, cardiac function normalized and amiodarone was switched to flecainide.  Unfortunately, he failed therapy with flecainide having recurrence of atrial fibrillation.  He underwent A. fib ablation 01/03/2020.    After A. fib ablation, he has recurrence of atrial flutter.  Flecainide was then discontinued he was started on amiodarone.  He underwent successful cardioversion on oh 1/28.  After completing 3 months of recovery.  After ablation, amiodarone was discontinued his p.o. beta-blocker therapy..    In the beginning of August, he presented again with recurrence of atrial fibrillation.  Metoprolol dose was then increased to 100 mg daily and he was referred here for evaluation.    At the moment, he informs that he can tolerate the A. fib.  However he does not feel great.  He complains of fatigue and dyspnea major exertion.  He denies any other symptoms of chest pain, lightheadedness, near-syncope or syncope.    EKG obtained  in August is compatible with atrial fibrillation.     Previous studies:  -EKG (08/20/2019): Normal sinus rhythm.  First-degree AV block (WY of 276 ms).  QRS measuring 112 ms.  -Exercise stress test (08/28/2019): Target heart rate was not achieved.  However test was not negative for ischemia or pro-arrhythmias.  -Limited echo (08/14/2019): Normal LV function.  EF estimated 50-55%.  -IBETH (1/02/2020): Normal LV function.  EF estimated 55-60%.  There was no significant valve disease.  -Chest CT (01/02/2020): Normal pulmonary vein anatomy.  Aortic root was mildly dilated (4.2 x 3.88 cm).  Coronary calcification was noticed in the LAD.  Scattered mediastinal and right heel are calcifications were noticed suggestive of old granulomas.     ASSESSMENT AND PLAN:   1.  Persistent atrial fibrillation.      Patient had recurrence of A. fib despite of ablation.  He continues to be symptomatic with atrial fibrillation despite of high dose of metoprolol.  Today, we discussed options including continue rate control strategy versus rhythm control strategy.  Regarding rhythm control, we discussed possibility of ablation or start flecainide and pursue another cardioversion.  We also discussed possibility of AV node ablation pacemaker implantation.  After discussing pros and cons of each option, he decided to attempt another cardioversion of flecainide.  I recommend the following:    -Check INR today.  Of note, Coumadin dose has been stable and INR has been therapeutic for several months.  -If INR continues to be therapeutic, we will schedule cardioversion.  He will start flecainide 100 mg twice daily 2 days prior to cardioversion.  -If INR is subtherapeutic, we will schedule IBETH/cardioversion.      2.  Embolic prevention.  Chads vas score of 2-3.  Continue anticoagulation indefinitely.        Lamont Hannon MD    Physical Exam:  Vitals: There were no vitals taken for this visit.    Constitutional:  Pt is in no acute distress.  Normal  body habitus, upright.  HEAD: Normocephalic. No evidence of injury or laceration.   SKIN: Skin normal color with no lesions or eruptions. No xanthelasma.  ENT/Mouth:  Membranes moist. No nasal discharge or bleeding gums.   Neck:  Supple, normal JVP, no evidence of thyromegaly.  Chest/Lungs: No audible wheezing or labored breathing. Normal chest wall expansion. No cough.   Cardiovascular: Normal jugular venous pressure. No evidence of pitting edema bilaterally.   Abdomen: No evidence of abdominal distention. No observed jaundice.  Eyes: PERRL, EOMI. No scleral icterus, normal conjunctivae.  Extremities:  No lower extremity edema noticed.  No cyanosis or clubbing noted.   Neurologic: Normal arm motion bilateral.  No tremors. No evidence of focal defect.   Psychiatric: Alert and oriented x3, calm.         CURRENT MEDICATIONS:  Current Outpatient Medications   Medication Sig Dispense Refill     acetaminophen (TYLENOL) 500 MG tablet Take 500 mg by mouth every 6 hours as needed for mild pain       atorvastatin (LIPITOR) 20 MG tablet Take 1 tablet (20 mg) by mouth daily 90 tablet 3     losartan (COZAAR) 50 MG tablet Take 1 tablet (50 mg) by mouth daily 90 tablet 3     melatonin 3 MG CAPS Take 3 mg by mouth At Bedtime       metoprolol succinate ER (TOPROL-XL) 50 MG 24 hr tablet Take 1 tablet (50 mg) by mouth daily (Patient taking differently: Take 100 mg by mouth daily )       sildenafil (REVATIO) 20 MG tablet Take 1 tablet (20 mg) by mouth daily as needed 30 tablet 3     warfarin ANTICOAGULANT (COUMADIN) 5 MG tablet TAKE 1/2 TABLET (2.5MG) BY MOUTH ON FRIDAYS; TAKE 1 TABLET (5MG) ALL OTHER DAYS OR AS DIRECTED BY INR CLINIC 90 tablet 0       ALLERGIES     Allergies   Allergen Reactions     Lisinopril Cough     Very persistent cough       PAST MEDICAL HISTORY:  Past Medical History:   Diagnosis Date     Atrial flutter (H)      Benign essential hypertension 1/18/2017    past use of ACE- Cough;  Had been on ARB-diuretic but  experienced lower bp readings;  BLOOD PRESSURE now well controlled on ARB also no longer on diuretic; no low bp readings; may have had slight edema initially but has normalized with bp well controlled on ARB alone.      Cancer (H)     testicular cancer     CHF (congestive heart failure) (H) 6/5/2019     Long term current use of anticoagulant therapy 6/3/2019     Obesity (BMI 35.0-39.9) with comorbidity (H) 2/11/2019     Paroxysmal atrial fibrillation (H) 06/03/2019       PAST SURGICAL HISTORY:  Past Surgical History:   Procedure Laterality Date     ANESTHESIA CARDIOVERSION N/A 6/26/2019    Procedure: ANESTHESIA, FOR CARDIOVERSION DR. LE;  Surgeon: GENERIC ANESTHESIA PROVIDER;  Location:  OR     ANESTHESIA CARDIOVERSION N/A 7/17/2019    Procedure: ANESTHESIA, FOR CARDIOVERSION;  Surgeon: GENERIC ANESTHESIA PROVIDER;  Location:  OR     ANESTHESIA CARDIOVERSION N/A 1/28/2020    Procedure: ANESTHESIA, FOR CARDIOVERSION;  Surgeon: GENERIC ANESTHESIA PROVIDER;  Location:  OR     CLOSED REDUCTION SHOULDER Left      EP ABLATION FOCAL AFIB N/A 1/3/2020    Procedure: EP Ablation Focal AFIB;  Surgeon: Lamont Hannon MD;  Location:  HEART [facility]ARD[facility] CATH LAB     GENITOURINARY SURGERY  1990    testicular cancer     ORTHOPEDIC SURGERY  1970's    right knee surgery        FAMILY HISTORY:  Family History   Problem Relation Age of Onset     Colon Cancer Father      Coronary Artery Disease Maternal Grandmother        SOCIAL HISTORY:  Social History     Socioeconomic History     Marital status:      Spouse name: Not on file     Number of children: Not on file     Years of education: Not on file     Highest education level: Not on file   Occupational History     Not on file   Social Needs     Financial resource strain: Not on file     Food insecurity     Worry: Not on file     Inability: Not on file     Transportation needs     Medical: Not on file     Non-medical: Not on file   Tobacco Use     Smoking status:  Never Smoker     Smokeless tobacco: Never Used   Substance and Sexual Activity     Alcohol use: Yes     Comment: every 1-2 months     Drug use: No     Sexual activity: Yes     Partners: Female   Lifestyle     Physical activity     Days per week: Not on file     Minutes per session: Not on file     Stress: Not on file   Relationships     Social connections     Talks on phone: Not on file     Gets together: Not on file     Attends Synagogue service: Not on file     Active member of club or organization: Not on file     Attends meetings of clubs or organizations: Not on file     Relationship status: Not on file     Intimate partner violence     Fear of current or ex partner: Not on file     Emotionally abused: Not on file     Physically abused: Not on file     Forced sexual activity: Not on file   Other Topics Concern     Parent/sibling w/ CABG, MI or angioplasty before 65F 55M? Yes   Social History Narrative     Not on file       Review of Systems:  Skin:        Eyes:       ENT:       Respiratory:       Cardiovascular:       Gastroenterology:      Genitourinary:       Musculoskeletal:       Neurologic:       Psychiatric:       Heme/Lymph/Imm:       Endocrine:           Recent Lab Results:  LIPID RESULTS:  Lab Results   Component Value Date    CHOL 138 06/01/2020    HDL 44 06/01/2020    LDL 75 06/01/2020    TRIG 93 06/01/2020       LIVER ENZYME RESULTS:  Lab Results   Component Value Date    AST 17 01/13/2020    ALT 35 03/24/2020       CBC RESULTS:  Lab Results   Component Value Date    WBC 5.8 01/03/2020    RBC 4.88 01/03/2020    HGB 16.0 01/03/2020    HCT 44.2 01/03/2020    MCV 91 01/03/2020    MCH 32.8 01/03/2020    MCHC 36.2 01/03/2020    RDW 12.6 01/03/2020     01/03/2020       BMP RESULTS:  Lab Results   Component Value Date     05/26/2020    POTASSIUM 4.9 05/26/2020    CHLORIDE 110 (H) 05/26/2020    CO2 27 05/26/2020    ANIONGAP 2 (L) 05/26/2020    GLC 99 05/26/2020    BUN 22 05/26/2020    CR 1.35  "(H) 05/26/2020    GFRESTIMATED 54 (L) 05/26/2020    GFRESTBLACK 62 05/26/2020    CHU 8.8 05/26/2020        A1C RESULTS:  No results found for: A1C    INR RESULTS:  Lab Results   Component Value Date    INR 2.40 (H) 07/31/2020    INR 2.30 (H) 07/03/2020         ECHOCARDIOGRAM  No results found for this or any previous visit (from the past 8760 hour(s)).      No orders of the defined types were placed in this encounter.    No orders of the defined types were placed in this encounter.    There are no discontinued medications.      No diagnosis found.      CC  No referring provider defined for this encounter.                Kevan Connelly is a 69 year old male who is being evaluated via a billable video visit.      The patient has been notified of following:     \"This video visit will be conducted via a call between you and your physician/provider. We have found that certain health care needs   can be provided without the need for an in-person physical exam.  This service lets us provide the care you need with a video conversation.  If a prescription is necessary we can send it directly to your pharmacy.  If lab work is needed we can place an order for that and you can then stop by our lab to have the test done at a later time.    Video visits are billed at different rates depending on your insurance coverage.  Please reach out to your insurance provider with any questions.    If during the course of the call the physician/provider feels a video visit is not appropriate, you will not be charged for this service.\"    Patient has given verbal consent for Video visit? Yes  How would you like to obtain your AVS? Mail a copy  If you are dropped from the video visit, the video invite should be resent to: Other e-mail: [email or web address]  Will anyone else be joining your video visit? No      Video-Visit Details    Type of service:  Video Visit    Video Start Time:   Video End Time:     Originating Location (pt. Location):     Distant " Location (provider location):  [facility]     Platform used for Video Visit:               Review Of Systems  Skin: negative  Eyes: negative  Ears/Nose/Throat: negative  Respiratory: No shortness of breath, dyspnea on exertion, cough, or hemoptysis  Cardiovascular: elevated heart rate   Gastrointestinal: negative  Genitourinary: negative  Musculoskeletal: negative  Neurologic: negative  Psychiatric: negative  Hematologic/Lymphatic/Immunologic: negative  Endocrine: negative    Patient reported vitals:  BP: 121/100  Heart rate: 139  Weight:269lb    [facility]

## 2020-09-03 ENCOUNTER — TELEPHONE (OUTPATIENT)
Dept: CARDIOLOGY | Facility: CLINIC | Age: 70
End: 2020-09-03

## 2020-09-03 DIAGNOSIS — Z11.59 ENCOUNTER FOR SCREENING FOR OTHER VIRAL DISEASES: Primary | ICD-10-CM

## 2020-09-03 NOTE — TELEPHONE ENCOUNTER
Patient transferred from scheduling after cardioversion was scheduled.  Per virtual visit yesterday with Dr Hannon.  There were instructions to start flecainide two days prior to cardioversion.  Patient is scheduled to have cardioversion on 9/8.  Patient started flecainide last evening.  Updated Dr Hannon regarding above.    Verbal order received per Dr Hannon:  ---ok for patient to continue with flecainide  Call and spoke to patient regarding above.  Patient provided verbal understanding.  DIO Schrader

## 2020-09-04 DIAGNOSIS — Z11.59 ENCOUNTER FOR SCREENING FOR OTHER VIRAL DISEASES: ICD-10-CM

## 2020-09-04 PROCEDURE — U0003 INFECTIOUS AGENT DETECTION BY NUCLEIC ACID (DNA OR RNA); SEVERE ACUTE RESPIRATORY SYNDROME CORONAVIRUS 2 (SARS-COV-2) (CORONAVIRUS DISEASE [COVID-19]), AMPLIFIED PROBE TECHNIQUE, MAKING USE OF HIGH THROUGHPUT TECHNOLOGIES AS DESCRIBED BY CMS-2020-01-R: HCPCS | Performed by: INTERNAL MEDICINE

## 2020-09-05 LAB
SARS-COV-2 RNA SPEC QL NAA+PROBE: NOT DETECTED
SPECIMEN SOURCE: NORMAL

## 2020-09-08 ENCOUNTER — ANESTHESIA EVENT (OUTPATIENT)
Dept: SURGERY | Facility: CLINIC | Age: 70
End: 2020-09-08
Payer: COMMERCIAL

## 2020-09-08 ENCOUNTER — HOSPITAL ENCOUNTER (OUTPATIENT)
Dept: MEDSURG UNIT | Facility: CLINIC | Age: 70
End: 2020-09-08
Attending: INTERNAL MEDICINE | Admitting: INTERNAL MEDICINE
Payer: COMMERCIAL

## 2020-09-08 ENCOUNTER — HOSPITAL ENCOUNTER (OUTPATIENT)
Facility: CLINIC | Age: 70
Discharge: HOME | End: 2020-09-08
Attending: INTERNAL MEDICINE | Admitting: INTERNAL MEDICINE
Payer: COMMERCIAL

## 2020-09-08 ENCOUNTER — ANESTHESIA (OUTPATIENT)
Dept: SURGERY | Facility: CLINIC | Age: 70
End: 2020-09-08
Payer: COMMERCIAL

## 2020-09-08 VITALS
RESPIRATION RATE: 14 BRPM | OXYGEN SATURATION: 96 % | WEIGHT: 273.9 LBS | BODY MASS INDEX: 34.06 KG/M2 | HEIGHT: 75 IN | DIASTOLIC BLOOD PRESSURE: 78 MMHG | HEART RATE: 62 BPM | TEMPERATURE: 98 F | SYSTOLIC BLOOD PRESSURE: 121 MMHG

## 2020-09-08 DIAGNOSIS — I48.19 PERSISTENT ATRIAL FIBRILLATION (H): ICD-10-CM

## 2020-09-08 LAB
INR BLD: 2.8 (ref 0.86–1.14)
MAGNESIUM SERPL-MCNC: 2.2 MG/DL (ref 1.6–2.3)
POTASSIUM SERPL-SCNC: 4.7 MMOL/L (ref 3.4–5.3)

## 2020-09-08 PROCEDURE — 93005 ELECTROCARDIOGRAM TRACING: CPT

## 2020-09-08 PROCEDURE — 92960 CARDIOVERSION ELECTRIC EXT: CPT

## 2020-09-08 PROCEDURE — 25000128 H RX IP 250 OP 636: Performed by: ANESTHESIOLOGY

## 2020-09-08 PROCEDURE — 83735 ASSAY OF MAGNESIUM: CPT | Performed by: INTERNAL MEDICINE

## 2020-09-08 PROCEDURE — 92960 CARDIOVERSION ELECTRIC EXT: CPT | Performed by: INTERNAL MEDICINE

## 2020-09-08 PROCEDURE — 85610 PROTHROMBIN TIME: CPT | Mod: QW | Performed by: INTERNAL MEDICINE

## 2020-09-08 PROCEDURE — 25000125 ZZHC RX 250: Performed by: ANESTHESIOLOGY

## 2020-09-08 PROCEDURE — 40000065 ZZH STATISTIC EKG NON-CHARGEABLE

## 2020-09-08 PROCEDURE — 84132 ASSAY OF SERUM POTASSIUM: CPT | Performed by: INTERNAL MEDICINE

## 2020-09-08 PROCEDURE — 25800030 ZZH RX IP 258 OP 636: Performed by: INTERNAL MEDICINE

## 2020-09-08 PROCEDURE — 40000010 ZZH STATISTIC ANES STAT CODE-CRNA PER MINUTE

## 2020-09-08 PROCEDURE — 37000008 ZZH ANESTHESIA TECHNICAL FEE, 1ST 30 MIN

## 2020-09-08 PROCEDURE — 25800030 ZZH RX IP 258 OP 636: Performed by: ANESTHESIOLOGY

## 2020-09-08 PROCEDURE — 36415 COLL VENOUS BLD VENIPUNCTURE: CPT

## 2020-09-08 RX ORDER — MAGNESIUM SULFATE HEPTAHYDRATE 40 MG/ML
2 INJECTION, SOLUTION INTRAVENOUS
Status: DISCONTINUED | OUTPATIENT
Start: 2020-09-08 | End: 2020-09-08 | Stop reason: HOSPADM

## 2020-09-08 RX ORDER — FLUMAZENIL 0.1 MG/ML
0.2 INJECTION, SOLUTION INTRAVENOUS
Status: DISCONTINUED | OUTPATIENT
Start: 2020-09-08 | End: 2020-09-08 | Stop reason: HOSPADM

## 2020-09-08 RX ORDER — POTASSIUM CHLORIDE 1500 MG/1
40 TABLET, EXTENDED RELEASE ORAL
Status: DISCONTINUED | OUTPATIENT
Start: 2020-09-08 | End: 2020-09-08 | Stop reason: HOSPADM

## 2020-09-08 RX ORDER — ATROPINE SULFATE 0.1 MG/ML
.5-1 INJECTION INTRAVENOUS
Status: DISCONTINUED | OUTPATIENT
Start: 2020-09-08 | End: 2020-09-08 | Stop reason: HOSPADM

## 2020-09-08 RX ORDER — POTASSIUM CHLORIDE 1500 MG/1
20 TABLET, EXTENDED RELEASE ORAL
Status: DISCONTINUED | OUTPATIENT
Start: 2020-09-08 | End: 2020-09-08 | Stop reason: HOSPADM

## 2020-09-08 RX ORDER — SODIUM CHLORIDE 9 MG/ML
INJECTION, SOLUTION INTRAVENOUS CONTINUOUS
Status: DISCONTINUED | OUTPATIENT
Start: 2020-09-08 | End: 2020-09-08 | Stop reason: HOSPADM

## 2020-09-08 RX ORDER — NALOXONE HYDROCHLORIDE 0.4 MG/ML
.1-.4 INJECTION, SOLUTION INTRAMUSCULAR; INTRAVENOUS; SUBCUTANEOUS
Status: DISCONTINUED | OUTPATIENT
Start: 2020-09-08 | End: 2020-09-08 | Stop reason: HOSPADM

## 2020-09-08 RX ORDER — PROPOFOL 10 MG/ML
INJECTION, EMULSION INTRAVENOUS PRN
Status: DISCONTINUED | OUTPATIENT
Start: 2020-09-08 | End: 2020-09-08

## 2020-09-08 RX ADMIN — DEXMEDETOMIDINE HYDROCHLORIDE 12 MCG: 100 INJECTION, SOLUTION INTRAVENOUS at 11:49

## 2020-09-08 RX ADMIN — SODIUM CHLORIDE: 9 INJECTION, SOLUTION INTRAVENOUS at 09:46

## 2020-09-08 RX ADMIN — PROPOFOL 80 MG: 10 INJECTION, EMULSION INTRAVENOUS at 11:50

## 2020-09-08 ASSESSMENT — ENCOUNTER SYMPTOMS: DYSRHYTHMIAS: 1

## 2020-09-08 ASSESSMENT — MIFFLIN-ST. JEOR: SCORE: 2103.03

## 2020-09-08 NOTE — PROGRESS NOTES
Care Suites Admission Nursing Note    Patient Information  Name: Kevan Connelly  Age: 69 year old  Reason for admission: cardioversion  Care Suites arrival time: 0830    Visitor Information  Name: Pat -   Informed of visitor restrictions: Yes  1 visitor allowed per patient   Visitor must screen negative for COVID symptoms   Visitor must wear a mask  Waiting rooms closed to visitors    Patient Admission/Assessment   Pre-procedure assessment complete: Yes  If abnormal assessment/labs, provider notified: No  NPO: Yes  Medications held per instructions/orders: Yes  Consent: obtained  If applicable, pregnancy test status: declined  Patient oriented to room: Yes  Education/questions answered: Yes  Plan/other: Review labs, obtain consent and proceed with scheduled treatment or intervention      Discharge Planning  Discharge name/phone number: Pat - spouse - [phone]  Overnight post sedation caregiver: Pat - spouse - [phone]  Discharge location: [place]    Blayne Brantley RN

## 2020-09-08 NOTE — DISCHARGE SUMMARY
Care Suites Discharge Nursing Note    One shock 200j - converted into sinus rhythm    Patient Information  Name: Kevan Connelly  Age: 69 year old    Discharge Education:  Discharge instructions reviewed: Yes  Additional education/resources provided: no  Patient/patient representative verbalizes understanding: Yes  Patient discharging on new medications: No  Medication education completed: N/A    Discharge Plans:   Discharge location: home  Discharge ride contacted: Yes  Approximate discharge time: 1230    Discharge Criteria:  Discharge criteria met and vital signs stable: Yes    Patient Belongs:  Patient belongings returned to patient: Yes    Blayne Brantley RN

## 2020-09-08 NOTE — DISCHARGE INSTRUCTIONS
Cardioversion Discharge Instructions    After you go home:       For 24 hours - due to the sedation you received:      Have an adult stay with you for 24 hours.     Relax and take it easy.    Do NOT make any important or legal decisions.    Do NOT drive or operate machines at home or at work.    Do NOT drink alcohol.    Diet:      Start with clear liquids and progress to your normal diet as you feel able.    Medicines:      Take your medications, including blood thinners, unless your provider tells you not to.    If you have stopped any medications, check with your provider about when to restart them.    Follow Up Appointments:      Follow up with your cardiologist at [facility] of patient preference as instructed.    Follow up with your primary care provider as needed.    Post cardioversion:    The skin on your chest or back may feel tender for 48 hours.  If your skin is tender, you may:      Use a cold pack on the site. Never use ice directly on your skin. Use the cold pack for 20 minutes. Remove it for at least 30 minutes before re-using.    Apply 1% hydrocortisone cream to the skin (sold at drug stores)    Take Advil (Ibuprofen) or Tylenol (Acetaminophen) per your provider's recommendations.      Call your provider if you have:      Weakness, dizziness, lightheadedness, or fainting.    Shortness of breath.    Irregular heartbeat, feelings of your heart fluttering or beating fast, hard or palpitations.     More than minor skin discomfort or redness where the cardioversion pads were placed.    Questions or concerns.      Call 911 if you have:      Pain in your chest, arm, shoulder, neck, or upper back.    You have problems speaking or seeing.    Weakness in your arm or leg.    You are unable to move your arm or leg.    You have uncontrolled bleeding.         [facility] [place]:    [phone] [facility] (7 days a week)

## 2020-09-08 NOTE — ANESTHESIA POSTPROCEDURE EVALUATION
Patient: Kevan Connelly    Procedure(s):  ANESTHESIA, FOR CARDIOVERSION    Diagnosis:A-fib (H) [I48.91]    Anesthesia Type:  General    Note:  Anesthesia Post Evaluation    Patient location during evaluation: Cardiac Cath Lab  Patient participation: Able to fully participate in evaluation  Level of consciousness: awake and alert  Pain management: adequate  Airway patency: patent  Cardiovascular status: acceptable and hemodynamically stable  Respiratory status: acceptable and unassisted  Hydration status: acceptable  PONV: none             Last vitals:  Vitals:    09/08/20 0829 09/08/20 0831   BP: (!) 168/108 (!) 159/116   Pulse: 67    Resp: 18    Temp: 36.7  C (98  F)    SpO2: 99%          Electronically Signed By: Umair Rowan DO  September 8, 2020  11:58 AM

## 2020-09-08 NOTE — ANESTHESIA PREPROCEDURE EVALUATION
Anesthesia Pre-Procedure Evaluation    Patient: Kevan Connelly   MRN: [id number] : 1950          Preoperative Diagnosis: A-fib (H) [I48.91]    Procedure(s):  ANESTHESIA, FOR CARDIOVERSION    Past Medical History:   Diagnosis Date     Atrial flutter (H)      Benign essential hypertension 2017    past use of ACE- Cough;  Had been on ARB-diuretic but experienced lower bp readings;  BLOOD PRESSURE now well controlled on ARB also no longer on diuretic; no low bp readings; may have had slight edema initially but has normalized with bp well controlled on ARB alone.      Cancer (H)     testicular cancer     CHF (congestive heart failure) (H) 2019     Long term current use of anticoagulant therapy 6/3/2019     Obesity (BMI 35.0-39.9) with comorbidity (H) 2019     Paroxysmal atrial fibrillation (H) 2019     Past Surgical History:   Procedure Laterality Date     ANESTHESIA CARDIOVERSION N/A 2019    Procedure: ANESTHESIA, FOR CARDIOVERSION DR. LE;  Surgeon: GENERIC ANESTHESIA PROVIDER;  Location:  OR     ANESTHESIA CARDIOVERSION N/A 2019    Procedure: ANESTHESIA, FOR CARDIOVERSION;  Surgeon: GENERIC ANESTHESIA PROVIDER;  Location:  OR     ANESTHESIA CARDIOVERSION N/A 2020    Procedure: ANESTHESIA, FOR CARDIOVERSION;  Surgeon: GENERIC ANESTHESIA PROVIDER;  Location:  OR     CLOSED REDUCTION SHOULDER Left      EP ABLATION FOCAL AFIB N/A 1/3/2020    Procedure: EP Ablation Focal AFIB;  Surgeon: Lamont Hannon MD;  Location:  HEART CARDIAC CATH LAB     GENITOURINARY SURGERY      testicular cancer     ORTHOPEDIC SURGERY  's    right knee surgery        Anesthesia Evaluation     . Pt has had prior anesthetic. Type: General    No history of anesthetic complications          ROS/MED HX    ENT/Pulmonary:      (-) sleep apnea   Neurologic:       Cardiovascular:     (+) hypertension----. : . CHF . . :. dysrhythmias a-fib, .       METS/Exercise Tolerance:    "  Hematologic:         Musculoskeletal:         GI/Hepatic:        (-) GERD and liver disease   Renal/Genitourinary:      (-) renal disease   Endo:     (+) Obesity, .   (-) Type I DM and Type II DM   Psychiatric:         Infectious Disease:         Malignancy:   (+) Malignancy History of Other  Other CA Testicular CA status post         Other:                          Physical Exam  Normal systems: pulmonary and dental    Airway   Mallampati: II  TM distance: >3 FB  Neck ROM: full    Dental     Cardiovascular   Rhythm and rate: irregular      Pulmonary             Lab Results   Component Value Date    WBC 5.8 01/03/2020    HGB 16.0 01/03/2020    HCT 44.2 01/03/2020     01/03/2020     05/26/2020    POTASSIUM 4.7 09/08/2020    CHLORIDE 110 (H) 05/26/2020    CO2 27 05/26/2020    BUN 22 05/26/2020    CR 1.35 (H) 05/26/2020    GLC 99 05/26/2020    CHU 8.8 05/26/2020    MAG 2.2 09/08/2020    ALBUMIN 4.0 01/22/2019    PROTTOTAL 7.2 01/22/2019    ALT 35 03/24/2020    AST 17 01/13/2020    ALKPHOS 51 01/22/2019    BILITOTAL 1.3 01/22/2019    INR 2.8 (H) 09/08/2020    TSH 2.86 01/13/2020       Preop Vitals  BP Readings from Last 3 Encounters:   09/08/20 (!) 159/116   06/08/20 124/74   03/09/20 124/76    Pulse Readings from Last 3 Encounters:   09/08/20 67   03/09/20 71   01/28/20 65      Resp Readings from Last 3 Encounters:   09/08/20 18   03/09/20 18   01/28/20 18    SpO2 Readings from Last 3 Encounters:   09/08/20 99%   03/09/20 99%   01/28/20 99%      Temp Readings from Last 1 Encounters:   09/08/20 36.7  C (98  F) (Oral)    Ht Readings from Last 1 Encounters:   09/08/20 1.905 m (6' 3\")      Wt Readings from Last 1 Encounters:   09/08/20 124.2 kg (273 lb 14.4 oz)    Estimated body mass index is 34.24 kg/m  as calculated from the following:    Height as of this encounter: 1.905 m (6' 3\").    Weight as of this encounter: 124.2 kg (273 lb 14.4 oz).       Anesthesia Plan      History & Physical Review  History and " physical reviewed and following examination; no interval change.    ASA Status:  3 .    NPO Status:  > 8 hours    Plan for General with Intravenous induction.            Postoperative Care      Consents  Anesthetic plan, risks, benefits and alternatives discussed with:  Patient..                 Umair Rowan DO

## 2020-09-09 ENCOUNTER — DOCUMENTATION ONLY (OUTPATIENT)
Dept: FAMILY MEDICINE | Facility: CLINIC | Age: 70
End: 2020-09-09

## 2020-09-09 NOTE — PROGRESS NOTES
ANTICOAGULATION  MANAGEMENT: Discharge Review    Kevan Connelly chart reviewed for anticoagulation continuity of care    Hospital Admission on 9/8/20 for cardioversion.    Discharge disposition: Home    Results:    Recent labs: (last 7 days)     09/02/20  1127 09/08/20  0935   INR 2.80* 2.8*     Anticoagulation inpatient management:     not applicable     Anticoagulation discharge instructions:     Warfarin dosing: not addressed   Bridging: No   INR goal change: No      Medication changes affecting anticoagulation: No    Additional factors affecting anticoagulation: No    Plan     No adjustment to anticoagulation plan needed    Patient not contacted    No adjustment to Anticoagulation Calendar was required    Kaylee Ramey RN

## 2020-09-10 LAB — INTERPRETATION ECG - MUSE: NORMAL

## 2020-09-16 ENCOUNTER — OFFICE VISIT (OUTPATIENT)
Dept: CARDIOLOGY | Facility: CLINIC | Age: 70
End: 2020-09-16
Attending: INTERNAL MEDICINE
Payer: COMMERCIAL

## 2020-09-16 VITALS
HEART RATE: 64 BPM | BODY MASS INDEX: 33.23 KG/M2 | HEIGHT: 76 IN | DIASTOLIC BLOOD PRESSURE: 88 MMHG | WEIGHT: 272.9 LBS | OXYGEN SATURATION: 98 % | SYSTOLIC BLOOD PRESSURE: 138 MMHG

## 2020-09-16 DIAGNOSIS — I48.0 PAROXYSMAL ATRIAL FIBRILLATION (H): ICD-10-CM

## 2020-09-16 DIAGNOSIS — I48.19 PERSISTENT ATRIAL FIBRILLATION (H): ICD-10-CM

## 2020-09-16 PROCEDURE — 93000 ELECTROCARDIOGRAM COMPLETE: CPT | Performed by: NURSE PRACTITIONER

## 2020-09-16 PROCEDURE — 99214 OFFICE O/P EST MOD 30 MIN: CPT | Performed by: NURSE PRACTITIONER

## 2020-09-16 RX ORDER — METOPROLOL SUCCINATE 50 MG/1
50 TABLET, EXTENDED RELEASE ORAL DAILY
Qty: 90 TABLET | Refills: 3 | Status: SHIPPED | OUTPATIENT
Start: 2020-09-16 | End: 2020-11-10

## 2020-09-16 RX ORDER — FLECAINIDE ACETATE 100 MG/1
100 TABLET ORAL 2 TIMES DAILY
Qty: 180 TABLET | Refills: 3 | Status: SHIPPED | OUTPATIENT
Start: 2020-09-16 | End: 2020-11-10

## 2020-09-16 ASSESSMENT — MIFFLIN-ST. JEOR: SCORE: 2114.37

## 2020-09-16 NOTE — LETTER
9/16/2020    Davina Reaves MD  [address]  [facility] [address]    RE: Kevan Connelly       Dear Colleague,    I had the pleasure of seeing Kevan Connelly in the [facility].    HPI:  Kevan Connelly is a 69 year old male who presents after undergoing an cardioversion for atrial fibrillation  He is a patient of Dr. Hannon.  His medical history includes    1. Atrial fibrillation.  Diagnosed on 5/30/19 with cardiomyopathy EF was 33%.  Was on amiodarone and changed to flecainide with normal EF.  refractory to flecainide.     Underwent an atrial fibrillation ablation on 1/3/2020  2. Nonocclusive coronary artery disease.  Per CT angiogram heart 1/2020 revealed coronary calcification the LAD, circumflex and RCA.  3. Mildly dilated aortic root and ascending aorta.  Per CT angiogram (1/2020) revealed 4.22 x 3.88 cm and 3.64 cm x 3.73 cm respectively.  4. Cardiomyopathy.  EF 33% (6/2018)  ECHO 50-55% (8/2019)  5. Hypertension  6. Obesity  7. Testicular cancer with subsequent ED    In March 2019, he was diagnosed with atrial fibrillation started on beta-blocker and Xarelto.   In June 2019 after being diagnosed with cardiomyopathy and atrial fibrillation. He underwent an unsuccessful cardioversion on 6/26/2019 and later was started amiodarone load and on 7/17/19 he underwent a successful cardioversion.       In September 2019, he was started on flecainide and metoprolol.  When he saw Dr. Hannon in November he was in atrial fibrillation because he forgot to take his medications at that time his flecainide was increased from 75 mg to 100 mg twice daily.  A repeat Zio patch revealed patient being in the 100% atrial fibrillation and an atrial fibrillation ablation was recommended.      On 1/3/2020, patient underwent a PVI ablation and unfortunately had a recurrence on 1/28/2020 and underwent a cardioversion and restarted on amiodarone which was discontinued in 4/2020     On  8/28/2020, he presented in atrial fibrillation, was started on flecainide and underwent a DCCV on 9/8/2020.      Today he reports doing well in normal rhythm with no recurrence of atrial fibrillation.  He does not exercise due to lower back pain.  Overall he denies chest pain or pressure, dizziness, syncope, angina, dyspnea at rest or with exertion, palpitations, orthopnea, PND, or edema.  He states he takes medications as prescribed including warfarin and denies bleeding, hematuria, hematochezia, epistaxis and signs/symptoms of stroke.         ASSESSMENT AND PLAN    Atrial fibrillation    For anticoagulation for CHADS VASC 3 (age, HTN, HF) he takes warfarin with goal INR 2-3. His INR has been therapeutic since 6/2020.         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  After resolution of tachycardia induced cardiomyopathy he was started on flecainide with metoprolol and later was found to be in atrial fibrillation despite increase flecainide dosage. On 1/3/2020, he underwent a PVI ablation, flecainide was discontinued and he was started on amiodarone which was discontinued on 4/2020.   In 8/2020, he had a recurrence of atrial fibrillation, was restarted on flecainide 100 mg twice daily and underwent a successful cardioversion.  Today's ECG reveals sinus rhythm with ventricular rate of 62 bpm and TCW=962 ms.   He underwent a stress ECG on 8/2019 while on a 100 mg BID of flecainide with no evidence of pro arrhythmia therefore will not repeat at this time.      Cardiomyopathy    At the time of diagnosis with his atrial fibrillation his EF was found to be 33%.   ECHO (8/2019) revealed EF 50-55%      Continue GDMT of ARB (losartan 50 mg daily) and BB (Metoprolol XL 50 mg daily)    No HF symptoms     Hypertension    Controlled while taking losartan and metoprolol     Non-occlusive coronary artery disease    Per CT angiogram heart 1/2020 revealed coronary  calcification the LAD, circumflex and RCA.    Asymptomatic    Continue BB, ARB, statin.  He is not on an aspirin due to being on Warfarin.  His last LDL was 75 (5/2020)     Plan:    Continue current medications    Follow up with Dr. Hannon in 6 months.       Patient expresses understanding and agreement with the plan.     I appreciate the chance to help with Kevan DHRUV Connelly Please let me know if you have any questions or concerns.    Hilda Wahl, APRN, CNP    This note was completed in part using Dragon voice recognition software. Although reviewed after completion, some word and grammatical errors may occur.    Orders Placed This Encounter   Procedures     EKG 12-lead complete w/read - Clinics (performed today)     Orders Placed This Encounter   Medications     metoprolol succinate ER (TOPROL-XL) 50 MG 24 hr tablet     Sig: Take 1 tablet (50 mg) by mouth daily     Dispense:  90 tablet     Refill:  3     Pt will call for refills     flecainide (TAMBOCOR) 100 MG tablet     Sig: Take 1 tablet (100 mg) by mouth 2 times daily     Dispense:  180 tablet     Refill:  3     Pt will call for refills     Medications Discontinued During This Encounter   Medication Reason     metoprolol succinate ER (TOPROL-XL) 50 MG 24 hr tablet      flecainide (TAMBOCOR) 100 MG tablet          Encounter Diagnoses   Name Primary?     Paroxysmal atrial fibrillation (H)      Paroxysmal atrial fibrillation (H)      Persistent atrial fibrillation (H)        CURRENT MEDICATIONS:  Current Outpatient Medications   Medication Sig Dispense Refill     acetaminophen (TYLENOL) 500 MG tablet Take 500 mg by mouth every 6 hours as needed for mild pain       atorvastatin (LIPITOR) 20 MG tablet Take 1 tablet (20 mg) by mouth daily 90 tablet 3     flecainide (TAMBOCOR) 100 MG tablet Take 1 tablet (100 mg) by mouth 2 times daily 180 tablet 3     losartan (COZAAR) 50 MG tablet Take 1 tablet (50 mg) by mouth daily 90 tablet 3     melatonin 3 MG CAPS Take 3 mg  by mouth At Bedtime       metoprolol succinate ER (TOPROL-XL) 50 MG 24 hr tablet Take 1 tablet (50 mg) by mouth daily 90 tablet 3     sildenafil (REVATIO) 20 MG tablet Take 1 tablet (20 mg) by mouth daily as needed 30 tablet 3     warfarin ANTICOAGULANT (COUMADIN) 5 MG tablet TAKE 1/2 TABLET (2.5MG) BY MOUTH ON FRIDAYS; TAKE 1 TABLET (5MG) ALL OTHER DAYS OR AS DIRECTED BY INR CLINIC 90 tablet 0       ALLERGIES     Allergies   Allergen Reactions     Lisinopril Cough     Very persistent cough       PAST MEDICAL HISTORY:  Past Medical History:   Diagnosis Date     Atrial flutter (H)      Benign essential hypertension 1/18/2017    past use of ACE- Cough;  Had been on ARB-diuretic but experienced lower bp readings;  BLOOD PRESSURE now well controlled on ARB also no longer on diuretic; no low bp readings; may have had slight edema initially but has normalized with bp well controlled on ARB alone.      Cancer (H)     testicular cancer     CHF (congestive heart failure) (H) 6/5/2019     Long term current use of anticoagulant therapy 6/3/2019     Obesity (BMI 35.0-39.9) with comorbidity (H) 2/11/2019     Paroxysmal atrial fibrillation (H) 06/03/2019       PAST SURGICAL HISTORY:  Past Surgical History:   Procedure Laterality Date     ANESTHESIA CARDIOVERSION N/A 6/26/2019    Procedure: ANESTHESIA, FOR CARDIOVERSION DR. LE;  Surgeon: GENERIC ANESTHESIA PROVIDER;  Location:  OR     ANESTHESIA CARDIOVERSION N/A 7/17/2019    Procedure: ANESTHESIA, FOR CARDIOVERSION;  Surgeon: GENERIC ANESTHESIA PROVIDER;  Location:  OR     ANESTHESIA CARDIOVERSION N/A 1/28/2020    Procedure: ANESTHESIA, FOR CARDIOVERSION;  Surgeon: GENERIC ANESTHESIA PROVIDER;  Location:  OR     ANESTHESIA CARDIOVERSION N/A 9/8/2020    Procedure: ANESTHESIA, FOR CARDIOVERSION;  Surgeon: GENERIC ANESTHESIA PROVIDER;  Location:  OR     CLOSED REDUCTION SHOULDER Left      EP ABLATION FOCAL AFIB N/A 1/3/2020    Procedure: EP Ablation Focal AFIB;  Surgeon:  Lamont Hannon MD;  Location:  [facility] CATH LAB     GENITOURINARY SURGERY  1990    testicular cancer     ORTHOPEDIC SURGERY  1970's    right knee surgery        FAMILY HISTORY:  Family History   Problem Relation Age of Onset     Colon Cancer Father      Coronary Artery Disease Maternal Grandmother        SOCIAL HISTORY:  Social History     Socioeconomic History     Marital status:      Spouse name: None     Number of children: None     Years of education: None     Highest education level: None   Occupational History     None   Social Needs     Financial resource strain: None     Food insecurity     Worry: None     Inability: None     Transportation needs     Medical: None     Non-medical: None   Tobacco Use     Smoking status: Never Smoker     Smokeless tobacco: Never Used   Substance and Sexual Activity     Alcohol use: Yes     Comment: every 1-2 months     Drug use: No     Sexual activity: Yes     Partners: Female   Lifestyle     Physical activity     Days per week: None     Minutes per session: None     Stress: None   Relationships     Social connections     Talks on phone: None     Gets together: None     Attends Sikh service: None     Active member of club or organization: None     Attends meetings of clubs or organizations: None     Relationship status: None     Intimate partner violence     Fear of current or ex partner: None     Emotionally abused: None     Physically abused: None     Forced sexual activity: None   Other Topics Concern     Parent/sibling w/ CABG, MI or angioplasty before 65F 55M? Yes   Social History Narrative     None       Review of Systems:  Skin:  Positive for     Eyes:  Positive for glasses  ENT:  Negative    Respiratory:  Negative    Cardiovascular:  Negative    Gastroenterology: Negative    Genitourinary:  Negative    Musculoskeletal:  Negative    Neurologic:  Negative    Psychiatric:  Negative    Heme/Lymph/Imm:  Negative    Endocrine:  Negative   "    Physical Exam:  Vitals: /88   Pulse 64   Ht 1.93 m (6' 4\")   Wt 123.8 kg (272 lb 14.4 oz)   SpO2 98%   BMI 33.22 kg/m      Constitutional:  cooperative, alert and oriented, well developed, well nourished, in no acute distress overweight      Skin:  warm and dry to the touch        Head:           Eyes:  pupils equal and round        ENT:           Neck:           Chest:  clear to auscultation        Cardiac: regular rhythm                  Abdomen:  abdomen soft obese      Vascular:                                        Extremities and Back:  no deformities, clubbing, cyanosis, erythema observed        Neurological:  no gross motor deficits          Recent Lab Results:  LIPID RESULTS:  Lab Results   Component Value Date    CHOL 138 06/01/2020    HDL 44 06/01/2020    LDL 75 06/01/2020    TRIG 93 06/01/2020       LIVER ENZYME RESULTS:  Lab Results   Component Value Date    AST 17 01/13/2020    ALT 35 03/24/2020       CBC RESULTS:  Lab Results   Component Value Date    WBC 5.8 01/03/2020    RBC 4.88 01/03/2020    HGB 16.0 01/03/2020    HCT 44.2 01/03/2020    MCV 91 01/03/2020    MCH 32.8 01/03/2020    MCHC 36.2 01/03/2020    RDW 12.6 01/03/2020     01/03/2020       BMP RESULTS:  Lab Results   Component Value Date     05/26/2020    POTASSIUM 4.7 09/08/2020    CHLORIDE 110 (H) 05/26/2020    CO2 27 05/26/2020    ANIONGAP 2 (L) 05/26/2020    GLC 99 05/26/2020    BUN 22 05/26/2020    CR 1.35 (H) 05/26/2020    GFRESTIMATED 54 (L) 05/26/2020    GFRESTBLACK 62 05/26/2020    CHU 8.8 05/26/2020        A1C RESULTS:  No results found for: A1C    INR RESULTS:  Lab Results   Component Value Date    INR 2.8 (H) 09/08/2020    INR 2.80 (H) 09/02/2020    INR 2.40 (H) 07/31/2020           Thank you for allowing me to participate in the care of your patient.    Sincerely,     ANNMARIE Peterson [facility]    "

## 2020-09-16 NOTE — PROGRESS NOTES
HPI:  Kevan Connelly is a 69 year old male who presents after undergoing an cardioversion for atrial fibrillation  He is a patient of Dr. Hannon.  His medical history includes    1. Atrial fibrillation.  Diagnosed on 5/30/19 with cardiomyopathy EF was 33%.  Was on amiodarone and changed to flecainide with normal EF.  refractory to flecainide.     Underwent an atrial fibrillation ablation on 1/3/2020  2. Nonocclusive coronary artery disease.  Per CT angiogram heart 1/2020 revealed coronary calcification the LAD, circumflex and RCA.  3. Mildly dilated aortic root and ascending aorta.  Per CT angiogram (1/2020) revealed 4.22 x 3.88 cm and 3.64 cm x 3.73 cm respectively.  4. Cardiomyopathy.  EF 33% (6/2018)  ECHO 50-55% (8/2019)  5. Hypertension  6. Obesity  7. Testicular cancer with subsequent ED    In March 2019, he was diagnosed with atrial fibrillation started on beta-blocker and Xarelto.   In June 2019 after being diagnosed with cardiomyopathy and atrial fibrillation. He underwent an unsuccessful cardioversion on 6/26/2019 and later was started amiodarone load and on 7/17/19 he underwent a successful cardioversion.       In September 2019, he was started on flecainide and metoprolol.  When he saw Dr. Hannon in November he was in atrial fibrillation because he forgot to take his medications at that time his flecainide was increased from 75 mg to 100 mg twice daily.  A repeat Zio patch revealed patient being in the 100% atrial fibrillation and an atrial fibrillation ablation was recommended.      On 1/3/2020, patient underwent a PVI ablation and unfortunately had a recurrence on 1/28/2020 and underwent a cardioversion and restarted on amiodarone which was discontinued in 4/2020     On 8/28/2020, he presented in atrial fibrillation, was started on flecainide and underwent a DCCV on 9/8/2020.      Today he reports doing well in normal rhythm with no recurrence of atrial fibrillation.  He does not exercise due to  lower back pain.  Overall he denies chest pain or pressure, dizziness, syncope, angina, dyspnea at rest or with exertion, palpitations, orthopnea, PND, or edema.  He states he takes medications as prescribed including warfarin and denies bleeding, hematuria, hematochezia, epistaxis and signs/symptoms of stroke.         ASSESSMENT AND PLAN    Atrial fibrillation    For anticoagulation for CHADS VASC 3 (age, HTN, HF) he takes warfarin with goal INR 2-3. His INR has been therapeutic since 6/2020.         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  After resolution of tachycardia induced cardiomyopathy he was started on flecainide with metoprolol and later was found to be in atrial fibrillation despite increase flecainide dosage. On 1/3/2020, he underwent a PVI ablation, flecainide was discontinued and he was started on amiodarone which was discontinued on 4/2020.   In 8/2020, he had a recurrence of atrial fibrillation, was restarted on flecainide 100 mg twice daily and underwent a successful cardioversion.  Today's ECG reveals sinus rhythm with ventricular rate of 62 bpm and MVP=091 ms.   He underwent a stress ECG on 8/2019 while on a 100 mg BID of flecainide with no evidence of pro arrhythmia therefore will not repeat at this time.      Cardiomyopathy    At the time of diagnosis with his atrial fibrillation his EF was found to be 33%.   ECHO (8/2019) revealed EF 50-55%      Continue GDMT of ARB (losartan 50 mg daily) and BB (Metoprolol XL 50 mg daily)    No HF symptoms     Hypertension    Controlled while taking losartan and metoprolol     Non-occlusive coronary artery disease    Per CT angiogram heart 1/2020 revealed coronary calcification the LAD, circumflex and RCA.    Asymptomatic    Continue BB, ARB, statin.  He is not on an aspirin due to being on Warfarin.  His last LDL was 75 (5/2020)     Plan:    Continue current medications    Follow up with  Dr. Hannon in 6 months.       Patient expresses understanding and agreement with the plan.     I appreciate the chance to help with Kevan DHRUV Connelly Please let me know if you have any questions or concerns.    ANNMARIE Carey, CNP    This note was completed in part using Dragon voice recognition software. Although reviewed after completion, some word and grammatical errors may occur.    Orders Placed This Encounter   Procedures     EKG 12-lead complete w/read - Clinics (performed today)     Orders Placed This Encounter   Medications     metoprolol succinate ER (TOPROL-XL) 50 MG 24 hr tablet     Sig: Take 1 tablet (50 mg) by mouth daily     Dispense:  90 tablet     Refill:  3     Pt will call for refills     flecainide (TAMBOCOR) 100 MG tablet     Sig: Take 1 tablet (100 mg) by mouth 2 times daily     Dispense:  180 tablet     Refill:  3     Pt will call for refills     Medications Discontinued During This Encounter   Medication Reason     metoprolol succinate ER (TOPROL-XL) 50 MG 24 hr tablet      flecainide (TAMBOCOR) 100 MG tablet          Encounter Diagnoses   Name Primary?     Paroxysmal atrial fibrillation (H)      Paroxysmal atrial fibrillation (H)      Persistent atrial fibrillation (H)        CURRENT MEDICATIONS:  Current Outpatient Medications   Medication Sig Dispense Refill     acetaminophen (TYLENOL) 500 MG tablet Take 500 mg by mouth every 6 hours as needed for mild pain       atorvastatin (LIPITOR) 20 MG tablet Take 1 tablet (20 mg) by mouth daily 90 tablet 3     flecainide (TAMBOCOR) 100 MG tablet Take 1 tablet (100 mg) by mouth 2 times daily 180 tablet 3     losartan (COZAAR) 50 MG tablet Take 1 tablet (50 mg) by mouth daily 90 tablet 3     melatonin 3 MG CAPS Take 3 mg by mouth At Bedtime       metoprolol succinate ER (TOPROL-XL) 50 MG 24 hr tablet Take 1 tablet (50 mg) by mouth daily 90 tablet 3     sildenafil (REVATIO) 20 MG tablet Take 1 tablet (20 mg) by mouth daily as needed 30 tablet 3      warfarin ANTICOAGULANT (COUMADIN) 5 MG tablet TAKE 1/2 TABLET (2.5MG) BY MOUTH ON FRIDAYS; TAKE 1 TABLET (5MG) ALL OTHER DAYS OR AS DIRECTED BY INR CLINIC 90 tablet 0       ALLERGIES     Allergies   Allergen Reactions     Lisinopril Cough     Very persistent cough       PAST MEDICAL HISTORY:  Past Medical History:   Diagnosis Date     Atrial flutter (H)      Benign essential hypertension 1/18/2017    past use of ACE- Cough;  Had been on ARB-diuretic but experienced lower bp readings;  BLOOD PRESSURE now well controlled on ARB also no longer on diuretic; no low bp readings; may have had slight edema initially but has normalized with bp well controlled on ARB alone.      Cancer (H)     testicular cancer     CHF (congestive heart failure) (H) 6/5/2019     Long term current use of anticoagulant therapy 6/3/2019     Obesity (BMI 35.0-39.9) with comorbidity (H) 2/11/2019     Paroxysmal atrial fibrillation (H) 06/03/2019       PAST SURGICAL HISTORY:  Past Surgical History:   Procedure Laterality Date     ANESTHESIA CARDIOVERSION N/A 6/26/2019    Procedure: ANESTHESIA, FOR CARDIOVERSION DR. LE;  Surgeon: GENERIC ANESTHESIA PROVIDER;  Location:  OR     ANESTHESIA CARDIOVERSION N/A 7/17/2019    Procedure: ANESTHESIA, FOR CARDIOVERSION;  Surgeon: GENERIC ANESTHESIA PROVIDER;  Location:  OR     ANESTHESIA CARDIOVERSION N/A 1/28/2020    Procedure: ANESTHESIA, FOR CARDIOVERSION;  Surgeon: GENERIC ANESTHESIA PROVIDER;  Location:  OR     ANESTHESIA CARDIOVERSION N/A 9/8/2020    Procedure: ANESTHESIA, FOR CARDIOVERSION;  Surgeon: GENERIC ANESTHESIA PROVIDER;  Location:  OR     CLOSED REDUCTION SHOULDER Left      EP ABLATION FOCAL AFIB N/A 1/3/2020    Procedure: EP Ablation Focal AFIB;  Surgeon: Lamont Hannon MD;  Location:  [facility]ATH LAB     GENITOURINARY SURGERY  1990    testicular cancer     ORTHOPEDIC SURGERY  1970's    right knee surgery        FAMILY HISTORY:  Family History   Problem Relation  "Age of Onset     Colon Cancer Father      Coronary Artery Disease Maternal Grandmother        SOCIAL HISTORY:  Social History     Socioeconomic History     Marital status:      Spouse name: None     Number of children: None     Years of education: None     Highest education level: None   Occupational History     None   Social Needs     Financial resource strain: None     Food insecurity     Worry: None     Inability: None     Transportation needs     Medical: None     Non-medical: None   Tobacco Use     Smoking status: Never Smoker     Smokeless tobacco: Never Used   Substance and Sexual Activity     Alcohol use: Yes     Comment: every 1-2 months     Drug use: No     Sexual activity: Yes     Partners: Female   Lifestyle     Physical activity     Days per week: None     Minutes per session: None     Stress: None   Relationships     Social connections     Talks on phone: None     Gets together: None     Attends Rastafarian service: None     Active member of club or organization: None     Attends meetings of clubs or organizations: None     Relationship status: None     Intimate partner violence     Fear of current or ex partner: None     Emotionally abused: None     Physically abused: None     Forced sexual activity: None   Other Topics Concern     Parent/sibling w/ CABG, MI or angioplasty before 65F 55M? Yes   Social History Narrative     None       Review of Systems:  Skin:  Positive for     Eyes:  Positive for glasses  ENT:  Negative    Respiratory:  Negative    Cardiovascular:  Negative    Gastroenterology: Negative    Genitourinary:  Negative    Musculoskeletal:  Negative    Neurologic:  Negative    Psychiatric:  Negative    Heme/Lymph/Imm:  Negative    Endocrine:  Negative      Physical Exam:  Vitals: /88   Pulse 64   Ht 1.93 m (6' 4\")   Wt 123.8 kg (272 lb 14.4 oz)   SpO2 98%   BMI 33.22 kg/m      Constitutional:  cooperative, alert and oriented, well developed, well nourished, in no acute " distress overweight      Skin:  warm and dry to the touch        Head:           Eyes:  pupils equal and round        ENT:           Neck:           Chest:  clear to auscultation        Cardiac: regular rhythm                  Abdomen:  abdomen soft obese      Vascular:                                        Extremities and Back:  no deformities, clubbing, cyanosis, erythema observed        Neurological:  no gross motor deficits          Recent Lab Results:  LIPID RESULTS:  Lab Results   Component Value Date    CHOL 138 06/01/2020    HDL 44 06/01/2020    LDL 75 06/01/2020    TRIG 93 06/01/2020       LIVER ENZYME RESULTS:  Lab Results   Component Value Date    AST 17 01/13/2020    ALT 35 03/24/2020       CBC RESULTS:  Lab Results   Component Value Date    WBC 5.8 01/03/2020    RBC 4.88 01/03/2020    HGB 16.0 01/03/2020    HCT 44.2 01/03/2020    MCV 91 01/03/2020    MCH 32.8 01/03/2020    MCHC 36.2 01/03/2020    RDW 12.6 01/03/2020     01/03/2020       BMP RESULTS:  Lab Results   Component Value Date     05/26/2020    POTASSIUM 4.7 09/08/2020    CHLORIDE 110 (H) 05/26/2020    CO2 27 05/26/2020    ANIONGAP 2 (L) 05/26/2020    GLC 99 05/26/2020    BUN 22 05/26/2020    CR 1.35 (H) 05/26/2020    GFRESTIMATED 54 (L) 05/26/2020    GFRESTBLACK 62 05/26/2020    CHU 8.8 05/26/2020        A1C RESULTS:  No results found for: A1C    INR RESULTS:  Lab Results   Component Value Date    INR 2.8 (H) 09/08/2020    INR 2.80 (H) 09/02/2020    INR 2.40 (H) 07/31/2020           CC  Lamont Hannon MD  [id number]  [place] [id number]

## 2020-09-17 NOTE — ANESTHESIA POSTPROCEDURE EVALUATION
Patient: Kevan Connelly    Procedure(s):  ANESTHESIA, FOR CARDIOVERSION    Diagnosis:A-fib (H) [I48.91]    Anesthesia Type:  General    Note:  Anesthesia Post Evaluation    Last vitals:  Vitals:    09/08/20 1200 09/08/20 1210 09/08/20 1220   BP: 110/75 120/76 121/78   Pulse: 61 60 62   Resp: 11 14 14   Temp:      SpO2: 98% 98% 96%         Electronically Signed By: Umair Rowan DO  September 17, 2020  2:50 AM

## 2020-09-30 DIAGNOSIS — Z11.59 ENCOUNTER FOR SCREENING FOR OTHER VIRAL DISEASES: Primary | ICD-10-CM

## 2020-10-14 ENCOUNTER — ANTICOAGULATION THERAPY VISIT (OUTPATIENT)
Dept: FAMILY MEDICINE | Facility: CLINIC | Age: 70
End: 2020-10-14

## 2020-10-14 ENCOUNTER — TELEPHONE (OUTPATIENT)
Dept: FAMILY MEDICINE | Facility: CLINIC | Age: 70
End: 2020-10-14

## 2020-10-14 DIAGNOSIS — Z79.01 LONG TERM CURRENT USE OF ANTICOAGULANT THERAPY: ICD-10-CM

## 2020-10-14 DIAGNOSIS — I48.0 PAROXYSMAL ATRIAL FIBRILLATION (H): ICD-10-CM

## 2020-10-14 DIAGNOSIS — Z79.01 LONG TERM CURRENT USE OF ANTICOAGULANTS WITH INR GOAL OF 2.0-3.0: ICD-10-CM

## 2020-10-14 DIAGNOSIS — I48.92 ATRIAL FLUTTER (H): ICD-10-CM

## 2020-10-14 LAB — INR PPP: 2.4 (ref 0.86–1.14)

## 2020-10-14 PROCEDURE — 99207 PR NO CHARGE NURSE ONLY: CPT | Performed by: INTERNAL MEDICINE

## 2020-10-14 PROCEDURE — 85610 PROTHROMBIN TIME: CPT | Performed by: INTERNAL MEDICINE

## 2020-10-14 PROCEDURE — 36416 COLLJ CAPILLARY BLOOD SPEC: CPT | Performed by: INTERNAL MEDICINE

## 2020-10-14 NOTE — PROGRESS NOTES
ANTICOAGULATION FOLLOW-UP CLINIC VISIT    Patient Name:  Kevan Connelly  Date:  10/14/2020  Contact Type:  Telephone/ Keron    SUBJECTIVE:  Patient Findings     Positives:  Upcoming invasive procedure    Comments:  Patient has colonoscopy 10/27/20. See 10/14/20 telephone encounter sent to PharmD for hold/ bridge plan.         Clinical Outcomes     Negatives:  Major bleeding event, Thromboembolic event, Anticoagulation-related hospital admission, Anticoagulation-related ED visit, Anticoagulation-related fatality    Comments:  Patient has colonoscopy 10/27/20. See 10/14/20 telephone encounter sent to PharmD for hold/ bridge plan.            OBJECTIVE    Recent labs: (last 7 days)     10/14/20  0810   INR 2.40*       ASSESSMENT / PLAN  INR assessment THER    Recheck INR In: 6 WEEKS    INR Location Clinic      Anticoagulation Summary  As of 10/14/2020    INR goal:  2.0-3.0   TTR:  88.5 % (1 y)   INR used for dosin.40 (10/14/2020)   Warfarin maintenance plan:  2.5 mg (5 mg x 0.5) every Fri; 5 mg (5 mg x 1) all other days   Full warfarin instructions:  2.5 mg every Fri; 5 mg all other days   Weekly warfarin total:  32.5 mg   No change documented:  Marilou Esparza RN   Plan last modified:  Vivi Clark, RN (2020)   Next INR check:  2020   Priority:  Maintenance   Target end date:  Indefinite    Indications    Long term current use of anticoagulant therapy [Z79.01]  Paroxysmal atrial fibrillation (H) [I48.0]  Long term current use of anticoagulants with INR goal of 2.0-3.0 [Z79.01]             Anticoagulation Episode Summary     INR check location:  Anticoagulation Clinic    Preferred lab:      Send INR reminders to:  CONNIE HOBBS    Comments:  5mg tabs / DCCV 19 & 20 / early morning appointments on Wed or Fri      Anticoagulation Care Providers     Provider Role Specialty Phone number    Davina Reaves MD Referring Internal Medicine [phone]            See the  Encounter Report to view Anticoagulation Flowsheet and Dosing Calendar (Go to Encounters tab in chart review, and find the Anticoagulation Therapy Visit)    Patient INR is therapeutic.  Patient will continue to take 32.5 mg weekly dosing and follow up in 6 weeks or sooner for any problems or concerns.        Marilou Esparza RN

## 2020-10-14 NOTE — PROGRESS NOTES
Anticoagulation Management    Unable to reach Keron today.    Today's INR result of 2.4 is therapeutic (goal INR of 2.0-3.0).  Result received from: Clinic Lab    Follow up required to assess for changes     Left message to call [phone]. Transfer to [phone].      Anticoagulation clinic to follow up    Marilou Esparza RN

## 2020-10-14 NOTE — TELEPHONE ENCOUNTER
Patient is scheduled for colonoscopy on: 10/27/20   Patient is currently on warfarin for atrial fib. TTR 88.5%    Please advise if patient will need to hold/bridge prior to procedure.    Thank you,  Ivy Esparza RN   [facility]  [place], [place], [place]

## 2020-10-15 NOTE — TELEPHONE ENCOUNTER
KAR[facility]-PROCEDURAL ANTICOAGULATION  MANAGEMENT    Assessment     Warfarin interruption plan for colonoscopy on 10/27/2020.    A. Fib      Risk stratification for thromboembolism: low (2017 ACC periprocedure pathway for NVAF Expert Consensus)      [id number]-[id number] = 3    NVAF: 2017 ACC periprocedure pathway for NVAF advises NO bridge for low risk stratification (RVO2IT3-KGJk score <=4 or and no prior hx of stroke, TIA or systemic embolism)    Plan       Pre-Procedure:    Hold warfarin until after procedure starting: 10/23/2020          Post-Procedure:    Resume home warfarin dose if okay with provider doing procedure on night of procedure, 10/27/2020 PM: 7.5mg (boost)    Recheck INR 5-7 days after resuming warfarin   ?   [facility]    Subjective/Objective:      Kevan Connelly, a 70 year old male    Reason for Anticoagulation: A. Fib    Goal INR Range: 2.0-3.0    Patient bridged in past: Yes: for high clot risk procedures    Pertinent History: N/A    Wt Readings from Last 3 Encounters:   09/16/20 123.8 kg (272 lb 14.4 oz)   09/08/20 124.2 kg (273 lb 14.4 oz)   03/09/20 122.9 kg (271 lb)        Ideal body weight: 86.8 kg (191 lb 5.7 oz)  Adjusted ideal body weight: 101.6 kg (223 lb 15.6 oz)     Lab Results   Component Value Date    INR 2.40 (H) 10/14/2020    INR 2.8 (H) 09/08/2020    INR 2.80 (H) 09/02/2020     Lab Results   Component Value Date    HGB 16.0 01/03/2020    HCT 44.2 01/03/2020     01/03/2020     Lab Results   Component Value Date    CR 1.35 (H) 05/26/2020    CR 1.17 05/19/2020    CR 1.11 01/03/2020     CrCl cannot be calculated (Patient's most recent lab result is older than the maximum 10 days allowed.).

## 2020-10-20 NOTE — TELEPHONE ENCOUNTER
Spoke with Keron, instructions reviewed. Patient/ parent verbalized understanding and agrees with plan.    Ivy Esparza RN   [facility]  [place], [place], [place]

## 2020-10-20 NOTE — TELEPHONE ENCOUNTER
Left non detailed message for patient to review hold instructions. Sent via [employer] as well. Transfer call to [phone].    PATIENT INSTRUCTIONS:  10/23/20 4 days before procedure: No warfarin  10/24/20 3 days before procedure: No warfarin  10/25/20 2 days before procedure: No warfarin  10/26/20 1 day before procedure: No warfarin    10/27/20 Day of surgery: Warfarin 7.5 mg in the PM after surgery/procedure. (Unless instructed different by provider or surgeon)     10/28/20 Day 1 after procedure: take Warfarin 5 mg   10/29/20 Day 2 after procedure: take Warfarin 5 mg   10/30/20 Day 3 after procedure: take Warfarin 2.5 mg  10/31/20 Day 4 after procedure: take Warfarin 5 mg   11/1/20 Day 5 after procedure: take Warfarin 5 mg  11/2/20 Day 6 after procedure: take Warfarin 5 mg    Next INR recheck on 11/3/20    Ivy Esparza RN   [facility]  [place], [place], [place]

## 2020-10-23 DIAGNOSIS — Z11.59 ENCOUNTER FOR SCREENING FOR OTHER VIRAL DISEASES: ICD-10-CM

## 2020-10-23 PROCEDURE — U0003 INFECTIOUS AGENT DETECTION BY NUCLEIC ACID (DNA OR RNA); SEVERE ACUTE RESPIRATORY SYNDROME CORONAVIRUS 2 (SARS-COV-2) (CORONAVIRUS DISEASE [COVID-19]), AMPLIFIED PROBE TECHNIQUE, MAKING USE OF HIGH THROUGHPUT TECHNOLOGIES AS DESCRIBED BY CMS-2020-01-R: HCPCS | Performed by: INTERNAL MEDICINE

## 2020-10-24 LAB
SARS-COV-2 RNA SPEC QL NAA+PROBE: NOT DETECTED
SPECIMEN SOURCE: NORMAL

## 2020-10-27 ENCOUNTER — TELEPHONE (OUTPATIENT)
Dept: CARDIOLOGY | Facility: CLINIC | Age: 70
End: 2020-10-27

## 2020-10-27 ENCOUNTER — HOSPITAL ENCOUNTER (OUTPATIENT)
Facility: CLINIC | Age: 70
Discharge: HOME | End: 2020-10-27
Attending: INTERNAL MEDICINE | Admitting: INTERNAL MEDICINE
Payer: COMMERCIAL

## 2020-10-27 ENCOUNTER — DOCUMENTATION ONLY (OUTPATIENT)
Dept: FAMILY MEDICINE | Facility: CLINIC | Age: 70
End: 2020-10-27

## 2020-10-27 VITALS
SYSTOLIC BLOOD PRESSURE: 133 MMHG | DIASTOLIC BLOOD PRESSURE: 98 MMHG | TEMPERATURE: 97.6 F | HEART RATE: 74 BPM | RESPIRATION RATE: 16 BRPM | OXYGEN SATURATION: 98 %

## 2020-10-27 DIAGNOSIS — Z79.01 LONG TERM CURRENT USE OF ANTICOAGULANT THERAPY: ICD-10-CM

## 2020-10-27 DIAGNOSIS — I48.0 PAROXYSMAL ATRIAL FIBRILLATION (H): ICD-10-CM

## 2020-10-27 DIAGNOSIS — Z79.01 LONG TERM CURRENT USE OF ANTICOAGULANTS WITH INR GOAL OF 2.0-3.0: ICD-10-CM

## 2020-10-27 LAB
COLONOSCOPY: NORMAL
INR PPP: 1.37 (ref 0.86–1.14)

## 2020-10-27 PROCEDURE — 88305 TISSUE EXAM BY PATHOLOGIST: CPT | Mod: TC | Performed by: INTERNAL MEDICINE

## 2020-10-27 PROCEDURE — 36415 COLL VENOUS BLD VENIPUNCTURE: CPT | Performed by: INTERNAL MEDICINE

## 2020-10-27 PROCEDURE — 250N000011 HC RX IP 250 OP 636: Performed by: INTERNAL MEDICINE

## 2020-10-27 PROCEDURE — G0500 MOD SEDAT ENDO SERVICE >5YRS: HCPCS | Performed by: INTERNAL MEDICINE

## 2020-10-27 PROCEDURE — 45385 COLONOSCOPY W/LESION REMOVAL: CPT | Performed by: INTERNAL MEDICINE

## 2020-10-27 PROCEDURE — 85610 PROTHROMBIN TIME: CPT | Performed by: INTERNAL MEDICINE

## 2020-10-27 PROCEDURE — 88305 TISSUE EXAM BY PATHOLOGIST: CPT | Mod: 26 | Performed by: PATHOLOGY

## 2020-10-27 PROCEDURE — 99152 MOD SED SAME PHYS/QHP 5/>YRS: CPT | Performed by: INTERNAL MEDICINE

## 2020-10-27 RX ORDER — FENTANYL CITRATE 50 UG/ML
INJECTION, SOLUTION INTRAMUSCULAR; INTRAVENOUS PRN
Status: DISCONTINUED | OUTPATIENT
Start: 2020-10-27 | End: 2020-10-27 | Stop reason: HOSPADM

## 2020-10-27 RX ORDER — FLUMAZENIL 0.1 MG/ML
0.2 INJECTION, SOLUTION INTRAVENOUS
Status: DISCONTINUED | OUTPATIENT
Start: 2020-10-27 | End: 2020-10-27 | Stop reason: HOSPADM

## 2020-10-27 RX ORDER — LIDOCAINE 40 MG/G
CREAM TOPICAL
Status: DISCONTINUED | OUTPATIENT
Start: 2020-10-27 | End: 2020-10-27 | Stop reason: HOSPADM

## 2020-10-27 RX ORDER — ONDANSETRON 2 MG/ML
4 INJECTION INTRAMUSCULAR; INTRAVENOUS
Status: DISCONTINUED | OUTPATIENT
Start: 2020-10-27 | End: 2020-10-27 | Stop reason: HOSPADM

## 2020-10-27 RX ORDER — PROCHLORPERAZINE MALEATE 5 MG
5 TABLET ORAL EVERY 6 HOURS PRN
Status: DISCONTINUED | OUTPATIENT
Start: 2020-10-27 | End: 2020-10-27 | Stop reason: HOSPADM

## 2020-10-27 RX ORDER — ONDANSETRON 4 MG/1
4 TABLET, ORALLY DISINTEGRATING ORAL EVERY 6 HOURS PRN
Status: DISCONTINUED | OUTPATIENT
Start: 2020-10-27 | End: 2020-10-27 | Stop reason: HOSPADM

## 2020-10-27 RX ORDER — ONDANSETRON 2 MG/ML
4 INJECTION INTRAMUSCULAR; INTRAVENOUS EVERY 6 HOURS PRN
Status: DISCONTINUED | OUTPATIENT
Start: 2020-10-27 | End: 2020-10-27 | Stop reason: HOSPADM

## 2020-10-27 RX ORDER — NALOXONE HYDROCHLORIDE 0.4 MG/ML
.1-.4 INJECTION, SOLUTION INTRAMUSCULAR; INTRAVENOUS; SUBCUTANEOUS
Status: DISCONTINUED | OUTPATIENT
Start: 2020-10-27 | End: 2020-10-27 | Stop reason: HOSPADM

## 2020-10-27 NOTE — PROGRESS NOTES
ANTICOAGULATION  MANAGEMENT: Discharge Review    Kevan Connelly chart reviewed for anticoagulation continuity of care    Outpatient surgery/procedure on 10/27/20 for colonoscopy.    Discharge disposition: Home    Results:    Recent labs: (last 7 days)     10/27/20  0830   INR 1.37*     Anticoagulation inpatient management:     not applicable     Anticoagulation discharge instructions:     Warfarin dosing: Not addressed, patient had instructions to start back on warfarin with increased dose tonight then maintenance dosing and recheck INR in 1 week.   Bridging: No   INR goal change: No      Medication changes affecting anticoagulation: No    Additional factors affecting anticoagulation: No    Plan     No adjustment to anticoagulation plan needed    Patient not contacted    No adjustment to Anticoagulation Calendar was required    Kaylee Ramey RN

## 2020-10-27 NOTE — TELEPHONE ENCOUNTER
Patient called to report he recently had colonoscopy with 1 polyp removal.  He has been off of warfarin since 10/23 was told to resume on 11/1.  Advised patient to contact INR clinic to obtain starting dose and follow up INR.  Patient provided verbal understanding.  DIO Schrader

## 2020-10-27 NOTE — H&P
Pre-Endoscopy History and Physical     Kevan Connelly MRN# [id number]   YOB: 1950 Age: 70 year old     Date of Procedure: 10/27/2020  Primary care provider: Davina Reaves  Type of Endoscopy: Colonoscopy with possible biopsy, possible polypectomy  Reason for Procedure: polyp history  Type of Anesthesia Anticipated: Conscious Sedation    HPI:    Kevan is a 70 year old male who will be undergoing the above procedure.      A history and physical has been performed. The patient's medications and allergies have been reviewed. The risks and benefits of the procedure and the sedation options and risks were discussed with the patient.  All questions were answered and informed consent was obtained.      He denies a personal or family history of anesthesia complications or bleeding disorders.     Patient Active Problem List   Diagnosis     Hx of testicular cancer     Benign essential hypertension     Erectile dysfunction following radiation therapy     Encounter for monitoring diuretic therapy     Colon polyps     Obesity (BMI 35.0-39.9) with comorbidity (H)     Long term current use of anticoagulant therapy     CHF (congestive heart failure) (H)     Paroxysmal atrial fibrillation (H)     Atrial flutter (H)     Chronic kidney disease, stage 3 (moderate)     Long term current use of anticoagulants with INR goal of 2.0-3.0        Past Medical History:   Diagnosis Date     Atrial flutter (H)      Benign essential hypertension 1/18/2017    past use of ACE- Cough;  Had been on ARB-diuretic but experienced lower bp readings;  BLOOD PRESSURE now well controlled on ARB also no longer on diuretic; no low bp readings; may have had slight edema initially but has normalized with bp well controlled on ARB alone.      Cancer (H)     testicular cancer     CHF (congestive heart failure) (H) 6/5/2019     Long term current use of anticoagulant therapy 6/3/2019     Obesity (BMI 35.0-39.9) with comorbidity (H)  2/11/2019     Paroxysmal atrial fibrillation (H) 06/03/2019        Past Surgical History:   Procedure Laterality Date     ANESTHESIA CARDIOVERSION N/A 6/26/2019    Procedure: ANESTHESIA, FOR CARDIOVERSION DR. LE;  Surgeon: GENERIC ANESTHESIA PROVIDER;  Location:  OR     ANESTHESIA CARDIOVERSION N/A 7/17/2019    Procedure: ANESTHESIA, FOR CARDIOVERSION;  Surgeon: GENERIC ANESTHESIA PROVIDER;  Location: [facility] OR     ANESTHESIA CARDIOVERSION N/A 1/28/2020    Procedure: ANESTHESIA, FOR CARDIOVERSION;  Surgeon: GENERIC ANESTHESIA PROVIDER;  Location: [facility] OR     ANESTHESIA CARDIOVERSION N/A 9/8/2020    Procedure: ANESTHESIA, FOR CARDIOVERSION;  Surgeon: GENERIC ANESTHESIA PROVIDER;  Location:  OR     CLOSED REDUCTION SHOULDER Left      EP ABLATION FOCAL AFIB N/A 1/3/2020    Procedure: EP Ablation Focal AFIB;  Surgeon: Lamont Hannon MD;  Location:  [facility] CATH LAB     GENITOURINARY SURGERY  1990    testicular cancer     ORTHOPEDIC SURGERY  1970's    right knee surgery        Social History     Tobacco Use     Smoking status: Never Smoker     Smokeless tobacco: Never Used   Substance Use Topics     Alcohol use: Yes     Comment: every 1-2 months       Family History   Problem Relation Age of Onset     Colon Cancer Father      Coronary Artery Disease Maternal Grandmother        Prior to Admission medications    Medication Sig Start Date End Date Taking? Authorizing Provider   atorvastatin (LIPITOR) 20 MG tablet Take 1 tablet (20 mg) by mouth daily 1/15/20  Yes Hilda Wahl APRN CNP   flecainide (TAMBOCOR) 100 MG tablet Take 1 tablet (100 mg) by mouth 2 times daily 9/16/20  Yes Hilda Wahl APRN CNP   losartan (COZAAR) 50 MG tablet Take 1 tablet (50 mg) by mouth daily 3/9/20  Yes Davina Reaves MD   metoprolol succinate ER (TOPROL-XL) 50 MG 24 hr tablet Take 1 tablet (50 mg) by mouth daily 9/16/20  Yes Hilda Wahl APRN CNP   warfarin ANTICOAGULANT  "(COUMADIN) 5 MG tablet TAKE 1/2 TABLET (2.5MG) BY MOUTH ON FRIDAYS; TAKE 1 TABLET (5MG) ALL OTHER DAYS OR AS DIRECTED BY INR CLINIC 8/31/20  Yes Davina Reaves MD   acetaminophen (TYLENOL) 500 MG tablet Take 500 mg by mouth every 6 hours as needed for mild pain    Reported, Patient   melatonin 3 MG CAPS Take 3 mg by mouth At Bedtime    Reported, Patient   sildenafil (REVATIO) 20 MG tablet Take 1 tablet (20 mg) by mouth daily as needed 10/9/17   Davina Reaves MD       Allergies   Allergen Reactions     Lisinopril Cough     Very persistent cough        REVIEW OF SYSTEMS:   5 point ROS negative except as noted above in HPI, including Gen., Resp., CV, GI &  system review.    PHYSICAL EXAM:   There were no vitals taken for this visit. Estimated body mass index is 33.22 kg/m  as calculated from the following:    Height as of 9/16/20: 1.93 m (6' 4\").    Weight as of 9/16/20: 123.8 kg (272 lb 14.4 oz).   GENERAL APPEARANCE: alert, and oriented  MENTAL STATUS: alert  AIRWAY EXAM: Mallampatti Class I (visualization of the soft palate, fauces, uvula, anterior and posterior pillars)  RESP: lungs clear to auscultation - no rales, rhonchi or wheezes  CV: regular rates and rhythm  DIAGNOSTICS:    Not indicated    IMPRESSION   ASA Class 2 - Mild systemic disease    PLAN:   Plan for Colonoscopy with possible biopsy, possible polypectomy. We discussed the risks, benefits and alternatives and the patient wished to proceed.    The above has been forwarded to the consulting provider.      Signed Electronically by: Karl Horne MD  October 27, 2020          "

## 2020-10-27 NOTE — LETTER
October 12, 2020      Kevan Connelly  [address]   [place] [address]        Thank you for choosing [facility]. You are scheduled for the following service(s).   An  INR will be drawn when you arrive   If you take an anticoagulant or anti-platelet medication (such as Coumadin , Lovenox , Pradaxa , Xarelto , Eliquis , etc.), please call your primary doctor for advice on holding this medication.  Please be aware that coverage of these services is subject to the terms and limitations of your health insurance plan.  Call member services at your health plan with any benefit or coverage questions.  Date:  10-12-20       Procedure: COLONOSCOPY  Doctor:   Coleen         Arrival Time:  0800   *Enter and check in at the Main Hospital Entrance  Procedure Time:  0900    Location:   [facility]        Endoscopy Department, First Floor *         [address] [place], [place] [address] [phone] or [phone] ([facility]) to reschedule        NuLYTELY  DIVIDED PREP  Colonoscopy is the most accurate test to detect colon polyps and colon cancer; and the only test where polyps can be removed. During this procedure, a doctor examines the lining of your large intestine and rectum through a flexible tube.         Transportation  You must arrange for a ride for the day of your procedure with a responsible adult. A taxi , Uber, etc, is not an option unless you are accompanied by a responsible adult. If you fail to arrange transportation with a responsible adult, your procedure will be cancelled and rescheduled.    Fill your enclosed prescription for NuLYTELY  at your local pharmacy. Please call our office at [phone] if you did not receive a prescription.    COLONOSCOPY PRE-PROCEDURE CHECKLIST  If you have diabetes, ask your regular doctor for diet and medication restrictions.  If you take an anticoagulant or anti-platelet medication (such as  Coumadin , Lovenox , Pradaxa , Xarelto , Eliquis , etc.), please call your primary doctor for advice on holding this medication.  If you take aspirin you may continue to do so.  If you are or may be pregnant, please discuss the risks and benefits of this procedure with your doctor.    PREPARATION FOR COLONOSCOPY    7 days before:    Discontinue fiber supplements and medications containing iron. This includes Metamucil  and Fibercon ; and multivitamins with iron.  3 days before:    Begin a low-fiber diet. A low-fiber diet helps making the cleanout more effective. For additional details on low-fiber diet, please refer to the table on the last page.  2 days before:    Continue the low-fiber diet.     Drink at least 8 glasses of water throughout the day.     Stop eating solid foods at 11:45 pm.  1 day before:    In the morning: begin a clear liquid diet (liquids you can see through).     Examples of a clear liquid diet include: water, clear broth or bouillon, Gatorade, Pedialyte or Powerade, carbonated and non-carbonated soft drinks (Sprite , 7-Up , ginger ale), strained fruit juices without pulp (apple, white grape, white cranberry), Jell-O  and popsicles.     The following are not allowed on a clear liquid diet: red liquids, alcoholic beverages,  dairy products (milk, creamer, and yogurt), protein shakes,  juice with pulp and chewing tobacco.    At 4pm: drink 1 (one) 8 oz glass of NuLYTELY  solution every 15 minutes (approximately 8 glasses of 8 oz or half the bottle). Keep the solution refrigerated. Do not drink any other liquids while you are drinking the NuLYTELY  solution.    Over the course of the evening, drink an additional   liter of clear liquids and continue clear liquid diet.    Day of procedure:    6 hours before the procedure: drink 1 (one) 8 oz glass of NuLYTELY  solution every 15 minutes until the last half of the bottle (approximately 8 glasses of 8 oz) is gone. Keep the solution refrigerated. Do not  drink any other liquids while you are drinking the NuLYTELY  solution. After that, you may continue the clear liquid diet until 4 hours before the procedure.      You may take all of your morning medications including blood pressure medications, blood thinners (if you have not been instructed to stop these by our office), methadone, and anti-seizure medications with sips of water 4 hours prior to your procedure or earlier. Do not take insulin or vitamins prior to your procedure.       4 hours prior:   o STOP consuming all liquids   o Do not take anything by mouth during this time.   o Allow extra time to travel to your procedure as you may need to stop and use a restroom along the way.  You are ready for the procedure, if you followed all instructions and your stool is no longer formed, but clear or yellow liquid. If you are unsure whether your colon is clean, please call our office at [phone] before you leave for your appointment.    COLON CLEANSING TIPS: drink adequate amounts of fluids before and after your colon cleansing to prevent dehydration. Stay near a toilet because you will have diarrhea. Even if you are sitting on the toilet, continue to drink the cleansing solution every 15 minutes. If you feel nauseous or vomit, rinse your mouth with water, take a 15 to 30-minute-break and then continue drinking the solution. You will be uncomfortable until the stool has flushed from your colon (in about 2 to 4 hours). You may feel chilled.    Bring the following to your procedure:  - Insurance Card/Photo ID.   - List of current medications including over-the-counter medications and supplements.   - Your rescue inhaler if you currently use one to control asthma.    Canceling or rescheduling your appointment:   If you must cancel or reschedule your appointment, please call [phone] as soon as possible.      What happens during a colonoscopy?    Plan to spend up to two hours, starting at registration time, at  the endoscopy center the day of your procedure. The colonoscopy takes an average of 15 to 30 minutes. Recovery time is about 30 minutes.    Before the exam:    You will change into a gown.    Your medical history and medication list will be reviewed with you, unless that has been done over the phone prior to the procedure.     A nurse will insert an intravenous (IV) line into your hand or arm.    The doctor will meet with you and will give you a consent form to sign.    During the exam:     Medicine will be given through the IV line to help you relax.     Your heart rate and oxygen levels will be monitored. If your blood pressure is low, you may be given fluids through the IV line.     The doctor will insert a flexible hollow tube, called a colonoscope, into your rectum. The scope will be advanced slowly through the large intestine (colon).    You may have a feeling of fullness or pressure.     If an abnormal tissue or a polyp is found, the doctor may remove it through the endoscope for closer examination, or biopsy. Tissue removal is painless.    After the exam:           Any tissue samples removed during the exam will be sent to a lab for evaluation. It may take 5-7 working days for you to be notified of the results.     A nurse will provide you with complete discharge instructions before you leave the endoscopy center. Be sure to ask the nurse for specific instructions if you take blood thinners such as Aspirin, Coumadin or Plavix.     The doctor will prepare a full report for you and for the physician who referred you for the procedure.     Your doctor will talk with you about the initial results of your exam.      Medication given during the exam will prohibit you from driving for the rest of the day.     Following the exam, you may resume your normal diet. Your first meal should be light, no greasy foods. Avoid alcohol until the next day.     You may resume your regular activities the day after the procedure.      LOW-FIBER DIET  Foods RECOMMENDED Foods to AVOID   Breads, Cereal, Rice and Pasta:   White bread, rolls, biscuits, croissant and ijeoma toast.   Waffles, Irish toast and pancakes.   White rice, noodles, pasta, macaroni and peeled cooked potatoes.   Plain crackers and saltines.   Cooked cereals: farina, cream of rice.   Cold cereals: Puffed Rice , Rice Krispies , Corn Flakes  and Special K    Breads, Cereal, Rice and Pasta:   Breads or rolls with nuts, seeds or fruit.   Whole wheat, pumpernickel, rye breads and cornbread.   Potatoes with skin, brown or wild rice, and kasha (buckwheat).     Vegetables:   Tender cooked and canned vegetables without seeds: carrots, asparagus tips, green or wax beans, pumpkin, spinach, lima beans. Vegetables:   Raw or steamed vegetables.   Vegetables with seeds.   Sauerkraut.   Winter squash, peas, broccoli, Brussel sprouts, cabbage, onions, cauliflower, baked beans, peas and corn.   Fruits:   Strained fruit juice.   Canned fruit, except pineapple.   Ripe bananas and melon. Fruits:   Prunes and prune juice.   Raw fruits.   Dried fruits: figs, dates and raisins.   Milk/Dairy:   Milk: plain or flavored.   Yogurt, custard and ice cream.   Cheese and cottage cheese Milk/Dairy:     Meat and other proteins:   ground, well-cooked tender beef, lamb, ham, veal, pork, fish, poultry and organ meats.   Eggs.   Peanut butter without nuts. Meat and other proteins:   Tough, fibrous meats with gristle.   Dry beans, peas and lentils.   Peanut butter with nuts.   Tofu.   Fats, Snack, Sweets, Condiments and Beverages:   Margarine, butter, oils, mayonnaise, sour cream and salad dressing, plain gravy.   Sugar, hard candy, clear jelly, honey and syrup.   Spices, cooked herbs, bouillon, broth and soups made with allowed vegetable, ketchup and mustard.   Coffee, tea and carbonated drinks.   Plain cakes, cookies and pretzels.   Gelatin, plain puddings, custard, ice cream, sherbet and popsicles. Fats, Snack,  Sweets, Condiments and Beverages:   Nuts, seeds and coconut.   Jam, marmalade and preserves.   Pickles, olives, relish and horseradish.   All desserts containing nuts, seeds, dried fruit and coconut; or made from whole grains or bran.   Candy made with nuts or seeds.   Popcorn.     DIRECTIONS TO THE ENDOSCOPY DEPARTMENT    DIRECTIONS TO THE ENDOSCOPY DEPARTMENT    From the north ([facility])  Take [address] South, exit on [address] . Get into the left hand yudith, turn left (east), go one-half mile to [address] and turn left. Take [address] to the Main Entrance    From the south ([facility])  Take [address]N to the [address]E split and exit on [address] . On [address] , turn left (west) to [address]. Turn right (north) on [address]. Go north to the second  stoplight, take a right and follow [address] to the Main Entrance.    From the east via 35E ([facility])  Take [address] south to [place] [address] exit. Turn right on [address]. Go west to [address]. Turn right (north) on [address]. Go to the first stoplight, take a right on [address]  to the Main Entrance     From the east via [address] ([facility])ollow on [address]  to the Main Entrance  Take [address] to [address]. Turn left (south) on [address] to [address]. Turn left (east) on on [address]  to the Main Entrance    From the west via [address] ([place], [place])  Take [address] east to [address]. Turn right (south) on [address] to [address]. Turn left (east) on [address]  to the Main Entrance

## 2020-10-27 NOTE — PROGRESS NOTES
Patient called to state that they removed 1 polyp and his provider at colonoscopy advised he hold his warfarin another 5 days and restart on 11/1/20. Adjusted warfarin restart date on calendar, advised the first dose should be a booster dose of 7.5 mg as planned before, then 5 mg daily until INR on 11/5/20.     Routing update to Anticoagulation Pharmacist, Ana Faith, and PCP to see if bridging is recommended now that the warfarin hold is extended to 9 days instead of the original 4. Patient understands [facility] RN will call him back if there are any further recommendations.    Kaylee LEONARD RN  Anticoagulation Clinic  [place]

## 2020-10-27 NOTE — DISCHARGE INSTRUCTIONS
Understanding Colon and Rectal Polyps     The colon has a smooth lining composed of millions of cells.     The colon (also called the large intestine) is a muscular tube that forms the last part of the digestive tract. It absorbs water and stores food waste. The colon is about 4 to 6 feet long. The rectum is the last 6 inches of the colon. The colon and rectum have a smooth lining composed of millions of cells. Changes in these cells can lead to growths in the colon that can become cancerous and should be removed.     When the Colon Lining Changes  Changes that occur in the cells that line the colon or rectum can lead to growths called polyps. Over a period of years, polyps can turn cancerous. Removing polyps early may prevent cancer from ever forming.      Polyps  Polyps are fleshy clumps of tissue that form on the lining of the colon or rectum. Small polyps are usually benign (not cancerous). However, over time, cells in a polyp can change and become cancerous. The larger a polyp grows, the more likely this is to happen. Also, certain types of polyps known as adenomatous polyps are considered premalignant. This means that they will almost always become cancerous if they re not removed.          Cancer  Almost all colorectal cancers start when polyp cells begin growing abnormally. As a cancerous tumor grows, it may involve more and more of the colon or rectum. In time, cancer can also grow beyond the colon or rectum and spread to nearby organs or to glands called lymph nodes. The cells can also travel to other parts of the body. This is known as metastasis. The earlier a cancerous tumor is removed, the better the chance of preventing its spread.        4425-3822 [facility], [address] [place] [address], [place] [address]. All rights reserved. This information is not intended as a substitute for professional medical care. Always follow your healthcare professional's instructions.    Understanding Colon and Rectal  Polyps     The colon has a smooth lining composed of millions of cells.     The colon (also called the large intestine) is a muscular tube that forms the last part of the digestive tract. It absorbs water and stores food waste. The colon is about 4 to 6 feet long. The rectum is the last 6 inches of the colon. The colon and rectum have a smooth lining composed of millions of cells. Changes in these cells can lead to growths in the colon that can become cancerous and should be removed.     When the Colon Lining Changes  Changes that occur in the cells that line the colon or rectum can lead to growths called polyps. Over a period of years, polyps can turn cancerous. Removing polyps early may prevent cancer from ever forming.      Polyps  Polyps are fleshy clumps of tissue that form on the lining of the colon or rectum. Small polyps are usually benign (not cancerous). However, over time, cells in a polyp can change and become cancerous. The larger a polyp grows, the more likely this is to happen. Also, certain types of polyps known as adenomatous polyps are considered premalignant. This means that they will almost always become cancerous if they re not removed.          Cancer  Almost all colorectal cancers start when polyp cells begin growing abnormally. As a cancerous tumor grows, it may involve more and more of the colon or rectum. In time, cancer can also grow beyond the colon or rectum and spread to nearby organs or to glands called lymph nodes. The cells can also travel to other parts of the body. This is known as metastasis. The earlier a cancerous tumor is removed, the better the chance of preventing its spread.        [id number] [facility], [address] [place] [address], [place] [address]. All rights reserved. This information is not intended as a substitute for professional medical care. Always follow your healthcare professional's instructions.    Understanding Diverticulosis and Diverticulitis     Pouches or  diverticula usually occur in the lower part of the colon called the sigmoid.      Diverticulitis occurs when the pouches become inflamed.     The colon (large intestine) is the last part of the digestive tract. It absorbs water from stool and changes it from a liquid to a solid. In certain cases, small pouches called diverticula can form in the colon wall. This condition is called diverticulosis. The pouches can become infected. If this happens, it becomes a more serious problem called diverticulitis. These problems can be painful. But they can be managed.   Managing Your Condition  Diet changes or taking medications are often tried first. These may be enough to bring relief. If the case is bad, surgery may be done. You and your doctor can discuss the plan that is best for you.  If You Have Diverticulosis  Diet changes are often enough to control symptoms. The main changes are adding fiber (roughage) and drinking more water. Fiber absorbs water as it travels through your colon. This helps your stool stay soft and move smoothly. Water helps this process. If needed, you may be told to take over-the-counter stool softeners. To help relieve pain, antispasmodic medications may be prescribed.  If You Have Diverticulitis  Treatment depends on how bad your symptoms are.  For mild symptoms: You may be put on a liquid diet for a short time. You may also be prescribed antibiotics. If these two steps relieve your symptoms, you may then be prescribed a high-fiber diet. If you still have symptoms, your doctor will discuss further treatment options with you.  For severe symptoms: You may need to be admitted to the hospital. There, you can be given IV antibiotics and fluids. Once symptoms are under control, the above treatments may be tried. If these don t control your condition, your doctor may discuss the option of having surgery with you.  [facility] to Colon Health  Help keep your colon healthy with a diet that includes plenty of  high-fiber fruits, vegetables, and whole grains. Drink plenty of liquids like water and juice. Your doctor may also recommend avoiding seeds and nuts.          [id number] [facility], [address] [facility], [place] [address]. All rights reserved. This information is not intended as a substitute for professional medical care. Always follow your healthcare professional's instructions.    HIGH FIBER DIET  Fiber is present in all fruits, vegetables, cereals and grains. Fiber passes through the body undigested. A high fiber diet helps food move through the intestinal tract. The added bulk is helpful in preventing constipation. In people with diverticulosis it serves to clean out the pouches along the colon wall while preventing new ones from forming. A high fiber diet also reduces the risk of colon cancer, decreases blood cholesterol and prevents high blood sugar in people with diabetes.    The foods listed below are high in fiber and should be included in your diet. If you are not used to high fiber foods, start with 1 or 2 foods from this list. Every 3-4 days add a new one to your diet until you are eating 4 high fiber foods per day. This should give you 20-35 Gm of fiber/day. It is also important to drink a lot of water when you are on this diet (6-8 glasses a day). Water causes the fiber to swell and increases the benefit.    FOODS HIGH IN DIETARY FIBER:  BREADS: Made with 100% whole wheat flour; floyd, wheat or rye crackers; tortillas, bran muffins  CEREALS: Whole grain cereal with bran (Chex, Raisin Bran, Corn Bran), oatmeal, rolled oats, granola, wheat flakes, brown rice  NUTS: Any nuts  FRUITS: All fresh fruits along with edible skins, (bananas, citrus fruit, mangoes, pears, prunes, raisins, apples, pineapple, apricot, melon, jams and marmalades), fruit juices (especially prune juice)  VEGETABLES: All types, preferably raw or lightly cooked: especially, celery, eggplant, potatoes, spinach, broccoli, brussel  sprouts, winter squash, carrots, cauliflower, soybeans, lentils, fresh and dried beans of all kinds  OTHER: Popcorn, any spices      [id number] [facility], [address] [place] [address], [place] [address]. All rights reserved. This information is not intended as a substitute for professional medical care. Always follow your healthcare professional's instructions.  .

## 2020-10-28 LAB — COPATH REPORT: NORMAL

## 2020-10-28 NOTE — PROGRESS NOTES
Verified with Keron's cardiology care team that holding warfarin as directed by gastro is OK, and since cardioversion >30 days ago, and low risk with [id number] = 3.    Ana Faith, PharmD BCACP  Anticoagulation Clinical Pharmacist

## 2020-10-28 NOTE — PROGRESS NOTES
Notified patient that Cardiology team is in agreement with the extended hold and nothing else is needed. Patient stated understanding and is in agreement with plan.  Kaylee LEONARD RN  Anticoagulation Clinic  [place]

## 2020-11-05 ENCOUNTER — TELEPHONE (OUTPATIENT)
Dept: CARDIOLOGY | Facility: CLINIC | Age: 70
End: 2020-11-05

## 2020-11-05 ENCOUNTER — ANTICOAGULATION THERAPY VISIT (OUTPATIENT)
Dept: NURSING | Facility: CLINIC | Age: 70
End: 2020-11-05

## 2020-11-05 DIAGNOSIS — I48.0 PAROXYSMAL ATRIAL FIBRILLATION (H): Primary | ICD-10-CM

## 2020-11-05 DIAGNOSIS — I48.92 ATRIAL FLUTTER (H): ICD-10-CM

## 2020-11-05 DIAGNOSIS — Z79.01 LONG TERM CURRENT USE OF ANTICOAGULANT THERAPY: ICD-10-CM

## 2020-11-05 DIAGNOSIS — Z79.01 LONG TERM CURRENT USE OF ANTICOAGULANTS WITH INR GOAL OF 2.0-3.0: ICD-10-CM

## 2020-11-05 DIAGNOSIS — I48.0 PAROXYSMAL ATRIAL FIBRILLATION (H): ICD-10-CM

## 2020-11-05 LAB
CAPILLARY BLOOD COLLECTION: NORMAL
INR PPP: 1.2 (ref 0.86–1.14)

## 2020-11-05 PROCEDURE — 99207 PR NO CHARGE NURSE ONLY: CPT

## 2020-11-05 PROCEDURE — 36416 COLLJ CAPILLARY BLOOD SPEC: CPT | Performed by: INTERNAL MEDICINE

## 2020-11-05 PROCEDURE — 85610 PROTHROMBIN TIME: CPT | Performed by: INTERNAL MEDICINE

## 2020-11-05 NOTE — PROGRESS NOTES
ANTICOAGULATION FOLLOW-UP CLINIC VISIT    Patient Name:  Kevan Connelly  Date:  11/5/2020  Contact Type:  Telephone    SUBJECTIVE:  Patient Findings     Comments:  Called patient to discuss today's INR results: He had an extra long hold of warfarin advised after colonoscopy. Otherwise, no problems, changes or concerns. The patient was assessed for diet, medication, and activity level changes, bruising or bleeding, with no problem findings. Per protocol, advised increasing dose by 5 mg spread over today and tomorrow, then continue with maintenance dosing, with a recheck of INR in 1 week. Discussed s/sx to look for with DVT/PE since the low INR places him at greater risk of clots and advised he go to ED if he should experience any of these. Patient stated understanding and is in agreement with plan.             Clinical Outcomes     Negatives:  Major bleeding event, Thromboembolic event, Anticoagulation-related hospital admission, Anticoagulation-related ED visit, Anticoagulation-related fatality    Comments:  Called patient to discuss today's INR results: He had an extra long hold of warfarin advised after colonoscopy. Otherwise, no problems, changes or concerns. The patient was assessed for diet, medication, and activity level changes, bruising or bleeding, with no problem findings. Per protocol, advised increasing dose by 5 mg spread over today and tomorrow, then continue with maintenance dosing, with a recheck of INR in 1 week. Discussed s/sx to look for with DVT/PE since the low INR places him at greater risk of clots and advised he go to ED if he should experience any of these. Patient stated understanding and is in agreement with plan.                OBJECTIVE    Recent labs: (last 7 days)     11/05/20  1358   INR 1.20*       ASSESSMENT / PLAN  INR assessment SUB    Recheck INR In: 1 WEEK    INR Location Clinic      Anticoagulation Summary  As of 11/5/2020    INR goal:  2.0-3.0   TTR:  85.7 % (1 y)   INR  used for dosin.20 (2020)   Warfarin maintenance plan:  2.5 mg (5 mg x 0.5) every Fri; 5 mg (5 mg x 1) all other days   Full warfarin instructions:  : 7.5 mg; : 5 mg; Otherwise 2.5 mg every Fri; 5 mg all other days   Weekly warfarin total:  32.5 mg   Plan last modified:  Vivi Clark RN (2020)   Next INR check:  2020   Priority:  High   Target end date:  Indefinite    Indications    Long term current use of anticoagulant therapy [Z79.01]  Paroxysmal atrial fibrillation (H) [I48.0]  Long term current use of anticoagulants with INR goal of 2.0-3.0 [Z79.01]             Anticoagulation Episode Summary     INR check location:  Anticoagulation Clinic    Preferred lab:      Send INR reminders to:  CONNIE HOBBS    Comments:  5mg tabs / DCCV 19 & 20 / early morning appointments on Wed or Fri      Anticoagulation Care Providers     Provider Role Specialty Phone number    Davina Reaves MD Referring Internal Medicine [phone]            See the Encounter Report to view Anticoagulation Flowsheet and Dosing Calendar (Go to Encounters tab in chart review, and find the Anticoagulation Therapy Visit)    Dosage adjustment made based on physician directed care plan.    Kaylee Ramey RN

## 2020-11-05 NOTE — TELEPHONE ENCOUNTER
Pt called at at 1619 and states that he is in a Fib and his INR is 10.2 after holding his Warfarin for 5 days for Colonoscopy.  Pt states that he went into A Fib at about 1500 with rates 113-124.  Pt is feeling ok, no shortness of breath.  Pt has taken his evening Metoprolol and Flecainide early.  Dicussed with Katiana Toledo and will have pt take and extra Metoprolol XL in the AM if still in A Fib and pt will call clinic with an update. Pt told that if he does not feel well and becomes symptomatic he will need to to go to the ER. Did explain that if pt does not convert on own and Cardioversion is needed, pt will need to have a IBETH first.  Pt states understanding. Will await pt call in the AM. Betsey

## 2020-11-06 DIAGNOSIS — I48.0 PAROXYSMAL ATRIAL FIBRILLATION (H): ICD-10-CM

## 2020-11-06 PROCEDURE — 93000 ELECTROCARDIOGRAM COMPLETE: CPT | Performed by: INTERNAL MEDICINE

## 2020-11-06 NOTE — TELEPHONE ENCOUNTER
Pt called and said that when he woke up he felt good and the rate was 60-90's.  Pt then took the extra metoprolol and his scheduled Flecainide and 1/2 later his HR went up to 123 bpm.  Pt now says that his HR is in the 70's and feels normal.  Asked pt to continue to monitor and call back at 1300 and see if his hr begins to spike again and if then would have pt come get an EKG. Isabel

## 2020-11-06 NOTE — TELEPHONE ENCOUNTER
Pt called and said that his rates are still bouncing. Rates .  Pt will go into  heart clinic for an EKG and then will discuss with Dr Hannon.  Pt is still in the 24 hour window of when the A Fib started. [facility]         Wellness Screening Tool    Symptom Screening:    Do you have one of the following NEW symptoms:      Fever (subjective or >100.0)?  No    New cough? No    Shortness of breath? No    Chills? No    New loss of taste or smell? No    Generalized body aches? No    New persistent headache? No    New sore throat? No    Nausea, vomiting or diarrhea? No    Within the past 2 weeks, have you been exposed to someone with a known positive illness below?      COVID - 19 (known or suspected) No    Chicken pox?  No    Measles? No    Pertussis? No    Have you had a positive COVID-19 diagnostic test (nasal swab test) in the last 14 days or are you currently   on self-quarantine restrictions (i.e.travel restriction, exposure, etc?) No        Patient notified of visitor restriction: Yes  Patient informed to wear a mask: Yes    Patient's appointment status: Patient will be seen in [facility] as scheduled today at 1430 for an EKG

## 2020-11-06 NOTE — TELEPHONE ENCOUNTER
EKG completed and reviewed by Dr Hannon. A Fib rate 73 bpm.  Discussed with Dr Hannon is made aware that pt  Is taking Flecainide 100 mg bid and Metoprolol XL 25 mg daily.  INR on 11/5 was 1.2.  Discussed that it pt becomes symptomatic with the A Fib he should go into ER, but would be best if with in 48 hours of start of A Fib.  Pt will continue to take Metoprolol XL 25 mg bid over the weekend to keep rate under control.  Explained that pt could possibly still convert.  Pt next INR check is on Thursday.  Pt dose was increased for a couple of days. Asked pt to not eat any dark leafy greens. Have asked pt to try an lay low through the weekend and then will talk on Monday.  Pt states understanding. Isabel

## 2020-11-10 RX ORDER — METOPROLOL SUCCINATE 50 MG/1
50 TABLET, EXTENDED RELEASE ORAL 3 TIMES DAILY
Qty: 90 TABLET | Refills: 3
Start: 2020-11-10 | End: 2020-11-20

## 2020-11-10 NOTE — TELEPHONE ENCOUNTER
11/10/20 Information reviewed by Dr Hannon  Pt should stop Flecainide ( d/t pt being out of Rhythm for approx 4 days and subtherapeutic INRs)   Increase Metoprolol to 50 mg TID  Schedule IBETH/DCCV after 4 therapeutic INRs   Pt called and updated of plan. Orders placed, scheduling notified of plan and will reach out to pt.  Msg sent to INR clinic  Pt voiced understanding and agreement w plan  KHerroRN 440 pm

## 2020-11-10 NOTE — TELEPHONE ENCOUNTER
11/10/20 Spoke with pt in follow up to EKG on 11/6 showing he is back in Afib and Metoprolol dose increased to 25 mg BID at that time.  Pt states he is feeling a bit better but Hrs still fluctuating from -115 at rest and with activity. He is having some slight SOB with activity like climbing up stairs.  He has not checked a home BP  He verified he is also taking Flecainide 100 mg BID and Warfarin. Last INR on 11/5 was 1.2 ( Warfarin recently on hold for Colonoscopy). Next INR is 11/12.   Informed pt I will update Dr Hannon and call pt back with recommendations. He voiced understanding and agreement w planJoceline Kowalski am

## 2020-11-12 ENCOUNTER — ANTICOAGULATION THERAPY VISIT (OUTPATIENT)
Dept: NURSING | Facility: CLINIC | Age: 70
End: 2020-11-12

## 2020-11-12 ENCOUNTER — HOSPITAL ENCOUNTER (OUTPATIENT)
Facility: CLINIC | Age: 70
End: 2020-11-12
Payer: COMMERCIAL

## 2020-11-12 DIAGNOSIS — Z79.01 LONG TERM CURRENT USE OF ANTICOAGULANT THERAPY: ICD-10-CM

## 2020-11-12 DIAGNOSIS — I48.92 ATRIAL FLUTTER (H): ICD-10-CM

## 2020-11-12 DIAGNOSIS — Z79.01 LONG TERM CURRENT USE OF ANTICOAGULANTS WITH INR GOAL OF 2.0-3.0: ICD-10-CM

## 2020-11-12 DIAGNOSIS — Z11.59 ENCOUNTER FOR SCREENING FOR OTHER VIRAL DISEASES: Primary | ICD-10-CM

## 2020-11-12 DIAGNOSIS — I48.0 PAROXYSMAL ATRIAL FIBRILLATION (H): ICD-10-CM

## 2020-11-12 LAB
CAPILLARY BLOOD COLLECTION: NORMAL
INR PPP: 1.7 (ref 0.86–1.14)

## 2020-11-12 PROCEDURE — 85610 PROTHROMBIN TIME: CPT | Performed by: INTERNAL MEDICINE

## 2020-11-12 PROCEDURE — 36416 COLLJ CAPILLARY BLOOD SPEC: CPT | Performed by: INTERNAL MEDICINE

## 2020-11-12 PROCEDURE — 99207 PR NO CHARGE NURSE ONLY: CPT

## 2020-11-12 NOTE — PROGRESS NOTES
ANTICOAGULATION FOLLOW-UP CLINIC VISIT    Patient Name:  Kevan Connelly  Date:  2020  Contact Type:  Telephone    SUBJECTIVE:  Patient Findings     Comments:  Called patient to discuss today's INR results: Patient denies any identifiable changes that caused the subtherapeutic INR. He has increased warfarin dosing as advised. He is not eating anything with vitamin K. He needs 4 weeks of INR above 2.0 for his scheduled DCCV. The patient was assessed for diet, medication, and activity level changes, missed or extra doses, bruising or bleeding, with no problem findings. Advised to increase dose tonight and recheck INR tomorrow. Patient stated understanding and is in agreement with plan. Also increased maintenance dose by 8%.  Kaylee LEONARD RN  Anticoagulation Clinic  [place]            Clinical Outcomes     Negatives:  Major bleeding event, Thromboembolic event, Anticoagulation-related hospital admission, Anticoagulation-related ED visit, Anticoagulation-related fatality    Comments:  Called patient to discuss today's INR results: Patient denies any identifiable changes that caused the subtherapeutic INR. He has increased warfarin dosing as advised. He is not eating anything with vitamin K. He needs 4 weeks of INR above 2.0 for his scheduled DCCV. The patient was assessed for diet, medication, and activity level changes, missed or extra doses, bruising or bleeding, with no problem findings. Advised to increase dose tonight and recheck INR tomorrow. Patient stated understanding and is in agreement with plan. Also increased maintenance dose by 8%.  Kaylee LEONARD RN  Anticoagulation Clinic  [place]               OBJECTIVE    Recent labs: (last 7 days)     20  1512   INR 1.70*       ASSESSMENT / PLAN  INR assessment SUB    Recheck INR In: 1 DAY    INR Location Clinic      Anticoagulation Summary  As of 2020    INR goal:  2.0-3.0   TTR:  84.3 % (1 y)   INR used for dosin.70 (2020)   Warfarin  maintenance plan:  5 mg (5 mg x 1) every day   Full warfarin instructions:  11/12: 7.5 mg; Otherwise 5 mg every day   Weekly warfarin total:  35 mg   Plan last modified:  Kaylee Ramey RN (11/12/2020)   Next INR check:  11/13/2020   Priority:  High   Target end date:  Indefinite    Indications    Long term current use of anticoagulant therapy [Z79.01]  Paroxysmal atrial fibrillation (H) [I48.0]  Long term current use of anticoagulants with INR goal of 2.0-3.0 [Z79.01]             Anticoagulation Episode Summary     INR check location:  Anticoagulation Clinic    Preferred lab:      Send INR reminders to:  CONNIE HOBBS    Comments:  5mg tabs / DCCV 7/17/19 & 1/28/20 / early morning appointments on Wed or Fri      Anticoagulation Care Providers     Provider Role Specialty Phone number    Davina Reaves MD Referring Internal Medicine [phone]            See the Encounter Report to view Anticoagulation Flowsheet and Dosing Calendar (Go to Encounters tab in chart review, and find the Anticoagulation Therapy Visit)    Dosage adjustment made based on physician directed care plan.    Kaylee Ramey RN

## 2020-11-13 ENCOUNTER — ANTICOAGULATION THERAPY VISIT (OUTPATIENT)
Dept: FAMILY MEDICINE | Facility: CLINIC | Age: 70
End: 2020-11-13

## 2020-11-13 DIAGNOSIS — I48.92 ATRIAL FLUTTER (H): ICD-10-CM

## 2020-11-13 DIAGNOSIS — I48.0 PAROXYSMAL ATRIAL FIBRILLATION (H): ICD-10-CM

## 2020-11-13 DIAGNOSIS — Z79.01 LONG TERM CURRENT USE OF ANTICOAGULANT THERAPY: ICD-10-CM

## 2020-11-13 DIAGNOSIS — Z79.01 LONG TERM CURRENT USE OF ANTICOAGULANTS WITH INR GOAL OF 2.0-3.0: ICD-10-CM

## 2020-11-13 LAB
CAPILLARY BLOOD COLLECTION: NORMAL
INR PPP: 1.9 (ref 0.86–1.14)

## 2020-11-13 PROCEDURE — 85610 PROTHROMBIN TIME: CPT | Performed by: INTERNAL MEDICINE

## 2020-11-13 PROCEDURE — 36416 COLLJ CAPILLARY BLOOD SPEC: CPT | Performed by: INTERNAL MEDICINE

## 2020-11-13 PROCEDURE — 99207 PR NO CHARGE NURSE ONLY: CPT | Performed by: INTERNAL MEDICINE

## 2020-11-13 NOTE — PROGRESS NOTES
ANTICOAGULATION FOLLOW-UP CLINIC VISIT    Patient Name:  Kevan Connelly  Date:  11/13/2020  Contact Type:  Telephone  Continues to be sub with long hold he had will give small bolus and recheck next week like scheduled.  SUBJECTIVE:  Patient Findings         Clinical Outcomes     Negatives:  Major bleeding event, Thromboembolic event, Anticoagulation-related hospital admission, Anticoagulation-related ED visit, Anticoagulation-related fatality           OBJECTIVE    Recent labs: (last 7 days)     11/13/20  1549   INR 1.90*       ASSESSMENT / PLAN  INR assessment SUB    Recheck INR In: 1 WEEK    INR Location Clinic      Anticoagulation Summary  As of 11/13/2020    INR goal:  2.0-3.0   TTR:  84.0 % (1 y)   INR used for dosing:     Warfarin maintenance plan:  5 mg (5 mg x 1) every day   Full warfarin instructions:  11/13: 7.5 mg; Otherwise 5 mg every day   Weekly warfarin total:  35 mg   Plan last modified:  Kaylee Ramey RN (11/12/2020)   Next INR check:  11/19/2020   Priority:  High   Target end date:  Indefinite    Indications    Long term current use of anticoagulant therapy [Z79.01]  Paroxysmal atrial fibrillation (H) [I48.0]  Long term current use of anticoagulants with INR goal of 2.0-3.0 [Z79.01]             Anticoagulation Episode Summary     INR check location:  Anticoagulation Clinic    Preferred lab:      Send INR reminders to:  CONNIE HOBBS    Comments:  5mg tabs / DCCV 7/17/19 & 1/28/20 / early morning appointments on Wed or Fri      Anticoagulation Care Providers     Provider Role Specialty Phone number    Davina Reaves MD Referring Internal Medicine [phone]          Some signs and symptoms of clots include: pain or tenderness in arm or leg, swelling in arm or leg, changes in skin color, or area is warm to touch, shortness or breath, trouble breathing.  Numbness or weakness especially on 1 side of the body, sudden trouble speaking or swallowing, sudden trouble seeing, sudden  confusion, dizzy spells or headache.  If you have these please call 911 or seek medical care immediately.    See the Encounter Report to view Anticoagulation Flowsheet and Dosing Calendar (Go to Encounters tab in chart review, and find the Anticoagulation Therapy Visit)    Dosage adjustment made based on physician directed care plan.    Angelica Ledbetter RN

## 2020-11-17 NOTE — TELEPHONE ENCOUNTER
11/17/20 Pt called to report that he has not been feeling well the past few days as his HR has been mainly staying in the 120-130 range with occasional dips down into the 55-60-90 range. He is fatigued and feeling some SOB. BP while on phone was 107/83. He states he has not been checking BP routinely. Pt is awaiting DCCV on 12/11. Pt on Coumadin and last INR on 11/13 was 1.9  Future INRS scheduled for 11/19, 11/25 and 12/4.  Will update Dr Hannon and call pt back w recommendations. Pt voiced understanding and agreement w plan.  Bran 236 pm

## 2020-11-18 NOTE — TELEPHONE ENCOUNTER
11/18/20 Per verbal order with Dr Hannon  Please see if IBETH/DCCV can be moved up. If pt having IBETH/DCCV no need to wait until INR therapeutic. Spoke with scheduling and spot placed on hold in [facility] on Monday 11/23. Pt will need to reschedule COVID test for Friday 11/20 ( can call [phone]) .  Attempted to call pt but reached KENDALL AMADOR and requested callback. Bran 210 pm

## 2020-11-19 ENCOUNTER — ANTICOAGULATION THERAPY VISIT (OUTPATIENT)
Dept: NURSING | Facility: CLINIC | Age: 70
End: 2020-11-19

## 2020-11-19 DIAGNOSIS — Z79.01 LONG TERM CURRENT USE OF ANTICOAGULANT THERAPY: ICD-10-CM

## 2020-11-19 DIAGNOSIS — Z79.01 LONG TERM CURRENT USE OF ANTICOAGULANTS WITH INR GOAL OF 2.0-3.0: ICD-10-CM

## 2020-11-19 DIAGNOSIS — Z11.59 ENCOUNTER FOR SCREENING FOR OTHER VIRAL DISEASES: Primary | ICD-10-CM

## 2020-11-19 DIAGNOSIS — I48.0 PAROXYSMAL ATRIAL FIBRILLATION (H): ICD-10-CM

## 2020-11-19 DIAGNOSIS — I48.92 ATRIAL FLUTTER (H): ICD-10-CM

## 2020-11-19 LAB
CAPILLARY BLOOD COLLECTION: NORMAL
INR PPP: 2.8 (ref 0.86–1.14)

## 2020-11-19 PROCEDURE — 85610 PROTHROMBIN TIME: CPT | Performed by: INTERNAL MEDICINE

## 2020-11-19 PROCEDURE — 36416 COLLJ CAPILLARY BLOOD SPEC: CPT | Performed by: INTERNAL MEDICINE

## 2020-11-19 NOTE — TELEPHONE ENCOUNTER
Patient called to update us that procedure has been changed to 11/23.   H&P to be completed tomorrow 11/20 and COVID test as well.  Patient aware he is to quarantine after COVID test.  DIO Schrader

## 2020-11-19 NOTE — PROGRESS NOTES
ANTICOAGULATION MANAGEMENT     Patient Name:  Kevan Connelly  Date:  11/19/2020    ASSESSMENT /SUBJECTIVE:    Today's INR result of 2.8 is therapeutic. Goal INR of 2.0-3.0      Warfarin dose taken: Warfarin taken as instructed    Diet: No new diet changes affecting INR    Medication changes/ interactions: No new medications/supplements affecting INR    Previous INR: Subtherapeutic     S/S of bleeding or thromboembolism: No    New injury or illness: No    Upcoming surgery, procedure or cardioversion: Yes: cardioversion on 11/23    Additional findings: patient states that cardiology may start pt on Amiodarone again after cardioversion, he will call back to the clinic if he is started on this and may  Need to change INR recheck date at that point.      PLAN:    Telephone call with Kevan regarding INR result and instructed:     Warfarin Dosing Instructions: Continue your current warfarin dose 5mg daily     Instructed patient to follow up no later than: 2 weeks  Lab visit scheduled    Education provided: Please call back if any changes to your diet, medications or how you've been taking warfarin      Keron verbalizes understanding and agrees to warfarin dosing plan.    Instructed to call the Anticoagulation Clinic for any changes, questions or concerns. (#[phone])        Leah Aguilar RN      OBJECTIVE:  Recent labs: (last 7 days)     11/12/20  1512 11/13/20  1549 11/19/20  0937   INR 1.70* 1.90* 2.80*         INR assessment THER    Recheck INR In: 2 WEEKS    INR Location Clinic      Anticoagulation Summary  As of 11/19/2020    INR goal:  2.0-3.0   TTR:  83.8 % (1 y)   INR used for dosing:  No new INR was available at the time of this encounter.   Warfarin maintenance plan:  5 mg (5 mg x 1) every day   Full warfarin instructions:  5 mg every day   Weekly warfarin total:  35 mg   No change documented:  Leah Aguilar RN   Plan last modified:  Kaylee Ramey RN (11/12/2020)   Next INR check:   12/4/2020   Priority:  Maintenance   Target end date:  Indefinite    Indications    Long term current use of anticoagulant therapy [Z79.01]  Paroxysmal atrial fibrillation (H) [I48.0]  Long term current use of anticoagulants with INR goal of 2.0-3.0 [Z79.01]             Anticoagulation Episode Summary     INR check location:  Anticoagulation Clinic    Preferred lab:      Send INR reminders to:  CONNIE HOBBS    Comments:  5mg tabs / DCCV 7/17/19 & 1/28/20 / early morning appointments on Wed or Fri      Anticoagulation Care Providers     Provider Role Specialty Phone number    Davina Reaves MD Referring Internal Medicine [phone]

## 2020-11-20 ENCOUNTER — OFFICE VISIT (OUTPATIENT)
Dept: CARDIOLOGY | Facility: CLINIC | Age: 70
End: 2020-11-20
Payer: COMMERCIAL

## 2020-11-20 ENCOUNTER — AMBULATORY - HEALTHEAST (OUTPATIENT)
Dept: LAB | Facility: CLINIC | Age: 70
End: 2020-11-20

## 2020-11-20 VITALS
WEIGHT: 279 LBS | HEIGHT: 76 IN | HEART RATE: 142 BPM | DIASTOLIC BLOOD PRESSURE: 87 MMHG | BODY MASS INDEX: 33.97 KG/M2 | SYSTOLIC BLOOD PRESSURE: 122 MMHG

## 2020-11-20 DIAGNOSIS — I10 BENIGN ESSENTIAL HYPERTENSION: ICD-10-CM

## 2020-11-20 DIAGNOSIS — Z11.59 ENCOUNTER FOR SCREENING FOR OTHER VIRAL DISEASES: ICD-10-CM

## 2020-11-20 DIAGNOSIS — I48.19 PERSISTENT ATRIAL FIBRILLATION (H): Primary | ICD-10-CM

## 2020-11-20 DIAGNOSIS — I48.0 PAROXYSMAL ATRIAL FIBRILLATION (H): ICD-10-CM

## 2020-11-20 PROCEDURE — 99214 OFFICE O/P EST MOD 30 MIN: CPT | Performed by: NURSE PRACTITIONER

## 2020-11-20 PROCEDURE — U0003 INFECTIOUS AGENT DETECTION BY NUCLEIC ACID (DNA OR RNA); SEVERE ACUTE RESPIRATORY SYNDROME CORONAVIRUS 2 (SARS-COV-2) (CORONAVIRUS DISEASE [COVID-19]), AMPLIFIED PROBE TECHNIQUE, MAKING USE OF HIGH THROUGHPUT TECHNOLOGIES AS DESCRIBED BY CMS-2020-01-R: HCPCS | Performed by: INTERNAL MEDICINE

## 2020-11-20 RX ORDER — METOPROLOL SUCCINATE 50 MG/1
50 TABLET, EXTENDED RELEASE ORAL 3 TIMES DAILY
Qty: 90 TABLET | Refills: 3 | Status: SHIPPED | OUTPATIENT
Start: 2020-11-20 | End: 2020-11-24

## 2020-11-20 RX ORDER — METOPROLOL SUCCINATE 50 MG/1
50 TABLET, EXTENDED RELEASE ORAL 2 TIMES DAILY
Start: 2020-11-23 | End: 2020-11-25

## 2020-11-20 RX ORDER — FLECAINIDE ACETATE 150 MG/1
150 TABLET ORAL 2 TIMES DAILY
Start: 2020-11-23 | End: 2021-03-31

## 2020-11-20 ASSESSMENT — MIFFLIN-ST. JEOR: SCORE: 2137.04

## 2020-11-20 NOTE — PROGRESS NOTES
HPI and Plan: [id number]  See dictation    Orders Placed This Encounter   Procedures     Follow-Up with Electrophysiologist     EKG 12-lead complete w/read - Clinics (to be scheduled)     EKG 12-lead complete w/read - Clinics (to be scheduled)       Orders Placed This Encounter   Medications     flecainide (TAMBOCOR) 150 MG tablet     Sig: Take 1 tablet (150 mg) by mouth 2 times daily     metoprolol succinate ER (TOPROL-XL) 50 MG 24 hr tablet     Sig: Take 1 tablet (50 mg) by mouth 2 times daily     metoprolol succinate ER (TOPROL-XL) 50 MG 24 hr tablet     Sig: Take 1 tablet (50 mg) by mouth 3 times daily     Dispense:  90 tablet     Refill:  3       Medications Discontinued During This Encounter   Medication Reason     metoprolol succinate ER (TOPROL-XL) 50 MG 24 hr tablet          Encounter Diagnoses   Name Primary?     Persistent atrial fibrillation (H) Yes     Benign essential hypertension        CURRENT MEDICATIONS:  Current Outpatient Medications   Medication Sig Dispense Refill     acetaminophen (TYLENOL) 500 MG tablet Take 500 mg by mouth every 6 hours as needed for mild pain       atorvastatin (LIPITOR) 20 MG tablet Take 1 tablet (20 mg) by mouth daily 90 tablet 3     [START ON 11/23/2020] flecainide (TAMBOCOR) 150 MG tablet Take 1 tablet (150 mg) by mouth 2 times daily       losartan (COZAAR) 50 MG tablet Take 1 tablet (50 mg) by mouth daily 90 tablet 3     melatonin 3 MG CAPS Take 3 mg by mouth At Bedtime       [START ON 11/23/2020] metoprolol succinate ER (TOPROL-XL) 50 MG 24 hr tablet Take 1 tablet (50 mg) by mouth 2 times daily       metoprolol succinate ER (TOPROL-XL) 50 MG 24 hr tablet Take 1 tablet (50 mg) by mouth 3 times daily 90 tablet 3     sildenafil (REVATIO) 20 MG tablet Take 1 tablet (20 mg) by mouth daily as needed 30 tablet 3     warfarin ANTICOAGULANT (COUMADIN) 5 MG tablet TAKE 1/2 TABLET (2.5MG) BY MOUTH ON FRIDAYS; TAKE 1 TABLET (5MG) ALL OTHER DAYS OR AS DIRECTED BY INR CLINIC 90 tablet  0       ALLERGIES     Allergies   Allergen Reactions     Lisinopril Cough     Very persistent cough       PAST MEDICAL HISTORY:  Past Medical History:   Diagnosis Date     Atrial flutter (H)      Benign essential hypertension 1/18/2017    past use of ACE- Cough;  Had been on ARB-diuretic but experienced lower bp readings;  BLOOD PRESSURE now well controlled on ARB also no longer on diuretic; no low bp readings; may have had slight edema initially but has normalized with bp well controlled on ARB alone.      Cancer (H)     testicular cancer     CHF (congestive heart failure) (H) 6/5/2019     Long term current use of anticoagulant therapy 6/3/2019     Obesity (BMI 35.0-39.9) with comorbidity (H) 2/11/2019     Paroxysmal atrial fibrillation (H) 06/03/2019       PAST SURGICAL HISTORY:  Past Surgical History:   Procedure Laterality Date     ANESTHESIA CARDIOVERSION N/A 6/26/2019    Procedure: ANESTHESIA, FOR CARDIOVERSION DR. LE;  Surgeon: GENERIC ANESTHESIA PROVIDER;  Location:  OR     ANESTHESIA CARDIOVERSION N/A 7/17/2019    Procedure: ANESTHESIA, FOR CARDIOVERSION;  Surgeon: GENERIC ANESTHESIA PROVIDER;  Location:  OR     ANESTHESIA CARDIOVERSION N/A 1/28/2020    Procedure: ANESTHESIA, FOR CARDIOVERSION;  Surgeon: GENERIC ANESTHESIA PROVIDER;  Location:  OR     ANESTHESIA CARDIOVERSION N/A 9/8/2020    Procedure: ANESTHESIA, FOR CARDIOVERSION;  Surgeon: GENERIC ANESTHESIA PROVIDER;  Location:  OR     CLOSED REDUCTION SHOULDER Left      EP ABLATION FOCAL AFIB N/A 1/3/2020    Procedure: EP Ablation Focal AFIB;  Surgeon: Lamont Hannon MD;  Location:  HE[facility]ARD[facility]ATH LAB     GENITOURINARY SURGERY  1990    testicular cancer     ORTHOPEDIC SURGERY  1970's    right knee surgery        FAMILY HISTORY:  Family History   Problem Relation Age of Onset     Colon Cancer Father      Coronary Artery Disease Maternal Grandmother        SOCIAL HISTORY:  Social History     Socioeconomic History     Marital  "status:      Spouse name: None     Number of children: None     Years of education: None     Highest education level: None   Occupational History     None   Social Needs     Financial resource strain: None     Food insecurity     Worry: None     Inability: None     Transportation needs     Medical: None     Non-medical: None   Tobacco Use     Smoking status: Never Smoker     Smokeless tobacco: Never Used   Substance and Sexual Activity     Alcohol use: Yes     Comment: every 1-2 months     Drug use: No     Sexual activity: Yes     Partners: Female   Lifestyle     Physical activity     Days per week: None     Minutes per session: None     Stress: None   Relationships     Social connections     Talks on phone: None     Gets together: None     Attends Anabaptism service: None     Active member of club or organization: None     Attends meetings of clubs or organizations: None     Relationship status: None     Intimate partner violence     Fear of current or ex partner: None     Emotionally abused: None     Physically abused: None     Forced sexual activity: None   Other Topics Concern     Parent/sibling w/ CABG, MI or angioplasty before 65F 55M? Yes   Social History Narrative     None       Review of Systems:  Skin:  Positive for   Righ lower leg ulcers present 6-7 months now starting to blead an weep   Eyes:  Positive for glasses reading glasses  ENT:  Negative      Respiratory:  Positive for dyspnea on exertion     Cardiovascular:    Positive for;palpitations;edema edema as of today  Gastroenterology: Negative      Genitourinary:  Negative      Musculoskeletal:  Negative      Neurologic:  Negative      Psychiatric:  Negative      Heme/Lymph/Imm:  Negative      Endocrine:  Negative        Physical Exam:  Vitals: /87   Pulse 142   Ht 1.93 m (6' 4\")   Wt 126.6 kg (279 lb)   BMI 33.96 kg/m      Constitutional:  cooperative, alert and oriented, well developed, well nourished, in no acute distress    "     Skin:  warm and dry to the touch          Head:  normocephalic, no masses or lesions        Eyes:  pupils equal and round        ENT:  no pallor or cyanosis, dentition good        Neck:  JVP normal        Respiratory:  normal breath sounds, clear to auscultation, normal A-P diameter, normal symmetry, normal respiratory excursion, no use of accessory muscles         Cardiac: no murmurs, gallops or rubs detected irregularly irregular rhythm;tachycardic              not assessed this visit                                        GI:  abdomen soft obese      Extremities and Muscular Skeletal:  no deformities, clubbing, cyanosis, erythema observed;no edema              Neurological:  no gross motor deficits        Psych:  Alert and Oriented x 3        CC  No referring provider defined for this encounter.

## 2020-11-20 NOTE — LETTER
11/20/2020    Davina Reaves MD  [id number] [place] [address]    RE: Kevan Connelly       Dear Colleague,    I had the pleasure of seeing Kevan Connelly in the [facility].    HPI and Plan: [id number]  See dictation    Orders Placed This Encounter   Procedures     Follow-Up with Electrophysiologist     EKG 12-lead complete w/read - Clinics (to be scheduled)     EKG 12-lead complete w/read - Clinics (to be scheduled)       Orders Placed This Encounter   Medications     flecainide (TAMBOCOR) 150 MG tablet     Sig: Take 1 tablet (150 mg) by mouth 2 times daily     metoprolol succinate ER (TOPROL-XL) 50 MG 24 hr tablet     Sig: Take 1 tablet (50 mg) by mouth 2 times daily     metoprolol succinate ER (TOPROL-XL) 50 MG 24 hr tablet     Sig: Take 1 tablet (50 mg) by mouth 3 times daily     Dispense:  90 tablet     Refill:  3       Medications Discontinued During This Encounter   Medication Reason     metoprolol succinate ER (TOPROL-XL) 50 MG 24 hr tablet          Encounter Diagnoses   Name Primary?     Persistent atrial fibrillation (H) Yes     Benign essential hypertension        CURRENT MEDICATIONS:  Current Outpatient Medications   Medication Sig Dispense Refill     acetaminophen (TYLENOL) 500 MG tablet Take 500 mg by mouth every 6 hours as needed for mild pain       atorvastatin (LIPITOR) 20 MG tablet Take 1 tablet (20 mg) by mouth daily 90 tablet 3     [START ON 11/23/2020] flecainide (TAMBOCOR) 150 MG tablet Take 1 tablet (150 mg) by mouth 2 times daily       losartan (COZAAR) 50 MG tablet Take 1 tablet (50 mg) by mouth daily 90 tablet 3     melatonin 3 MG CAPS Take 3 mg by mouth At Bedtime       [START ON 11/23/2020] metoprolol succinate ER (TOPROL-XL) 50 MG 24 hr tablet Take 1 tablet (50 mg) by mouth 2 times daily       metoprolol succinate ER (TOPROL-XL) 50 MG 24 hr tablet Take 1 tablet (50 mg) by mouth 3 times daily 90 tablet 3     sildenafil (REVATIO) 20 MG tablet  Take 1 tablet (20 mg) by mouth daily as needed 30 tablet 3     warfarin ANTICOAGULANT (COUMADIN) 5 MG tablet TAKE 1/2 TABLET (2.5MG) BY MOUTH ON FRIDAYS; TAKE 1 TABLET (5MG) ALL OTHER DAYS OR AS DIRECTED BY INR CLINIC 90 tablet 0       ALLERGIES     Allergies   Allergen Reactions     Lisinopril Cough     Very persistent cough       PAST MEDICAL HISTORY:  Past Medical History:   Diagnosis Date     Atrial flutter (H)      Benign essential hypertension 1/18/2017    past use of ACE- Cough;  Had been on ARB-diuretic but experienced lower bp readings;  BLOOD PRESSURE now well controlled on ARB also no longer on diuretic; no low bp readings; may have had slight edema initially but has normalized with bp well controlled on ARB alone.      Cancer (H)     testicular cancer     CHF (congestive heart failure) (H) 6/5/2019     Long term current use of anticoagulant therapy 6/3/2019     Obesity (BMI 35.0-39.9) with comorbidity (H) 2/11/2019     Paroxysmal atrial fibrillation (H) 06/03/2019       PAST SURGICAL HISTORY:  Past Surgical History:   Procedure Laterality Date     ANESTHESIA CARDIOVERSION N/A 6/26/2019    Procedure: ANESTHESIA, FOR CARDIOVERSION DR. LE;  Surgeon: GENERIC ANESTHESIA PROVIDER;  Location:  OR     ANESTHESIA CARDIOVERSION N/A 7/17/2019    Procedure: ANESTHESIA, FOR CARDIOVERSION;  Surgeon: GENERIC ANESTHESIA PROVIDER;  Location:  OR     ANESTHESIA CARDIOVERSION N/A 1/28/2020    Procedure: ANESTHESIA, FOR CARDIOVERSION;  Surgeon: GENERIC ANESTHESIA PROVIDER;  Location:  OR     ANESTHESIA CARDIOVERSION N/A 9/8/2020    Procedure: ANESTHESIA, FOR CARDIOVERSION;  Surgeon: GENERIC ANESTHESIA PROVIDER;  Location:  OR     CLOSED REDUCTION SHOULDER Left      EP ABLATION FOCAL AFIB N/A 1/3/2020    Procedure: EP Ablation Focal AFIB;  Surgeon: Lamont Hannon MD;  Location:  HEART CARDIAC CATH LAB     GENITOURINARY SURGERY  1990    testicular cancer     ORTHOPEDIC SURGERY  1970's    right knee  surgery        FAMILY HISTORY:  Family History   Problem Relation Age of Onset     Colon Cancer Father      Coronary Artery Disease Maternal Grandmother        SOCIAL HISTORY:  Social History     Socioeconomic History     Marital status:      Spouse name: None     Number of children: None     Years of education: None     Highest education level: None   Occupational History     None   Social Needs     Financial resource strain: None     Food insecurity     Worry: None     Inability: None     Transportation needs     Medical: None     Non-medical: None   Tobacco Use     Smoking status: Never Smoker     Smokeless tobacco: Never Used   Substance and Sexual Activity     Alcohol use: Yes     Comment: every 1-2 months     Drug use: No     Sexual activity: Yes     Partners: Female   Lifestyle     Physical activity     Days per week: None     Minutes per session: None     Stress: None   Relationships     Social connections     Talks on phone: None     Gets together: None     Attends Religion service: None     Active member of club or organization: None     Attends meetings of clubs or organizations: None     Relationship status: None     Intimate partner violence     Fear of current or ex partner: None     Emotionally abused: None     Physically abused: None     Forced sexual activity: None   Other Topics Concern     Parent/sibling w/ CABG, MI or angioplasty before 65F 55M? Yes   Social History Narrative     None       Review of Systems:  Skin:  Positive for   Righ lower leg ulcers present 6-7 months now starting to blead an weep   Eyes:  Positive for glasses reading glasses  ENT:  Negative      Respiratory:  Positive for dyspnea on exertion     Cardiovascular:    Positive for;palpitations;edema edema as of today  Gastroenterology: Negative      Genitourinary:  Negative      Musculoskeletal:  Negative      Neurologic:  Negative      Psychiatric:  Negative      Heme/Lymph/Imm:  Negative      Endocrine:  Negative     "    Physical Exam:  Vitals: /87   Pulse 142   Ht 1.93 m (6' 4\")   Wt 126.6 kg (279 lb)   BMI 33.96 kg/m      Constitutional:  cooperative, alert and oriented, well developed, well nourished, in no acute distress        Skin:  warm and dry to the touch          Head:  normocephalic, no masses or lesions        Eyes:  pupils equal and round        ENT:  no pallor or cyanosis, dentition good        Neck:  JVP normal        Respiratory:  normal breath sounds, clear to auscultation, normal A-P diameter, normal symmetry, normal respiratory excursion, no use of accessory muscles         Cardiac: no murmurs, gallops or rubs detected irregularly irregular rhythm;tachycardic              not assessed this visit                                        GI:  abdomen soft obese      Extremities and Muscular Skeletal:  no deformities, clubbing, cyanosis, erythema observed;no edema              Neurological:  no gross motor deficits        Psych:  Alert and Oriented x 3        CC  No referring provider defined for this encounter.                  Thank you for allowing me to participate in the care of your patient.      Sincerely,     Katiana Toledo, MIKY, APRN [facility]    cc:   No referring provider defined for this encounter.        "

## 2020-11-20 NOTE — PROGRESS NOTES
Service Date: 11/20/2020      HISTORY OF PRESENT ILLNESS:  Mr. Connelly is a delightful 70-year-old gentleman that I am having the pleasure of meeting today.  He is an established patient of Dr. Hannon.  He has also been followed by Hilda Wahl NP, at our [facility] location.  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  He underwent initially amiodarone, as well as cardioversion therapy.  Once his cardiac function normalized, amiodarone was switched to flecainide.  Unfortunately, he failed therapy with flecainide, having recurrence of atrial fibrillation.  On 01/03/2020, he underwent a pulmonary vein isolation ablation with Dr. Hannon.  After the ablation, he had a recurrence of atrial flutter.  Flecainide was then discontinued and he was started on amiodarone and underwent a successful cardioversion 01/28/2020.  After completing 3 months of recovery, amiodarone was discontinued and he was placed on beta blocker therapy only.      Unfortunately, in August, he presented with recurrence of atrial fibrillation on metoprolol, which was increased to 100 mg daily.  He was then initiated back on flecainide at 100 mg b.i.d.  He underwent a repeat cardioversion 09/08/2020.  He maintained rhythm for about 8 weeks and had a recurrence of his atrial fibrillation.  Unfortunately, his INR was subtherapeutic.  On 11/19, it was now back to a therapeutic level at 2.8.  The patient does have complaints of dyspnea, especially with exertion.  A IBETH and cardioversion is scheduled for Monday, 11/23.  Following our visit today, he is going to have COVID testing and knows he needs to shelter in place.      His heart rate varies on exam.  His pulse rate is in the 140s.  He states that he has been wearing his smart watch and he did show me the fluctuation of heart rate.  His metoprolol was increased to 50 mg t.i.d.  He states that he is tolerating  this.  He is also on losartan at 50 mg daily.      Limited echo in 2019 showed normal LV function at 50%-55%.  IBETH 01/2020 again showed normal LV function at 55%-60%.      A chest CT 01/2020 showed normal pulmonary vein anatomy.  Aortic root was mildly dilated.  Coronary calcification was noted in the LAD.  He also has scattered mediastinal  calcifications noted, suggestive of old granulomas.      Currently, he denies chest pain, orthopnea, PND, syncope or peripheral edema.  His weight is at 279 today.  He feels his weight is stable.  He has no complaints of edema.  When reviewing his weights over the past year, it appears he is up about 5-6 pounds.  All other review of systems and past medical history are noted below.      ASSESSMENT AND PLAN:  Mr. Connelly is a delightful 70-year-old gentleman that I am having the pleasure of meeting today.   1.  Persistent, symptomatic atrial fibrillation.    CHADS-VASc score is 3 (age, hypertension, heart failure).  Ejection fraction has normalized in the past with rhythm control.  His rates are quite fast.  He is scheduled on Monday to have a IBETH and cardioversion.  He does have some shortness of breath with exertion, but otherwise feels okay.  I have asked that he continue his metoprolol at 50 mg t.i.d.  I reviewed the case with Dr. Hannon.  Following the cardioversion on Monday, we will proceed with the reinitiation of flecainide at a higher dose, 150 mg b.i.d.  Metoprolol will go back to 50 mg b.i.d.  He will need to continue to closely watch his INRs.  They have been subtherapeutic most of this month.  An EKG will be completed 2 days after reinitiation due to the Thanksgiving holiday.  I would like to make sure his QRS is stable.  Last baseline QRS was 116 milliseconds.  I then asked that he follow up with Dr. Hannon in 2-3 weeks with another EKG to discuss long-term plans.  We briefly discussed different antiarrhythmic therapy.  The patient is not interested in being on  amiodarone long-term.  There was also consideration to have a repeat PVI or last case scenario AV janessa ablation and pacemaker implantation.      I did discuss doing a sleep study.  However, he does not want to use a CPAP.  He admits that this is mostly why he has not had a study in the past.  He has no complaints of daytime somnolence.  I explained that this could be an underlying cause for his recurrent atrial fibrillation.  It is something he can consider.     2.  History of cardiomyopathy.    EF went from 33% to 50%-55% post-rhythm control.  He has no heart failure symptoms at today's visit and appears euvolemic on exam.     3.  Hypertension, well-controlled with losartan and metoprolol.     4.  Nonocclusive coronary artery disease.    CT angiogram did show calcification of the LAD, circumflex and RCA.  He is on a statin.  Last LDL was noted to be 75.     5.  History of testicular cancer.      As always, I appreciate being involved in the care of this delightful gentleman.  We went over risks and benefits of his IBETH and cardioversion.  He has had both of these completed in the past and is very familiar.  He will be n.p.o. prior to the procedure.  I did ask that he take his medications with a sip of water.  He must arrange transportation home from the procedure.  Risks include but are not limited to:  Sore throat, dysphagia, esophageal injury secondary to probe placement, bradyarrhythmias.  Cardioversion risks include but are not limited to:  Superficial burn from defibrillation, cardiac arrhythmias, and stroke.  The patient understands and is willing to proceed.  This will be a same day procedure, barring any complications.  Again, EKG will be completed 2 days after reinitiation of flecainide at 150 mg b.i.d. and metoprolol will be reduced to 50 mg b.i.d.  Instructions were given to the patient today.      INR on 11/19 was 2.8.  His INRs were subtherapeutic earlier in the month.      As always, thank you for  including us in his care.  If you have questions or concerns, please feel free to contact us.         TROY AVITIA NP             D: 2020   T: 2020   MT: al      Name:     GILBERTO ANGEL   MRN:      [id number]        Account:      [id number]   :      1950           Service Date: 2020      Document: [id number]

## 2020-11-20 NOTE — LETTER
11/20/2020      Davina Reaves MD  [address]  [facility] [address]      RE: Kevan Connelly       Dear Colleague,    I had the pleasure of seeing Kevan Connelly in the [facility].    Service Date: 11/20/2020      HISTORY OF PRESENT ILLNESS:  Mr. Connelly is a delightful 70-year-old gentleman that I am having the pleasure of meeting today.  He is an established patient of Dr. Hannon.  He has also been followed by Hilda Wahl NP, at our [facility] location.  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  He underwent initially amiodarone, as well as cardioversion therapy.  Once his cardiac function normalized, amiodarone was switched to flecainide.  Unfortunately, he failed therapy with flecainide, having recurrence of atrial fibrillation.  On 01/03/2020, he underwent a pulmonary vein isolation ablation with Dr. Hannon.  After the ablation, he had a recurrence of atrial flutter.  Flecainide was then discontinued and he was started on amiodarone and underwent a successful cardioversion 01/28/2020.  After completing 3 months of recovery, amiodarone was discontinued and he was placed on beta blocker therapy only.      Unfortunately, in August, he presented with recurrence of atrial fibrillation on metoprolol, which was increased to 100 mg daily.  He was then initiated back on flecainide at 100 mg b.i.d.  He underwent a repeat cardioversion 09/08/2020.  He maintained rhythm for about 8 weeks and had a recurrence of his atrial fibrillation.  Unfortunately, his INR was subtherapeutic.  On 11/19, it was now back to a therapeutic level at 2.8.  The patient does have complaints of dyspnea, especially with exertion.  A IBETH and cardioversion is scheduled for Monday, 11/23.  Following our visit today, he is going to have COVID testing and knows he needs to shelter in place.      His heart rate  varies on exam.  His pulse rate is in the 140s.  He states that he has been wearing his smart watch and he did show me the fluctuation of heart rate.  His metoprolol was increased to 50 mg t.i.d.  He states that he is tolerating this.  He is also on losartan at 50 mg daily.      Limited echo in 2019 showed normal LV function at 50%-55%.  IBETH 01/2020 again showed normal LV function at 55%-60%.      A chest CT 01/2020 showed normal pulmonary vein anatomy.  Aortic root was mildly dilated.  Coronary calcification was noted in the LAD.  He also has scattered mediastinal right healed*** area of calcifications noted, suggestive of old granulomas.      Currently, he denies chest pain, orthopnea, PND, syncope or peripheral edema.  His weight is at 279 today.  He feels his weight is stable.  He has no complaints of edema.  When reviewing his weights over the past year, it appears he is up about 5-6 pounds.  All other review of systems and past medical history are noted below.      ASSESSMENT AND PLAN:  Mr. Conenlly is a delightful 70-year-old gentleman that I am having the pleasure of meeting today.   1.  Persistent, symptomatic atrial fibrillation.  CHADS-VASc score is 3 (age, hypertension, heart failure).  Ejection fraction has normalized in the past with rhythm control.  His rates are quite fast.  He is scheduled on Monday to have a IBETH and cardioversion.  He does have some shortness of breath with exertion, but otherwise feels okay.  I have asked that he continue his metoprolol at 50 mg t.i.d.  I reviewed the case with Dr. Hannon.  Following the cardioversion on Monday, we will proceed with the reinitiation of flecainide at a higher dose, 150 mg b.i.d.  Metoprolol will go back to 50 mg b.i.d.  He will need to continue to closely watch his INRs.  They have been subtherapeutic most of this month.  An EKG will be completed 2 days after reinitiation due to the Thanksgiving holiday.  I would like to make sure his QRS is stable.   Last baseline QRS was 116 milliseconds.  I then asked that he follow up with Dr. Hannon in 2-3 weeks with another EKG to discuss long-term plans.  We briefly discussed different antiarrhythmic therapy.  The patient is not interested in being on amiodarone long-term.  There was also consideration to have a repeat PVI or last case scenario AV janessa ablation and pacemaker implantation.      I did discuss doing a sleep study.  However, he does not want to use a CPAP.  He admits that this is mostly why he has not had a study in the past.  He has no complaints of daytime somnolence.  I explained that this could be an underlying cause for his recurrent atrial fibrillation.  It is something he can consider.   2.  History of cardiomyopathy.  EF went from 33% to 50%-55% post-rhythm control.  He has no heart failure symptoms at today's visit and appears euvolemic on exam.   3.  Hypertension, well-controlled with losartan and metoprolol.   4.  Nonocclusive coronary artery disease.  CT angiogram did show calcification of the LAD, circumflex and RCA.  He is on a statin.  Last LDL was noted to be 75.   5.  History of testicular cancer.      As always, I appreciate being involved in the care of this delightful gentleman.  We went over risks and benefits of his IBETH and cardioversion.  He has had both of these completed in the past and is very familiar.  He will be n.p.o. prior to the procedure.  I did ask that he take his medications with a sip of water.  He must arrange transportation home from the procedure.  Risks include but are not limited to:  Sore throat, dysphagia, esophageal injury secondary to probe placement, bradyarrhythmias.  Cardioversion risks include but are not limited to:  Superficial burn from defibrillation, cardiac arrhythmias, and stroke.  The patient understands and is willing to proceed.  This will be a same day procedure, barring any complications.  Again, EKG will be completed 2 days after reinitiation of  flecainide at 150 mg b.i.d. and metoprolol will be reduced to 50 mg b.i.d.  Instructions were given to the patient today.      INR on  was 2.8.  His INRs were subtherapeutic earlier in the month.      As always, thank you for including us in his care.  If you have questions or concerns, please feel free to contact us.         KATIANA TOLEDO NP             D: 2020   T: 2020   MT: al      Name:     GILBERTO ANGEL   MRN:      [id number]        Account:      [id number]   :      1950           Service Date: 2020      Document: [id number]          Outpatient Encounter Medications as of 2020   Medication Sig Dispense Refill     acetaminophen (TYLENOL) 500 MG tablet Take 500 mg by mouth every 6 hours as needed for mild pain       atorvastatin (LIPITOR) 20 MG tablet Take 1 tablet (20 mg) by mouth daily 90 tablet 3     flecainide (TAMBOCOR) 150 MG tablet Take 1 tablet (150 mg) by mouth 2 times daily       losartan (COZAAR) 50 MG tablet Take 1 tablet (50 mg) by mouth daily 90 tablet 3     melatonin 3 MG CAPS Take 3 mg by mouth At Bedtime       metoprolol succinate ER (TOPROL-XL) 50 MG 24 hr tablet Take 1 tablet (50 mg) by mouth 2 times daily       sildenafil (REVATIO) 20 MG tablet Take 1 tablet (20 mg) by mouth daily as needed 30 tablet 3     warfarin ANTICOAGULANT (COUMADIN) 5 MG tablet TAKE 1/2 TABLET (2.5MG) BY MOUTH ON ; TAKE 1 TABLET (5MG) ALL OTHER DAYS OR AS DIRECTED BY INR CLINIC 90 tablet 0     [DISCONTINUED] metoprolol succinate ER (TOPROL-XL) 50 MG 24 hr tablet Take 1 tablet (50 mg) by mouth 3 times daily 90 tablet 3     [DISCONTINUED] metoprolol succinate ER (TOPROL-XL) 50 MG 24 hr tablet Take 1 tablet (50 mg) by mouth 3 times daily 90 tablet 3     No facility-administered encounter medications on file as of 2020.        Again, thank you for allowing me to participate in the care of your patient.      Sincerely,    Katiana Toledo NP, APRN CNP     [facility]

## 2020-11-20 NOTE — LETTER
11/20/2020      Davina Reaves MD  [address]  [facility] [address]      RE: Kevan Connelly       Dear Colleague,    I had the pleasure of seeing Kevan Connelly in the [facility].    Service Date: 11/20/2020      HISTORY OF PRESENT ILLNESS:  Mr. Connelly is a delightful 70-year-old gentleman that I am having the pleasure of meeting today.  He is an established patient of Dr. Hannon.  He has also been followed by Hilda Wahl NP, at our [facility] location.  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  He underwent initially amiodarone, as well as cardioversion therapy.  Once his cardiac function normalized, amiodarone was switched to flecainide.  Unfortunately, he failed therapy with flecainide, having recurrence of atrial fibrillation.  On 01/03/2020, he underwent a pulmonary vein isolation ablation with Dr. Hannon.  After the ablation, he had a recurrence of atrial flutter.  Flecainide was then discontinued and he was started on amiodarone and underwent a successful cardioversion 01/28/2020.  After completing 3 months of recovery, amiodarone was discontinued and he was placed on beta blocker therapy only.      Unfortunately, in August, he presented with recurrence of atrial fibrillation on metoprolol, which was increased to 100 mg daily.  He was then initiated back on flecainide at 100 mg b.i.d.  He underwent a repeat cardioversion 09/08/2020.  He maintained rhythm for about 8 weeks and had a recurrence of his atrial fibrillation.  Unfortunately, his INR was subtherapeutic.  On 11/19, it was now back to a therapeutic level at 2.8.  The patient does have complaints of dyspnea, especially with exertion.  A IBETH and cardioversion is scheduled for Monday, 11/23.  Following our visit today, he is going to have COVID testing and knows he needs to shelter in place.      His heart rate  varies on exam.  His pulse rate is in the 140s.  He states that he has been wearing his smart watch and he did show me the fluctuation of heart rate.  His metoprolol was increased to 50 mg t.i.d.  He states that he is tolerating this.  He is also on losartan at 50 mg daily.      Limited echo in 2019 showed normal LV function at 50%-55%.  IBETH 01/2020 again showed normal LV function at 55%-60%.      A chest CT 01/2020 showed normal pulmonary vein anatomy.  Aortic root was mildly dilated.  Coronary calcification was noted in the LAD.  He also has scattered mediastinal right healed*** area of calcifications noted, suggestive of old granulomas.      Currently, he denies chest pain, orthopnea, PND, syncope or peripheral edema.  His weight is at 279 today.  He feels his weight is stable.  He has no complaints of edema.  When reviewing his weights over the past year, it appears he is up about 5-6 pounds.  All other review of systems and past medical history are noted below.      ASSESSMENT AND PLAN:  Mr. Connelly is a delightful 70-year-old gentleman that I am having the pleasure of meeting today.   1.  Persistent, symptomatic atrial fibrillation.  CHADS-VASc score is 3 (age, hypertension, heart failure).  Ejection fraction has normalized in the past with rhythm control.  His rates are quite fast.  He is scheduled on Monday to have a IBETH and cardioversion.  He does have some shortness of breath with exertion, but otherwise feels okay.  I have asked that he continue his metoprolol at 50 mg t.i.d.  I reviewed the case with Dr. Hannon.  Following the cardioversion on Monday, we will proceed with the reinitiation of flecainide at a higher dose, 150 mg b.i.d.  Metoprolol will go back to 50 mg b.i.d.  He will need to continue to closely watch his INRs.  They have been subtherapeutic most of this month.  An EKG will be completed 2 days after reinitiation due to the Thanksgiving holiday.  I would like to make sure his QRS is stable.   Last baseline QRS was 116 milliseconds.  I then asked that he follow up with Dr. Hannon in 2-3 weeks with another EKG to discuss long-term plans.  We briefly discussed different antiarrhythmic therapy.  The patient is not interested in being on amiodarone long-term.  There was also consideration to have a repeat PVI or last case scenario AV janessa ablation and pacemaker implantation.      I did discuss doing a sleep study.  However, he does not want to use a CPAP.  He admits that this is mostly why he has not had a study in the past.  He has no complaints of daytime somnolence.  I explained that this could be an underlying cause for his recurrent atrial fibrillation.  It is something he can consider.   2.  History of cardiomyopathy.  EF went from 33% to 50%-55% post-rhythm control.  He has no heart failure symptoms at today's visit and appears euvolemic on exam.   3.  Hypertension, well-controlled with losartan and metoprolol.   4.  Nonocclusive coronary artery disease.  CT angiogram did show calcification of the LAD, circumflex and RCA.  He is on a statin.  Last LDL was noted to be 75.   5.  History of testicular cancer.      As always, I appreciate being involved in the care of this delightful gentleman.  We went over risks and benefits of his IBETH and cardioversion.  He has had both of these completed in the past and is very familiar.  He will be n.p.o. prior to the procedure.  I did ask that he take his medications with a sip of water.  He must arrange transportation home from the procedure.  Risks include but are not limited to:  Sore throat, dysphagia, esophageal injury secondary to probe placement, bradyarrhythmias.  Cardioversion risks include but are not limited to:  Superficial burn from defibrillation, cardiac arrhythmias, and stroke.  The patient understands and is willing to proceed.  This will be a same day procedure, barring any complications.  Again, EKG will be completed 2 days after reinitiation of  flecainide at 150 mg b.i.d. and metoprolol will be reduced to 50 mg b.i.d.  Instructions were given to the patient today.      INR on  was 2.8.  His INRs were subtherapeutic earlier in the month.      As always, thank you for including us in his care.  If you have questions or concerns, please feel free to contact us.         KATIANA TOLEDO NP             D: 2020   T: 2020   MT: al      Name:     GILBERTO ANGEL   MRN:      [id number]        Account:      [id number]   :      1950           Service Date: 2020      Document: [id number]          Outpatient Encounter Medications as of 2020   Medication Sig Dispense Refill     acetaminophen (TYLENOL) 500 MG tablet Take 500 mg by mouth every 6 hours as needed for mild pain       atorvastatin (LIPITOR) 20 MG tablet Take 1 tablet (20 mg) by mouth daily 90 tablet 3     flecainide (TAMBOCOR) 150 MG tablet Take 1 tablet (150 mg) by mouth 2 times daily       losartan (COZAAR) 50 MG tablet Take 1 tablet (50 mg) by mouth daily 90 tablet 3     melatonin 3 MG CAPS Take 3 mg by mouth At Bedtime       sildenafil (REVATIO) 20 MG tablet Take 1 tablet (20 mg) by mouth daily as needed 30 tablet 3     warfarin ANTICOAGULANT (COUMADIN) 5 MG tablet TAKE 1/2 TABLET (2.5MG) BY MOUTH ON ; TAKE 1 TABLET (5MG) ALL OTHER DAYS OR AS DIRECTED BY INR CLINIC 90 tablet 0     [DISCONTINUED] metoprolol succinate ER (TOPROL-XL) 50 MG 24 hr tablet Take 1 tablet (50 mg) by mouth 2 times daily       [DISCONTINUED] metoprolol succinate ER (TOPROL-XL) 50 MG 24 hr tablet Take 1 tablet (50 mg) by mouth 3 times daily 90 tablet 3     [DISCONTINUED] metoprolol succinate ER (TOPROL-XL) 50 MG 24 hr tablet Take 1 tablet (50 mg) by mouth 3 times daily 90 tablet 3     No facility-administered encounter medications on file as of 2020.        Again, thank you for allowing me to participate in the care of your patient.      Sincerely,    Katiana Toledo NP, APRN CNP      [facility]

## 2020-11-20 NOTE — PATIENT INSTRUCTIONS
Call my nurse with any questions or concerns:  [phone]  *If you have concerns after hours, please call [phone], option 2 to speak with on call Cardiologist.    1. Medication changes from today:    After the cardioversion restart Flecainide 150 mg twice daily   Decrease metoprolol after the cardioversion 50 mg twice daily  EKG 3-4 days later at [facility]

## 2020-11-21 LAB
SARS-COV-2 RNA SPEC QL NAA+PROBE: NOT DETECTED
SPECIMEN SOURCE: NORMAL

## 2020-11-22 ENCOUNTER — ANESTHESIA EVENT (OUTPATIENT)
Dept: SURGERY | Facility: CLINIC | Age: 70
End: 2020-11-22
Payer: COMMERCIAL

## 2020-11-23 ENCOUNTER — APPOINTMENT (OUTPATIENT)
Dept: CARDIOLOGY | Facility: CLINIC | Age: 70
End: 2020-11-23
Attending: INTERNAL MEDICINE
Payer: COMMERCIAL

## 2020-11-23 ENCOUNTER — HOSPITAL ENCOUNTER (OUTPATIENT)
Facility: CLINIC | Age: 70
Discharge: HOME | End: 2020-11-23
Attending: INTERNAL MEDICINE | Admitting: INTERNAL MEDICINE
Payer: COMMERCIAL

## 2020-11-23 ENCOUNTER — ANESTHESIA (OUTPATIENT)
Dept: SURGERY | Facility: CLINIC | Age: 70
End: 2020-11-23
Payer: COMMERCIAL

## 2020-11-23 VITALS
OXYGEN SATURATION: 98 % | RESPIRATION RATE: 13 BRPM | HEART RATE: 68 BPM | DIASTOLIC BLOOD PRESSURE: 82 MMHG | SYSTOLIC BLOOD PRESSURE: 116 MMHG

## 2020-11-23 VITALS
OXYGEN SATURATION: 96 % | HEART RATE: 130 BPM | DIASTOLIC BLOOD PRESSURE: 100 MMHG | RESPIRATION RATE: 13 BRPM | SYSTOLIC BLOOD PRESSURE: 132 MMHG

## 2020-11-23 DIAGNOSIS — I48.0 PAROXYSMAL ATRIAL FIBRILLATION (H): ICD-10-CM

## 2020-11-23 LAB
MAGNESIUM SERPL-MCNC: 1.9 MG/DL (ref 1.6–2.3)
MAGNESIUM SERPL-MCNC: 2 MG/DL (ref 1.6–2.3)
POTASSIUM SERPL-SCNC: 4.3 MMOL/L (ref 3.4–5.3)
POTASSIUM SERPL-SCNC: NORMAL MMOL/L (ref 3.4–5.3)

## 2020-11-23 PROCEDURE — 99152 MOD SED SAME PHYS/QHP 5/>YRS: CPT | Performed by: INTERNAL MEDICINE

## 2020-11-23 PROCEDURE — 93320 DOPPLER ECHO COMPLETE: CPT | Mod: 26 | Performed by: INTERNAL MEDICINE

## 2020-11-23 PROCEDURE — 250N000011 HC RX IP 250 OP 636: Performed by: NURSE ANESTHETIST, CERTIFIED REGISTERED

## 2020-11-23 PROCEDURE — 250N000011 HC RX IP 250 OP 636: Performed by: INTERNAL MEDICINE

## 2020-11-23 PROCEDURE — 93010 ELECTROCARDIOGRAM REPORT: CPT | Performed by: INTERNAL MEDICINE

## 2020-11-23 PROCEDURE — 250N000009 HC RX 250: Performed by: INTERNAL MEDICINE

## 2020-11-23 PROCEDURE — 93325 DOPPLER ECHO COLOR FLOW MAPG: CPT | Mod: 26 | Performed by: INTERNAL MEDICINE

## 2020-11-23 PROCEDURE — 93320 DOPPLER ECHO COMPLETE: CPT

## 2020-11-23 PROCEDURE — 83735 ASSAY OF MAGNESIUM: CPT | Performed by: INTERNAL MEDICINE

## 2020-11-23 PROCEDURE — 84132 ASSAY OF SERUM POTASSIUM: CPT | Performed by: INTERNAL MEDICINE

## 2020-11-23 PROCEDURE — 93312 ECHO TRANSESOPHAGEAL: CPT | Mod: 26 | Performed by: INTERNAL MEDICINE

## 2020-11-23 PROCEDURE — 92960 CARDIOVERSION ELECTRIC EXT: CPT | Performed by: INTERNAL MEDICINE

## 2020-11-23 PROCEDURE — 92960 CARDIOVERSION ELECTRIC EXT: CPT

## 2020-11-23 PROCEDURE — 370N000001 HC ANESTHESIA TECHNICAL FEE, 1ST 30 MIN

## 2020-11-23 RX ORDER — PROPOFOL 10 MG/ML
INJECTION, EMULSION INTRAVENOUS PRN
Status: DISCONTINUED | OUTPATIENT
Start: 2020-11-23 | End: 2020-11-23

## 2020-11-23 RX ORDER — FLUMAZENIL 0.1 MG/ML
INJECTION, SOLUTION INTRAVENOUS
Status: DISCONTINUED
Start: 2020-11-23 | End: 2020-11-23 | Stop reason: WASHOUT

## 2020-11-23 RX ORDER — LIDOCAINE 50 MG/G
OINTMENT TOPICAL ONCE
Status: DISCONTINUED | OUTPATIENT
Start: 2020-11-23 | End: 2020-11-24 | Stop reason: HOSPADM

## 2020-11-23 RX ORDER — NALOXONE HYDROCHLORIDE 0.4 MG/ML
0.2 INJECTION, SOLUTION INTRAMUSCULAR; INTRAVENOUS; SUBCUTANEOUS
Status: DISCONTINUED | OUTPATIENT
Start: 2020-11-23 | End: 2020-11-24 | Stop reason: HOSPADM

## 2020-11-23 RX ORDER — DEXTROSE MONOHYDRATE 25 G/50ML
9.5 INJECTION, SOLUTION INTRAVENOUS
Status: DISCONTINUED | OUTPATIENT
Start: 2020-11-23 | End: 2020-11-24 | Stop reason: HOSPADM

## 2020-11-23 RX ORDER — FLUMAZENIL 0.1 MG/ML
0.2 INJECTION, SOLUTION INTRAVENOUS
Status: DISCONTINUED | OUTPATIENT
Start: 2020-11-23 | End: 2020-11-24 | Stop reason: HOSPADM

## 2020-11-23 RX ORDER — FENTANYL CITRATE 50 UG/ML
25 INJECTION, SOLUTION INTRAMUSCULAR; INTRAVENOUS
Status: DISCONTINUED | OUTPATIENT
Start: 2020-11-23 | End: 2020-11-24 | Stop reason: HOSPADM

## 2020-11-23 RX ORDER — LIDOCAINE HYDROCHLORIDE 40 MG/ML
1.5 SOLUTION TOPICAL ONCE
Status: DISCONTINUED | OUTPATIENT
Start: 2020-11-23 | End: 2020-11-24 | Stop reason: HOSPADM

## 2020-11-23 RX ORDER — MAGNESIUM SULFATE HEPTAHYDRATE 40 MG/ML
2 INJECTION, SOLUTION INTRAVENOUS
Status: DISCONTINUED | OUTPATIENT
Start: 2020-11-23 | End: 2020-11-24 | Stop reason: HOSPADM

## 2020-11-23 RX ORDER — GLYCOPYRROLATE 0.2 MG/ML
INJECTION INTRAMUSCULAR; INTRAVENOUS
Status: DISCONTINUED
Start: 2020-11-23 | End: 2020-11-23 | Stop reason: HOSPADM

## 2020-11-23 RX ORDER — METOPROLOL TARTRATE 1 MG/ML
2.5-5 INJECTION, SOLUTION INTRAVENOUS
Status: DISCONTINUED | OUTPATIENT
Start: 2020-11-23 | End: 2020-11-24 | Stop reason: HOSPADM

## 2020-11-23 RX ORDER — POTASSIUM CHLORIDE 1500 MG/1
20 TABLET, EXTENDED RELEASE ORAL
Status: DISCONTINUED | OUTPATIENT
Start: 2020-11-23 | End: 2020-11-24 | Stop reason: HOSPADM

## 2020-11-23 RX ORDER — POTASSIUM CHLORIDE 1500 MG/1
40 TABLET, EXTENDED RELEASE ORAL
Status: DISCONTINUED | OUTPATIENT
Start: 2020-11-23 | End: 2020-11-24 | Stop reason: HOSPADM

## 2020-11-23 RX ORDER — NALOXONE HYDROCHLORIDE 0.4 MG/ML
0.4 INJECTION, SOLUTION INTRAMUSCULAR; INTRAVENOUS; SUBCUTANEOUS
Status: DISCONTINUED | OUTPATIENT
Start: 2020-11-23 | End: 2020-11-24 | Stop reason: HOSPADM

## 2020-11-23 RX ORDER — LIDOCAINE HYDROCHLORIDE 20 MG/ML
SOLUTION OROPHARYNGEAL PRN
Status: DISCONTINUED | OUTPATIENT
Start: 2020-11-23 | End: 2020-11-24 | Stop reason: HOSPADM

## 2020-11-23 RX ORDER — ATROPINE SULFATE 0.1 MG/ML
.5-1 INJECTION INTRAVENOUS
Status: DISCONTINUED | OUTPATIENT
Start: 2020-11-23 | End: 2020-11-24 | Stop reason: HOSPADM

## 2020-11-23 RX ORDER — GLYCOPYRROLATE 0.2 MG/ML
0.1 INJECTION, SOLUTION INTRAMUSCULAR; INTRAVENOUS ONCE
Status: COMPLETED | OUTPATIENT
Start: 2020-11-23 | End: 2020-11-23

## 2020-11-23 RX ORDER — SODIUM CHLORIDE 9 MG/ML
1000 INJECTION, SOLUTION INTRAVENOUS CONTINUOUS
Status: DISCONTINUED | OUTPATIENT
Start: 2020-11-23 | End: 2020-11-24 | Stop reason: HOSPADM

## 2020-11-23 RX ORDER — FENTANYL CITRATE 50 UG/ML
INJECTION, SOLUTION INTRAMUSCULAR; INTRAVENOUS
Status: DISCONTINUED
Start: 2020-11-23 | End: 2020-11-23 | Stop reason: HOSPADM

## 2020-11-23 RX ORDER — NALOXONE HYDROCHLORIDE 0.4 MG/ML
INJECTION, SOLUTION INTRAMUSCULAR; INTRAVENOUS; SUBCUTANEOUS
Status: DISCONTINUED
Start: 2020-11-23 | End: 2020-11-23 | Stop reason: WASHOUT

## 2020-11-23 RX ORDER — LIDOCAINE HYDROCHLORIDE 20 MG/ML
SOLUTION OROPHARYNGEAL
Status: DISCONTINUED
Start: 2020-11-23 | End: 2020-11-23 | Stop reason: HOSPADM

## 2020-11-23 RX ADMIN — MIDAZOLAM 0.5 MG: 1 INJECTION INTRAMUSCULAR; INTRAVENOUS at 11:39

## 2020-11-23 RX ADMIN — FENTANYL CITRATE 25 MCG: 50 INJECTION, SOLUTION INTRAMUSCULAR; INTRAVENOUS at 11:40

## 2020-11-23 RX ADMIN — GLYCOPYRROLATE 0.1 MG: 0.2 INJECTION, SOLUTION INTRAMUSCULAR; INTRAVENOUS at 11:18

## 2020-11-23 RX ADMIN — PROPOFOL 60 MG: 10 INJECTION, EMULSION INTRAVENOUS at 11:53

## 2020-11-23 RX ADMIN — LIDOCAINE HYDROCHLORIDE 30 ML: 20 SOLUTION ORAL; TOPICAL at 11:20

## 2020-11-23 RX ADMIN — MIDAZOLAM 1 MG: 1 INJECTION INTRAMUSCULAR; INTRAVENOUS at 11:34

## 2020-11-23 RX ADMIN — FENTANYL CITRATE 25 MCG: 50 INJECTION, SOLUTION INTRAMUSCULAR; INTRAVENOUS at 11:36

## 2020-11-23 RX ADMIN — FENTANYL CITRATE 50 MCG: 50 INJECTION, SOLUTION INTRAMUSCULAR; INTRAVENOUS at 11:34

## 2020-11-23 RX ADMIN — MIDAZOLAM 1 MG: 1 INJECTION INTRAMUSCULAR; INTRAVENOUS at 11:35

## 2020-11-23 SDOH — HEALTH STABILITY: MENTAL HEALTH: CURRENT SMOKER: 0

## 2020-11-23 ASSESSMENT — ENCOUNTER SYMPTOMS: DYSRHYTHMIAS: 1

## 2020-11-23 NOTE — DISCHARGE INSTRUCTIONS
Taking Care of Yourself after IBETH with Cardioversion    Patient's Name: Kevan Connelly  Today's Date: November 23, 2020    You had a: IBETH with Cardioversion  Your doctor was Dr. Aguiar    Activity and diet    Do not drive, drink alcohol or make major decisions for 24 hours. The medicines you received may make you dizzy, sleepy or forgetful.    An adult should stay with you for the first six hours at home. Take it easy for the rest of the day.    Return to your usual diet, but no scratchy foods for two days.    If your throat is sore, eat cold, bland or soft foods.    You may have heartburn if the tube used in the exam entered your stomach. If so:  - Do not eat acidic and spicy foods.  - Do not eat three hours before bedtime. Clear liquids are okay.  - When lying down, use two pillows to raise your head.  Medicines    You may start taking your usual medicines again. Always follow your doctor s orders.    You may take Tylenol (acetaminophen) if your throat is sore.    You may take antacids if you have heartburn. Take as directed.  Call your family doctor if you have any of these problems:    Heartburn that is severe or lasts more than 72 hours.    A sore throat that feels worse after 72 hours.    Shortness of breath, neck pain, chest pain, fever, chills, coughing up blood, or other unusual signs.  If you also had cardioversion:    The skin on your chest or back may feel tender for 48 hours. If your skin is tender, you may:  - Use cold packs.  - Apply 1% hydrocortisone cream to the skin (sold at drug stores).  - Take Advil (ibuprofen) or Tylenol (acetaminophen). Follow your doctor s orders.    Call your doctor right a way if you have an irregular heartbeat, shortness of breath or feel dizzy.  Follow-up visits:  Keep all scheduled appointments

## 2020-11-23 NOTE — ANESTHESIA CARE TRANSFER NOTE
Patient: Kevan Connelly    Procedure(s):  ANESTHESIA, FOR CARDIOVERSION    Diagnosis: Paroxysmal atrial fibrillation (H) [I48.0]  Diagnosis Additional Information: No value filed.    Anesthesia Type:   MAC     Note:  Airway :Nasal Cannula  Patient transferred to: Recovery  Handoff Report: Identifed the Patient, Identified the Reponsible Provider, Reviewed the pertinent medical history, Discussed the surgical course, Reviewed Intra-OP anesthesia mangement and issues during anesthesia, Set expectations for post-procedure period and Allowed opportunity for questions and acknowledgement of understanding      Vitals: (Last set prior to Anesthesia Care Transfer)    CRNA VITALS  11/23/2020 1150 - 11/23/2020 1220      11/23/2020             SpO2:  98 %                Electronically Signed By: Dean Dennis Severson, APRN CRNA  November 23, 2020  12:20 PM

## 2020-11-23 NOTE — ANESTHESIA PREPROCEDURE EVALUATION
Anesthesia Pre-Procedure Evaluation    Patient: Kevan Connelly   MRN: [id number] : 1950          Preoperative Diagnosis: Paroxysmal atrial fibrillation (H) [I48.0]    Procedure(s):  ANESTHESIA, FOR CARDIOVERSION    Past Medical History:   Diagnosis Date     Atrial flutter (H)      Benign essential hypertension 2017    past use of ACE- Cough;  Had been on ARB-diuretic but experienced lower bp readings;  BLOOD PRESSURE now well controlled on ARB also no longer on diuretic; no low bp readings; may have had slight edema initially but has normalized with bp well controlled on ARB alone.      Cancer (H)     testicular cancer     CHF (congestive heart failure) (H) 2019     Long term current use of anticoagulant therapy 6/3/2019     Obesity (BMI 35.0-39.9) with comorbidity (H) 2019     Paroxysmal atrial fibrillation (H) 2019     Past Surgical History:   Procedure Laterality Date     ANESTHESIA CARDIOVERSION N/A 2019    Procedure: ANESTHESIA, FOR CARDIOVERSION DR. LE;  Surgeon: GENERIC ANESTHESIA PROVIDER;  Location:  OR     ANESTHESIA CARDIOVERSION N/A 2019    Procedure: ANESTHESIA, FOR CARDIOVERSION;  Surgeon: GENERIC ANESTHESIA PROVIDER;  Location:  OR     ANESTHESIA CARDIOVERSION N/A 2020    Procedure: ANESTHESIA, FOR CARDIOVERSION;  Surgeon: GENERIC ANESTHESIA PROVIDER;  Location:  OR     ANESTHESIA CARDIOVERSION N/A 2020    Procedure: ANESTHESIA, FOR CARDIOVERSION;  Surgeon: GENERIC ANESTHESIA PROVIDER;  Location:  OR     CLOSED REDUCTION SHOULDER Left      EP ABLATION FOCAL AFIB N/A 1/3/2020    Procedure: EP Ablation Focal AFIB;  Surgeon: Lamont Hannon MD;  Location:  HEART CARDI[facility] CATH LAB     GENITOURINARY SURGERY      testicular cancer     ORTHOPEDIC SURGERY      right knee surgery      Anesthesia Evaluation     .             ROS/MED HX    ENT/Pulmonary:     (+)sleep apnea, uses CPAP , . .    Neurologic:  - neg neurologic ROS      Cardiovascular:     (+) hypertension----. : . CHF . . :. dysrhythmias a-fib and a-flutter, . Previous cardiac testing Echodate:11/23/20results:date: results:ECG reviewed date:11/23/20 results:afib date: results:          METS/Exercise Tolerance:     Hematologic:  - neg hematologic  ROS       Musculoskeletal:  - neg musculoskeletal ROS       GI/Hepatic:         Renal/Genitourinary:     (+) chronic renal disease, type: CRI, Pt has no history of transplant,       Endo:  - neg endo ROS   (+) Obesity, .      Psychiatric:  - neg psychiatric ROS       Infectious Disease:  - neg infectious disease ROS       Malignancy:      - no malignancy   Other:    - neg other ROS                      Physical Exam  Normal systems: dental    Airway   Mallampati: II  TM distance: >3 FB  Neck ROM: full    Dental     Cardiovascular   Rhythm and rate: irregular and abnormal      Pulmonary    breath sounds clear to auscultation            Lab Results   Component Value Date    WBC 5.8 01/03/2020    HGB 16.0 01/03/2020    HCT 44.2 01/03/2020     01/03/2020     05/26/2020    POTASSIUM 4.3 11/23/2020    CHLORIDE 110 (H) 05/26/2020    CO2 27 05/26/2020    BUN 22 05/26/2020    CR 1.35 (H) 05/26/2020    GLC 99 05/26/2020    CHU 8.8 05/26/2020    MAG 1.9 11/23/2020    ALBUMIN 4.0 01/22/2019    PROTTOTAL 7.2 01/22/2019    ALT 35 03/24/2020    AST 17 01/13/2020    ALKPHOS 51 01/22/2019    BILITOTAL 1.3 01/22/2019    INR 2.80 (H) 11/19/2020    TSH 2.86 01/13/2020       Preop Vitals  BP Readings from Last 3 Encounters:   11/23/20 (!) 142/103   11/20/20 122/87   10/27/20 (!) 133/98    Pulse Readings from Last 3 Encounters:   11/23/20 133   11/20/20 142   10/27/20 74      Resp Readings from Last 3 Encounters:   11/23/20 20   10/27/20 16   09/08/20 14    SpO2 Readings from Last 3 Encounters:   11/23/20 95%   10/27/20 98%   09/16/20 98%      Temp Readings from Last 1 Encounters:   10/27/20 97.6  F (36.4  C) (Temporal)    Ht Readings from Last  "1 Encounters:   11/20/20 1.93 m (6' 4\")      Wt Readings from Last 1 Encounters:   11/20/20 126.6 kg (279 lb)    Estimated body mass index is 33.96 kg/m  as calculated from the following:    Height as of 11/20/20: 1.93 m (6' 4\").    Weight as of 11/20/20: 126.6 kg (279 lb).       Anesthesia Plan      History & Physical Review  History and physical reviewed and following examination; no interval change.    ASA Status:  3 .    NPO Status:  > 8 hours    Plan for MAC with Intravenous and Propofol induction. Reason for MAC:  Deep or markedly invasive procedure (G8)      The patient is not a current smoker , Patient not instructed to abstain from smoking on day of procedure and patient did not smoke on day of surgery     Postoperative Care      Consents  Anesthetic plan, risks, benefits and alternatives discussed with:  Patient..                 Dean Dennis Severson, APRN CRNA                    .  "

## 2020-11-23 NOTE — PROCEDURES
[facility]    Procedure: Cardioversion    Date/Time: 11/23/2020 11:54 AM  Performed by: Reji Aguiar MD  Authorized by: Lamont Hannon MD     UNIVERSAL PROTOCOL   Site Marked: Yes  Prior Images Obtained and Reviewed:  Yes  Required items: Required blood products, implants, devices and special equipment available    Patient identity confirmed:  Verbally with patient  Patient was reevaluated immediately before administering moderate or deep sedation or anesthesia  Confirmation Checklist:  Patient's identity using two indicators, relevant allergies and procedure was appropriate and matched the consent or emergent situation  Time out: Immediately prior to the procedure a time out was called    Universal Protocol: the Joint Commission Universal Protocol was followed    Preparation: Patient was prepped and draped in usual sterile fashion           ANESTHESIA  Anesthesia was administered and monitored by anesthesiology.  See anesthesia documentation for details.    SEDATION    Patient Sedated: Yes    Sedation Type:  Deep  Vital signs: Vital signs monitored during sedation      PROCEDURE DETAILS  Cardioversion basis: elective  Indications: failure of anti-arrhythmic medications  Pre-procedure rhythm: atrial fibrillation  Patient position: patient was placed in a supine position  Chest area: chest area exposed  Electrodes: pads  Electrodes placed: anterior-posterior  Number of attempts: 1    Details of Attempts:  One shock 200 joules synchronized  Post-procedure rhythm: normal sinus rhythm  Complications: no complications

## 2020-11-23 NOTE — PRE-PROCEDURE
GENERAL PRE-PROCEDURE:   Procedure:  IBETH [facility]  Date/Time:  11/23/2020 11:43 AM    Written consent obtained?: Yes    Risks and benefits: Risks, benefits and alternatives were discussed    Consent given by:  Patient  Patient states understanding of procedure being performed: Yes    Patient's understanding of procedure matches consent: Yes    Procedure consent matches procedure scheduled: Yes    Expected level of sedation:  Moderate  Appropriately NPO:  Yes  ASA Class:  Class 3- Severe systemic disease, definite functional limitations  Mallampati  :  Grade 3- soft palate visible, posterior pharyngeal wall not visible  Lungs:  Lungs clear with good breath sounds bilaterally  Heart:  Normal heart sounds and rate and a-fib  History & Physical reviewed:  History and physical reviewed and no updates needed  Statement of review:  I have reviewed the lab findings, diagnostic data, medications, and the plan for sedation    The risks and benefits of IBETH have been discussed with the patient and/or relatives/ family in detail including the risks of serious complications including rare risk of death, esophageal tear or trauma, emergent surgery, pharyngeal hematoma/ trauma, methemoglobinemia, gastrointestinal bleeding etc. Patient denies any active upper GI issues or bleeding or difficult swallowing. Denies prior sedation related problems. The patient understands the above mentioned risks and is willing to proceed with the IBETH.    The risks and benefits of the cardioversion procedure have been explained to patient (and/or family) in detail including but not limited to minor or serious life-threatening arrhythmias, heart pauses/ block, death, burns, respiratory failure needing ventilation, sedation related or anesthetic complications, cardiac arrest, need for CPR, temporary or permanent pacemaker implantation, anesthesia, ineffectiveness of the procedure, discomfort and stroke.  The patient voiced understanding and understands that  a  will be needed given the use of sedation. Patient understands that cardioversion is NOT a cure for atrial fibrillation and may not be successful in certain cases to achieve restoration of sinus rhythm and in certain cases patients may immediately or later revert back in to atrial fibrillation or other atrial arrhythmias. Patient and/family understand it and wish to proceed.

## 2020-11-24 ENCOUNTER — DOCUMENTATION ONLY (OUTPATIENT)
Dept: FAMILY MEDICINE | Facility: CLINIC | Age: 70
End: 2020-11-24

## 2020-11-24 ENCOUNTER — TELEPHONE (OUTPATIENT)
Dept: CARDIOLOGY | Facility: CLINIC | Age: 70
End: 2020-11-24

## 2020-11-24 LAB — INTERPRETATION ECG - MUSE: NORMAL

## 2020-11-24 NOTE — TELEPHONE ENCOUNTER
11/24/20 Spoke w patient who stated he is feeling well post IBETH/DCCV from yesterday. HR feels regular. Verified he is taking Flecainide 150 mg BID and Torpol ER 50 mg BID. Reminded of EKG tomorrow and f/u visit with Dr Hannon 12/3. Pt voiced understanding and agreement w plan.  Bran 350 pm

## 2020-11-24 NOTE — PROGRESS NOTES
ANTICOAGULATION  MANAGEMENT: Discharge Review    Kevan Connelly chart reviewed for anticoagulation continuity of care    Outpatient surgery/procedure on 11/23/20 for cardioversion.    Discharge disposition: Home    Results:    Recent labs: (last 7 days)     11/19/20  0937   INR 2.80*     Anticoagulation inpatient management:     not applicable     Anticoagulation discharge instructions:     Warfarin dosing: not addressed, but patient continues with home warfarin dosing   Bridging: No   INR goal change: No      Medication changes affecting anticoagulation: No    Additional factors affecting anticoagulation: No    Plan     No adjustment to anticoagulation plan needed, patient is scheduled for lab-only INR on 12/4/20    Patient not contacted    No adjustment to Anticoagulation Calendar was required    Kaylee Ramey RN

## 2020-11-24 NOTE — TELEPHONE ENCOUNTER
11/24/20 Attempted to call pt post IBETH/DCCV 1 day ago. Reached VM, LM and requested callback.Bran 1250 pm

## 2020-11-25 ENCOUNTER — TELEPHONE (OUTPATIENT)
Dept: CARDIOLOGY | Facility: CLINIC | Age: 70
End: 2020-11-25

## 2020-11-25 DIAGNOSIS — I48.19 PERSISTENT ATRIAL FIBRILLATION (H): ICD-10-CM

## 2020-11-25 PROCEDURE — 93005 ELECTROCARDIOGRAM TRACING: CPT | Performed by: NURSE PRACTITIONER

## 2020-11-25 RX ORDER — METOPROLOL SUCCINATE 50 MG/1
25 TABLET, EXTENDED RELEASE ORAL 2 TIMES DAILY
COMMUNITY
Start: 2020-11-25 | End: 2020-12-21

## 2020-11-25 NOTE — TELEPHONE ENCOUNTER
S/P IBETH/DCCV and increased Flecainide reinitiated. EKG shows ND and QRS slightly more prolonged. Lets decrease metoprolol to 25 mg bid, continue flecainide 150 mg bid and repeat EKG when he see  on Thursday.   I have copied him on this message so he is aware.   Dinorah

## 2020-11-25 NOTE — TELEPHONE ENCOUNTER
Spoke to patient regarding recommendations per Katiana Toledo, BRYAN  -------S/P IBETH/DCCV and increased Flecainide reinitiated. EKG shows [place] and QRS slightly more prolonged. Lets decrease metoprolol to 25 mg bid, continue flecainide 150 mg bid and repeat EKG when he see  on Thursday.    Medication  List updated in epic.  Patient provided verbal understanding regarding above.  Patient also reporting feeling of general malaise and low grade fever on and off today.  Recommended that patient continue to monitor symptoms and if symptom persist to contact PCP.  Discussed that he may be haiving residual from medications given from procedure on 11/23 or possible COVID symptoms.  Patient aware of what symptoms to look for if possible COVID.  DIO Schrader

## 2020-11-25 NOTE — TELEPHONE ENCOUNTER
Patient here for EKG will have Katiana Toledo, BRYAN review.  Patient restarted flecainide after IBETH/cardioversion on 11/23.    DIO Schrader

## 2020-12-03 ENCOUNTER — TELEPHONE (OUTPATIENT)
Dept: CARDIOLOGY | Facility: CLINIC | Age: 70
End: 2020-12-03

## 2020-12-03 ENCOUNTER — OFFICE VISIT (OUTPATIENT)
Dept: CARDIOLOGY | Facility: CLINIC | Age: 70
End: 2020-12-03
Attending: NURSE PRACTITIONER
Payer: COMMERCIAL

## 2020-12-03 VITALS
HEIGHT: 76 IN | DIASTOLIC BLOOD PRESSURE: 80 MMHG | BODY MASS INDEX: 33.36 KG/M2 | SYSTOLIC BLOOD PRESSURE: 140 MMHG | HEART RATE: 67 BPM | WEIGHT: 274 LBS

## 2020-12-03 DIAGNOSIS — I48.19 PERSISTENT ATRIAL FIBRILLATION (H): Primary | ICD-10-CM

## 2020-12-03 PROCEDURE — 99213 OFFICE O/P EST LOW 20 MIN: CPT | Performed by: INTERNAL MEDICINE

## 2020-12-03 PROCEDURE — 93000 ELECTROCARDIOGRAM COMPLETE: CPT | Performed by: INTERNAL MEDICINE

## 2020-12-03 ASSESSMENT — MIFFLIN-ST. JEOR: SCORE: 2114.36

## 2020-12-03 NOTE — LETTER
12/3/2020    Davina Reaves MD  [address]  [facility] [address]    RE: Kevan Connelly       Dear Colleague,    I had the pleasure of seeing Kevan Connelly in the [facility].    Electrophysiology/ Clinic Note         H&P and Plan:     REASON FOR VISIT: Electrophysiology evaluation.      HISTORY OF PRESENT ILLNESS: Mr. Connelly is a pleasant  69-year-old gentleman with a history of persistent atrial fibrillation (A. 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    Patient underwent A. fib ablation on 1/3/2020.  After ablation he did well and was off antiarrhythmics.  However in August he presents with recurrence of persistent atrial fibrillation.  He was started on flecainide and underwent successful IBETH cardioversion on 11/23/2020.    Today, he informs he is doing well.  Has no complaints during this visit and denies any symptoms of chest pain, palpitations, shortness of breath, lightheadedness, near-syncope or syncope.    EKG done today showed normal sinus rhythm.  QRS was measured 110 ms.  Of note, patient had a previous exercise stress test done on flecainide last year which rule out any arrhythmia or QRS widening.     Previous studies:  -EKG (08/20/2019): Normal sinus rhythm.  First-degree AV block (CT of 276 ms).  QRS measuring 112 ms.  -Exercise stress test (08/28/2019): Target heart rate was not achieved.  However test was not negative for ischemia or pro-arrhythmias.  -Limited echo (08/14/2019): Normal LV function.  EF estimated 50-55%.  -IBETH (1/02/2020): Normal LV function.  EF estimated 55-60%.  There was no significant valve disease.  -Chest CT (01/02/2020): Normal pulmonary vein anatomy.  Aortic root was mildly dilated (4.2 x 3.88 cm).  Coronary calcification was noticed in the LAD.  Scattered mediastinal and right heel are calcifications were noticed suggestive of old  "granulomas.     ASSESSMENT AND PLAN:   1.  Persistent atrial fibrillation.  He responded well to cardioversion and is doing well enough combination of flecainide and beta-blockers.  We will continue current medical therapy.  He will follow-up here in 6 months to reevaluate EKG.          2.  Embolic prevention.  Chads vas score of 2-3.  Continue anticoagulation indefinitely.        Lamont Hannon MD    Physical Exam:  Vitals: BP (!) 140/80   Pulse 67   Ht 1.93 m (6' 4\")   Wt 124.3 kg (274 lb)   BMI 33.35 kg/m      Constitutional:  AAO x3.  Pt is in NAD.  HEAD: normocephalic.  SKIN: Skin normal color, texture and turgor with no lesions or eruptions.  Eyes: PERRL, EOMI.  ENT:  Supple, normal JVP. No lymphadenopathy or thyroid enlargement.  Chest:  CTAB.  Cardiac:   RRR, normal  S1 and S2.  No murmurs rubs or gallop.    Abdomen:  Normal BS.  Soft, non-tender and non-distended.  No rebound or guarding.    Extremities:  Pedious pulses palpable B/L.  No LE edema noticed.   Neurological: Strength and sensation grossly symmetric and intact throughout.       CURRENT MEDICATIONS:  Current Outpatient Medications   Medication Sig Dispense Refill     acetaminophen (TYLENOL) 500 MG tablet Take 500 mg by mouth every 6 hours as needed for mild pain       atorvastatin (LIPITOR) 20 MG tablet Take 1 tablet (20 mg) by mouth daily 90 tablet 3     flecainide (TAMBOCOR) 150 MG tablet Take 1 tablet (150 mg) by mouth 2 times daily       losartan (COZAAR) 50 MG tablet Take 1 tablet (50 mg) by mouth daily 90 tablet 3     melatonin 3 MG CAPS Take 3 mg by mouth At Bedtime       metoprolol succinate ER (TOPROL-XL) 50 MG 24 hr tablet Take 0.5 tablets (25 mg) by mouth 2 times daily       sildenafil (REVATIO) 20 MG tablet Take 1 tablet (20 mg) by mouth daily as needed 30 tablet 3     warfarin ANTICOAGULANT (COUMADIN) 5 MG tablet TAKE 1/2 TABLET (2.5MG) BY MOUTH ON FRIDAYS; TAKE 1 TABLET (5MG) ALL OTHER DAYS OR AS DIRECTED BY INR CLINIC 90 tablet " 0       ALLERGIES     Allergies   Allergen Reactions     Lisinopril Cough     Very persistent cough       PAST MEDICAL HISTORY:  Past Medical History:   Diagnosis Date     Atrial flutter (H)      Benign essential hypertension 1/18/2017    past use of ACE- Cough;  Had been on ARB-diuretic but experienced lower bp readings;  BLOOD PRESSURE now well controlled on ARB also no longer on diuretic; no low bp readings; may have had slight edema initially but has normalized with bp well controlled on ARB alone.      Cancer (H)     testicular cancer     CHF (congestive heart failure) (H) 6/5/2019     Long term current use of anticoagulant therapy 6/3/2019     Obesity (BMI 35.0-39.9) with comorbidity (H) 2/11/2019     Paroxysmal atrial fibrillation (H) 06/03/2019       PAST SURGICAL HISTORY:  Past Surgical History:   Procedure Laterality Date     ANESTHESIA CARDIOVERSION N/A 6/26/2019    Procedure: ANESTHESIA, FOR CARDIOVERSION DR. LE;  Surgeon: GENERIC ANESTHESIA PROVIDER;  Location:  OR     ANESTHESIA CARDIOVERSION N/A 7/17/2019    Procedure: ANESTHESIA, FOR CARDIOVERSION;  Surgeon: GENERIC ANESTHESIA PROVIDER;  Location:  OR     ANESTHESIA CARDIOVERSION N/A 1/28/2020    Procedure: ANESTHESIA, FOR CARDIOVERSION;  Surgeon: GENERIC ANESTHESIA PROVIDER;  Location:  OR     ANESTHESIA CARDIOVERSION N/A 9/8/2020    Procedure: ANESTHESIA, FOR CARDIOVERSION;  Surgeon: GENERIC ANESTHESIA PROVIDER;  Location:  OR     ANESTHESIA CARDIOVERSION N/A 11/23/2020    Procedure: ANESTHESIA, FOR CARDIOVERSION;  Surgeon: GENERIC ANESTHESIA PROVIDER;  Location: [facility] OR     CLOSED REDUCTION SHOULDER Left      EP ABLATION FOCAL AFIB N/A 1/3/2020    Procedure: EP Ablation Focal AFIB;  Surgeon: Lamont Hannon MD;  Location:  [facility]ATH LAB     GENITOURINARY SURGERY  1990    testicular cancer     ORTHOPEDIC SURGERY  1970's    right knee surgery        FAMILY HISTORY:  Family History   Problem Relation Age of Onset     Colon  Cancer Father      Coronary Artery Disease Maternal Grandmother        SOCIAL HISTORY:  Social History     Socioeconomic History     Marital status:      Spouse name: Not on file     Number of children: Not on file     Years of education: Not on file     Highest education level: Not on file   Occupational History     Not on file   Social Needs     Financial resource strain: Not on file     Food insecurity     Worry: Not on file     Inability: Not on file     Transportation needs     Medical: Not on file     Non-medical: Not on file   Tobacco Use     Smoking status: Never Smoker     Smokeless tobacco: Never Used   Substance and Sexual Activity     Alcohol use: Yes     Comment: every 1-2 months     Drug use: No     Sexual activity: Yes     Partners: Female   Lifestyle     Physical activity     Days per week: Not on file     Minutes per session: Not on file     Stress: Not on file   Relationships     Social connections     Talks on phone: Not on file     Gets together: Not on file     Attends [employer] service: Not on file     Active member of club or organization: Not on file     Attends meetings of clubs or organizations: Not on file     Relationship status: Not on file     Intimate partner violence     Fear of current or ex partner: Not on file     Emotionally abused: Not on file     Physically abused: Not on file     Forced sexual activity: Not on file   Other Topics Concern     Parent/sibling w/ CABG, MI or angioplasty before 65F 55M? Yes   Social History Narrative     Not on file       Review of Systems:  Skin:        Eyes:       ENT:       Respiratory:       Cardiovascular:       Gastroenterology:      Genitourinary:       Musculoskeletal:       Neurologic:       Psychiatric:       Heme/Lymph/Imm:       Endocrine:           Recent Lab Results:  LIPID RESULTS:  Lab Results   Component Value Date    CHOL 138 06/01/2020    HDL 44 06/01/2020    LDL 75 06/01/2020    TRIG 93 06/01/2020       LIVER ENZYME  RESULTS:  Lab Results   Component Value Date    AST 17 01/13/2020    ALT 35 03/24/2020       CBC RESULTS:  Lab Results   Component Value Date    WBC 5.8 01/03/2020    RBC 4.88 01/03/2020    HGB 16.0 01/03/2020    HCT 44.2 01/03/2020    MCV 91 01/03/2020    MCH 32.8 01/03/2020    MCHC 36.2 01/03/2020    RDW 12.6 01/03/2020     01/03/2020       BMP RESULTS:  Lab Results   Component Value Date     05/26/2020    POTASSIUM 4.3 11/23/2020    CHLORIDE 110 (H) 05/26/2020    CO2 27 05/26/2020    ANIONGAP 2 (L) 05/26/2020    GLC 99 05/26/2020    BUN 22 05/26/2020    CR 1.35 (H) 05/26/2020    GFRESTIMATED 54 (L) 05/26/2020    GFRESTBLACK 62 05/26/2020    CHU 8.8 05/26/2020        A1C RESULTS:  No results found for: A1C    INR RESULTS:  Lab Results   Component Value Date    INR 2.80 (H) 11/19/2020    INR 1.90 (H) 11/13/2020         ECHOCARDIOGRAM  No results found for this or any previous visit (from the past 8760 hour(s)).      No orders of the defined types were placed in this encounter.    No orders of the defined types were placed in this encounter.    There are no discontinued medications.      No diagnosis found.        Thank you for allowing me to participate in the care of your patient.    Sincerely,     Lamont Hannon MD     [facility]

## 2020-12-03 NOTE — PROGRESS NOTES
Electrophysiology/ Clinic Note         H&P and Plan:     REASON FOR VISIT: Electrophysiology evaluation.      HISTORY OF PRESENT ILLNESS: Mr. Connelly is a pleasant  69-year-old gentleman with a history of persistent atrial fibrillation (A. 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    Patient underwent A. fib ablation on 1/3/2020.  After ablation he did well and was off antiarrhythmics.  However in August he presents with recurrence of persistent atrial fibrillation.  He was started on flecainide and underwent successful IBETH cardioversion on 11/23/2020.    Today, he informs he is doing well.  Has no complaints during this visit and denies any symptoms of chest pain, palpitations, shortness of breath, lightheadedness, near-syncope or syncope.    EKG done today showed normal sinus rhythm.  QRS was measured 110 ms.  Of note, patient had a previous exercise stress test done on flecainide last year which rule out any arrhythmia or QRS widening.     Previous studies:  -EKG (08/20/2019): Normal sinus rhythm.  First-degree AV block (NV of 276 ms).  QRS measuring 112 ms.  -Exercise stress test (08/28/2019): Target heart rate was not achieved.  However test was not negative for ischemia or pro-arrhythmias.  -Limited echo (08/14/2019): Normal LV function.  EF estimated 50-55%.  -IBTEH (1/02/2020): Normal LV function.  EF estimated 55-60%.  There was no significant valve disease.  -Chest CT (01/02/2020): Normal pulmonary vein anatomy.  Aortic root was mildly dilated (4.2 x 3.88 cm).  Coronary calcification was noticed in the LAD.  Scattered mediastinal and right heel are calcifications were noticed suggestive of old granulomas.     ASSESSMENT AND PLAN:   1.  Persistent atrial fibrillation.  He responded well to cardioversion and is doing well enough combination of flecainide and beta-blockers.  We will continue current medical therapy.  He  "will follow-up here in 6 months to reevaluate EKG.          2.  Embolic prevention.  Chads vas score of 2-3.  Continue anticoagulation indefinitely.        Lamont Hannon MD    Physical Exam:  Vitals: BP (!) 140/80   Pulse 67   Ht 1.93 m (6' 4\")   Wt 124.3 kg (274 lb)   BMI 33.35 kg/m      Constitutional:  AAO x3.  Pt is in NAD.  HEAD: normocephalic.  SKIN: Skin normal color, texture and turgor with no lesions or eruptions.  Eyes: PERRL, EOMI.  ENT:  Supple, normal JVP. No lymphadenopathy or thyroid enlargement.  Chest:  CTAB.  Cardiac:   RRR, normal  S1 and S2.  No murmurs rubs or gallop.    Abdomen:  Normal BS.  Soft, non-tender and non-distended.  No rebound or guarding.    Extremities:  Pedious pulses palpable B/L.  No LE edema noticed.   Neurological: Strength and sensation grossly symmetric and intact throughout.       CURRENT MEDICATIONS:  Current Outpatient Medications   Medication Sig Dispense Refill     acetaminophen (TYLENOL) 500 MG tablet Take 500 mg by mouth every 6 hours as needed for mild pain       atorvastatin (LIPITOR) 20 MG tablet Take 1 tablet (20 mg) by mouth daily 90 tablet 3     flecainide (TAMBOCOR) 150 MG tablet Take 1 tablet (150 mg) by mouth 2 times daily       losartan (COZAAR) 50 MG tablet Take 1 tablet (50 mg) by mouth daily 90 tablet 3     melatonin 3 MG CAPS Take 3 mg by mouth At Bedtime       metoprolol succinate ER (TOPROL-XL) 50 MG 24 hr tablet Take 0.5 tablets (25 mg) by mouth 2 times daily       sildenafil (REVATIO) 20 MG tablet Take 1 tablet (20 mg) by mouth daily as needed 30 tablet 3     warfarin ANTICOAGULANT (COUMADIN) 5 MG tablet TAKE 1/2 TABLET (2.5MG) BY MOUTH ON FRIDAYS; TAKE 1 TABLET (5MG) ALL OTHER DAYS OR AS DIRECTED BY INR CLINIC 90 tablet 0       ALLERGIES     Allergies   Allergen Reactions     Lisinopril Cough     Very persistent cough       PAST MEDICAL HISTORY:  Past Medical History:   Diagnosis Date     Atrial flutter (H)      Benign essential hypertension " 1/18/2017    past use of ACE- Cough;  Had been on ARB-diuretic but experienced lower bp readings;  BLOOD PRESSURE now well controlled on ARB also no longer on diuretic; no low bp readings; may have had slight edema initially but has normalized with bp well controlled on ARB alone.      Cancer (H)     testicular cancer     CHF (congestive heart failure) (H) 6/5/2019     Long term current use of anticoagulant therapy 6/3/2019     Obesity (BMI 35.0-39.9) with comorbidity (H) 2/11/2019     Paroxysmal atrial fibrillation (H) 06/03/2019       PAST SURGICAL HISTORY:  Past Surgical History:   Procedure Laterality Date     ANESTHESIA CARDIOVERSION N/A 6/26/2019    Procedure: ANESTHESIA, FOR CARDIOVERSION DR. LE;  Surgeon: GENERIC ANESTHESIA PROVIDER;  Location:  OR     ANESTHESIA CARDIOVERSION N/A 7/17/2019    Procedure: ANESTHESIA, FOR CARDIOVERSION;  Surgeon: GENERIC ANESTHESIA PROVIDER;  Location:  OR     ANESTHESIA CARDIOVERSION N/A 1/28/2020    Procedure: ANESTHESIA, FOR CARDIOVERSION;  Surgeon: GENERIC ANESTHESIA PROVIDER;  Location:  OR     ANESTHESIA CARDIOVERSION N/A 9/8/2020    Procedure: ANESTHESIA, FOR CARDIOVERSION;  Surgeon: GENERIC ANESTHESIA PROVIDER;  Location:  OR     ANESTHESIA CARDIOVERSION N/A 11/23/2020    Procedure: ANESTHESIA, FOR CARDIOVERSION;  Surgeon: GENERIC ANESTHESIA PROVIDER;  Location: [facility] OR     CLOSED REDUCTION SHOULDER Left      EP ABLATION FOCAL AFIB N/A 1/3/2020    Procedure: EP Ablation Focal AFIB;  Surgeon: Lamont Hannon MD;  Location:  [facility]     GENITOURINARY SURGERY  1990    testicular cancer     ORTHOPEDIC SURGERY  1970's    right knee surgery        FAMILY HISTORY:  Family History   Problem Relation Age of Onset     Colon Cancer Father      Coronary Artery Disease Maternal Grandmother        SOCIAL HISTORY:  Social History     Socioeconomic History     Marital status:      Spouse name: Not on file     Number of children: Not on file      Years of education: Not on file     Highest education level: Not on file   Occupational History     Not on file   Social Needs     Financial resource strain: Not on file     Food insecurity     Worry: Not on file     Inability: Not on file     Transportation needs     Medical: Not on file     Non-medical: Not on file   Tobacco Use     Smoking status: Never Smoker     Smokeless tobacco: Never Used   Substance and Sexual Activity     Alcohol use: Yes     Comment: every 1-2 months     Drug use: No     Sexual activity: Yes     Partners: Female   Lifestyle     Physical activity     Days per week: Not on file     Minutes per session: Not on file     Stress: Not on file   Relationships     Social connections     Talks on phone: Not on file     Gets together: Not on file     Attends Rastafari service: Not on file     Active member of club or organization: Not on file     Attends meetings of clubs or organizations: Not on file     Relationship status: Not on file     Intimate partner violence     Fear of current or ex partner: Not on file     Emotionally abused: Not on file     Physically abused: Not on file     Forced sexual activity: Not on file   Other Topics Concern     Parent/sibling w/ CABG, MI or angioplasty before 65F 55M? Yes   Social History Narrative     Not on file       Review of Systems:  Skin:        Eyes:       ENT:       Respiratory:       Cardiovascular:       Gastroenterology:      Genitourinary:       Musculoskeletal:       Neurologic:       Psychiatric:       Heme/Lymph/Imm:       Endocrine:           Recent Lab Results:  LIPID RESULTS:  Lab Results   Component Value Date    CHOL 138 06/01/2020    HDL 44 06/01/2020    LDL 75 06/01/2020    TRIG 93 06/01/2020       LIVER ENZYME RESULTS:  Lab Results   Component Value Date    AST 17 01/13/2020    ALT 35 03/24/2020       CBC RESULTS:  Lab Results   Component Value Date    WBC 5.8 01/03/2020    RBC 4.88 01/03/2020    HGB 16.0 01/03/2020    HCT 44.2 01/03/2020     MCV 91 01/03/2020    MCH 32.8 01/03/2020    MCHC 36.2 01/03/2020    RDW 12.6 01/03/2020     01/03/2020       BMP RESULTS:  Lab Results   Component Value Date     05/26/2020    POTASSIUM 4.3 11/23/2020    CHLORIDE 110 (H) 05/26/2020    CO2 27 05/26/2020    ANIONGAP 2 (L) 05/26/2020    GLC 99 05/26/2020    BUN 22 05/26/2020    CR 1.35 (H) 05/26/2020    GFRESTIMATED 54 (L) 05/26/2020    GFRESTBLACK 62 05/26/2020    CHU 8.8 05/26/2020        A1C RESULTS:  No results found for: A1C    INR RESULTS:  Lab Results   Component Value Date    INR 2.80 (H) 11/19/2020    INR 1.90 (H) 11/13/2020         ECHOCARDIOGRAM  No results found for this or any previous visit (from the past 8760 hour(s)).      No orders of the defined types were placed in this encounter.    No orders of the defined types were placed in this encounter.    There are no discontinued medications.      No diagnosis found.      CC  Katiana Toledo, APRN CNP  [address]  [place]  [place] [id number]

## 2020-12-03 NOTE — TELEPHONE ENCOUNTER
Patient left message on AF nurse line after hours yesterday in follow up to missed call.  Unable to identify who called patient.  Spoke to patient and asked if he had been called for his wellness check as he has appt today with Dr Hannon at 12:45pm.  Patient indicated he had not.  Wellness screen completed.    Wellness Screening Tool    Symptom Screening:    Do you have one of the following NEW symptoms:      Fever (subjective or >100.0)?  No    New cough? No    Shortness of breath? No    Chills? No    New loss of taste or smell? No    Generalized body aches? No    New persistent headache? No    New sore throat? No    Nausea, vomiting or diarrhea? No    Within the past 2 weeks, have you been exposed to someone with a known positive illness below?      COVID - 19 (known or suspected) No    Chicken pox?  No    Measles? No    Pertussis? No    Have you had a positive COVID-19 diagnostic test (nasal swab test) in the last 14 days or are you currently   on self-quarantine restrictions (i.e.travel restriction, exposure, etc?) No        Patient notified of visitor restriction: Yes  Patient informed to wear a mask: Yes    Patient's appointment status: Patient will be seen in clinic today 12/3 with Dr Hannon at 12:45pm.  DIO Schrader

## 2020-12-04 ENCOUNTER — ANTICOAGULATION THERAPY VISIT (OUTPATIENT)
Dept: FAMILY MEDICINE | Facility: CLINIC | Age: 70
End: 2020-12-04

## 2020-12-04 DIAGNOSIS — Z79.01 LONG TERM CURRENT USE OF ANTICOAGULANTS WITH INR GOAL OF 2.0-3.0: ICD-10-CM

## 2020-12-04 DIAGNOSIS — I48.0 PAROXYSMAL ATRIAL FIBRILLATION (H): ICD-10-CM

## 2020-12-04 DIAGNOSIS — Z79.01 LONG TERM CURRENT USE OF ANTICOAGULANT THERAPY: ICD-10-CM

## 2020-12-04 DIAGNOSIS — I48.92 ATRIAL FLUTTER (H): ICD-10-CM

## 2020-12-04 LAB
CAPILLARY BLOOD COLLECTION: NORMAL
INR PPP: 3.3 (ref 0.86–1.14)

## 2020-12-04 PROCEDURE — 99207 PR NO CHARGE NURSE ONLY: CPT | Performed by: INTERNAL MEDICINE

## 2020-12-04 PROCEDURE — 85610 PROTHROMBIN TIME: CPT | Performed by: INTERNAL MEDICINE

## 2020-12-04 PROCEDURE — 36416 COLLJ CAPILLARY BLOOD SPEC: CPT | Performed by: INTERNAL MEDICINE

## 2020-12-04 NOTE — PROGRESS NOTES
ANTICOAGULATION FOLLOW-UP CLINIC VISIT    Patient Name:  Kevan Connelly  Date:  12/4/2020  Contact Type:  Telephone    SUBJECTIVE:  Patient Findings     Comments:  Called patient to discuss today's INR results: Patient denies any identifiable changes that caused the supratherapeutic INR. The patient was assessed for diet, medication, and activity level changes, missed or extra doses, bruising or bleeding, with no problem findings. He may have had less green veggies than usual but he states he really doesn't eat that much, maybe a nuria salad or two a week. He had his DCCV early because he was so symptomatic with his a-fib. Prior to a long warfarin hold he was taking a 1/2 tab on Fridays and this was working well for him. Warfarin was increaesed to 5 mg daily after he had a hard time coming back up to therapeutic after he long hold. Per protocol, for no factors, will decrease maintenance dose by about 7% and recheck INR in 2 weeks.           Clinical Outcomes     Negatives:  Major bleeding event, Thromboembolic event, Anticoagulation-related hospital admission, Anticoagulation-related ED visit, Anticoagulation-related fatality    Comments:  Called patient to discuss today's INR results: Patient denies any identifiable changes that caused the supratherapeutic INR. The patient was assessed for diet, medication, and activity level changes, missed or extra doses, bruising or bleeding, with no problem findings. He may have had less green veggies than usual but he states he really doesn't eat that much, maybe a nuria salad or two a week. He had his DCCV early because he was so symptomatic with his a-fib. Prior to a long warfarin hold he was taking a 1/2 tab on Fridays and this was working well for him. Warfarin was increaesed to 5 mg daily after he had a hard time coming back up to therapeutic after he long hold. Per protocol, for no factors, will decrease maintenance dose by about 7% and recheck INR in 2 weeks.               OBJECTIVE    Recent labs: (last 7 days)     12/04/20  0730   INR 3.30*       ASSESSMENT / PLAN  INR assessment SUPRA    Recheck INR In: 2 WEEKS    INR Location Clinic      Anticoagulation Summary  As of 12/4/2020    INR goal:  2.0-3.0   TTR:  81.4 % (1 y)   INR used for dosing:  3.30 (12/4/2020)   Warfarin maintenance plan:  2.5 mg (5 mg x 0.5) every Fri; 5 mg (5 mg x 1) all other days   Full warfarin instructions:  2.5 mg every Fri; 5 mg all other days   Weekly warfarin total:  32.5 mg   Plan last modified:  Kaylee Ramey RN (12/4/2020)   Next INR check:  12/18/2020   Priority:  Maintenance   Target end date:  Indefinite    Indications    Long term current use of anticoagulant therapy [Z79.01]  Paroxysmal atrial fibrillation (H) [I48.0]  Long term current use of anticoagulants with INR goal of 2.0-3.0 [Z79.01]             Anticoagulation Episode Summary     INR check location:  Anticoagulation Clinic    Preferred lab:      Send INR reminders to:  CONNIE HOBBS    Comments:  5mg tabs / DCCV 7/17/19 & 1/28/20 / early morning appointments on Wed or Fri      Anticoagulation Care Providers     Provider Role Specialty Phone number    Davina Reaves MD Referring Internal Medicine [phone]            See the Encounter Report to view Anticoagulation Flowsheet and Dosing Calendar (Go to Encounters tab in chart review, and find the Anticoagulation Therapy Visit)    Dosage adjustment made based on physician directed care plan.    Kaylee Ramey, DIO

## 2020-12-07 DIAGNOSIS — I48.91 ATRIAL FIBRILLATION, UNSPECIFIED TYPE (H): ICD-10-CM

## 2020-12-07 DIAGNOSIS — I25.10 CORONARY ARTERY CALCIFICATION: ICD-10-CM

## 2020-12-07 RX ORDER — ATORVASTATIN CALCIUM 20 MG/1
20 TABLET, FILM COATED ORAL DAILY
Qty: 90 TABLET | Refills: 1 | Status: SHIPPED | OUTPATIENT
Start: 2020-12-07 | End: 2021-06-17

## 2020-12-07 RX ORDER — WARFARIN SODIUM 5 MG/1
TABLET ORAL
Qty: 90 TABLET | Refills: 1 | Status: SHIPPED | OUTPATIENT
Start: 2020-12-07 | End: 2021-02-19

## 2020-12-07 NOTE — TELEPHONE ENCOUNTER
Received refill request for:  Atorvastatin  Last OV was: 12/3/2020 with Dr. Hannon  Labs/EKG: last lipid 6/1/2020  F/U scheduled: orders in Epic for 3/2021  New script sent to: [facility]

## 2020-12-08 NOTE — TELEPHONE ENCOUNTER
Anticoagulation Monitoring Return Date Recheck   Latest Ref Rng & Units     12/4/2020 12/18/2020 2 WEEKS     Anticoagulation Monitoring Instructions   Latest Ref Rng & Units    12/4/2020 2.5 mg every Fri; 5 mg all other days   Prescription approved per [facility] Refill Protocol.  Bree Blair, RN  Anticoagulation Nurse - [facility]

## 2020-12-14 NOTE — PROCEDURES
[facility]    Procedure: Cardioversion    Date/Time: 12/13/2020 10:01 PM  Performed by: Marshall Dueñas MD  Authorized by: Lamont Hannon MD     UNIVERSAL PROTOCOL   Site Marked: NA  Prior Images Obtained and Reviewed:  NA  Required items: Required blood products, implants, devices and special equipment available    Patient identity confirmed:  Verbally with patient  Patient was reevaluated immediately before administering moderate or deep sedation or anesthesia  Confirmation Checklist:  Patient's identity using two indicators  Universal Protocol: the Joint [facility] Universal Protocol was followed          PROCEDURE DETAILS  Pre-procedure rhythm: atrial fibrillation  Patient position: patient was placed in a supine position  Electrodes: pads  Electrodes placed: anterior-posterior  Number of attempts: 1    Details of Attempts:  Elective DC Cardioversion Post Procedure Note    Synchronized Cardioversion was attempted once, with 200 J. The patient converted to sinus rhythm. There was a conversion pause of  <1 seconds prior to onset of sinus rhythm.  No complications noted.       RECOMMENDATIONS:  1.  Post procedure, the patient will continue current medications including anticoagulation.  2.  Patient to see primary cardiologist for out patient follow up.         Post-procedure rhythm: normal sinus rhythm  Complications: no complications    PROCEDURE   Patient Tolerance:  Patient tolerated the procedure well with no immediate complications    Length of time physician/provider present for 1:1 monitoring during sedation: 20

## 2020-12-18 ENCOUNTER — ANTICOAGULATION THERAPY VISIT (OUTPATIENT)
Dept: FAMILY MEDICINE | Facility: CLINIC | Age: 70
End: 2020-12-18

## 2020-12-18 DIAGNOSIS — Z79.01 LONG TERM CURRENT USE OF ANTICOAGULANT THERAPY: ICD-10-CM

## 2020-12-18 DIAGNOSIS — I48.0 PAROXYSMAL ATRIAL FIBRILLATION (H): ICD-10-CM

## 2020-12-18 DIAGNOSIS — I48.92 ATRIAL FLUTTER (H): ICD-10-CM

## 2020-12-18 DIAGNOSIS — Z79.01 LONG TERM CURRENT USE OF ANTICOAGULANTS WITH INR GOAL OF 2.0-3.0: ICD-10-CM

## 2020-12-18 LAB
CAPILLARY BLOOD COLLECTION: NORMAL
INR PPP: 2.3 (ref 0.86–1.14)

## 2020-12-18 PROCEDURE — 85610 PROTHROMBIN TIME: CPT | Performed by: INTERNAL MEDICINE

## 2020-12-18 PROCEDURE — 99207 PR NO CHARGE NURSE ONLY: CPT | Performed by: INTERNAL MEDICINE

## 2020-12-18 PROCEDURE — 36416 COLLJ CAPILLARY BLOOD SPEC: CPT | Performed by: INTERNAL MEDICINE

## 2020-12-18 NOTE — PROGRESS NOTES
ANTICOAGULATION FOLLOW-UP CLINIC VISIT    Patient Name:  Kevan Connelly  Date:  2020  Contact Type:  Telephone/ [place]    SUBJECTIVE:  Patient Findings     Comments:  The patient was assessed for diet, medication, and activity level changes, missed or extra doses, bruising or bleeding, with no problem findings.          Clinical Outcomes     Negatives:  Major bleeding event, Thromboembolic event, Anticoagulation-related hospital admission, Anticoagulation-related ED visit, Anticoagulation-related fatality    Comments:  The patient was assessed for diet, medication, and activity level changes, missed or extra doses, bruising or bleeding, with no problem findings.             OBJECTIVE    Recent labs: (last 7 days)     20  0851   INR 2.30*       ASSESSMENT / PLAN  INR assessment THER    Recheck INR In: 3 WEEKS    INR Location Clinic      Anticoagulation Summary  As of 2020    INR goal:  2.0-3.0   TTR:  80.2 % (1 y)   INR used for dosin.30 (2020)   Warfarin maintenance plan:  2.5 mg (5 mg x 0.5) every Fri; 5 mg (5 mg x 1) all other days   Full warfarin instructions:  2.5 mg every Fri; 5 mg all other days   Weekly warfarin total:  32.5 mg   No change documented:  Marilou Esparza RN   Plan last modified:  Kaylee Ramey RN (2020)   Next INR check:  2021   Priority:  Maintenance   Target end date:  Indefinite    Indications    Long term current use of anticoagulant therapy [Z79.01]  Paroxysmal atrial fibrillation (H) [I48.0]  Long term current use of anticoagulants with INR goal of 2.0-3.0 [Z79.01]             Anticoagulation Episode Summary     INR check location:  Anticoagulation Clinic    Preferred lab:      Send INR reminders to:  CONNIE HOBBS    Comments:  5mg tabs / DCCV 19 & 20 / early morning appointments on Wed or Fri      Anticoagulation Care Providers     Provider Role Specialty Phone number    Davina Reaves MD Referring Internal  Medicine [phone]            See the Encounter Report to view Anticoagulation Flowsheet and Dosing Calendar (Go to Encounters tab in chart review, and find the Anticoagulation Therapy Visit)    Patient INR is therapeutic.  Patient will continue to take 32.5 mg weekly dosing and follow up in 3 weeks or sooner for any problems or concerns.        Marilou Esparza RN

## 2020-12-21 DIAGNOSIS — I48.19 PERSISTENT ATRIAL FIBRILLATION (H): ICD-10-CM

## 2020-12-21 RX ORDER — METOPROLOL SUCCINATE 50 MG/1
25 TABLET, EXTENDED RELEASE ORAL 2 TIMES DAILY
Qty: 90 TABLET | Refills: 1 | Status: SHIPPED | OUTPATIENT
Start: 2020-12-21 | End: 2021-06-02

## 2021-01-08 ENCOUNTER — ANTICOAGULATION THERAPY VISIT (OUTPATIENT)
Dept: FAMILY MEDICINE | Facility: CLINIC | Age: 71
End: 2021-01-08

## 2021-01-08 DIAGNOSIS — Z79.01 LONG TERM CURRENT USE OF ANTICOAGULANT THERAPY: ICD-10-CM

## 2021-01-08 DIAGNOSIS — I48.0 PAROXYSMAL ATRIAL FIBRILLATION (H): ICD-10-CM

## 2021-01-08 DIAGNOSIS — Z79.01 LONG TERM CURRENT USE OF ANTICOAGULANTS WITH INR GOAL OF 2.0-3.0: ICD-10-CM

## 2021-01-08 DIAGNOSIS — I48.92 ATRIAL FLUTTER (H): ICD-10-CM

## 2021-01-08 LAB
CAPILLARY BLOOD COLLECTION: NORMAL
INR PPP: 1.9 (ref 0.86–1.14)

## 2021-01-08 PROCEDURE — 99207 PR NO CHARGE NURSE ONLY: CPT | Performed by: INTERNAL MEDICINE

## 2021-01-08 PROCEDURE — 85610 PROTHROMBIN TIME: CPT | Performed by: INTERNAL MEDICINE

## 2021-01-08 PROCEDURE — 36416 COLLJ CAPILLARY BLOOD SPEC: CPT | Performed by: INTERNAL MEDICINE

## 2021-01-08 NOTE — PROGRESS NOTES
ANTICOAGULATION FOLLOW-UP CLINIC VISIT    Patient Name:  Kevan Connelly  Date:  1/8/2021  Contact Type:  Telephone    SUBJECTIVE:  Patient Findings     Comments:  Called patient to discuss today's INR results: Patient denies any identifiable changes that caused the subtherapeutic INR. The patient was assessed for diet, medication, and activity level changes, missed or extra doses, bruising or bleeding, with no problem findings. Ate his usual bag of mixed greens salad this week. Feeling well, still in sinus rhythm. Per protocol, will continue wit current maintenance dosing of warfarin. However, if still sub at next visit in 2 weeks, will need to consider smaller tablet size since 1 month ago we lowered his dosing on Fridays, taking him back up to that prior dose would not make sense. Advised we may need a smaller tablet size for smaller dose corrections to get him in his therapeutic range. Patient stated understanding and is in agreement with plan.  Kaylee MICHELLE RN  Anticoagulation Team            Clinical Outcomes     Negatives:  Major bleeding event, Thromboembolic event, Anticoagulation-related hospital admission, Anticoagulation-related ED visit, Anticoagulation-related fatality    Comments:  Called patient to discuss today's INR results: Patient denies any identifiable changes that caused the subtherapeutic INR. The patient was assessed for diet, medication, and activity level changes, missed or extra doses, bruising or bleeding, with no problem findings. Ate his usual bag of mixed greens salad this week. Feeling well, still in sinus rhythm. Per protocol, will continue wit current maintenance dosing of warfarin. However, if still sub at next visit in 2 weeks, will need to consider smaller tablet size since 1 month ago we lowered his dosing on Fridays, taking him back up to that prior dose would not make sense. Advised we may need a smaller tablet size for smaller dose corrections to get him in his therapeutic  range. Patient stated understanding and is in agreement with plan.  Kaylee MICHELLE RN  Anticoagulation Team               OBJECTIVE    Recent labs: (last 7 days)     21  0834   INR 1.90*       ASSESSMENT / PLAN  INR assessment SUB    Recheck INR In: 2 WEEKS    INR Location Clinic      Anticoagulation Summary  As of 2021    INR goal:  2.0-3.0   TTR:  79.4 % (1 y)   INR used for dosin.90 (2021)   Warfarin maintenance plan:  2.5 mg (5 mg x 0.5) every Fri; 5 mg (5 mg x 1) all other days   Full warfarin instructions:  2.5 mg every Fri; 5 mg all other days   Weekly warfarin total:  32.5 mg   No change documented:  Kaylee Ramey RN   Plan last modified:  Kaylee Ramey RN (2020)   Next INR check:  2021   Priority:  Maintenance   Target end date:  Indefinite    Indications    Long term current use of anticoagulant therapy [Z79.01]  Paroxysmal atrial fibrillation (H) [I48.0]  Long term current use of anticoagulants with INR goal of 2.0-3.0 [Z79.01]             Anticoagulation Episode Summary     INR check location:  Anticoagulation Clinic    Preferred lab:      Send INR reminders to:  CONNIE HOBBS    Comments:  5mg tabs / DCCV 19 & 20 / early morning appointments on Wed or Fri      Anticoagulation Care Providers     Provider Role Specialty Phone number    Davina Reaves MD Referring Internal Medicine [phone]            See the Encounter Report to view Anticoagulation Flowsheet and Dosing Calendar (Go to Encounters tab in chart review, and find the Anticoagulation Therapy Visit)    Kaylee Ramey RN

## 2021-01-14 ENCOUNTER — HEALTH MAINTENANCE LETTER (OUTPATIENT)
Age: 71
End: 2021-01-14

## 2021-01-21 ENCOUNTER — ANTICOAGULATION THERAPY VISIT (OUTPATIENT)
Dept: FAMILY MEDICINE | Facility: CLINIC | Age: 71
End: 2021-01-21

## 2021-01-21 DIAGNOSIS — Z79.01 LONG TERM CURRENT USE OF ANTICOAGULANT THERAPY: ICD-10-CM

## 2021-01-21 DIAGNOSIS — Z79.01 LONG TERM CURRENT USE OF ANTICOAGULANTS WITH INR GOAL OF 2.0-3.0: ICD-10-CM

## 2021-01-21 DIAGNOSIS — I48.0 PAROXYSMAL ATRIAL FIBRILLATION (H): ICD-10-CM

## 2021-01-21 DIAGNOSIS — I48.92 ATRIAL FLUTTER (H): ICD-10-CM

## 2021-01-21 LAB
CAPILLARY BLOOD COLLECTION: NORMAL
INR PPP: 1.8 (ref 0.86–1.14)

## 2021-01-21 PROCEDURE — 99207 PR NO CHARGE NURSE ONLY: CPT | Performed by: INTERNAL MEDICINE

## 2021-01-21 PROCEDURE — 36416 COLLJ CAPILLARY BLOOD SPEC: CPT | Performed by: INTERNAL MEDICINE

## 2021-01-21 PROCEDURE — 85610 PROTHROMBIN TIME: CPT | Performed by: INTERNAL MEDICINE

## 2021-01-21 NOTE — PROGRESS NOTES
ANTICOAGULATION FOLLOW-UP CLINIC VISIT    Patient Name:  Kevan Connelly  Date:  2021  Contact Type:  Telephone    SUBJECTIVE:  Patient Findings     Comments:  Called patient to discuss today's INR results: Patient denies any identifiable changes that caused the subtherapeutic INR. The patient was assessed for diet, medication, and activity level changes, missed or extra doses, bruising or bleeding, with no problem findings. After patient's procedure, he may be simply continuing to improve his health and maybe activity a bit now. INR is a bit lower today than 2 weeks ago, so per protocol, will increase maintenance dosing by 7.7% and recheck INR in 2 weeks. Patient stated understanding and is in agreement with plan.  Kaylee MICHELLE RN  Anticoagulation Team            Clinical Outcomes     Negatives:  Major bleeding event, Thromboembolic event, Anticoagulation-related hospital admission, Anticoagulation-related ED visit, Anticoagulation-related fatality    Comments:  Called patient to discuss today's INR results: Patient denies any identifiable changes that caused the subtherapeutic INR. The patient was assessed for diet, medication, and activity level changes, missed or extra doses, bruising or bleeding, with no problem findings. After patient's procedure, he may be simply continuing to improve his health and maybe activity a bit now. INR is a bit lower today than 2 weeks ago, so per protocol, will increase maintenance dosing by 7.7% and recheck INR in 2 weeks. Patient stated understanding and is in agreement with plan.  Kaylee MICHELLE RN  Anticoagulation Team               OBJECTIVE    Recent labs: (last 7 days)     21  0900   INR 1.80*       ASSESSMENT / PLAN  INR assessment SUB    Recheck INR In: 2 WEEKS    INR Location Clinic      Anticoagulation Summary  As of 2021    INR goal:  2.0-3.0   TTR:  78.6 % (1 y)   INR used for dosin.80 (2021)   Warfarin maintenance plan:  5 mg (5 mg x 1) every day    Full warfarin instructions:  5 mg every day   Weekly warfarin total:  35 mg   Plan last modified:  Kaylee Ramey RN (1/21/2021)   Next INR check:  2/4/2021   Priority:  Maintenance   Target end date:  Indefinite    Indications    Long term current use of anticoagulant therapy [Z79.01]  Paroxysmal atrial fibrillation (H) [I48.0]  Long term current use of anticoagulants with INR goal of 2.0-3.0 [Z79.01]             Anticoagulation Episode Summary     INR check location:  Anticoagulation Clinic    Preferred lab:      Send INR reminders to:  CONNIE HOBBS    Comments:  5mg tabs / DCCV 7/17/19 & 1/28/20 / early morning appointments on Wed or Fri      Anticoagulation Care Providers     Provider Role Specialty Phone number    Davina Reaves MD Referring Internal Medicine [phone]            See the Encounter Report to view Anticoagulation Flowsheet and Dosing Calendar (Go to Encounters tab in chart review, and find the Anticoagulation Therapy Visit)    Dosage adjustment made based on physician directed care plan.    Kaylee Ramey RN

## 2021-02-04 ENCOUNTER — ANTICOAGULATION THERAPY VISIT (OUTPATIENT)
Dept: FAMILY MEDICINE | Facility: CLINIC | Age: 71
End: 2021-02-04

## 2021-02-04 DIAGNOSIS — Z79.01 LONG TERM CURRENT USE OF ANTICOAGULANT THERAPY: ICD-10-CM

## 2021-02-04 DIAGNOSIS — I48.92 ATRIAL FLUTTER (H): ICD-10-CM

## 2021-02-04 DIAGNOSIS — Z79.01 LONG TERM CURRENT USE OF ANTICOAGULANTS WITH INR GOAL OF 2.0-3.0: ICD-10-CM

## 2021-02-04 DIAGNOSIS — I48.0 PAROXYSMAL ATRIAL FIBRILLATION (H): ICD-10-CM

## 2021-02-04 LAB
CAPILLARY BLOOD COLLECTION: NORMAL
INR PPP: 2.2 (ref 0.86–1.14)

## 2021-02-04 PROCEDURE — 36416 COLLJ CAPILLARY BLOOD SPEC: CPT | Performed by: INTERNAL MEDICINE

## 2021-02-04 PROCEDURE — 85610 PROTHROMBIN TIME: CPT | Performed by: INTERNAL MEDICINE

## 2021-02-04 PROCEDURE — 99207 PR NO CHARGE NURSE ONLY: CPT | Performed by: INTERNAL MEDICINE

## 2021-02-04 NOTE — PROGRESS NOTES
ANTICOAGULATION FOLLOW-UP CLINIC VISIT    Patient Name:  Kevan Connelly  Date:  2021  Contact Type:  Telephone    SUBJECTIVE:  Patient Findings     Comments:  Called patient to discuss today's INR results: The patient was assessed for diet, medication, and activity level changes, missed or extra doses, bruising or bleeding, with no problem findings. New maintenance dose is working well for patient. Reviewed maintenance warfarin dosing with patient. Patient will remain on the same dose until next INR check. No other questions or concerns. Scheduled next lab-only INR in 2 weeks.  Kaylee MICHELLE RN  Anticoagulation Team          Clinical Outcomes     Negatives:  Major bleeding event, Thromboembolic event, Anticoagulation-related hospital admission, Anticoagulation-related ED visit, Anticoagulation-related fatality    Comments:  Called patient to discuss today's INR results: The patient was assessed for diet, medication, and activity level changes, missed or extra doses, bruising or bleeding, with no problem findings. New maintenance dose is working well for patient. Reviewed maintenance warfarin dosing with patient. Patient will remain on the same dose until next INR check. No other questions or concerns. Scheduled next lab-only INR in 2 weeks.  Kaylee MICHELLE RN  Anticoagulation Team             OBJECTIVE    Recent labs: (last 7 days)     21  0836   INR 2.20*       ASSESSMENT / PLAN  INR assessment THER    Recheck INR In: 2 WEEKS    INR Location Clinic      Anticoagulation Summary  As of 2021    INR goal:  2.0-3.0   TTR:  80.2 % (1 y)   INR used for dosin.20 (2021)   Warfarin maintenance plan:  5 mg (5 mg x 1) every day   Full warfarin instructions:  5 mg every day   Weekly warfarin total:  35 mg   No change documented:  Kaylee Ramey RN   Plan last modified:  Kaylee Ramey RN (2021)   Next INR check:  2021   Priority:  Maintenance   Target end date:  Indefinite    Indications     Long term current use of anticoagulant therapy [Z79.01]  Paroxysmal atrial fibrillation (H) [I48.0]  Long term current use of anticoagulants with INR goal of 2.0-3.0 [Z79.01]             Anticoagulation Episode Summary     INR check location:  Anticoagulation Clinic    Preferred lab:      Send INR reminders to:  CONNIE HOBBS    Comments:  5mg tabs / DCCV 7/17/19 & 1/28/20 / early morning appointments on Wed or Fri      Anticoagulation Care Providers     Provider Role Specialty Phone number    Davina Reaves MD Referring Internal Medicine [phone]            See the Encounter Report to view Anticoagulation Flowsheet and Dosing Calendar (Go to Encounters tab in chart review, and find the Anticoagulation Therapy Visit)    Kaylee Ramey RN

## 2021-02-19 ENCOUNTER — ANTICOAGULATION THERAPY VISIT (OUTPATIENT)
Dept: FAMILY MEDICINE | Facility: CLINIC | Age: 71
End: 2021-02-19

## 2021-02-19 DIAGNOSIS — Z79.01 LONG TERM CURRENT USE OF ANTICOAGULANT THERAPY: ICD-10-CM

## 2021-02-19 DIAGNOSIS — Z79.01 LONG TERM CURRENT USE OF ANTICOAGULANTS WITH INR GOAL OF 2.0-3.0: ICD-10-CM

## 2021-02-19 DIAGNOSIS — I48.0 PAROXYSMAL ATRIAL FIBRILLATION (H): ICD-10-CM

## 2021-02-19 DIAGNOSIS — I48.92 ATRIAL FLUTTER (H): ICD-10-CM

## 2021-02-19 DIAGNOSIS — I48.91 ATRIAL FIBRILLATION, UNSPECIFIED TYPE (H): ICD-10-CM

## 2021-02-19 LAB
CAPILLARY BLOOD COLLECTION: NORMAL
INR PPP: 2 (ref 0.86–1.14)

## 2021-02-19 PROCEDURE — 36416 COLLJ CAPILLARY BLOOD SPEC: CPT | Performed by: INTERNAL MEDICINE

## 2021-02-19 PROCEDURE — 99207 PR NO CHARGE NURSE ONLY: CPT | Performed by: INTERNAL MEDICINE

## 2021-02-19 PROCEDURE — 85610 PROTHROMBIN TIME: CPT | Performed by: INTERNAL MEDICINE

## 2021-02-19 RX ORDER — WARFARIN SODIUM 5 MG/1
TABLET ORAL
Qty: 90 TABLET | Refills: 1
Start: 2021-02-19 | End: 2021-06-02

## 2021-02-19 NOTE — PROGRESS NOTES
ANTICOAGULATION FOLLOW-UP CLINIC VISIT    Patient Name:  Kevan Connelly  Date:  2/19/2021  Contact Type:  Telephone    SUBJECTIVE:  Patient Findings     Comments:  Called patient to discuss today's INR results:  The patient was assessed for diet, medication, and activity level changes, missed or extra doses, bruising or bleeding, with no problem findings. He didn't have many green veggies but sometimes enjoys a mixed greens salad. Advised patient he can eat his salads on occasion, if his INR drops again, we will simply increase his warfarin maintenance dosing, but at this point, it would be outside protocol. Reviewed maintenance warfarin dosing with patient. Patient will remain on the same dose until next INR check. No other questions or concerns. Scheduled next lab-only INR in 3 weeks. Patient stated understanding and is in agreement with plan.  Kaylee MICHELLE RN  Anticoagulation Team          Clinical Outcomes     Negatives:  Major bleeding event, Thromboembolic event, Anticoagulation-related hospital admission, Anticoagulation-related ED visit, Anticoagulation-related fatality    Comments:  Called patient to discuss today's INR results:  The patient was assessed for diet, medication, and activity level changes, missed or extra doses, bruising or bleeding, with no problem findings. He didn't have many green veggies but sometimes enjoys a mixed greens salad. Advised patient he can eat his salads on occasion, if his INR drops again, we will simply increase his warfarin maintenance dosing, but at this point, it would be outside protocol. Reviewed maintenance warfarin dosing with patient. Patient will remain on the same dose until next INR check. No other questions or concerns. Scheduled next lab-only INR in 3 weeks. Patient stated understanding and is in agreement with plan.  Kaylee MICHELLE RN  Anticoagulation Team             OBJECTIVE    Recent labs: (last 7 days)     02/19/21  0845   INR 2.00*       ASSESSMENT /  PLAN  INR assessment THER    Recheck INR In: 3 WEEKS    INR Location Clinic      Anticoagulation Summary  As of 2021    INR goal:  2.0-3.0   TTR:  82.4 % (1 y)   INR used for dosin.00 (2021)   Warfarin maintenance plan:  5 mg (5 mg x 1) every day   Full warfarin instructions:  5 mg every day   Weekly warfarin total:  35 mg   No change documented:  Kaylee Ramey RN   Plan last modified:  Kaylee Ramey RN (2021)   Next INR check:  3/12/2021   Priority:  Maintenance   Target end date:  Indefinite    Indications    Long term current use of anticoagulant therapy [Z79.01]  Paroxysmal atrial fibrillation (H) [I48.0]  Long term current use of anticoagulants with INR goal of 2.0-3.0 [Z79.01]             Anticoagulation Episode Summary     INR check location:  Anticoagulation Clinic    Preferred lab:      Send INR reminders to:  CONNIE HOBBS    Comments:  5mg tabs / DCCV 19 & 20 / early morning appointments on Wed or Fri      Anticoagulation Care Providers     Provider Role Specialty Phone number    Davina Reaves MD Referring Internal Medicine [phone]            See the Encounter Report to view Anticoagulation Flowsheet and Dosing Calendar (Go to Encounters tab in chart review, and find the Anticoagulation Therapy Visit)    Kaylee Ramey, DIO

## 2021-03-12 ENCOUNTER — ANTICOAGULATION THERAPY VISIT (OUTPATIENT)
Dept: FAMILY MEDICINE | Facility: CLINIC | Age: 71
End: 2021-03-12

## 2021-03-12 DIAGNOSIS — I48.0 PAROXYSMAL ATRIAL FIBRILLATION (H): ICD-10-CM

## 2021-03-12 DIAGNOSIS — Z79.01 LONG TERM CURRENT USE OF ANTICOAGULANT THERAPY: ICD-10-CM

## 2021-03-12 DIAGNOSIS — I48.92 ATRIAL FLUTTER (H): ICD-10-CM

## 2021-03-12 DIAGNOSIS — Z79.01 LONG TERM CURRENT USE OF ANTICOAGULANTS WITH INR GOAL OF 2.0-3.0: ICD-10-CM

## 2021-03-12 LAB
CAPILLARY BLOOD COLLECTION: NORMAL
INR PPP: 1.9 (ref 0.86–1.14)

## 2021-03-12 PROCEDURE — 85610 PROTHROMBIN TIME: CPT | Performed by: INTERNAL MEDICINE

## 2021-03-12 PROCEDURE — 99207 PR NO CHARGE NURSE ONLY: CPT | Performed by: INTERNAL MEDICINE

## 2021-03-12 PROCEDURE — 36416 COLLJ CAPILLARY BLOOD SPEC: CPT | Performed by: INTERNAL MEDICINE

## 2021-03-12 NOTE — PROGRESS NOTES
ANTICOAGULATION FOLLOW-UP CLINIC VISIT    Patient Name:  Kevan Connelly  Date:  3/12/2021  Contact Type:  Telephone/ [place]    SUBJECTIVE:  Patient Findings     Comments:  Patient denies any identifiable changes that caused the subtherapeutic INR. Would like to increase back to previous maintenance dose of 37.5 mg.           Clinical Outcomes     Negatives:  Major bleeding event, Thromboembolic event, Anticoagulation-related hospital admission, Anticoagulation-related ED visit, Anticoagulation-related fatality    Comments:  Patient denies any identifiable changes that caused the subtherapeutic INR. Would like to increase back to previous maintenance dose of 37.5 mg.              OBJECTIVE    Recent labs: (last 7 days)     21  0845   INR 1.90*       ASSESSMENT / PLAN  INR assessment SUB    Recheck INR In: 2 WEEKS    INR Location Clinic      Anticoagulation Summary  As of 3/12/2021    INR goal:  2.0-3.0   TTR:  76.6 % (1 y)   INR used for dosin.90 (3/12/2021)   Warfarin maintenance plan:  7.5 mg (5 mg x 1.5) every Fri; 5 mg (5 mg x 1) all other days   Full warfarin instructions:  7.5 mg every Fri; 5 mg all other days   Weekly warfarin total:  37.5 mg   Plan last modified:  Marilou Esparza RN (3/12/2021)   Next INR check:  3/26/2021   Priority:  Maintenance   Target end date:  Indefinite    Indications    Long term current use of anticoagulant therapy [Z79.01]  Paroxysmal atrial fibrillation (H) [I48.0]  Long term current use of anticoagulants with INR goal of 2.0-3.0 [Z79.01]             Anticoagulation Episode Summary     INR check location:  Anticoagulation Clinic    Preferred lab:      Send INR reminders to:  CONNIE HOBBS    Comments:  5mg tabs / DCCV 19 & 20 / early morning appointments on Wed or Fri      Anticoagulation Care Providers     Provider Role Specialty Phone number    Davina Reaves MD Referring Internal Medicine [phone]            See the Encounter Report  to view Anticoagulation Flowsheet and Dosing Calendar (Go to Encounters tab in chart review, and find the Anticoagulation Therapy Visit)    Patient INR is sub therapeutic today.  Patient will increase weekly maintenance dose to 37.5 mg and follow up in 2 weeks or sooner if there are any concerns or problems.     Discussed signs of clotting with patient and when to seek care for concerns.  Patient verbalized understanding    Rationale to adjustments: 7.1% Dosage adjustment made based on physician directed care plan.      Marilou Esparza RN

## 2021-03-16 ENCOUNTER — TELEPHONE (OUTPATIENT)
Dept: CARDIOLOGY | Facility: CLINIC | Age: 71
End: 2021-03-16

## 2021-03-16 NOTE — TELEPHONE ENCOUNTER
Fax sent to "[employer] to deny refill of Metoprolol ER. On 12/21/20 we ordered 90 tabs with 1 refill. He has an appointment on 4/21/21. Does not need a refill now.

## 2021-03-16 NOTE — TELEPHONE ENCOUNTER
Pt called looking for a refill request that he had sent in.  Spoke to pt who states that the pharmacy called and has his refill that he had requested ready for him.  Discussed pt medications and notes that he is taking Metoprolol XL 50 mg bid, instead of 25 mg twice a day. Pt states that his HR has been in the 60's.  Pt also continues on Flecainide 150 mg bid.  Pt notes that he does not have a lot of energy and not sure if mentally or physically.  Will talk with Dr Hannon about pt Metoprolol.  The metoprolol may be part of pt issue with energy. EstevanRN    Addendum: discussed pt lack of energy with Dr Hannon and the dose of the Metoprolol and he recommended that pt go down to the 25 mg twice daily as prescribed.  Pt made aware and will reduce the dose.  HILDA

## 2021-03-19 NOTE — TELEPHONE ENCOUNTER
Pt called back and LM that since decreasing the Metoprolol dose, the lethargy has gone away and he is feeling great.  Isabel

## 2021-03-26 ENCOUNTER — ANTICOAGULATION THERAPY VISIT (OUTPATIENT)
Dept: FAMILY MEDICINE | Facility: CLINIC | Age: 71
End: 2021-03-26

## 2021-03-26 DIAGNOSIS — I48.0 PAROXYSMAL ATRIAL FIBRILLATION (H): ICD-10-CM

## 2021-03-26 DIAGNOSIS — Z79.01 LONG TERM CURRENT USE OF ANTICOAGULANT THERAPY: ICD-10-CM

## 2021-03-26 DIAGNOSIS — Z79.01 LONG TERM CURRENT USE OF ANTICOAGULANTS WITH INR GOAL OF 2.0-3.0: ICD-10-CM

## 2021-03-26 DIAGNOSIS — I48.92 ATRIAL FLUTTER (H): ICD-10-CM

## 2021-03-26 LAB
CAPILLARY BLOOD COLLECTION: NORMAL
INR PPP: 2.5 (ref 0.86–1.14)

## 2021-03-26 PROCEDURE — 85610 PROTHROMBIN TIME: CPT | Performed by: INTERNAL MEDICINE

## 2021-03-26 PROCEDURE — 36416 COLLJ CAPILLARY BLOOD SPEC: CPT | Performed by: INTERNAL MEDICINE

## 2021-03-26 NOTE — PROGRESS NOTES
ANTICOAGULATION MANAGEMENT     Patient Name:  Kevan Connelly  Date:  3/26/2021    ASSESSMENT /SUBJECTIVE:    Today's INR result of 2.5 is therapeutic. Goal INR of 2.0-3.0      Warfarin dose taken: Warfarin taken as instructed    Diet: No new diet changes affecting INR    Medication changes/ interactions: No new medications/supplements affecting INR    Previous INR: Subtherapeutic     S/S of bleeding or thromboembolism: No    New injury or illness: No    Upcoming surgery, procedure or cardioversion: No    Additional findings: none      PLAN:    Telephone call with Kevan regarding INR result and instructed:     Warfarin Dosing Instructions: Continue your current warfarin dose 7.5 mg Fri + 5 mg AOD. Patient will need a new rx with next INR if weekly dose remains at 37.5 mg.    Instructed patient to follow up no later than: 2 weeks  Lab visit scheduled    Education provided: Please call back if any changes to your diet, medications or how you've been taking warfarin, Monitoring for bleeding signs and symptoms, Monitoring for clotting signs and symptoms and Importance of notifying clinic for changes in medications; a sooner lab recheck maybe needed.      Patient verbalizes understanding and agrees to warfarin dosing plan.    Instructed to call the Anticoagulation Clinic for any changes, questions or concerns. (#[phone])        Irma Mckinley RN      OBJECTIVE:  Recent labs: (last 7 days)     21  0858   INR 2.50*         No question data found.  Anticoagulation Summary  As of 3/26/2021    INR goal:  2.0-3.0   TTR:  76.0 % (1 y)   INR used for dosin.50 (3/26/2021)   Warfarin maintenance plan:  7.5 mg (5 mg x 1.5) every Fri; 5 mg (5 mg x 1) all other days   Full warfarin instructions:  7.5 mg every Fri; 5 mg all other days   Weekly warfarin total:  37.5 mg   Plan last modified:  Marilou Esparza RN (3/12/2021)   Next INR check:     Priority:  Maintenance   Target end date:  Indefinite     Indications    Long term current use of anticoagulant therapy [Z79.01]  Paroxysmal atrial fibrillation (H) [I48.0]  Long term current use of anticoagulants with INR goal of 2.0-3.0 [Z79.01]             Anticoagulation Episode Summary     INR check location:  Anticoagulation Clinic    Preferred lab:      Send INR reminders to:  CONNIE HOBBS    Comments:  5mg tabs / DCCV 7/17/19 & 1/28/20 / early morning appointments on Wed or Fri      Anticoagulation Care Providers     Provider Role Specialty Phone number    Davina Reaves MD Referring Internal Medicine [phone]

## 2021-03-29 ENCOUNTER — ALLIED HEALTH/NURSE VISIT (OUTPATIENT)
Dept: NURSING | Facility: CLINIC | Age: 71
End: 2021-03-29
Payer: COMMERCIAL

## 2021-03-29 ENCOUNTER — TELEPHONE (OUTPATIENT)
Dept: FAMILY MEDICINE | Facility: CLINIC | Age: 71
End: 2021-03-29

## 2021-03-29 VITALS — SYSTOLIC BLOOD PRESSURE: 132 MMHG | DIASTOLIC BLOOD PRESSURE: 82 MMHG

## 2021-03-29 DIAGNOSIS — Z01.30 BP CHECK: Primary | ICD-10-CM

## 2021-03-29 PROCEDURE — 99207 PR NO CHARGE NURSE ONLY: CPT

## 2021-03-29 NOTE — PROGRESS NOTES
Kevan Connelly is a 70 year old patient who comes in today for a Blood Pressure check.  Initial BP:  /82 (BP Location: Right arm, Patient Position: Sitting, Cuff Size: Adult Large)      Data Unavailable  Disposition: follow-up as previously indicated by provider and results routed to provider    Stacia Ibarra MA

## 2021-03-29 NOTE — TELEPHONE ENCOUNTER
Kevan Connelly is a 70 year old patient who comes in today for a Blood Pressure check.  Initial BP:  /82 (BP Location: Right arm, Patient Position: Sitting, Cuff Size: Adult Large)      Data Unavailable  Disposition: follow-up as previously indicated by provider and results routed to provider    Stacia Ibarra MA    BP Readings from Last 3 Encounters:   12/03/20 (!) 140/80   11/23/20 (!) 132/100   11/23/20 116/82

## 2021-03-31 ENCOUNTER — OFFICE VISIT (OUTPATIENT)
Dept: FAMILY MEDICINE | Facility: CLINIC | Age: 71
End: 2021-03-31
Payer: COMMERCIAL

## 2021-03-31 VITALS
WEIGHT: 274.2 LBS | SYSTOLIC BLOOD PRESSURE: 112 MMHG | RESPIRATION RATE: 17 BRPM | OXYGEN SATURATION: 97 % | HEIGHT: 76 IN | HEART RATE: 64 BPM | TEMPERATURE: 98.6 F | BODY MASS INDEX: 33.39 KG/M2 | DIASTOLIC BLOOD PRESSURE: 64 MMHG

## 2021-03-31 DIAGNOSIS — Z86.0100 HISTORY OF COLONIC POLYPS: ICD-10-CM

## 2021-03-31 DIAGNOSIS — Z13.6 CARDIOVASCULAR SCREENING; LDL GOAL LESS THAN 130: ICD-10-CM

## 2021-03-31 DIAGNOSIS — Z23 NEED FOR PROPHYLACTIC VACCINATION AND INOCULATION AGAINST INFLUENZA: ICD-10-CM

## 2021-03-31 DIAGNOSIS — Z79.01 LONG TERM CURRENT USE OF ANTICOAGULANT THERAPY: ICD-10-CM

## 2021-03-31 DIAGNOSIS — I48.91 ATRIAL FIBRILLATION, UNSPECIFIED TYPE (H): ICD-10-CM

## 2021-03-31 DIAGNOSIS — I10 BENIGN ESSENTIAL HYPERTENSION: ICD-10-CM

## 2021-03-31 DIAGNOSIS — Z79.01 LONG TERM CURRENT USE OF ANTICOAGULANTS WITH INR GOAL OF 2.0-3.0: ICD-10-CM

## 2021-03-31 DIAGNOSIS — R21 SCALY PATCH RASH: ICD-10-CM

## 2021-03-31 DIAGNOSIS — Z12.5 SCREENING PSA (PROSTATE SPECIFIC ANTIGEN): ICD-10-CM

## 2021-03-31 DIAGNOSIS — Z00.00 ENCOUNTER FOR ANNUAL WELLNESS EXAM IN MEDICARE PATIENT: Primary | ICD-10-CM

## 2021-03-31 LAB
ALBUMIN SERPL-MCNC: 3.6 G/DL (ref 3.4–5)
ALP SERPL-CCNC: 63 U/L (ref 40–150)
ALT SERPL W P-5'-P-CCNC: 28 U/L (ref 0–70)
ANION GAP SERPL CALCULATED.3IONS-SCNC: 4 MMOL/L (ref 3–14)
AST SERPL W P-5'-P-CCNC: 14 U/L (ref 0–45)
BASOPHILS # BLD AUTO: 0 10E9/L (ref 0–0.2)
BASOPHILS NFR BLD AUTO: 0.6 %
BILIRUB SERPL-MCNC: 1.2 MG/DL (ref 0.2–1.3)
BUN SERPL-MCNC: 18 MG/DL (ref 7–30)
CALCIUM SERPL-MCNC: 8.9 MG/DL (ref 8.5–10.1)
CHLORIDE SERPL-SCNC: 110 MMOL/L (ref 94–109)
CHOLEST SERPL-MCNC: 157 MG/DL
CO2 SERPL-SCNC: 27 MMOL/L (ref 20–32)
CREAT SERPL-MCNC: 1.18 MG/DL (ref 0.66–1.25)
DIFFERENTIAL METHOD BLD: NORMAL
EOSINOPHIL # BLD AUTO: 0.1 10E9/L (ref 0–0.7)
EOSINOPHIL NFR BLD AUTO: 1.5 %
ERYTHROCYTE [DISTWIDTH] IN BLOOD BY AUTOMATED COUNT: 12.8 % (ref 10–15)
GFR SERPL CREATININE-BSD FRML MDRD: 63 ML/MIN/{1.73_M2}
GLUCOSE SERPL-MCNC: 97 MG/DL (ref 70–99)
HCT VFR BLD AUTO: 44.8 % (ref 40–53)
HDLC SERPL-MCNC: 47 MG/DL
HGB BLD-MCNC: 15.7 G/DL (ref 13.3–17.7)
LDLC SERPL CALC-MCNC: 93 MG/DL
LYMPHOCYTES # BLD AUTO: 1.2 10E9/L (ref 0.8–5.3)
LYMPHOCYTES NFR BLD AUTO: 24 %
MCH RBC QN AUTO: 32.2 PG (ref 26.5–33)
MCHC RBC AUTO-ENTMCNC: 35 G/DL (ref 31.5–36.5)
MCV RBC AUTO: 92 FL (ref 78–100)
MONOCYTES # BLD AUTO: 0.6 10E9/L (ref 0–1.3)
MONOCYTES NFR BLD AUTO: 11.5 %
NEUTROPHILS # BLD AUTO: 3 10E9/L (ref 1.6–8.3)
NEUTROPHILS NFR BLD AUTO: 62.4 %
NONHDLC SERPL-MCNC: 110 MG/DL
PLATELET # BLD AUTO: 161 10E9/L (ref 150–450)
POTASSIUM SERPL-SCNC: 4.9 MMOL/L (ref 3.4–5.3)
PROT SERPL-MCNC: 6.9 G/DL (ref 6.8–8.8)
PSA SERPL-ACNC: 1.79 UG/L (ref 0–4)
RBC # BLD AUTO: 4.88 10E12/L (ref 4.4–5.9)
SODIUM SERPL-SCNC: 141 MMOL/L (ref 133–144)
TRIGL SERPL-MCNC: 84 MG/DL
WBC # BLD AUTO: 4.8 10E9/L (ref 4–11)

## 2021-03-31 PROCEDURE — 99214 OFFICE O/P EST MOD 30 MIN: CPT | Mod: 25 | Performed by: INTERNAL MEDICINE

## 2021-03-31 PROCEDURE — 99397 PER PM REEVAL EST PAT 65+ YR: CPT | Mod: 25 | Performed by: INTERNAL MEDICINE

## 2021-03-31 PROCEDURE — G0009 ADMIN PNEUMOCOCCAL VACCINE: HCPCS | Performed by: INTERNAL MEDICINE

## 2021-03-31 PROCEDURE — 90732 PPSV23 VACC 2 YRS+ SUBQ/IM: CPT | Performed by: INTERNAL MEDICINE

## 2021-03-31 PROCEDURE — 80061 LIPID PANEL: CPT | Performed by: INTERNAL MEDICINE

## 2021-03-31 PROCEDURE — G0103 PSA SCREENING: HCPCS | Performed by: INTERNAL MEDICINE

## 2021-03-31 PROCEDURE — 36415 COLL VENOUS BLD VENIPUNCTURE: CPT | Performed by: INTERNAL MEDICINE

## 2021-03-31 PROCEDURE — 80053 COMPREHEN METABOLIC PANEL: CPT | Performed by: INTERNAL MEDICINE

## 2021-03-31 PROCEDURE — 85025 COMPLETE CBC W/AUTO DIFF WBC: CPT | Performed by: INTERNAL MEDICINE

## 2021-03-31 RX ORDER — FLECAINIDE ACETATE 50 MG/1
75 TABLET ORAL 2 TIMES DAILY
COMMUNITY
End: 2021-06-21

## 2021-03-31 RX ORDER — LOSARTAN POTASSIUM 50 MG/1
50 TABLET ORAL DAILY
Qty: 90 TABLET | Refills: 3 | Status: SHIPPED | OUTPATIENT
Start: 2021-03-31 | End: 2021-06-09

## 2021-03-31 RX ORDER — TRIAMCINOLONE ACETONIDE 1 MG/G
CREAM TOPICAL 2 TIMES DAILY
Qty: 45 G | Refills: 0 | Status: SHIPPED | OUTPATIENT
Start: 2021-03-31 | End: 2022-01-12

## 2021-03-31 ASSESSMENT — MIFFLIN-ST. JEOR: SCORE: 2115.26

## 2021-03-31 NOTE — PROGRESS NOTES
Assessment & Plan     (Z00.00) Encounter for annual wellness exam in Medicare patient  (primary encounter diagnosis)  Comment: HEALTH CARE MAINTENANCE   Colonoscopy: 2020 due 2025; updated HM  Immunizations: due for PPSV-23  Plan: as above.     (I10) Benign essential hypertension  Comment: BLOOD PRESSURE well controlled; renew RX  Plan: losartan (COZAAR) 50 MG tablet          (Z79.01) Long term current use of anticoagulant therapy  Comment: indication: Atrial Fibrillation  Plan: CBC with platelets and differential          (I48.91) Atrial fibrillation, unspecified type (H)  Comment: Past ablation, cardioversion in 2020; pt on antiarrhythmics per cardiology  Plan: Warfarin- stroke prevention; Rate control with BB and antiarrhythmic    (Z13.6) CARDIOVASCULAR SCREENING; LDL GOAL LESS THAN 130  Comment:   Lab Results   Component Value Date    LDL 75 06/01/2020    LDL 94 03/24/2020     The 10-year ASCVD risk score (Amina PALMER Jr., et al., 2013) is: 12.6%    Values used to calculate the score:      Age: 70 years      Sex: Male      Is Non- : No      Diabetic: No      Tobacco smoker: No      Systolic Blood Pressure: 112 mmHg      Is BP treated: Yes      HDL Cholesterol: 44 mg/dL      Total Cholesterol: 138 mg/dL   He has been treating with diet and exercise; reassess today; consider statin therapy   Plan: Comprehensive metabolic panel, Lipid panel         reflex to direct LDL Fasting          (Z79.01) Long term current use of anticoagulants with INR goal of 2.0-3.0  Comment: Warfarin for stroke prevention with PAF   Plan: CBC with platelets and differential          (Z12.5) Screening PSA (prostate specific antigen)  Comment: screening  Plan: PSA, screen          (Z86.010) History of colonic polyps  Comment: colonoscopies done  2017, 2020 and next due 2025  Plan: next due 2025    (R21) Scaly patch rash  Comment: bilateral forearms and lower legs; serpiginous patches with scale; see photo;   Plan:  "triamcinolone (KENALOG) 0.1 % external cream        Steroid will likely improve rash but key is to see if it resolves or recurs quickly; psoriatic in appearance without joint involvement;  dermatology referral will be next step. Pt prefers to try topical and hold off on dermatology. With extensive involvement, may benefit from oral steroid treatment- short term; if not resolved, then derm evaluation to consider biopsy and more definitive treatment        Review of external notes as documented elsewhere in note  Prescription drug management  60 minutes spent on the date of the encounter doing chart review, review of test results, interpretation of tests, patient visit and documentation        BMI:   Estimated body mass index is 33.38 kg/m  as calculated from the following:    Height as of this encounter: 1.93 m (6' 4\").    Weight as of this encounter: 124.4 kg (274 lb 3.2 oz).   Weight management plan: Discussed healthy diet and exercise guidelines    MEDICATIONS:   Orders Placed This Encounter   Medications     flecainide (TAMBOCOR) 50 MG tablet     Sig: Take 75 mg by mouth 2 times daily     losartan (COZAAR) 50 MG tablet     Sig: Take 1 tablet (50 mg) by mouth daily     Dispense:  90 tablet     Refill:  3     triamcinolone (KENALOG) 0.1 % external cream     Sig: Apply topically 2 times daily     Dispense:  45 g     Refill:  0          - Continue other medications without change  CONSULTATION/REFERRAL to Dermatology- discussed; pt prefers to try topical steroid first.   Work on weight loss  Regular exercise    Return in about 1 year (around 3/31/2022) for Wellness visit.    Davina Reaves MD  Internal Medicine   [facility] ANJEL Cabrales is a 70 year old who presents for the following health issues     HPI     Rash  Onset/Duration: progressive over the past year  Description  Location: forearms and lower legs  Character: blotchy, red  Itching: no  Intensity:  " "moderate  Progression of Symptoms:  worsening  Accompanying signs and symptoms:   Fever: no  Body aches or joint pain: no  Sore throat symptoms: no  Recent cold symptoms: no  History:           Previous episodes of similar rash: None  New exposures:  None  Recent travel: no  Exposure to similar rash: no  Precipitating or alleviating factors: nothing  Therapies tried and outcome:lotions -  Does not help much  No hx of psoriasis;  Using sun protection; shirts with UPF/SPF     Blood pressure Follow up   Is checking his blood pressures at home   No blood pressures greater than 140/90  Does all salt to his food     Annual Wellness Visit    Patient has been advised of split billing requirements and indicates understanding: Yes     Are you in the first 12 months of your Medicare Part B coverage?  No    Physical Health:    In general, how would you rate your overall physical health? good    Outside of work, how many days during the week do you exercise?2-3 days/week    Outside of work, approximately how many minutes a day do you exercise?45-60 minutes    If you drink alcohol do you typically have >3 drinks per day or >7 drinks per week? No    Do you usually eat at least 4 servings of fruit and vegetables a day, include whole grains & fiber and avoid regularly eating high fat or \"junk\" foods? Yes    Do you have any problems taking medications regularly? No    Do you have any side effects from medications? none    Needs assistance for the following daily activities: no assistance needed    Which of the following safety concerns are present in your home?  none identified     Hearing impairment: No    In the past 6 months, have you been bothered by leaking of urine? no    Mental Health:    In general, how would you rate your overall mental or emotional health? excellent  PHQ-2 Score:      Do you feel safe in your environment? Yes    Have you ever done Advance Care Planning? (For example, a Health Directive, POLST, or a " "discussion with a medical provider or your loved ones about your wishes)? No, advance care planning information given to patient to review.  Patient declined advance care planning discussion at this time.    Fall risk:  Fallen 2 or more times in the past year?: No  Any fall with injury in the past year?: No    Cognitive Screenin) Repeat 3 items (Leader, Season, Table)    2) Clock draw: NORMAL  3) 3 item recall: Recalls 2 objects   Results: NORMAL clock, 1-2 items recalled: COGNITIVE IMPAIRMENT LESS LIKELY    Mini-CogTM Copyright S Apoorva. Licensed by the author for use in [facility]; reprinted with permission (amy@[employer]). All rights reserved.      Do you have sleep apnea, excessive snoring or daytime drowsiness?: no    Current providers sharing in care for this patient include:   Patient Care Team:  Davina Reaves MD as PCP - General (Internal Medicine)  Davina Reaves MD as Assigned PCP  Lamont Hannon MD as Assigned Heart and Vascular Provider    Patient has been advised of split billing requirements and indicates understanding: Yes      Review of Systems   CONSTITUTIONAL: NEGATIVE for fever, chills, change in weight  INTEGUMENTARY/SKIN: scaly rash past several months; close to a year. ENT/MOUTH: NEGATIVE for ear, mouth and throat problems  RESP: NEGATIVE for significant cough or SOB  CV: Atrial Fibrillation, paroxysmal; past ablation and cardioversion  GI: colon polyp noted ; no abdominal pain or changes in BMs  : decreased urinary stream especially noted in the AM  MUSCULOSKELETAL: NEGATIVE for significant arthralgias or myalgia  NEURO: NEGATIVE for weakness, dizziness or paresthesias  ENDOCRINE: reviewed lipids; no related meds.   HEME/ALLERGY/IMMUNE: warfarin therapy due to PAF  PSYCHIATRIC: NEGATIVE for changes in mood or affect      Objective    /64   Pulse 64   Temp 98.6  F (37  C) (Oral)   Resp 17   Ht 1.93 m (6' 4\")   Wt 124.4 kg (274 lb 3.2 oz)  "  SpO2 97%   BMI 33.38 kg/m    Body mass index is 33.38 kg/m .  Physical Exam   GENERAL: healthy, alert and no distress  EYES: Eyes grossly normal to inspection  HENT: ear canals and TM's normal, nose and mouth without ulcers or lesions  NECK: no adenopathy, no asymmetry, masses, or scars and thyroid normal to palpation  RESP: lungs clear to auscultation - no rales, rhonchi or wheezes  CV: regular rate and rhythm, normal S1 S2, no S3 or S4, no murmur, click or rub, no peripheral edema and peripheral pulses strong  ABDOMEN: soft, nontender, no hepatosplenomegaly, no masses and bowel sounds normal  MS: no gross musculoskeletal defects noted, no edema  SKIN:  serpiginous patches with overlying scale;  involving lower extremities R>L and bilateral forearms- see photo  NEURO: Normal strength and tone, sensory exam grossly normal, mentation intact, gait normal including heel/toe/tandem walking and Romberg normal  PSYCH: mentation appears normal, affect normal/bright

## 2021-04-09 ENCOUNTER — ANTICOAGULATION THERAPY VISIT (OUTPATIENT)
Dept: FAMILY MEDICINE | Facility: CLINIC | Age: 71
End: 2021-04-09

## 2021-04-09 DIAGNOSIS — I48.0 PAROXYSMAL ATRIAL FIBRILLATION (H): ICD-10-CM

## 2021-04-09 DIAGNOSIS — Z79.01 LONG TERM CURRENT USE OF ANTICOAGULANTS WITH INR GOAL OF 2.0-3.0: ICD-10-CM

## 2021-04-09 DIAGNOSIS — Z79.01 LONG TERM CURRENT USE OF ANTICOAGULANT THERAPY: ICD-10-CM

## 2021-04-09 DIAGNOSIS — I48.92 ATRIAL FLUTTER (H): ICD-10-CM

## 2021-04-09 LAB
CAPILLARY BLOOD COLLECTION: NORMAL
INR PPP: 2.6 (ref 0.86–1.14)

## 2021-04-09 PROCEDURE — 36416 COLLJ CAPILLARY BLOOD SPEC: CPT | Performed by: INTERNAL MEDICINE

## 2021-04-09 PROCEDURE — 85610 PROTHROMBIN TIME: CPT | Performed by: INTERNAL MEDICINE

## 2021-04-09 NOTE — PROGRESS NOTES
ANTICOAGULATION MANAGEMENT     Patient Name:  Kevan Connelly  Date:  2021    ASSESSMENT /SUBJECTIVE:    Today's INR result of 2.6 is therapeutic. Goal INR of 2.0-3.0      Warfarin dose taken: Warfarin taken as instructed    Diet: No new diet changes affecting INR    Medication changes/ interactions: No new medications/supplements affecting INR    Previous INR: Therapeutic     S/S of bleeding or thromboembolism: No    New injury or illness: No    Upcoming surgery, procedure or cardioversion: No    Additional findings: Patient will be on vacation from -.      PLAN:    Telephone call with Keron regarding INR result and instructed:     Warfarin Dosing Instructions: Continue your current warfarin dose 7.5 mg Fri + 5 mg AOD.    Instructed patient to follow up no later than: 3 weeks  Lab visit scheduled    Education provided: Please call back if any changes to your diet, medications or how you've been taking warfarin, Importance of consistent vitamin K intake, Monitoring for bleeding signs and symptoms, Monitoring for clotting signs and symptoms and Importance of notifying clinic for changes in medications; a sooner lab recheck maybe needed.      Patient verbalizes understanding and agrees to warfarin dosing plan.    Instructed to call the Anticoagulation Clinic for any changes, questions or concerns. (#[phone])        Irma Mckinley RN      OBJECTIVE:  Recent labs: (last 7 days)     21  0840   INR 2.60*         No question data found.  Anticoagulation Summary  As of 2021    INR goal:  2.0-3.0   TTR:  76.0 % (1 y)   INR used for dosin.60 (2021)   Warfarin maintenance plan:  7.5 mg (5 mg x 1.5) every Fri; 5 mg (5 mg x 1) all other days   Full warfarin instructions:  7.5 mg every Fri; 5 mg all other days   Weekly warfarin total:  37.5 mg   Plan last modified:  Marilou Esparza RN (3/12/2021)   Next INR check:     Priority:  Maintenance   Target end date:  Indefinite     Indications    Long term current use of anticoagulant therapy [Z79.01]  Paroxysmal atrial fibrillation (H) [I48.0]  Long term current use of anticoagulants with INR goal of 2.0-3.0 [Z79.01]             Anticoagulation Episode Summary     INR check location:  Anticoagulation Clinic    Preferred lab:      Send INR reminders to:  CONNIE HOBBS    Comments:  5mg tabs / DCCV 7/17/19 & 1/28/20 / early morning appointments on Wed or Fri      Anticoagulation Care Providers     Provider Role Specialty Phone number    Davina Reaves MD Referring Internal Medicine [phone]

## 2021-04-16 DIAGNOSIS — I48.0 PAROXYSMAL ATRIAL FIBRILLATION (H): Primary | ICD-10-CM

## 2021-05-04 ENCOUNTER — TELEPHONE (OUTPATIENT)
Dept: FAMILY MEDICINE | Facility: CLINIC | Age: 71
End: 2021-05-04

## 2021-05-04 ENCOUNTER — ANTICOAGULATION THERAPY VISIT (OUTPATIENT)
Dept: FAMILY MEDICINE | Facility: CLINIC | Age: 71
End: 2021-05-04

## 2021-05-04 DIAGNOSIS — I48.0 PAROXYSMAL ATRIAL FIBRILLATION (H): ICD-10-CM

## 2021-05-04 DIAGNOSIS — R21 SCALY PATCH RASH: Primary | ICD-10-CM

## 2021-05-04 DIAGNOSIS — Z79.01 LONG TERM CURRENT USE OF ANTICOAGULANTS WITH INR GOAL OF 2.0-3.0: ICD-10-CM

## 2021-05-04 DIAGNOSIS — Z79.01 LONG TERM CURRENT USE OF ANTICOAGULANT THERAPY: ICD-10-CM

## 2021-05-04 DIAGNOSIS — Z79.01 LONG TERM CURRENT USE OF ANTICOAGULANTS WITH INR GOAL OF 2.0-3.0: Primary | ICD-10-CM

## 2021-05-04 LAB — INR PPP: 3 (ref 0.86–1.14)

## 2021-05-04 PROCEDURE — 85610 PROTHROMBIN TIME: CPT | Performed by: INTERNAL MEDICINE

## 2021-05-04 PROCEDURE — 36416 COLLJ CAPILLARY BLOOD SPEC: CPT | Performed by: INTERNAL MEDICINE

## 2021-05-04 PROCEDURE — 99207 PR NO CHARGE NURSE ONLY: CPT | Performed by: INTERNAL MEDICINE

## 2021-05-04 NOTE — TELEPHONE ENCOUNTER
ANTICOAGULATION MANAGEMENT      Kevan Connelly due for annual renewal of referral to anticoagulation monitoring. Order pended for your review and signature.      ANTICOAGULATION SUMMARY      Warfarin indication(s)     Atrial fibrillation    Heart valve present?  NO       Current goal range   INR: 2.0-3.0     Goal appropriate for indication? Yes, INR 2-3 appropriate for hx of DVT, PE, hypercoagulable state, Afib, LVAD, or bileaflet AVR without risk factors     Current duration of therapy Indefinite/long term therapy   Time in Therapeutic Range (TTR)  (Goal > 60%) 76.0 %       Office visit with referring provider's group within last year yes on 3/31/21       Kaylee Ramey RN

## 2021-05-04 NOTE — TELEPHONE ENCOUNTER
R21) Scaly patch rash  Comment: bilateral forearms and lower legs; serpiginous patches with scale; see photo;   Plan: triamcinolone (KENALOG) 0.1 % external cream        Steroid will likely improve rash but key is to see if it resolves or recurs quickly; psoriatic in appearance without joint involvement;  dermatology referral will be next step. Pt prefers to try topical and hold off on dermatology. With extensive involvement, may benefit from oral steroid treatment- short term; if not resolved, then derm evaluation to consider biopsy and more definitive treatment

## 2021-05-04 NOTE — TELEPHONE ENCOUNTER
Called and spoke to patient.     Patient referred to Derm by PCP for a rash.     Per chart note, telephone visit 5/4/21:  psoriatic in appearance without joint involvement;  dermatology referral will be next step. Pt prefers to try topical and hold off on dermatology. With extensive involvement, may benefit from oral steroid treatment- short term; if not resolved, then derm evaluation to consider biopsy and more definitive treatment    Rescheduled appointment for an earlier date.    Patient voiced understanding.    DIO Camejo-BSN-PHN  [facility]  [phone]

## 2021-05-04 NOTE — TELEPHONE ENCOUNTER
Reason for Call:  Other QUESTION     Detailed comments: Patient called in today in regards to scheduling an appointment. Patient scheduled on 06/21/21. Patient asked to have someone call him. Thank you    Phone Number Patient can be reached at: Home number on file [phone] (home)    Best Time: anytime    Can we leave a detailed message on this number? YES    Call taken on 5/4/2021 at 3:53 PM by Joan Faith

## 2021-05-04 NOTE — TELEPHONE ENCOUNTER
Left detailed message.   Michelle w/ DN- referral to [facility].   Dr. Patrick Hernandez or Joan CRUZ  Phone: [phone]    Please be aware that coverage of these services is subject to the terms and limitations of your health insurance plan.  Call member services at your health plan with any benefit or coverage questions.  [employer] will call you to coordinate your care as prescribed by the provider.  If you don t hear from a representative within 2 business days, please call the number listed above.  [facility]   [address] [place] [id number]   Phone: [phone]   Fax: [phone]     Call w/ questions/concerns.     Carol JUDGE RN

## 2021-05-04 NOTE — PROGRESS NOTES
ANTICOAGULATION FOLLOW-UP CLINIC VISIT    Patient Name:  Kevan Connelly  Date:  5/4/2021  Contact Type:  Telephone    SUBJECTIVE:  Patient Findings     Positives:  Change in diet/appetite    Comments:  Called patient to discuss today's INR results: The patient was assessed for medication, and activity level changes, missed or extra doses, bruising or bleeding, with no problem findings. However, patient states he just returned from vacation and he had less green veggies. He plans to get back to regular intake. Reviewed maintenance warfarin dosing with patient. Patient will remain on the same dose until next INR check. No other questions or concerns. Scheduled next lab-only INR in 6 weeks.  Kaylee MICHELLE RN  Anticoagulation Team            Clinical Outcomes     Negatives:  Major bleeding event, Thromboembolic event, Anticoagulation-related hospital admission, Anticoagulation-related ED visit, Anticoagulation-related fatality    Comments:  Called patient to discuss today's INR results: The patient was assessed for medication, and activity level changes, missed or extra doses, bruising or bleeding, with no problem findings. However, patient states he just returned from vacation and he had less green veggies. He plans to get back to regular intake. Reviewed maintenance warfarin dosing with patient. Patient will remain on the same dose until next INR check. No other questions or concerns. Scheduled next lab-only INR in 6 weeks.  Kaylee MICHELLE RN  Anticoagulation Team               OBJECTIVE    Recent labs: (last 7 days)     05/04/21  1009   INR 3.00*       ASSESSMENT / PLAN  INR assessment THER    Recheck INR In: 6 WEEKS    INR Location Clinic      Anticoagulation Summary  As of 5/4/2021    INR goal:  2.0-3.0   TTR:  76.0 % (1 y)   INR used for dosing:  3.00 (5/4/2021)   Warfarin maintenance plan:  7.5 mg (5 mg x 1.5) every Fri; 5 mg (5 mg x 1) all other days   Full warfarin instructions:  7.5 mg every Fri; 5 mg all other  days   Weekly warfarin total:  37.5 mg   No change documented:  Kaylee Ramey RN   Plan last modified:  Marilou Esparza RN (3/12/2021)   Next INR check:  6/15/2021   Priority:  Maintenance   Target end date:  Indefinite    Indications    Long term current use of anticoagulant therapy [Z79.01]  Paroxysmal atrial fibrillation (H) [I48.0]  Long term current use of anticoagulants with INR goal of 2.0-3.0 [Z79.01]             Anticoagulation Episode Summary     INR check location:  Anticoagulation Clinic    Preferred lab:      Send INR reminders to:  CONNIE HOBBS    Comments:  5mg tabs / DCCV 7/17/19 & 1/28/20 / early morning appointments on Wed or Fri      Anticoagulation Care Providers     Provider Role Specialty Phone number    Davina Reaves MD Referring Internal Medicine [phone]            See the Encounter Report to view Anticoagulation Flowsheet and Dosing Calendar (Go to Encounters tab in chart review, and find the Anticoagulation Therapy Visit)    Kaylee Ramey, RN

## 2021-05-04 NOTE — TELEPHONE ENCOUNTER
Reason for call:  Symptom   Symptom or request: Rash has worsen since 3/31 visit    Duration (how long have symptoms been present): Since last visit  Have you been treated for this before? Yes    Additional comments: Worsening and would like to know if there is something else to use to treat it or does he need to see a dermatologist    Phone number to reach patient:  Cell number on file:    Telephone Information:   Mobile [phone]       Best Time:  any    Can we leave a detailed message on this number?  YES    Travel screening: Not Applicable

## 2021-05-13 ENCOUNTER — TRANSFERRED RECORDS (OUTPATIENT)
Dept: HEALTH INFORMATION MANAGEMENT | Facility: CLINIC | Age: 71
End: 2021-05-13

## 2021-05-13 ENCOUNTER — OFFICE VISIT (OUTPATIENT)
Dept: DERMATOLOGY | Facility: CLINIC | Age: 71
End: 2021-05-13
Payer: COMMERCIAL

## 2021-05-13 DIAGNOSIS — L82.1 SEBORRHEIC KERATOSIS: ICD-10-CM

## 2021-05-13 DIAGNOSIS — L57.0 AK (ACTINIC KERATOSIS): ICD-10-CM

## 2021-05-13 DIAGNOSIS — D23.9 DERMAL NEVUS: Primary | ICD-10-CM

## 2021-05-13 DIAGNOSIS — R21 SCALY PATCH RASH: ICD-10-CM

## 2021-05-13 DIAGNOSIS — L81.4 LENTIGO: ICD-10-CM

## 2021-05-13 PROCEDURE — 99203 OFFICE O/P NEW LOW 30 MIN: CPT | Performed by: DERMATOLOGY

## 2021-05-13 RX ORDER — FLUOROURACIL 50 MG/G
CREAM TOPICAL
Qty: 40 G | Refills: 1 | Status: SHIPPED | OUTPATIENT
Start: 2021-05-13 | End: 2022-01-12

## 2021-05-13 RX ORDER — TRETINOIN 0.5 MG/G
CREAM TOPICAL
Qty: 45 G | Refills: 3 | Status: SHIPPED | OUTPATIENT
Start: 2021-05-13 | End: 2022-01-12

## 2021-05-13 RX ORDER — CALCIPOTRIENE 50 UG/G
CREAM TOPICAL
Qty: 60 G | Refills: 3 | Status: SHIPPED | OUTPATIENT
Start: 2021-05-13 | End: 2022-01-12

## 2021-05-13 NOTE — LETTER
5/13/2021         RE: Kevan Connelly  [id number]  [facility] [address]        Dear Colleague,    Thank you for referring your patient, Kevan Connelly, to the [facility]. Please see a copy of my visit note below.    Kevan Connelly , a 70 year old year old male patient, I was asked to see by Dr. Reaves for rash on arms.  Patient states this has been present for a while.  Patient reports the following symptoms:  scale .  Patient reports the following previous treatments cortisone.  Patient reports the following modifying factors none.  Associated symptoms: none.  Patient has no other skin complaints today.  Remainder of the HPI, Meds, PMH, Allergies, FH, and SH was reviewed in chart.      Past Medical History:   Diagnosis Date     Atrial flutter (H)      Benign essential hypertension 1/18/2017    past use of ACE- Cough;  Had been on ARB-diuretic but experienced lower bp readings;  BLOOD PRESSURE now well controlled on ARB also no longer on diuretic; no low bp readings; may have had slight edema initially but has normalized with bp well controlled on ARB alone.      Cancer (H)     testicular cancer     CHF (congestive heart failure) (H) 6/5/2019     Long term current use of anticoagulant therapy 6/3/2019     Obesity (BMI 35.0-39.9) with comorbidity (H) 2/11/2019     Paroxysmal atrial fibrillation (H) 06/03/2019       Past Surgical History:   Procedure Laterality Date     ANESTHESIA CARDIOVERSION N/A 6/26/2019    Procedure: ANESTHESIA, FOR CARDIOVERSION DR. LE;  Surgeon: GENERIC ANESTHESIA PROVIDER;  Location:  OR     ANESTHESIA CARDIOVERSION N/A 7/17/2019    Procedure: ANESTHESIA, FOR CARDIOVERSION;  Surgeon: GENERIC ANESTHESIA PROVIDER;  Location:  OR     ANESTHESIA CARDIOVERSION N/A 1/28/2020    Procedure: ANESTHESIA, FOR CARDIOVERSION;  Surgeon: GENERIC ANESTHESIA PROVIDER;  Location:  OR     ANESTHESIA CARDIOVERSION N/A 9/8/2020     Procedure: ANESTHESIA, FOR CARDIOVERSION;  Surgeon: GENERIC ANESTHESIA PROVIDER;  Location:  OR     ANESTHESIA CARDIOVERSION N/A 11/23/2020    Procedure: ANESTHESIA, FOR CARDIOVERSION;  Surgeon: GENERIC ANESTHESIA PROVIDER;  Location: [facility] OR     CLOSED REDUCTION SHOULDER Left      EP ABLATION FOCAL AFIB N/A 1/3/2020    Procedure: EP Ablation Focal AFIB;  Surgeon: Lamont Hannon MD;  Location:  [facility]ARD[facility] CATH LAB     GENITOURINARY SURGERY  1990    testicular cancer     ORTHOPEDIC SURGERY  1970's    right knee surgery         Family History   Problem Relation Age of Onset     Colon Cancer Father      Coronary Artery Disease Maternal Grandmother        Social History     Socioeconomic History     Marital status:      Spouse name: Not on file     Number of children: Not on file     Years of education: Not on file     Highest education level: Not on file   Occupational History     Not on file   Social Needs     Financial resource strain: Not on file     Food insecurity     Worry: Not on file     Inability: Not on file     Transportation needs     Medical: Not on file     Non-medical: Not on file   Tobacco Use     Smoking status: Never Smoker     Smokeless tobacco: Never Used   Substance and Sexual Activity     Alcohol use: Yes     Comment: every 1-2 months     Drug use: No     Sexual activity: Yes     Partners: Female   Lifestyle     Physical activity     Days per week: Not on file     Minutes per session: Not on file     Stress: Not on file   Relationships     Social connections     Talks on phone: Not on file     Gets together: Not on file     Attends Muslim service: Not on file     Active member of club or organization: Not on file     Attends meetings of clubs or organizations: Not on file     Relationship status: Not on file     Intimate partner violence     Fear of current or ex partner: Not on file     Emotionally abused: Not on file     Physically abused: Not on file     Forced sexual  activity: Not on file   Other Topics Concern     Parent/sibling w/ CABG, MI or angioplasty before 65F 55M? Yes   Social History Narrative     Not on file       Outpatient Encounter Medications as of 5/13/2021   Medication Sig Dispense Refill     acetaminophen (TYLENOL) 500 MG tablet Take 500 mg by mouth every 6 hours as needed for mild pain       atorvastatin (LIPITOR) 20 MG tablet Take 1 tablet (20 mg) by mouth daily 90 tablet 1     flecainide (TAMBOCOR) 50 MG tablet Take 75 mg by mouth 2 times daily       losartan (COZAAR) 50 MG tablet Take 1 tablet (50 mg) by mouth daily 90 tablet 3     melatonin 3 MG CAPS Take 3 mg by mouth At Bedtime       metoprolol succinate ER (TOPROL-XL) 50 MG 24 hr tablet Take 0.5 tablets (25 mg) by mouth 2 times daily 90 tablet 1     sildenafil (REVATIO) 20 MG tablet Take 1 tablet (20 mg) by mouth daily as needed 30 tablet 3     triamcinolone (KENALOG) 0.1 % external cream Apply topically 2 times daily 45 g 0     warfarin ANTICOAGULANT (COUMADIN) 5 MG tablet Take one tablet (5 mg) by mouth daily; or as advised by INR Clinic. 90 tablet 1     No facility-administered encounter medications on file as of 5/13/2021.              Review Of Systems  Skin: As above  Eyes: negative  Ears/Nose/Throat: negative  Respiratory: No shortness of breath, dyspnea on exertion, cough, or hemoptysis  Cardiovascular: negative  Gastrointestinal: negative  Genitourinary: negative  Musculoskeletal: negative  Neurologic: negative  Psychiatric: negative  Hematologic/Lymphatic/Immunologic: negative  Endocrine: negative      O:   NAD, WDWN, Alert & Oriented, Mood & Affect wnl, Vitals stable   Here today alone   General appearance dale ii   Vitals stable   Alert, oriented and in no acute distress     Gritty papules bl forearms   Stuck on papules and brown macules on trunk and ext   Flesh colored papules on face     The remainder of expanded problem focused exam was normal; the following areas were examined:   conjunctiva/lids, face, neck, , chest, digits/nails, RUE, LUE.      Eyes: Conjunctivae/lids:Normal     ENT: Lips, buccal mucosa, tongue: normal    MSK:Normal    Cardiovascular: peripheral edema slight     Pulm: Breathing Normal    Neuro/Psych: Orientation:Normal; Mood/Affect:Normal      A/P:  1. Seborrheic keratosis, lentigo, dermal nevus  2. Actinic keratosis   Pathophysiology discussed with pateint   Using 5-Flurouracil Cream    5-Fluorouracil (5FU) topical cream (brand names Efudex, Carac) is a prescription topical medicine to treat actinic keratoses (pre-squamous cell skin cancer lesions), sun-damaged skin as well as superficial skin cancers.    When applied the areas of sun-damaged skin, the 5FU will  find  damaged skin cells and destroy them.   During treatment, the skin will become red and look very irritated. This is the expected  normal response,  Some patients using 5FU show minimal redness and scaling while others have a very  vigorous  response where the skin scabs and peels. The important thing to realize is that 5FU is treating sun-damaged skin that carries skin cancer risk.      While the skin is irritated, open, sore or scabbed you can apply aquaphor, vaseline or 1% hydrocortisone cream in the morning.     You should apply a thin layer of the cream to the affected area twice a day for 2  weeks every night. A strip of cream the length of your finger tip should be enough to cover your entire face.  For tougher skin like arms, legs, or back, we may suggest longer treatment plans.  However if you react really strong and fast, you might stop earlier or use less frequently. - please call if it is very strong and you are concern you might need to stop early.     If you prescription coverage allows we may add calcipotriene (Dovonex ), a vitamin-D derivative, to the treatment plan.  In these cases we will have you mix the calcipotriene with the efudex to help shorten the treatment course and improve outcomes.       Typically very strong reactions are related to lots of underlying sun damage, and this means you are getting a good response to the medication. However, there is no need to be miserable while using this. Please let us know if you are having trouble or concerns!    It was a pleasure speaking to Kevan Connelly today.  Previous clinic  notes and pertinent laboratory tests were reviewed prior to Kevan Connelly's visit.  Signs and Symptoms of skin cancer discussed with patient.  Patient encouraged to perform monthly skin exams.  UV precautions reviewed with patient.  Return to clinic 3 months        Again, thank you for allowing me to participate in the care of your patient.        Sincerely,        Patrick Hernandez MD

## 2021-05-13 NOTE — PROGRESS NOTES
Kevan Connelly , a 70 year old year old male patient, I was asked to see by Dr. Reaves for rash on arms.  Patient states this has been present for a while.  Patient reports the following symptoms:  scale .  Patient reports the following previous treatments cortisone.  Patient reports the following modifying factors none.  Associated symptoms: none.  Patient has no other skin complaints today.  Remainder of the HPI, Meds, PMH, Allergies, FH, and SH was reviewed in chart.      Past Medical History:   Diagnosis Date     Atrial flutter (H)      Benign essential hypertension 1/18/2017    past use of ACE- Cough;  Had been on ARB-diuretic but experienced lower bp readings;  BLOOD PRESSURE now well controlled on ARB also no longer on diuretic; no low bp readings; may have had slight edema initially but has normalized with bp well controlled on ARB alone.      Cancer (H)     testicular cancer     CHF (congestive heart failure) (H) 6/5/2019     Long term current use of anticoagulant therapy 6/3/2019     Obesity (BMI 35.0-39.9) with comorbidity (H) 2/11/2019     Paroxysmal atrial fibrillation (H) 06/03/2019       Past Surgical History:   Procedure Laterality Date     ANESTHESIA CARDIOVERSION N/A 6/26/2019    Procedure: ANESTHESIA, FOR CARDIOVERSION DR. LE;  Surgeon: GENERIC ANESTHESIA PROVIDER;  Location: [facility] OR     ANESTHESIA CARDIOVERSION N/A 7/17/2019    Procedure: ANESTHESIA, FOR CARDIOVERSION;  Surgeon: GENERIC ANESTHESIA PROVIDER;  Location: [facility] OR     ANESTHESIA CARDIOVERSION N/A 1/28/2020    Procedure: ANESTHESIA, FOR CARDIOVERSION;  Surgeon: GENERIC ANESTHESIA PROVIDER;  Location: [facility] OR     ANESTHESIA CARDIOVERSION N/A 9/8/2020    Procedure: ANESTHESIA, FOR CARDIOVERSION;  Surgeon: GENERIC ANESTHESIA PROVIDER;  Location: [facility] OR     ANESTHESIA CARDIOVERSION N/A 11/23/2020    Procedure: ANESTHESIA, FOR CARDIOVERSION;  Surgeon: GENERIC ANESTHESIA PROVIDER;  Location: [facility] OR     CLOSED REDUCTION SHOULDER Left      EP  ABLATION FOCAL AFIB N/A 1/3/2020    Procedure: EP Ablation Focal AFIB;  Surgeon: Lamont Hannon MD;  Location:  [facility] CATH LAB     GENITOURINARY SURGERY  1990    testicular cancer     ORTHOPEDIC SURGERY  1970's    right knee surgery         Family History   Problem Relation Age of Onset     Colon Cancer Father      Coronary Artery Disease Maternal Grandmother        Social History     Socioeconomic History     Marital status:      Spouse name: Not on file     Number of children: Not on file     Years of education: Not on file     Highest education level: Not on file   Occupational History     Not on file   Social Needs     Financial resource strain: Not on file     Food insecurity     Worry: Not on file     Inability: Not on file     Transportation needs     Medical: Not on file     Non-medical: Not on file   Tobacco Use     Smoking status: Never Smoker     Smokeless tobacco: Never Used   Substance and Sexual Activity     Alcohol use: Yes     Comment: every 1-2 months     Drug use: No     Sexual activity: Yes     Partners: Female   Lifestyle     Physical activity     Days per week: Not on file     Minutes per session: Not on file     Stress: Not on file   Relationships     Social connections     Talks on phone: Not on file     Gets together: Not on file     Attends Sabianist service: Not on file     Active member of club or organization: Not on file     Attends meetings of clubs or organizations: Not on file     Relationship status: Not on file     Intimate partner violence     Fear of current or ex partner: Not on file     Emotionally abused: Not on file     Physically abused: Not on file     Forced sexual activity: Not on file   Other Topics Concern     Parent/sibling w/ CABG, MI or angioplasty before 65F 55M? Yes   Social History Narrative     Not on file       Outpatient Encounter Medications as of 5/13/2021   Medication Sig Dispense Refill     acetaminophen (TYLENOL) 500 MG tablet Take  500 mg by mouth every 6 hours as needed for mild pain       atorvastatin (LIPITOR) 20 MG tablet Take 1 tablet (20 mg) by mouth daily 90 tablet 1     flecainide (TAMBOCOR) 50 MG tablet Take 75 mg by mouth 2 times daily       losartan (COZAAR) 50 MG tablet Take 1 tablet (50 mg) by mouth daily 90 tablet 3     melatonin 3 MG CAPS Take 3 mg by mouth At Bedtime       metoprolol succinate ER (TOPROL-XL) 50 MG 24 hr tablet Take 0.5 tablets (25 mg) by mouth 2 times daily 90 tablet 1     sildenafil (REVATIO) 20 MG tablet Take 1 tablet (20 mg) by mouth daily as needed 30 tablet 3     triamcinolone (KENALOG) 0.1 % external cream Apply topically 2 times daily 45 g 0     warfarin ANTICOAGULANT (COUMADIN) 5 MG tablet Take one tablet (5 mg) by mouth daily; or as advised by INR Clinic. 90 tablet 1     No facility-administered encounter medications on file as of 5/13/2021.              Review Of Systems  Skin: As above  Eyes: negative  Ears/Nose/Throat: negative  Respiratory: No shortness of breath, dyspnea on exertion, cough, or hemoptysis  Cardiovascular: negative  Gastrointestinal: negative  Genitourinary: negative  Musculoskeletal: negative  Neurologic: negative  Psychiatric: negative  Hematologic/Lymphatic/Immunologic: negative  Endocrine: negative      O:   NAD, WDWN, Alert & Oriented, Mood & Affect wnl, Vitals stable   Here today alone   General appearance dale ii   Vitals stable   Alert, oriented and in no acute distress     Gritty papules bl forearms   Stuck on papules and brown macules on trunk and ext   Flesh colored papules on face     The remainder of expanded problem focused exam was normal; the following areas were examined:  conjunctiva/lids, face, neck, , chest, digits/nails, RUE, LUE.      Eyes: Conjunctivae/lids:Normal     ENT: Lips, buccal mucosa, tongue: normal    MSK:Normal    Cardiovascular: peripheral edema slight     Pulm: Breathing Normal    Neuro/Psych: Orientation:Normal; Mood/Affect:Normal      A/P:  1.  Seborrheic keratosis, lentigo, dermal nevus  2. Actinic keratosis   Pathophysiology discussed with pateint   Using 5-Flurouracil Cream    5-Fluorouracil (5FU) topical cream (brand names Efudex, Carac) is a prescription topical medicine to treat actinic keratoses (pre-squamous cell skin cancer lesions), sun-damaged skin as well as superficial skin cancers.    When applied the areas of sun-damaged skin, the 5FU will  find  damaged skin cells and destroy them.   During treatment, the skin will become red and look very irritated. This is the expected  normal response,  Some patients using 5FU show minimal redness and scaling while others have a very  vigorous  response where the skin scabs and peels. The important thing to realize is that 5FU is treating sun-damaged skin that carries skin cancer risk.      While the skin is irritated, open, sore or scabbed you can apply aquaphor, vaseline or 1% hydrocortisone cream in the morning.     You should apply a thin layer of the cream to the affected area twice a day for 2  weeks every night. A strip of cream the length of your finger tip should be enough to cover your entire face.  For tougher skin like arms, legs, or back, we may suggest longer treatment plans.  However if you react really strong and fast, you might stop earlier or use less frequently. - please call if it is very strong and you are concern you might need to stop early.     If you prescription coverage allows we may add calcipotriene (Dovonex ), a vitamin-D derivative, to the treatment plan.  In these cases we will have you mix the calcipotriene with the efudex to help shorten the treatment course and improve outcomes.      Typically very strong reactions are related to lots of underlying sun damage, and this means you are getting a good response to the medication. However, there is no need to be miserable while using this. Please let us know if you are having trouble or concerns!    It was a pleasure speaking  to Kevan Connelly today.  Previous clinic  notes and pertinent laboratory tests were reviewed prior to Kevan Connelly's visit.  Signs and Symptoms of skin cancer discussed with patient.  Patient encouraged to perform monthly skin exams.  UV precautions reviewed with patient.  Return to clinic 3 months

## 2021-05-21 ENCOUNTER — THERAPY VISIT (OUTPATIENT)
Dept: PHYSICAL THERAPY | Facility: CLINIC | Age: 71
End: 2021-05-21
Payer: COMMERCIAL

## 2021-05-21 DIAGNOSIS — M25.511 ACUTE PAIN OF RIGHT SHOULDER: ICD-10-CM

## 2021-05-21 PROCEDURE — 97161 PT EVAL LOW COMPLEX 20 MIN: CPT | Mod: GP | Performed by: PHYSICAL THERAPIST

## 2021-05-21 PROCEDURE — 97110 THERAPEUTIC EXERCISES: CPT | Mod: GP | Performed by: PHYSICAL THERAPIST

## 2021-05-21 NOTE — PROGRESS NOTES
Physical Therapy Initial Evaluation  Subjective:  The history is provided by the patient. No  was used.   Patient Health History  Mathur DHRUV Connelly being seen for Right shoulder pain.     Problem began: 4/27/2021.   Problem occurred: Pulling weight across my body, heard/felt pop in shoulder area.   Pain is reported as 3/10 on pain scale.  General health as reported by patient is good.  Pertinent medical history includes: cancer, heart problems, high blood pressure and changes in skin color.     Medical allergies: none.   Surgeries include:  Orthopedic surgery, heart surgery and cancer surgery.    Current medications:  Cardiac medication, heparin/coumadin, high blood pressure medication and other. Other medications details: Creams for sun damaged skin-fore arms.    Current occupation is retired .   Primary job tasks include:  Driving, lifting/carrying and pushing/pulling.                  Therapist Generated HPI Evaluation  Problem details: Pt. complains of right shoulder pain that has been present since pulling a weight across his body on 4/27/21.  PT order dated 5/13/21.      .         Type of problem:  Right shoulder.    This is a new condition.  Condition occurred with:  Lifting (pulling off calf length sock).  Where condition occurred: at home.  Patient reports pain:  In the joint, lateral and upper arm.  Pain is described as aching and is intermittent.  Pain radiates to:  Upper arm. Pain is worse during the day.  Since onset symptoms are gradually improving.  Associated symptoms:  Loss of motion/stiffness and loss of strength. Symptoms are exacerbated by using arm at shoulder level, using arm overhead and using arm behind back  and relieved by rest.  Special tests included:  X-ray (negative).    Barriers include:  None as reported by patient.                        Objective:  Standing Alignment:      Shoulder/UE:  Rounded shoulders                  Flexibility/Screens:    Positive screens:  ShoulderNegative screens: Cervical                              Shoulder Evaluation:  ROM:  AROM:  normal                            PROM:  normal                            Pain: ERP with active flexion, combined ext/IR and abd/ER    Strength:    Flexion: Left:5/5    Pain: -    Right: 4/5      Pain:  -/+    Abduction:  Left: 5/5   Pain:-    Right: 4/5      Pain:-/+    Internal Rotation:  Left:5/5      Pain:-    Right: 4+/5      Pain:-/+  External Rotation:   Left:5/5      Pain:-   Right:5/5      Pain:-            Stability Testing:  normal      Special Tests:      Left shoulder negative for the following special tests:  Impingement; Bursal and Rotator cuff tear  Right shoulder positive for the following special tests:Impingement  Right shoulder negative for the following special tests:Bursal and Rotator cuff tear  Palpation:      Left shoulder tenderness not present at: Biceps; Supraspinatus; Deltoid; Levator; Upper Trap or Bicipital Groove  Right shoulder tenderness present at: Biceps and Bicipital Groove  Right shoulder tenderness not present at:Supraspinatus; Deltoid; Levator or Upper Trap  Mobility Tests:  normal                                                 General     ROS    Assessment/Plan:    Patient is a 70 year old male with right side shoulder complaints.    Patient has the following significant findings with corresponding treatment plan.                Diagnosis 1:  R shoulder pain  Pain -  self management, education, directional preference exercise and home program  Decreased strength - therapeutic exercise and therapeutic activities  Impaired muscle performance - neuro re-education  Decreased function - therapeutic activities    Therapy Evaluation Codes:   1) Clinical presentation characteristics are:   Stable/Uncomplicated.  2) Decision-Making    Low complexity using standardized patient assessment instrument and/or measureable assessment of functional outcome.  Cumulative  Therapy Evaluation is: Low complexity.    Previous and current functional limitations:  (See Goal Flow Sheet for this information)    Short term and Long term goals: (See Goal Flow Sheet for this information)     Communication ability:  Patient appears to be able to clearly communicate and understand verbal and written communication and follow directions correctly.  Treatment Explanation - The following has been discussed with the patient:   RX ordered/plan of care  Anticipated outcomes  Possible risks and side effects  This patient would benefit from PT intervention to resume normal activities.   Rehab potential is good.    Frequency:  1 X week, once daily  Duration:  for 6 weeks  Discharge Plan:  Achieve all LTG.  Independent in home treatment program.  Reach maximal therapeutic benefit.    Please refer to the daily flowsheet for treatment today, total treatment time and time spent performing 1:1 timed codes.

## 2021-05-25 ENCOUNTER — OFFICE VISIT (OUTPATIENT)
Dept: FAMILY MEDICINE | Facility: CLINIC | Age: 71
End: 2021-05-25
Payer: COMMERCIAL

## 2021-05-25 VITALS
OXYGEN SATURATION: 97 % | SYSTOLIC BLOOD PRESSURE: 142 MMHG | BODY MASS INDEX: 34.08 KG/M2 | DIASTOLIC BLOOD PRESSURE: 80 MMHG | TEMPERATURE: 98.1 F | WEIGHT: 280 LBS | HEART RATE: 66 BPM

## 2021-05-25 DIAGNOSIS — I77.810 THORACIC AORTIC ECTASIA (H): ICD-10-CM

## 2021-05-25 DIAGNOSIS — N18.30 STAGE 3 CHRONIC KIDNEY DISEASE, UNSPECIFIED WHETHER STAGE 3A OR 3B CKD (H): ICD-10-CM

## 2021-05-25 DIAGNOSIS — I42.9 CARDIOMYOPATHY, UNSPECIFIED TYPE (H): ICD-10-CM

## 2021-05-25 DIAGNOSIS — L30.9 PERIORBITAL DERMATITIS: Primary | ICD-10-CM

## 2021-05-25 DIAGNOSIS — E66.01 MORBID OBESITY (H): ICD-10-CM

## 2021-05-25 PROCEDURE — 99214 OFFICE O/P EST MOD 30 MIN: CPT | Performed by: PHYSICIAN ASSISTANT

## 2021-05-25 RX ORDER — MUPIROCIN 20 MG/G
OINTMENT TOPICAL 2 TIMES DAILY
Qty: 15 G | Refills: 0 | Status: SHIPPED | OUTPATIENT
Start: 2021-05-25 | End: 2022-01-12

## 2021-05-25 RX ORDER — POLYMYXIN B SULFATE AND TRIMETHOPRIM 1; 10000 MG/ML; [USP'U]/ML
1-2 SOLUTION OPHTHALMIC EVERY 4 HOURS
Qty: 10 ML | Refills: 0 | Status: SHIPPED | OUTPATIENT
Start: 2021-05-25 | End: 2022-01-12

## 2021-05-25 NOTE — PROGRESS NOTES
Assessment & Plan     Periorbital dermatitis  Present on exam. Likely contact from using topical arm medications and touching eyes prior to washing.educated on this. Will cover for secondary infection. And to continue sparing otc 1% hydrocortisone. If no improvement in 5 days, patient to call. Oral steroid to be considered. If worsening, rtc.   - mupirocin (BACTROBAN) 2 % external ointment; Apply topically 2 times daily  - trimethoprim-polymyxin b (POLYTRIM) 40398-1.1 UNIT/ML-% ophthalmic solution; Place 1-2 drops Into the left eye every 4 hours  -Medication use and side effects discussed with the patient. Patient is in complete understanding and agreement with plan.       Cardiomyopathy, unspecified type (H)  Followed by cardiology.     Thoracic aortic ectasia (H)  As above     Morbid obesity (H)  Followed by pcp.     Stage 3 chronic kidney disease, unspecified whether stage 3a or 3b CKD  As above       14 minutes spent on the date of the encounter doing chart review, patient visit and documentation        Return in about 4 months (around 9/25/2021) for BP Recheck, with pcp.    Escobar Bee PA-C  [facility] TRACE Cabrales is a 70 year old who presents for the following health issues HPI     Eye(s) Problem  Onset/Duration: about 3 days   Description:   Location: YES- was both now left   Pain: YES- minimal   Redness: YES  Accompanying Signs & Symptoms:  Discharge/mattering: YES  Swelling: YES  Visual changes: no  Fever: no  Nasal Congestion: no  Bothered by bright lights: YES  History:  Trauma: no  Foreign body exposure: no  Wearing contacts: no  Precipitating or alleviating factors: None  Therapies tried and outcome: ice and Cortizone cream on the exterior with little help        Review of Systems   Constitutional, HEENT, cardiovascular, pulmonary, GI, , musculoskeletal, neuro, skin, endocrine and psych systems are negative, except as otherwise noted.      Objective     BP (!) 142/80   Pulse 66   Temp 98.1  F (36.7  C) (Oral)   Wt 127 kg (280 lb)   SpO2 97%   BMI 34.08 kg/m    Body mass index is 34.08 kg/m .  Physical Exam   GENERAL: alert and no distress  EYES: PERRL, EOMI, conjunctiva/corneas- conjunctival injection OS and erythematous and mild edema lateral periorbit of left eye.   PSYCH: mentation appears normal, affect normal/bright

## 2021-05-26 ENCOUNTER — MYC MEDICAL ADVICE (OUTPATIENT)
Dept: DERMATOLOGY | Facility: CLINIC | Age: 71
End: 2021-05-26

## 2021-05-26 NOTE — TELEPHONE ENCOUNTER
[employer] message sent:    Nikolas Cabrales-    Here is a list of skin care products Dr. Hernandez recommends:    -Eliminate harsh soaps, i.e. Dial, Zest, Arabic Spring  -Use mild soaps such as Cetaphil or Dove Sensitive Skin  -Avoid hot or cold showers  -After showering, lightly dry off   -Aggressive use of a moisturizer (including Vanicream, Cetaphil, Aquaphor or Cerave)     -We recommend using a tub that needs to be scooped out, not a pump. This has more of an oil base. It will hold moisture in your skin much better than a water base moisturizer. The ones recommended are non- pore clogging.    DIO Camejo-BSN-N  [facility]  [phone]

## 2021-05-27 ENCOUNTER — THERAPY VISIT (OUTPATIENT)
Dept: PHYSICAL THERAPY | Facility: CLINIC | Age: 71
End: 2021-05-27
Payer: COMMERCIAL

## 2021-05-27 DIAGNOSIS — M25.511 ACUTE PAIN OF RIGHT SHOULDER: ICD-10-CM

## 2021-05-27 PROCEDURE — 97110 THERAPEUTIC EXERCISES: CPT | Mod: GP | Performed by: PHYSICAL THERAPIST

## 2021-05-27 PROCEDURE — 97112 NEUROMUSCULAR REEDUCATION: CPT | Mod: GP | Performed by: PHYSICAL THERAPIST

## 2021-06-01 ENCOUNTER — MYC MEDICAL ADVICE (OUTPATIENT)
Dept: DERMATOLOGY | Facility: CLINIC | Age: 71
End: 2021-06-01

## 2021-06-01 DIAGNOSIS — I48.91 ATRIAL FIBRILLATION, UNSPECIFIED TYPE (H): ICD-10-CM

## 2021-06-01 NOTE — TELEPHONE ENCOUNTER
[employer] message sent:    Nikolas Cabrales-    Yes, if you have completed the 14 days you are done.    Dr. Hernandez would like you to follow up with him in 3 months.    Let me know if you have any other questions,    DIO Camejo-BSN-N  [facility]  [phone]

## 2021-06-02 DIAGNOSIS — I48.19 PERSISTENT ATRIAL FIBRILLATION (H): ICD-10-CM

## 2021-06-02 RX ORDER — WARFARIN SODIUM 5 MG/1
TABLET ORAL
Qty: 96 TABLET | Refills: 1 | Status: SHIPPED | OUTPATIENT
Start: 2021-06-02 | End: 2021-11-18

## 2021-06-02 RX ORDER — METOPROLOL SUCCINATE 50 MG/1
25 TABLET, EXTENDED RELEASE ORAL 2 TIMES DAILY
Qty: 90 TABLET | Refills: 0 | Status: SHIPPED | OUTPATIENT
Start: 2021-06-02 | End: 2021-06-14

## 2021-06-02 NOTE — TELEPHONE ENCOUNTER
Reviewed last INR visit and last refill. No concerns. Prescription approved per [facility] Refill Protocol.  Kaylee MICHELLE RN  Anticoagulation Team

## 2021-06-09 ENCOUNTER — OFFICE VISIT (OUTPATIENT)
Dept: CARDIOLOGY | Facility: CLINIC | Age: 71
End: 2021-06-09
Attending: NURSE PRACTITIONER
Payer: COMMERCIAL

## 2021-06-09 VITALS
SYSTOLIC BLOOD PRESSURE: 160 MMHG | WEIGHT: 278.2 LBS | OXYGEN SATURATION: 97 % | BODY MASS INDEX: 33.88 KG/M2 | HEART RATE: 70 BPM | DIASTOLIC BLOOD PRESSURE: 80 MMHG | HEIGHT: 76 IN

## 2021-06-09 DIAGNOSIS — I10 BENIGN ESSENTIAL HYPERTENSION: ICD-10-CM

## 2021-06-09 DIAGNOSIS — I48.0 PAROXYSMAL ATRIAL FIBRILLATION (H): ICD-10-CM

## 2021-06-09 PROCEDURE — 93000 ELECTROCARDIOGRAM COMPLETE: CPT | Performed by: INTERNAL MEDICINE

## 2021-06-09 PROCEDURE — 99214 OFFICE O/P EST MOD 30 MIN: CPT | Mod: 25 | Performed by: INTERNAL MEDICINE

## 2021-06-09 RX ORDER — LOSARTAN POTASSIUM 100 MG/1
50 TABLET ORAL DAILY
Qty: 30 TABLET | Refills: 3 | Status: SHIPPED | OUTPATIENT
Start: 2021-06-09 | End: 2021-06-22

## 2021-06-09 ASSESSMENT — MIFFLIN-ST. JEOR: SCORE: 2133.41

## 2021-06-09 NOTE — LETTER
6/9/2021    Davina Reaves MD  [address]  [facility] [address]    RE: Kevan Connelly       Dear Colleague,    I had the pleasure of seeing Kevan Connelly in the [facility].    Electrophysiology/ Clinic Note         H&P and Plan:     REASON FOR VISIT: Electrophysiology evaluation.      HISTORY OF PRESENT ILLNESS: Mr. Connelly is a pleasant  70-year-old gentleman with a history of hypertension, hyperlipidemia and recurrent persistent atrial fibrillation (previous tachycardia mediated cardiomyopathy-resolved; A. fib ablation on 1/3/2020), who is here for team evaluation.     Patient underwent A. fib ablation on 1/3/2020 and is on chronic therapy with flecainide and metoprolol.  Today, he informs he is doing well.  He denies any recurrence of A. fib last year.  He has no complaints during this visit and denies any symptoms such as chest pain, palpitations, shortness of breath, lightheadedness, near-syncope or syncope.     EKG today showed normal sinus rhythm with QRS measured around 120 ms.  Labs obtained in March of this year including BMP CBC and lipids were reviewed normal limits.  BP was elevated today.     Previous studies:  -EKG (08/20/2019): Normal sinus rhythm.  First-degree AV block (OR of 276 ms).  QRS measuring 112 ms.  -Exercise stress test (08/28/2019): Target heart rate was not achieved.  However test was not negative for ischemia or pro-arrhythmias.  -Limited echo (08/14/2019): Normal LV function.  EF estimated 50-55%.  -IBETH (1/02/2020): Normal LV function.  EF estimated 55-60%.  There was no significant valve disease.  -Chest CT (01/02/2020): Normal pulmonary vein anatomy.  Aortic root was mildly dilated (4.2 x 3.88 cm).  Coronary calcification was noticed in the LAD.  Scattered mediastinal and right heel are calcifications were noticed suggestive of old granulomas.     ASSESSMENT AND PLAN:   1.  Persistent atrial fibrillation.  He  "continues to do well on a combination of flecainide and beta-blockers.  We will continue current medical therapy.     2.  Embolic prevention.  Chads vas score of 2.  Continue anticoagulation with Coumadin indefinitely.     3.  Hypertension.  Blood pressure was elevated today.  Will increase losartan to 100 mg daily.  He will follow-up with PCP in a week to evaluate blood pressure.      Lamont Hannon MD    Physical Exam:  Vitals: BP (!) 160/80   Pulse 70   Ht 1.93 m (6' 4\")   Wt 126.2 kg (278 lb 3.2 oz)   SpO2 97%   BMI 33.86 kg/m      Constitutional:  AAO x3.  Pt is in NAD.  HEAD: normocephalic.  SKIN: Skin normal color, texture and turgor with no lesions or eruptions.  Eyes: PERRL, EOMI.  ENT:  Supple, normal JVP. No lymphadenopathy or thyroid enlargement.  Chest:  CTAB.  Cardiac:   RRR, normal  S1 and S2.  No murmurs rubs or gallop.   Abdomen:  Normal BS.  Soft, non-tender and non-distended.  No rebound or guarding.    Extremities:  Pedious pulses palpable B/L.  No LE edema noticed.   Neurological: Strength and sensation grossly symmetric and intact throughout.       CURRENT MEDICATIONS:  Current Outpatient Medications   Medication Sig Dispense Refill     acetaminophen (TYLENOL) 500 MG tablet Take 500 mg by mouth every 6 hours as needed for mild pain       atorvastatin (LIPITOR) 20 MG tablet Take 1 tablet (20 mg) by mouth daily 90 tablet 1     calcipotriene (DOVONEX) 0.005 % external cream Mix with efudex and apply twice daily to arms for 14 days 60 g 3     flecainide (TAMBOCOR) 50 MG tablet Take 75 mg by mouth 2 times daily       fluorouracil (EFUDEX) 5 % external cream Mix with dovonex and apply twice daily to arms for 14 days 40 g 1     losartan (COZAAR) 50 MG tablet Take 1 tablet (50 mg) by mouth daily 90 tablet 3     melatonin 3 MG CAPS Take 3 mg by mouth At Bedtime       metoprolol succinate ER (TOPROL-XL) 50 MG 24 hr tablet Take 0.5 tablets (25 mg) by mouth 2 times daily 90 tablet 0     mupirocin " (BACTROBAN) 2 % external ointment Apply topically 2 times daily 15 g 0     sildenafil (REVATIO) 20 MG tablet Take 1 tablet (20 mg) by mouth daily as needed 30 tablet 3     tretinoin (RETIN-A) 0.05 % external cream Bedtime to arms for 3 weeks 45 g 3     triamcinolone (KENALOG) 0.1 % external cream Apply topically 2 times daily 45 g 0     trimethoprim-polymyxin b (POLYTRIM) 45151-6.1 UNIT/ML-% ophthalmic solution Place 1-2 drops Into the left eye every 4 hours 10 mL 0     warfarin ANTICOAGULANT (COUMADIN) 5 MG tablet TAKE ONE & ONE-HALF TABLETS (7.5 MG) BY MOUTH ON FRIDAYS, AND TAKE ONE (5 MG) TABLET BY MOUTH ON ALL OTHER DAYS, OR AS DIRECTED BY INR CLINIC 96 tablet 1       ALLERGIES     Allergies   Allergen Reactions     Lisinopril Cough     Very persistent cough       PAST MEDICAL HISTORY:  Past Medical History:   Diagnosis Date     Atrial flutter (H)      Benign essential hypertension 1/18/2017    past use of ACE- Cough;  Had been on ARB-diuretic but experienced lower bp readings;  BLOOD PRESSURE now well controlled on ARB also no longer on diuretic; no low bp readings; may have had slight edema initially but has normalized with bp well controlled on ARB alone.      Cancer (H)     testicular cancer     CHF (congestive heart failure) (H) 6/5/2019     Long term current use of anticoagulant therapy 6/3/2019     Obesity (BMI 35.0-39.9) with comorbidity (H) 2/11/2019     Paroxysmal atrial fibrillation (H) 06/03/2019       PAST SURGICAL HISTORY:  Past Surgical History:   Procedure Laterality Date     ANESTHESIA CARDIOVERSION N/A 6/26/2019    Procedure: ANESTHESIA, FOR CARDIOVERSION DR. LE;  Surgeon: GENERIC ANESTHESIA PROVIDER;  Location:  OR     ANESTHESIA CARDIOVERSION N/A 7/17/2019    Procedure: ANESTHESIA, FOR CARDIOVERSION;  Surgeon: GENERIC ANESTHESIA PROVIDER;  Location:  OR     ANESTHESIA CARDIOVERSION N/A 1/28/2020    Procedure: ANESTHESIA, FOR CARDIOVERSION;  Surgeon: GENERIC ANESTHESIA PROVIDER;   Location: [facility] OR     ANESTHESIA CARDIOVERSION N/A 9/8/2020    Procedure: ANESTHESIA, FOR CARDIOVERSION;  Surgeon: GENERIC ANESTHESIA PROVIDER;  Location: [facility] OR     ANESTHESIA CARDIOVERSION N/A 11/23/2020    Procedure: ANESTHESIA, FOR CARDIOVERSION;  Surgeon: GENERIC ANESTHESIA PROVIDER;  Location: [facility] OR     CLOSED REDUCTION SHOULDER Left      EP ABLATION FOCAL AFIB N/A 1/3/2020    Procedure: EP Ablation Focal AFIB;  Surgeon: Lamont Hannon MD;  Location:  HEART CARDIAC CATH LAB     GENITOURINARY SURGERY  1990    testicular cancer     ORTHOPEDIC SURGERY  1970's    right knee surgery        FAMILY HISTORY:  Family History   Problem Relation Age of Onset     Colon Cancer Father      Coronary Artery Disease Maternal Grandmother        SOCIAL HISTORY:  Social History     Socioeconomic History     Marital status:      Spouse name: Not on file     Number of children: Not on file     Years of education: Not on file     Highest education level: Not on file   Occupational History     Not on file   Social Needs     Financial resource strain: Not on file     Food insecurity     Worry: Not on file     Inability: Not on file     Transportation needs     Medical: Not on file     Non-medical: Not on file   Tobacco Use     Smoking status: Never Smoker     Smokeless tobacco: Never Used   Substance and Sexual Activity     Alcohol use: Yes     Comment: every 1-2 months     Drug use: No     Sexual activity: Yes     Partners: Female   Lifestyle     Physical activity     Days per week: Not on file     Minutes per session: Not on file     Stress: Not on file   Relationships     Social connections     Talks on phone: Not on file     Gets together: Not on file     Attends [employer] service: Not on file     Active member of club or organization: Not on file     Attends meetings of clubs or organizations: Not on file     Relationship status: Not on file     Intimate partner violence     Fear of current or ex partner: Not on file      Emotionally abused: Not on file     Physically abused: Not on file     Forced sexual activity: Not on file   Other Topics Concern     Parent/sibling w/ CABG, MI or angioplasty before 65F 55M? Yes   Social History Narrative     Not on file       Review of Systems:  Skin:        Eyes:       ENT:       Respiratory:       Cardiovascular:       Gastroenterology:      Genitourinary:       Musculoskeletal:       Neurologic:       Psychiatric:       Heme/Lymph/Imm:       Endocrine:           Recent Lab Results:  LIPID RESULTS:  Lab Results   Component Value Date    CHOL 157 03/31/2021    HDL 47 03/31/2021    LDL 93 03/31/2021    TRIG 84 03/31/2021       LIVER ENZYME RESULTS:  Lab Results   Component Value Date    AST 14 03/31/2021    ALT 28 03/31/2021       CBC RESULTS:  Lab Results   Component Value Date    WBC 4.8 03/31/2021    RBC 4.88 03/31/2021    HGB 15.7 03/31/2021    HCT 44.8 03/31/2021    MCV 92 03/31/2021    MCH 32.2 03/31/2021    MCHC 35.0 03/31/2021    RDW 12.8 03/31/2021     03/31/2021       BMP RESULTS:  Lab Results   Component Value Date     03/31/2021    POTASSIUM 4.9 03/31/2021    CHLORIDE 110 (H) 03/31/2021    CO2 27 03/31/2021    ANIONGAP 4 03/31/2021    GLC 97 03/31/2021    BUN 18 03/31/2021    CR 1.18 03/31/2021    GFRESTIMATED 63 03/31/2021    GFRESTBLACK 73 03/31/2021    CHU 8.9 03/31/2021        A1C RESULTS:  No results found for: A1C    INR RESULTS:  Lab Results   Component Value Date    INR 3.00 (H) 05/04/2021    INR 2.60 (H) 04/09/2021         ECHOCARDIOGRAM  No results found for this or any previous visit (from the past 8760 hour(s)).      Orders Placed This Encounter   Procedures     EKG 12-lead complete w/read - Clinics (performed today)     No orders of the defined types were placed in this encounter.    There are no discontinued medications.      Encounter Diagnosis   Name Primary?     Paroxysmal atrial fibrillation (H)        Thank you for allowing me to participate in the  care of your patient.      Sincerely,     Lamont Hannon MD     [facility]    cc:   Hilda Wahl, APRN CNP  [address]  [place] [id number]

## 2021-06-09 NOTE — PROGRESS NOTES
Electrophysiology/ Clinic Note         H&P and Plan:     REASON FOR VISIT: Electrophysiology evaluation.      HISTORY OF PRESENT ILLNESS: Mr. Connelly is a pleasant  70-year-old gentleman with a history of hypertension, hyperlipidemia and recurrent persistent atrial fibrillation (previous tachycardia mediated cardiomyopathy-resolved; A. fib ablation on 1/3/2020), who is here for team evaluation.     Patient underwent A. fib ablation on 1/3/2020 and is on chronic therapy with flecainide and metoprolol.  Today, he informs he is doing well.  He denies any recurrence of A. fib last year.  He has no complaints during this visit and denies any symptoms such as chest pain, palpitations, shortness of breath, lightheadedness, near-syncope or syncope.     EKG today showed normal sinus rhythm with QRS measured around 120 ms.  Labs obtained in March of this year including BMP CBC and lipids were reviewed normal limits.  BP was elevated today.     Previous studies:  -EKG (08/20/2019): Normal sinus rhythm.  First-degree AV block (AL of 276 ms).  QRS measuring 112 ms.  -Exercise stress test (08/28/2019): Target heart rate was not achieved.  However test was not negative for ischemia or pro-arrhythmias.  -Limited echo (08/14/2019): Normal LV function.  EF estimated 50-55%.  -IBETH (1/02/2020): Normal LV function.  EF estimated 55-60%.  There was no significant valve disease.  -Chest CT (01/02/2020): Normal pulmonary vein anatomy.  Aortic root was mildly dilated (4.2 x 3.88 cm).  Coronary calcification was noticed in the LAD.  Scattered mediastinal and right heel are calcifications were noticed suggestive of old granulomas.     ASSESSMENT AND PLAN:   1.  Persistent atrial fibrillation.  He continues to do well on a combination of flecainide and beta-blockers.  We will continue current medical therapy.     2.  Embolic prevention.  Chads vas score of 2.  Continue anticoagulation with Coumadin indefinitely.     3.  Hypertension.  Blood  "pressure was elevated today.  Will increase losartan to 100 mg daily.  He will follow-up with PCP in a week to evaluate blood pressure.      Lamont Hannon MD    Physical Exam:  Vitals: BP (!) 160/80   Pulse 70   Ht 1.93 m (6' 4\")   Wt 126.2 kg (278 lb 3.2 oz)   SpO2 97%   BMI 33.86 kg/m      Constitutional:  AAO x3.  Pt is in NAD.  HEAD: normocephalic.  SKIN: Skin normal color, texture and turgor with no lesions or eruptions.  Eyes: PERRL, EOMI.  ENT:  Supple, normal JVP. No lymphadenopathy or thyroid enlargement.  Chest:  CTAB.  Cardiac:   RRR, normal  S1 and S2.  No murmurs rubs or gallop.   Abdomen:  Normal BS.  Soft, non-tender and non-distended.  No rebound or guarding.    Extremities:  Pedious pulses palpable B/L.  No LE edema noticed.   Neurological: Strength and sensation grossly symmetric and intact throughout.       CURRENT MEDICATIONS:  Current Outpatient Medications   Medication Sig Dispense Refill     acetaminophen (TYLENOL) 500 MG tablet Take 500 mg by mouth every 6 hours as needed for mild pain       atorvastatin (LIPITOR) 20 MG tablet Take 1 tablet (20 mg) by mouth daily 90 tablet 1     calcipotriene (DOVONEX) 0.005 % external cream Mix with efudex and apply twice daily to arms for 14 days 60 g 3     flecainide (TAMBOCOR) 50 MG tablet Take 75 mg by mouth 2 times daily       fluorouracil (EFUDEX) 5 % external cream Mix with dovonex and apply twice daily to arms for 14 days 40 g 1     losartan (COZAAR) 50 MG tablet Take 1 tablet (50 mg) by mouth daily 90 tablet 3     melatonin 3 MG CAPS Take 3 mg by mouth At Bedtime       metoprolol succinate ER (TOPROL-XL) 50 MG 24 hr tablet Take 0.5 tablets (25 mg) by mouth 2 times daily 90 tablet 0     mupirocin (BACTROBAN) 2 % external ointment Apply topically 2 times daily 15 g 0     sildenafil (REVATIO) 20 MG tablet Take 1 tablet (20 mg) by mouth daily as needed 30 tablet 3     tretinoin (RETIN-A) 0.05 % external cream Bedtime to arms for 3 weeks 45 g 3     " triamcinolone (KENALOG) 0.1 % external cream Apply topically 2 times daily 45 g 0     trimethoprim-polymyxin b (POLYTRIM) 91217-2.1 UNIT/ML-% ophthalmic solution Place 1-2 drops Into the left eye every 4 hours 10 mL 0     warfarin ANTICOAGULANT (COUMADIN) 5 MG tablet TAKE ONE & ONE-HALF TABLETS (7.5 MG) BY MOUTH ON FRIDAYS, AND TAKE ONE (5 MG) TABLET BY MOUTH ON ALL OTHER DAYS, OR AS DIRECTED BY INR CLINIC 96 tablet 1       ALLERGIES     Allergies   Allergen Reactions     Lisinopril Cough     Very persistent cough       PAST MEDICAL HISTORY:  Past Medical History:   Diagnosis Date     Atrial flutter (H)      Benign essential hypertension 1/18/2017    past use of ACE- Cough;  Had been on ARB-diuretic but experienced lower bp readings;  BLOOD PRESSURE now well controlled on ARB also no longer on diuretic; no low bp readings; may have had slight edema initially but has normalized with bp well controlled on ARB alone.      Cancer (H)     testicular cancer     CHF (congestive heart failure) (H) 6/5/2019     Long term current use of anticoagulant therapy 6/3/2019     Obesity (BMI 35.0-39.9) with comorbidity (H) 2/11/2019     Paroxysmal atrial fibrillation (H) 06/03/2019       PAST SURGICAL HISTORY:  Past Surgical History:   Procedure Laterality Date     ANESTHESIA CARDIOVERSION N/A 6/26/2019    Procedure: ANESTHESIA, FOR CARDIOVERSION DR. LE;  Surgeon: GENERIC ANESTHESIA PROVIDER;  Location:  OR     ANESTHESIA CARDIOVERSION N/A 7/17/2019    Procedure: ANESTHESIA, FOR CARDIOVERSION;  Surgeon: GENERIC ANESTHESIA PROVIDER;  Location:  OR     ANESTHESIA CARDIOVERSION N/A 1/28/2020    Procedure: ANESTHESIA, FOR CARDIOVERSION;  Surgeon: GENERIC ANESTHESIA PROVIDER;  Location:  OR     ANESTHESIA CARDIOVERSION N/A 9/8/2020    Procedure: ANESTHESIA, FOR CARDIOVERSION;  Surgeon: GENERIC ANESTHESIA PROVIDER;  Location:  OR     ANESTHESIA CARDIOVERSION N/A 11/23/2020    Procedure: ANESTHESIA, FOR CARDIOVERSION;  Surgeon:  GENERIC ANESTHESIA PROVIDER;  Location: [facility] OR     CLOSED REDUCTION SHOULDER Left      EP ABLATION FOCAL AFIB N/A 1/3/2020    Procedure: EP Ablation Focal AFIB;  Surgeon: Lamont Hannon MD;  Location:  HEART CARDIAC CATH LAB     GENITOURINARY SURGERY  1990    testicular cancer     ORTHOPEDIC SURGERY  1970's    right knee surgery        FAMILY HISTORY:  Family History   Problem Relation Age of Onset     Colon Cancer Father      Coronary Artery Disease Maternal Grandmother        SOCIAL HISTORY:  Social History     Socioeconomic History     Marital status:      Spouse name: Not on file     Number of children: Not on file     Years of education: Not on file     Highest education level: Not on file   Occupational History     Not on file   Social Needs     Financial resource strain: Not on file     Food insecurity     Worry: Not on file     Inability: Not on file     Transportation needs     Medical: Not on file     Non-medical: Not on file   Tobacco Use     Smoking status: Never Smoker     Smokeless tobacco: Never Used   Substance and Sexual Activity     Alcohol use: Yes     Comment: every 1-2 months     Drug use: No     Sexual activity: Yes     Partners: Female   Lifestyle     Physical activity     Days per week: Not on file     Minutes per session: Not on file     Stress: Not on file   Relationships     Social connections     Talks on phone: Not on file     Gets together: Not on file     Attends [employer] service: Not on file     Active member of club or organization: Not on file     Attends meetings of clubs or organizations: Not on file     Relationship status: Not on file     Intimate partner violence     Fear of current or ex partner: Not on file     Emotionally abused: Not on file     Physically abused: Not on file     Forced sexual activity: Not on file   Other Topics Concern     Parent/sibling w/ CABG, MI or angioplasty before 65F 55M? Yes   Social History Narrative     Not on file       Review  of Systems:  Skin:        Eyes:       ENT:       Respiratory:       Cardiovascular:       Gastroenterology:      Genitourinary:       Musculoskeletal:       Neurologic:       Psychiatric:       Heme/Lymph/Imm:       Endocrine:           Recent Lab Results:  LIPID RESULTS:  Lab Results   Component Value Date    CHOL 157 03/31/2021    HDL 47 03/31/2021    LDL 93 03/31/2021    TRIG 84 03/31/2021       LIVER ENZYME RESULTS:  Lab Results   Component Value Date    AST 14 03/31/2021    ALT 28 03/31/2021       CBC RESULTS:  Lab Results   Component Value Date    WBC 4.8 03/31/2021    RBC 4.88 03/31/2021    HGB 15.7 03/31/2021    HCT 44.8 03/31/2021    MCV 92 03/31/2021    MCH 32.2 03/31/2021    MCHC 35.0 03/31/2021    RDW 12.8 03/31/2021     03/31/2021       BMP RESULTS:  Lab Results   Component Value Date     03/31/2021    POTASSIUM 4.9 03/31/2021    CHLORIDE 110 (H) 03/31/2021    CO2 27 03/31/2021    ANIONGAP 4 03/31/2021    GLC 97 03/31/2021    BUN 18 03/31/2021    CR 1.18 03/31/2021    GFRESTIMATED 63 03/31/2021    GFRESTBLACK 73 03/31/2021    CHU 8.9 03/31/2021        A1C RESULTS:  No results found for: A1C    INR RESULTS:  Lab Results   Component Value Date    INR 3.00 (H) 05/04/2021    INR 2.60 (H) 04/09/2021         ECHOCARDIOGRAM  No results found for this or any previous visit (from the past 8760 hour(s)).      Orders Placed This Encounter   Procedures     EKG 12-lead complete w/read - Clinics (performed today)     No orders of the defined types were placed in this encounter.    There are no discontinued medications.      Encounter Diagnosis   Name Primary?     Paroxysmal atrial fibrillation (H)          MARAH Wahl, APRN CNP  [address]  [place] [id number]

## 2021-06-14 ENCOUNTER — TELEPHONE (OUTPATIENT)
Dept: CARDIOLOGY | Facility: CLINIC | Age: 71
End: 2021-06-14

## 2021-06-14 DIAGNOSIS — I48.19 PERSISTENT ATRIAL FIBRILLATION (H): ICD-10-CM

## 2021-06-14 RX ORDER — METOPROLOL SUCCINATE 25 MG/1
25 TABLET, EXTENDED RELEASE ORAL 2 TIMES DAILY
Qty: 90 TABLET | Refills: 3 | OUTPATIENT
Start: 2021-06-14 | End: 2021-06-21

## 2021-06-14 NOTE — TELEPHONE ENCOUNTER
Patient called requesting refill on metoprolol.  Asked for 25mg tablets to be sent as it is very difficult to cut the 50mg tablets in half.  Sent script to [facility]'s pharmacy.  DIO Schrader

## 2021-06-15 ENCOUNTER — ALLIED HEALTH/NURSE VISIT (OUTPATIENT)
Dept: NURSING | Facility: CLINIC | Age: 71
End: 2021-06-15
Payer: COMMERCIAL

## 2021-06-15 ENCOUNTER — ANTICOAGULATION THERAPY VISIT (OUTPATIENT)
Dept: FAMILY MEDICINE | Facility: CLINIC | Age: 71
End: 2021-06-15

## 2021-06-15 VITALS — SYSTOLIC BLOOD PRESSURE: 132 MMHG | DIASTOLIC BLOOD PRESSURE: 80 MMHG

## 2021-06-15 DIAGNOSIS — I10 BENIGN ESSENTIAL HYPERTENSION: Primary | ICD-10-CM

## 2021-06-15 DIAGNOSIS — I48.0 PAROXYSMAL ATRIAL FIBRILLATION (H): ICD-10-CM

## 2021-06-15 DIAGNOSIS — Z79.01 LONG TERM CURRENT USE OF ANTICOAGULANTS WITH INR GOAL OF 2.0-3.0: ICD-10-CM

## 2021-06-15 DIAGNOSIS — Z79.01 LONG TERM CURRENT USE OF ANTICOAGULANT THERAPY: ICD-10-CM

## 2021-06-15 LAB
CAPILLARY BLOOD COLLECTION: NORMAL
INR PPP: 2.4 (ref 0.86–1.14)

## 2021-06-15 PROCEDURE — 99207 PR NO CHARGE NURSE ONLY: CPT | Performed by: INTERNAL MEDICINE

## 2021-06-15 PROCEDURE — 99207 PR NO CHARGE NURSE ONLY: CPT

## 2021-06-15 PROCEDURE — 36416 COLLJ CAPILLARY BLOOD SPEC: CPT | Performed by: INTERNAL MEDICINE

## 2021-06-15 PROCEDURE — 85610 PROTHROMBIN TIME: CPT | Performed by: INTERNAL MEDICINE

## 2021-06-15 NOTE — PROGRESS NOTES
ANTICOAGULATION FOLLOW-UP CLINIC VISIT    Patient Name:  Kevan Connelly  Date:  6/15/2021  Contact Type:  Telephone    SUBJECTIVE:  Patient Findings     Comments:  Called patient to discuss today's INR results: The patient was assessed for diet, medication, and activity level changes, missed or extra doses, bruising or bleeding, with no problem findings. Reviewed maintenance warfarin dosing with patient. Patient will remain on the same dose until next INR check. No other questions or concerns. Scheduled next lab-only INR in 6 weeks.  Kaylee MICHELLE RN  Anticoagulation Team          Clinical Outcomes     Negatives:  Major bleeding event, Thromboembolic event, Anticoagulation-related hospital admission, Anticoagulation-related ED visit, Anticoagulation-related fatality    Comments:  Called patient to discuss today's INR results: The patient was assessed for diet, medication, and activity level changes, missed or extra doses, bruising or bleeding, with no problem findings. Reviewed maintenance warfarin dosing with patient. Patient will remain on the same dose until next INR check. No other questions or concerns. Scheduled next lab-only INR in 6 weeks.  Kaylee MICHELLE RN  Anticoagulation Team             OBJECTIVE    Recent labs: (last 7 days)     06/15/21  1024   INR 2.40*       ASSESSMENT / PLAN  INR assessment THER    Recheck INR In: 6 WEEKS    INR Location Clinic      Anticoagulation Summary  As of 6/15/2021    INR goal:  2.0-3.0   TTR:  76.0 % (1 y)   INR used for dosin.40 (6/15/2021)   Warfarin maintenance plan:  7.5 mg (5 mg x 1.5) every Fri; 5 mg (5 mg x 1) all other days   Full warfarin instructions:  7.5 mg every Fri; 5 mg all other days   Weekly warfarin total:  37.5 mg   No change documented:  Kaylee Ramey RN   Plan last modified:  Marilou Esparza RN (3/12/2021)   Next INR check:  2021   Priority:  Maintenance   Target end date:  Indefinite    Indications    Long term current use of  anticoagulant therapy [Z79.01]  Paroxysmal atrial fibrillation (H) [I48.0]  Long term current use of anticoagulants with INR goal of 2.0-3.0 [Z79.01]             Anticoagulation Episode Summary     INR check location:  Anticoagulation Clinic    Preferred lab:      Send INR reminders to:  CONNIE HOBBS    Comments:  5mg tabs / DCCV 7/17/19 & 1/28/20 / early morning appointments on Wed or Fri      Anticoagulation Care Providers     Provider Role Specialty Phone number    Davina Reaves MD Referring Internal Medicine [phone]            See the Encounter Report to view Anticoagulation Flowsheet and Dosing Calendar (Go to Encounters tab in chart review, and find the Anticoagulation Therapy Visit)    Kaylee Ramey RN

## 2021-06-15 NOTE — PROGRESS NOTES
Kevan Connelly is a 70 year old patient who comes in today for a Blood Pressure check.  Initial BP:  BP (!) 144/82 (BP Location: Right arm, Patient Position: Sitting, Cuff Size: Adult Large)      Data Unavailable  Disposition: BP elevated.  Triage RN notified, patient asked to wait      Celeste Arambula CMA

## 2021-06-15 NOTE — PROGRESS NOTES
Routing BP readings to CARDIOLOGY with question on medication dosing/order for Losartan.     BP Readings from Last 3 Encounters:   06/15/21 132/80   06/09/21 (!) 160/80   05/25/21 (!) 142/80     Adv pt to monitor BP three times a week and PRN if symptomatic. Adv to call w/ questions/concerns.       Asked to triage for increase blood pressure. BP taken with Adult Reg cuff manually. Pt reports no symptoms- HA, vision changes, etc.     Pt is taking Losartan 100 mg per Dr. Hannon from office visit 6/9/2021.     Pt does have some 50 mg (stock pile) at home that he is taking 2 tabs to equal the 100 mg. The losartan that was prescribed on   6/9/2021   losartan (COZAAR) 100 MG tablet 30 tablet 3 6/9/2021  No   Sig - Route: Take 0.5 tablets (50 mg) by mouth daily - Oral     I think may be ordered wrong.    6/9/2021   ASSESSMENT AND PLAN:   1.  Persistent atrial fibrillation.  He continues to do well on a combination of flecainide and beta-blockers.  We will continue current medical therapy.     2.  Embolic prevention.  Chads vas score of 2.  Continue anticoagulation with Coumadin indefinitely.     3.  Hypertension.  Blood pressure was elevated today.  Will increase losartan to 100 mg daily.  He will follow-up with PCP in a week to evaluate blood pressure      Carol JUDGE RN

## 2021-06-15 NOTE — PROGRESS NOTES
Blood pressure is much better controlled.   What is his BP at home?  Please ask him to have a BMP lab completed within the next few days.  (I know he had an INR today)    ANNMARIE Peterson CNP on 6/15/2021 at 1:55 PM

## 2021-06-16 ENCOUNTER — TELEPHONE (OUTPATIENT)
Dept: CARDIOLOGY | Facility: CLINIC | Age: 71
End: 2021-06-16

## 2021-06-16 DIAGNOSIS — I10 BENIGN ESSENTIAL HYPERTENSION: Primary | ICD-10-CM

## 2021-06-16 NOTE — TELEPHONE ENCOUNTER
Patient returned call.  He will get BMP completed sometimes this week at his PCP office.  Patient reports he does check his BP readings at home a couple of times per week and the number have improved.  He has been averaging 130's systolic onver 80's.  Will update Hilda regarding above.  DIO Schrader

## 2021-06-16 NOTE — TELEPHONE ENCOUNTER
Message left for patient with recommendations per Hilda Wahl, BRYAN:  Blood pressure is much better controlled.   What is his BP at home?  Please ask him to have a BMP lab completed within the next few days.  (I know he had an INR today)     Hilda Wahl, ANNMARIE CNP on 6/15/2021 at 1:55 PM  Awating return call from patient.  DIO Schrader

## 2021-06-17 DIAGNOSIS — I25.10 CORONARY ARTERY CALCIFICATION: ICD-10-CM

## 2021-06-17 RX ORDER — ATORVASTATIN CALCIUM 20 MG/1
20 TABLET, FILM COATED ORAL DAILY
Qty: 90 TABLET | Refills: 2 | Status: SHIPPED | OUTPATIENT
Start: 2021-06-17 | End: 2022-03-11

## 2021-06-17 NOTE — TELEPHONE ENCOUNTER
Requesting refill of his Lipitor.  Escript sent and reminded pt that in March he will nilsa a Lipid lab completed.  Pt verified which lab he was having tomorrow.  Pt will have a BMP per Hilda. Isabel

## 2021-06-18 DIAGNOSIS — I10 BENIGN ESSENTIAL HYPERTENSION: ICD-10-CM

## 2021-06-18 LAB
ANION GAP SERPL CALCULATED.3IONS-SCNC: 5 MMOL/L (ref 3–14)
BUN SERPL-MCNC: 24 MG/DL (ref 7–30)
CALCIUM SERPL-MCNC: 9.1 MG/DL (ref 8.5–10.1)
CHLORIDE SERPL-SCNC: 109 MMOL/L (ref 94–109)
CO2 SERPL-SCNC: 25 MMOL/L (ref 20–32)
CREAT SERPL-MCNC: 1.42 MG/DL (ref 0.66–1.25)
GFR SERPL CREATININE-BSD FRML MDRD: 50 ML/MIN/{1.73_M2}
GLUCOSE SERPL-MCNC: 91 MG/DL (ref 70–99)
POTASSIUM SERPL-SCNC: 4.7 MMOL/L (ref 3.4–5.3)
SODIUM SERPL-SCNC: 139 MMOL/L (ref 133–144)

## 2021-06-18 PROCEDURE — 36415 COLL VENOUS BLD VENIPUNCTURE: CPT | Performed by: NURSE PRACTITIONER

## 2021-06-18 PROCEDURE — 80048 BASIC METABOLIC PNL TOTAL CA: CPT | Performed by: NURSE PRACTITIONER

## 2021-06-20 NOTE — TELEPHONE ENCOUNTER
I reviewed his BMP after increasing the lisinopril.  His creatine increased slightly which has happened in the past.  Recommend redrawing BMP in 7-10 days.  Recommend he consume 6-8   8 oz glasses of fluid daily (caffein and alcohol don't count.     I placed the order for a BMP     Thank you,    ANNMARIE Peterson CNP on 6/20/2021 at 12:19 PM

## 2021-06-21 ENCOUNTER — HOSPITAL ENCOUNTER (EMERGENCY)
Facility: CLINIC | Age: 71
Discharge: HOME | End: 2021-06-21
Attending: EMERGENCY MEDICINE | Admitting: EMERGENCY MEDICINE
Payer: COMMERCIAL

## 2021-06-21 ENCOUNTER — TELEPHONE (OUTPATIENT)
Dept: CARDIOLOGY | Facility: CLINIC | Age: 71
End: 2021-06-21

## 2021-06-21 ENCOUNTER — APPOINTMENT (OUTPATIENT)
Dept: GENERAL RADIOLOGY | Facility: CLINIC | Age: 71
End: 2021-06-21
Attending: EMERGENCY MEDICINE
Payer: COMMERCIAL

## 2021-06-21 VITALS
SYSTOLIC BLOOD PRESSURE: 173 MMHG | HEART RATE: 66 BPM | TEMPERATURE: 98.3 F | DIASTOLIC BLOOD PRESSURE: 104 MMHG | OXYGEN SATURATION: 99 % | RESPIRATION RATE: 28 BRPM

## 2021-06-21 DIAGNOSIS — I48.0 PAROXYSMAL ATRIAL FIBRILLATION (H): Primary | ICD-10-CM

## 2021-06-21 DIAGNOSIS — R07.9 CHEST PAIN, UNSPECIFIED TYPE: ICD-10-CM

## 2021-06-21 DIAGNOSIS — I10 HYPERTENSION, UNSPECIFIED TYPE: ICD-10-CM

## 2021-06-21 LAB
ANION GAP SERPL CALCULATED.3IONS-SCNC: 3 MMOL/L (ref 3–14)
BASOPHILS # BLD AUTO: 0 10E9/L (ref 0–0.2)
BASOPHILS NFR BLD AUTO: 0.5 %
BUN SERPL-MCNC: 23 MG/DL (ref 7–30)
CALCIUM SERPL-MCNC: 9.2 MG/DL (ref 8.5–10.1)
CHLORIDE SERPL-SCNC: 108 MMOL/L (ref 94–109)
CO2 SERPL-SCNC: 27 MMOL/L (ref 20–32)
CREAT SERPL-MCNC: 1.31 MG/DL (ref 0.66–1.25)
DIFFERENTIAL METHOD BLD: NORMAL
EOSINOPHIL # BLD AUTO: 0.1 10E9/L (ref 0–0.7)
EOSINOPHIL NFR BLD AUTO: 1 %
ERYTHROCYTE [DISTWIDTH] IN BLOOD BY AUTOMATED COUNT: 12.2 % (ref 10–15)
GFR SERPL CREATININE-BSD FRML MDRD: 55 ML/MIN/{1.73_M2}
GLUCOSE SERPL-MCNC: 94 MG/DL (ref 70–99)
HCT VFR BLD AUTO: 47.2 % (ref 40–53)
HGB BLD-MCNC: 16.4 G/DL (ref 13.3–17.7)
IMM GRANULOCYTES # BLD: 0 10E9/L (ref 0–0.4)
IMM GRANULOCYTES NFR BLD: 0.3 %
INR PPP: 1.88 (ref 0.86–1.14)
LYMPHOCYTES # BLD AUTO: 1.3 10E9/L (ref 0.8–5.3)
LYMPHOCYTES NFR BLD AUTO: 22.2 %
MCH RBC QN AUTO: 32.2 PG (ref 26.5–33)
MCHC RBC AUTO-ENTMCNC: 34.7 G/DL (ref 31.5–36.5)
MCV RBC AUTO: 93 FL (ref 78–100)
MONOCYTES # BLD AUTO: 0.7 10E9/L (ref 0–1.3)
MONOCYTES NFR BLD AUTO: 11.4 %
NEUTROPHILS # BLD AUTO: 3.7 10E9/L (ref 1.6–8.3)
NEUTROPHILS NFR BLD AUTO: 64.6 %
NRBC # BLD AUTO: 0 10*3/UL
NRBC BLD AUTO-RTO: 0 /100
PLATELET # BLD AUTO: 171 10E9/L (ref 150–450)
POTASSIUM SERPL-SCNC: 4.9 MMOL/L (ref 3.4–5.3)
RBC # BLD AUTO: 5.09 10E12/L (ref 4.4–5.9)
SODIUM SERPL-SCNC: 138 MMOL/L (ref 133–144)
TROPONIN I SERPL-MCNC: <0.015 UG/L (ref 0–0.04)
TROPONIN I SERPL-MCNC: <0.015 UG/L (ref 0–0.04)
WBC # BLD AUTO: 5.7 10E9/L (ref 4–11)

## 2021-06-21 PROCEDURE — 84484 ASSAY OF TROPONIN QUANT: CPT | Performed by: EMERGENCY MEDICINE

## 2021-06-21 PROCEDURE — 84484 ASSAY OF TROPONIN QUANT: CPT | Mod: 91 | Performed by: EMERGENCY MEDICINE

## 2021-06-21 PROCEDURE — 93005 ELECTROCARDIOGRAM TRACING: CPT

## 2021-06-21 PROCEDURE — 80048 BASIC METABOLIC PNL TOTAL CA: CPT | Performed by: EMERGENCY MEDICINE

## 2021-06-21 PROCEDURE — 85025 COMPLETE CBC W/AUTO DIFF WBC: CPT | Performed by: EMERGENCY MEDICINE

## 2021-06-21 PROCEDURE — 71046 X-RAY EXAM CHEST 2 VIEWS: CPT

## 2021-06-21 PROCEDURE — 99285 EMERGENCY DEPT VISIT HI MDM: CPT | Mod: 25

## 2021-06-21 PROCEDURE — 85610 PROTHROMBIN TIME: CPT | Performed by: EMERGENCY MEDICINE

## 2021-06-21 PROCEDURE — 96374 THER/PROPH/DIAG INJ IV PUSH: CPT

## 2021-06-21 PROCEDURE — 250N000011 HC RX IP 250 OP 636: Performed by: EMERGENCY MEDICINE

## 2021-06-21 PROCEDURE — 250N000013 HC RX MED GY IP 250 OP 250 PS 637: Performed by: EMERGENCY MEDICINE

## 2021-06-21 RX ORDER — ASPIRIN 81 MG/1
324 TABLET, CHEWABLE ORAL ONCE
Status: DISCONTINUED | OUTPATIENT
Start: 2021-06-21 | End: 2021-06-21 | Stop reason: HOSPADM

## 2021-06-21 RX ORDER — CARVEDILOL 6.25 MG/1
6.25 TABLET ORAL ONCE
Status: COMPLETED | OUTPATIENT
Start: 2021-06-21 | End: 2021-06-21

## 2021-06-21 RX ORDER — HYDRALAZINE HYDROCHLORIDE 20 MG/ML
5 INJECTION INTRAMUSCULAR; INTRAVENOUS ONCE
Status: COMPLETED | OUTPATIENT
Start: 2021-06-21 | End: 2021-06-21

## 2021-06-21 RX ORDER — FLECAINIDE ACETATE 50 MG/1
75 TABLET ORAL 2 TIMES DAILY
Qty: 90 TABLET | Refills: 3 | Status: SHIPPED | OUTPATIENT
Start: 2021-06-21 | End: 2021-06-22

## 2021-06-21 RX ORDER — NITROGLYCERIN 0.4 MG/1
0.4 TABLET SUBLINGUAL EVERY 5 MIN PRN
Status: DISCONTINUED | OUTPATIENT
Start: 2021-06-21 | End: 2021-06-21 | Stop reason: HOSPADM

## 2021-06-21 RX ORDER — CARVEDILOL 6.25 MG/1
6.25 TABLET ORAL 2 TIMES DAILY WITH MEALS
Qty: 60 TABLET | Refills: 0 | Status: SHIPPED | OUTPATIENT
Start: 2021-06-21 | End: 2021-07-12

## 2021-06-21 RX ADMIN — CARVEDILOL 6.25 MG: 6.25 TABLET, FILM COATED ORAL at 17:35

## 2021-06-21 RX ADMIN — HYDRALAZINE HYDROCHLORIDE 5 MG: 20 INJECTION INTRAMUSCULAR; INTRAVENOUS at 17:29

## 2021-06-21 ASSESSMENT — ENCOUNTER SYMPTOMS
NAUSEA: 0
HEADACHES: 1
DIAPHORESIS: 0
SHORTNESS OF BREATH: 0

## 2021-06-21 NOTE — TELEPHONE ENCOUNTER
Pt was on his way to ER d/t his BP being very elevated. Explained that this writer will monitor and will speak to him later in the day.  Pt will most likely get another BMP today.  Will monitor pt visit. Isabel

## 2021-06-21 NOTE — DISCHARGE INSTRUCTIONS
"What do you do next:   You should stop taking your metoprolol and replace this with the \"carvedilol\" that was recommended by Dr. Hannon as this is felt to have better blood pressure lowering properties than metoprolol..  Continue your other home medications.  Call the cardiology office tomorrow to discuss your symptoms and arrange a closer follow-up.  Follow up as indicated below    When do you return: If you have return of chest pain, shortness of breath, lightheadedness or fainting, severe headache, confusion, or any other symptoms that concern you, please return to the ED for reevaluation.    Thank you for allowing us to care for you today.  "

## 2021-06-21 NOTE — ED NOTES
Patient discharged home with discharge paperwork. Vital signs stable at discharge. Education provided regarding medication use, follow up with cardiology and when to return to ED. Pt verbalized understanding. IV removed. Catheter intact. All questions answered.

## 2021-06-21 NOTE — ED TRIAGE NOTES
"Patient c/o \"across the chest\" chest pain that started 40 mins PTA. Rates pain 3/10. Denies radiating. Nothing makes it better or worse. ABCs intact. Took morning Metoprolol.   "

## 2021-06-21 NOTE — TELEPHONE ENCOUNTER
6/21/21 Pt LM on Afib RN line requesting refill of Flecainide be sent to [facility] [place] Refill sent  KHerroRN 1230 pm

## 2021-06-21 NOTE — ED PROVIDER NOTES
History   Chief Complaint:  Chest Pain       HPI   Kevan Connelly is a 70 year old male on Coumadin with history of atrial fibrillation, congestive heart failure, and hypertension who presents with chest pain. The patient states that around 1030 this morning he began experiencing a chest pain that he describes as a pressure with some heat. He woke up around 5 am, so the chest pain began after he woke up, and he did not put himself through any exertion this morning. He rates his current chest pain at 0.5 out of 10. The patient also had a headache earlier which has resolved. He went to bed feeling normal last night. The patient notes his Losartan was recently increased to 100 mg. He has his atrial fibrillation under control with Flecainide and has not had an episode since October 2020. The patient has not had any cardiac surgery besides an ablation for his atrial fibrillation. He notes his maternal grandmother had a heart attack in her 50's. The patient denies diaphoresis, shortness of breath, leg pain or swelling, nausea, or known sick contacts.    Review of Systems   Constitutional: Negative for diaphoresis.   Respiratory: Negative for shortness of breath.    Cardiovascular: Positive for chest pain. Negative for leg swelling.   Gastrointestinal: Negative for nausea.   Neurological: Positive for headaches.   All other systems reviewed and are negative.      Allergies:  Lisinopril      Medications:  Atorvastatin  Flecainide  Losartan  Melatonin  Metoprolol succinate  Sildenafil  Coumadin      Past Medical History:    Atrial flutter  Hypertension  Cancer  Congestive heart failure  Obesity  Paroxysmal atrial fibrillation  Thoracic aortic ectasia  Chronic kidney disease stage 3  Colon polyps  Erectile dysfunction  Testicular cancer       Past Surgical History:    Anesthesia cardioversion x 5  Closed reduction shoulder, left  Ablation focal atrial fibrillation  Testicular cancer  Right knee surgery       Family  History:    Colon cancer      Social History:  Presents with wife.  Follows with electrophysiologist.    Physical Exam     Patient Vitals for the past 24 hrs:   BP Temp Temp src Pulse Resp SpO2   06/21/21 1715 -- -- -- 63 -- 99 %   06/21/21 1700 (!) 182/103 -- -- 62 28 99 %   06/21/21 1645 (!) 178/101 -- -- 64 20 98 %   06/21/21 1630 (!) 176/104 -- -- 63 17 99 %   06/21/21 1615 (!) 182/104 -- -- 64 16 98 %   06/21/21 1600 (!) 183/135 -- -- 61 15 99 %   06/21/21 1245 (!) 183/111 -- -- 63 21 99 %   06/21/21 1230 (!) 180/108 -- -- 64 28 99 %   06/21/21 1215 (!) 190/110 -- -- -- -- --   06/21/21 1047 (!) 210/128 98.3  F (36.8  C) Oral 77 18 98 %       Physical Exam  Constitutional: Vital signs reviewed as above.   Head: No external signs of trauma noted.  Eyes: Pupils are equal, round, and reactive to light.   Neck: No JVD noted  Cardiovascular: Normal rate, regular rhythm and normal heart sounds.  No murmur heard. Equal B/L peripheral pulses.  Pulmonary/Chest: Effort normal and breath sounds normal. No respiratory distress. Patient has no wheezes. Patient has no rales.   Gastrointestinal: Soft. There is no tenderness.   Musculoskeletal/Extremities: No edema noted. Normal tone.  Neurological: Patient is alert and oriented to person, place, and time.   Skin: Skin is warm and dry. There is no diaphoresis noted.   Psychiatric: The patient appears calm.      Emergency Department Course   ECG  ECG taken at 1051, ECG read at 1240  Sinus rhythm with 1st degree AV block  Nonspecific intraventricular block  Abnormal ECG  No significant change as compared to prior, dated 6/9/2021.  Rate 73 bpm. DC interval 320 ms. QRS duration 132 ms. QT/QTc 458/504 ms. P-R-T axes 48 -14 20.     Imaging:  Chest XR,  PA & LAT  PA and lateral views of the chest were obtained.  Cardiomediastinal silhouette is within normal limits. Multiple  calcified mediastinal and hilar granulomas. Nodular opacity projects  over the right upper lobe, likely  represents calcified pulmonary  granuloma, which can be confirmed with chest CT if clinically  warranted. Otherwise no suspicious focal pulmonary opacities. No  significant pleural effusion or pneumothorax.    ARIANA LOMAS MD  Reading per radiology      Laboratory:  CBC: WBC 5.7, HGB 16.4,    BMP: GFR 55 (L) o/w WNL (Creatinine 1.31 (H))     Troponin (Collected 1228): <0.015  Troponin (Collected 1437): <0.015    INR: 1.88 (H)          Emergency Department Course:    Reviewed:  I reviewed nursing notes, vitals, past medical history and care everywhere    Assessments/Consults:    ED Course as of Jun 21 1727   Mon Jun 21, 2021   1245 Dr. Lund obtained history and examined the patient as noted above.      1559 Rechecked and updated.      1702 D/W Dr. Hannon. Could consider 6.25 coreg BID.       1715 Rechecked and updated.        Interventions:  Medications   aspirin (ASA) chewable tablet 324 mg (324 mg Oral Not Given 6/21/21 1717)   nitroGLYcerin (NITROSTAT) sublingual tablet 0.4 mg (has no administration in time range)   hydrALAZINE (APRESOLINE) injection 5 mg (has no administration in time range)   carvedilol (COREG) tablet 6.25 mg (has no administration in time range)         Disposition:  The patient was discharged to home.     Impression & Plan   CMS Diagnoses: None  Medical Decision Making:  This 70-year-old male patient presents to the ED due to chest pain and hypertension.  Please see the HPI and exam for specifics.  The patient stated that his primary concern was his hypertension.  He follows with electrophysiology due to his atrial fibrillation.  At his last visit his losartan dose was increased.  Based on age and health conditions, the patient is not technically low risk for ACS though the patient has 2 negative troponins here and his chest pain has resolved.  He would like to be discharged home.  I reached out to his electrophysiology office and we talked through the case.  The recommendation is  to consider changing the patient's metoprolol to Coreg and have the patient follow closely in the clinic.  He should return to the ED immediately if he has any new or worsening symptoms.  Anticipatory guidance given prior to discharge.      Covid-19  Kevan Connelly was evaluated during a global COVID-19 pandemic, which necessitated consideration that the patient might be at risk for infection with the SARS-CoV-2 virus that causes COVID-19.   Applicable protocols for evaluation were followed during the patient's care.     Diagnosis:    ICD-10-CM    1. Chest pain, unspecified type  R07.9    2. Hypertension, unspecified type  I10        Discharge Medications:  New Prescriptions    CARVEDILOL (COREG) 6.25 MG TABLET    Take 1 tablet (6.25 mg) by mouth 2 times daily (with meals)       Scribe Disclosure:  Carol DUQUE, am serving as a scribe at 12:18 PM on 6/21/2021 to document services personally performed by Fausto Lund DO based on my observations and the provider's statements to me.      Fausto Lund DO  06/21/21 1738

## 2021-06-22 ENCOUNTER — TELEPHONE (OUTPATIENT)
Dept: FAMILY MEDICINE | Facility: CLINIC | Age: 71
End: 2021-06-22

## 2021-06-22 ENCOUNTER — TELEPHONE (OUTPATIENT)
Dept: CARDIOLOGY | Facility: CLINIC | Age: 71
End: 2021-06-22

## 2021-06-22 DIAGNOSIS — Z79.01 LONG TERM CURRENT USE OF ANTICOAGULANTS WITH INR GOAL OF 2.0-3.0: ICD-10-CM

## 2021-06-22 DIAGNOSIS — I48.0 PAROXYSMAL ATRIAL FIBRILLATION (H): ICD-10-CM

## 2021-06-22 DIAGNOSIS — I10 BENIGN ESSENTIAL HYPERTENSION: ICD-10-CM

## 2021-06-22 DIAGNOSIS — Z79.01 LONG TERM CURRENT USE OF ANTICOAGULANT THERAPY: ICD-10-CM

## 2021-06-22 LAB — INTERPRETATION ECG - MUSE: NORMAL

## 2021-06-22 RX ORDER — FLECAINIDE ACETATE 150 MG/1
TABLET ORAL
Qty: 45 TABLET | Refills: 3 | Status: SHIPPED | OUTPATIENT
Start: 2021-06-22 | End: 2021-07-19

## 2021-06-22 RX ORDER — LOSARTAN POTASSIUM 100 MG/1
100 TABLET ORAL DAILY
Qty: 30 TABLET | Refills: 3 | Status: SHIPPED | OUTPATIENT
Start: 2021-06-22 | End: 2021-07-19

## 2021-06-22 NOTE — TELEPHONE ENCOUNTER
Spoke to patient to let him know that he should follow up with BRYAN in one month to evaluate BP readings.  Recommended daily monitoring.  Also asked patient to bring in BP cuff for calibration.  Scheduled patient to see BRYAN Carey on 7/19.  Instructed patient to call sooner if having issues.  Patient provided verbal understanding.  DIO Schrader

## 2021-06-22 NOTE — TELEPHONE ENCOUNTER
ANTICOAGULATION  MANAGEMENT: Discharge Review    Kevan Connelly chart reviewed for anticoagulation continuity of care    Emergency room visit on 6/21/21 for chest pain.    Discharge disposition: Home    Results:    Recent labs: (last 7 days)     06/21/21  1228   INR 1.88*     Anticoagulation inpatient management:     not addressed    Anticoagulation discharge instructions:     Warfarin dosing: home regimen continued   Bridging: No   INR goal change: No      Medication changes affecting anticoagulation: No    Additional factors affecting anticoagulation: No    Plan     No adjustment to anticoagulation plan needed    Spoke with Keron. He has increased some fruits - berries and honeydew melon, has also added in a few protein shakes per week but checked and the label does not list vit K. Last 2 INRs therapeutic. Per protocol, advised patient to continue on same maintenance dosing. Keep all lifestyle aspects stable and recheck INR in 2 weeks. Patient stated understanding and is in agreement with plan.    Anticoagulation Calendar updated    Kaylee Raemy RN

## 2021-06-22 NOTE — TELEPHONE ENCOUNTER
Patient called to report BP is responding well to new medication.  Was in ED yesterday d/t hypertensive crisis and CP.  Today BP prior to taking medication was 148/85 after medication 104/72.  It was noted ED doctor did talk to cardiology yesterday to approve medication changes.  Patient inquiring when he should follow up.  Will update Dr Hannon regarding above.  DIO Schrader

## 2021-07-05 PROBLEM — M25.511 ACUTE PAIN OF RIGHT SHOULDER: Status: RESOLVED | Noted: 2021-05-21 | Resolved: 2021-07-05

## 2021-07-05 NOTE — PROGRESS NOTES
Discharge Note    Progress reporting period is from initial eval to May 27, 2021.     Kevan failed to return for next follow up visit and current status is unknown.  Please see information below for last relevant information on current status.  Patient seen for Rxs Used: 2 visits.  SUBJECTIVE  Subjective changes noted by patient:  Subjective: Better. HEP helplful.  .  Current pain level is  .     Previous pain level was  Initial Pain level: 5/10.   Changes in function:  Yes (See Goal flowsheet attached for changes in current functional level)  Adverse reaction to treatment or activity: None    OBJECTIVE  Changes noted in objective findings:       ASSESSMENT/PLAN  Diagnosis: R shoulder pain   DIAGP:  The encounter diagnosis was Acute pain of right shoulder.  Updated problem list and treatment plan:   Impaired muscle performance - HEP  STG/LTGs have been met or progress has been made towards goals:  Yes, please see goal flowsheet for most current information  Assessment of Progress: current status is unknown.  Last current status:     Self Management Plans:  HEP  I have re-evaluated this patient and find that the nature, scope, duration and intensity of the therapy is appropriate for the medical condition of the patient.  Kevan continues to require the following intervention to meet STG and LTG's:  HEP.    Recommendations:  Discharge with current home program.  Patient to follow up with MD as needed.    Please refer to the daily flowsheet for treatment today, total treatment time and time spent performing 1:1 timed codes.  
bed rails

## 2021-07-06 ENCOUNTER — ANTICOAGULATION THERAPY VISIT (OUTPATIENT)
Dept: FAMILY MEDICINE | Facility: CLINIC | Age: 71
End: 2021-07-06

## 2021-07-06 DIAGNOSIS — I48.0 PAROXYSMAL ATRIAL FIBRILLATION (H): ICD-10-CM

## 2021-07-06 DIAGNOSIS — Z79.01 LONG TERM CURRENT USE OF ANTICOAGULANTS WITH INR GOAL OF 2.0-3.0: Primary | ICD-10-CM

## 2021-07-06 DIAGNOSIS — Z79.01 LONG TERM CURRENT USE OF ANTICOAGULANT THERAPY: ICD-10-CM

## 2021-07-06 LAB
CAPILLARY BLOOD COLLECTION: NORMAL
INR PPP: 2.6 (ref 0.86–1.14)

## 2021-07-06 PROCEDURE — 85610 PROTHROMBIN TIME: CPT | Performed by: INTERNAL MEDICINE

## 2021-07-06 PROCEDURE — 36416 COLLJ CAPILLARY BLOOD SPEC: CPT | Performed by: INTERNAL MEDICINE

## 2021-07-06 NOTE — PROGRESS NOTES
Anticoagulation Management    Unable to reach Keron today. Cell service down?    Today's INR result of 2.6 is therapeutic (goal INR of 2.0-3.0).  Result received from: Clinic Lab    Follow up required to confirm warfarin dose taken and assess for changes. If no changes or concerns, next INR in 4 weeks.    No answer. Left  to call [phone]. Can transfer to [facility] at [phone].    Anticoagulation clinic to follow up    Kaylee Ramey RN

## 2021-07-06 NOTE — PROGRESS NOTES
ANTICOAGULATION MANAGEMENT     Kevan Connelly 70 year old male is on warfarin with therapeutic INR result. (Goal INR 2.0-3.0)    Recent labs: (last 7 days)     07/06/21  0835   INR 2.60*       ASSESSMENT     Source(s): Chart Review and Patient/Caregiver Call       Warfarin doses taken: Warfarin taken as instructed    Diet: No new diet changes identified    New illness, injury, or hospitalization: No    Medication/supplement changes: None noted    Signs or symptoms of bleeding or clotting: No    Previous INR: subtherapeutic    Additional findings: None     PLAN     Recommended plan for no diet, medication or health factor changes affecting INR     Dosing Instructions: Continue your current warfarin dose with next INR in 3 weeks       Summary  As of 7/6/2021    Full warfarin instructions:  7.5 mg every Fri; 5 mg all other days   Next INR check:  7/28/2021             Telephone call with Mathur who verbalizes understanding and agrees to plan    Lab visit scheduled    Education provided: Please call back if any changes to your diet, medications or how you've been taking warfarin    Plan made per [facility] anticoagulation protocol    Kaylee Ramey RN  Anticoagulation Clinic  7/6/2021    _______________________________________________________________________     Anticoagulation Episode Summary     Current INR goal:  2.0-3.0   TTR:  74.9 % (1 y)   Target end date:  Indefinite   Send INR reminders to:  [facility]    Indications    Long term current use of anticoagulant therapy [Z79.01]  Paroxysmal atrial fibrillation (H) [I48.0]  Long term current use of anticoagulants with INR goal of 2.0-3.0 [Z79.01]           Comments:           Anticoagulation Care Providers     Provider Role Specialty Phone number    Davina Reaves MD Referring Internal Medicine [phone]

## 2021-07-12 ENCOUNTER — TELEPHONE (OUTPATIENT)
Dept: CARDIOLOGY | Facility: CLINIC | Age: 71
End: 2021-07-12

## 2021-07-12 DIAGNOSIS — I48.0 PAROXYSMAL ATRIAL FIBRILLATION (H): Primary | ICD-10-CM

## 2021-07-12 RX ORDER — CARVEDILOL 6.25 MG/1
6.25 TABLET ORAL 2 TIMES DAILY WITH MEALS
Qty: 180 TABLET | Refills: 3 | Status: SHIPPED | OUTPATIENT
Start: 2021-07-12 | End: 2021-07-19

## 2021-07-18 NOTE — PROGRESS NOTES
Cardiology Clinic Progress Note    Service Date:  07/19/21    Primary Cardiologist: Dr. Hannon      Reason for Visit: Follow-up for hypertension    HPI:   I had the pleasure of seeing Mr. Kevan Connelly in the clinic today. he is a very pleasant 70 year old male with a past medical history notable for    1. Atrial fibrillation.  Diagnosed on 5/30/19 with cardiomyopathy EF was 33%.  Was on amiodarone and changed to flecainide with normal EF.  refractory to flecainide.     Underwent an atrial fibrillation ablation on 1/3/2020  2. Nonocclusive coronary artery disease.  Per CT angiogram heart 1/2020 revealed coronary calcification the LAD, circumflex and RCA.  3. Mildly dilated aortic root and ascending aorta.  Per CT angiogram (1/2020) revealed 4.22 x 3.88 cm and 3.64 cm x 3.73 cm respectively.  4. Tachycardia induced cardiomyopathy.  EF 33% (6/2018)  ECHO 50-55% (8/2019)  5. Hypertension  6. Obesity  7. Testicular cancer with subsequent ED    In March 2019, he was diagnosed with atrial fibrillation    In June 2019 he was diagnosed with cardiomyopathy, underwent an unsuccessful cardioversion was later started on amiodarone and on 7/2018 underwent a successful cardioversion    In September 2019 his EF improved, he was started on flecainide unfortunately was found to be in atrial fibrillation via Zio patch    On 1/2020 he underwent PVI, had recurrence of atrial fibrillation was started on amiodarone which was subsequently discontinued on 4/2020    On 8/2020 he presented with atrial fibrillation, was started on flecainide and underwent a cardioversion.  Failed to maintain sinus rhythm and in November 2020 underwent a cardioversion.    In June 2021 he met with Dr. Hannon he was doing well on flecainide 75 mg twice daily and metoprolol XL 50 mg daily.  He was on Coumadin for anticoagulation.  His blood pressure was elevated and his losartan was increased to 100 mg daily.    On 6/21/2021, patient was in the emergency  room with chest pain, his blood pressure was elevated, he was in sinus rhythm, his troponin was negative.  His metoprolol was continued and was started on carvedilol 6.25 mg twice daily    Today, he reports better blood pressure since starting coreg.  He brought his home blood pressure cuff today and his BP was 134/91 mmHg and manual was 120/74 mmHg.  He does not regularly exercise.  He denies chest pain, shortness of breath, dyspnea on exertion, orthopnea, PND, lower extremity edema, palpitations, dizziness, presyncope, or syncope.   Reports taking medications as prescribed including warfarin and denies bleeding and sign/symptoms of stroke.     ASSESSMENT AND PLAN:  Atrial fibrillation    For anticoagulation for CHADS VASC 3 (age, HTN, HF) he takes warfarin with goal INR 2-3. His INR has been therapeutic 80% of the time since April 2021.    His last hemoglobin was 16.4 (6/2021).   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  After resolution of tachycardia induced cardiomyopathy he was started on flecainide with metoprolol and later was found to be in atrial fibrillation despite increase flecainide dosage. On 1/3/2020, he underwent a PVI ablation, flecainide was discontinued and he was started on amiodarone which was discontinued on 4/2020.   In 8/2020, he had a recurrence of atrial fibrillation, was restarted on flecainide underwent a successful cardioversion.  He had a recurrence and underwent a cardioversion on 11/2020.      For rhythm control he is currently taking flecainide 75 mg twice daily and carvedilol 6.25 mg twice daily.   With no recurrence.      Cardiomyopathy    At the time of diagnosis with his atrial fibrillation his EF was found to be 33%.   ECHO (8/2019) revealed EF 50-55%      Continue GDMT of ARB (losartan 50 mg daily) and BB (carvedilol 6.25 mg twice daily)    Euvolemic on exam      Hypertension    Controlled while taking losartan  100 mg daily and carvedilol 6.25 mg twice daily    Continue to take BP at home     Creatinine was slightly elevated in June 2021, will repeat BMP today     Non-occlusive coronary artery disease    Per CT angiogram heart 1/2020 revealed coronary calcification the LAD, circumflex and RCA.    Asymptomatic    Continue BB, ARB, statin.  He is not on an aspirin due to being on Warfarin.  His last LDL was 93 (3/2021)    Plan:    Repeat BMP today    Continue with current medications.     Take BP at home    Follow up with Dr. Hannon in 1 year    Please call the clinic if questions or problems arise      Thank you for the opportunity to participate in this pleasant patient's care. We would be happy to see him sooner if needed for any concerns in the meantime.        ANNMARIE Peterson [facility]  Text Page  (M-F 8:00 am - 4:30 pm)    Orders this Visit:  Orders Placed This Encounter   Procedures     Basic metabolic panel     Basic metabolic panel     Follow-Up with Electrophysiologist     EKG 12-lead complete w/read - Clinics (to be scheduled)     Orders Placed This Encounter   Medications     losartan (COZAAR) 100 MG tablet     Sig: Take 1 tablet (100 mg) by mouth daily     Dispense:  180 tablet     Refill:  3     carvedilol (COREG) 6.25 MG tablet     Sig: Take 1 tablet (6.25 mg) by mouth 2 times daily (with meals)     Dispense:  180 tablet     Refill:  3     flecainide (TAMBOCOR) 150 MG tablet     Sig: Take 75mg (1/2 tab)  po every 12 hours     Dispense:  45 tablet     Refill:  3     Medications Discontinued During This Encounter   Medication Reason     losartan (COZAAR) 100 MG tablet Reorder     flecainide (TAMBOCOR) 150 MG tablet Reorder     carvedilol (COREG) 6.25 MG tablet Reorder     Encounter Diagnoses   Name Primary?     Paroxysmal atrial fibrillation (H) Yes     Benign essential hypertension        CURRENT MEDICATIONS:  Current Outpatient Medications   Medication Sig Dispense Refill     acetaminophen  (TYLENOL) 500 MG tablet Take 500 mg by mouth every 6 hours as needed for mild pain       atorvastatin (LIPITOR) 20 MG tablet Take 1 tablet (20 mg) by mouth daily 90 tablet 2     calcipotriene (DOVONEX) 0.005 % external cream Mix with efudex and apply twice daily to arms for 14 days 60 g 3     carvedilol (COREG) 6.25 MG tablet Take 1 tablet (6.25 mg) by mouth 2 times daily (with meals) 180 tablet 3     flecainide (TAMBOCOR) 150 MG tablet Take 75mg (1/2 tab)  po every 12 hours 45 tablet 3     fluorouracil (EFUDEX) 5 % external cream Mix with dovonex and apply twice daily to arms for 14 days 40 g 1     losartan (COZAAR) 100 MG tablet Take 1 tablet (100 mg) by mouth daily 180 tablet 3     melatonin 3 MG CAPS Take 3 mg by mouth At Bedtime       mupirocin (BACTROBAN) 2 % external ointment Apply topically 2 times daily 15 g 0     sildenafil (REVATIO) 20 MG tablet Take 1 tablet (20 mg) by mouth daily as needed 30 tablet 3     tretinoin (RETIN-A) 0.05 % external cream Bedtime to arms for 3 weeks 45 g 3     triamcinolone (KENALOG) 0.1 % external cream Apply topically 2 times daily 45 g 0     trimethoprim-polymyxin b (POLYTRIM) 96931-3.1 UNIT/ML-% ophthalmic solution Place 1-2 drops Into the left eye every 4 hours 10 mL 0     warfarin ANTICOAGULANT (COUMADIN) 5 MG tablet TAKE ONE & ONE-HALF TABLETS (7.5 MG) BY MOUTH ON FRIDAYS, AND TAKE ONE (5 MG) TABLET BY MOUTH ON ALL OTHER DAYS, OR AS DIRECTED BY INR CLINIC 96 tablet 1       ALLERGIES  Allergies   Allergen Reactions     Lisinopril Cough     Very persistent cough       PAST MEDICAL, SURGICAL, FAMILY HISTORY:  History was reviewed and updated as needed, see medical record.    SOCIAL HISTORY:  Social History     Socioeconomic History     Marital status:      Spouse name: Not on file     Number of children: Not on file     Years of education: Not on file     Highest education level: Not on file   Occupational History     Not on file   Tobacco Use     Smoking status:  "Never Smoker     Smokeless tobacco: Never Used   Substance and Sexual Activity     Alcohol use: Yes     Comment: every 1-2 months     Drug use: No     Sexual activity: Yes     Partners: Female   Other Topics Concern     Parent/sibling w/ CABG, MI or angioplasty before 65F 55M? Yes   Social History Narrative     Not on file     Social Determinants of Health     Financial Resource Strain:      Difficulty of Paying Living Expenses:    Food Insecurity:      Worried About Running Out of Food in the Last Year:      Ran Out of Food in the Last Year:    Transportation Needs:      Lack of Transportation (Medical):      Lack of Transportation (Non-Medical):    Physical Activity:      Days of Exercise per Week:      Minutes of Exercise per Session:    Stress:      Feeling of Stress :    Social Connections:      Frequency of Communication with Friends and Family:      Frequency of Social Gatherings with Friends and Family:      Attends Islam Services:      Active Member of Clubs or Organizations:      Attends Club or Organization Meetings:      Marital Status:    Intimate Partner Violence:      Fear of Current or Ex-Partner:      Emotionally Abused:      Physically Abused:      Sexually Abused:        Review of Systems:  Skin:  Positive for rash   Eyes:  Positive for glasses  ENT:  Negative    Respiratory:  Negative dyspnea on exertion  Cardiovascular:  Negative    Gastroenterology: Negative    Genitourinary:  Negative    Musculoskeletal:  Negative    Neurologic:  Negative    Psychiatric:  Negative    Heme/Lymph/Imm:  Negative    Endocrine:  Negative       Physical Exam:  Vitals: /75 (BP Location: Right arm, Patient Position: Sitting, Cuff Size: Adult Regular)   Pulse 73   Ht 1.93 m (6' 4\")   Wt 124.5 kg (274 lb 8 oz)   SpO2 98%   BMI 33.41 kg/m     Wt Readings from Last 4 Encounters:   07/19/21 124.5 kg (274 lb 8 oz)   06/09/21 126.2 kg (278 lb 3.2 oz)   05/25/21 127 kg (280 lb)   03/31/21 124.4 kg (274 lb 3.2 " oz)     CONSTITUTIONAL: Appears his stated age, well nourished, and in no acute distress.  HEENT: Pupils equal, round. Sclerae nonicteric.    NECK: Supple, no masses appreciated.   C/V:  Regular rate and rhythm, normal S1 and S2, no S3 or S4, no murmur, rub or gallop.   RESP: Respirations are unlabored. Lungs are clear to auscultation bilaterally without wheezing, rales, or rhonchi.  GI: Abdomen soft, non-tender, non-distended.  EXTREM:  No clubbing, cyanosis, or lower extremity edema bilaterally.   NEURO: Alert and oriented, cooperative. Gait steady. No gross focal deficits.   PSYCH: Affect appropriate. Mentation normal. Responds to questions appropriately.  SKIN: Warm and dry. No apparent rashes or bruising.     Recent Lab Results:  LIPID RESULTS:  Lab Results   Component Value Date    CHOL 157 03/31/2021    HDL 47 03/31/2021    LDL 93 03/31/2021    TRIG 84 03/31/2021     LIVER ENZYME RESULTS:  Lab Results   Component Value Date    AST 14 03/31/2021    ALT 28 03/31/2021     CBC RESULTS:  Lab Results   Component Value Date    WBC 5.7 06/21/2021    RBC 5.09 06/21/2021    HGB 16.4 06/21/2021    HCT 47.2 06/21/2021    MCV 93 06/21/2021    MCH 32.2 06/21/2021    MCHC 34.7 06/21/2021    RDW 12.2 06/21/2021     06/21/2021     BMP RESULTS:  Lab Results   Component Value Date     06/21/2021    POTASSIUM 4.9 06/21/2021    CHLORIDE 108 06/21/2021    CO2 27 06/21/2021    ANIONGAP 3 06/21/2021    GLC 94 06/21/2021    BUN 23 06/21/2021    CR 1.31 (H) 06/21/2021    GFRESTIMATED 55 (L) 06/21/2021    GFRESTBLACK 64 06/21/2021    CHU 9.2 06/21/2021      A1C RESULTS:  No results found for: A1C  INR RESULTS:  Lab Results   Component Value Date    INR 2.60 (H) 07/06/2021    INR 1.88 (H) 06/21/2021       CC  No referring provider defined for this encounter.    This note was completed in part using Dragon voice recognition software. Although reviewed after completion, some word and grammatical errors may occur.

## 2021-07-19 ENCOUNTER — LAB (OUTPATIENT)
Dept: LAB | Facility: CLINIC | Age: 71
End: 2021-07-19
Payer: COMMERCIAL

## 2021-07-19 ENCOUNTER — OFFICE VISIT (OUTPATIENT)
Dept: CARDIOLOGY | Facility: CLINIC | Age: 71
End: 2021-07-19
Payer: COMMERCIAL

## 2021-07-19 VITALS
HEART RATE: 73 BPM | SYSTOLIC BLOOD PRESSURE: 121 MMHG | DIASTOLIC BLOOD PRESSURE: 75 MMHG | HEIGHT: 76 IN | BODY MASS INDEX: 33.43 KG/M2 | WEIGHT: 274.5 LBS | OXYGEN SATURATION: 98 %

## 2021-07-19 DIAGNOSIS — I10 BENIGN ESSENTIAL HYPERTENSION: ICD-10-CM

## 2021-07-19 DIAGNOSIS — I48.0 PAROXYSMAL ATRIAL FIBRILLATION (H): Primary | ICD-10-CM

## 2021-07-19 DIAGNOSIS — I48.0 PAROXYSMAL ATRIAL FIBRILLATION (H): ICD-10-CM

## 2021-07-19 LAB
ANION GAP SERPL CALCULATED.3IONS-SCNC: 4 MMOL/L (ref 3–14)
BUN SERPL-MCNC: 24 MG/DL (ref 7–30)
CALCIUM SERPL-MCNC: 8.7 MG/DL (ref 8.5–10.1)
CHLORIDE BLD-SCNC: 112 MMOL/L (ref 94–109)
CO2 SERPL-SCNC: 22 MMOL/L (ref 20–32)
CREAT SERPL-MCNC: 1.2 MG/DL (ref 0.66–1.25)
GFR SERPL CREATININE-BSD FRML MDRD: 62 ML/MIN/1.73M2
GLUCOSE BLD-MCNC: 88 MG/DL (ref 70–99)
POTASSIUM BLD-SCNC: 4.6 MMOL/L (ref 3.4–5.3)
SODIUM SERPL-SCNC: 138 MMOL/L (ref 133–144)

## 2021-07-19 PROCEDURE — 80048 BASIC METABOLIC PNL TOTAL CA: CPT

## 2021-07-19 PROCEDURE — 99214 OFFICE O/P EST MOD 30 MIN: CPT | Performed by: NURSE PRACTITIONER

## 2021-07-19 PROCEDURE — 36415 COLL VENOUS BLD VENIPUNCTURE: CPT

## 2021-07-19 RX ORDER — LOSARTAN POTASSIUM 100 MG/1
100 TABLET ORAL DAILY
Qty: 180 TABLET | Refills: 3 | Status: SHIPPED | OUTPATIENT
Start: 2021-07-19 | End: 2022-02-03

## 2021-07-19 RX ORDER — CARVEDILOL 6.25 MG/1
6.25 TABLET ORAL 2 TIMES DAILY WITH MEALS
Qty: 180 TABLET | Refills: 3 | Status: SHIPPED | OUTPATIENT
Start: 2021-07-19 | End: 2022-02-02

## 2021-07-19 RX ORDER — FLECAINIDE ACETATE 150 MG/1
TABLET ORAL
Qty: 45 TABLET | Refills: 3 | Status: ON HOLD | OUTPATIENT
Start: 2021-07-19 | End: 2022-01-24

## 2021-07-19 ASSESSMENT — MIFFLIN-ST. JEOR: SCORE: 2116.62

## 2021-07-19 NOTE — PATIENT INSTRUCTIONS
Bmp today.    If you have problems or questions please call the RNs (Ashlee Tran, & Darya) at [phone]

## 2021-07-19 NOTE — LETTER
7/19/2021    Davina Reaves MD  [address]  [facility] [address]    RE: Kevan Connelly       Dear Colleague,    I had the pleasure of seeing Kevan Connelly in the [facility].        Cardiology Clinic Progress Note    Service Date:  07/19/21    Primary Cardiologist: Dr. Hannon      Reason for Visit: Follow-up for hypertension    HPI:   I had the pleasure of seeing . Kevan Connelly in the clinic today. he is a very pleasant 70 year old male with a past medical history notable for    1. Atrial fibrillation.  Diagnosed on 5/30/19 with cardiomyopathy EF was 33%.  Was on amiodarone and changed to flecainide with normal EF.  refractory to flecainide.     Underwent an atrial fibrillation ablation on 1/3/2020  2. Nonocclusive coronary artery disease.  Per CT angiogram heart 1/2020 revealed coronary calcification the LAD, circumflex and RCA.  3. Mildly dilated aortic root and ascending aorta.  Per CT angiogram (1/2020) revealed 4.22 x 3.88 cm and 3.64 cm x 3.73 cm respectively.  4. Tachycardia induced cardiomyopathy.  EF 33% (6/2018)  ECHO 50-55% (8/2019)  5. Hypertension  6. Obesity  7. Testicular cancer with subsequent ED    In March 2019, he was diagnosed with atrial fibrillation    In June 2019 he was diagnosed with cardiomyopathy, underwent an unsuccessful cardioversion was later started on amiodarone and on 7/2018 underwent a successful cardioversion    In September 2019 his EF improved, he was started on flecainide unfortunately was found to be in atrial fibrillation via Zio patch    On 1/2020 he underwent PVI, had recurrence of atrial fibrillation was started on amiodarone which was subsequently discontinued on 4/2020    On 8/2020 he presented with atrial fibrillation, was started on flecainide and underwent a cardioversion.  Failed to maintain sinus rhythm and in November 2020 underwent a cardioversion.    In June 2021 he met with   Parvez he was doing well on flecainide 75 mg twice daily and metoprolol XL 50 mg daily.  He was on Coumadin for anticoagulation.  His blood pressure was elevated and his losartan was increased to 100 mg daily.    On 6/21/2021, patient was in the emergency room with chest pain, his blood pressure was elevated, he was in sinus rhythm, his troponin was negative.  His metoprolol was continued and was started on carvedilol 6.25 mg twice daily    Today, he reports better blood pressure since starting coreg.  He brought his home blood pressure cuff today and his BP was 134/91 mmHg and manual was 120/74 mmHg.  He does not regularly exercise.  He denies chest pain, shortness of breath, dyspnea on exertion, orthopnea, PND, lower extremity edema, palpitations, dizziness, presyncope, or syncope.   Reports taking medications as prescribed including warfarin and denies bleeding and sign/symptoms of stroke.     ASSESSMENT AND PLAN:  Atrial fibrillation    For anticoagulation for CHADS VASC 3 (age, HTN, HF) he takes warfarin with goal INR 2-3. His INR has been therapeutic 80% of the time since April 2021.    His last hemoglobin was 16.4 (6/2021).   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  After resolution of tachycardia induced cardiomyopathy he was started on flecainide with metoprolol and later was found to be in atrial fibrillation despite increase flecainide dosage. On 1/3/2020, he underwent a PVI ablation, flecainide was discontinued and he was started on amiodarone which was discontinued on 4/2020.   In 8/2020, he had a recurrence of atrial fibrillation, was restarted on flecainide underwent a successful cardioversion.  He had a recurrence and underwent a cardioversion on 11/2020.      For rhythm control he is currently taking flecainide 75 mg twice daily and carvedilol 6.25 mg twice daily.   With no recurrence.      Cardiomyopathy    At the time of diagnosis  with his atrial fibrillation his EF was found to be 33%.   ECHO (8/2019) revealed EF 50-55%      Continue GDMT of ARB (losartan 50 mg daily) and BB (carvedilol 6.25 mg twice daily)    Euvolemic on exam      Hypertension    Controlled while taking losartan 100 mg daily and carvedilol 6.25 mg twice daily    Continue to take BP at home     Creatinine was slightly elevated in June 2021, will repeat BMP today     Non-occlusive coronary artery disease    Per CT angiogram heart 1/2020 revealed coronary calcification the LAD, circumflex and RCA.    Asymptomatic    Continue BB, ARB, statin.  He is not on an aspirin due to being on Warfarin.  His last LDL was 93 (3/2021)    Plan:    Repeat BMP today    Continue with current medications.     Take BP at home    Follow up with Dr. Hannon in 1 year    Please call the clinic if questions or problems arise      Thank you for the opportunity to participate in this pleasant patient's care. We would be happy to see him sooner if needed for any concerns in the meantime.        ANNMARIE Peterson CNP  [facility]  Text Page  (M-F 8:00 am - 4:30 pm)    Orders this Visit:  Orders Placed This Encounter   Procedures     Basic metabolic panel     Basic metabolic panel     Follow-Up with Electrophysiologist     EKG 12-lead complete w/read - Clinics (to be scheduled)     Orders Placed This Encounter   Medications     losartan (COZAAR) 100 MG tablet     Sig: Take 1 tablet (100 mg) by mouth daily     Dispense:  180 tablet     Refill:  3     carvedilol (COREG) 6.25 MG tablet     Sig: Take 1 tablet (6.25 mg) by mouth 2 times daily (with meals)     Dispense:  180 tablet     Refill:  3     flecainide (TAMBOCOR) 150 MG tablet     Sig: Take 75mg (1/2 tab)  po every 12 hours     Dispense:  45 tablet     Refill:  3     Medications Discontinued During This Encounter   Medication Reason     losartan (COZAAR) 100 MG tablet Reorder     flecainide (TAMBOCOR) 150 MG tablet Reorder     carvedilol (COREG)  6.25 MG tablet Reorder     Encounter Diagnoses   Name Primary?     Paroxysmal atrial fibrillation (H) Yes     Benign essential hypertension        CURRENT MEDICATIONS:  Current Outpatient Medications   Medication Sig Dispense Refill     acetaminophen (TYLENOL) 500 MG tablet Take 500 mg by mouth every 6 hours as needed for mild pain       atorvastatin (LIPITOR) 20 MG tablet Take 1 tablet (20 mg) by mouth daily 90 tablet 2     calcipotriene (DOVONEX) 0.005 % external cream Mix with efudex and apply twice daily to arms for 14 days 60 g 3     carvedilol (COREG) 6.25 MG tablet Take 1 tablet (6.25 mg) by mouth 2 times daily (with meals) 180 tablet 3     flecainide (TAMBOCOR) 150 MG tablet Take 75mg (1/2 tab)  po every 12 hours 45 tablet 3     fluorouracil (EFUDEX) 5 % external cream Mix with dovonex and apply twice daily to arms for 14 days 40 g 1     losartan (COZAAR) 100 MG tablet Take 1 tablet (100 mg) by mouth daily 180 tablet 3     melatonin 3 MG CAPS Take 3 mg by mouth At Bedtime       mupirocin (BACTROBAN) 2 % external ointment Apply topically 2 times daily 15 g 0     sildenafil (REVATIO) 20 MG tablet Take 1 tablet (20 mg) by mouth daily as needed 30 tablet 3     tretinoin (RETIN-A) 0.05 % external cream Bedtime to arms for 3 weeks 45 g 3     triamcinolone (KENALOG) 0.1 % external cream Apply topically 2 times daily 45 g 0     trimethoprim-polymyxin b (POLYTRIM) 33486-4.1 UNIT/ML-% ophthalmic solution Place 1-2 drops Into the left eye every 4 hours 10 mL 0     warfarin ANTICOAGULANT (COUMADIN) 5 MG tablet TAKE ONE & ONE-HALF TABLETS (7.5 MG) BY MOUTH ON FRIDAYS, AND TAKE ONE (5 MG) TABLET BY MOUTH ON ALL OTHER DAYS, OR AS DIRECTED BY INR CLINIC 96 tablet 1       ALLERGIES  Allergies   Allergen Reactions     Lisinopril Cough     Very persistent cough       PAST MEDICAL, SURGICAL, FAMILY HISTORY:  History was reviewed and updated as needed, see medical record.    SOCIAL HISTORY:  Social History     Socioeconomic  History     Marital status:      Spouse name: Not on file     Number of children: Not on file     Years of education: Not on file     Highest education level: Not on file   Occupational History     Not on file   Tobacco Use     Smoking status: Never Smoker     Smokeless tobacco: Never Used   Substance and Sexual Activity     Alcohol use: Yes     Comment: every 1-2 months     Drug use: No     Sexual activity: Yes     Partners: Female   Other Topics Concern     Parent/sibling w/ CABG, MI or angioplasty before 65F 55M? Yes   Social History Narrative     Not on file     Social Determinants of Health     Financial Resource Strain:      Difficulty of Paying Living Expenses:    Food Insecurity:      Worried About Running Out of Food in the Last Year:      Ran Out of Food in the Last Year:    Transportation Needs:      Lack of Transportation (Medical):      Lack of Transportation (Non-Medical):    Physical Activity:      Days of Exercise per Week:      Minutes of Exercise per Session:    Stress:      Feeling of Stress :    Social Connections:      Frequency of Communication with Friends and Family:      Frequency of Social Gatherings with Friends and Family:      Attends Orthodoxy Services:      Active Member of Clubs or Organizations:      Attends Club or Organization Meetings:      Marital Status:    Intimate Partner Violence:      Fear of Current or Ex-Partner:      Emotionally Abused:      Physically Abused:      Sexually Abused:        Review of Systems:  Skin:  Positive for rash   Eyes:  Positive for glasses  ENT:  Negative    Respiratory:  Negative dyspnea on exertion  Cardiovascular:  Negative    Gastroenterology: Negative    Genitourinary:  Negative    Musculoskeletal:  Negative    Neurologic:  Negative    Psychiatric:  Negative    Heme/Lymph/Imm:  Negative    Endocrine:  Negative       Physical Exam:  Vitals: /75 (BP Location: Right arm, Patient Position: Sitting, Cuff Size: Adult Regular)   Pulse 73  "  Ht 1.93 m (6' 4\")   Wt 124.5 kg (274 lb 8 oz)   SpO2 98%   BMI 33.41 kg/m     Wt Readings from Last 4 Encounters:   07/19/21 124.5 kg (274 lb 8 oz)   06/09/21 126.2 kg (278 lb 3.2 oz)   05/25/21 127 kg (280 lb)   03/31/21 124.4 kg (274 lb 3.2 oz)     CONSTITUTIONAL: Appears his stated age, well nourished, and in no acute distress.  HEENT: Pupils equal, round. Sclerae nonicteric.    NECK: Supple, no masses appreciated.   C/V:  Regular rate and rhythm, normal S1 and S2, no S3 or S4, no murmur, rub or gallop.   RESP: Respirations are unlabored. Lungs are clear to auscultation bilaterally without wheezing, rales, or rhonchi.  GI: Abdomen soft, non-tender, non-distended.  EXTREM:  No clubbing, cyanosis, or lower extremity edema bilaterally.   NEURO: Alert and oriented, cooperative. Gait steady. No gross focal deficits.   PSYCH: Affect appropriate. Mentation normal. Responds to questions appropriately.  SKIN: Warm and dry. No apparent rashes or bruising.     Recent Lab Results:  LIPID RESULTS:  Lab Results   Component Value Date    CHOL 157 03/31/2021    HDL 47 03/31/2021    LDL 93 03/31/2021    TRIG 84 03/31/2021     LIVER ENZYME RESULTS:  Lab Results   Component Value Date    AST 14 03/31/2021    ALT 28 03/31/2021     CBC RESULTS:  Lab Results   Component Value Date    WBC 5.7 06/21/2021    RBC 5.09 06/21/2021    HGB 16.4 06/21/2021    HCT 47.2 06/21/2021    MCV 93 06/21/2021    MCH 32.2 06/21/2021    MCHC 34.7 06/21/2021    RDW 12.2 06/21/2021     06/21/2021     BMP RESULTS:  Lab Results   Component Value Date     06/21/2021    POTASSIUM 4.9 06/21/2021    CHLORIDE 108 06/21/2021    CO2 27 06/21/2021    ANIONGAP 3 06/21/2021    GLC 94 06/21/2021    BUN 23 06/21/2021    CR 1.31 (H) 06/21/2021    GFRESTIMATED 55 (L) 06/21/2021    GFRESTBLACK 64 06/21/2021    CHU 9.2 06/21/2021      A1C RESULTS:  No results found for: A1C  INR RESULTS:  Lab Results   Component Value Date    INR 2.60 (H) 07/06/2021    INR " 1.88 (H) 06/21/2021       CC  No referring provider defined for this encounter.    This note was completed in part using Dragon voice recognition software. Although reviewed after completion, some word and grammatical errors may occur.      Thank you for allowing me to participate in the care of your patient.      Sincerely,     ANNMARIE Peterson [facility]  cc:   No referring provider defined for this encounter.

## 2021-07-28 ENCOUNTER — ANTICOAGULATION THERAPY VISIT (OUTPATIENT)
Dept: ANTICOAGULATION | Facility: CLINIC | Age: 71
End: 2021-07-28

## 2021-07-28 ENCOUNTER — LAB (OUTPATIENT)
Dept: LAB | Facility: CLINIC | Age: 71
End: 2021-07-28
Payer: COMMERCIAL

## 2021-07-28 DIAGNOSIS — I48.0 PAROXYSMAL ATRIAL FIBRILLATION (H): ICD-10-CM

## 2021-07-28 DIAGNOSIS — Z79.01 LONG TERM CURRENT USE OF ANTICOAGULANTS WITH INR GOAL OF 2.0-3.0: ICD-10-CM

## 2021-07-28 DIAGNOSIS — Z79.01 LONG TERM CURRENT USE OF ANTICOAGULANT THERAPY: Primary | ICD-10-CM

## 2021-07-28 LAB — INR BLD: 2.8 (ref 0.9–1.1)

## 2021-07-28 PROCEDURE — 36416 COLLJ CAPILLARY BLOOD SPEC: CPT

## 2021-07-28 PROCEDURE — 85610 PROTHROMBIN TIME: CPT

## 2021-07-28 NOTE — PROGRESS NOTES
ANTICOAGULATION MANAGEMENT     Kevan Connelly 70 year old male is on warfarin with therapeutic INR result. (Goal INR 2.0-3.0)    Recent labs: (last 7 days)     07/28/21  0906   INR 2.8*       ASSESSMENT     Source(s): Chart Review and Patient/Caregiver Call       Warfarin doses taken: Warfarin taken as instructed    Diet: No new diet changes identified    New illness, injury, or hospitalization: No    Medication/supplement changes: None noted    Signs or symptoms of bleeding or clotting: No    Previous INR: Therapeutic last visit; previously outside of goal range    Additional findings: None     PLAN     Recommended plan for no diet, medication or health factor changes affecting INR     Dosing Instructions: Continue your current warfarin dose with next INR in 4 weeks       Summary  As of 7/28/2021    Full warfarin instructions:  7.5 mg every Fri; 5 mg all other days   Next INR check:  8/25/2021             Telephone call with Kevan who verbalizes understanding and agrees to plan    Lab visit scheduled    Education provided: Please call back if any changes to your diet, medications or how you've been taking warfarin    Plan made per [facility] anticoagulation protocol    Kaylee Ramey RN  Anticoagulation Clinic  7/28/2021    _______________________________________________________________________     Anticoagulation Episode Summary     Current INR goal:  2.0-3.0   TTR:  74.9 % (1 y)   Target end date:  Indefinite   Send INR reminders to:  [facility]    Indications    Long term current use of anticoagulant therapy [Z79.01]  Paroxysmal atrial fibrillation (H) [I48.0]  Long term current use of anticoagulants with INR goal of 2.0-3.0 [Z79.01]           Comments:           Anticoagulation Care Providers     Provider Role Specialty Phone number    Davina Reaves MD Referring Internal Medicine [phone]

## 2021-08-12 ENCOUNTER — OFFICE VISIT (OUTPATIENT)
Dept: DERMATOLOGY | Facility: CLINIC | Age: 71
End: 2021-08-12
Payer: COMMERCIAL

## 2021-08-12 VITALS
SYSTOLIC BLOOD PRESSURE: 138 MMHG | BODY MASS INDEX: 33.41 KG/M2 | HEIGHT: 76 IN | HEART RATE: 67 BPM | DIASTOLIC BLOOD PRESSURE: 92 MMHG

## 2021-08-12 DIAGNOSIS — Z87.2 HISTORY OF ACTINIC KERATOSES: Primary | ICD-10-CM

## 2021-08-12 PROCEDURE — 99212 OFFICE O/P EST SF 10 MIN: CPT | Performed by: DERMATOLOGY

## 2021-08-12 NOTE — LETTER
8/12/2021         RE: Kevan Connelly  [address]  [place] [address]        Dear Colleague,    Thank you for referring your patient, Kevan Connelly, to the [facility]. Please see a copy of my visit note below.    Kevan Connelly is an extremely pleasant 70 year old year old male patient here today for f/u efudex on arms.  Did well no issues, arms smoother.  Patient has no other skin complaints today.  Remainder of the HPI, Meds, PMH, Allergies, FH, and SH was reviewed in chart.      Past Medical History:   Diagnosis Date     Atrial flutter (H)      Benign essential hypertension 1/18/2017    past use of ACE- Cough;  Had been on ARB-diuretic but experienced lower bp readings;  BLOOD PRESSURE now well controlled on ARB also no longer on diuretic; no low bp readings; may have had slight edema initially but has normalized with bp well controlled on ARB alone.      Cancer (H)     testicular cancer     CHF (congestive heart failure) (H) 6/5/2019     Long term current use of anticoagulant therapy 6/3/2019     Obesity (BMI 35.0-39.9) with comorbidity (H) 2/11/2019     Paroxysmal atrial fibrillation (H) 06/03/2019       Past Surgical History:   Procedure Laterality Date     ANESTHESIA CARDIOVERSION N/A 6/26/2019    Procedure: ANESTHESIA, FOR CARDIOVERSION DR. LE;  Surgeon: GENERIC ANESTHESIA PROVIDER;  Location: [facility] OR     ANESTHESIA CARDIOVERSION N/A 7/17/2019    Procedure: ANESTHESIA, FOR CARDIOVERSION;  Surgeon: GENERIC ANESTHESIA PROVIDER;  Location:  OR     ANESTHESIA CARDIOVERSION N/A 1/28/2020    Procedure: ANESTHESIA, FOR CARDIOVERSION;  Surgeon: GENERIC ANESTHESIA PROVIDER;  Location: [facility] OR     ANESTHESIA CARDIOVERSION N/A 9/8/2020    Procedure: ANESTHESIA, FOR CARDIOVERSION;  Surgeon: GENERIC ANESTHESIA PROVIDER;  Location: [facility] OR     ANESTHESIA CARDIOVERSION N/A 11/23/2020    Procedure: ANESTHESIA, FOR CARDIOVERSION;  Surgeon: GENERIC  ANESTHESIA PROVIDER;  Location: [facility] OR     CLOSED REDUCTION SHOULDER Left      EP ABLATION FOCAL AFIB N/A 1/3/2020    Procedure: EP Ablation Focal AFIB;  Surgeon: Lamont Hannon MD;  Location:  [facility]     GENITOURINARY SURGERY  1990    testicular cancer     ORTHOPEDIC SURGERY  1970's    right knee surgery         Family History   Problem Relation Age of Onset     Colon Cancer Father      Coronary Artery Disease Maternal Grandmother        Social History     Socioeconomic History     Marital status:      Spouse name: Not on file     Number of children: Not on file     Years of education: Not on file     Highest education level: Not on file   Occupational History     Not on file   Tobacco Use     Smoking status: Never Smoker     Smokeless tobacco: Never Used   Substance and Sexual Activity     Alcohol use: Yes     Comment: every 1-2 months     Drug use: No     Sexual activity: Yes     Partners: Female   Other Topics Concern     Parent/sibling w/ CABG, MI or angioplasty before 65F 55M? Yes   Social History Narrative     Not on file     Social Determinants of Health     Financial Resource Strain:      Difficulty of Paying Living Expenses:    Food Insecurity:      Worried About Running Out of Food in the Last Year:      Ran Out of Food in the Last Year:    Transportation Needs:      Lack of Transportation (Medical):      Lack of Transportation (Non-Medical):    Physical Activity:      Days of Exercise per Week:      Minutes of Exercise per Session:    Stress:      Feeling of Stress :    Social Connections:      Frequency of Communication with Friends and Family:      Frequency of Social Gatherings with Friends and Family:      Attends Baptism Services:      Active Member of Clubs or Organizations:      Attends Club or Organization Meetings:      Marital Status:    Intimate Partner Violence:      Fear of Current or Ex-Partner:      Emotionally Abused:      Physically Abused:      Sexually  "Abused:        Outpatient Encounter Medications as of 8/12/2021   Medication Sig Dispense Refill     acetaminophen (TYLENOL) 500 MG tablet Take 500 mg by mouth every 6 hours as needed for mild pain       atorvastatin (LIPITOR) 20 MG tablet Take 1 tablet (20 mg) by mouth daily 90 tablet 2     calcipotriene (DOVONEX) 0.005 % external cream Mix with efudex and apply twice daily to arms for 14 days 60 g 3     carvedilol (COREG) 6.25 MG tablet Take 1 tablet (6.25 mg) by mouth 2 times daily (with meals) 180 tablet 3     flecainide (TAMBOCOR) 150 MG tablet Take 75mg (1/2 tab)  po every 12 hours 45 tablet 3     fluorouracil (EFUDEX) 5 % external cream Mix with dovonex and apply twice daily to arms for 14 days 40 g 1     losartan (COZAAR) 100 MG tablet Take 1 tablet (100 mg) by mouth daily 180 tablet 3     melatonin 3 MG CAPS Take 3 mg by mouth At Bedtime       mupirocin (BACTROBAN) 2 % external ointment Apply topically 2 times daily 15 g 0     sildenafil (REVATIO) 20 MG tablet Take 1 tablet (20 mg) by mouth daily as needed 30 tablet 3     tretinoin (RETIN-A) 0.05 % external cream Bedtime to arms for 3 weeks 45 g 3     triamcinolone (KENALOG) 0.1 % external cream Apply topically 2 times daily 45 g 0     trimethoprim-polymyxin b (POLYTRIM) 06544-0.1 UNIT/ML-% ophthalmic solution Place 1-2 drops Into the left eye every 4 hours 10 mL 0     warfarin ANTICOAGULANT (COUMADIN) 5 MG tablet TAKE ONE & ONE-HALF TABLETS (7.5 MG) BY MOUTH ON FRIDAYS, AND TAKE ONE (5 MG) TABLET BY MOUTH ON ALL OTHER DAYS, OR AS DIRECTED BY INR CLINIC 96 tablet 1     [DISCONTINUED] metoprolol succinate ER (TOPROL-XL) 25 MG 24 hr tablet Take 1 tablet (25 mg) by mouth 2 times daily 90 tablet 3     No facility-administered encounter medications on file as of 8/12/2021.             O:   NAD, WDWN, Alert & Oriented, Mood & Affect wnl, Vitals stable   Here today alone   BP (!) 138/92   Pulse 67   Ht 1.93 m (6' 4\")   BMI 33.41 kg/m     General appearance " normal   Vitals stable   Alert, oriented and in no acute distress     Post inflammatory changes on arms      Eyes: Conjunctivae/lids:Normal     ENT: Lips, buccal mucosa, tongue: normal    MSK:Normal    Cardiovascular: peripheral edema none    Pulm: Breathing Normal    Neuro/Psych: Orientation:Alert and Orientedx3 ; Mood/Affect:normal       A/P:  1. Hx of actinic keratosis s/p efudex did great  It was a pleasure speaking to Kevan Connelly today.  Previous clinic notes and pertinent laboratory tests were reviewed prior to Kevan Connelly's visit.  Patient encouraged to perform monthly skin exams.  UV precautions reviewed with patient.  Patient to follow up with Primary Care provider regarding elevated blood pressure.  Return to clinic 12 months        Again, thank you for allowing me to participate in the care of your patient.        Sincerely,        Patrick Hernandez MD

## 2021-08-12 NOTE — PROGRESS NOTES
Kevan Connelly is an extremely pleasant 70 year old year old male patient here today for f/u efudex on arms.  Did well no issues, arms smoother.  Patient has no other skin complaints today.  Remainder of the HPI, Meds, PMH, Allergies, FH, and SH was reviewed in chart.      Past Medical History:   Diagnosis Date     Atrial flutter (H)      Benign essential hypertension 1/18/2017    past use of ACE- Cough;  Had been on ARB-diuretic but experienced lower bp readings;  BLOOD PRESSURE now well controlled on ARB also no longer on diuretic; no low bp readings; may have had slight edema initially but has normalized with bp well controlled on ARB alone.      Cancer (H)     testicular cancer     CHF (congestive heart failure) (H) 6/5/2019     Long term current use of anticoagulant therapy 6/3/2019     Obesity (BMI 35.0-39.9) with comorbidity (H) 2/11/2019     Paroxysmal atrial fibrillation (H) 06/03/2019       Past Surgical History:   Procedure Laterality Date     ANESTHESIA CARDIOVERSION N/A 6/26/2019    Procedure: ANESTHESIA, FOR CARDIOVERSION DR. LE;  Surgeon: GENERIC ANESTHESIA PROVIDER;  Location:  OR     ANESTHESIA CARDIOVERSION N/A 7/17/2019    Procedure: ANESTHESIA, FOR CARDIOVERSION;  Surgeon: GENERIC ANESTHESIA PROVIDER;  Location:  OR     ANESTHESIA CARDIOVERSION N/A 1/28/2020    Procedure: ANESTHESIA, FOR CARDIOVERSION;  Surgeon: GENERIC ANESTHESIA PROVIDER;  Location:  OR     ANESTHESIA CARDIOVERSION N/A 9/8/2020    Procedure: ANESTHESIA, FOR CARDIOVERSION;  Surgeon: GENERIC ANESTHESIA PROVIDER;  Location:  OR     ANESTHESIA CARDIOVERSION N/A 11/23/2020    Procedure: ANESTHESIA, FOR CARDIOVERSION;  Surgeon: GENERIC ANESTHESIA PROVIDER;  Location: [facility] OR     CLOSED REDUCTION SHOULDER Left      EP ABLATION FOCAL AFIB N/A 1/3/2020    Procedure: EP Ablation Focal AFIB;  Surgeon: Lamont Hannon MD;  Location:  [facility]     GENITOURINARY SURGERY  1990    testicular cancer      ORTHOPEDIC SURGERY  1970's    right knee surgery         Family History   Problem Relation Age of Onset     Colon Cancer Father      Coronary Artery Disease Maternal Grandmother        Social History     Socioeconomic History     Marital status:      Spouse name: Not on file     Number of children: Not on file     Years of education: Not on file     Highest education level: Not on file   Occupational History     Not on file   Tobacco Use     Smoking status: Never Smoker     Smokeless tobacco: Never Used   Substance and Sexual Activity     Alcohol use: Yes     Comment: every 1-2 months     Drug use: No     Sexual activity: Yes     Partners: Female   Other Topics Concern     Parent/sibling w/ CABG, MI or angioplasty before 65F 55M? Yes   Social History Narrative     Not on file     Social Determinants of Health     Financial Resource Strain:      Difficulty of Paying Living Expenses:    Food Insecurity:      Worried About Running Out of Food in the Last Year:      Ran Out of Food in the Last Year:    Transportation Needs:      Lack of Transportation (Medical):      Lack of Transportation (Non-Medical):    Physical Activity:      Days of Exercise per Week:      Minutes of Exercise per Session:    Stress:      Feeling of Stress :    Social Connections:      Frequency of Communication with Friends and Family:      Frequency of Social Gatherings with Friends and Family:      Attends Protestant Services:      Active Member of Clubs or Organizations:      Attends Club or Organization Meetings:      Marital Status:    Intimate Partner Violence:      Fear of Current or Ex-Partner:      Emotionally Abused:      Physically Abused:      Sexually Abused:        Outpatient Encounter Medications as of 8/12/2021   Medication Sig Dispense Refill     acetaminophen (TYLENOL) 500 MG tablet Take 500 mg by mouth every 6 hours as needed for mild pain       atorvastatin (LIPITOR) 20 MG tablet Take 1 tablet (20 mg) by mouth daily 90  "tablet 2     calcipotriene (DOVONEX) 0.005 % external cream Mix with efudex and apply twice daily to arms for 14 days 60 g 3     carvedilol (COREG) 6.25 MG tablet Take 1 tablet (6.25 mg) by mouth 2 times daily (with meals) 180 tablet 3     flecainide (TAMBOCOR) 150 MG tablet Take 75mg (1/2 tab)  po every 12 hours 45 tablet 3     fluorouracil (EFUDEX) 5 % external cream Mix with dovonex and apply twice daily to arms for 14 days 40 g 1     losartan (COZAAR) 100 MG tablet Take 1 tablet (100 mg) by mouth daily 180 tablet 3     melatonin 3 MG CAPS Take 3 mg by mouth At Bedtime       mupirocin (BACTROBAN) 2 % external ointment Apply topically 2 times daily 15 g 0     sildenafil (REVATIO) 20 MG tablet Take 1 tablet (20 mg) by mouth daily as needed 30 tablet 3     tretinoin (RETIN-A) 0.05 % external cream Bedtime to arms for 3 weeks 45 g 3     triamcinolone (KENALOG) 0.1 % external cream Apply topically 2 times daily 45 g 0     trimethoprim-polymyxin b (POLYTRIM) 39020-8.1 UNIT/ML-% ophthalmic solution Place 1-2 drops Into the left eye every 4 hours 10 mL 0     warfarin ANTICOAGULANT (COUMADIN) 5 MG tablet TAKE ONE & ONE-HALF TABLETS (7.5 MG) BY MOUTH ON FRIDAYS, AND TAKE ONE (5 MG) TABLET BY MOUTH ON ALL OTHER DAYS, OR AS DIRECTED BY INR CLINIC 96 tablet 1     [DISCONTINUED] metoprolol succinate ER (TOPROL-XL) 25 MG 24 hr tablet Take 1 tablet (25 mg) by mouth 2 times daily 90 tablet 3     No facility-administered encounter medications on file as of 8/12/2021.             O:   NAD, WDWN, Alert & Oriented, Mood & Affect wnl, Vitals stable   Here today alone   BP (!) 138/92   Pulse 67   Ht 1.93 m (6' 4\")   BMI 33.41 kg/m     General appearance normal   Vitals stable   Alert, oriented and in no acute distress     Post inflammatory changes on arms      Eyes: Conjunctivae/lids:Normal     ENT: Lips, buccal mucosa, tongue: normal    MSK:Normal    Cardiovascular: peripheral edema none    Pulm: Breathing Normal    Neuro/Psych: " Orientation:Alert and Orientedx3 ; Mood/Affect:normal       A/P:  1. Hx of actinic keratosis s/p efudex did great  It was a pleasure speaking to Kevan Connelly today.  Previous clinic notes and pertinent laboratory tests were reviewed prior to Kevan Connelly's visit.  Patient encouraged to perform monthly skin exams.  UV precautions reviewed with patient.  Patient to follow up with Primary Care provider regarding elevated blood pressure.  Return to clinic 12 months

## 2021-08-25 ENCOUNTER — ANTICOAGULATION THERAPY VISIT (OUTPATIENT)
Dept: ANTICOAGULATION | Facility: CLINIC | Age: 71
End: 2021-08-25

## 2021-08-25 ENCOUNTER — LAB (OUTPATIENT)
Dept: LAB | Facility: CLINIC | Age: 71
End: 2021-08-25
Payer: COMMERCIAL

## 2021-08-25 DIAGNOSIS — Z79.01 LONG TERM CURRENT USE OF ANTICOAGULANT THERAPY: Primary | ICD-10-CM

## 2021-08-25 DIAGNOSIS — Z79.01 LONG TERM CURRENT USE OF ANTICOAGULANTS WITH INR GOAL OF 2.0-3.0: ICD-10-CM

## 2021-08-25 DIAGNOSIS — I48.0 PAROXYSMAL ATRIAL FIBRILLATION (H): ICD-10-CM

## 2021-08-25 LAB — INR BLD: 2.4 (ref 0.9–1.1)

## 2021-08-25 PROCEDURE — 36416 COLLJ CAPILLARY BLOOD SPEC: CPT

## 2021-08-25 PROCEDURE — 85610 PROTHROMBIN TIME: CPT

## 2021-08-25 NOTE — PROGRESS NOTES
Anticoagulation Management    Unable to reach Keron today.    Today's INR result of 2.4 is therapeutic (goal INR of 2.0-3.0).  Result received from: Clinic Lab    Follow up required to confirm warfarin dose taken and assess for changes. If after assessment, no changes or concerns, then next INR in 6 weeks.    LVM for patient to return call to [facility] RN.     Anticoagulation clinic to follow up    Kaylee Ramey RN

## 2021-08-25 NOTE — PROGRESS NOTES
ANTICOAGULATION MANAGEMENT     Kevan Connelly 70 year old male is on warfarin with therapeutic INR result. (Goal INR 2.0-3.0)    Recent labs: (last 7 days)     08/25/21  0911   INR 2.4*       ASSESSMENT     Source(s): Chart Review and Patient/Caregiver Call       Warfarin doses taken: Warfarin taken as instructed    Diet: No new diet changes identified    New illness, injury, or hospitalization: No    Medication/supplement changes: None noted    Signs or symptoms of bleeding or clotting: No    Previous INR: Therapeutic last 2(+) visits    Additional findings: None     PLAN     Recommended plan for no diet, medication or health factor changes affecting INR     Dosing Instructions: Continue your current warfarin dose with next INR in 6 weeks       Summary  As of 8/25/2021    Full warfarin instructions:  7.5 mg every Fri; 5 mg all other days   Next INR check:  10/6/2021             Telephone call with Kevan who verbalizes understanding and agrees to plan    Lab visit scheduled    Education provided: Please call back if any changes to your diet, medications or how you've been taking warfarin    Plan made per [facility] anticoagulation protocol    Kaylee Ramey RN  Anticoagulation Clinic  8/25/2021    _______________________________________________________________________     Anticoagulation Episode Summary     Current INR goal:  2.0-3.0   TTR:  74.9 % (1 y)   Target end date:  Indefinite   Send INR reminders to:  [facility]    Indications    Long term current use of anticoagulant therapy [Z79.01]  Paroxysmal atrial fibrillation (H) [I48.0]  Long term current use of anticoagulants with INR goal of 2.0-3.0 [Z79.01]           Comments:           Anticoagulation Care Providers     Provider Role Specialty Phone number    Davina Reaves MD Referring Internal Medicine [phone]

## 2021-10-06 ENCOUNTER — LAB (OUTPATIENT)
Dept: LAB | Facility: CLINIC | Age: 71
End: 2021-10-06
Payer: COMMERCIAL

## 2021-10-06 ENCOUNTER — ANTICOAGULATION THERAPY VISIT (OUTPATIENT)
Dept: ANTICOAGULATION | Facility: CLINIC | Age: 71
End: 2021-10-06

## 2021-10-06 DIAGNOSIS — I48.0 PAROXYSMAL ATRIAL FIBRILLATION (H): ICD-10-CM

## 2021-10-06 DIAGNOSIS — Z79.01 LONG TERM CURRENT USE OF ANTICOAGULANTS WITH INR GOAL OF 2.0-3.0: ICD-10-CM

## 2021-10-06 DIAGNOSIS — Z79.01 LONG TERM CURRENT USE OF ANTICOAGULANT THERAPY: Primary | ICD-10-CM

## 2021-10-06 LAB — INR BLD: 2.2 (ref 0.9–1.1)

## 2021-10-06 PROCEDURE — 36415 COLL VENOUS BLD VENIPUNCTURE: CPT

## 2021-10-06 PROCEDURE — 85610 PROTHROMBIN TIME: CPT

## 2021-10-06 NOTE — PROGRESS NOTES
ANTICOAGULATION MANAGEMENT     Kevan Connelly 71 year old male is on warfarin with therapeutic INR result. (Goal INR 2.0-3.0)    Recent labs: (last 7 days)     10/06/21  0919   INR 2.2*       ASSESSMENT     Source(s): Chart Review and Patient/Caregiver Call       Warfarin doses taken: Warfarin taken as instructed    Diet: No new diet changes identified    New illness, injury, or hospitalization: No    Medication/supplement changes: None noted    Signs or symptoms of bleeding or clotting: No    Previous INR: Therapeutic last 2(+) visits    Additional findings: None     PLAN     Recommended plan for no diet, medication or health factor changes affecting INR     Dosing Instructions: Continue your current warfarin dose with next INR in 6 weeks       Summary  As of 10/6/2021    Full warfarin instructions:  7.5 mg every Fri; 5 mg all other days   Next INR check:  11/17/2021             Telephone call with Kevan who verbalizes understanding and agrees to plan    Lab visit scheduled    Education provided: Please call back if any changes to your diet, medications or how you've been taking warfarin, Monitoring for bleeding signs and symptoms, Monitoring for clotting signs and symptoms and Contact [phone]  with any changes, questions or concerns.     Plan made per [facility] anticoagulation protocol    Kaylee Ramey RN  Anticoagulation Clinic  10/6/2021    _______________________________________________________________________     Anticoagulation Episode Summary     Current INR goal:  2.0-3.0   TTR:  74.9 % (1 y)   Target end date:  Indefinite   Send INR reminders to:  [facility]    Indications    Long term current use of anticoagulant therapy [Z79.01]  Paroxysmal atrial fibrillation (H) [I48.0]  Long term current use of anticoagulants with INR goal of 2.0-3.0 [Z79.01]           Comments:           Anticoagulation Care Providers     Provider Role Specialty Phone number    Davina Reaves MD Referring  Internal Medicine [phone]

## 2021-10-11 ENCOUNTER — E-VISIT (OUTPATIENT)
Dept: FAMILY MEDICINE | Facility: CLINIC | Age: 71
End: 2021-10-11
Payer: COMMERCIAL

## 2021-10-11 DIAGNOSIS — R50.9 FEBRILE RESPIRATORY ILLNESS: Primary | ICD-10-CM

## 2021-10-11 DIAGNOSIS — J98.9 FEBRILE RESPIRATORY ILLNESS: Primary | ICD-10-CM

## 2021-10-11 PROCEDURE — 99422 OL DIG E/M SVC 11-20 MIN: CPT | Performed by: INTERNAL MEDICINE

## 2021-10-12 ENCOUNTER — TELEPHONE (OUTPATIENT)
Dept: NURSING | Facility: CLINIC | Age: 71
End: 2021-10-12

## 2021-10-12 ENCOUNTER — MYC MEDICAL ADVICE (OUTPATIENT)
Dept: FAMILY MEDICINE | Facility: CLINIC | Age: 71
End: 2021-10-12

## 2021-10-12 ENCOUNTER — LAB (OUTPATIENT)
Dept: URGENT CARE | Facility: URGENT CARE | Age: 71
End: 2021-10-12
Attending: INTERNAL MEDICINE
Payer: COMMERCIAL

## 2021-10-12 LAB — SARS-COV-2 RNA RESP QL NAA+PROBE: POSITIVE

## 2021-10-12 PROCEDURE — U0003 INFECTIOUS AGENT DETECTION BY NUCLEIC ACID (DNA OR RNA); SEVERE ACUTE RESPIRATORY SYNDROME CORONAVIRUS 2 (SARS-COV-2) (CORONAVIRUS DISEASE [COVID-19]), AMPLIFIED PROBE TECHNIQUE, MAKING USE OF HIGH THROUGHPUT TECHNOLOGIES AS DESCRIBED BY CMS-2020-01-R: HCPCS | Performed by: INTERNAL MEDICINE

## 2021-10-12 PROCEDURE — U0005 INFEC AGEN DETEC AMPLI PROBE: HCPCS | Performed by: INTERNAL MEDICINE

## 2021-10-12 NOTE — TELEPHONE ENCOUNTER
Relayed patient's e-visit appointment.    Suzanne Hammonds RN   10/12/21 6:56 AM  [facility] Nurse Advisor

## 2021-10-12 NOTE — TELEPHONE ENCOUNTER
Provider E-Visit time total (minutes):   11:03 PM-11:15     [FreeTextEntry1] : I, Fernandez Chavez, acted solely as a scribe for Dr. Alan Perez on this date 06/24/2020.\par All medical record entries made by the Scribe were at my, Dr. Alan Perez, direction and personally dictated by me on 06/24/2020. I have reviewed the chart and agree that the record accurately reflects my personal performance of the history, physical exam, assessment and plan. I have also personally directed, reviewed, and agreed with the chart.

## 2021-10-13 ENCOUNTER — TELEPHONE (OUTPATIENT)
Dept: CARDIOLOGY | Facility: CLINIC | Age: 71
End: 2021-10-13

## 2021-10-13 ENCOUNTER — MYC MEDICAL ADVICE (OUTPATIENT)
Dept: FAMILY MEDICINE | Facility: CLINIC | Age: 71
End: 2021-10-13

## 2021-10-13 NOTE — TELEPHONE ENCOUNTER
Call to pt to see about scheduling. He thought we would want a base set of vitals. He said his symptoms started Fri/Sat (10/8) w/ cough, then fever and malaise, the cough got a little better, but now low grade fever again. NOT feeling SOB. Appetite ok. Drinking fluids. Went over again MC message I sent him. Offered VIRT visit. Pt declined saying he will monitor and will MC or call with any changes/updates.     Adv to seek medical care if difficulty breathing, confusion, etc.     Pt verbalized understanding.     Carol JUDGE RN

## 2021-10-13 NOTE — TELEPHONE ENCOUNTER
"10/13/21 Pt called to report he has been diagnosed with COVID as of 10/12. His sx started on Friday 10/8. He stated he feels \" ok\", sluggish but denies fever, SOB or cough. Wants to know if there is anything he should do.  Explained that pt should focus on fever control, taking OTC pain reliever/fever reducer (Tylenol), staying hydrated and watching O2 sats. Informed him sats should stay > 94%. Informed him he may have some Afib during his quarantine and to monitor his HR. He does have a pulse oximeter and so far everything has been normal.   Pt states wife has been vaccinated but has not had a COVID test since his dx. Encouraged her to be tested.  Pt voiced understanding and agreement with plan.   Bran 1015 am  "

## 2021-10-18 ENCOUNTER — TELEPHONE (OUTPATIENT)
Dept: FAMILY MEDICINE | Facility: CLINIC | Age: 71
End: 2021-10-18

## 2021-10-18 DIAGNOSIS — Z79.01 LONG TERM CURRENT USE OF ANTICOAGULANTS WITH INR GOAL OF 2.0-3.0: ICD-10-CM

## 2021-10-18 DIAGNOSIS — I48.0 PAROXYSMAL ATRIAL FIBRILLATION (H): ICD-10-CM

## 2021-10-18 DIAGNOSIS — Z79.01 LONG TERM CURRENT USE OF ANTICOAGULANT THERAPY: Primary | ICD-10-CM

## 2021-10-18 NOTE — TELEPHONE ENCOUNTER
Reason for Call:  Other     Detailed comments: the patient would like to talk with an INR nurse about his diet     Phone Number Patient can be reached at: Home number on file [phone] (home)    Best Time: any    Can we leave a detailed message on this number? YES    Call taken on 10/18/2021 at 11:52 AM by Deana Davis

## 2021-10-18 NOTE — TELEPHONE ENCOUNTER
Patient diagnosed with Covid as of 10/12. Sx started 10/8. Advised to stay home, rest, plenty of fluids, watch for fever and increasing cough/SOB, monitor O2sats. Stay home until it has been 10 days since first symptoms, no fever for at least 24 hours without using fever-reducing medications and symptoms are gone.   Last INR 10/6/21, therapeutic at 2.2. Next scheduled 11/17/21.    Called patient back: hasn't had much appetite, for a while he was just taking in liquids only. Recently went back to protein shakes with 25% vit K. He doesn't normally eat many greens short of an occasional nuria salad.     Advised he check INR this week since dietary changes and covid+. Scheduled for 10/22/21.    Kaylee MICHELLE RN  Anticoagulation Team

## 2021-10-22 ENCOUNTER — ANTICOAGULATION THERAPY VISIT (OUTPATIENT)
Dept: FAMILY MEDICINE | Facility: CLINIC | Age: 71
End: 2021-10-22

## 2021-10-22 ENCOUNTER — LAB (OUTPATIENT)
Dept: LAB | Facility: CLINIC | Age: 71
End: 2021-10-22
Payer: COMMERCIAL

## 2021-10-22 DIAGNOSIS — Z79.01 LONG TERM CURRENT USE OF ANTICOAGULANTS WITH INR GOAL OF 2.0-3.0: ICD-10-CM

## 2021-10-22 DIAGNOSIS — Z79.01 LONG TERM CURRENT USE OF ANTICOAGULANT THERAPY: Primary | ICD-10-CM

## 2021-10-22 DIAGNOSIS — I48.0 PAROXYSMAL ATRIAL FIBRILLATION (H): ICD-10-CM

## 2021-10-22 LAB — INR BLD: 6.2 (ref 0.9–1.1)

## 2021-10-22 PROCEDURE — 36415 COLL VENOUS BLD VENIPUNCTURE: CPT

## 2021-10-22 PROCEDURE — 85610 PROTHROMBIN TIME: CPT

## 2021-10-22 NOTE — PROGRESS NOTES
ANTICOAGULATION MANAGEMENT     Kevan Connelly 71 year old male is on warfarin with supratherapeutic INR result. (Goal INR 2.0-3.0)    Recent labs: (last 7 days)     10/22/21  1433   INR 6.2*       ASSESSMENT     Source(s): Chart Review and Patient/Caregiver Call       Warfarin doses taken: Warfarin taken as instructed    Diet: No new diet changes identified    New illness, injury, or hospitalization: No    Medication/supplement changes: None noted    Signs or symptoms of bleeding or clotting: No    Previous INR: Therapeutic last 2(+) visits    Additional findings: Covid positive as of 10/12/21. Feeling 90% better.      PLAN     Recommended plan for temporary change(s) affecting INR     Dosing Instructions: Hold warfarin today, Sat, Sun with next INR in 3 days       Summary  As of 10/22/2021    Full warfarin instructions:  10/22: Hold; 10/23: Hold; 10/24: Hold; Otherwise 7.5 mg every Fri; 5 mg all other days   Next INR check:  10/25/2021             Telephone call with Mathur who verbalizes understanding and agrees to plan    Lab visit scheduled    Education provided: Please call back if any changes to your diet, medications or how you've been taking warfarin, Goal range and significance of current result, Monitoring for bleeding signs and symptoms and When to seek medical attention/emergency care    Plan made with [facility] Pharmacist Ana Esparza RN  Anticoagulation Clinic  10/22/2021    _______________________________________________________________________     Anticoagulation Episode Summary     Current INR goal:  2.0-3.0   TTR:  72.1 % (1 y)   Target end date:  Indefinite   Send INR reminders to:  [facility]    Indications    Long term current use of anticoagulant therapy [Z79.01]  Paroxysmal atrial fibrillation (H) [I48.0]           Comments:           Anticoagulation Care Providers     Provider Role Specialty Phone number    Davina Reaves MD Referring Internal  Medicine [phone]

## 2021-10-22 NOTE — PROGRESS NOTES
Chart reviewed with [facility] RN at time of encounter.    Based on positive COVID, decreased food intake and CHA2DS2-VASc=3 (HTN, CHF, age) advise 3 day hold & recheck INR.    Ana Faith, PharmD BCACP  Anticoagulation Clinical Pharmacist

## 2021-10-24 ENCOUNTER — HEALTH MAINTENANCE LETTER (OUTPATIENT)
Age: 71
End: 2021-10-24

## 2021-10-25 ENCOUNTER — ALLIED HEALTH/NURSE VISIT (OUTPATIENT)
Dept: FAMILY MEDICINE | Facility: CLINIC | Age: 71
End: 2021-10-25
Payer: COMMERCIAL

## 2021-10-25 ENCOUNTER — LAB (OUTPATIENT)
Dept: LAB | Facility: CLINIC | Age: 71
End: 2021-10-25
Payer: COMMERCIAL

## 2021-10-25 ENCOUNTER — ANTICOAGULATION THERAPY VISIT (OUTPATIENT)
Dept: ANTICOAGULATION | Facility: CLINIC | Age: 71
End: 2021-10-25

## 2021-10-25 VITALS — TEMPERATURE: 98.6 F

## 2021-10-25 DIAGNOSIS — Z23 NEED FOR PROPHYLACTIC VACCINATION AND INOCULATION AGAINST INFLUENZA: Primary | ICD-10-CM

## 2021-10-25 DIAGNOSIS — I48.0 PAROXYSMAL ATRIAL FIBRILLATION (H): ICD-10-CM

## 2021-10-25 DIAGNOSIS — Z79.01 LONG TERM CURRENT USE OF ANTICOAGULANTS WITH INR GOAL OF 2.0-3.0: ICD-10-CM

## 2021-10-25 DIAGNOSIS — Z79.01 LONG TERM CURRENT USE OF ANTICOAGULANT THERAPY: Primary | ICD-10-CM

## 2021-10-25 LAB — INR BLD: 2.4 (ref 0.9–1.1)

## 2021-10-25 PROCEDURE — 99207 PR NO CHARGE NURSE ONLY: CPT

## 2021-10-25 PROCEDURE — 36415 COLL VENOUS BLD VENIPUNCTURE: CPT

## 2021-10-25 PROCEDURE — 85610 PROTHROMBIN TIME: CPT

## 2021-10-25 PROCEDURE — G0008 ADMIN INFLUENZA VIRUS VAC: HCPCS

## 2021-10-25 PROCEDURE — 90662 IIV NO PRSV INCREASED AG IM: CPT

## 2021-10-25 NOTE — PROGRESS NOTES
ANTICOAGULATION MANAGEMENT     Kevan Connelly 71 year old male is on warfarin with therapeutic INR result. (Goal INR 2.0-3.0)    Recent labs: (last 7 days)     10/25/21  1111   INR 2.4*       ASSESSMENT     Source(s): Chart Review and Patient/Caregiver Call       Warfarin doses taken: Warfarin taken as instructed, held x3 days    Diet: No new diet changes identified, back to regular diet, some but not much greens, per his usual    New illness, injury, or hospitalization: No    Medication/supplement changes: None noted    Signs or symptoms of bleeding or clotting: No    Previous INR: Supratherapeutic    Additional findings: None, feeling pretty much back to his usual after +Covid diagnosis 10/12/21.     PLAN     Recommended plan for no diet, medication or health factor changes affecting INR     Dosing Instructions: Continue your current warfarin dose with next INR in 4 days. Per discussion with Anticoag Pharm D, suggest close follow up for 2-3 weeks depending on sx/ongoing factor resolution.     Summary  As of 10/25/2021    Full warfarin instructions:  7.5 mg every Fri; 5 mg all other days   Next INR check:  10/29/2021             Telephone call with Kevan who verbalizes understanding and agrees to plan    Lab visit scheduled    Education provided: Please call back if any changes to your diet, medications or how you've been taking warfarin, Importance of consistent vitamin K intake, Monitoring for bleeding signs and symptoms, Monitoring for clotting signs and symptoms and Contact [phone]  with any changes, questions or concerns.     Plan made with [facility] Pharmacist Katelyn Ramey RN  Anticoagulation Clinic  10/25/2021    _______________________________________________________________________     Anticoagulation Episode Summary     Current INR goal:  2.0-3.0   TTR:  72.3 % (1 y)   Target end date:  Indefinite   Send INR reminders to:  [facility]    Indications    Long term current  use of anticoagulant therapy [Z79.01]  Paroxysmal atrial fibrillation (H) [I48.0]           Comments:           Anticoagulation Care Providers     Provider Role Specialty Phone number    Davina Reaves MD Referring Internal Medicine [phone]

## 2021-10-29 ENCOUNTER — LAB (OUTPATIENT)
Dept: LAB | Facility: CLINIC | Age: 71
End: 2021-10-29
Payer: COMMERCIAL

## 2021-10-29 ENCOUNTER — ANTICOAGULATION THERAPY VISIT (OUTPATIENT)
Dept: ANTICOAGULATION | Facility: CLINIC | Age: 71
End: 2021-10-29

## 2021-10-29 DIAGNOSIS — Z79.01 LONG TERM CURRENT USE OF ANTICOAGULANTS WITH INR GOAL OF 2.0-3.0: ICD-10-CM

## 2021-10-29 DIAGNOSIS — I48.0 PAROXYSMAL ATRIAL FIBRILLATION (H): ICD-10-CM

## 2021-10-29 DIAGNOSIS — Z79.01 LONG TERM CURRENT USE OF ANTICOAGULANT THERAPY: Primary | ICD-10-CM

## 2021-10-29 LAB — INR BLD: 2.4 (ref 0.9–1.1)

## 2021-10-29 PROCEDURE — 85610 PROTHROMBIN TIME: CPT

## 2021-10-29 PROCEDURE — 36416 COLLJ CAPILLARY BLOOD SPEC: CPT

## 2021-10-29 NOTE — PROGRESS NOTES
ANTICOAGULATION MANAGEMENT     Kevan Connelly 71 year old male is on warfarin with therapeutic INR result. (Goal INR 2.0-3.0)    Recent labs: (last 7 days)     10/29/21  1110   INR 2.4*       ASSESSMENT     Source(s): Chart Review and Patient/Caregiver Call       Warfarin doses taken: Warfarin taken as instructed    Diet: No new diet changes identified    New illness, injury, or hospitalization: No    Medication/supplement changes: None noted    Signs or symptoms of bleeding or clotting: No    Previous INR: Therapeutic last visit; previously outside of goal range    Additional findings: s/p covid, needs close monitoring for a few weeks.      PLAN     Recommended plan for no diet, medication or health factor changes affecting INR     Dosing Instructions: Continue your current warfarin dose with next INR in 1 week. If therapeutic next week, will move out an additional week with each INR.      Summary  As of 10/29/2021    Full warfarin instructions:  7.5 mg every Fri; 5 mg all other days   Next INR check:  11/5/2021             Telephone call with Kevan who verbalizes understanding and agrees to plan    Lab visit scheduled    Education provided: Please call back if any changes to your diet, medications or how you've been taking warfarin, Goal range and significance of current result and Contact [phone]  with any changes, questions or concerns.     Plan made per [facility] anticoagulation protocol    Kaylee Ramey RN  Anticoagulation Clinic  10/29/2021    _______________________________________________________________________     Anticoagulation Episode Summary     Current INR goal:  2.0-3.0   TTR:  73.4 % (1 y)   Target end date:  Indefinite   Send INR reminders to:  [facility]    Indications    Long term current use of anticoagulant therapy [Z79.01]  Paroxysmal atrial fibrillation (H) [I48.0]           Comments:           Anticoagulation Care Providers     Provider Role Specialty Phone number     Davina Reaves MD [facility] Internal Medicine [phone]

## 2021-11-05 ENCOUNTER — LAB (OUTPATIENT)
Dept: LAB | Facility: CLINIC | Age: 71
End: 2021-11-05
Payer: COMMERCIAL

## 2021-11-05 ENCOUNTER — ANTICOAGULATION THERAPY VISIT (OUTPATIENT)
Dept: ANTICOAGULATION | Facility: CLINIC | Age: 71
End: 2021-11-05

## 2021-11-05 DIAGNOSIS — I48.0 PAROXYSMAL ATRIAL FIBRILLATION (H): ICD-10-CM

## 2021-11-05 DIAGNOSIS — Z79.01 LONG TERM CURRENT USE OF ANTICOAGULANT THERAPY: Primary | ICD-10-CM

## 2021-11-05 DIAGNOSIS — Z79.01 LONG TERM CURRENT USE OF ANTICOAGULANTS WITH INR GOAL OF 2.0-3.0: ICD-10-CM

## 2021-11-05 LAB — INR BLD: 3.3 (ref 0.9–1.1)

## 2021-11-05 PROCEDURE — 85610 PROTHROMBIN TIME: CPT

## 2021-11-05 PROCEDURE — 36416 COLLJ CAPILLARY BLOOD SPEC: CPT

## 2021-11-05 NOTE — PROGRESS NOTES
ANTICOAGULATION MANAGEMENT     Kevan Connelly 71 year old male is on warfarin with supratherapeutic INR result. (Goal INR 2.0-3.0)    Recent labs: (last 7 days)     11/05/21  1112   INR 3.3*       ASSESSMENT     Source(s): Chart Review and Patient/Caregiver Call       Warfarin doses taken: Warfarin taken as instructed    Diet:   Appetite is very close to baseline s/p COVID infection.    New illness, injury, or hospitalization: No    Medication/supplement changes: Started Vitamin C, Vitamin D and zinc since having COVID.  This should not effect INR.    Signs or symptoms of bleeding or clotting: No    Previous INR: Therapeutic last 2(+) visits    Additional findings: None     PLAN     Recommended plan for no diet, medication or health factor changes affecting INR     Dosing Instructions: Partial hold, then resume maintenance dose of warfarin with next INR in 10 days       Summary  As of 11/5/2021    Full warfarin instructions:  11/5: 5 mg; Otherwise 7.5 mg every Fri; 5 mg all other days   Next INR check:  11/17/2021             Telephone call with Kevan who agrees to plan and repeated back plan correctly    Lab visit scheduled    Education provided: Please call back if any changes to your diet, medications or how you've been taking warfarin and Impact of vitamin K foods on INR    Plan made per [facility] anticoagulation protocol    Yarelis Martinez RN  Anticoagulation Clinic  11/5/2021    _______________________________________________________________________     Anticoagulation Episode Summary     Current INR goal:  2.0-3.0   TTR:  74.6 % (1 y)   Target end date:  Indefinite   Send INR reminders to:  [facility]    Indications    Long term current use of anticoagulant therapy [Z79.01]  Paroxysmal atrial fibrillation (H) [I48.0]           Comments:           Anticoagulation Care Providers     Provider Role Specialty Phone number    Davina Reaves MD Referring Internal Medicine [phone]

## 2021-11-17 ENCOUNTER — LAB (OUTPATIENT)
Dept: LAB | Facility: CLINIC | Age: 71
End: 2021-11-17
Payer: COMMERCIAL

## 2021-11-17 ENCOUNTER — ANTICOAGULATION THERAPY VISIT (OUTPATIENT)
Dept: ANTICOAGULATION | Facility: CLINIC | Age: 71
End: 2021-11-17

## 2021-11-17 DIAGNOSIS — I48.0 PAROXYSMAL ATRIAL FIBRILLATION (H): ICD-10-CM

## 2021-11-17 DIAGNOSIS — Z79.01 LONG TERM CURRENT USE OF ANTICOAGULANT THERAPY: Primary | ICD-10-CM

## 2021-11-17 DIAGNOSIS — Z79.01 LONG TERM CURRENT USE OF ANTICOAGULANTS WITH INR GOAL OF 2.0-3.0: ICD-10-CM

## 2021-11-17 LAB — INR BLD: 3.6 (ref 0.9–1.1)

## 2021-11-17 PROCEDURE — 85610 PROTHROMBIN TIME: CPT

## 2021-11-17 PROCEDURE — 36415 COLL VENOUS BLD VENIPUNCTURE: CPT

## 2021-11-17 NOTE — PROGRESS NOTES
ANTICOAGULATION MANAGEMENT     Kevan Connelly 71 year old male is on warfarin with supratherapeutic INR result. (Goal INR 2.0-3.0)    Recent labs: (last 7 days)     11/17/21  0935   INR 3.6*       ASSESSMENT     Source(s): Chart Review and Patient/Caregiver Call       Warfarin doses taken: Warfarin taken as instructed    Diet: No new diet changes identified    New illness, injury, or hospitalization: No    Medication/supplement changes: None noted    Signs or symptoms of bleeding or clotting: No    Previous INR: Therapeutic last visit; previously outside of goal range    Additional findings: None , INR has been off and on supra since Covid diagnosis 10/11/21.     PLAN     Recommended plan for ongoing change(s) affecting INR     Dosing Instructions: Partial hold then Decrease your warfarin dose (7% change) with next INR in 2 weeks       Summary  As of 11/17/2021    Full warfarin instructions:  11/17: 2.5 mg; Otherwise 5 mg every day   Next INR check:  12/1/2021             Telephone call with Mathur who verbalizes understanding and agrees to plan    Lab visit scheduled    Education provided: Please call back if any changes to your diet, medications or how you've been taking warfarin, Goal range and significance of current result, Monitoring for bleeding signs and symptoms, Monitoring for clotting signs and symptoms, When to seek medical attention/emergency care and Contact [phone]  with any changes, questions or concerns.     Plan made per [facility] anticoagulation protocol    Kaylee Ramey RN  Anticoagulation Clinic  11/17/2021    _______________________________________________________________________     Anticoagulation Episode Summary     Current INR goal:  2.0-3.0   TTR:  73.9 % (1 y)   Target end date:  Indefinite   Send INR reminders to:  [facility]    Indications    Long term current use of anticoagulant therapy [Z79.01]  Paroxysmal atrial fibrillation (H) [I48.0]           Comments:            Anticoagulation Care Providers     Provider Role Specialty Phone number    Davina Reaves MD Referring Internal Medicine [phone]

## 2021-11-18 DIAGNOSIS — I48.91 ATRIAL FIBRILLATION, UNSPECIFIED TYPE (H): ICD-10-CM

## 2021-11-18 RX ORDER — WARFARIN SODIUM 5 MG/1
TABLET ORAL
Qty: 95 TABLET | Refills: 1 | Status: SHIPPED | OUTPATIENT
Start: 2021-11-18 | End: 2022-06-09

## 2021-11-18 NOTE — TELEPHONE ENCOUNTER
Warfarin refill approved per [facility] [place]. Previous INR 3.6 and patient has appropriate f/u scheduled. MELANIA GarciaN, RN

## 2021-12-01 ENCOUNTER — LAB (OUTPATIENT)
Dept: LAB | Facility: CLINIC | Age: 71
End: 2021-12-01
Payer: COMMERCIAL

## 2021-12-01 ENCOUNTER — ANTICOAGULATION THERAPY VISIT (OUTPATIENT)
Dept: ANTICOAGULATION | Facility: CLINIC | Age: 71
End: 2021-12-01

## 2021-12-01 DIAGNOSIS — I48.0 PAROXYSMAL ATRIAL FIBRILLATION (H): ICD-10-CM

## 2021-12-01 DIAGNOSIS — Z79.01 LONG TERM CURRENT USE OF ANTICOAGULANT THERAPY: Primary | ICD-10-CM

## 2021-12-01 DIAGNOSIS — Z79.01 LONG TERM CURRENT USE OF ANTICOAGULANTS WITH INR GOAL OF 2.0-3.0: ICD-10-CM

## 2021-12-01 LAB — INR BLD: 3 (ref 0.9–1.1)

## 2021-12-01 PROCEDURE — 36415 COLL VENOUS BLD VENIPUNCTURE: CPT

## 2021-12-01 PROCEDURE — 85610 PROTHROMBIN TIME: CPT

## 2021-12-01 NOTE — PROGRESS NOTES
ANTICOAGULATION MANAGEMENT     Kevan Connelly 71 year old male is on warfarin with therapeutic INR result. (Goal INR 2.0-3.0)    Recent labs: (last 7 days)     12/01/21  0935   INR 3.0*       ASSESSMENT     Source(s): Chart Review and Patient/Caregiver Call       Warfarin doses taken: Warfarin taken as instructed, dose reduced after several supratherapeutic INRs post covid diagnosis.    Diet: No new diet changes identified    New illness, injury, or hospitalization: No    Medication/supplement changes: None noted    Signs or symptoms of bleeding or clotting: No    Previous INR: Supratherapeutic    Additional findings: None     PLAN     Recommended plan for no diet, medication or health factor changes affecting INR     Dosing Instructions: Continue your current warfarin dose with next INR in 2 weeks       Summary  As of 12/1/2021    Full warfarin instructions:  5 mg every day   Next INR check:  12/17/2021             Telephone call with Kevan who verbalizes understanding and agrees to plan    Lab visit scheduled    Education provided: Please call back if any changes to your diet, medications or how you've been taking warfarin, Importance of consistent vitamin K intake, Impact of vitamin K foods on INR, Goal range and significance of current result and Contact [phone]  with any changes, questions or concerns.     Plan made per [facility] anticoagulation protocol    Kaylee Ramey RN  Anticoagulation Clinic  12/1/2021    _______________________________________________________________________     Anticoagulation Episode Summary     Current INR goal:  2.0-3.0   TTR:  71.6 % (1 y)   Target end date:  Indefinite   Send INR reminders to:  [facility]    Indications    Long term current use of anticoagulant therapy [Z79.01]  Paroxysmal atrial fibrillation (H) [I48.0]           Comments:           Anticoagulation Care Providers     Provider Role Specialty Phone number    Davina Reaves MD Referring  Internal Medicine [phone]

## 2021-12-17 ENCOUNTER — ANTICOAGULATION THERAPY VISIT (OUTPATIENT)
Dept: ANTICOAGULATION | Facility: CLINIC | Age: 71
End: 2021-12-17

## 2021-12-17 ENCOUNTER — LAB (OUTPATIENT)
Dept: LAB | Facility: CLINIC | Age: 71
End: 2021-12-17
Payer: COMMERCIAL

## 2021-12-17 DIAGNOSIS — I48.0 PAROXYSMAL ATRIAL FIBRILLATION (H): ICD-10-CM

## 2021-12-17 DIAGNOSIS — Z79.01 LONG TERM CURRENT USE OF ANTICOAGULANTS WITH INR GOAL OF 2.0-3.0: ICD-10-CM

## 2021-12-17 DIAGNOSIS — Z79.01 LONG TERM CURRENT USE OF ANTICOAGULANT THERAPY: Primary | ICD-10-CM

## 2021-12-17 LAB — INR BLD: 2.1 (ref 0.9–1.1)

## 2021-12-17 PROCEDURE — 85610 PROTHROMBIN TIME: CPT

## 2021-12-17 PROCEDURE — 36415 COLL VENOUS BLD VENIPUNCTURE: CPT

## 2021-12-17 NOTE — PROGRESS NOTES
ANTICOAGULATION MANAGEMENT     Kevan Connelly 71 year old male is on warfarin with therapeutic INR result. (Goal INR 2.0-3.0)    Recent labs: (last 7 days)     12/17/21  0856   INR 2.1*       ASSESSMENT     Source(s): Chart Review and Patient/Caregiver Call       Warfarin doses taken: Warfarin taken as instructed    Diet: No new diet changes identified    New illness, injury, or hospitalization: No    Medication/supplement changes: None noted    Signs or symptoms of bleeding or clotting: No    Previous INR: Therapeutic last visit; previously outside of goal range    Additional findings: None     PLAN     Recommended plan for no diet, medication or health factor changes affecting INR     Dosing Instructions: Continue your current warfarin dose with next INR in 2 weeks       Summary  As of 12/17/2021    Full warfarin instructions:  5 mg every day   Next INR check:  12/30/2021             Telephone call with Kevan who verbalizes understanding and agrees to plan    Lab visit scheduled    Education provided: Please call back if any changes to your diet, medications or how you've been taking warfarin, Monitoring for bleeding signs and symptoms, Monitoring for clotting signs and symptoms and Importance of notifying clinic for changes in medications; a sooner lab recheck maybe needed.    Plan made per [facility] anticoagulation protocol    Irma Mckinley RN  Anticoagulation Clinic  12/17/2021    _______________________________________________________________________     Anticoagulation Episode Summary     Current INR goal:  2.0-3.0   TTR:  73.7 % (1 y)   Target end date:  Indefinite   Send INR reminders to:  [facility]    Indications    Long term current use of anticoagulant therapy [Z79.01]  Paroxysmal atrial fibrillation (H) [I48.0]           Comments:           Anticoagulation Care Providers     Provider Role Specialty Phone number    Davina Reaves MD Referring Internal Medicine [phone]

## 2021-12-29 ENCOUNTER — ANTICOAGULATION THERAPY VISIT (OUTPATIENT)
Dept: ANTICOAGULATION | Facility: CLINIC | Age: 71
End: 2021-12-29

## 2021-12-29 ENCOUNTER — LAB (OUTPATIENT)
Dept: LAB | Facility: CLINIC | Age: 71
End: 2021-12-29
Payer: COMMERCIAL

## 2021-12-29 DIAGNOSIS — Z79.01 LONG TERM CURRENT USE OF ANTICOAGULANTS WITH INR GOAL OF 2.0-3.0: ICD-10-CM

## 2021-12-29 DIAGNOSIS — I48.0 PAROXYSMAL ATRIAL FIBRILLATION (H): ICD-10-CM

## 2021-12-29 LAB — INR BLD: 2.4 (ref 0.9–1.1)

## 2021-12-29 PROCEDURE — 36415 COLL VENOUS BLD VENIPUNCTURE: CPT

## 2021-12-29 PROCEDURE — 85610 PROTHROMBIN TIME: CPT

## 2021-12-29 NOTE — PROGRESS NOTES
ANTICOAGULATION MANAGEMENT     Kevan Connelly 71 year old male is on warfarin with therapeutic INR result. (Goal INR 2.0-3.0)    Recent labs: (last 7 days)     12/29/21  1233   INR 2.4*       ASSESSMENT     Source(s): Chart Review and Patient/Caregiver Call       Warfarin doses taken: Warfarin taken as instructed    Diet: No new diet changes identified    New illness, injury, or hospitalization: No    Medication/supplement changes: None noted    Signs or symptoms of bleeding or clotting: No    Previous INR: Therapeutic last 2(+) visits    Additional findings: None     PLAN     Recommended plan for no diet, medication or health factor changes affecting INR     Dosing Instructions: Continue your current warfarin dose with next INR in 4 weeks       Summary  As of 12/29/2021    Full warfarin instructions:  5 mg every day   Next INR check:  1/28/2022             Telephone call with Kevan who verbalizes understanding and agrees to plan    Lab visit scheduled    Education provided: Importance of notifying clinic for changes in medications; a sooner lab recheck maybe needed. and Contact [phone]  with any changes, questions or concerns.     Plan made per [facility] anticoagulation protocol    Michelle Naylor RN  Anticoagulation Clinic  12/29/2021    _______________________________________________________________________     Anticoagulation Episode Summary     Current INR goal:  2.0-3.0   TTR:  73.7 % (1 y)   Target end date:  Indefinite   Send INR reminders to:  [facility]    Indications    Long term current use of anticoagulant therapy [Z79.01]  Paroxysmal atrial fibrillation (H) [I48.0]           Comments:           Anticoagulation Care Providers     Provider Role Specialty Phone number    Davina Reaves MD Referring Internal Medicine [phone]

## 2021-12-29 NOTE — PROGRESS NOTES
Anticoagulation Management    Unable to reach Keron today.    Today's INR result of 2.4 is therapeutic (goal INR of 2.0-3.0).  Result received from: Clinic Lab    Follow up required to assess for changes     Left a voicemail advising pt to continue taking warfarin 5 mg daily. Requested a call back    Transfer to [phone]      Anticoagulation clinic to follow up    Michelle Naylor RN

## 2022-01-10 ENCOUNTER — TELEPHONE (OUTPATIENT)
Dept: CARDIOLOGY | Facility: CLINIC | Age: 72
End: 2022-01-10

## 2022-01-10 DIAGNOSIS — I48.0 PAROXYSMAL ATRIAL FIBRILLATION (H): ICD-10-CM

## 2022-01-10 DIAGNOSIS — I48.0 PAROXYSMAL ATRIAL FIBRILLATION (H): Primary | ICD-10-CM

## 2022-01-10 PROCEDURE — 93000 ELECTROCARDIOGRAM COMPLETE: CPT | Performed by: INTERNAL MEDICINE

## 2022-01-10 NOTE — TELEPHONE ENCOUNTER
Spoke to patient regarding recommendations per Dr Hannon:  He appears to be in atrial flutter.  We will he is on chronic anticoagulation with Coumadin.  Lets make sure his INR is therapeutic and schedule cardioversion.   INR has been therapeutic.      If he continues to be symptomatic with a flutter, and BP is good, we may also increase the dose of Coreg (from 6.25 to 9.375 twice daily).   Tolerating symptoms at this point.    Scheduling to call patient tomorrow to get this set up. DIO Schrader

## 2022-01-10 NOTE — TELEPHONE ENCOUNTER
He appears to be in atrial flutter.  We will he is on chronic anticoagulation with Coumadin.  Lets make sure his INR is therapeutic and schedule cardioversion.     If he continues to be symptomatic with a flutter, and BP is good, we may also increase the dose of Coreg (from 6.25 to 9.375 twice daily).     Lamont

## 2022-01-10 NOTE — TELEPHONE ENCOUNTER
Patient left message on EP nurse line on 1/8 at 1:08pm reporting he went back into AF.  Requesting call back 1/10.  Spoke to patient who reports he went into AF around 12 noon on Saturday 1/8.  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   HR ranging .  Tolerating symptoms at this time.  Will have patient come in for EKG to confirm rhythm.  DIO Schrader

## 2022-01-11 ENCOUNTER — HOSPITAL ENCOUNTER (OUTPATIENT)
Facility: CLINIC | Age: 72
End: 2022-01-11
Admitting: INTERNAL MEDICINE
Payer: COMMERCIAL

## 2022-01-11 NOTE — PROGRESS NOTES
Cardiology Clinic Progress Note    Service Date: 01/12/22    Primary Cardiologist: Dr. Hannon      Reason for Visit: H&P for a cardioversion    HPI:   I had the pleasure of seeing Mr. Kevan Connelly in the clinic today and he is a very pleasant 71 year old male with a past medical history notable for    1. Atrial fibrillation.  Diagnosed on 5/30/19 with cardiomyopathy EF was 33%.  Was on amiodarone and changed to flecainide with normal EF.  refractory to flecainide.     Underwent an atrial fibrillation ablation on 1/3/2020  2. Nonocclusive coronary artery disease.  Per CT angiogram heart 1/2020 revealed coronary calcification the LAD, circumflex and RCA.  3. Mildly dilated aortic root and ascending aorta.  Per CT angiogram (1/2020) revealed 4.22 x 3.88 cm and 3.64 cm x 3.73 cm respectively.  4. Tachycardia induced cardiomyopathy.  EF 33% (6/2018)  ECHO 50-55% (8/2019)  5. Hypertension  6. Obesity  7. Testicular cancer with subsequent ED     In March 2019, he was diagnosed with atrial fibrillation     In June 2019 he was diagnosed with cardiomyopathy, underwent an unsuccessful cardioversion was later started on amiodarone and on 7/2019 underwent a successful cardioversion     In September 2019 his EF improved, he was started on flecainide unfortunately was found to be in atrial fibrillation via Zio patch     On 1/2020 he underwent PVI, had recurrence of atrial fibrillation was started on amiodarone which was subsequently discontinued on 4/2020     On 8/2020 he presented with atrial fibrillation, was started on flecainide and underwent a cardioversion.  Failed to maintain sinus rhythm and in November 2020 underwent a cardioversion.     In June 2021 he met with Dr. Hannon he was doing well on flecainide 75 mg twice daily and metoprolol XL 50 mg daily.  He was on Coumadin for anticoagulation.  His blood pressure was elevated and his losartan was increased to 100 mg daily.     On 6/21/2021, patient was in the  emergency room with chest pain, his blood pressure was elevated, he was in sinus rhythm, his troponin was negative.  His metoprolol was discontinued and was started on carvedilol 6.25 mg twice daily    On 1/2/2022, patient's ECG showed atrial flutter and was recommended to increase his carvedilol from 6.25-9.375 and undergo a cardioversion.    Today, he reports going into atrial fibrillation after moving boxes over a weekend.  Interestingly, he had COVID in 10/2021 and didn't have afib when infected.   He feels palpitations and fatigued but denies chest pain, shortness of breath, dyspnea on exertion, orthopnea, PND, lower extremity edema, dizziness, presyncope, or syncope.   Reports taking medications as prescribed including warfarin and denies bleeding and sign/symptoms of stroke.     ASSESSMENT AND PLAN:  Atrial fibrillation    For anticoagulation for CHADS VASC 3 (age, HTN, HF) he takes warfarin with goal INR 2-3. His INR has been therapeutic 80% of the time since 10/2021 but has not had weekly INRs for the past 3 weeks.      His last hemoglobin was 16.4 (6/2021).   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  After resolution of tachycardia induced cardiomyopathy he was started on flecainide with metoprolol and later was found to be in atrial fibrillation despite increase flecainide dosage. On 1/3/2020, he underwent a PVI ablation, flecainide was discontinued and he was started on amiodarone which was discontinued on 4/2020.   In 8/2020, he had a recurrence of atrial fibrillation, was restarted on flecainide underwent a successful cardioversion.  He had a recurrence and underwent a cardioversion on 11/2020.    In January 2022 patient had a recurrence of atrial fibrillation.  Today his ECG shows atrial flutter with ventricular rate of 119 bpm.  Will increase his coreg to 9.375 mg BID and plan for a IBETH guided cardioversion    For rhythm control he is  currently taking flecainide 75 mg twice daily and usually takes carvedilol 6.25 mg twice daily, however due to being in atrial flutter with RVR his carvedilol will be increased to 9.375 mg twice daily until the day prior to the cardioversion.  Proceed to IBETH and cardioversion as patient has not had 3 weeks of INRs.  Patient counseled on IBETH and DCCV.  Instructed not to not eat or drink for 8 hours before the procedure and that home medications can be taken with a sip of water.   All risks and benefits for transesophageal echocardiogram have been explained to the patient.  This includes but is not limited to damage to the oral cavity, esophageal perforation, GI bleeding, pharyngeal hematoma, transient bronchospasm, transient hypoxia, arrhthymias (NSVT, transient atrial fibrillation), vomiting, hemoptysis, and complications from anesthesia.   Complications of cardioversion are uncommon and include but are not limited to abnormal heart rhythm, minor skin burns, reaction to sedation medication, and stroke or pulmonary embolism due to blood clots that may be in the heart.  A formal consent form will be signed by the procedural physician.  Follow up with Dr. Hannon afterwards  Cardiomyopathy    At the time of diagnosis with his atrial fibrillation his EF was found to be 33%.   ECHO (8/2019) revealed EF 50-55%      Continue GDMT of ARB (losartan 50 mg daily) and BB (carvedilol  6.25 mg twice daily)    Euvolemic on exam      Hypertension    controlled while taking losartan 100 mg daily and carvedilol  6.25 mg twice daily       Non-occlusive coronary artery disease    Per CT angiogram heart 1/2020 revealed coronary calcification the LAD, circumflex and RCA.    Asymptomatic    Continue BB, ARB, statin.  He is not on an aspirin due to being on Warfarin.  His last LDL was 93 (3/2021)        Plan:    INR today    Increase coreg from 6.25 mg BID to 9.375 mg BID.  Stop 1 day prior to DCCV    Proceed with IBETH guided cardioversion  with COVID test prior    Follow up with Dr. Hannon after cardioversion.     Please call the clinic if questions or problems arise      Thank you for the opportunity to participate in this pleasant patient's care. We would be happy to see him sooner if needed for any concerns in the meantime.        ANNMARIE Peterson CNP  [facility]  Text Page  (M-F 8:00 am - 4:30 pm)    Orders this Visit:  Orders Placed This Encounter   Procedures     INR     Follow-Up with Electrophysiologist     EKG 12-lead complete w/read - Clinics (performed today)     Transesophageal Echocardiogram     Orders Placed This Encounter   Medications     carvedilol (COREG) 3.125 MG tablet     Sig: Take 1 tablet (3.125 mg) by mouth 2 times daily (with meals) for 5 days     Dispense:  10 tablet     Refill:  0     Medications Discontinued During This Encounter   Medication Reason     tretinoin (RETIN-A) 0.05 % external cream Medication Reconciliation Clean Up     mupirocin (BACTROBAN) 2 % external ointment Medication Reconciliation Clean Up     fluorouracil (EFUDEX) 5 % external cream Medication Reconciliation Clean Up     calcipotriene (DOVONEX) 0.005 % external cream Medication Reconciliation Clean Up     triamcinolone (KENALOG) 0.1 % external cream Medication Reconciliation Clean Up     trimethoprim-polymyxin b (POLYTRIM) 50250-6.1 UNIT/ML-% ophthalmic solution Medication Reconciliation Clean Up     Encounter Diagnoses   Name Primary?     Atrial flutter (H) Yes     Paroxysmal atrial fibrillation (H)        CURRENT MEDICATIONS:  Current Outpatient Medications   Medication Sig Dispense Refill     acetaminophen (TYLENOL) 500 MG tablet Take 500 mg by mouth every 6 hours as needed for mild pain       atorvastatin (LIPITOR) 20 MG tablet Take 1 tablet (20 mg) by mouth daily 90 tablet 2     carvedilol (COREG) 3.125 MG tablet Take 1 tablet (3.125 mg) by mouth 2 times daily (with meals) for 5 days 10 tablet 0     carvedilol (COREG) 6.25 MG tablet Take 1  "tablet (6.25 mg) by mouth 2 times daily (with meals) 180 tablet 3     flecainide (TAMBOCOR) 150 MG tablet Take 75mg (1/2 tab)  po every 12 hours 45 tablet 3     losartan (COZAAR) 100 MG tablet Take 1 tablet (100 mg) by mouth daily 180 tablet 3     melatonin 3 MG CAPS Take 3 mg by mouth At Bedtime       sildenafil (REVATIO) 20 MG tablet Take 1 tablet (20 mg) by mouth daily as needed 30 tablet 3     warfarin ANTICOAGULANT (COUMADIN) 5 MG tablet Current dose (11/18/21): 5 mg daily (1 tablet) or as directed by ACC team. 95 tablet 1       ALLERGIES  Allergies   Allergen Reactions     Lisinopril Cough     Very persistent cough       PAST MEDICAL, SURGICAL, SOCIAL FAMILY HISTORY:  History was reviewed and updated as needed, see medical record.    Review of Systems:  Skin:  Negative     Eyes:  Negative    ENT:  Positive for tinnitus  Respiratory:  Positive for dyspnea on exertion  Cardiovascular:  Negative    Gastroenterology: Negative    Genitourinary:  Negative    Musculoskeletal:  Negative    Neurologic:  Negative    Psychiatric:  Negative    Heme/Lymph/Imm:  Positive for allergies  Endocrine:  Negative       Physical Exam:  Vitals: /88 (BP Location: Right arm, Patient Position: Sitting, Cuff Size: Adult Regular)   Pulse 118   Ht 1.93 m (6' 4\")   Wt 128.1 kg (282 lb 8 oz)   SpO2 98%   BMI 34.39 kg/m     Wt Readings from Last 4 Encounters:   01/12/22 128.1 kg (282 lb 8 oz)   07/19/21 124.5 kg (274 lb 8 oz)   06/09/21 126.2 kg (278 lb 3.2 oz)   05/25/21 127 kg (280 lb)     CONSTITUTIONAL: Appears his stated age, well nourished, and in no acute distress.  HEENT: Pupils equal, round. Sclerae nonicteric.    NECK: Supple, no masses appreciated.   C/V:  Irregular rate and rhythm.  distant  RESP: Respirations are unlabored. Lungs are clear to auscultation bilaterally without wheezing, rales, or rhonchi.  GI: Abdomen soft, non-tender, non-distended.  EXTREM:  No clubbing, cyanosis, or lower extremity edema bilaterally. "   NEURO: Alert and oriented, cooperative. Gait steady. No gross focal deficits.   PSYCH: Affect appropriate. Mentation normal. Responds to questions appropriately.  SKIN: Warm and dry. No apparent rashes or bruising    Recent Lab Results:    CBC RESULTS:  Lab Results   Component Value Date    WBC 5.7 06/21/2021    RBC 5.09 06/21/2021    HGB 16.4 06/21/2021    HCT 47.2 06/21/2021    MCV 93 06/21/2021    MCH 32.2 06/21/2021    MCHC 34.7 06/21/2021    RDW 12.2 06/21/2021     06/21/2021     BMP RESULTS:  Lab Results   Component Value Date     07/19/2021     06/21/2021    POTASSIUM 4.6 07/19/2021    POTASSIUM 4.9 06/21/2021    CHLORIDE 112 (H) 07/19/2021    CHLORIDE 108 06/21/2021    CO2 22 07/19/2021    CO2 27 06/21/2021    ANIONGAP 4 07/19/2021    ANIONGAP 3 06/21/2021    GLC 88 07/19/2021    GLC 94 06/21/2021    BUN 24 07/19/2021    BUN 23 06/21/2021    CR 1.20 07/19/2021    CR 1.31 (H) 06/21/2021    GFRESTIMATED 62 07/19/2021    GFRESTIMATED 55 (L) 06/21/2021    GFRESTBLACK 64 06/21/2021    CHU 8.7 07/19/2021    CHU 9.2 06/21/2021        CC  No referring provider defined for this encounter.    This note was completed in part using Dragon voice recognition software. Although reviewed after completion, some word and grammatical errors may occur.

## 2022-01-12 ENCOUNTER — OFFICE VISIT (OUTPATIENT)
Dept: CARDIOLOGY | Facility: CLINIC | Age: 72
End: 2022-01-12
Payer: COMMERCIAL

## 2022-01-12 ENCOUNTER — ANTICOAGULATION THERAPY VISIT (OUTPATIENT)
Dept: ANTICOAGULATION | Facility: CLINIC | Age: 72
End: 2022-01-12

## 2022-01-12 ENCOUNTER — LAB (OUTPATIENT)
Dept: LAB | Facility: CLINIC | Age: 72
End: 2022-01-12
Payer: COMMERCIAL

## 2022-01-12 VITALS
HEIGHT: 76 IN | HEART RATE: 118 BPM | BODY MASS INDEX: 34.4 KG/M2 | SYSTOLIC BLOOD PRESSURE: 116 MMHG | DIASTOLIC BLOOD PRESSURE: 88 MMHG | WEIGHT: 282.5 LBS | OXYGEN SATURATION: 98 %

## 2022-01-12 DIAGNOSIS — Z79.01 ON CONTINUOUS ORAL ANTICOAGULATION: ICD-10-CM

## 2022-01-12 DIAGNOSIS — Z79.899 ENCOUNTER FOR MONITORING ANTI-ARRHYTHMIC THERAPY: ICD-10-CM

## 2022-01-12 DIAGNOSIS — I48.0 PAROXYSMAL ATRIAL FIBRILLATION (H): Primary | ICD-10-CM

## 2022-01-12 DIAGNOSIS — I48.0 PAROXYSMAL ATRIAL FIBRILLATION (H): ICD-10-CM

## 2022-01-12 DIAGNOSIS — Z51.81 ENCOUNTER FOR MONITORING ANTI-ARRHYTHMIC THERAPY: ICD-10-CM

## 2022-01-12 DIAGNOSIS — Z79.01 LONG TERM CURRENT USE OF ANTICOAGULANT THERAPY: Primary | ICD-10-CM

## 2022-01-12 DIAGNOSIS — R00.2 PALPITATION: ICD-10-CM

## 2022-01-12 LAB — INR PPP: 2.18 (ref 0.85–1.15)

## 2022-01-12 PROCEDURE — 85610 PROTHROMBIN TIME: CPT

## 2022-01-12 PROCEDURE — 36415 COLL VENOUS BLD VENIPUNCTURE: CPT

## 2022-01-12 PROCEDURE — 93000 ELECTROCARDIOGRAM COMPLETE: CPT | Performed by: NURSE PRACTITIONER

## 2022-01-12 PROCEDURE — 99214 OFFICE O/P EST MOD 30 MIN: CPT | Performed by: NURSE PRACTITIONER

## 2022-01-12 RX ORDER — CARVEDILOL 3.12 MG/1
3.12 TABLET ORAL 2 TIMES DAILY WITH MEALS
Qty: 10 TABLET | Refills: 0 | Status: ON HOLD | OUTPATIENT
Start: 2022-01-12 | End: 2022-01-24

## 2022-01-12 ASSESSMENT — MIFFLIN-ST. JEOR: SCORE: 2147.91

## 2022-01-12 NOTE — LETTER
1/12/2022    Davina Reaves MD  [id number] [address] [facility] [id number]    RE: Kevan Connelly       Dear Colleague,     I had the [facility] of [facility] the [facility].      Cardiology Clinic Progress Note    Service Date: 01/12/22    Primary Cardiologist: Dr. Hannon      Reason for Visit: H&P for a cardioversion    HPI:   I had the pleasure of seeing Mr. Kevan Connelly in the clinic today and he is a very pleasant 71 year old male with a past medical history notable for    1. Atrial fibrillation.  Diagnosed on 5/30/19 with cardiomyopathy EF was 33%.  Was on amiodarone and changed to flecainide with normal EF.  refractory to flecainide.     Underwent an atrial fibrillation ablation on 1/3/2020  2. Nonocclusive coronary artery disease.  Per CT angiogram heart 1/2020 revealed coronary calcification the LAD, circumflex and RCA.  3. Mildly dilated aortic root and ascending aorta.  Per CT angiogram (1/2020) revealed 4.22 x 3.88 cm and 3.64 cm x 3.73 cm respectively.  4. Tachycardia induced cardiomyopathy.  EF 33% (6/2018)  ECHO 50-55% (8/2019)  5. Hypertension  6. Obesity  7. Testicular cancer with subsequent ED     In March 2019, he was diagnosed with atrial fibrillation     In June 2019 he was diagnosed with cardiomyopathy, underwent an unsuccessful cardioversion was later started on amiodarone and on 7/2019 underwent a successful cardioversion     In September 2019 his EF improved, he was started on flecainide unfortunately was found to be in atrial fibrillation via Zio patch     On 1/2020 he underwent PVI, had recurrence of atrial fibrillation was started on amiodarone which was subsequently discontinued on 4/2020     On 8/2020 he presented with atrial fibrillation, was started on flecainide and underwent a cardioversion.  Failed to maintain sinus rhythm and in November 2020 underwent a cardioversion.     In June 2021 he met with Dr. Hannon he was doing well on flecainide  75 mg twice daily and metoprolol XL 50 mg daily.  He was on Coumadin for anticoagulation.  His blood pressure was elevated and his losartan was increased to 100 mg daily.     On 6/21/2021, patient was in the emergency room with chest pain, his blood pressure was elevated, he was in sinus rhythm, his troponin was negative.  His metoprolol was discontinued and was started on carvedilol 6.25 mg twice daily    On 1/2/2022, patient's ECG showed atrial flutter and was recommended to increase his carvedilol from 6.25-9.375 and undergo a cardioversion.    Today, he reports going into atrial fibrillation after moving boxes over a weekend.  Interestingly, he had COVID in 10/2021 and didn't have afib when infected.   He feels palpitations and fatigued but denies chest pain, shortness of breath, dyspnea on exertion, orthopnea, PND, lower extremity edema, dizziness, presyncope, or syncope.   Reports taking medications as prescribed including warfarin and denies bleeding and sign/symptoms of stroke.     ASSESSMENT AND PLAN:  Atrial fibrillation    For anticoagulation for CHADS VASC 3 (age, HTN, HF) he takes warfarin with goal INR 2-3. His INR has been therapeutic 80% of the time since 10/2021 but has not had weekly INRs for the past 3 weeks.      His last hemoglobin was 16.4 (6/2021).   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  After resolution of tachycardia induced cardiomyopathy he was started on flecainide with metoprolol and later was found to be in atrial fibrillation despite increase flecainide dosage. On 1/3/2020, he underwent a PVI ablation, flecainide was discontinued and he was started on amiodarone which was discontinued on 4/2020.   In 8/2020, he had a recurrence of atrial fibrillation, was restarted on flecainide underwent a successful cardioversion.  He had a recurrence and underwent a cardioversion on 11/2020.    In January 2022 patient had a  recurrence of atrial fibrillation.  Today his ECG shows atrial flutter with ventricular rate of 119 bpm.  Will increase his coreg to 9.375 mg BID and plan for a IBETH guided cardioversion    For rhythm control he is currently taking flecainide 75 mg twice daily and usually takes carvedilol 6.25 mg twice daily, however due to being in atrial flutter with RVR his carvedilol will be increased to 9.375 mg twice daily until the day prior to the cardioversion.  Proceed to IBETH and cardioversion as patient has not had 3 weeks of INRs.  Patient counseled on IBETH and DCCV.  Instructed not to not eat or drink for 8 hours before the procedure and that home medications can be taken with a sip of water.   All risks and benefits for transesophageal echocardiogram have been explained to the patient.  This includes but is not limited to damage to the oral cavity, esophageal perforation, GI bleeding, pharyngeal hematoma, transient bronchospasm, transient hypoxia, arrhthymias (NSVT, transient atrial fibrillation), vomiting, hemoptysis, and complications from anesthesia.   Complications of cardioversion are uncommon and include but are not limited to abnormal heart rhythm, minor skin burns, reaction to sedation medication, and stroke or pulmonary embolism due to blood clots that may be in the heart.  A formal consent form will be signed by the procedural physician.  Follow up with Dr. Hannon afterwards  Cardiomyopathy    At the time of diagnosis with his atrial fibrillation his EF was found to be 33%.   ECHO (8/2019) revealed EF 50-55%      Continue GDMT of ARB (losartan 50 mg daily) and BB (carvedilol  6.25 mg twice daily)    Euvolemic on exam      Hypertension    controlled while taking losartan 100 mg daily and carvedilol  6.25 mg twice daily       Non-occlusive coronary artery disease    Per CT angiogram heart 1/2020 revealed coronary calcification the LAD, circumflex and RCA.    Asymptomatic    Continue BB, ARB, statin.  He is not on  an aspirin due to being on Warfarin.  His last LDL was 93 (3/2021)        Plan:    INR today    Increase coreg from 6.25 mg BID to 9.375 mg BID.  Stop 1 day prior to DCCV    Proceed with IBETH guided cardioversion with COVID test prior    Follow up with Dr. Hannon after cardioversion.     Please call the clinic if questions or problems arise      Thank you for the opportunity to participate in this pleasant patient's care. We would be happy to see him sooner if needed for any concerns in the meantime.        ANNMARIE Peterson [facility]  Text Page  (M-F 8:00 am - 4:30 pm)    Orders this Visit:  Orders Placed This Encounter   Procedures     INR     Follow-Up with Electrophysiologist     EKG 12-lead complete w/read - Clinics (performed today)     Transesophageal Echocardiogram     Orders Placed This Encounter   Medications     carvedilol (COREG) 3.125 MG tablet     Sig: Take 1 tablet (3.125 mg) by mouth 2 times daily (with meals) for 5 days     Dispense:  10 tablet     Refill:  0     Medications Discontinued During This Encounter   Medication Reason     tretinoin (RETIN-A) 0.05 % external cream Medication Reconciliation Clean Up     mupirocin (BACTROBAN) 2 % external ointment Medication Reconciliation Clean Up     fluorouracil (EFUDEX) 5 % external cream Medication Reconciliation Clean Up     calcipotriene (DOVONEX) 0.005 % external cream Medication Reconciliation Clean Up     triamcinolone (KENALOG) 0.1 % external cream Medication Reconciliation Clean Up     trimethoprim-polymyxin b (POLYTRIM) 58576-6.1 UNIT/ML-% ophthalmic solution Medication Reconciliation Clean Up     Encounter Diagnoses   Name Primary?     Atrial flutter (H) Yes     Paroxysmal atrial fibrillation (H)        CURRENT MEDICATIONS:  Current Outpatient Medications   Medication Sig Dispense Refill     acetaminophen (TYLENOL) 500 MG tablet Take 500 mg by mouth every 6 hours as needed for mild pain       atorvastatin (LIPITOR) 20 MG tablet Take 1  "tablet (20 mg) by mouth daily 90 tablet 2     carvedilol (COREG) 3.125 MG tablet Take 1 tablet (3.125 mg) by mouth 2 times daily (with meals) for 5 days 10 tablet 0     carvedilol (COREG) 6.25 MG tablet Take 1 tablet (6.25 mg) by mouth 2 times daily (with meals) 180 tablet 3     flecainide (TAMBOCOR) 150 MG tablet Take 75mg (1/2 tab)  po every 12 hours 45 tablet 3     losartan (COZAAR) 100 MG tablet Take 1 tablet (100 mg) by mouth daily 180 tablet 3     melatonin 3 MG CAPS Take 3 mg by mouth At Bedtime       sildenafil (REVATIO) 20 MG tablet Take 1 tablet (20 mg) by mouth daily as needed 30 tablet 3     warfarin ANTICOAGULANT (COUMADIN) 5 MG tablet Current dose (11/18/21): 5 mg daily (1 tablet) or as directed by ACC team. 95 tablet 1       ALLERGIES  Allergies   Allergen Reactions     Lisinopril Cough     Very persistent cough       PAST MEDICAL, SURGICAL, SOCIAL FAMILY HISTORY:  History was reviewed and updated as needed, see medical record.    Review of Systems:  Skin:  Negative     Eyes:  Negative    ENT:  Positive for tinnitus  Respiratory:  Positive for dyspnea on exertion  Cardiovascular:  Negative    Gastroenterology: Negative    Genitourinary:  Negative    Musculoskeletal:  Negative    Neurologic:  Negative    Psychiatric:  Negative    Heme/Lymph/Imm:  Positive for allergies  Endocrine:  Negative       Physical Exam:  Vitals: /88 (BP Location: Right arm, Patient Position: Sitting, Cuff Size: Adult Regular)   Pulse 118   Ht 1.93 m (6' 4\")   Wt 128.1 kg (282 lb 8 oz)   SpO2 98%   BMI 34.39 kg/m     Wt Readings from Last 4 Encounters:   01/12/22 128.1 kg (282 lb 8 oz)   07/19/21 124.5 kg (274 lb 8 oz)   06/09/21 126.2 kg (278 lb 3.2 oz)   05/25/21 127 kg (280 lb)     CONSTITUTIONAL: Appears his stated age, well nourished, and in no acute distress.  HEENT: Pupils equal, round. Sclerae nonicteric.    NECK: Supple, no masses appreciated.   C/V:  Irregular rate and rhythm.  distant  RESP: Respirations " are unlabored. Lungs are clear to auscultation bilaterally without wheezing, rales, or rhonchi.  GI: Abdomen soft, non-tender, non-distended.  EXTREM:  No clubbing, cyanosis, or lower extremity edema bilaterally.   NEURO: Alert and oriented, cooperative. Gait steady. No gross focal deficits.   PSYCH: Affect appropriate. Mentation normal. Responds to questions appropriately.  SKIN: Warm and dry. No apparent rashes or bruising    Recent Lab Results:    CBC RESULTS:  Lab Results   Component Value Date    WBC 5.7 06/21/2021    RBC 5.09 06/21/2021    HGB 16.4 06/21/2021    HCT 47.2 06/21/2021    MCV 93 06/21/2021    MCH 32.2 06/21/2021    MCHC 34.7 06/21/2021    RDW 12.2 06/21/2021     06/21/2021     BMP RESULTS:  Lab Results   Component Value Date     07/19/2021     06/21/2021    POTASSIUM 4.6 07/19/2021    POTASSIUM 4.9 06/21/2021    CHLORIDE 112 (H) 07/19/2021    CHLORIDE 108 06/21/2021    CO2 22 07/19/2021    CO2 27 06/21/2021    ANIONGAP 4 07/19/2021    ANIONGAP 3 06/21/2021    GLC 88 07/19/2021    GLC 94 06/21/2021    BUN 24 07/19/2021    BUN 23 06/21/2021    CR 1.20 07/19/2021    CR 1.31 (H) 06/21/2021    GFRESTIMATED 62 07/19/2021    GFRESTIMATED 55 (L) 06/21/2021    GFRESTBLACK 64 06/21/2021    CHU 8.7 07/19/2021    CHU 9.2 06/21/2021      This note was completed in part using Dragon voice recognition software. Although reviewed after completion, some word and grammatical errors may occur.    ANNMARIE Peterson [facility]

## 2022-01-12 NOTE — PATIENT INSTRUCTIONS
Add IBETH to Tuesday  Take extra coreg 3.125 mg BID until Monday  Follow up with Parvez afterwards  inr this week

## 2022-01-12 NOTE — TELEPHONE ENCOUNTER
Dr. Michael OLIVA, I met with Kevan Connelly today for an H*P for cardioversion.  He has not had 3 weeks of therapeutic INRs and does not feel he can tolerate 3 more weeks of atrial flutter.  A IBETH was added to his cardioversion which is scheduled next week.   He is currently on flecainide and had an ablation 1/2020 and will follow up with you after his cardioversion.      Plan  INR today  IBETH guided cardioversion on 1/18/2022    Thank you,    Hilda Wahl, ANNMARIE CNP on 1/12/2022 at 10:04 AM

## 2022-01-12 NOTE — PROGRESS NOTES
ANTICOAGULATION MANAGEMENT     Kevan Connelly 71 year old male is on warfarin with therapeutic INR result. (Goal INR 2.0-3.0)    Recent labs: (last 7 days)     01/12/22  0953   INR 2.18*       ASSESSMENT     Source(s): Chart Review and Patient/Caregiver Call       Warfarin doses taken: Warfarin taken as instructed    Diet: No new diet changes identified    New illness, injury, or hospitalization: No    Medication/supplement changes: Coreg increased today (no interaction with warfarin)    Signs or symptoms of bleeding or clotting: No    Previous INR: Therapeutic last 2(+) visits    Additional findings: Cardioversion with IBETH scheduled on 1/18/22.     PLAN     Recommended plan for no diet, medication or health factor changes affecting INR     Dosing Instructions: Continue your current warfarin dose with next INR in 2 weeks       Summary  As of 1/12/2022    Full warfarin instructions:  5 mg every day   Next INR check:  1/28/2022             Telephone call with Kevan who agrees to plan and repeated back plan correctly    Lab visit scheduled    Education provided: Please call back if any changes to your diet, medications or how you've been taking warfarin, Importance of consistent vitamin K intake and Contact [phone]  with any changes, questions or concerns.     Plan made per [facility] anticoagulation protocol    Yarelis Martinez RN  Anticoagulation Clinic  1/12/2022    _______________________________________________________________________     Anticoagulation Episode Summary     Current INR goal:  2.0-3.0   TTR:  76.1 % (1 y)   Target end date:  Indefinite   Send INR reminders to:  [facility]    Indications    Long term current use of anticoagulant therapy [Z79.01]  Paroxysmal atrial fibrillation (H) [I48.0]           Comments:           Anticoagulation Care Providers     Provider Role Specialty Phone number    Davina Reaves MD Referring Internal Medicine [phone]

## 2022-01-12 NOTE — TELEPHONE ENCOUNTER
Pt called and wanted to verify when to decrease the dose of his Carvedilol which was increased today to 9.374  Bid and then on the day of the Cardioversion, drop back down to his dose of 6.25 mg bid he day before.  Discussed with Hilda and pt is to drop the dose back to 6.25 the day of the Cardioversion.  This was left as a message for pt and asked to call back with questions. Isabel

## 2022-01-15 ENCOUNTER — LAB (OUTPATIENT)
Dept: LAB | Facility: CLINIC | Age: 72
End: 2022-01-15
Payer: COMMERCIAL

## 2022-01-15 DIAGNOSIS — I48.0 PAROXYSMAL ATRIAL FIBRILLATION (H): ICD-10-CM

## 2022-01-15 PROCEDURE — U0003 INFECTIOUS AGENT DETECTION BY NUCLEIC ACID (DNA OR RNA); SEVERE ACUTE RESPIRATORY SYNDROME CORONAVIRUS 2 (SARS-COV-2) (CORONAVIRUS DISEASE [COVID-19]), AMPLIFIED PROBE TECHNIQUE, MAKING USE OF HIGH THROUGHPUT TECHNOLOGIES AS DESCRIBED BY CMS-2020-01-R: HCPCS

## 2022-01-16 LAB — SARS-COV-2 RNA RESP QL NAA+PROBE: NEGATIVE

## 2022-01-17 ENCOUNTER — DOCUMENTATION ONLY (OUTPATIENT)
Dept: CARDIOLOGY | Facility: CLINIC | Age: 72
End: 2022-01-17
Payer: COMMERCIAL

## 2022-01-17 DIAGNOSIS — I48.0 PAROXYSMAL ATRIAL FIBRILLATION (H): Primary | ICD-10-CM

## 2022-01-18 ENCOUNTER — LAB (OUTPATIENT)
Dept: URGENT CARE | Facility: URGENT CARE | Age: 72
End: 2022-01-18
Payer: COMMERCIAL

## 2022-01-18 DIAGNOSIS — I48.0 PAROXYSMAL ATRIAL FIBRILLATION (H): ICD-10-CM

## 2022-01-18 PROCEDURE — U0005 INFEC AGEN DETEC AMPLI PROBE: HCPCS

## 2022-01-18 PROCEDURE — U0003 INFECTIOUS AGENT DETECTION BY NUCLEIC ACID (DNA OR RNA); SEVERE ACUTE RESPIRATORY SYNDROME CORONAVIRUS 2 (SARS-COV-2) (CORONAVIRUS DISEASE [COVID-19]), AMPLIFIED PROBE TECHNIQUE, MAKING USE OF HIGH THROUGHPUT TECHNOLOGIES AS DESCRIBED BY CMS-2020-01-R: HCPCS

## 2022-01-19 LAB — SARS-COV-2 RNA RESP QL NAA+PROBE: NEGATIVE

## 2022-01-21 ENCOUNTER — HOSPITAL ENCOUNTER (OUTPATIENT)
Dept: CARDIOLOGY | Facility: CLINIC | Age: 72
End: 2022-01-21
Attending: NURSE PRACTITIONER | Admitting: INTERNAL MEDICINE
Payer: COMMERCIAL

## 2022-01-21 ENCOUNTER — HOSPITAL ENCOUNTER (OUTPATIENT)
Facility: CLINIC | Age: 72
Discharge: HOME | End: 2022-01-21
Attending: INTERNAL MEDICINE | Admitting: INTERNAL MEDICINE
Payer: COMMERCIAL

## 2022-01-21 ENCOUNTER — APPOINTMENT (OUTPATIENT)
Dept: CARDIOLOGY | Facility: CLINIC | Age: 72
End: 2022-01-21
Attending: INTERNAL MEDICINE
Payer: COMMERCIAL

## 2022-01-21 ENCOUNTER — ANESTHESIA EVENT (OUTPATIENT)
Dept: SURGERY | Facility: CLINIC | Age: 72
End: 2022-01-21
Payer: COMMERCIAL

## 2022-01-21 ENCOUNTER — ANTICOAGULATION THERAPY VISIT (OUTPATIENT)
Dept: ANTICOAGULATION | Facility: CLINIC | Age: 72
End: 2022-01-21

## 2022-01-21 ENCOUNTER — ANESTHESIA (OUTPATIENT)
Dept: SURGERY | Facility: CLINIC | Age: 72
End: 2022-01-21
Payer: COMMERCIAL

## 2022-01-21 VITALS
TEMPERATURE: 98.6 F | RESPIRATION RATE: 16 BRPM | SYSTOLIC BLOOD PRESSURE: 114 MMHG | OXYGEN SATURATION: 97 % | DIASTOLIC BLOOD PRESSURE: 77 MMHG | HEART RATE: 73 BPM

## 2022-01-21 DIAGNOSIS — Z79.01 LONG TERM CURRENT USE OF ANTICOAGULANT THERAPY: Primary | ICD-10-CM

## 2022-01-21 DIAGNOSIS — I48.0 PAROXYSMAL ATRIAL FIBRILLATION (H): ICD-10-CM

## 2022-01-21 LAB
ATRIAL RATE - MUSE: 115 BPM
DIASTOLIC BLOOD PRESSURE - MUSE: NORMAL MMHG
INR PPP: 2.48 (ref 0.85–1.15)
INTERPRETATION ECG - MUSE: NORMAL
LVEF ECHO: NORMAL
MAGNESIUM SERPL-MCNC: 2.2 MG/DL (ref 1.6–2.3)
P AXIS - MUSE: NORMAL DEGREES
POTASSIUM BLD-SCNC: 4.2 MMOL/L (ref 3.4–5.3)
PR INTERVAL - MUSE: 208 MS
QRS DURATION - MUSE: 118 MS
QT - MUSE: 360 MS
QTC - MUSE: 498 MS
R AXIS - MUSE: -10 DEGREES
SYSTOLIC BLOOD PRESSURE - MUSE: NORMAL MMHG
T AXIS - MUSE: 69 DEGREES
VENTRICULAR RATE- MUSE: 115 BPM

## 2022-01-21 PROCEDURE — 83735 ASSAY OF MAGNESIUM: CPT | Performed by: INTERNAL MEDICINE

## 2022-01-21 PROCEDURE — 93312 ECHO TRANSESOPHAGEAL: CPT | Mod: 26 | Performed by: INTERNAL MEDICINE

## 2022-01-21 PROCEDURE — 93320 DOPPLER ECHO COMPLETE: CPT | Mod: 26 | Performed by: INTERNAL MEDICINE

## 2022-01-21 PROCEDURE — 370N000017 HC ANESTHESIA TECHNICAL FEE, PER MIN

## 2022-01-21 PROCEDURE — 36415 COLL VENOUS BLD VENIPUNCTURE: CPT | Performed by: INTERNAL MEDICINE

## 2022-01-21 PROCEDURE — 250N000011 HC RX IP 250 OP 636: Performed by: NURSE ANESTHETIST, CERTIFIED REGISTERED

## 2022-01-21 PROCEDURE — 250N000011 HC RX IP 250 OP 636

## 2022-01-21 PROCEDURE — 92960 CARDIOVERSION ELECTRIC EXT: CPT | Performed by: INTERNAL MEDICINE

## 2022-01-21 PROCEDURE — 250N000009 HC RX 250

## 2022-01-21 PROCEDURE — 93325 DOPPLER ECHO COLOR FLOW MAPG: CPT | Mod: 26 | Performed by: INTERNAL MEDICINE

## 2022-01-21 PROCEDURE — 93325 DOPPLER ECHO COLOR FLOW MAPG: CPT

## 2022-01-21 PROCEDURE — 92960 CARDIOVERSION ELECTRIC EXT: CPT

## 2022-01-21 PROCEDURE — 85610 PROTHROMBIN TIME: CPT | Performed by: INTERNAL MEDICINE

## 2022-01-21 PROCEDURE — 258N000003 HC RX IP 258 OP 636: Performed by: NURSE ANESTHETIST, CERTIFIED REGISTERED

## 2022-01-21 PROCEDURE — 84132 ASSAY OF SERUM POTASSIUM: CPT | Performed by: INTERNAL MEDICINE

## 2022-01-21 PROCEDURE — 250N000011 HC RX IP 250 OP 636: Performed by: INTERNAL MEDICINE

## 2022-01-21 RX ORDER — LIDOCAINE 50 MG/G
OINTMENT TOPICAL ONCE
Status: DISCONTINUED | OUTPATIENT
Start: 2022-01-21 | End: 2022-01-22 | Stop reason: HOSPADM

## 2022-01-21 RX ORDER — LIDOCAINE HYDROCHLORIDE 20 MG/ML
15 SOLUTION OROPHARYNGEAL ONCE
Status: COMPLETED | OUTPATIENT
Start: 2022-01-21 | End: 2022-01-21

## 2022-01-21 RX ORDER — MAGNESIUM SULFATE HEPTAHYDRATE 40 MG/ML
2 INJECTION, SOLUTION INTRAVENOUS
Status: DISCONTINUED | OUTPATIENT
Start: 2022-01-21 | End: 2022-01-22 | Stop reason: HOSPADM

## 2022-01-21 RX ORDER — NALOXONE HYDROCHLORIDE 0.4 MG/ML
0.4 INJECTION, SOLUTION INTRAMUSCULAR; INTRAVENOUS; SUBCUTANEOUS
Status: DISCONTINUED | OUTPATIENT
Start: 2022-01-21 | End: 2022-01-22 | Stop reason: HOSPADM

## 2022-01-21 RX ORDER — GLYCOPYRROLATE 0.2 MG/ML
INJECTION INTRAMUSCULAR; INTRAVENOUS
Status: COMPLETED
Start: 2022-01-21 | End: 2022-01-21

## 2022-01-21 RX ORDER — NALOXONE HYDROCHLORIDE 0.4 MG/ML
INJECTION, SOLUTION INTRAMUSCULAR; INTRAVENOUS; SUBCUTANEOUS
Status: DISCONTINUED
Start: 2022-01-21 | End: 2022-01-21 | Stop reason: WASHOUT

## 2022-01-21 RX ORDER — NALOXONE HYDROCHLORIDE 0.4 MG/ML
0.2 INJECTION, SOLUTION INTRAMUSCULAR; INTRAVENOUS; SUBCUTANEOUS
Status: DISCONTINUED | OUTPATIENT
Start: 2022-01-21 | End: 2022-01-22 | Stop reason: HOSPADM

## 2022-01-21 RX ORDER — LIDOCAINE HYDROCHLORIDE 20 MG/ML
SOLUTION OROPHARYNGEAL
Status: COMPLETED
Start: 2022-01-21 | End: 2022-01-21

## 2022-01-21 RX ORDER — FENTANYL CITRATE 50 UG/ML
INJECTION, SOLUTION INTRAMUSCULAR; INTRAVENOUS
Status: COMPLETED
Start: 2022-01-21 | End: 2022-01-21

## 2022-01-21 RX ORDER — SODIUM CHLORIDE 9 MG/ML
1000 INJECTION, SOLUTION INTRAVENOUS CONTINUOUS
Status: DISCONTINUED | OUTPATIENT
Start: 2022-01-21 | End: 2022-01-22 | Stop reason: HOSPADM

## 2022-01-21 RX ORDER — ATROPINE SULFATE 0.1 MG/ML
.5-1 INJECTION INTRAVENOUS
Status: DISCONTINUED | OUTPATIENT
Start: 2022-01-21 | End: 2022-01-22 | Stop reason: HOSPADM

## 2022-01-21 RX ORDER — METOPROLOL TARTRATE 1 MG/ML
2.5-5 INJECTION, SOLUTION INTRAVENOUS
Status: DISCONTINUED | OUTPATIENT
Start: 2022-01-21 | End: 2022-01-22 | Stop reason: HOSPADM

## 2022-01-21 RX ORDER — FLUMAZENIL 0.1 MG/ML
0.2 INJECTION, SOLUTION INTRAVENOUS
Status: DISCONTINUED | OUTPATIENT
Start: 2022-01-21 | End: 2022-01-22 | Stop reason: HOSPADM

## 2022-01-21 RX ORDER — DEXTROSE MONOHYDRATE 25 G/50ML
9.5 INJECTION, SOLUTION INTRAVENOUS
Status: DISCONTINUED | OUTPATIENT
Start: 2022-01-21 | End: 2022-01-22 | Stop reason: HOSPADM

## 2022-01-21 RX ORDER — LIDOCAINE HYDROCHLORIDE 40 MG/ML
1.5 SOLUTION TOPICAL ONCE
Status: DISCONTINUED | OUTPATIENT
Start: 2022-01-21 | End: 2022-01-22 | Stop reason: HOSPADM

## 2022-01-21 RX ORDER — FENTANYL CITRATE 50 UG/ML
25 INJECTION, SOLUTION INTRAMUSCULAR; INTRAVENOUS
Status: DISCONTINUED | OUTPATIENT
Start: 2022-01-21 | End: 2022-01-22 | Stop reason: HOSPADM

## 2022-01-21 RX ORDER — POTASSIUM CHLORIDE 1500 MG/1
20 TABLET, EXTENDED RELEASE ORAL
Status: DISCONTINUED | OUTPATIENT
Start: 2022-01-21 | End: 2022-01-22 | Stop reason: HOSPADM

## 2022-01-21 RX ORDER — GLYCOPYRROLATE 0.2 MG/ML
0.1 INJECTION, SOLUTION INTRAMUSCULAR; INTRAVENOUS ONCE
Status: COMPLETED | OUTPATIENT
Start: 2022-01-21 | End: 2022-01-21

## 2022-01-21 RX ORDER — FLUMAZENIL 0.1 MG/ML
INJECTION, SOLUTION INTRAVENOUS
Status: DISCONTINUED
Start: 2022-01-21 | End: 2022-01-21 | Stop reason: WASHOUT

## 2022-01-21 RX ORDER — PROPOFOL 10 MG/ML
INJECTION, EMULSION INTRAVENOUS PRN
Status: DISCONTINUED | OUTPATIENT
Start: 2022-01-21 | End: 2022-01-21

## 2022-01-21 RX ORDER — POTASSIUM CHLORIDE 1500 MG/1
40 TABLET, EXTENDED RELEASE ORAL
Status: DISCONTINUED | OUTPATIENT
Start: 2022-01-21 | End: 2022-01-22 | Stop reason: HOSPADM

## 2022-01-21 RX ADMIN — MIDAZOLAM 1 MG: 1 INJECTION INTRAMUSCULAR; INTRAVENOUS at 09:04

## 2022-01-21 RX ADMIN — PROPOFOL 20 MG: 10 INJECTION, EMULSION INTRAVENOUS at 09:33

## 2022-01-21 RX ADMIN — FENTANYL CITRATE 25 MCG: 50 INJECTION, SOLUTION INTRAMUSCULAR; INTRAVENOUS at 09:05

## 2022-01-21 RX ADMIN — PHENYLEPHRINE HYDROCHLORIDE 100 MCG: 10 INJECTION INTRAVENOUS at 09:36

## 2022-01-21 RX ADMIN — FENTANYL CITRATE 25 MCG: 50 INJECTION, SOLUTION INTRAMUSCULAR; INTRAVENOUS at 09:07

## 2022-01-21 RX ADMIN — PROPOFOL 40 MG: 10 INJECTION, EMULSION INTRAVENOUS at 09:32

## 2022-01-21 RX ADMIN — LIDOCAINE HYDROCHLORIDE 30 ML: 20 SOLUTION OROPHARYNGEAL at 08:11

## 2022-01-21 RX ADMIN — MIDAZOLAM 2 MG: 1 INJECTION INTRAMUSCULAR; INTRAVENOUS at 09:01

## 2022-01-21 RX ADMIN — GLYCOPYRROLATE 0.1 MG: 0.2 INJECTION, SOLUTION INTRAMUSCULAR; INTRAVENOUS at 08:56

## 2022-01-21 RX ADMIN — FENTANYL CITRATE 50 MCG: 50 INJECTION, SOLUTION INTRAMUSCULAR; INTRAVENOUS at 09:02

## 2022-01-21 RX ADMIN — MIDAZOLAM 1 MG: 1 INJECTION INTRAMUSCULAR; INTRAVENOUS at 09:06

## 2022-01-21 RX ADMIN — GLYCOPYRROLATE 0.1 MG: 0.2 INJECTION INTRAMUSCULAR; INTRAVENOUS at 08:56

## 2022-01-21 RX ADMIN — TOPICAL ANESTHETIC 1.5 ML: 200 SPRAY DENTAL; PERIODONTAL at 08:15

## 2022-01-21 RX ADMIN — LIDOCAINE HYDROCHLORIDE 30 ML: 20 SOLUTION ORAL; TOPICAL at 08:11

## 2022-01-21 ASSESSMENT — ENCOUNTER SYMPTOMS: DYSRHYTHMIAS: 1

## 2022-01-21 NOTE — ANESTHESIA POSTPROCEDURE EVALUATION
Patient: Kevan Connelly    Procedure: Procedure(s):  ANESTHESIA, FOR CARDIOVERSION       Diagnosis:A-fib (H) [I48.91]  Diagnosis Additional Information: No value filed.    Anesthesia Type:  MAC    Note:  Disposition: Outpatient   Postop Pain Control: Uneventful            Sign Out: Well controlled pain   PONV: No   Neuro/Psych: Uneventful            Sign Out: Acceptable/Baseline neuro status   Airway/Respiratory: Uneventful            Sign Out: Acceptable/Baseline resp. status   CV/Hemodynamics: Uneventful            Sign Out: Acceptable CV status; No obvious hypovolemia; No obvious fluid overload   Other NRE: NONE   DID A NON-ROUTINE EVENT OCCUR? No           Last vitals:  Vitals Value Taken Time   BP 89/60 01/21/22 0948   Temp     Pulse 75 01/21/22 0948   Resp 10 01/21/22 0948   SpO2 98 % 01/21/22 0948       Electronically Signed By: Dean Dennis Severson, APRN CRNA  January 21, 2022  9:57 AM

## 2022-01-21 NOTE — SEDATION DOCUMENTATION
S/p IBETH/DCCV. BP slightly low post procedure. Additional IVF given. VSS prior to discharge. Discharge instructions reviewed with patient and spouse at bedside.   See anesthesia documentation for cardioversion.

## 2022-01-21 NOTE — PROCEDURES
[facility]    Procedure: Cardioversion External    Date/Time: 1/21/2022 9:30 AM  Performed by: Luis Tyson MD  Authorized by: Lamont Hannon MD       UNIVERSAL PROTOCOL   Site Marked: Yes  Prior Images Obtained and Reviewed:  Yes  Required items: Required blood products, implants, devices and special equipment available    Patient identity confirmed:  Verbally with patient and provided demographic data  Patient was reevaluated immediately before administering moderate or deep sedation or anesthesia  Confirmation Checklist:  Patient's identity using two indicators  Time out: Immediately prior to the procedure a time out was called    Universal Protocol: the Joint Commission Universal Protocol was followed    Preparation: Patient was prepped and draped in usual sterile fashion       ANESTHESIA  Anesthesia was administered and monitored by anesthesiology.  See anesthesia documentation for details.    SEDATION  Patient Sedated: Yes    Sedation Type:  Moderate (conscious) sedation  Vital signs: Vital signs monitored during sedation      PROCEDURE DETAILS  Cardioversion basis: elective  Indications: failure of anti-arrhythmic medications  Pre-procedure rhythm: atrial flutter  Patient position: patient was placed in a supine position  Chest area: chest area exposed  Electrodes: pads  Electrodes placed: anterior-posterior  Number of attempts: 1    Details of Attempts:  DC Cardioversion Procedure Note:    Informed consent obtained.  Pads placed in AP position.  Anesthesia used, please see their documentation.    Synchronized, biphasic 120J shock x 1 delivered and successful in converting atypical atrial flutter with low flutter wave amplitude 100bpm to sinus rhythm with 1' AVB [id number] without bradycardia or pauses.    No apparent complications.  Post-procedure rhythm: normal sinus rhythm  Complications: no complications      PROCEDURE    Patient Tolerance:  Patient tolerated the  procedure well with no immediate complications

## 2022-01-21 NOTE — ADDENDUM NOTE
Addendum  created 01/21/22 0958 by Severson, Dean Dennis, APRN CRNA    Intraprocedure Event edited

## 2022-01-21 NOTE — ANESTHESIA CARE TRANSFER NOTE
Patient: Kevan Connelly    Procedure: Procedure(s):  ANESTHESIA, FOR CARDIOVERSION       Diagnosis: A-fib (H) [I48.91]  Diagnosis Additional Information: No value filed.    Anesthesia Type:   MAC     Note:    Oropharynx: oropharynx clear of all foreign objects  Level of Consciousness: drowsy  Oxygen Supplementation: nasal cannula  Level of Supplemental Oxygen (L/min / FiO2): 4  Independent Airway: airway patency satisfactory and stable  Dentition: dentition unchanged  Vital Signs Stable: post-procedure vital signs reviewed and stable  Report to RN Given: handoff report given  Patient transferred to:  Recovery    Handoff Report: Identifed the Patient, Identified the Reponsible Provider, Reviewed the pertinent medical history, Discussed the surgical course, Reviewed Intra-OP anesthesia mangement and issues during anesthesia, Set expectations for post-procedure period and Allowed opportunity for questions and acknowledgement of understanding      Vitals:  Vitals Value Taken Time   BP 89/60 01/21/22 0948   Temp     Pulse 75 01/21/22 0948   Resp 10 01/21/22 0948   SpO2 98 % 01/21/22 0948       Electronically Signed By: Dean Dennis Severson, APRN CRNA  January 21, 2022  9:56 AM

## 2022-01-21 NOTE — ANESTHESIA PREPROCEDURE EVALUATION
Anesthesia Pre-Procedure Evaluation    Patient: Kevan Connelly   MRN: [id number] : 1950        Preoperative Diagnosis: A-fib (H) [I48.91]    Procedure : Procedure(s):  ANESTHESIA, FOR CARDIOVERSION          Past Medical History:   Diagnosis Date     Atrial flutter (H)      Benign essential hypertension 2017    past use of ACE- Cough;  Had been on ARB-diuretic but experienced lower bp readings;  BLOOD PRESSURE now well controlled on ARB also no longer on diuretic; no low bp readings; may have had slight edema initially but has normalized with bp well controlled on ARB alone.      Cancer (H)     testicular cancer     CHF (congestive heart failure) (H) 2019     Long term current use of anticoagulant therapy 6/3/2019     Obesity (BMI 35.0-39.9) with comorbidity (H) 2019     Paroxysmal atrial fibrillation (H) 2019      Past Surgical History:   Procedure Laterality Date     ANESTHESIA CARDIOVERSION N/A 2019    Procedure: ANESTHESIA, FOR CARDIOVERSION DR. LE;  Surgeon: GENERIC ANESTHESIA PROVIDER;  Location:  OR     ANESTHESIA CARDIOVERSION N/A 2019    Procedure: ANESTHESIA, FOR CARDIOVERSION;  Surgeon: GENERIC ANESTHESIA PROVIDER;  Location:  OR     ANESTHESIA CARDIOVERSION N/A 2020    Procedure: ANESTHESIA, FOR CARDIOVERSION;  Surgeon: GENERIC ANESTHESIA PROVIDER;  Location:  OR     ANESTHESIA CARDIOVERSION N/A 2020    Procedure: ANESTHESIA, FOR CARDIOVERSION;  Surgeon: GENERIC ANESTHESIA PROVIDER;  Location:  OR     ANESTHESIA CARDIOVERSION N/A 2020    Procedure: ANESTHESIA, FOR CARDIOVERSION;  Surgeon: GENERIC ANESTHESIA PROVIDER;  Location: [facility] OR     CLOSED REDUCTION SHOULDER Left      EP ABLATION FOCAL AFIB N/A 1/3/2020    Procedure: EP Ablation Focal AFIB;  Surgeon: Lamont Hannon MD;  Location:  [facility]     GENITOURINARY SURGERY      testicular cancer     ORTHOPEDIC SURGERY  's    right knee surgery       Allergies    Allergen Reactions     Lisinopril Cough     Very persistent cough      Social History     Tobacco Use     Smoking status: Never Smoker     Smokeless tobacco: Never Used   Substance Use Topics     Alcohol use: Yes     Comment: every 1-2 months      Wt Readings from Last 1 Encounters:   01/12/22 128.1 kg (282 lb 8 oz)        Anesthesia Evaluation            ROS/MED HX  ENT/Pulmonary:  - neg pulmonary ROS     Neurologic:  - neg neurologic ROS     Cardiovascular:     (+) -----CHF dysrhythmias, a-fib and a-flutter,     METS/Exercise Tolerance:     Hematologic:  - neg hematologic  ROS     Musculoskeletal:  - neg musculoskeletal ROS     GI/Hepatic:  - neg GI/hepatic ROS     Renal/Genitourinary:       Endo:  - neg endo ROS   (+) Obesity,     Psychiatric/Substance Use:  - neg psychiatric ROS     Infectious Disease:  - neg infectious disease ROS     Malignancy:  - neg malignancy ROS     Other:  - neg other ROS          Physical Exam    Airway        Mallampati: II   TM distance: > 3 FB   Neck ROM: full   Mouth opening: > 3 cm    Respiratory Devices and Support         Dental  no notable dental history         Cardiovascular          Rhythm and rate: irregular and normal     Pulmonary   pulmonary exam normal                OUTSIDE LABS:  CBC:   Lab Results   Component Value Date    WBC 5.7 06/21/2021    WBC 4.8 03/31/2021    HGB 16.4 06/21/2021    HGB 15.7 03/31/2021    HCT 47.2 06/21/2021    HCT 44.8 03/31/2021     06/21/2021     03/31/2021     BMP:   Lab Results   Component Value Date     07/19/2021     06/21/2021    POTASSIUM 4.2 01/21/2022    POTASSIUM 4.6 07/19/2021    CHLORIDE 112 (H) 07/19/2021    CHLORIDE 108 06/21/2021    CO2 22 07/19/2021    CO2 27 06/21/2021    BUN 24 07/19/2021    BUN 23 06/21/2021    CR 1.20 07/19/2021    CR 1.31 (H) 06/21/2021    GLC 88 07/19/2021    GLC 94 06/21/2021     COAGS:   Lab Results   Component Value Date    INR 2.48 (H) 01/21/2022     POC: No results found  for: BGM, HCG, HCGS  HEPATIC:   Lab Results   Component Value Date    ALBUMIN 3.6 03/31/2021    PROTTOTAL 6.9 03/31/2021    ALT 28 03/31/2021    AST 14 03/31/2021    ALKPHOS 63 03/31/2021    BILITOTAL 1.2 03/31/2021     OTHER:   Lab Results   Component Value Date    CHU 8.7 07/19/2021    MAG 2.2 01/21/2022    TSH 2.86 01/13/2020       Anesthesia Plan    ASA Status:  3      Anesthesia Type: MAC.   Induction: Propofol.           Consents    Anesthesia Plan(s) and associated risks, benefits, and realistic alternatives discussed. Questions answered and patient/representative(s) expressed understanding.     - Discussed: Risks, Benefits and Alternatives for BOTH SEDATION and the PROCEDURE were discussed     - Discussed with:  Patient      - Extended Intubation/Ventilatory Support Discussed: No.      - Patient is DNR/DNI Status: No    Use of blood products discussed: No .     Postoperative Care            Comments:                Dean Dennis Severson, APRN CRNA

## 2022-01-21 NOTE — PRE-PROCEDURE
GENERAL PRE-PROCEDURE:   Procedure:  IBETH cardioversion  Date/Time:  1/21/2022 8:30 AM    Verbal consent obtained?: Yes    Written consent obtained?: Yes    Risks and benefits: Risks, benefits and alternatives were discussed    Consent given by:  Patient  Patient states understanding of procedure being performed: Yes    Patient's understanding of procedure matches consent: Yes    Procedure consent matches procedure scheduled: Yes    Expected level of sedation:  Moderate  Appropriately NPO:  Yes  ASA Class:  2  Mallampati  :  Grade 2- soft palate, base of uvula, tonsillar pillars, and portion of posterior pharyngeal wall visible  Lungs:  Other (comment)  Lung exam comment:  Symmetric  Heart:  A-flutter  History & Physical reviewed:  History and physical reviewed and no updates needed  Statement of review:  I have reviewed the lab findings, diagnostic data, medications, and the plan for sedation

## 2022-01-21 NOTE — DISCHARGE INSTRUCTIONS
What is a Transesophageal Echocardiogram (IBETH)?  This is a test that allows your doctor to record images of your heart from inside your esophagus, or food pipe. These images help your doctor identify or treat problems such as infection, disease, blood clots, or defects in your heart s walls or valves.     The test uses sound waves to see images of the heart muscle, valves, and function of the heart. A special instrument called a flexible transducer (IBETH probe) is passed through your mouth into your esophagus. The transducer picks up heart sound waves and uses them to project a picture of the heart onto a screen. A recording is made for the cardiologist to review.  The test itself takes approximately 15-30 minutes, but since medications are given to ensure your comfort, your visit will be longer. Allow 2-4 hours from your arrival to the time you can leave.    Preparation  To ensure an accurate and safe test, follow all instructions given by your doctor, including the following:    Do not eat or drink for six (6) hours prior to the test.     Unless otherwise instructed by your doctor, take your medications as you do normally with a small sip of water.    Bring a list of your current medications, including over-the-counter drugs, vitamins, and herbal supplements. List any allergies.    You must make arrangements for someone to drive you home. We recommend someone stay with you at home for up to 24 hours.    What happens during the test?     When you arrive for your IBETH, you will change into a hospital gown, and you will be asked questions about medical history, medications, and allergies. An IV (intravenous) line will be started in your vein.    You will be given oxygen and your throat will be sprayed with a numbing medication. You may be given a mild sedative through your IV to help you relax. You will be asked to lie on your left side.    The doctor gently inserts the IBETH probe into your mouth. As you swallow, the  tube is slowly guided into your esophagus. The tube will be lubricated to make it slide easily.    You may feel the doctor moving the probe, but it shouldn t hurt or interfere with your breathing. A nurse monitors your heart rhythm, blood pressure, and breathing. The test usually takes 15-30 minutes.    When the test is complete  The medications you receive may make you drowsy. Do not drive for the remainder of the day of the test.  Because the back of your throat will be numb, you should not eat or drink until the numbness wears off (usually about 1 hour after the test). You will receive something to drink and eat 1 hour after the test. You may not have any hot foods or liquids for 6 hours after the test.    At Home/After the Procedure    Rest the remainder of the day. Over activity may cause nausea and dizziness.    Do not drive or operate any machinery.    Follow your normal diet. Drink plenty of fluids.     Do not drink alcoholic beverages for 24 hours.    Use throat lozenges or ice chips if you experience a mild sore throat.    Call your primary doctor if you have questions, concerns, or experience any of the following:  o Increased pain or difficulty when swallowing.    At Home/After the Procedure Continued  o Stomach or abdominal pain.  o Temperature above 99 degrees.    Test Results  The cardiologist will study and interpret the images. Your doctor s office will be given test results and contact you. If you do not hear from them within 3-5 business days, please contact your doctor directly.       Here at [facility], we are dedicated to providing the best possible care. We hope to make your experience as pleasant as possible. Thank you for taking the time to read this information. Feel free to ask questions if something is not clear to you.       Discharge Instructions for Cardioversion  Your healthcare provider did a procedure called cardioversion. He or she used a controlled electric shock or a  medicine to briefly stop all electrical activity in your heart. This helped restore your heart s normal rhythm. Here are some instructions to follow while you recover.  Home care    Before cardioversion, you will typically be given sedation. So, you won't be able to drive home. You will need a ride. Wait at least 24 hours before driving a car or operating heavy machinery after getting sedating medicines.    The skin on your chest may be irritated or feel like it's sunburned. Your healthcare provider may prescribe a soothing lotion to ease this discomfort. These minor symptoms will go away in a few days.    Ask your healthcare provider about medicines to keep your heart rhythm steady.    If you were prescribed medicine, take it as instructed by your healthcare provider. Don t skip doses or take double doses. Cardioversion requires blood thinners for at least 4 weeks after the procedure to prevent a delayed risk of stroke when treating atrial fibrillation or atrial flutter. Be sure you discuss which medicine you are taking to prevent stroke. Ask when you need to have your medicine levels checked. Also ask whether you may be able to stop taking it in the future or whether you need to take it for life. Some of these blood-thinning medicines such as warfarin will have the dose adjusted, and interact with other medicines or foods. Your healthcare team will give you full instructions on what to watch out for. Report bleeding or symptoms of stroke immediately to your healthcare team and seek emergency medical attention.    Learn to take your own pulse. Keep a record of your results. Ask your healthcare provider when you should seek emergency medical attention. He or she will tell you which pulse rate reading is dangerous.     Cardioversion is usually short term (temporary). You may need it repeated if the abnormal heart rhythm returns. After the procedure, your healthcare provider will tell you if the treatment worked or if  you will need further treatments or medicine.    Follow-up care  Make a follow-up appointment, or as advised.  When to call your healthcare provider  Call 911 right away if you have:    Chest pain    Shortness of breath    Loss of vision, speech, or strength or coordination in any body part  Call your healthcare provider right away if you:    Feel faint, dizzy, or lightheaded    Have chest pain with increased activity    Have irregular heartbeat or fast pulse    Have bleeding issues from blood-thinning medicines  [employer] last reviewed this educational content on 5/1/2019 2000-2021 [employer]. All rights reserved. This information is not intended as a substitute for professional medical care. Always follow your healthcare professional's instructions.

## 2022-01-21 NOTE — PROGRESS NOTES
ANTICOAGULATION MANAGEMENT     Kevan Connelly 71 year old male is on warfarin with therapeutic INR result. (Goal INR 2.0-3.0)    Recent labs: (last 7 days)     01/21/22  0720   INR 2.48*       ASSESSMENT     Source(s): Chart Review       Warfarin doses taken: Warfarin taken as instructed    Diet: No new diet changes identified    New illness, injury, or hospitalization: No    Medication/supplement changes: None noted    Signs or symptoms of bleeding or clotting: No    Previous INR: Therapeutic last 2(+) visits    Additional findings: Cardioversion today.  INR was drawn prior to cardioversion, so patient was not called.  Has follow up already scheduled as discussed at last INR check.     PLAN     Recommended plan for no diet, medication or health factor changes affecting INR     Dosing Instructions: Continue your current warfarin dose with next INR in 1 week       Summary  As of 1/21/2022    Full warfarin instructions:  5 mg every day   Next INR check:  1/28/2022                 Lab visit scheduled    Education provided: None required    Plan made per [facility] anticoagulation protocol    Yarelis Martinez RN  Anticoagulation Clinic  1/21/2022    _______________________________________________________________________     Anticoagulation Episode Summary     Current INR goal:  2.0-3.0   TTR:  78.7 % (1 y)   Target end date:  Indefinite   Send INR reminders to:  [facility]    Indications    Long term current use of anticoagulant therapy [Z79.01]  Paroxysmal atrial fibrillation (H) [I48.0]           Comments:           Anticoagulation Care Providers     Provider Role Specialty Phone number    Davina Reaves MD Referring Internal Medicine [phone]

## 2022-01-24 ENCOUNTER — TELEPHONE (OUTPATIENT)
Dept: CARDIOLOGY | Facility: CLINIC | Age: 72
End: 2022-01-24
Payer: COMMERCIAL

## 2022-01-24 DIAGNOSIS — I48.0 PAROXYSMAL ATRIAL FIBRILLATION (H): Primary | ICD-10-CM

## 2022-01-24 RX ORDER — AMIODARONE HYDROCHLORIDE 200 MG/1
TABLET ORAL
Qty: 54 TABLET | Refills: 0 | Status: SHIPPED | OUTPATIENT
Start: 2022-01-24 | End: 2022-02-24

## 2022-01-24 NOTE — TELEPHONE ENCOUNTER
Dr. Hannon    Kevan Connelly is a 71 year old who has a hx of afib with tachycardia induced cardiomyopathy.  He underwent a PVI on 1/3/2020 and was on amiodarone until 4/2020.   In 1/2022, he had a recurrence of atrial fibrillation and underwent a successful IBETH guided cardioversion on 1/21/2022 but was not able to maintain sinus rhythm.    Recommend stopping flecainide 75 mg BID and starting amiodarone:    1. Stop flecainide  2. Start Amiodarone 400 mg PO BID for 4 days   3. decrease to 400 mg daily for 4 days  4. decrease to 200 mg PO daily  5. Weekly INRs   6. Notify INR clinic of starting amiodarone.    7. Follow up on 1/27/2022 with Dr. Hannon to discuss rhythm control.     ANNMARIE Peterson CNP on 1/24/2022 at 10:13 AM

## 2022-01-24 NOTE — TELEPHONE ENCOUNTER
01/24/22 Verbal order recd from Dr Hannon that he is ok with plan as outlined below per Hilda     1. Stop flecainide  2. Start Amiodarone 400 mg PO BID for 4 days   3. decrease to 400 mg daily for 4 days  4. decrease to 200 mg PO daily  5. Weekly INRs   6. Notify INR clinic of starting amiodarone.    7. Follow up on 1/27/2022 with Dr. Hannon to discuss rhythm control.     Pt wrote down instructions and was able to verbally recite them back to me. Scheduled for OV w Dr Hannon on 2/14 at 215 in [place]  Pt voiced understanding and agreement with plan.   Bran 510 pm

## 2022-01-24 NOTE — TELEPHONE ENCOUNTER
"01/24/22 Pt had successful DCCV on Friday 1/24. He converted back into  Afib w rates  ( av 130's) approx 6 pm last evening ( 1/23) . He feels fatigued, lethargic and overall \" not feeling well\".   He denies exposure to any triggers over the weekend.  He verifies he is taking the following:  Carvedilol 6.25 mg BID  Flecainide 75 mg q 12 hrs  Losartan 100 mg every day  Warfarin   Will update Hilda and call pt back  Pt voiced understanding and agreement with plan.   Bran 915 am    "

## 2022-01-25 ENCOUNTER — TELEPHONE (OUTPATIENT)
Dept: FAMILY MEDICINE | Facility: CLINIC | Age: 72
End: 2022-01-25
Payer: COMMERCIAL

## 2022-01-25 DIAGNOSIS — Z79.01 LONG TERM CURRENT USE OF ANTICOAGULANT THERAPY: Primary | ICD-10-CM

## 2022-01-25 DIAGNOSIS — I48.0 PAROXYSMAL ATRIAL FIBRILLATION (H): ICD-10-CM

## 2022-01-25 NOTE — TELEPHONE ENCOUNTER
Patient left a VM on the Home care line at 12:27 pm stating he has a med change to report that will interfere with Warfarin/INR.  Please call [phone]  Bree Blair RN  Anticoagulation Nurse - [facility], [place]

## 2022-01-25 NOTE — TELEPHONE ENCOUNTER
His HRs are between  bpm on apple watch and pulse oximeter why in atrial fibrillation.  Asked pt to take metoprolol XL 50 mg BID for the next 48 hours until he starts amiodarone.  Also ask him to call back in 1 week if he wasn't back in NSR.     ANNMARIE Peterson CNP on 1/25/2022 at 12:21 PM

## 2022-01-25 NOTE — TELEPHONE ENCOUNTER
Patient started on amiodarone yesterday and will take the following dosing Take 2 tablets (400 mg) by mouth 2 times daily for 4 days, THEN 2 tablets (400 mg) daily for 4 days, THEN 1 tablet (200 mg) daily.  INR on Friday was 2.48.  Consulted with Katelyn Blue [facility] for care plan.  Patient will stay on the same warfarin maintenance dose (5 mg daily) and have INR checked on 1/28.  Notified patient that he should be watchful for signs of bleeding or bruising and notify INR clinic if he is noticing any signs.  Patient verbalized understanding of care plan.

## 2022-01-26 NOTE — TELEPHONE ENCOUNTER
Patient called for clarification regarding metoprolol usage.  Patient reports feeling better now that his HR is better controlled.  Continues to be in AF.   Currently his HR has been fluctuating between .  He has taken 3 doses of metoprolol XL.  Reviewed recommendations per Hilda to take the metoprolol for 48 hours and start amiodarone on 1/27.    Patient provided verbal understanding and will take last dose of metoprolol this evening.  He will call in one week or sooner if needed with update on symptoms.  DIO Schrader

## 2022-01-28 ENCOUNTER — ANTICOAGULATION THERAPY VISIT (OUTPATIENT)
Dept: ANTICOAGULATION | Facility: CLINIC | Age: 72
End: 2022-01-28

## 2022-01-28 ENCOUNTER — LAB (OUTPATIENT)
Dept: LAB | Facility: CLINIC | Age: 72
End: 2022-01-28
Payer: COMMERCIAL

## 2022-01-28 DIAGNOSIS — Z79.01 LONG TERM CURRENT USE OF ANTICOAGULANTS WITH INR GOAL OF 2.0-3.0: ICD-10-CM

## 2022-01-28 DIAGNOSIS — I48.0 PAROXYSMAL ATRIAL FIBRILLATION (H): ICD-10-CM

## 2022-01-28 DIAGNOSIS — Z79.01 LONG TERM CURRENT USE OF ANTICOAGULANT THERAPY: Primary | ICD-10-CM

## 2022-01-28 LAB — INR BLD: 2.9 (ref 0.9–1.1)

## 2022-01-28 PROCEDURE — 85610 PROTHROMBIN TIME: CPT

## 2022-01-28 PROCEDURE — 36416 COLLJ CAPILLARY BLOOD SPEC: CPT

## 2022-01-28 NOTE — PROGRESS NOTES
ANTICOAGULATION MANAGEMENT     Kevan Connelly 71 year old male is on warfarin with therapeutic INR result. (Goal INR 2.0-3.0)    Recent labs: (last 7 days)     01/28/22  1023   INR 2.9*       ASSESSMENT     Source(s): Chart Review and Patient/Caregiver Call       Warfarin doses taken: Warfarin taken as instructed    Diet: No new diet changes identified    New illness, injury, or hospitalization: No    Medication/supplement changes: Amiodarone 1/24/22 started on 1/24/22 which has potential for interaction; increasing INR    Signs or symptoms of bleeding or clotting: No    Previous INR: Therapeutic last 2(+) visits    Additional findings: None     PLAN     Recommended plan for ongoing change(s) affecting INR     Dosing Instructions:  Decrease your warfarin dose (14.3% change) with next INR in 5 days       Summary  As of 1/28/2022    Full warfarin instructions:  2.5 mg every Mon, Fri; 5 mg all other days   Next INR check:  2/2/2022             Telephone call with Kevan who verbalizes understanding and agrees to plan    Lab visit scheduled    Education provided: Importance of therapeutic range, Importance of following up at instructed interval, Importance of taking warfarin as instructed and Potential interaction between warfarin and Amiodarone    Plan made with [facility] Pharmacist Ana Anderson, RN  Anticoagulation Clinic  1/28/2022    _______________________________________________________________________     Anticoagulation Episode Summary     Current INR goal:  2.0-3.0   TTR:  80.5 % (1 y)   Target end date:  Indefinite   Send INR reminders to:  [facility]    Indications    Long term current use of anticoagulant therapy [Z79.01]  Paroxysmal atrial fibrillation (H) [I48.0]           Comments:           Anticoagulation Care Providers     Provider Role Specialty Phone number    Davina Reaves MD Referring Internal Medicine [phone]

## 2022-02-02 ENCOUNTER — ANTICOAGULATION THERAPY VISIT (OUTPATIENT)
Dept: ANTICOAGULATION | Facility: CLINIC | Age: 72
End: 2022-02-02

## 2022-02-02 ENCOUNTER — LAB (OUTPATIENT)
Dept: LAB | Facility: CLINIC | Age: 72
End: 2022-02-02
Payer: COMMERCIAL

## 2022-02-02 ENCOUNTER — TELEPHONE (OUTPATIENT)
Dept: CARDIOLOGY | Facility: CLINIC | Age: 72
End: 2022-02-02

## 2022-02-02 DIAGNOSIS — I48.0 PAROXYSMAL ATRIAL FIBRILLATION (H): ICD-10-CM

## 2022-02-02 DIAGNOSIS — Z79.01 LONG TERM CURRENT USE OF ANTICOAGULANTS WITH INR GOAL OF 2.0-3.0: ICD-10-CM

## 2022-02-02 DIAGNOSIS — I48.0 PAROXYSMAL ATRIAL FIBRILLATION (H): Primary | ICD-10-CM

## 2022-02-02 DIAGNOSIS — Z79.01 LONG TERM CURRENT USE OF ANTICOAGULANT THERAPY: Primary | ICD-10-CM

## 2022-02-02 DIAGNOSIS — I10 BENIGN ESSENTIAL HYPERTENSION: ICD-10-CM

## 2022-02-02 LAB
ATRIAL RATE - MUSE: 79 BPM
DIASTOLIC BLOOD PRESSURE - MUSE: NORMAL MMHG
INR BLD: 2.2 (ref 0.9–1.1)
INTERPRETATION ECG - MUSE: NORMAL
P AXIS - MUSE: 62 DEGREES
PR INTERVAL - MUSE: 312 MS
QRS DURATION - MUSE: 108 MS
QT - MUSE: 418 MS
QTC - MUSE: 479 MS
R AXIS - MUSE: 12 DEGREES
SYSTOLIC BLOOD PRESSURE - MUSE: NORMAL MMHG
T AXIS - MUSE: 25 DEGREES
VENTRICULAR RATE- MUSE: 79 BPM

## 2022-02-02 PROCEDURE — 85610 PROTHROMBIN TIME: CPT

## 2022-02-02 PROCEDURE — 36415 COLL VENOUS BLD VENIPUNCTURE: CPT

## 2022-02-02 RX ORDER — METOPROLOL SUCCINATE 50 MG/1
100 TABLET, EXTENDED RELEASE ORAL 2 TIMES DAILY
Qty: 180 TABLET | Refills: 3 | COMMUNITY
Start: 2022-02-02 | End: 2022-02-03

## 2022-02-02 NOTE — TELEPHONE ENCOUNTER
02/02/22 Msg recd from Hilda Wahl NP    Pt called today the [place] clinic today and stating his HR was elevated to the 120s while taking amiodarone 400 mg daily, carvedilol 6.25 mg twice daily.  Blood pressure 137/99 mmHg     Plan:  1. Stop carvedilol- med list updated, pt voiced understanding  2. Start metoprolol succinate 50 mg now- pt voiced understanding  3. Take metoprolol succinate 50 mg this evening-pt voiced understanding   4. If heart rate is above 100 bpm on 2/3/2022, take metoprolol succinate 100 mg-pt voiced understanding   5. Call [phone] on 2/3/2022 with an update on heart rates- pt voiced understanding  KHerrStacy 1033 am

## 2022-02-02 NOTE — TELEPHONE ENCOUNTER
Pt called today the [facility] clinic today and stating his HR was elevated to the 120s while taking amiodarone 400 mg daily, carvedilol 6.25 mg twice daily.  Blood pressure 137/99 mmHg    Plan:  1. Stop carvedilol  2. Start metoprolol succinate 50 mg now  3. Take metoprolol succinate 50 mg this evening  4. If heart rate is above 100 bpm on 2/3/2022, take metoprolol succinate 100 mg  Call [phone] on 2/3/2022 with an update on heart rates      ANNMARIE Peterson CNP on 2/2/2022 at 9:59 AM

## 2022-02-02 NOTE — PROGRESS NOTES
ANTICOAGULATION MANAGEMENT     Kevan Connelly 71 year old male is on warfarin with therapeutic INR result. (Goal INR 2.0-3.0)    Recent labs: (last 7 days)     02/02/22  0805   INR 2.2*       ASSESSMENT     Source(s): Chart Review and Patient/Caregiver Call       Warfarin doses taken: Warfarin taken as instructed    Diet: No new diet changes identified    New illness, injury, or hospitalization: No    Medication/supplement changes: He is on amiodarone 2 tablets (400 mg) daily until Friday, then 1 tablet (200 mg) daily.    Signs or symptoms of bleeding or clotting: No    Previous INR: Therapeutic last 2(+) visits    Additional findings: None     PLAN     Recommended plan for no diet, medication or health factor changes affecting INR     Dosing Instructions: Continue your current warfarin dose with next INR in 2 days       Summary  As of 2/2/2022    Full warfarin instructions:  2.5 mg every Mon, Fri; 5 mg all other days   Next INR check:  2/4/2022             Telephone call with  Keron who agrees to plan and repeated back plan correctly    Lab visit scheduled    Education provided: Potential interaction between warfarin and amiodarone    Plan made per [facility] anticoagulation protocol    Noris Sher RN  Anticoagulation Clinic  2/2/2022    _______________________________________________________________________     Anticoagulation Episode Summary     Current INR goal:  2.0-3.0   TTR:  80.6 % (1 y)   Target end date:  Indefinite   Send INR reminders to:  [facility]    Indications    Long term current use of anticoagulant therapy [Z79.01]  Paroxysmal atrial fibrillation (H) [I48.0]           Comments:           Anticoagulation Care Providers     Provider Role Specialty Phone number    Davina Reaves MD Referring Internal Medicine [phone]

## 2022-02-03 RX ORDER — LOSARTAN POTASSIUM 100 MG/1
50 TABLET ORAL DAILY
Qty: 45 TABLET | Refills: 3 | Status: SHIPPED | OUTPATIENT
Start: 2022-02-03 | End: 2022-02-17

## 2022-02-03 RX ORDER — METOPROLOL SUCCINATE 100 MG/1
100 TABLET, EXTENDED RELEASE ORAL 2 TIMES DAILY
Qty: 180 TABLET | Refills: 3 | Status: SHIPPED | OUTPATIENT
Start: 2022-02-03 | End: 2022-02-14

## 2022-02-03 NOTE — TELEPHONE ENCOUNTER
Spoke to patient who reports HR continue to be elevated.  11am /106 and 156/109 .  2pm .  Patient clarified that he stopped the coreg.  He took metoprolol XL 100mg this morning and did not take his losartan.  Will review above with Hilda to get recommendations for treatment plan.   DIO Schrader

## 2022-02-03 NOTE — TELEPHONE ENCOUNTER
Spoke to Hilda Wahl, BRYAN.  See verbal orders below.    ---decrease losartan to 50mg po every day  ---metoprolol [id number] po bid  Hold if HR <60  ---set up cardioversion next week   ---INR one to two days prior to procedure  ---virtual visit 2/4 with Hilda to update H&P  Discussed above plan with patient.  Medication list updated in [facility] and sent to pharmacy.  Cardioversion set up for 2/10.  Patient will set up INR prior to cardioversion.  Patient provided verbal understanding.  DIO Schrader

## 2022-02-04 ENCOUNTER — LAB (OUTPATIENT)
Dept: LAB | Facility: CLINIC | Age: 72
End: 2022-02-04
Payer: COMMERCIAL

## 2022-02-04 ENCOUNTER — VIRTUAL VISIT (OUTPATIENT)
Dept: CARDIOLOGY | Facility: CLINIC | Age: 72
End: 2022-02-04
Payer: COMMERCIAL

## 2022-02-04 ENCOUNTER — HOSPITAL ENCOUNTER (OUTPATIENT)
Facility: CLINIC | Age: 72
End: 2022-02-04
Admitting: INTERNAL MEDICINE
Payer: COMMERCIAL

## 2022-02-04 ENCOUNTER — ANTICOAGULATION THERAPY VISIT (OUTPATIENT)
Dept: ANTICOAGULATION | Facility: CLINIC | Age: 72
End: 2022-02-04

## 2022-02-04 DIAGNOSIS — I48.0 PAROXYSMAL ATRIAL FIBRILLATION (H): ICD-10-CM

## 2022-02-04 DIAGNOSIS — Z79.01 LONG TERM CURRENT USE OF ANTICOAGULANT THERAPY: Primary | ICD-10-CM

## 2022-02-04 DIAGNOSIS — Z79.899 ENCOUNTER FOR MONITORING ANTI-ARRHYTHMIC THERAPY: ICD-10-CM

## 2022-02-04 DIAGNOSIS — Z51.81 ENCOUNTER FOR MONITORING ANTI-ARRHYTHMIC THERAPY: ICD-10-CM

## 2022-02-04 DIAGNOSIS — Z79.01 ON CONTINUOUS ORAL ANTICOAGULATION: ICD-10-CM

## 2022-02-04 DIAGNOSIS — I50.9 CONGESTIVE HEART FAILURE, UNSPECIFIED HF CHRONICITY, UNSPECIFIED HEART FAILURE TYPE (H): Primary | ICD-10-CM

## 2022-02-04 DIAGNOSIS — I10 BENIGN ESSENTIAL HYPERTENSION: ICD-10-CM

## 2022-02-04 DIAGNOSIS — Z79.01 LONG TERM CURRENT USE OF ANTICOAGULANTS WITH INR GOAL OF 2.0-3.0: ICD-10-CM

## 2022-02-04 LAB — INR BLD: 2.8 (ref 0.9–1.1)

## 2022-02-04 PROCEDURE — 36415 COLL VENOUS BLD VENIPUNCTURE: CPT

## 2022-02-04 PROCEDURE — 99213 OFFICE O/P EST LOW 20 MIN: CPT | Mod: 95 | Performed by: NURSE PRACTITIONER

## 2022-02-04 PROCEDURE — 85610 PROTHROMBIN TIME: CPT

## 2022-02-04 NOTE — PATIENT INSTRUCTIONS
Tuesday INR and covid test      Wednesday evening take metoprolol 50 mg   Thursday morning hold metoprolol, cardioversion

## 2022-02-04 NOTE — LETTER
"2/4/2022    Davina [place]aves [place]  [id number] [facility] [facility] [id number]    RE: Kevan Connelly       Dear Colleague,     I had the pleasure of seeing Kevan Connelly in the [facility].     Review Of Systems  Skin: Negative  Eyes: Negative  Ears/Nose/Throat: Positive for tinnitus  Respiratory: Positive for dyspnea on exertion - has improved  Cardiovascular: Positive for palpitations; slight edema; energy is \"fair\"  Gastrointestinal: Negative  Genitourinary: Negative  Musculoskeletal: Negative  Neurologic: Negative  Psychiatric: Negative  Hematologic/Lymphatic/Immunologic: Positive for allergies  Endocrine: Negative    Patient reported vitals:  BP: 139/90  Heart rate: N/A  Weight: 274 lbs    Ivy MUHAMMAD CMA    Video Start Time: 12:33 PM    Kevan Connelly is a 71 year old male who is following up for H&P for cardioversion.   This visit is being conducted as a virtual visit due to the emphasis on mitigation of the COVID-19 virus pandemic. The clinician has decided that the risk of an in-office visit outweighs the benefit for this patient.   He is a patient of Dr. Hannon.  His medical history includes     1. Atrial fibrillation.  Diagnosed on 5/30/19 with cardiomyopathy EF was 33%.  Was on amiodarone and changed to flecainide with normal EF.  refractory to flecainide.     Underwent an atrial fibrillation ablation on 1/3/2020. IBETH guided cardioversion on 1/21/2022  2. Nonocclusive coronary artery disease.  Per CT angiogram heart 1/2020 revealed coronary calcification the LAD, circumflex and RCA.  3. Mildly dilated aortic root and ascending aorta.  Per CT angiogram (1/2020) revealed 4.22 x 3.88 cm and 3.64 cm x 3.73 cm respectively.  4. Tachycardia induced cardiomyopathy.  EF 33% (6/2018)  ECHO 50-55% (8/2019)  5. Hypertension  6. Obesity  7. Testicular cancer with subsequent ED       Diagnostics:  I personally reviewed the following report    IBETH (1/21/2022) The left ventricle is normal in size. " The visual ejection fraction is 55-60%. No regional wall motion abnormalities noted. No thrombus is detected in the left atrial appendage. The left atrium is moderate to severely dilated. No significant valvular heart disease. The rhythm was atrial flutter.      In March 2019, he was diagnosed with atrial fibrillation     In June 2019 he was diagnosed with cardiomyopathy, underwent an unsuccessful cardioversion was later started on amiodarone and on 7/2019 underwent a successful cardioversion     In September 2019 his EF improved, he was started on flecainide unfortunately was found to be in atrial fibrillation via Zio patch     On 1/2020 he underwent PVI, had recurrence of atrial fibrillation was started on amiodarone which was subsequently discontinued on 4/2020     On 8/2020 he presented with atrial fibrillation, was started on flecainide and underwent a cardioversion.  Failed to maintain sinus rhythm and in November 2020 underwent a cardioversion.     In June 2021 he met with Dr. Hannon he was doing well on flecainide 75 mg twice daily and metoprolol XL 50 mg daily.  He was on Coumadin for anticoagulation.  His blood pressure was elevated and his losartan was increased to 100 mg daily.     On 6/21/2021, patient was in the emergency room with chest pain, his blood pressure was elevated, he was in sinus rhythm, his troponin was negative.  His metoprolol was discontinued and was started on carvedilol 6.25 mg twice daily     On 1/2/2022, patient's ECG showed atrial flutter and was recommended to increase his carvedilol from 6.25-9.375 and undergo a cardioversion.    On 1/12/2022, he was seen in clinic in atrial fibrillation, his coreg was increased and he underwent a IBETH guided cardioversion on 1/21/2022 however he was not able to maintain sinus rhythm.     On 1/24/2022, he was started on amiodarone with a load.       On 2/2/2022, his coreg was stopped and he was started on metoprolol  mg BID and his losartan  was cut to 50 mg daily     Today he reports HR  bpm and his -140/80-90 mmHg while taking metoprolol  mg daily and amiodarone 200 mg daily, losartan 50 mg daily.   He denies chest pain, shortness of breath but he has edema and he has palpitations particularly when lying.  He has not been exercising regularly and has decreased exercise tolerance when walking up the stairs.    He denies HF symptoms including orthopnea, PND, or edema.  Reports taking medications as prescribed including warfarin and denies bleeding, hematuria, melena, epistaxis and signs/symptoms of stroke.      Physical Exam  Constitutional:       Appearance: Normal appearance.   Neurological:      Mental Status: He is alert.   Psychiatric:         Mood and Affect: Mood normal.         Behavior: Behavior normal.         The remainder of the physical exam was deferred due to public health crisis.   Acknowledge and reviewed ROS completed by Ivy MUHAMMAD CMA    ASSESSMENT AND PLAN  Atrial fibrillation    For anticoagulation for CHADS VASC 3 (age, HTN, HF) he takes warfarin with goal INR 2-3. His weekly INRs for the past 3 weeks after his IBETH.      His last hemoglobin was 16.4 (6/2021).       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  After resolution of tachycardia induced cardiomyopathy he was started on flecainide with metoprolol and later was found to be in atrial fibrillation despite increase flecainide dosage. On 1/3/2020, he underwent a PVI ablation, flecainide was discontinued and he was started on amiodarone which was discontinued on 4/2020.   In 8/2020, he had a recurrence of atrial fibrillation, was restarted on flecainide underwent a successful cardioversion.  He had a recurrence and underwent a cardioversion on 11/2020.    In January 2022 patient had a recurrence of atrial fibrillation and underwent a IBETH guided cardioversion but has not been able to maintain sinus  rhythm and was started on amiodarone with a load.  Today he states his HR is  bpm while taking amiodarone 200 mg daily and metoprolol  mg twice daily.      For rhythm control he is currently taking amiodarone 200 mg daily and metoprolol  mg twice daily and will plan for a cardioversion without IBETH as he had a IBETH and has had weekly INRs since.     Proceed to cardioversion as patient has had a IBETH and weekly INRs since.     The evening before this cardioversion decrease metoprolol  mg daily to 50 mg daily and the morning of the cardioversion hold the metoprolol    Patient counseled on DCCV.  Instructed not to not eat or drink for 8 hours before the procedure and that home medications can be taken with a sip of water.   Complications of cardioversion are uncommon and include but are not limited to abnormal heart rhythm, minor skin burns, reaction to sedation medication, and stroke or pulmonary embolism due to blood clots that may be in the heart.  A formal consent form will be signed by the procedural physician.    Follow up with Dr. Hannon afterwards    Cardiomyopathy    At the time of diagnosis with his atrial fibrillation his EF was found to be 33%.       ECHO (8/2019) revealed EF 50-55%      IBETH (1/2022) revealed normal EF    Continue GDMT of ARB (losartan 50 mg daily) and BB (metoprolol  mg BID)     Euvolemic on exam      Hypertension    controlled while taking losartan 50 mg daily and metoprolol  mg BID.  His usual dose of losartan is 100 mg daily with coreg 6.25 mg BID.          Non-occlusive coronary artery disease    Per CT angiogram heart 1/2020 revealed coronary calcification the LAD, circumflex and RCA.    Asymptomatic    Continue BB, ARB, statin.  He is not on an aspirin due to being on Warfarin.  His last LDL was 93 (3/2021)     Plan:    Continue with losartan 50 mg daily    The evening before decrease metoprolol  mg daily to 50 mg daily and the morning of the  cardioversion hold the metoprolol    Call after the cardioversion for HR and BP     Follow up with ANNMARIE Glez [facility]

## 2022-02-04 NOTE — PROGRESS NOTES
"Keron is a 71 year old who is being evaluated via a billable video visit.      How would you like to obtain your AVS? Mail a copy  If the video visit is dropped, the invitation should be resent by: Text to cell phone: [phone]  Will anyone else be joining your video visit? No       Review Of Systems  Skin: Negative  Eyes: Negative  Ears/Nose/Throat: Positive for tinnitus  Respiratory: Positive for dyspnea on exertion - has improved  Cardiovascular: Positive for palpitations; slight edema; energy is \"fair\"  Gastrointestinal: Negative  Genitourinary: Negative  Musculoskeletal: Negative  Neurologic: Negative  Psychiatric: Negative  Hematologic/Lymphatic/Immunologic: Positive for allergies  Endocrine: Negative    Patient reported vitals:  BP: 139/90  Heart rate: N/A  Weight: 274 lbs    Ivy MUHAMMAD CMA    Video Start Time: 12:33 PM  Video-Visit Details    Type of service:  Video Visit    Video End Time 12:51 PM    Originating Location (pt. Location): Home    Distant Location (provider location):  [facility]     Platform used for Video Visit: Ayan     Kevan Connelly is a 71 year old male who is following up for H&P for cardioversion.   This visit is being conducted as a virtual visit due to the emphasis on mitigation of the COVID-19 virus pandemic. The clinician has decided that the risk of an in-office visit outweighs the benefit for this patient.   He is a patient of Dr. Hannon.  His medical history includes     1. Atrial fibrillation.  Diagnosed on 5/30/19 with cardiomyopathy EF was 33%.  Was on amiodarone and changed to flecainide with normal EF.  refractory to flecainide.     Underwent an atrial fibrillation ablation on 1/3/2020. IBETH guided cardioversion on 1/21/2022  2. Nonocclusive coronary artery disease.  Per CT angiogram heart 1/2020 revealed coronary calcification the LAD, circumflex and RCA.  3. Mildly dilated aortic root and ascending aorta.  Per CT angiogram " (1/2020) revealed 4.22 x 3.88 cm and 3.64 cm x 3.73 cm respectively.  4. Tachycardia induced cardiomyopathy.  EF 33% (6/2018)  ECHO 50-55% (8/2019)  5. Hypertension  6. Obesity  7. Testicular cancer with subsequent ED       Diagnostics:  I personally reviewed the following report    IBETH (1/21/2022) The left ventricle is normal in size. The visual ejection fraction is 55-60%. No regional wall motion abnormalities noted. No thrombus is detected in the left atrial appendage. The left atrium is moderate to severely dilated. No significant valvular heart disease. The rhythm was atrial flutter.      In March 2019, he was diagnosed with atrial fibrillation     In June 2019 he was diagnosed with cardiomyopathy, underwent an unsuccessful cardioversion was later started on amiodarone and on 7/2019 underwent a successful cardioversion     In September 2019 his EF improved, he was started on flecainide unfortunately was found to be in atrial fibrillation via Zio patch     On 1/2020 he underwent PVI, had recurrence of atrial fibrillation was started on amiodarone which was subsequently discontinued on 4/2020     On 8/2020 he presented with atrial fibrillation, was started on flecainide and underwent a cardioversion.  Failed to maintain sinus rhythm and in November 2020 underwent a cardioversion.     In June 2021 he met with Dr. Hannon he was doing well on flecainide 75 mg twice daily and metoprolol XL 50 mg daily.  He was on Coumadin for anticoagulation.  His blood pressure was elevated and his losartan was increased to 100 mg daily.     On 6/21/2021, patient was in the emergency room with chest pain, his blood pressure was elevated, he was in sinus rhythm, his troponin was negative.  His metoprolol was discontinued and was started on carvedilol 6.25 mg twice daily     On 1/2/2022, patient's ECG showed atrial flutter and was recommended to increase his carvedilol from 6.25-9.375 and undergo a cardioversion.    On 1/12/2022, he  was seen in clinic in atrial fibrillation, his coreg was increased and he underwent a IBETH guided cardioversion on 1/21/2022 however he was not able to maintain sinus rhythm.     On 1/24/2022, he was started on amiodarone with a load.       On 2/2/2022, his coreg was stopped and he was started on metoprolol  mg BID and his losartan was cut to 50 mg daily     Today he reports HR  bpm and his -140/80-90 mmHg while taking metoprolol  mg daily and amiodarone 200 mg daily, losartan 50 mg daily.   He denies chest pain, shortness of breath but he has edema and he has palpitations particularly when lying.  He has not been exercising regularly and has decreased exercise tolerance when walking up the stairs.    He denies HF symptoms including orthopnea, PND, or edema.  Reports taking medications as prescribed including warfarin and denies bleeding, hematuria, melena, epistaxis and signs/symptoms of stroke.      Physical Exam  Constitutional:       Appearance: Normal appearance.   Neurological:      Mental Status: He is alert.   Psychiatric:         Mood and Affect: Mood normal.         Behavior: Behavior normal.         The remainder of the physical exam was deferred due to public health crisis.   Acknowledge and reviewed ROS completed by Ivy MUHAMMAD CMA    ASSESSMENT AND PLAN  Atrial fibrillation    For anticoagulation for CHADS VASC 3 (age, HTN, HF) he takes warfarin with goal INR 2-3. His weekly INRs for the past 3 weeks after his IBETH.      His last hemoglobin was 16.4 (6/2021).       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  After resolution of tachycardia induced cardiomyopathy he was started on flecainide with metoprolol and later was found to be in atrial fibrillation despite increase flecainide dosage. On 1/3/2020, he underwent a PVI ablation, flecainide was discontinued and he was started on amiodarone which was discontinued on  4/2020.   In 8/2020, he had a recurrence of atrial fibrillation, was restarted on flecainide underwent a successful cardioversion.  He had a recurrence and underwent a cardioversion on 11/2020.    In January 2022 patient had a recurrence of atrial fibrillation and underwent a IBETH guided cardioversion but has not been able to maintain sinus rhythm and was started on amiodarone with a load.  Today he states his HR is  bpm while taking amiodarone 200 mg daily and metoprolol  mg twice daily.      For rhythm control he is currently taking amiodarone 200 mg daily and metoprolol  mg twice daily and will plan for a cardioversion without IBETH as he had a IBETH and has had weekly INRs since.     Proceed to cardioversion as patient has had a IBETH and weekly INRs since.     The evening before this cardioversion decrease metoprolol  mg daily to 50 mg daily and the morning of the cardioversion hold the metoprolol    Patient counseled on DCCV.  Instructed not to not eat or drink for 8 hours before the procedure and that home medications can be taken with a sip of water.   Complications of cardioversion are uncommon and include but are not limited to abnormal heart rhythm, minor skin burns, reaction to sedation medication, and stroke or pulmonary embolism due to blood clots that may be in the heart.  A formal consent form will be signed by the procedural physician.    Follow up with Dr. Hannon afterwards    Cardiomyopathy    At the time of diagnosis with his atrial fibrillation his EF was found to be 33%.       ECHO (8/2019) revealed EF 50-55%      IBETH (1/2022) revealed normal EF    Continue GDMT of ARB (losartan 50 mg daily) and BB (metoprolol  mg BID)     Euvolemic on exam      Hypertension    controlled while taking losartan 50 mg daily and metoprolol  mg BID.  His usual dose of losartan is 100 mg daily with coreg 6.25 mg BID.          Non-occlusive coronary artery disease    Per CT angiogram heart  1/2020 revealed coronary calcification the LAD, circumflex and RCA.    Asymptomatic    Continue BB, ARB, statin.  He is not on an aspirin due to being on Warfarin.  His last LDL was 93 (3/2021)     Plan:    Continue with losartan 50 mg daily    The evening before decrease metoprolol  mg daily to 50 mg daily and the morning of the cardioversion hold the metoprolol    Call after the cardioversion for HR and BP     Follow up with Dr. Hannon

## 2022-02-04 NOTE — PROGRESS NOTES
ANTICOAGULATION MANAGEMENT     Kevan Connelly 71 year old male is on warfarin with therapeutic INR result. (Goal INR 2.0-3.0)    Recent labs: (last 7 days)     02/04/22  1154   INR 2.8*       ASSESSMENT     Source(s): Chart Review and Patient/Caregiver Call       Warfarin doses taken: Warfarin taken as instructed    Diet: No new diet changes identified, patient reports he does not care for greens, will drink an ensure    New illness, injury, or hospitalization: No    Medication/supplement changes: None noted    Signs or symptoms of bleeding or clotting: No    Previous INR: Therapeutic last 2(+) visits    Additional findings: Upcoming surgery/procedure Cardioversion 2/10/22     PLAN     Recommended plan for no diet, medication or health factor changes affecting INR     Dosing Instructions:  Decrease your warfarin dose (8.3% change) with next INR in 4 days       Summary  As of 2/4/2022    Full warfarin instructions:  2.5 mg every Mon, Wed, Fri; 5 mg all other days   Next INR check:  2/8/2022             Telephone call with Kevan who verbalizes understanding and agrees to plan    Lab visit scheduled    Education provided: Please call back if any changes to your diet, medications or how you've been taking warfarin and Contact [phone]  with any changes, questions or concerns.     Plan made with [facility] Pharmacist Katelyn Faith RN  Anticoagulation Clinic  2/4/2022    _______________________________________________________________________     Anticoagulation Episode Summary     Current INR goal:  2.0-3.0   TTR:  80.6 % (1 y)   Target end date:  Indefinite   Send INR reminders to:  [facility]    Indications    Long term current use of anticoagulant therapy [Z79.01]  Paroxysmal atrial fibrillation (H) [I48.0]           Comments:           Anticoagulation Care Providers     Provider Role Specialty Phone number    Davina Reaves MD Referring Internal Medicine [phone]

## 2022-02-06 ENCOUNTER — TELEPHONE (OUTPATIENT)
Dept: CARDIOLOGY | Facility: CLINIC | Age: 72
End: 2022-02-06
Payer: COMMERCIAL

## 2022-02-07 NOTE — TELEPHONE ENCOUNTER
[facility]     Kevan Connelly is a 71 year old who has a hx of afib with tachycardia induced cardiomyopathy.  He underwent a PVI on 1/3/2020 and was on amiodarone until 4/2020.   In 1/2022, he had a recurrence of atrial fibrillation and underwent a successful IBETH guided cardioversion on 1/21/2022 but was not able to maintain sinus rhythm.    His flecainide 75 mg BID was discontinued and he was started on an amiodarone with a load. He is currently taking amiodarone 200 mg daily.  His INRs have been checked weekly since his IBETH.  He remains in afib.       Plan  1. Proceed to cardioversion this week  2. Follow up on 2/14/2022 with Dr. Hannon to discuss long term rhythm control.      ANNMARIE Peterson CNP

## 2022-02-08 ENCOUNTER — ANTICOAGULATION THERAPY VISIT (OUTPATIENT)
Dept: ANTICOAGULATION | Facility: CLINIC | Age: 72
End: 2022-02-08

## 2022-02-08 ENCOUNTER — LAB (OUTPATIENT)
Dept: LAB | Facility: CLINIC | Age: 72
End: 2022-02-08

## 2022-02-08 ENCOUNTER — LAB (OUTPATIENT)
Dept: URGENT CARE | Facility: URGENT CARE | Age: 72
End: 2022-02-08
Payer: COMMERCIAL

## 2022-02-08 DIAGNOSIS — I48.0 PAROXYSMAL ATRIAL FIBRILLATION (H): ICD-10-CM

## 2022-02-08 DIAGNOSIS — Z79.01 LONG TERM CURRENT USE OF ANTICOAGULANTS WITH INR GOAL OF 2.0-3.0: ICD-10-CM

## 2022-02-08 DIAGNOSIS — Z79.01 LONG TERM CURRENT USE OF ANTICOAGULANT THERAPY: Primary | ICD-10-CM

## 2022-02-08 LAB — INR BLD: 2.8 (ref 0.9–1.1)

## 2022-02-08 PROCEDURE — 36415 COLL VENOUS BLD VENIPUNCTURE: CPT

## 2022-02-08 PROCEDURE — U0003 INFECTIOUS AGENT DETECTION BY NUCLEIC ACID (DNA OR RNA); SEVERE ACUTE RESPIRATORY SYNDROME CORONAVIRUS 2 (SARS-COV-2) (CORONAVIRUS DISEASE [COVID-19]), AMPLIFIED PROBE TECHNIQUE, MAKING USE OF HIGH THROUGHPUT TECHNOLOGIES AS DESCRIBED BY CMS-2020-01-R: HCPCS

## 2022-02-08 PROCEDURE — 85610 PROTHROMBIN TIME: CPT

## 2022-02-08 PROCEDURE — U0005 INFEC AGEN DETEC AMPLI PROBE: HCPCS

## 2022-02-08 NOTE — PROGRESS NOTES
ANTICOAGULATION MANAGEMENT     Kevan Connelly 71 year old male is on warfarin with therapeutic INR result. (Goal INR 2.0-3.0)    Recent labs: (last 7 days)     02/08/22  1143   INR 2.8*       ASSESSMENT     Source(s): Chart Review and Patient/Caregiver Call       Warfarin doses taken: Warfarin taken as instructed    Diet: No new diet changes identified    New illness, injury, or hospitalization: No    Medication/supplement changes: None noted    Signs or symptoms of bleeding or clotting: No    Previous INR: Therapeutic last 2(+) visits    Additional findings: Upcoming surgery/procedure Cardioversion 2/10/22     PLAN     Recommended plan for no diet, medication or health factor changes affecting INR     Dosing Instructions: Continue your current warfarin dose with next INR in 3 days       Summary  As of 2/8/2022    Full warfarin instructions:  2.5 mg every Mon, Wed, Fri; 5 mg all other days   Next INR check:  2/11/2022             Telephone call with Kevan who verbalizes understanding and agrees to plan    Lab visit scheduled    Education provided: Please call back if any changes to your diet, medications or how you've been taking warfarin and Contact [phone]  with any changes, questions or concerns.     Plan made per [facility] anticoagulation protocol    Gretta Faith RN  Anticoagulation Clinic  2/8/2022    _______________________________________________________________________     Anticoagulation Episode Summary     Current INR goal:  2.0-3.0   TTR:  80.6 % (1 y)   Target end date:  Indefinite   Send INR reminders to:  [facility]    Indications    Long term current use of anticoagulant therapy [Z79.01]  Paroxysmal atrial fibrillation (H) [I48.0]           Comments:           Anticoagulation Care Providers     Provider Role Specialty Phone number    Davina Reaves MD Referring Internal Medicine [phone]

## 2022-02-09 ENCOUNTER — TELEPHONE (OUTPATIENT)
Dept: CARDIOLOGY | Facility: CLINIC | Age: 72
End: 2022-02-09
Payer: COMMERCIAL

## 2022-02-09 DIAGNOSIS — I48.0 PAROXYSMAL ATRIAL FIBRILLATION (H): Primary | ICD-10-CM

## 2022-02-09 LAB — SARS-COV-2 RNA RESP QL NAA+PROBE: NEGATIVE

## 2022-02-09 NOTE — TELEPHONE ENCOUNTER
Spoke to patient to remind him to reduce dose of metoprolol this evening from 100mg to 50,mg and hold dose tomorrow morning as discussed at OV with Hilda on  2/4.  COVID test NEGATIVE.  Patient provided verbal understanding.  Will monitor outcome of procedure.  DIO Schrader

## 2022-02-10 NOTE — TELEPHONE ENCOUNTER
Patient called earlier this am (9:45am) reporting his cardioversion was cancelled today as he did not arrive on time.  Patient was to arrive at 9:30am.  He reports he was going by his my chart message that indicated he was to arrive at 11:30am.  Worked with scheduling and Care Suite Manager Maribel to see if we could use spot in [place] tomorrow as it was open.  They were able to accommodate patient and moved cardioversion to tomorrow.  Reviewed with Dr Hannon hold orders for beta blocker.  Ok to hold beta blocker as previously discussed.   Patient to take metoprolol succinate 50mg po evening before scheduled cardioverions and hold metoprolol morning of procedure.    Spoke to patient to let him know cardioversion was rescheduled for 2/11.  He is to arrive at 11:30am for 1:30pm procedure.  Discussed hold orders for beta blocker.  He was also instructed to follow the following instructions:  EP CARDIOVERSION EXTERNAL  1) NPO for 8 hours prior to the procedure except for sips of water with medications.  2) Hold insulin or oral diabetic medications morning of procedure. May take   dose long acting insulin. Follow further instructions of PMD.  3) Continue daily Coumadin.  ~ If taking Digoxin, hold AM of procedure.  ~ Plan to have a responsible adult take you home after the exam. You may not drive, take a bus or taxi by yourself.  ~ A responsible adult must stay with you for 24 hours after the test.  COVID test NEGATIVE.    Patient provided verbal understanding regarding above.  He is also aware of follow up appt with Dr Angel on 2/14 to discuss long term plan for his AF.  DIO Schrader

## 2022-02-11 ENCOUNTER — HOSPITAL ENCOUNTER (OUTPATIENT)
Facility: CLINIC | Age: 72
Discharge: HOME | End: 2022-02-11
Admitting: INTERNAL MEDICINE
Payer: COMMERCIAL

## 2022-02-11 ENCOUNTER — DOCUMENTATION ONLY (OUTPATIENT)
Dept: ANTICOAGULATION | Facility: CLINIC | Age: 72
End: 2022-02-11

## 2022-02-11 ENCOUNTER — ANESTHESIA EVENT (OUTPATIENT)
Dept: SURGERY | Facility: CLINIC | Age: 72
End: 2022-02-11
Payer: COMMERCIAL

## 2022-02-11 ENCOUNTER — ANESTHESIA (OUTPATIENT)
Dept: SURGERY | Facility: CLINIC | Age: 72
End: 2022-02-11
Payer: COMMERCIAL

## 2022-02-11 VITALS
WEIGHT: 282.5 LBS | SYSTOLIC BLOOD PRESSURE: 129 MMHG | BODY MASS INDEX: 34.4 KG/M2 | RESPIRATION RATE: 10 BRPM | OXYGEN SATURATION: 98 % | TEMPERATURE: 97 F | DIASTOLIC BLOOD PRESSURE: 79 MMHG | HEART RATE: 67 BPM | HEIGHT: 76 IN

## 2022-02-11 DIAGNOSIS — I48.0 PAROXYSMAL ATRIAL FIBRILLATION (H): ICD-10-CM

## 2022-02-11 DIAGNOSIS — Z79.01 LONG TERM CURRENT USE OF ANTICOAGULANT THERAPY: Primary | ICD-10-CM

## 2022-02-11 LAB
INR BLD: 2.3 (ref 0.9–1.1)
POTASSIUM BLD-SCNC: 4.5 MMOL/L (ref 3.4–5.3)

## 2022-02-11 PROCEDURE — 36415 COLL VENOUS BLD VENIPUNCTURE: CPT

## 2022-02-11 PROCEDURE — 84132 ASSAY OF SERUM POTASSIUM: CPT

## 2022-02-11 PROCEDURE — 999N000010 HC STATISTIC ANES STAT CODE-CRNA PER MINUTE

## 2022-02-11 PROCEDURE — 93005 ELECTROCARDIOGRAM TRACING: CPT

## 2022-02-11 PROCEDURE — 999N000054 HC STATISTIC EKG NON-CHARGEABLE

## 2022-02-11 PROCEDURE — 370N000017 HC ANESTHESIA TECHNICAL FEE, PER MIN

## 2022-02-11 PROCEDURE — 36416 COLLJ CAPILLARY BLOOD SPEC: CPT

## 2022-02-11 PROCEDURE — 92960 CARDIOVERSION ELECTRIC EXT: CPT | Performed by: INTERNAL MEDICINE

## 2022-02-11 PROCEDURE — 999N000184 HC STATISTIC TELEMETRY

## 2022-02-11 PROCEDURE — 85610 PROTHROMBIN TIME: CPT

## 2022-02-11 PROCEDURE — 92960 CARDIOVERSION ELECTRIC EXT: CPT

## 2022-02-11 PROCEDURE — 250N000011 HC RX IP 250 OP 636: Performed by: NURSE ANESTHETIST, CERTIFIED REGISTERED

## 2022-02-11 RX ORDER — MAGNESIUM SULFATE HEPTAHYDRATE 40 MG/ML
2 INJECTION, SOLUTION INTRAVENOUS
Status: DISCONTINUED | OUTPATIENT
Start: 2022-02-11 | End: 2022-02-11 | Stop reason: HOSPADM

## 2022-02-11 RX ORDER — POTASSIUM CHLORIDE 1500 MG/1
40 TABLET, EXTENDED RELEASE ORAL
Status: DISCONTINUED | OUTPATIENT
Start: 2022-02-11 | End: 2022-02-11 | Stop reason: HOSPADM

## 2022-02-11 RX ORDER — FLUMAZENIL 0.1 MG/ML
0.2 INJECTION, SOLUTION INTRAVENOUS
Status: DISCONTINUED | OUTPATIENT
Start: 2022-02-11 | End: 2022-02-11 | Stop reason: HOSPADM

## 2022-02-11 RX ORDER — NALOXONE HYDROCHLORIDE 0.4 MG/ML
0.4 INJECTION, SOLUTION INTRAMUSCULAR; INTRAVENOUS; SUBCUTANEOUS
Status: DISCONTINUED | OUTPATIENT
Start: 2022-02-11 | End: 2022-02-11 | Stop reason: HOSPADM

## 2022-02-11 RX ORDER — POTASSIUM CHLORIDE 1500 MG/1
20 TABLET, EXTENDED RELEASE ORAL
Status: DISCONTINUED | OUTPATIENT
Start: 2022-02-11 | End: 2022-02-11 | Stop reason: HOSPADM

## 2022-02-11 RX ORDER — ATROPINE SULFATE 0.1 MG/ML
.5-1 INJECTION INTRAVENOUS
Status: DISCONTINUED | OUTPATIENT
Start: 2022-02-11 | End: 2022-02-11 | Stop reason: HOSPADM

## 2022-02-11 RX ORDER — NALOXONE HYDROCHLORIDE 0.4 MG/ML
0.2 INJECTION, SOLUTION INTRAMUSCULAR; INTRAVENOUS; SUBCUTANEOUS
Status: DISCONTINUED | OUTPATIENT
Start: 2022-02-11 | End: 2022-02-11 | Stop reason: HOSPADM

## 2022-02-11 RX ORDER — PROPOFOL 10 MG/ML
INJECTION, EMULSION INTRAVENOUS PRN
Status: DISCONTINUED | OUTPATIENT
Start: 2022-02-11 | End: 2022-02-11

## 2022-02-11 RX ORDER — SODIUM CHLORIDE 9 MG/ML
INJECTION, SOLUTION INTRAVENOUS CONTINUOUS
Status: DISCONTINUED | OUTPATIENT
Start: 2022-02-11 | End: 2022-02-11 | Stop reason: HOSPADM

## 2022-02-11 RX ADMIN — PROPOFOL 50 MG: 10 INJECTION, EMULSION INTRAVENOUS at 13:26

## 2022-02-11 RX ADMIN — PROPOFOL 30 MG: 10 INJECTION, EMULSION INTRAVENOUS at 13:28

## 2022-02-11 ASSESSMENT — ENCOUNTER SYMPTOMS
DYSRHYTHMIAS: 1
SEIZURES: 0

## 2022-02-11 ASSESSMENT — LIFESTYLE VARIABLES: TOBACCO_USE: 0

## 2022-02-11 ASSESSMENT — MIFFLIN-ST. JEOR: SCORE: 2147.66

## 2022-02-11 NOTE — PROGRESS NOTES
Care Suites Procedure Nursing Note    Patient Information  Name: Kevan Connelly  Age: 71 year old    Procedure  Procedure: cardioversion  Procedure start time: cardioverted at 1328  Procedure complete time:   Concerns/abnormal assessment: none  If abnormal assessment, provider notified: N/A  Plan/Other: Cardioverted to SR with 1st degree AVB.  Alert now, reports no pain or discomfort.  Cont to remain in SR.  Wife at bedside.    Joan Ling RN

## 2022-02-11 NOTE — PROGRESS NOTES
Care Suites Discharge Nursing Note    Patient Information  Name: Kevan Connelly  Age: 71 year old    Discharge Education:  Discharge instructions reviewed: Yes  Additional education/resources provided: none  Patient/patient representative verbalizes understanding: Yes  Patient discharging on new medications: No  Medication education completed: N/A    Discharge Plans:   Discharge location: home  Discharge ride contacted: N/A  Approximate discharge time: 1420    Discharge Criteria:  Discharge criteria met and vital signs stable: Yes    Patient Belongs:  Patient belongings returned to patient: N/A    Joan Ling RN

## 2022-02-11 NOTE — PROGRESS NOTES
Care Suites Admission Nursing Note    Patient Information  Name: Kevan Connelly  Age: 71 year old  Reason for admission: cardioversion  Care Suites arrival time: 1140    Visitor Information  Name: Darya wife  Informed of visitor restrictions: Yes  1 visitor allowed per patient   Visitor must screen negative for COVID symptoms   Visitor must wear a mask  Waiting rooms closed to visitors    Patient Admission/Assessment   Pre-procedure assessment complete: Yes  If abnormal assessment/labs, provider notified: N/A  NPO: Yes  Medications held per instructions/orders: N/A  Consent: deferred  If applicable, pregnancy test status: deferred  Patient oriented to room: Yes  Education/questions answered: Yes  Plan/other: cardioversion    Discharge Planning  Discharge name/phone number: [facility] [phone]  Overnight post sedation caregiver: above  Discharge location: home    Wiley Castle RN

## 2022-02-11 NOTE — DISCHARGE INSTRUCTIONS
Cardioversion Discharge Instructions    After you go home:       For 24 hours - due to the sedation you received:      Have an adult stay with you for 24 hours.     Relax and take it easy.    Do NOT make any important or legal decisions.    Do NOT drive or operate machines at home or at work.    Do NOT drink alcohol.    Diet:      Start with clear liquids and progress to your normal diet as you feel able.    Medicines:      Take your medications, including blood thinners, unless your provider tells you not to.    If you have stopped any medications, check with your provider about when to restart them.    Follow Up Appointments:      Follow up with your cardiologist at [facility] of patient preference as instructed.    Follow up with your primary care provider as needed.    Post cardioversion:    The skin on your chest or back may feel tender for 48 hours.  If your skin is tender, you may:      Use a cold pack on the site. Never use ice directly on your skin. Use the cold pack for 20 minutes. Remove it for at least 30 minutes before re-using.    Apply 1% hydrocortisone cream to the skin (sold at drug stores)    Take Advil (Ibuprofen) or Tylenol (Acetaminophen) per your provider's recommendations.      Call your provider if you have:      Weakness, dizziness, lightheadedness, or fainting.    Shortness of breath.    Irregular heartbeat, feelings of your heart fluttering or beating fast, hard or palpitations.     More than minor skin discomfort or redness where the cardioversion pads were placed.    Questions or concerns.      Call 911 if you have:      Pain in your chest, arm, shoulder, neck, or upper back.    You have problems speaking or seeing.    Weakness in your arm or leg.    You are unable to move your arm or leg.    You have uncontrolled bleeding.         [facility]:    [phone] [facility] (7 days a week)

## 2022-02-11 NOTE — ANESTHESIA PREPROCEDURE EVALUATION
Anesthesia Pre-Procedure Evaluation    Patient: Kevan Connelly   MRN: [id number] : 1950        Preoperative Diagnosis: Atrial fibrillation, unspecified type (H) [I48.91]    Procedure : Procedure(s):  ANESTHESIA, FOR CARDIOVERSION (DR. MAO)          Past Medical History:   Diagnosis Date     Atrial flutter (H)      Benign essential hypertension 2017    past use of ACE- Cough;  Had been on ARB-diuretic but experienced lower bp readings;  BLOOD PRESSURE now well controlled on ARB also no longer on diuretic; no low bp readings; may have had slight edema initially but has normalized with bp well controlled on ARB alone.      Cancer (H)     testicular cancer     CHF (congestive heart failure) (H) 2019     Long term current use of anticoagulant therapy 6/3/2019     Obesity (BMI 35.0-39.9) with comorbidity (H) 2019     Paroxysmal atrial fibrillation (H) 2019      Past Surgical History:   Procedure Laterality Date     ANESTHESIA CARDIOVERSION N/A 2019    Procedure: ANESTHESIA, FOR CARDIOVERSION DR. LE;  Surgeon: GENERIC ANESTHESIA PROVIDER;  Location: [facility] OR     ANESTHESIA CARDIOVERSION N/A 2019    Procedure: ANESTHESIA, FOR CARDIOVERSION;  Surgeon: GENERIC ANESTHESIA PROVIDER;  Location: [facility] OR     ANESTHESIA CARDIOVERSION N/A 2020    Procedure: ANESTHESIA, FOR CARDIOVERSION;  Surgeon: GENERIC ANESTHESIA PROVIDER;  Location: [facility] OR     ANESTHESIA CARDIOVERSION N/A 2020    Procedure: ANESTHESIA, FOR CARDIOVERSION;  Surgeon: GENERIC ANESTHESIA PROVIDER;  Location: [facility] OR     ANESTHESIA CARDIOVERSION N/A 2020    Procedure: ANESTHESIA, FOR CARDIOVERSION;  Surgeon: GENERIC ANESTHESIA PROVIDER;  Location: [facility] OR     ANESTHESIA CARDIOVERSION N/A 2022    Procedure: ANESTHESIA, FOR CARDIOVERSION;  Surgeon: GENERIC ANESTHESIA PROVIDER;  Location: [facility] OR     CLOSED REDUCTION SHOULDER Left      EP ABLATION FOCAL AFIB N/A 1/3/2020    Procedure: EP Ablation Focal AFIB;   Surgeon: Lamont Hannon MD;  Location:  [facility]ATH [facility]     GENITOURINARY SURGERY  1990    testicular cancer     ORTHOPEDIC SURGERY  1970's    right knee surgery       Allergies   Allergen Reactions     Lisinopril Cough     Very persistent cough      Social History     Tobacco Use     Smoking status: Never Smoker     Smokeless tobacco: Never Used   Substance Use Topics     Alcohol use: Yes     Comment: every 1-2 months or less      Wt Readings from Last 1 Encounters:   02/11/22 128.1 kg (282 lb 8 oz)        Anesthesia Evaluation   Pt has had prior anesthetic.     No history of anesthetic complications       ROS/MED HX  ENT/Pulmonary:    (-) tobacco use and sleep apnea   Neurologic:    (-) no seizures and no CVA   Cardiovascular:     (+) Dyslipidemia hypertension-----CHF dysrhythmias, a-fib and a-flutter,     METS/Exercise Tolerance:     Hematologic:       Musculoskeletal:       GI/Hepatic:    (-) GERD and liver disease   Renal/Genitourinary:     (+) renal disease, type: CRI,     Endo:     (+) Obesity,     Psychiatric/Substance Use:       Infectious Disease:       Malignancy:       Other:            Physical Exam    Airway        Mallampati: II   TM distance: > 3 FB   Neck ROM: full   Mouth opening: > 3 cm    Respiratory Devices and Support         Dental  no notable dental history         Cardiovascular          Rhythm and rate: irregular     Pulmonary   pulmonary exam normal                OUTSIDE LABS:  CBC:   Lab Results   Component Value Date    WBC 5.7 06/21/2021    WBC 4.8 03/31/2021    HGB 16.4 06/21/2021    HGB 15.7 03/31/2021    HCT 47.2 06/21/2021    HCT 44.8 03/31/2021     06/21/2021     03/31/2021     BMP:   Lab Results   Component Value Date     07/19/2021     06/21/2021    POTASSIUM 4.5 02/11/2022    POTASSIUM 4.2 01/21/2022    CHLORIDE 112 (H) 07/19/2021    CHLORIDE 108 06/21/2021    CO2 22 07/19/2021    CO2 27 06/21/2021    BUN 24 07/19/2021    BUN 23  06/21/2021    CR 1.20 07/19/2021    CR 1.31 (H) 06/21/2021    GLC 88 07/19/2021    GLC 94 06/21/2021     COAGS:   Lab Results   Component Value Date    INR 2.3 (H) 02/11/2022     POC: No results found for: BGM, HCG, HCGS  HEPATIC:   Lab Results   Component Value Date    ALBUMIN 3.6 03/31/2021    PROTTOTAL 6.9 03/31/2021    ALT 28 03/31/2021    AST 14 03/31/2021    ALKPHOS 63 03/31/2021    BILITOTAL 1.2 03/31/2021     OTHER:   Lab Results   Component Value Date    CHU 8.7 07/19/2021    MAG 2.2 01/21/2022    TSH 2.86 01/13/2020       Anesthesia Plan    ASA Status:  3   NPO Status:  NPO Appropriate    Anesthesia Type: MAC.     - Reason for MAC: straight local not clinically adequate              Consents    Anesthesia Plan(s) and associated risks, benefits, and realistic alternatives discussed. Questions answered and patient/representative(s) expressed understanding.    - Discussed:     - Discussed with:  Patient         Postoperative Care            Comments:                Nik Melchor MD

## 2022-02-11 NOTE — ANESTHESIA POSTPROCEDURE EVALUATION
Patient: Kevan Connelly    Procedure: Procedure(s):  ANESTHESIA, FOR CARDIOVERSION (DR. MAO)       Diagnosis:Atrial fibrillation, unspecified type (H) [I48.91]  Diagnosis Additional Information: No value filed.    Anesthesia Type:  MAC    Note:     Postop Pain Control: Uneventful            Sign Out: Well controlled pain   PONV: No   Neuro/Psych: Uneventful            Sign Out: Acceptable/Baseline neuro status   Airway/Respiratory: Uneventful            Sign Out: Acceptable/Baseline resp. status   CV/Hemodynamics: Uneventful            Sign Out: Acceptable CV status; No obvious hypovolemia; No obvious fluid overload   Other NRE: NONE   DID A NON-ROUTINE EVENT OCCUR? No           Last vitals:  Vitals Value Taken Time   BP     Temp     Pulse     Resp     SpO2         Electronically Signed By: Nik Melchor MD  February 11, 2022  2:51 PM

## 2022-02-11 NOTE — PROGRESS NOTES
ANTICOAGULATION  MANAGEMENT: Discharge Review    Kevan Connelly chart reviewed for anticoagulation continuity of care    Outpatient surgery/procedure on 2/11/22 for cardioversion (rescheduled from yesterday).    Discharge disposition: Home    Results:    Recent labs: (last 7 days)     02/08/22  1143 02/11/22  1243   INR 2.8* 2.3*     Anticoagulation inpatient management:     not applicable     Anticoagulation discharge instructions:     Warfarin dosing: home regimen continued   Bridging: No   INR goal change: No      Medication changes affecting anticoagulation: No    Additional factors affecting anticoagulation: No    Plan     No adjustment to anticoagulation plan needed    Patient not contacted    No adjustment to Anticoagulation Calendar was required    Yarelis Martinez RN

## 2022-02-11 NOTE — PROCEDURES
[facility]    Procedure: *Cardioversion    Date/Time: 2/11/2022 1:33 PM  Performed by: Vish Perez MD  Authorized by: Vish Perez MD       UNIVERSAL PROTOCOL   Site Marked: NA  Prior Images Obtained and Reviewed:  Yes  Required items: Required blood products, implants, devices and special equipment available    Patient identity confirmed:  Verbally with patient  Patient was reevaluated immediately before administering moderate or deep sedation or anesthesia  Confirmation Checklist:  Patient's identity using two indicators  Time out: Immediately prior to the procedure a time out was called    Universal Protocol: the Joint Commission Universal Protocol was followed       ANESTHESIA  Anesthesia was administered and monitored by anesthesiology.  See anesthesia documentation for details.    SEDATION  Patient Sedated: Yes    Vital signs: Vital signs monitored during sedation      PROCEDURE DETAILS  Cardioversion basis: elective  Pre-procedure rhythm: atrial flutter  Electrodes: pads  Electrodes placed: anterior-posterior  Number of attempts: 1      PROCEDURE  Describe Procedure: Cardioversion Note    Indication: afib/flutter    Informed consent was signed by the patient.  A timeout was taken.    Anesthesia was present for cardioversion, see separate documentation for their sedation.    Baseline rhythm: aflutter, rate 110  Synchronized cardioversion performed at 150J, x1  Post-DCCV rhythm: sinus    No complications.    Vish Perez MD  Cardiology - [facility]  Pager: [phone]  Text Page  February 11, 2022      Patient Tolerance:  Patient tolerated the procedure well with no immediate complications

## 2022-02-11 NOTE — ANESTHESIA CARE TRANSFER NOTE
Patient: Kevan Connelly    Procedure: Procedure(s):  ANESTHESIA, FOR CARDIOVERSION (DR. MAO)       Diagnosis: Atrial fibrillation, unspecified type (H) [I48.91]  Diagnosis Additional Information: No value filed.    Anesthesia Type:   MAC     Note:    Oropharynx: oropharynx clear of all foreign objects and spontaneously breathing  Level of Consciousness: awake  Oxygen Supplementation: room air    Independent Airway: airway patency satisfactory and stable  Dentition: dentition unchanged S/P dental procedure  Vital Signs Stable: post-procedure vital signs reviewed and stable  Report to RN Given: handoff report given  Patient transferred to: PACU  Comments: Diagnosis: Atrial Fibriallation  Procedure: Cardioversion.  Cardiologist: Dr. Mao  Location: [facility] Suit[facility]  Handoff Report: Identifed the Patient, Identified the Reponsible Provider, Reviewed the pertinent medical history, Discussed the surgical course, Reviewed Intra-OP anesthesia mangement and issues during anesthesia, Set expectations for post-procedure period and Allowed opportunity for questions and acknowledgement of understanding      Vitals:  Vitals Value Taken Time   /84 02/11/22 1335   Temp     Pulse 65 02/11/22 1341   Resp 10 02/11/22 1341   SpO2 98 % 02/11/22 1341   Vitals shown include unvalidated device data.    Electronically Signed By: ANNMARIE Francois CRNA  February 11, 2022  1:42 PM

## 2022-02-11 NOTE — PRE-PROCEDURE
GENERAL PRE-PROCEDURE:   Procedure:  Cardioversion  Date/Time:  2/11/2022 1:22 PM    Verbal consent obtained?: Yes    Written consent obtained?: Yes    Risks and benefits: Risks, benefits and alternatives were discussed    Consent given by:  Patient  Patient states understanding of procedure being performed: Yes    Patient's understanding of procedure matches consent: Yes    Procedure consent matches procedure scheduled: Yes    Expected level of sedation:  Moderate  Appropriately NPO:  Yes  ASA Class:  1  Mallampati  :  Grade 1- soft palate, uvula, tonsillar pillars, and posterior pharyngeal wall visible  Lungs:  Lungs clear with good breath sounds bilaterally  Heart:  A-flutter  History & Physical reviewed:  History and physical reviewed and no updates needed  Statement of review:  I have reviewed the lab findings, diagnostic data, medications, and the plan for sedation

## 2022-02-14 ENCOUNTER — OFFICE VISIT (OUTPATIENT)
Dept: CARDIOLOGY | Facility: CLINIC | Age: 72
End: 2022-02-14
Attending: INTERNAL MEDICINE
Payer: COMMERCIAL

## 2022-02-14 VITALS
BODY MASS INDEX: 34.7 KG/M2 | DIASTOLIC BLOOD PRESSURE: 90 MMHG | SYSTOLIC BLOOD PRESSURE: 150 MMHG | HEART RATE: 63 BPM | WEIGHT: 285 LBS | HEIGHT: 76 IN

## 2022-02-14 DIAGNOSIS — I48.0 PAROXYSMAL ATRIAL FIBRILLATION (H): ICD-10-CM

## 2022-02-14 PROCEDURE — 93000 ELECTROCARDIOGRAM COMPLETE: CPT | Performed by: INTERNAL MEDICINE

## 2022-02-14 PROCEDURE — 99214 OFFICE O/P EST MOD 30 MIN: CPT | Performed by: INTERNAL MEDICINE

## 2022-02-14 RX ORDER — CARVEDILOL 6.25 MG/1
6.25 TABLET ORAL 2 TIMES DAILY WITH MEALS
Qty: 60 TABLET | Refills: 3 | Status: SHIPPED | OUTPATIENT
Start: 2022-02-14 | End: 2022-04-13

## 2022-02-14 ASSESSMENT — MIFFLIN-ST. JEOR: SCORE: 2159

## 2022-02-14 NOTE — LETTER
2/14/2022       RE: Kevan Connelly  [address]  [facility] [address]     Dear Colleague,    Thank you for referring your patient, Kevan Connelly, to the [facility] at [facility]. Please see a copy of my visit note below.    Electrophysiology/ Clinic Note         H&P and Plan:     REASON FOR VISIT: Electrophysiology evaluation.      HISTORY OF PRESENT ILLNESS: Mr. Connelly is a pleasant  71-year-old gentleman with a history of hypertension, hyperlipidemia and recurrent persistent atrial fibrillation (previous tachycardia mediated cardiomyopathy-resolved; A. fib ablation on 1/3/2020), who is here for team evaluation.     Patient underwent A. fib ablation on 1/3/2020.  At that time, he was found to have significant amount of scar in the left atrium.  He underwent pulmonary vein isolation and posterior left atrial wall isolation.  At the end of procedure, he was found to have atypical flutter.  However, he had several forms of atypical flutter no further ablation was performed.    After ablation procedure, he was started recommendation of flecainide and Coreg.  He did well for last 2 years.  However, this year he started having recurrent episodes of atypical flutter.  He underwent IBETH/cardioversion on 1/21 which was successful, but unfortunately he had recurrence of atrial flutter.  Flecainide was then discontinued and he was started on amiodarone.  He underwent a second successive cardioversion in 2/11/2022.    Today, he informs he is doing well.  However, he complains of some fatigue which he believes his heart rates have been slow.  He denies any other sense of chest pain, shortness of breath, lightheadedness, near-syncope or syncope.    EKG today showed normal sinus rhythm with first-degree AV block (NV measuring 270 ms).  I reviewed the EKGs in flutter.  He appears to have at least 2 different types of  "flutter.     PREVIOUS STUDIES (personally reviewed):  -Exercise stress test (08/28/2019): Target heart rate was not achieved.  However test was not negative for ischemia or pro-arrhythmias.  -Echo (08/14/2019): Normal LV function.  EF estimated 50-55%.  -IBETH (1/02/2020): Normal LV function.  EF estimated 55-60%.  There was no significant valve disease.  -Chest CT (01/02/2020): Normal pulmonary vein anatomy.  Aortic root was mildly dilated (4.2 x 3.88 cm).  Coronary calcification was noticed in the LAD.  Scattered mediastinal and right heel are calcifications were noticed suggestive of old granulomas.     ASSESSMENT AND PLAN:   1.  Persistent atrial fibrillation/atypical flutter.  Patient is now presented with recurrent episodes of atypical atrial flutter.    I had an extensive discussion with him regarding management of atrial arrhythmias.  He had an extensive scar burden in the left atrial noticed during previous ablation.  He is now presenting with several forms of atypical flutter.  Therefore, the success of ablation will be small.  He is doing well on amiodarone.  However, amiodarone is not the best medication for him the long-term.    After reviewing case, I believe the success rate of redo A. fib ablation will be very small.  Options are either AV node ablation/pacemaker implantation or continue antiarrhythmic therapy.  Since amiodarone has more side effects long-term, I believe best next step would be admission for dofetilide load.  I would recommend stopping amiodarone 5 days prior to admission.    2.  Embolic prevention.  Chads vas score of 2.  Continue anticoagulation with Coumadin indefinitely.     3.  Hypertension.  Blood pressure was elevated today.    He responded better to carvedilol in the past.  I recommend stopping metoprolol and starting carvedilol 6.25 mg twice daily.         Lamont Hannon MD    Physical Exam:  Vitals: BP (!) 150/90   Pulse 63   Ht 1.93 m (6' 3.98\")   Wt 129.3 kg (285 lb)   " BMI 34.71 kg/m      Constitutional:  AAO x3.  Pt is in NAD.  HEAD: normocephalic.  SKIN: Skin normal color, texture and turgor with no lesions or eruptions.  Eyes: PERRL, EOMI.  ENT:  Supple, normal JVP. No lymphadenopathy or thyroid enlargement.  Chest:  CTAB.  Cardiac:   RRR, normal  S1 and S2.  No murmurs rubs or gallop.    Abdomen:  Normal BS.  Soft, non-tender and non-distended.  No rebound or guarding.    Extremities:  Pedious pulses palpable B/L.  No LE edema noticed.   Neurological: Strength and sensation grossly symmetric and intact throughout.       CURRENT MEDICATIONS:  Current Outpatient Medications   Medication Sig Dispense Refill     acetaminophen (TYLENOL) 500 MG tablet Take 500 mg by mouth every 6 hours as needed for mild pain       amiodarone (PACERONE) 200 MG tablet Take 2 tablets (400 mg) by mouth 2 times daily for 4 days, THEN 2 tablets (400 mg) daily for 4 days, THEN 1 tablet (200 mg) daily. (Patient taking differently: 1 tablet (200 mg) daily.) 54 tablet 0     atorvastatin (LIPITOR) 20 MG tablet Take 1 tablet (20 mg) by mouth daily 90 tablet 2     losartan (COZAAR) 100 MG tablet Take 0.5 tablets (50 mg) by mouth daily 45 tablet 3     melatonin 3 MG CAPS Take 3 mg by mouth At Bedtime       metoprolol succinate ER (TOPROL-XL) 100 MG 24 hr tablet Take 1 tablet (100 mg) by mouth 2 times daily 180 tablet 3     sildenafil (REVATIO) 20 MG tablet Take 1 tablet (20 mg) by mouth daily as needed (Patient not taking: Reported on 2/4/2022) 30 tablet 3     warfarin ANTICOAGULANT (COUMADIN) 5 MG tablet Current dose (11/18/21): 5 mg daily (1 tablet) or as directed by ACC team. 95 tablet 1       ALLERGIES     Allergies   Allergen Reactions     Lisinopril Cough     Very persistent cough       PAST MEDICAL HISTORY:  Past Medical History:   Diagnosis Date     Atrial flutter (H)      Benign essential hypertension 1/18/2017    past use of ACE- Cough;  Had been on ARB-diuretic but experienced lower bp readings;   BLOOD PRESSURE now well controlled on ARB also no longer on diuretic; no low bp readings; may have had slight edema initially but has normalized with bp well controlled on ARB alone.      Cancer (H)     testicular cancer     CHF (congestive heart failure) (H) 6/5/2019     Long term current use of anticoagulant therapy 6/3/2019     Obesity (BMI 35.0-39.9) with comorbidity (H) 2/11/2019     Paroxysmal atrial fibrillation (H) 06/03/2019       PAST SURGICAL HISTORY:  Past Surgical History:   Procedure Laterality Date     ANESTHESIA CARDIOVERSION N/A 6/26/2019    Procedure: ANESTHESIA, FOR CARDIOVERSION DR. LE;  Surgeon: GENERIC ANESTHESIA PROVIDER;  Location:  OR     ANESTHESIA CARDIOVERSION N/A 7/17/2019    Procedure: ANESTHESIA, FOR CARDIOVERSION;  Surgeon: GENERIC ANESTHESIA PROVIDER;  Location: [facility] OR     ANESTHESIA CARDIOVERSION N/A 1/28/2020    Procedure: ANESTHESIA, FOR CARDIOVERSION;  Surgeon: GENERIC ANESTHESIA PROVIDER;  Location:  OR     ANESTHESIA CARDIOVERSION N/A 9/8/2020    Procedure: ANESTHESIA, FOR CARDIOVERSION;  Surgeon: GENERIC ANESTHESIA PROVIDER;  Location:  OR     ANESTHESIA CARDIOVERSION N/A 11/23/2020    Procedure: ANESTHESIA, FOR CARDIOVERSION;  Surgeon: GENERIC ANESTHESIA PROVIDER;  Location: [facility] OR     ANESTHESIA CARDIOVERSION N/A 1/21/2022    Procedure: ANESTHESIA, FOR CARDIOVERSION;  Surgeon: GENERIC ANESTHESIA PROVIDER;  Location: [facility] OR     ANESTHESIA CARDIOVERSION N/A 2/11/2022    Procedure: ANESTHESIA, FOR CARDIOVERSION (DR. MAO);  Surgeon: GENERIC ANESTHESIA PROVIDER;  Location:  OR     CLOSED REDUCTION SHOULDER Left      EP ABLATION FOCAL AFIB N/A 1/3/2020    Procedure: EP Ablation Focal AFIB;  Surgeon: Lamont Hannon MD;  Location:  HEART CARDI[facility] CATH LAB     GENITOURINARY SURGERY  1990    testicular cancer     ORTHOPEDIC SURGERY  1970's    right knee surgery        FAMILY HISTORY:  Family History   Problem Relation Age of Onset     Colon Cancer Father       Coronary Artery Disease Maternal Grandmother        SOCIAL HISTORY:  Social History     Socioeconomic History     Marital status:      Spouse name: Not on file     Number of children: Not on file     Years of education: Not on file     Highest education level: Not on file   Occupational History     Not on file   Tobacco Use     Smoking status: Never Smoker     Smokeless tobacco: Never Used   Substance and Sexual Activity     Alcohol use: Yes     Comment: every 1-2 months or less     Drug use: No     Sexual activity: Yes     Partners: Female   Other Topics Concern     Parent/sibling w/ CABG, MI or angioplasty before 65F 55M? Yes   Social History Narrative     Not on file     Social Determinants of Health     Financial Resource Strain: Not on file   Food Insecurity: Not on file   Transportation Needs: Not on file   Physical Activity: Not on file   Stress: Not on file   Social Connections: Not on file   Intimate Partner Violence: Not on file   Housing Stability: Not on file       Review of Systems:  Skin:        Eyes:       ENT:       Respiratory:       Cardiovascular:       Gastroenterology:      Genitourinary:       Musculoskeletal:       Neurologic:       Psychiatric:       Heme/Lymph/Imm:       Endocrine:           Recent Lab Results:  LIPID RESULTS:  Lab Results   Component Value Date    CHOL 157 03/31/2021    HDL 47 03/31/2021    LDL 93 03/31/2021    TRIG 84 03/31/2021       LIVER ENZYME RESULTS:  Lab Results   Component Value Date    AST 14 03/31/2021    ALT 28 03/31/2021       CBC RESULTS:  Lab Results   Component Value Date    WBC 5.7 06/21/2021    RBC 5.09 06/21/2021    HGB 16.4 06/21/2021    HCT 47.2 06/21/2021    MCV 93 06/21/2021    MCH 32.2 06/21/2021    MCHC 34.7 06/21/2021    RDW 12.2 06/21/2021     06/21/2021       BMP RESULTS:  Lab Results   Component Value Date     07/19/2021     06/21/2021    POTASSIUM 4.5 02/11/2022    POTASSIUM 4.9 06/21/2021    CHLORIDE 112 (H)  07/19/2021    CHLORIDE 108 06/21/2021    CO2 22 07/19/2021    CO2 27 06/21/2021    ANIONGAP 4 07/19/2021    ANIONGAP 3 06/21/2021    GLC 88 07/19/2021    GLC 94 06/21/2021    BUN 24 07/19/2021    BUN 23 06/21/2021    CR 1.20 07/19/2021    CR 1.31 (H) 06/21/2021    GFRESTIMATED 62 07/19/2021    GFRESTIMATED 55 (L) 06/21/2021    GFRESTBLACK 64 06/21/2021    CHU 8.7 07/19/2021    CHU 9.2 06/21/2021        A1C RESULTS:  No results found for: A1C    INR RESULTS:  Lab Results   Component Value Date    INR 2.3 (H) 02/11/2022    INR 2.8 (H) 02/08/2022    INR 2.48 (H) 01/21/2022    INR 2.18 (H) 01/12/2022    INR 2.60 (H) 07/06/2021    INR 1.88 (H) 06/21/2021         ECHOCARDIOGRAM  No results found for this or any previous visit (from the past 8760 hour(s)).      No orders of the defined types were placed in this encounter.    No orders of the defined types were placed in this encounter.    There are no discontinued medications.      Encounter Diagnosis   Name Primary?     Paroxysmal atrial fibrillation (H)            Lamont Hannon MD

## 2022-02-14 NOTE — PROGRESS NOTES
Electrophysiology/ Clinic Note         H&P and Plan:     REASON FOR VISIT: Electrophysiology evaluation.      HISTORY OF PRESENT ILLNESS: Mr. Connelly is a pleasant  71-year-old gentleman with a history of hypertension, hyperlipidemia and recurrent persistent atrial fibrillation (previous tachycardia mediated cardiomyopathy-resolved; A. fib ablation on 1/3/2020), who is here for team evaluation.     Patient underwent A. fib ablation on 1/3/2020.  At that time, he was found to have significant amount of scar in the left atrium.  He underwent pulmonary vein isolation and posterior left atrial wall isolation.  At the end of procedure, he was found to have atypical flutter.  However, he had several forms of atypical flutter no further ablation was performed.    After ablation procedure, he was started recommendation of flecainide and Coreg.  He did well for last 2 years.  However, this year he started having recurrent episodes of atypical flutter.  He underwent IBETH/cardioversion on 1/21 which was successful, but unfortunately he had recurrence of atrial flutter.  Flecainide was then discontinued and he was started on amiodarone.  He underwent a second successive cardioversion in 2/11/2022.    Today, he informs he is doing well.  However, he complains of some fatigue which he believes his heart rates have been slow.  He denies any other sense of chest pain, shortness of breath, lightheadedness, near-syncope or syncope.    EKG today showed normal sinus rhythm with first-degree AV block (RI measuring 270 ms).  I reviewed the EKGs in flutter.  He appears to have at least 2 different types of flutter.     PREVIOUS STUDIES (personally reviewed):  -Exercise stress test (08/28/2019): Target heart rate was not achieved.  However test was not negative for ischemia or pro-arrhythmias.  -Echo (08/14/2019): Normal LV function.  EF estimated 50-55%.  -IBETH (1/02/2020): Normal LV function.  EF estimated 55-60%.  There was no significant  "valve disease.  -Chest CT (01/02/2020): Normal pulmonary vein anatomy.  Aortic root was mildly dilated (4.2 x 3.88 cm).  Coronary calcification was noticed in the LAD.  Scattered mediastinal and right heel are calcifications were noticed suggestive of old granulomas.     ASSESSMENT AND PLAN:   1.  Persistent atrial fibrillation/atypical flutter.  Patient is now presented with recurrent episodes of atypical atrial flutter.    I had an extensive discussion with him regarding management of atrial arrhythmias.  He had an extensive scar burden in the left atrial noticed during previous ablation.  He is now presenting with several forms of atypical flutter.  Therefore, the success of ablation will be small.  He is doing well on amiodarone.  However, amiodarone is not the best medication for him the long-term.    After reviewing case, I believe the success rate of redo A. fib ablation will be very small.  Options are either AV node ablation/pacemaker implantation or continue antiarrhythmic therapy.  Since amiodarone has more side effects long-term, I believe best next step would be admission for dofetilide load.  I would recommend stopping amiodarone 5 days prior to admission.    2.  Embolic prevention.  Chads vas score of 2.  Continue anticoagulation with Coumadin indefinitely.     3.  Hypertension.  Blood pressure was elevated today.    He responded better to carvedilol in the past.  I recommend stopping metoprolol and starting carvedilol 6.25 mg twice daily.         Lamont Hannon MD    Physical Exam:  Vitals: BP (!) 150/90   Pulse 63   Ht 1.93 m (6' 3.98\")   Wt 129.3 kg (285 lb)   BMI 34.71 kg/m      Constitutional:  AAO x3.  Pt is in NAD.  HEAD: normocephalic.  SKIN: Skin normal color, texture and turgor with no lesions or eruptions.  Eyes: PERRL, EOMI.  ENT:  Supple, normal JVP. No lymphadenopathy or thyroid enlargement.  Chest:  CTAB.  Cardiac:   RRR, normal  S1 and S2.  No murmurs rubs or gallop.    Abdomen:  " Normal BS.  Soft, non-tender and non-distended.  No rebound or guarding.    Extremities:  Pedious pulses palpable B/L.  No LE edema noticed.   Neurological: Strength and sensation grossly symmetric and intact throughout.       CURRENT MEDICATIONS:  Current Outpatient Medications   Medication Sig Dispense Refill     acetaminophen (TYLENOL) 500 MG tablet Take 500 mg by mouth every 6 hours as needed for mild pain       amiodarone (PACERONE) 200 MG tablet Take 2 tablets (400 mg) by mouth 2 times daily for 4 days, THEN 2 tablets (400 mg) daily for 4 days, THEN 1 tablet (200 mg) daily. (Patient taking differently: 1 tablet (200 mg) daily.) 54 tablet 0     atorvastatin (LIPITOR) 20 MG tablet Take 1 tablet (20 mg) by mouth daily 90 tablet 2     losartan (COZAAR) 100 MG tablet Take 0.5 tablets (50 mg) by mouth daily 45 tablet 3     melatonin 3 MG CAPS Take 3 mg by mouth At Bedtime       metoprolol succinate ER (TOPROL-XL) 100 MG 24 hr tablet Take 1 tablet (100 mg) by mouth 2 times daily 180 tablet 3     sildenafil (REVATIO) 20 MG tablet Take 1 tablet (20 mg) by mouth daily as needed (Patient not taking: Reported on 2/4/2022) 30 tablet 3     warfarin ANTICOAGULANT (COUMADIN) 5 MG tablet Current dose (11/18/21): 5 mg daily (1 tablet) or as directed by ACC team. 95 tablet 1       ALLERGIES     Allergies   Allergen Reactions     Lisinopril Cough     Very persistent cough       PAST MEDICAL HISTORY:  Past Medical History:   Diagnosis Date     Atrial flutter (H)      Benign essential hypertension 1/18/2017    past use of ACE- Cough;  Had been on ARB-diuretic but experienced lower bp readings;  BLOOD PRESSURE now well controlled on ARB also no longer on diuretic; no low bp readings; may have had slight edema initially but has normalized with bp well controlled on ARB alone.      Cancer (H)     testicular cancer     CHF (congestive heart failure) (H) 6/5/2019     Long term current use of anticoagulant therapy 6/3/2019     Obesity  (BMI 35.0-39.9) with comorbidity (H) 2/11/2019     Paroxysmal atrial fibrillation (H) 06/03/2019       PAST SURGICAL HISTORY:  Past Surgical History:   Procedure Laterality Date     ANESTHESIA CARDIOVERSION N/A 6/26/2019    Procedure: ANESTHESIA, FOR CARDIOVERSION DR. LE;  Surgeon: GENERIC ANESTHESIA PROVIDER;  Location: [facility] OR     ANESTHESIA CARDIOVERSION N/A 7/17/2019    Procedure: ANESTHESIA, FOR CARDIOVERSION;  Surgeon: GENERIC ANESTHESIA PROVIDER;  Location: [facility] OR     ANESTHESIA CARDIOVERSION N/A 1/28/2020    Procedure: ANESTHESIA, FOR CARDIOVERSION;  Surgeon: GENERIC ANESTHESIA PROVIDER;  Location: [facility] OR     ANESTHESIA CARDIOVERSION N/A 9/8/2020    Procedure: ANESTHESIA, FOR CARDIOVERSION;  Surgeon: GENERIC ANESTHESIA PROVIDER;  Location: [facility] OR     ANESTHESIA CARDIOVERSION N/A 11/23/2020    Procedure: ANESTHESIA, FOR CARDIOVERSION;  Surgeon: GENERIC ANESTHESIA PROVIDER;  Location: [facility] OR     ANESTHESIA CARDIOVERSION N/A 1/21/2022    Procedure: ANESTHESIA, FOR CARDIOVERSION;  Surgeon: GENERIC ANESTHESIA PROVIDER;  Location: [facility] OR     ANESTHESIA CARDIOVERSION N/A 2/11/2022    Procedure: ANESTHESIA, FOR CARDIOVERSION (DR. MAO);  Surgeon: GENERIC ANESTHESIA PROVIDER;  Location:  OR     CLOSED REDUCTION SHOULDER Left      EP ABLATION FOCAL AFIB N/A 1/3/2020    Procedure: EP Ablation Focal AFIB;  Surgeon: Lamont Hannon MD;  Location:  HEART CARDI[facility] CATH LAB     GENITOURINARY SURGERY  1990    testicular cancer     ORTHOPEDIC SURGERY  1970's    right knee surgery        FAMILY HISTORY:  Family History   Problem Relation Age of Onset     Colon Cancer Father      Coronary Artery Disease Maternal Grandmother        SOCIAL HISTORY:  Social History     Socioeconomic History     Marital status:      Spouse name: Not on file     Number of children: Not on file     Years of education: Not on file     Highest education level: Not on file   Occupational History     Not on file   Tobacco Use     Smoking  status: Never Smoker     Smokeless tobacco: Never Used   Substance and Sexual Activity     Alcohol use: Yes     Comment: every 1-2 months or less     Drug use: No     Sexual activity: Yes     Partners: Female   Other Topics Concern     Parent/sibling w/ CABG, MI or angioplasty before 65F 55M? Yes   Social History Narrative     Not on file     Social Determinants of Health     Financial Resource Strain: Not on file   Food Insecurity: Not on file   Transportation Needs: Not on file   Physical Activity: Not on file   Stress: Not on file   Social Connections: Not on file   Intimate Partner Violence: Not on file   Housing Stability: Not on file       Review of Systems:  Skin:        Eyes:       ENT:       Respiratory:       Cardiovascular:       Gastroenterology:      Genitourinary:       Musculoskeletal:       Neurologic:       Psychiatric:       Heme/Lymph/Imm:       Endocrine:           Recent Lab Results:  LIPID RESULTS:  Lab Results   Component Value Date    CHOL 157 03/31/2021    HDL 47 03/31/2021    LDL 93 03/31/2021    TRIG 84 03/31/2021       LIVER ENZYME RESULTS:  Lab Results   Component Value Date    AST 14 03/31/2021    ALT 28 03/31/2021       CBC RESULTS:  Lab Results   Component Value Date    WBC 5.7 06/21/2021    RBC 5.09 06/21/2021    HGB 16.4 06/21/2021    HCT 47.2 06/21/2021    MCV 93 06/21/2021    MCH 32.2 06/21/2021    MCHC 34.7 06/21/2021    RDW 12.2 06/21/2021     06/21/2021       BMP RESULTS:  Lab Results   Component Value Date     07/19/2021     06/21/2021    POTASSIUM 4.5 02/11/2022    POTASSIUM 4.9 06/21/2021    CHLORIDE 112 (H) 07/19/2021    CHLORIDE 108 06/21/2021    CO2 22 07/19/2021    CO2 27 06/21/2021    ANIONGAP 4 07/19/2021    ANIONGAP 3 06/21/2021    GLC 88 07/19/2021    GLC 94 06/21/2021    BUN 24 07/19/2021    BUN 23 06/21/2021    CR 1.20 07/19/2021    CR 1.31 (H) 06/21/2021    GFRESTIMATED 62 07/19/2021    GFRESTIMATED 55 (L) 06/21/2021    GFRESTBLACK 64 06/21/2021     CHU 8.7 07/19/2021    CHU 9.2 06/21/2021        A1C RESULTS:  No results found for: A1C    INR RESULTS:  Lab Results   Component Value Date    INR 2.3 (H) 02/11/2022    INR 2.8 (H) 02/08/2022    INR 2.48 (H) 01/21/2022    INR 2.18 (H) 01/12/2022    INR 2.60 (H) 07/06/2021    INR 1.88 (H) 06/21/2021         ECHOCARDIOGRAM  No results found for this or any previous visit (from the past 8760 hour(s)).      No orders of the defined types were placed in this encounter.    No orders of the defined types were placed in this encounter.    There are no discontinued medications.      Encounter Diagnosis   Name Primary?     Paroxysmal atrial fibrillation (H)          CC  Lamont Hannon MD  [id number]  [place] [id number]

## 2022-02-14 NOTE — LETTER
2/14/2022    Davina Reaves MD  [address]  [facility] [address]    RE: Kevan Connelly       Dear Colleague,     I had the pleasure of seeing Kevan Connelly in the [facility].  Electrophysiology/ Clinic Note         H&P and Plan:     REASON FOR VISIT: Electrophysiology evaluation.      HISTORY OF PRESENT ILLNESS: Mr. Connelly is a pleasant  71-year-old gentleman with a history of hypertension, hyperlipidemia and recurrent persistent atrial fibrillation (previous tachycardia mediated cardiomyopathy-resolved; A. fib ablation on 1/3/2020), who is here for team evaluation.     Patient underwent A. fib ablation on 1/3/2020.  At that time, he was found to have significant amount of scar in the left atrium.  He underwent pulmonary vein isolation and posterior left atrial wall isolation.  At the end of procedure, he was found to have atypical flutter.  However, he had several forms of atypical flutter no further ablation was performed.    After ablation procedure, he was started recommendation of flecainide and Coreg.  He did well for last 2 years.  However, this year he started having recurrent episodes of atypical flutter.  He underwent IBETH/cardioversion on 1/21 which was successful, but unfortunately he had recurrence of atrial flutter.  Flecainide was then discontinued and he was started on amiodarone.  He underwent a second successive cardioversion in 2/11/2022.    Today, he informs he is doing well.  However, he complains of some fatigue which he believes his heart rates have been slow.  He denies any other sense of chest pain, shortness of breath, lightheadedness, near-syncope or syncope.    EKG today showed normal sinus rhythm with first-degree AV block (SD measuring 270 ms).  I reviewed the EKGs in flutter.  He appears to have at least 2 different types of flutter.     PREVIOUS STUDIES (personally reviewed):  -Exercise stress test (08/28/2019): Target heart rate was not achieved.  " However test was not negative for ischemia or pro-arrhythmias.  -Echo (08/14/2019): Normal LV function.  EF estimated 50-55%.  -IBETH (1/02/2020): Normal LV function.  EF estimated 55-60%.  There was no significant valve disease.  -Chest CT (01/02/2020): Normal pulmonary vein anatomy.  Aortic root was mildly dilated (4.2 x 3.88 cm).  Coronary calcification was noticed in the LAD.  Scattered mediastinal and right heel are calcifications were noticed suggestive of old granulomas.     ASSESSMENT AND PLAN:   1.  Persistent atrial fibrillation/atypical flutter.  Patient is now presented with recurrent episodes of atypical atrial flutter.    I had an extensive discussion with him regarding management of atrial arrhythmias.  He had an extensive scar burden in the left atrial noticed during previous ablation.  He is now presenting with several forms of atypical flutter.  Therefore, the success of ablation will be small.  He is doing well on amiodarone.  However, amiodarone is not the best medication for him the long-term.    After reviewing case, I believe the success rate of redo A. fib ablation will be very small.  Options are either AV node ablation/pacemaker implantation or continue antiarrhythmic therapy.  Since amiodarone has more side effects long-term, I believe best next step would be admission for dofetilide load.  I would recommend stopping amiodarone 5 days prior to admission.    2.  Embolic prevention.  Chads vas score of 2.  Continue anticoagulation with Coumadin indefinitely.     3.  Hypertension.  Blood pressure was elevated today.    He responded better to carvedilol in the past.  I recommend stopping metoprolol and starting carvedilol 6.25 mg twice daily.         Lamont Hannon MD    Physical Exam:  Vitals: BP (!) 150/90   Pulse 63   Ht 1.93 m (6' 3.98\")   Wt 129.3 kg (285 lb)   BMI 34.71 kg/m      Constitutional:  AAO x3.  Pt is in NAD.  HEAD: normocephalic.  SKIN: Skin normal color, texture and turgor " with no lesions or eruptions.  Eyes: PERRL, EOMI.  ENT:  Supple, normal JVP. No lymphadenopathy or thyroid enlargement.  Chest:  CTAB.  Cardiac:   RRR, normal  S1 and S2.  No murmurs rubs or gallop.    Abdomen:  Normal BS.  Soft, non-tender and non-distended.  No rebound or guarding.    Extremities:  Pedious pulses palpable B/L.  No LE edema noticed.   Neurological: Strength and sensation grossly symmetric and intact throughout.       CURRENT MEDICATIONS:  Current Outpatient Medications   Medication Sig Dispense Refill     acetaminophen (TYLENOL) 500 MG tablet Take 500 mg by mouth every 6 hours as needed for mild pain       amiodarone (PACERONE) 200 MG tablet Take 2 tablets (400 mg) by mouth 2 times daily for 4 days, THEN 2 tablets (400 mg) daily for 4 days, THEN 1 tablet (200 mg) daily. (Patient taking differently: 1 tablet (200 mg) daily.) 54 tablet 0     atorvastatin (LIPITOR) 20 MG tablet Take 1 tablet (20 mg) by mouth daily 90 tablet 2     losartan (COZAAR) 100 MG tablet Take 0.5 tablets (50 mg) by mouth daily 45 tablet 3     melatonin 3 MG CAPS Take 3 mg by mouth At Bedtime       metoprolol succinate ER (TOPROL-XL) 100 MG 24 hr tablet Take 1 tablet (100 mg) by mouth 2 times daily 180 tablet 3     sildenafil (REVATIO) 20 MG tablet Take 1 tablet (20 mg) by mouth daily as needed (Patient not taking: Reported on 2/4/2022) 30 tablet 3     warfarin ANTICOAGULANT (COUMADIN) 5 MG tablet Current dose (11/18/21): 5 mg daily (1 tablet) or as directed by ACC team. 95 tablet 1       ALLERGIES     Allergies   Allergen Reactions     Lisinopril Cough     Very persistent cough       PAST MEDICAL HISTORY:  Past Medical History:   Diagnosis Date     Atrial flutter (H)      Benign essential hypertension 1/18/2017    past use of ACE- Cough;  Had been on ARB-diuretic but experienced lower bp readings;  BLOOD PRESSURE now well controlled on ARB also no longer on diuretic; no low bp readings; may have had slight edema initially but  has normalized with bp well controlled on ARB alone.      Cancer (H)     testicular cancer     CHF (congestive heart failure) (H) 6/5/2019     Long term current use of anticoagulant therapy 6/3/2019     Obesity (BMI 35.0-39.9) with comorbidity (H) 2/11/2019     Paroxysmal atrial fibrillation (H) 06/03/2019       PAST SURGICAL HISTORY:  Past Surgical History:   Procedure Laterality Date     ANESTHESIA CARDIOVERSION N/A 6/26/2019    Procedure: ANESTHESIA, FOR CARDIOVERSION DR. LE;  Surgeon: GENERIC ANESTHESIA PROVIDER;  Location:  OR     ANESTHESIA CARDIOVERSION N/A 7/17/2019    Procedure: ANESTHESIA, FOR CARDIOVERSION;  Surgeon: GENERIC ANESTHESIA PROVIDER;  Location: [facility] OR     ANESTHESIA CARDIOVERSION N/A 1/28/2020    Procedure: ANESTHESIA, FOR CARDIOVERSION;  Surgeon: GENERIC ANESTHESIA PROVIDER;  Location:  OR     ANESTHESIA CARDIOVERSION N/A 9/8/2020    Procedure: ANESTHESIA, FOR CARDIOVERSION;  Surgeon: GENERIC ANESTHESIA PROVIDER;  Location:  OR     ANESTHESIA CARDIOVERSION N/A 11/23/2020    Procedure: ANESTHESIA, FOR CARDIOVERSION;  Surgeon: GENERIC ANESTHESIA PROVIDER;  Location: [facility] OR     ANESTHESIA CARDIOVERSION N/A 1/21/2022    Procedure: ANESTHESIA, FOR CARDIOVERSION;  Surgeon: GENERIC ANESTHESIA PROVIDER;  Location: [facility] OR     ANESTHESIA CARDIOVERSION N/A 2/11/2022    Procedure: ANESTHESIA, FOR CARDIOVERSION (DR. MAO);  Surgeon: GENERIC ANESTHESIA PROVIDER;  Location:  OR     CLOSED REDUCTION SHOULDER Left      EP ABLATION FOCAL AFIB N/A 1/3/2020    Procedure: EP Ablation Focal AFIB;  Surgeon: Lamont Hannon MD;  Location:  [facility] LAB     GENITOURINARY SURGERY  1990    testicular cancer     ORTHOPEDIC SURGERY  1970's    right knee surgery        FAMILY HISTORY:  Family History   Problem Relation Age of Onset     Colon Cancer Father      Coronary Artery Disease Maternal Grandmother        SOCIAL HISTORY:  Social History     Socioeconomic History     Marital status:       Spouse name: Not on file     Number of children: Not on file     Years of education: Not on file     Highest education level: Not on file   Occupational History     Not on file   Tobacco Use     Smoking status: Never Smoker     Smokeless tobacco: Never Used   Substance and Sexual Activity     Alcohol use: Yes     Comment: every 1-2 months or less     Drug use: No     Sexual activity: Yes     Partners: Female   Other Topics Concern     Parent/sibling w/ CABG, MI or angioplasty before 65F 55M? Yes   Social History Narrative     Not on file     Social Determinants of Health     Financial Resource Strain: Not on file   Food Insecurity: Not on file   Transportation Needs: Not on file   Physical Activity: Not on file   Stress: Not on file   Social Connections: Not on file   Intimate Partner Violence: Not on file   Housing Stability: Not on file       Review of Systems:  Skin:        Eyes:       ENT:       Respiratory:       Cardiovascular:       Gastroenterology:      Genitourinary:       Musculoskeletal:       Neurologic:       Psychiatric:       Heme/Lymph/Imm:       Endocrine:           Recent Lab Results:  LIPID RESULTS:  Lab Results   Component Value Date    CHOL 157 03/31/2021    HDL 47 03/31/2021    LDL 93 03/31/2021    TRIG 84 03/31/2021       LIVER ENZYME RESULTS:  Lab Results   Component Value Date    AST 14 03/31/2021    ALT 28 03/31/2021       CBC RESULTS:  Lab Results   Component Value Date    WBC 5.7 06/21/2021    RBC 5.09 06/21/2021    HGB 16.4 06/21/2021    HCT 47.2 06/21/2021    MCV 93 06/21/2021    MCH 32.2 06/21/2021    MCHC 34.7 06/21/2021    RDW 12.2 06/21/2021     06/21/2021       BMP RESULTS:  Lab Results   Component Value Date     07/19/2021     06/21/2021    POTASSIUM 4.5 02/11/2022    POTASSIUM 4.9 06/21/2021    CHLORIDE 112 (H) 07/19/2021    CHLORIDE 108 06/21/2021    CO2 22 07/19/2021    CO2 27 06/21/2021    ANIONGAP 4 07/19/2021    ANIONGAP 3 06/21/2021    GLC 88  07/19/2021    GLC 94 06/21/2021    BUN 24 07/19/2021    BUN 23 06/21/2021    CR 1.20 07/19/2021    CR 1.31 (H) 06/21/2021    GFRESTIMATED 62 07/19/2021    GFRESTIMATED 55 (L) 06/21/2021    GFRESTBLACK 64 06/21/2021    CHU 8.7 07/19/2021    CHU 9.2 06/21/2021        A1C RESULTS:  No results found for: A1C    INR RESULTS:  Lab Results   Component Value Date    INR 2.3 (H) 02/11/2022    INR 2.8 (H) 02/08/2022    INR 2.48 (H) 01/21/2022    INR 2.18 (H) 01/12/2022    INR 2.60 (H) 07/06/2021    INR 1.88 (H) 06/21/2021         ECHOCARDIOGRAM  No results found for this or any previous visit (from the past 8760 hour(s)).      No orders of the defined types were placed in this encounter.    No orders of the defined types were placed in this encounter.    There are no discontinued medications.      Encounter Diagnosis   Name Primary?     Paroxysmal atrial fibrillation (H)          CC  Lamont Hannon MD  [address]  [place] [id number]                  Thank you for allowing me to participate in the care of your patient.      Sincerely,     Lamont Hannon MD     [facility]  cc:   Lamont Hannon MD  [address]  [place] [id number]

## 2022-02-15 ENCOUNTER — TELEPHONE (OUTPATIENT)
Dept: CARDIOLOGY | Facility: CLINIC | Age: 72
End: 2022-02-15
Payer: COMMERCIAL

## 2022-02-15 LAB
ATRIAL RATE - MUSE: 114 BPM
ATRIAL RATE - MUSE: 64 BPM
DIASTOLIC BLOOD PRESSURE - MUSE: NORMAL MMHG
DIASTOLIC BLOOD PRESSURE - MUSE: NORMAL MMHG
INTERPRETATION ECG - MUSE: NORMAL
INTERPRETATION ECG - MUSE: NORMAL
P AXIS - MUSE: 58 DEGREES
P AXIS - MUSE: 66 DEGREES
PR INTERVAL - MUSE: 150 MS
PR INTERVAL - MUSE: 268 MS
QRS DURATION - MUSE: 102 MS
QRS DURATION - MUSE: 98 MS
QT - MUSE: 346 MS
QT - MUSE: 450 MS
QTC - MUSE: 464 MS
QTC - MUSE: 476 MS
R AXIS - MUSE: 0 DEGREES
R AXIS - MUSE: 17 DEGREES
SYSTOLIC BLOOD PRESSURE - MUSE: NORMAL MMHG
SYSTOLIC BLOOD PRESSURE - MUSE: NORMAL MMHG
T AXIS - MUSE: 47 DEGREES
T AXIS - MUSE: 51 DEGREES
VENTRICULAR RATE- MUSE: 114 BPM
VENTRICULAR RATE- MUSE: 64 BPM

## 2022-02-15 NOTE — TELEPHONE ENCOUNTER
Please inform patient that I reviewed his case.  Due to extensive amount of scar I do not think ablation is the best option for him at this time.  I recommend admission for dofetilide load.  He would have to stop amiodarone 5 days prior to admission.  He will need to stay in the hospital for 3 days for the Dfetilide load.     Dofetilide is a medication that needs to be monitor and initiation but will not have long-term side effects like the amiodarone.     Please update patient on plan.     Lamont

## 2022-02-15 NOTE — TELEPHONE ENCOUNTER
Spoke to pt and discussed recommendations from Dr Hannon after reviewing pt last case (ablation)  Made pt aware that because of the extensive scan Dr Hannon is recommending medication vs repeat ablation.  Recommending Dofetilide of which pt made aware that will have to be started in the hospital and he would stay for 3 days.  Discussed why the stay in the hospital and the potential of rhythm changes that can occur with the medication.  Pt asked about the pacemaker, which was discussed with Dr Hannon at . Explained that this writer would talk to Dr Hannon about the device. Spoke to Dr Hannon who states that yes the PPM was discussed, Discussed that pt can either come in and d/t the Stay in the hospital for the Dofetilide or he could stop the Amiodarone and when he begins to have issues with A fib, pt can have AVN ablation and PPM.  Discussed with pt and he will think about this and get back to A fib nurse this week. Isabel

## 2022-02-17 ENCOUNTER — OFFICE VISIT (OUTPATIENT)
Dept: FAMILY MEDICINE | Facility: CLINIC | Age: 72
End: 2022-02-17
Payer: COMMERCIAL

## 2022-02-17 ENCOUNTER — ANTICOAGULATION THERAPY VISIT (OUTPATIENT)
Dept: ANTICOAGULATION | Facility: CLINIC | Age: 72
End: 2022-02-17

## 2022-02-17 VITALS
BODY MASS INDEX: 35.15 KG/M2 | OXYGEN SATURATION: 98 % | TEMPERATURE: 98.3 F | SYSTOLIC BLOOD PRESSURE: 130 MMHG | WEIGHT: 282.7 LBS | HEIGHT: 75 IN | DIASTOLIC BLOOD PRESSURE: 82 MMHG | RESPIRATION RATE: 17 BRPM | HEART RATE: 70 BPM

## 2022-02-17 DIAGNOSIS — I10 BENIGN ESSENTIAL HYPERTENSION: ICD-10-CM

## 2022-02-17 DIAGNOSIS — D12.3 ADENOMATOUS POLYP OF TRANSVERSE COLON: ICD-10-CM

## 2022-02-17 DIAGNOSIS — I77.810 THORACIC AORTIC ECTASIA (H): ICD-10-CM

## 2022-02-17 DIAGNOSIS — I48.0 PAROXYSMAL ATRIAL FIBRILLATION (H): ICD-10-CM

## 2022-02-17 DIAGNOSIS — Z00.00 ENCOUNTER FOR MEDICARE ANNUAL WELLNESS EXAM: Primary | ICD-10-CM

## 2022-02-17 DIAGNOSIS — Z79.01 LONG TERM CURRENT USE OF ANTICOAGULANT THERAPY: Primary | ICD-10-CM

## 2022-02-17 DIAGNOSIS — Z80.42 FAMILY HX OF PROSTATE CANCER: ICD-10-CM

## 2022-02-17 DIAGNOSIS — E66.01 MORBID OBESITY (H): ICD-10-CM

## 2022-02-17 DIAGNOSIS — Z79.01 CHRONIC ANTICOAGULATION: ICD-10-CM

## 2022-02-17 DIAGNOSIS — N18.30 STAGE 3 CHRONIC KIDNEY DISEASE, UNSPECIFIED WHETHER STAGE 3A OR 3B CKD (H): ICD-10-CM

## 2022-02-17 DIAGNOSIS — Z12.5 SCREENING FOR PROSTATE CANCER: ICD-10-CM

## 2022-02-17 DIAGNOSIS — I25.10 CORONARY ARTERY CALCIFICATION: ICD-10-CM

## 2022-02-17 DIAGNOSIS — Z79.01 LONG TERM CURRENT USE OF ANTICOAGULANTS WITH INR GOAL OF 2.0-3.0: ICD-10-CM

## 2022-02-17 LAB
ALBUMIN SERPL-MCNC: 3.5 G/DL (ref 3.4–5)
ALP SERPL-CCNC: 58 U/L (ref 40–150)
ALT SERPL W P-5'-P-CCNC: 32 U/L (ref 0–70)
ANION GAP SERPL CALCULATED.3IONS-SCNC: 7 MMOL/L (ref 3–14)
AST SERPL W P-5'-P-CCNC: 14 U/L (ref 0–45)
BILIRUB SERPL-MCNC: 1.6 MG/DL (ref 0.2–1.3)
BUN SERPL-MCNC: 16 MG/DL (ref 7–30)
CALCIUM SERPL-MCNC: 8.7 MG/DL (ref 8.5–10.1)
CHLORIDE BLD-SCNC: 111 MMOL/L (ref 94–109)
CHOLEST SERPL-MCNC: 149 MG/DL
CO2 SERPL-SCNC: 21 MMOL/L (ref 20–32)
CREAT SERPL-MCNC: 1.25 MG/DL (ref 0.66–1.25)
FASTING STATUS PATIENT QL REPORTED: YES
GFR SERPL CREATININE-BSD FRML MDRD: 62 ML/MIN/1.73M2
GLUCOSE BLD-MCNC: 99 MG/DL (ref 70–99)
HDLC SERPL-MCNC: 48 MG/DL
INR BLD: 2.2 (ref 0.9–1.1)
LDLC SERPL CALC-MCNC: 79 MG/DL
NONHDLC SERPL-MCNC: 101 MG/DL
POTASSIUM BLD-SCNC: 4.6 MMOL/L (ref 3.4–5.3)
PROT SERPL-MCNC: 6.7 G/DL (ref 6.8–8.8)
SODIUM SERPL-SCNC: 139 MMOL/L (ref 133–144)
TRIGL SERPL-MCNC: 110 MG/DL

## 2022-02-17 PROCEDURE — 99214 OFFICE O/P EST MOD 30 MIN: CPT | Mod: 25 | Performed by: INTERNAL MEDICINE

## 2022-02-17 PROCEDURE — 80061 LIPID PANEL: CPT | Performed by: INTERNAL MEDICINE

## 2022-02-17 PROCEDURE — 36415 COLL VENOUS BLD VENIPUNCTURE: CPT | Performed by: INTERNAL MEDICINE

## 2022-02-17 PROCEDURE — 99397 PER PM REEVAL EST PAT 65+ YR: CPT | Performed by: INTERNAL MEDICINE

## 2022-02-17 PROCEDURE — 80053 COMPREHEN METABOLIC PANEL: CPT | Performed by: INTERNAL MEDICINE

## 2022-02-17 PROCEDURE — 85610 PROTHROMBIN TIME: CPT | Performed by: INTERNAL MEDICINE

## 2022-02-17 RX ORDER — LOSARTAN POTASSIUM 100 MG/1
100 TABLET ORAL DAILY
Qty: 90 TABLET | Refills: 3 | Status: SHIPPED | OUTPATIENT
Start: 2022-02-17 | End: 2022-05-05

## 2022-02-17 ASSESSMENT — ENCOUNTER SYMPTOMS
EYE PAIN: 0
FREQUENCY: 0
HEARTBURN: 0
SHORTNESS OF BREATH: 0
JOINT SWELLING: 0
DIARRHEA: 0
HEMATOCHEZIA: 0
NERVOUS/ANXIOUS: 0
ABDOMINAL PAIN: 0
CONSTIPATION: 0
FEVER: 0
HEADACHES: 0
NAUSEA: 0
DIZZINESS: 0
COUGH: 0
SORE THROAT: 0
HEMATURIA: 0
ARTHRALGIAS: 0
PALPITATIONS: 0
DYSURIA: 0
WEAKNESS: 0
CHILLS: 0
PARESTHESIAS: 0
MYALGIAS: 0

## 2022-02-17 ASSESSMENT — ACTIVITIES OF DAILY LIVING (ADL): CURRENT_FUNCTION: NO ASSISTANCE NEEDED

## 2022-02-17 NOTE — PROGRESS NOTES
"Chief Complaint   Patient presents with     Wellness Visit       SUBJECTIVE:   Kevan Connelly is a 71 year old male who presents for Preventive Visit.      Patient has been advised of split billing requirements and indicates understanding: Yes  Are you in the first 12 months of your Medicare coverage?  No    Healthy Habits:     In general, how would you rate your overall health?  Good    Frequency of exercise:  1 day/week    Duration of exercise:  15-30 minutes    Do you usually eat at least 4 servings of fruit and vegetables a day, include whole grains    & fiber and avoid regularly eating high fat or \"junk\" foods?  No    Taking medications regularly:  Yes    Medication side effects:  None    Ability to successfully perform activities of daily living:  No assistance needed    Home Safety:  No safety concerns identified    Hearing Impairment:  Find that men's voices are easier to understand than woman's    In the past 6 months, have you been bothered by leaking of urine?  No    In general, how would you rate your overall mental or emotional health?  Excellent      PHQ-2 Total Score: 0    Additional concerns today:  No    Do you feel safe in your environment? Yes    Have you ever done Advance Care Planning? (For example, a Health Directive, POLST, or a discussion with a medical provider or your loved ones about your wishes): No, advance care planning information given to patient to review.  Patient declined advance care planning discussion at this time.       Fall risk  Fallen 2 or more times in the past year?: No  Any fall with injury in the past year?: No    Cognitive Screening   1) Repeat 3 items (Leader, Season, Table)    2) Clock draw: NORMAL  3) 3 item recall: Recalls 3 objects  Results: 3 items recalled: COGNITIVE IMPAIRMENT LESS LIKELY    Mini-CogTM Copyright RAMÓN Guerin. Licensed by the author for use in [facility]; reprinted with permission (amy@.[facility]). All rights reserved.      Do you have " sleep apnea, excessive snoring or daytime drowsiness?: no    Reviewed and updated as needed this visit by clinical staff   Tobacco  Allergies  Meds   Med Hx  Surg Hx  Fam Hx  Soc Hx        Reviewed and updated as needed this visit by Provider   Tobacco  Allergies  Meds  Problems  Med Hx  Surg Hx  Fam Hx         Social History     Tobacco Use     Smoking status: Never Smoker     Smokeless tobacco: Never Used   Substance Use Topics     Alcohol use: Yes     Comment: every 1-2 months or less         Alcohol Use 2/17/2022   Prescreen: >3 drinks/day or >7 drinks/week? No       Current providers sharing in care for this patient include:     Patient Care Team:  Davina Reaves MD as PCP - General (Internal Medicine)  Davina Reaves MD as Assigned PCP  Patrick Hernandez MD as Assigned Surgical Provider  Hilda Wahl APRN CNP as Assigned Heart and Vascular Provider    The following health maintenance items are reviewed in Epic and correct as of today:  Health Maintenance Due   Topic Date Due     HF ACTION PLAN  Never done     URINALYSIS  Never done     COVID-19 Vaccine (1) Never done     ZOSTER IMMUNIZATION (1 of 2) Never done     AORTIC ANEURYSM SCREENING (SYSTEM ASSIGNED)  Never done     MICROALBUMIN  01/22/2020     BMP  01/19/2022     Labs reviewed in EPIC  BP Readings from Last 3 Encounters:   02/17/22 130/82   02/14/22 (!) 150/90   02/11/22 129/79    Wt Readings from Last 3 Encounters:   02/17/22 128.2 kg (282 lb 11.2 oz)   02/14/22 129.3 kg (285 lb)   02/11/22 128.1 kg (282 lb 8 oz)                  Patient Active Problem List   Diagnosis     Hx of testicular cancer     Benign essential hypertension     Erectile dysfunction following radiation therapy     Encounter for monitoring diuretic therapy     Colon polyps     Obesity (BMI 35.0-39.9) with comorbidity (H)     Long term current use of anticoagulant therapy     CHF (congestive heart failure) (H)     Paroxysmal atrial  fibrillation (H)     Atrial flutter (H)     Chronic kidney disease, stage 3 (moderate)     Long term current use of anticoagulants with INR goal of 2.0-3.0     Thoracic aortic ectasia (H)     Past Surgical History:   Procedure Laterality Date     ANESTHESIA CARDIOVERSION N/A 6/26/2019    Procedure: ANESTHESIA, FOR CARDIOVERSION DR. LE;  Surgeon: GENERIC ANESTHESIA PROVIDER;  Location:  OR     ANESTHESIA CARDIOVERSION N/A 7/17/2019    Procedure: ANESTHESIA, FOR CARDIOVERSION;  Surgeon: GENERIC ANESTHESIA PROVIDER;  Location: [facility] OR     ANESTHESIA CARDIOVERSION N/A 1/28/2020    Procedure: ANESTHESIA, FOR CARDIOVERSION;  Surgeon: GENERIC ANESTHESIA PROVIDER;  Location:  OR     ANESTHESIA CARDIOVERSION N/A 9/8/2020    Procedure: ANESTHESIA, FOR CARDIOVERSION;  Surgeon: GENERIC ANESTHESIA PROVIDER;  Location:  OR     ANESTHESIA CARDIOVERSION N/A 11/23/2020    Procedure: ANESTHESIA, FOR CARDIOVERSION;  Surgeon: GENERIC ANESTHESIA PROVIDER;  Location: [facility] OR     ANESTHESIA CARDIOVERSION N/A 1/21/2022    Procedure: ANESTHESIA, FOR CARDIOVERSION;  Surgeon: GENERIC ANESTHESIA PROVIDER;  Location: [facility] OR     ANESTHESIA CARDIOVERSION N/A 2/11/2022    Procedure: ANESTHESIA, FOR CARDIOVERSION (DR. MAO);  Surgeon: GENERIC ANESTHESIA PROVIDER;  Location:  OR     CLOSED REDUCTION SHOULDER Left      EP ABLATION FOCAL AFIB N/A 1/3/2020    Procedure: EP Ablation Focal AFIB;  Surgeon: Lamont Hannon MD;  Location:  HEART CARDI[facility] CATH LAB     GENITOURINARY SURGERY  1990    testicular cancer     ORTHOPEDIC SURGERY  1970's    right knee surgery        Social History     Tobacco Use     Smoking status: Never Smoker     Smokeless tobacco: Never Used   Substance Use Topics     Alcohol use: Yes     Comment: every 1-2 months or less     Family History   Problem Relation Age of Onset     Colon Cancer Father      Coronary Artery Disease Maternal Grandmother      Prostate Cancer Brother 72         Current Outpatient  Medications   Medication Sig Dispense Refill     acetaminophen (TYLENOL) 500 MG tablet Take 500 mg by mouth every 6 hours as needed for mild pain       amiodarone (PACERONE) 200 MG tablet Take 2 tablets (400 mg) by mouth 2 times daily for 4 days, THEN 2 tablets (400 mg) daily for 4 days, THEN 1 tablet (200 mg) daily. (Patient taking differently: 1 tablet (200 mg) daily.) 54 tablet 0     atorvastatin (LIPITOR) 20 MG tablet Take 1 tablet (20 mg) by mouth daily 90 tablet 2     carvedilol (COREG) 6.25 MG tablet Take 1 tablet (6.25 mg) by mouth 2 times daily (with meals) 60 tablet 3     losartan (COZAAR) 100 MG tablet Take 0.5 tablets (50 mg) by mouth daily 45 tablet 3     melatonin 3 MG CAPS Take 3 mg by mouth At Bedtime       sildenafil (REVATIO) 20 MG tablet Take 1 tablet (20 mg) by mouth daily as needed 30 tablet 3     warfarin ANTICOAGULANT (COUMADIN) 5 MG tablet Current dose (11/18/21): 5 mg daily (1 tablet) or as directed by [facility] team. 95 tablet 1     Allergies   Allergen Reactions     Lisinopril Cough     Very persistent cough     Recent Labs   Lab Test 02/11/22  1218 01/21/22  0720 07/19/21  0929 07/19/21  0929 06/21/21  1228 06/18/21  1626 03/31/21  0858 09/08/20  0930 06/01/20  0829 05/19/20  1311 03/24/20  0731 01/28/20  0920 01/13/20  0743 06/05/19  1041 05/30/19  1126   LDL  --   --   --   --   --   --  93  --  75  --  94  --  156*  --   --    HDL  --   --   --   --   --   --  47  --  44  --  43  --  38*  --   --    TRIG  --   --   --   --   --   --  84  --  93  --  119  --  192*  --   --    ALT  --   --   --   --   --   --  28  --   --   --  35  --  23  --   --    CR  --   --   --  1.20 1.31* 1.42* 1.18  --   --    < >  --   --   --    < > 1.30*   GFRESTIMATED  --   --   --  62 55* 50* 63  --   --    < >  --   --   --    < > 57*   GFRESTBLACK  --   --   --   --  64 58* 73  --   --    < >  --   --   --    < > 66   POTASSIUM 4.5 4.2   < > 4.6 4.9 4.7 4.9   < >  --    < >  --    < >  --    < > 4.6   TSH  --    "--   --   --   --   --   --   --   --   --   --   --  2.86  --  1.86    < > = values in this interval not displayed.            Review of Systems   Constitutional: Negative for chills and fever.   HENT: Negative for congestion, ear pain, hearing loss and sore throat.    Eyes: Negative for pain and visual disturbance.   Respiratory: Negative for cough and shortness of breath.    Cardiovascular: Positive for peripheral edema. Negative for chest pain and palpitations.        Atrial fibrillation- rate control with Amiodarone. Anticoagulated for stroke prevention.    Gastrointestinal: Negative for abdominal pain, constipation, diarrhea, heartburn, hematochezia and nausea.   Genitourinary: Positive for impotence. Negative for dysuria, frequency, genital sores, hematuria and urgency.   Musculoskeletal: Negative for arthralgias, joint swelling and myalgias.   Skin: Negative for rash.   Neurological: Negative for dizziness, weakness, headaches and paresthesias.   Psychiatric/Behavioral: Negative for mood changes. The patient is not nervous/anxious.          OBJECTIVE:   /82   Pulse 70   Temp 98.3  F (36.8  C) (Oral)   Resp 17   Ht 1.905 m (6' 3\")   Wt 128.2 kg (282 lb 11.2 oz)   SpO2 98%   BMI 35.34 kg/m   Estimated body mass index is 35.34 kg/m  as calculated from the following:    Height as of this encounter: 1.905 m (6' 3\").    Weight as of this encounter: 128.2 kg (282 lb 11.2 oz).  Physical Exam  GENERAL: healthy, alert and no distress  EYES: Eyes grossly normal to inspection  HENT: ear canals and TM's normal, nose and mouth without ulcers or lesions  NECK: no adenopathy, no asymmetry, masses, or scars and thyroid normal to palpation  RESP: lungs clear to auscultation - no rales, rhonchi or wheezes  CV: regular rate and rhythm, normal S1 S2, no S3 or S4, no murmur, click or rub, no peripheral edema and peripheral pulses strong  ABDOMEN: soft, nontender, no hepatosplenomegaly, no masses and bowel sounds " normal  MS: no gross musculoskeletal defects noted, no edema  NEURO: Normal strength and tone, sensory exam grossly normal, mentation intact, gait normal including heel/toe/tandem walking and Romberg normal  PSYCH: mentation appears normal, affect normal/bright    Diagnostic Test Results:  Labs reviewed in Epic        ASSESSMENT / PLAN:   (Z00.00) Encounter for Medicare annual wellness exam  (primary encounter diagnosis)  Comment: HEALTH CARE MAINTENANCE reviewed  Plan:     (I77.810) Thoracic aortic ectasia (H)  Comment: BB agents have been helpful; changed from Metoprolol to Carvedilol per coardiology. medication well tolerated.  Plan: Lipid panel reflex to direct LDL Fasting,         Comprehensive metabolic panel          (E66.01) Morbid obesity (H)  Comment: Body mass index is 35.34 kg/m .   Plan: regular exercise encouraged.     (N18.30) Stage 3 chronic kidney disease, unspecified whether stage 3a or 3b CKD (H)  Comment:   Plan: Comprehensive metabolic panel, Albumin Random         Urine Quantitative with Creat Ratio, Albumin         Random Urine Quantitative with Creat Ratio          (I48.0) Paroxysmal atrial fibrillation (H)  Comment: multiple cardioversions and ablations without ability to consistently keep rhythm sinus  Plan: antiarrhythmic medication ( amiodarone- short term); rate control, stroke prevention with Warfarin.     (I25.10,  I25.84) Coronary artery calcification  Comment: cardiac risk assessment  Plan: Lipid panel reflex to direct LDL Fasting,         Comprehensive metabolic panel          (I10) Benign essential hypertension  Comment: BLOOD PRESSURE reviewed; well controlled at appt today  Plan: Comprehensive metabolic panel, Albumin Random         Urine Quantitative with Creat Ratio, losartan         (COZAAR) 100 MG tablet, Albumin Random Urine         Quantitative with Creat Ratio          (D12.3) Adenomatous polyp of transverse colon  Comment: 6 mm polyp noted 2020 colonoscopy; 5 year follow  "up  ( due 2025)  Plan: due 2025    (Z79.01) Chronic anticoagulation  Comment: Warfarin; indication: Atrial Fibrillation ( PAF- becoming more prominent)  Plan:     (Z12.5) Screening for prostate cancer  Comment: denies nocturia over 2-3 times;   Plan: PSA, screen          (Z80.42) Family hx of prostate cancer  Comment: father had prostate cancer, brother recently dx prostate cancer and undergoing treatment,   Plan: PSA, screen          (Z79.01) Long term current use of anticoagulants with INR goal of 2.0-3.0  Comment: Warfarin use reviewed.   Plan:     (N18.30) Stage 3 chronic kidney disease (H)  Comment: CKD reviewed;   Plan: Goals of therapy reviewed.     Patient has been advised of split billing requirements and indicates understanding: Yes    COUNSELING:  Reviewed preventive health counseling, as reflected in patient instructions       Regular exercise       Healthy diet/nutrition       Colon cancer screening       Prostate cancer screening  Colon assessment 2025    Estimated body mass index is 35.34 kg/m  as calculated from the following:    Height as of this encounter: 1.905 m (6' 3\").    Weight as of this encounter: 128.2 kg (282 lb 11.2 oz).    Weight management plan: Discussed healthy diet and exercise guidelines    He reports that he has never smoked. He has never used smokeless tobacco.      Appropriate preventive services were discussed with this patient, including applicable screening as appropriate for cardiovascular disease, diabetes, osteopenia/osteoporosis, and glaucoma.  As appropriate for age/gender, discussed screening for colorectal cancer, prostate cancer, breast cancer, and cervical cancer. Checklist reviewing preventive services available has been given to the patient.    Reviewed patients plan of care and provided an AVS. The Complex Care Plan (for patients with higher acuity and needing more deliberate coordination of services) for Kevan meets the Care Plan requirement. This Care Plan has " been established and reviewed with the Patient.    Counseling Resources:  ATP IV Guidelines  Pooled Cohorts Equation Calculator  Breast Cancer Risk Calculator  Breast Cancer: Medication to Reduce Risk  FRAX Risk Assessment  ICSI Preventive Guidelines  Dietary Guidelines for Americans, 2010  USDA's MyPlate  ASA Prophylaxis  Lung CA Screening    Davina Reaves MD  Internal Medicine   [facility]    Identified Health Risks:

## 2022-02-17 NOTE — PROGRESS NOTES
ANTICOAGULATION MANAGEMENT     Kevan Connelly 71 year old male is on warfarin with therapeutic INR result. (Goal INR 2.0-3.0)    Recent labs: (last 7 days)     02/17/22  0811   INR 2.2*       ASSESSMENT     Source(s): Chart Review and Patient/Caregiver Call       Warfarin doses taken: Warfarin taken as instructed    Diet: No new diet changes identified--patient states he rarely drinks Ensure    New illness, injury, or hospitalization: No    Medication/supplement changes: Started Amiodarone 01/2022 but per cardiology notes, will not be taking long-term     Patient saw cardiology on 2/14/22--Metoprolol was discontinued, Coreg started    Signs or symptoms of bleeding or clotting: No    Previous INR: Therapeutic last 2(+) visits    Additional findings: cardioversion was changed to 2/11/22, pt states it went well     PLAN     Recommended plan for ongoing change(s) affecting INR     Dosing Instructions: Continue your current warfarin dose with next INR in 1 week       Summary  As of 2/17/2022    Full warfarin instructions:  2.5 mg every Mon, Wed, Fri; 5 mg all other days   Next INR check:  2/25/2022             Telephone call with Kevan who verbalizes understanding and agrees to plan    Lab visit scheduled    Education provided: Contact [phone]  with any changes, questions or concerns.     Plan made per [facility] anticoagulation protocol    Ramona Delcid RN  Anticoagulation Clinic  2/17/2022    _______________________________________________________________________     Anticoagulation Episode Summary     Current INR goal:  2.0-3.0   TTR:  80.6 % (1 y)   Target end date:  Indefinite   Send INR reminders to:  [facility]    Indications    Long term current use of anticoagulant therapy [Z79.01]  Paroxysmal atrial fibrillation (H) [I48.0]           Comments:           Anticoagulation Care Providers     Provider Role Specialty Phone number    Davina Reaves MD Referring Internal Medicine [phone]

## 2022-02-17 NOTE — PATIENT INSTRUCTIONS
Patient Education   Personalized Prevention Plan  You are due for the preventive services outlined below.  Your care team is available to assist you in scheduling these services.  If you have already completed any of these items, please share that information with your care team to update in your medical record.  Health Maintenance Due   Topic Date Due     Heart Failure Action Plan  Never done     Urine Test  Never done     COVID-19 Vaccine (1) Never done     Zoster (Shingles) Vaccine (1 of 2) Never done     AORTIC ANEURYSM SCREENING (SYSTEM ASSIGNED)  Never done     Kidney Microalbumin Urine Test  01/22/2020     Basic Metabolic Panel  01/19/2022

## 2022-02-17 NOTE — TELEPHONE ENCOUNTER
Pt called and stated he is leaning toward the AVN ablation and PPM. Pt wondering if he would need to take any other medications.  Explained that the Amiodarone would be stopped and pt would stay on his current medications.  Would keep and eye on his HR to make sure that pt is rate controlled. Pt currently is scheduled to see Dr Hannon on 3/16 and it was discussed that this can be moved up if wanted.  Pt also given device nurse number if having any question about PPM before the procedure.  Pt will think more and call when ready. Isabel

## 2022-02-18 ENCOUNTER — TELEPHONE (OUTPATIENT)
Dept: CARDIOLOGY | Facility: CLINIC | Age: 72
End: 2022-02-18
Payer: COMMERCIAL

## 2022-02-18 NOTE — TELEPHONE ENCOUNTER
Patient left voicemail on device line stating that he has some questions regarding Pacemakers.     Returned call to patient. Patient wondering what device company we use at this facility. Reviewed the 4 main Pacemaker brands that we implant([employer], [employer], [employer], [employer]).     Also answered patient questions regarding battery life of device, activity restrictions post implant, overnight stay in hospital after AVNA/Pacer implant, what the heart rates(LRL/URL) are set at post pacemaker, remote monitoring and follow up schedule.    Patient appreciative for information.

## 2022-02-21 NOTE — TELEPHONE ENCOUNTER
Spoke to pt who spoke to Device nurses and also family and has decided that he would like to pursue the AVN ablation and PPM.  Pt would like to have done in March.  Pt still wondering about the makers of the device, ie [employer] or [employer]. Discussed with Dr Hannon and it was decided that pt will have an OV to discuss the procedure and how this will impact patient once he is pacer dependent. Explained this to pt who is ok with an Ov with Dr Hannon on 3/2 at 1445. Isabel

## 2022-02-24 NOTE — TELEPHONE ENCOUNTER
Pt states that his left knee, a week ago started to feel weak and achy to the point of no stability. Pt is questioning the Amiodarone.  Discussed this with Dr Hannon who recommends stopping the Amiodarone, since pt will be coming it off sooner anyway. Pt states understanding and will stop the Amiodarone starting today. Pt will then see Dr Hannon on 3/2 as scheduled. Isabel

## 2022-02-25 ENCOUNTER — ANTICOAGULATION THERAPY VISIT (OUTPATIENT)
Dept: ANTICOAGULATION | Facility: CLINIC | Age: 72
End: 2022-02-25

## 2022-02-25 ENCOUNTER — LAB (OUTPATIENT)
Dept: LAB | Facility: CLINIC | Age: 72
End: 2022-02-25
Payer: COMMERCIAL

## 2022-02-25 DIAGNOSIS — I48.0 PAROXYSMAL ATRIAL FIBRILLATION (H): ICD-10-CM

## 2022-02-25 DIAGNOSIS — N18.30 STAGE 3 CHRONIC KIDNEY DISEASE, UNSPECIFIED WHETHER STAGE 3A OR 3B CKD (H): ICD-10-CM

## 2022-02-25 DIAGNOSIS — Z79.01 LONG TERM CURRENT USE OF ANTICOAGULANT THERAPY: Primary | ICD-10-CM

## 2022-02-25 DIAGNOSIS — Z79.01 LONG TERM CURRENT USE OF ANTICOAGULANTS WITH INR GOAL OF 2.0-3.0: ICD-10-CM

## 2022-02-25 DIAGNOSIS — I10 BENIGN ESSENTIAL HYPERTENSION: ICD-10-CM

## 2022-02-25 LAB
CREAT UR-MCNC: 102 MG/DL
INR BLD: 2 (ref 0.9–1.1)
MICROALBUMIN UR-MCNC: 7 MG/L
MICROALBUMIN/CREAT UR: 6.86 MG/G CR (ref 0–17)

## 2022-02-25 PROCEDURE — 36416 COLLJ CAPILLARY BLOOD SPEC: CPT

## 2022-02-25 PROCEDURE — 82043 UR ALBUMIN QUANTITATIVE: CPT

## 2022-02-25 PROCEDURE — 85610 PROTHROMBIN TIME: CPT

## 2022-03-02 ENCOUNTER — OFFICE VISIT (OUTPATIENT)
Dept: CARDIOLOGY | Facility: CLINIC | Age: 72
End: 2022-03-02
Attending: NURSE PRACTITIONER
Payer: COMMERCIAL

## 2022-03-02 VITALS
HEART RATE: 77 BPM | SYSTOLIC BLOOD PRESSURE: 147 MMHG | DIASTOLIC BLOOD PRESSURE: 93 MMHG | BODY MASS INDEX: 34.82 KG/M2 | WEIGHT: 280 LBS | OXYGEN SATURATION: 98 % | HEIGHT: 75 IN

## 2022-03-02 DIAGNOSIS — I48.0 PAROXYSMAL ATRIAL FIBRILLATION (H): ICD-10-CM

## 2022-03-02 PROCEDURE — 99214 OFFICE O/P EST MOD 30 MIN: CPT | Performed by: INTERNAL MEDICINE

## 2022-03-02 NOTE — LETTER
3/2/2022    Davina Reaves MD  [address]  Formerly [facility] [address]    RE: Kevan Connelly       Dear Colleague,     I had the pleasure of seeing Kevan Connelly in the [facility].  Electrophysiology/ Clinic Note         H&P and Plan:     REASON FOR VISIT: Electrophysiology evaluation.      HISTORY OF PRESENT ILLNESS: Mr. Connelly is a pleasant  71-year-old gentleman with a history of hypertension, hyperlipidemia and recurrent persistent atrial fibrillation (previous tachycardia mediated cardiomyopathy- resolved; A. fib ablation on 1/3/2020), who is here for team evaluation.     Patient underwent A. fib ablation on 1/3/2020.  At that time, he was found to have significant amount of scar in the left atrium.  He underwent pulmonary vein isolation and posterior left atrial wall isolation.  At the end of procedure, he was found to have atypical flutter.  However, he had several forms of atypical flutter and no further ablation was performed.     After ablation procedure, he was started recommendation of flecainide and Coreg.  He did well for last 2 years.  However, he started having recurrent episodes of atypical flutter this year.  He underwent IBETH/cardioversion on 1/21 and he had recurrence of atrial flutter after cardioversion.  Flecainide was then discontinued and he was started on amiodarone.  He underwent a second successive cardioversion in 2/11/2022.     I saw him a couple weeks ago.  At the time, he was concerned about potential side effects related with amiodarone long-term.  We discussed possibility of stopping amiodarone and pursue other therapies.  He is here today for further discussion.    Today, he informs he is doing well.  He stopped amiodarone a week ago.  He denies any recurrence of A. fib/flutter.  He has no complaints during this visit.  He denies any symptoms such as chest pain, shortness of breath, lightheadedness, near-syncope or syncope.      PREVIOUS STUDIES  (personally reviewed):  -Exercise stress test (08/28/2019): Target heart rate was not achieved.  However test was not negative for ischemia or pro-arrhythmias.  -Echo (08/14/2019): Normal LV function.  EF estimated 50-55%.  -IBETH (1/02/2020): Normal LV function.  EF estimated 55-60%.  There was no significant valve disease.  -Chest CT (01/02/2020): Normal pulmonary vein anatomy.  Aortic root was mildly dilated (4.2 x 3.88 cm).  Coronary calcification was noticed in the LAD.  Scattered mediastinal and right heel are calcifications were noticed suggestive of old granulomas.     ASSESSMENT AND PLAN:   1.  Recurrent persistent atrial fibrillation/atypical flutter.   An extensive discussion regarding management of A. fib/flutter with him and his wife.  He had extensive underscore and had significant ablation done the previous ablation.  The success rate of redo ablation will be very small.  We discussed possibility of antiarrhythmic therapy.  In particular, we discussed possibly of starting dofetilide.  However he is not interested in be admitted to hospital for medical therapy.    We also discussed possibility of living with atrial arrhythmias and control heart rate.  He seems to be inclined to that option.  He is also inclined to pursue AV node ablation/pacemaker implantation if needed as he is tired to deal with atrial arrhythmias.    Since he continues to do well and is off amiodarone.  We will continue current therapy with Coreg.  He will contact us in case of recurrence of atrial arrhythmia.  At that time, will consider either adding digoxin or diltiazem for rate control strategy.  If we fail rate control strategy with pharmacotherapy, will consider AV node ablation/pacemaker implantation    2.  Embolic prevention.  Chads vas score of 2.  Continue anticoagulation with Coumadin indefinitely.     3.  Hypertension.  Blood pressure was elevated today.    He responded better to carvedilol in the past.  I recommend  "stopping metoprolol and starting carvedilol 6.25 mg twice daily.        Lamont Hannon MD    Physical Exam:  Vitals: BP (!) 147/93   Pulse 77   Ht 1.905 m (6' 3\")   Wt 127 kg (280 lb)   SpO2 98%   BMI 35.00 kg/m      Constitutional:  AAO x3.  Pt is in NAD.  HEAD: normocephalic.  SKIN: Skin normal color, texture and turgor with no lesions or eruptions.  Eyes: PERRL, EOMI.  ENT:  Supple, normal JVP. No lymphadenopathy or thyroid enlargement.  Chest:  CTAB.  Cardiac: RRR, normal  S1 and S2.  No murmurs rubs or gallop.    Abdomen:  Normal BS.  Soft, non-tender and non-distended.  No rebound or guarding.    Extremities:  Pedious pulses palpable B/L.  No LE edema noticed.   Neurological: Strength and sensation grossly symmetric and intact throughout.       CURRENT MEDICATIONS:  Current Outpatient Medications   Medication Sig Dispense Refill     acetaminophen (TYLENOL) 500 MG tablet Take 500 mg by mouth every 6 hours as needed for mild pain       atorvastatin (LIPITOR) 20 MG tablet Take 1 tablet (20 mg) by mouth daily 90 tablet 2     carvedilol (COREG) 6.25 MG tablet Take 1 tablet (6.25 mg) by mouth 2 times daily (with meals) 60 tablet 3     losartan (COZAAR) 100 MG tablet Take 1 tablet (100 mg) by mouth daily 90 tablet 3     melatonin 3 MG CAPS Take 3 mg by mouth At Bedtime       warfarin ANTICOAGULANT (COUMADIN) 5 MG tablet Current dose (11/18/21): 5 mg daily (1 tablet) or as directed by ACC team. 95 tablet 1     sildenafil (REVATIO) 20 MG tablet Take 1 tablet (20 mg) by mouth daily as needed (Patient not taking: Reported on 3/2/2022) 30 tablet 3       ALLERGIES     Allergies   Allergen Reactions     Lisinopril Cough     Very persistent cough       PAST MEDICAL HISTORY:  Past Medical History:   Diagnosis Date     Atrial flutter (H)      Benign essential hypertension 1/18/2017    past use of ACE- Cough;  Had been on ARB-diuretic but experienced lower bp readings;  BLOOD PRESSURE now well controlled on ARB also no " longer on diuretic; no low bp readings; may have had slight edema initially but has normalized with bp well controlled on ARB alone.      Cancer (H)     testicular cancer     CHF (congestive heart failure) (H) 6/5/2019     Long term current use of anticoagulant therapy 6/3/2019     Obesity (BMI 35.0-39.9) with comorbidity (H) 2/11/2019     Paroxysmal atrial fibrillation (H) 06/03/2019       PAST SURGICAL HISTORY:  Past Surgical History:   Procedure Laterality Date     ANESTHESIA CARDIOVERSION N/A 6/26/2019    Procedure: ANESTHESIA, FOR CARDIOVERSION DR. LE;  Surgeon: GENERIC ANESTHESIA PROVIDER;  Location:  OR     ANESTHESIA CARDIOVERSION N/A 7/17/2019    Procedure: ANESTHESIA, FOR CARDIOVERSION;  Surgeon: GENERIC ANESTHESIA PROVIDER;  Location: [facility] OR     ANESTHESIA CARDIOVERSION N/A 1/28/2020    Procedure: ANESTHESIA, FOR CARDIOVERSION;  Surgeon: GENERIC ANESTHESIA PROVIDER;  Location:  OR     ANESTHESIA CARDIOVERSION N/A 9/8/2020    Procedure: ANESTHESIA, FOR CARDIOVERSION;  Surgeon: GENERIC ANESTHESIA PROVIDER;  Location:  OR     ANESTHESIA CARDIOVERSION N/A 11/23/2020    Procedure: ANESTHESIA, FOR CARDIOVERSION;  Surgeon: GENERIC ANESTHESIA PROVIDER;  Location: [facility] OR     ANESTHESIA CARDIOVERSION N/A 1/21/2022    Procedure: ANESTHESIA, FOR CARDIOVERSION;  Surgeon: GENERIC ANESTHESIA PROVIDER;  Location: [facility] OR     ANESTHESIA CARDIOVERSION N/A 2/11/2022    Procedure: ANESTHESIA, FOR CARDIOVERSION (DR. MAO);  Surgeon: GENERIC ANESTHESIA PROVIDER;  Location:  OR     CLOSED REDUCTION SHOULDER Left      EP ABLATION FOCAL AFIB N/A 1/3/2020    Procedure: EP Ablation Focal AFIB;  Surgeon: Lamont Hannon MD;  Location:  HEART CARDI[facility] CATH LAB     GENITOURINARY SURGERY  1990    testicular cancer     ORTHOPEDIC SURGERY  1970's    right knee surgery        FAMILY HISTORY:  Family History   Problem Relation Age of Onset     Colon Cancer Father      Coronary Artery Disease Maternal Grandmother       Prostate Cancer Brother 72       SOCIAL HISTORY:  Social History     Socioeconomic History     Marital status:      Spouse name: None     Number of children: None     Years of education: None     Highest education level: None   Occupational History     None   Tobacco Use     Smoking status: Never Smoker     Smokeless tobacco: Never Used   Substance and Sexual Activity     Alcohol use: Yes     Comment: every 1-2 months or less     Drug use: No     Sexual activity: Yes     Partners: Female   Other Topics Concern     Parent/sibling w/ CABG, MI or angioplasty before 65F 55M? Yes   Social History Narrative     None     Social Determinants of Health     Financial Resource Strain: Not on file   Food Insecurity: Not on file   Transportation Needs: Not on file   Physical Activity: Not on file   Stress: Not on file   Social Connections: Not on file   Intimate Partner Violence: Not on file   Housing Stability: Not on file       Review of Systems:  Skin:  Negative rash   Eyes:  Negative glasses  ENT:  Positive for tinnitus  Respiratory:  Negative    Cardiovascular:  Negative Positive for;edema  Gastroenterology: Negative    Genitourinary:  Negative urinary frequency  Musculoskeletal:  Negative    Neurologic:  Negative    Psychiatric:  Negative    Heme/Lymph/Imm:  Positive for allergies  Endocrine:  Negative        Recent Lab Results:  LIPID RESULTS:  Lab Results   Component Value Date    CHOL 149 02/17/2022    CHOL 157 03/31/2021    HDL 48 02/17/2022    HDL 47 03/31/2021    LDL 79 02/17/2022    LDL 93 03/31/2021    TRIG 110 02/17/2022    TRIG 84 03/31/2021       LIVER ENZYME RESULTS:  Lab Results   Component Value Date    AST 14 02/17/2022    AST 14 03/31/2021    ALT 32 02/17/2022    ALT 28 03/31/2021       CBC RESULTS:  Lab Results   Component Value Date    WBC 5.7 06/21/2021    RBC 5.09 06/21/2021    HGB 16.4 06/21/2021    HCT 47.2 06/21/2021    MCV 93 06/21/2021    MCH 32.2 06/21/2021    MCHC 34.7 06/21/2021    RDW  12.2 06/21/2021     06/21/2021       BMP RESULTS:  Lab Results   Component Value Date     02/17/2022     06/21/2021    POTASSIUM 4.6 02/17/2022    POTASSIUM 4.9 06/21/2021    CHLORIDE 111 (H) 02/17/2022    CHLORIDE 108 06/21/2021    CO2 21 02/17/2022    CO2 27 06/21/2021    ANIONGAP 7 02/17/2022    ANIONGAP 3 06/21/2021    GLC 99 02/17/2022    GLC 94 06/21/2021    BUN 16 02/17/2022    BUN 23 06/21/2021    CR 1.25 02/17/2022    CR 1.31 (H) 06/21/2021    GFRESTIMATED 62 02/17/2022    GFRESTIMATED 55 (L) 06/21/2021    GFRESTBLACK 64 06/21/2021    CHU 8.7 02/17/2022    CHU 9.2 06/21/2021        A1C RESULTS:  No results found for: A1C    INR RESULTS:  Lab Results   Component Value Date    INR 2.0 (H) 02/25/2022    INR 2.2 (H) 02/17/2022    INR 2.48 (H) 01/21/2022    INR 2.18 (H) 01/12/2022    INR 2.60 (H) 07/06/2021    INR 1.88 (H) 06/21/2021         ECHOCARDIOGRAM  No results found for this or any previous visit (from the past 8760 hour(s)).      No orders of the defined types were placed in this encounter.    No orders of the defined types were placed in this encounter.    There are no discontinued medications.      Encounter Diagnosis   Name Primary?     Paroxysmal atrial fibrillation (H)          CC  Hilda Wahl, APRN CNP  [address]  [place] [id number]    Thank you for allowing me to participate in the care of your patient.      Sincerely,     Lamont Hannon MD     [facility]

## 2022-03-02 NOTE — PROGRESS NOTES
Electrophysiology/ Clinic Note         H&P and Plan:     REASON FOR VISIT: Electrophysiology evaluation.      HISTORY OF PRESENT ILLNESS: Mr. Connelly is a pleasant  71-year-old gentleman with a history of hypertension, hyperlipidemia and recurrent persistent atrial fibrillation (previous tachycardia mediated cardiomyopathy- resolved; A. fib ablation on 1/3/2020), who is here for team evaluation.     Patient underwent A. fib ablation on 1/3/2020.  At that time, he was found to have significant amount of scar in the left atrium.  He underwent pulmonary vein isolation and posterior left atrial wall isolation.  At the end of procedure, he was found to have atypical flutter.  However, he had several forms of atypical flutter and no further ablation was performed.     After ablation procedure, he was started recommendation of flecainide and Coreg.  He did well for last 2 years.  However, he started having recurrent episodes of atypical flutter this year.  He underwent IBETH/cardioversion on 1/21 and he had recurrence of atrial flutter after cardioversion.  Flecainide was then discontinued and he was started on amiodarone.  He underwent a second successive cardioversion in 2/11/2022.     I saw him a couple weeks ago.  At the time, he was concerned about potential side effects related with amiodarone long-term.  We discussed possibility of stopping amiodarone and pursue other therapies.  He is here today for further discussion.    Today, he informs he is doing well.  He stopped amiodarone a week ago.  He denies any recurrence of A. fib/flutter.  He has no complaints during this visit.  He denies any symptoms such as chest pain, shortness of breath, lightheadedness, near-syncope or syncope.      PREVIOUS STUDIES (personally reviewed):  -Exercise stress test (08/28/2019): Target heart rate was not achieved.  However test was not negative for ischemia or pro-arrhythmias.  -Echo (08/14/2019): Normal LV function.  EF estimated  "50-55%.  -IBETH (1/02/2020): Normal LV function.  EF estimated 55-60%.  There was no significant valve disease.  -Chest CT (01/02/2020): Normal pulmonary vein anatomy.  Aortic root was mildly dilated (4.2 x 3.88 cm).  Coronary calcification was noticed in the LAD.  Scattered mediastinal and right heel are calcifications were noticed suggestive of old granulomas.     ASSESSMENT AND PLAN:   1.  Recurrent persistent atrial fibrillation/atypical flutter.   An extensive discussion regarding management of A. fib/flutter with him and his wife.  He had extensive underscore and had significant ablation done the previous ablation.  The success rate of redo ablation will be very small.  We discussed possibility of antiarrhythmic therapy.  In particular, we discussed possibly of starting dofetilide.  However he is not interested in be admitted to hospital for medical therapy.    We also discussed possibility of living with atrial arrhythmias and control heart rate.  He seems to be inclined to that option.  He is also inclined to pursue AV node ablation/pacemaker implantation if needed as he is tired to deal with atrial arrhythmias.    Since he continues to do well and is off amiodarone.  We will continue current therapy with Coreg.  He will contact us in case of recurrence of atrial arrhythmia.  At that time, will consider either adding digoxin or diltiazem for rate control strategy.  If we fail rate control strategy with pharmacotherapy, will consider AV node ablation/pacemaker implantation    2.  Embolic prevention.  Chads vas score of 2.  Continue anticoagulation with Coumadin indefinitely.     3.  Hypertension.  Blood pressure was elevated today.    He responded better to carvedilol in the past.  I recommend stopping metoprolol and starting carvedilol 6.25 mg twice daily.        Lamont Hannon MD    Physical Exam:  Vitals: BP (!) 147/93   Pulse 77   Ht 1.905 m (6' 3\")   Wt 127 kg (280 lb)   SpO2 98%   BMI 35.00 kg/m  "     Constitutional:  AAO x3.  Pt is in NAD.  HEAD: normocephalic.  SKIN: Skin normal color, texture and turgor with no lesions or eruptions.  Eyes: PERRL, EOMI.  ENT:  Supple, normal JVP. No lymphadenopathy or thyroid enlargement.  Chest:  CTAB.  Cardiac: RRR, normal  S1 and S2.  No murmurs rubs or gallop.    Abdomen:  Normal BS.  Soft, non-tender and non-distended.  No rebound or guarding.    Extremities:  Pedious pulses palpable B/L.  No LE edema noticed.   Neurological: Strength and sensation grossly symmetric and intact throughout.       CURRENT MEDICATIONS:  Current Outpatient Medications   Medication Sig Dispense Refill     acetaminophen (TYLENOL) 500 MG tablet Take 500 mg by mouth every 6 hours as needed for mild pain       atorvastatin (LIPITOR) 20 MG tablet Take 1 tablet (20 mg) by mouth daily 90 tablet 2     carvedilol (COREG) 6.25 MG tablet Take 1 tablet (6.25 mg) by mouth 2 times daily (with meals) 60 tablet 3     losartan (COZAAR) 100 MG tablet Take 1 tablet (100 mg) by mouth daily 90 tablet 3     melatonin 3 MG CAPS Take 3 mg by mouth At Bedtime       warfarin ANTICOAGULANT (COUMADIN) 5 MG tablet Current dose (11/18/21): 5 mg daily (1 tablet) or as directed by ACC team. 95 tablet 1     sildenafil (REVATIO) 20 MG tablet Take 1 tablet (20 mg) by mouth daily as needed (Patient not taking: Reported on 3/2/2022) 30 tablet 3       ALLERGIES     Allergies   Allergen Reactions     Lisinopril Cough     Very persistent cough       PAST MEDICAL HISTORY:  Past Medical History:   Diagnosis Date     Atrial flutter (H)      Benign essential hypertension 1/18/2017    past use of ACE- Cough;  Had been on ARB-diuretic but experienced lower bp readings;  BLOOD PRESSURE now well controlled on ARB also no longer on diuretic; no low bp readings; may have had slight edema initially but has normalized with bp well controlled on ARB alone.      Cancer (H)     testicular cancer     CHF (congestive heart failure) (H) 6/5/2019      Long term current use of anticoagulant therapy 6/3/2019     Obesity (BMI 35.0-39.9) with comorbidity (H) 2/11/2019     Paroxysmal atrial fibrillation (H) 06/03/2019       PAST SURGICAL HISTORY:  Past Surgical History:   Procedure Laterality Date     ANESTHESIA CARDIOVERSION N/A 6/26/2019    Procedure: ANESTHESIA, FOR CARDIOVERSION DR. LE;  Surgeon: GENERIC ANESTHESIA PROVIDER;  Location: [facility] OR     ANESTHESIA CARDIOVERSION N/A 7/17/2019    Procedure: ANESTHESIA, FOR CARDIOVERSION;  Surgeon: GENERIC ANESTHESIA PROVIDER;  Location: [facility] OR     ANESTHESIA CARDIOVERSION N/A 1/28/2020    Procedure: ANESTHESIA, FOR CARDIOVERSION;  Surgeon: GENERIC ANESTHESIA PROVIDER;  Location:  OR     ANESTHESIA CARDIOVERSION N/A 9/8/2020    Procedure: ANESTHESIA, FOR CARDIOVERSION;  Surgeon: GENERIC ANESTHESIA PROVIDER;  Location: [facility] OR     ANESTHESIA CARDIOVERSION N/A 11/23/2020    Procedure: ANESTHESIA, FOR CARDIOVERSION;  Surgeon: GENERIC ANESTHESIA PROVIDER;  Location: [facility] OR     ANESTHESIA CARDIOVERSION N/A 1/21/2022    Procedure: ANESTHESIA, FOR CARDIOVERSION;  Surgeon: GENERIC ANESTHESIA PROVIDER;  Location: [facility] OR     ANESTHESIA CARDIOVERSION N/A 2/11/2022    Procedure: ANESTHESIA, FOR CARDIOVERSION (DR. MAO);  Surgeon: GENERIC ANESTHESIA PROVIDER;  Location:  OR     CLOSED REDUCTION SHOULDER Left      EP ABLATION FOCAL AFIB N/A 1/3/2020    Procedure: EP Ablation Focal AFIB;  Surgeon: Lamont Hannon MD;  Location:  HE[facility] CARDI[facility] CATH LAB     GENITOURINARY SURGERY  1990    testicular cancer     ORTHOPEDIC SURGERY  1970's    right knee surgery        FAMILY HISTORY:  Family History   Problem Relation Age of Onset     Colon Cancer Father      Coronary Artery Disease Maternal Grandmother      Prostate Cancer Brother 72       SOCIAL HISTORY:  Social History     Socioeconomic History     Marital status:      Spouse name: None     Number of children: None     Years of education: None     Highest education  level: None   Occupational History     None   Tobacco Use     Smoking status: Never Smoker     Smokeless tobacco: Never Used   Substance and Sexual Activity     Alcohol use: Yes     Comment: every 1-2 months or less     Drug use: No     Sexual activity: Yes     Partners: Female   Other Topics Concern     Parent/sibling w/ CABG, MI or angioplasty before 65F 55M? Yes   Social History Narrative     None     Social Determinants of Health     Financial Resource Strain: Not on file   Food Insecurity: Not on file   Transportation Needs: Not on file   Physical Activity: Not on file   Stress: Not on file   Social Connections: Not on file   Intimate Partner Violence: Not on file   Housing Stability: Not on file       Review of Systems:  Skin:  Negative rash   Eyes:  Negative glasses  ENT:  Positive for tinnitus  Respiratory:  Negative    Cardiovascular:  Negative Positive for;edema  Gastroenterology: Negative    Genitourinary:  Negative urinary frequency  Musculoskeletal:  Negative    Neurologic:  Negative    Psychiatric:  Negative    Heme/Lymph/Imm:  Positive for allergies  Endocrine:  Negative        Recent Lab Results:  LIPID RESULTS:  Lab Results   Component Value Date    CHOL 149 02/17/2022    CHOL 157 03/31/2021    HDL 48 02/17/2022    HDL 47 03/31/2021    LDL 79 02/17/2022    LDL 93 03/31/2021    TRIG 110 02/17/2022    TRIG 84 03/31/2021       LIVER ENZYME RESULTS:  Lab Results   Component Value Date    AST 14 02/17/2022    AST 14 03/31/2021    ALT 32 02/17/2022    ALT 28 03/31/2021       CBC RESULTS:  Lab Results   Component Value Date    WBC 5.7 06/21/2021    RBC 5.09 06/21/2021    HGB 16.4 06/21/2021    HCT 47.2 06/21/2021    MCV 93 06/21/2021    MCH 32.2 06/21/2021    MCHC 34.7 06/21/2021    RDW 12.2 06/21/2021     06/21/2021       BMP RESULTS:  Lab Results   Component Value Date     02/17/2022     06/21/2021    POTASSIUM 4.6 02/17/2022    POTASSIUM 4.9 06/21/2021    CHLORIDE 111 (H) 02/17/2022     CHLORIDE 108 06/21/2021    CO2 21 02/17/2022    CO2 27 06/21/2021    ANIONGAP 7 02/17/2022    ANIONGAP 3 06/21/2021    GLC 99 02/17/2022    GLC 94 06/21/2021    BUN 16 02/17/2022    BUN 23 06/21/2021    CR 1.25 02/17/2022    CR 1.31 (H) 06/21/2021    GFRESTIMATED 62 02/17/2022    GFRESTIMATED 55 (L) 06/21/2021    GFRESTBLACK 64 06/21/2021    CHU 8.7 02/17/2022    CHU 9.2 06/21/2021        A1C RESULTS:  No results found for: A1C    INR RESULTS:  Lab Results   Component Value Date    INR 2.0 (H) 02/25/2022    INR 2.2 (H) 02/17/2022    INR 2.48 (H) 01/21/2022    INR 2.18 (H) 01/12/2022    INR 2.60 (H) 07/06/2021    INR 1.88 (H) 06/21/2021         ECHOCARDIOGRAM  No results found for this or any previous visit (from the past 8760 hour(s)).      No orders of the defined types were placed in this encounter.    No orders of the defined types were placed in this encounter.    There are no discontinued medications.      Encounter Diagnosis   Name Primary?     Paroxysmal atrial fibrillation (H)          MARAH Wahl, APRN CNP  [address]  [place] [id number]

## 2022-03-11 ENCOUNTER — LAB (OUTPATIENT)
Dept: LAB | Facility: CLINIC | Age: 72
End: 2022-03-11
Payer: COMMERCIAL

## 2022-03-11 ENCOUNTER — ANTICOAGULATION THERAPY VISIT (OUTPATIENT)
Dept: ANTICOAGULATION | Facility: CLINIC | Age: 72
End: 2022-03-11

## 2022-03-11 DIAGNOSIS — I25.10 CORONARY ARTERY CALCIFICATION: ICD-10-CM

## 2022-03-11 DIAGNOSIS — Z79.01 LONG TERM CURRENT USE OF ANTICOAGULANT THERAPY: Primary | ICD-10-CM

## 2022-03-11 DIAGNOSIS — I48.0 PAROXYSMAL ATRIAL FIBRILLATION (H): ICD-10-CM

## 2022-03-11 DIAGNOSIS — Z79.01 LONG TERM CURRENT USE OF ANTICOAGULANTS WITH INR GOAL OF 2.0-3.0: ICD-10-CM

## 2022-03-11 LAB — INR BLD: 2.1 (ref 0.9–1.1)

## 2022-03-11 PROCEDURE — 85610 PROTHROMBIN TIME: CPT

## 2022-03-11 PROCEDURE — 36416 COLLJ CAPILLARY BLOOD SPEC: CPT

## 2022-03-11 RX ORDER — ATORVASTATIN CALCIUM 20 MG/1
20 TABLET, FILM COATED ORAL DAILY
Qty: 90 TABLET | Refills: 3 | Status: SHIPPED | OUTPATIENT
Start: 2022-03-11 | End: 2023-03-20

## 2022-03-11 NOTE — PROGRESS NOTES
ANTICOAGULATION MANAGEMENT     Kevan Connelly 71 year old male is on warfarin with therapeutic INR result. (Goal INR 2.0-3.0)    Recent labs: (last 7 days)     03/11/22  1236   INR 2.1*       ASSESSMENT       Source(s): Chart Review       New illness, injury, or hospitalization: No    Medication/supplement changes: amiodarone discontinued on 2/23/22    Signs or symptoms of bleeding or clotting: No    Previous INR: Therapeutic last 2(+) visits. Maintenance dose was increased 9% at last INR check due to amiodarone completion.     Additional findings: None       PLAN     Unable to reach Keron today.    Left message for patient to increase dose to warfarin 5 mg Fri, Sat and Sunday.  Advised to call INR Clinic back to discuss further.    Follow up required to confirm warfarin dose taken and assess for changes     Dosing plan reviewed with Angela Mendoza [facility].  She suggested increasing warfarin to 5 mg daily except 2.5 mg on Monday since INR may continue to drop.    Yarelis Martinez RN  Anticoagulation Clinic  3/11/2022

## 2022-03-11 NOTE — PROGRESS NOTES
ANTICOAGULATION MANAGEMENT     Kevan Connelly 71 year old male is on warfarin with therapeutic INR result. (Goal INR 2.0-3.0)    Recent labs: (last 7 days)     03/11/22  1236   INR 2.1*       ASSESSMENT       Source(s): Chart Review and Patient/Caregiver Call       Warfarin doses taken: Warfarin taken as instructed    Diet: No new diet changes identified    New illness, injury, or hospitalization: No    Medication/supplement changes: amiodarone discontinued on 2/23/22    Signs or symptoms of bleeding or clotting: No    Previous INR: Therapeutic last 2(+) visits    Additional findings: None       PLAN     Recommended plan for ongoing change(s) affecting INR     Dosing Instructions:  Decrease your warfarin dose (9% change) with next INR in 2 weeks       Summary  As of 3/11/2022    Full warfarin instructions:  2.5 mg every Mon; 5 mg all other days   Next INR check:  3/25/2022             Telephone call with Kevan who agrees to plan and repeated back plan correctly    Lab visit scheduled    Education provided: Please call back if any changes to your diet, medications or how you've been taking warfarin and Potential interaction between warfarin and amiodarone    Plan made with [facility] Pharmacist Angela Martinez RN  Anticoagulation Clinic  3/11/2022    _______________________________________________________________________     Anticoagulation Episode Summary     Current INR goal:  2.0-3.0   TTR:  86.0 % (1 y)   Target end date:  Indefinite   Send INR reminders to:  [facility]    Indications    Long term current use of anticoagulant therapy [Z79.01]  Paroxysmal atrial fibrillation (H) [I48.0]           Comments:           Anticoagulation Care Providers     Provider Role Specialty Phone number    Davina Reaves MD Referring Internal Medicine [phone]

## 2022-03-17 ENCOUNTER — TRANSFERRED RECORDS (OUTPATIENT)
Dept: HEALTH INFORMATION MANAGEMENT | Facility: CLINIC | Age: 72
End: 2022-03-17
Payer: COMMERCIAL

## 2022-03-18 ENCOUNTER — TELEPHONE (OUTPATIENT)
Dept: CARDIOLOGY | Facility: CLINIC | Age: 72
End: 2022-03-18
Payer: COMMERCIAL

## 2022-03-18 DIAGNOSIS — M79.89 SWELLING OF LOWER EXTREMITY: Primary | ICD-10-CM

## 2022-03-18 RX ORDER — FUROSEMIDE 20 MG
20 TABLET ORAL DAILY PRN
Qty: 30 TABLET | Refills: 0 | Status: SHIPPED | OUTPATIENT
Start: 2022-03-18 | End: 2022-04-13

## 2022-03-18 NOTE — TELEPHONE ENCOUNTER
Pt called and states that he is doing well and not in A fib, but is starting to retain fluid in his LE and wondering about a diuretic. Spoke to pt who states that it is his feet and ankles an Left knee-but that may be arthritis related.  Pt BP today was 15/82.  Has been watching his salt intake and not drinking beer. Will message Hilda for recommendations. JNelsonRLISA

## 2022-03-18 NOTE — TELEPHONE ENCOUNTER
He can have lasix 20 mg daily x 3 days.  Then stop.  I sent the prescription to [facility]  Avoid salt   Keep feet elevated  Wear compression stockings.     ANNMARIE Peterson CNP on 3/18/2022 at 3:07 PM

## 2022-03-18 NOTE — TELEPHONE ENCOUNTER
Spoke to pt and made him aware that Hilda has sent Lasix 20 mg to [facility]'s to . Pt is to take daily for 3 days and then stop.  Pt will call on Monday with a report .  Pt aware NO salt, feet elevated and to wear his astrid stockings.  Pt states that he does have them on.  Isabel

## 2022-03-22 RX ORDER — FUROSEMIDE 20 MG
20 TABLET ORAL DAILY
Qty: 90 TABLET | Refills: 3 | Status: SHIPPED | OUTPATIENT
Start: 2022-03-22 | End: 2022-04-05

## 2022-03-22 NOTE — TELEPHONE ENCOUNTER
Made pt aware that Hilda would like him to have a BMP in one week and see her in a couple of weeks. Pt will get have BMP done at his PCP lab and will see Hilda on 4/13 at 1230. Isabel

## 2022-03-22 NOTE — TELEPHONE ENCOUNTER
Pt called with an update after taking lasix for 3 days. Pt states that it did help with the edema in his feet, not his knee. State that feet are better, but not back to normal. States that he is down 4 pounds. Pt BP yesterday was 137/88 HR 50-90's  No symptoms of A Fib. Will message to Hilda for recommendations. JNelsonRN

## 2022-03-22 NOTE — TELEPHONE ENCOUNTER
Start lasix 20 mg daily   Follow up with BMP in 1 week  OV in a couple week.      Orders are in and meds sent to pharmacy  Can you please call pt    ANNMARIE Peterson CNP on 3/22/2022 at 11:27 AM

## 2022-03-25 ENCOUNTER — LAB (OUTPATIENT)
Dept: LAB | Facility: CLINIC | Age: 72
End: 2022-03-25
Payer: COMMERCIAL

## 2022-03-25 ENCOUNTER — DOCUMENTATION ONLY (OUTPATIENT)
Dept: FAMILY MEDICINE | Facility: CLINIC | Age: 72
End: 2022-03-25

## 2022-03-25 ENCOUNTER — ANTICOAGULATION THERAPY VISIT (OUTPATIENT)
Dept: ANTICOAGULATION | Facility: CLINIC | Age: 72
End: 2022-03-25

## 2022-03-25 DIAGNOSIS — Z79.01 LONG TERM CURRENT USE OF ANTICOAGULANT THERAPY: Primary | ICD-10-CM

## 2022-03-25 DIAGNOSIS — I48.0 PAROXYSMAL ATRIAL FIBRILLATION (H): ICD-10-CM

## 2022-03-25 DIAGNOSIS — Z79.01 LONG TERM CURRENT USE OF ANTICOAGULANTS WITH INR GOAL OF 2.0-3.0: ICD-10-CM

## 2022-03-25 LAB — INR BLD: 2.3 (ref 0.9–1.1)

## 2022-03-25 PROCEDURE — 85610 PROTHROMBIN TIME: CPT

## 2022-03-25 PROCEDURE — 36415 COLL VENOUS BLD VENIPUNCTURE: CPT

## 2022-03-25 NOTE — PROGRESS NOTES
ANTICOAGULATION MANAGEMENT      Kevan Connelly due for annual renewal of referral to anticoagulation monitoring. Order pended for your review and signature.      ANTICOAGULATION SUMMARY      Warfarin indication(s)     Atrial fibrillation    Heart valve present?  NO       Current goal range   INR: 2.0-3.0     Goal appropriate for indication? Yes, INR 2-3 appropriate for hx of DVT, PE, hypercoagulable state, Afib, LVAD, or bileaflet AVR without risk factors     Current duration of therapy Indefinite/long term therapy   Time in Therapeutic Range (TTR)  (Goal > 60%) 87%       Office visit with referring provider's group within last year yes on 2/17/22       Noris Sher RN

## 2022-03-25 NOTE — PROGRESS NOTES
ANTICOAGULATION MANAGEMENT     Kevan Connelly 71 year old male is on warfarin with therapeutic INR result. (Goal INR 2.0-3.0)    Recent labs: (last 7 days)     03/25/22  1201   INR 2.3*       ASSESSMENT       Source(s): Chart Review and Patient/Caregiver Call       Warfarin doses taken: Warfarin taken as instructed    Diet: No new diet changes identified    New illness, injury, or hospitalization: Yes: left knee pain started 3-4 weeks ago. No fall or other injury. Has swelling in his knee, foot and ankle. Was started on lasix by cardiology.    Medication/supplement changes: amiodarone was discontinued on 2/25/22    Signs or symptoms of bleeding or clotting: No    Previous INR: Therapeutic last 2(+) visits    Additional findings: None       PLAN     Recommended plan for no diet, medication or health factor changes affecting INR     Dosing Instructions: Continue your current warfarin dose with next INR in 2.5 weeks, before his follow-up appointment with cardiology       Summary  As of 3/25/2022    Full warfarin instructions:  2.5 mg every Mon; 5 mg all other days   Next INR check:  4/13/2022             Telephone call with Kreon who agrees to plan and repeated back plan correctly    Lab visit scheduled    Education provided: No interaction anticipated between warfarin and lasix and Contact [phone]  with any changes, questions or concerns.     Plan made per [facility] anticoagulation protocol    Noris Sher RN  Anticoagulation Clinic  3/25/2022    _______________________________________________________________________     Anticoagulation Episode Summary     Current INR goal:  2.0-3.0   TTR:  87.0 % (1 y)   Target end date:  Indefinite   Send INR reminders to:  [facility]    Indications    Long term current use of anticoagulant therapy [Z79.01]  Paroxysmal atrial fibrillation (H) [I48.0]           Comments:           Anticoagulation Care Providers     Provider Role Specialty Phone number    Davina Reaves  MD [facility] Internal Medicine [phone]

## 2022-03-28 ENCOUNTER — TRANSFERRED RECORDS (OUTPATIENT)
Dept: HEALTH INFORMATION MANAGEMENT | Facility: CLINIC | Age: 72
End: 2022-03-28
Payer: COMMERCIAL

## 2022-03-30 ENCOUNTER — LAB (OUTPATIENT)
Dept: LAB | Facility: CLINIC | Age: 72
End: 2022-03-30
Payer: COMMERCIAL

## 2022-03-30 DIAGNOSIS — M79.89 SWELLING OF LOWER EXTREMITY: ICD-10-CM

## 2022-03-30 DIAGNOSIS — N18.30 STAGE 3 CHRONIC KIDNEY DISEASE (H): ICD-10-CM

## 2022-03-30 DIAGNOSIS — I48.0 PAROXYSMAL ATRIAL FIBRILLATION (H): Primary | ICD-10-CM

## 2022-03-30 LAB — HGB BLD-MCNC: 15.8 G/DL (ref 13.3–17.7)

## 2022-03-30 PROCEDURE — 80048 BASIC METABOLIC PNL TOTAL CA: CPT

## 2022-03-30 PROCEDURE — 85018 HEMOGLOBIN: CPT

## 2022-03-30 PROCEDURE — 36415 COLL VENOUS BLD VENIPUNCTURE: CPT

## 2022-03-31 LAB
ANION GAP SERPL CALCULATED.3IONS-SCNC: 3 MMOL/L (ref 3–14)
BUN SERPL-MCNC: 24 MG/DL (ref 7–30)
CALCIUM SERPL-MCNC: 9.4 MG/DL (ref 8.5–10.1)
CHLORIDE BLD-SCNC: 109 MMOL/L (ref 94–109)
CO2 SERPL-SCNC: 26 MMOL/L (ref 20–32)
CREAT SERPL-MCNC: 1.34 MG/DL (ref 0.66–1.25)
GFR SERPL CREATININE-BSD FRML MDRD: 57 ML/MIN/1.73M2
GLUCOSE BLD-MCNC: 104 MG/DL (ref 70–99)
POTASSIUM BLD-SCNC: 4.8 MMOL/L (ref 3.4–5.3)
SODIUM SERPL-SCNC: 138 MMOL/L (ref 133–144)

## 2022-04-01 ENCOUNTER — TELEPHONE (OUTPATIENT)
Dept: CARDIOLOGY | Facility: CLINIC | Age: 72
End: 2022-04-01
Payer: COMMERCIAL

## 2022-04-01 DIAGNOSIS — M79.89 SWELLING OF LOWER EXTREMITY: ICD-10-CM

## 2022-04-01 NOTE — TELEPHONE ENCOUNTER
4/1/22 Pt called asking for result of BMP drawn 3/30. Informed pt that Hilda is out of office until 4/4 but she can review at that time and we will call him w recommendations.  Pt voiced understanding and agreement with plan.   Bran 1122 am

## 2022-04-05 RX ORDER — FUROSEMIDE 20 MG
TABLET ORAL
Qty: 90 TABLET | Refills: 3
Start: 2022-04-05 | End: 2022-04-14

## 2022-04-05 NOTE — TELEPHONE ENCOUNTER
Can you call pt and let him know his creatinine was slightly elevated. Lets have him take his lasix 20 mg every other day.  I will see him next week.     ANNMARIE Peterson CNP on 4/5/2022 at 4:09 PM

## 2022-04-05 NOTE — TELEPHONE ENCOUNTER
4/5/22 Spoke w pt and explained results and recommendations. Med list updated.  Pt voiced understanding and agreement with plan.   Bran 420 pm

## 2022-04-13 ENCOUNTER — OFFICE VISIT (OUTPATIENT)
Dept: CARDIOLOGY | Facility: CLINIC | Age: 72
End: 2022-04-13
Attending: NURSE PRACTITIONER
Payer: COMMERCIAL

## 2022-04-13 ENCOUNTER — ANTICOAGULATION THERAPY VISIT (OUTPATIENT)
Dept: ANTICOAGULATION | Facility: CLINIC | Age: 72
End: 2022-04-13

## 2022-04-13 ENCOUNTER — LAB (OUTPATIENT)
Dept: LAB | Facility: CLINIC | Age: 72
End: 2022-04-13

## 2022-04-13 VITALS
HEIGHT: 75 IN | HEART RATE: 77 BPM | BODY MASS INDEX: 34.69 KG/M2 | WEIGHT: 279 LBS | SYSTOLIC BLOOD PRESSURE: 138 MMHG | DIASTOLIC BLOOD PRESSURE: 80 MMHG

## 2022-04-13 DIAGNOSIS — Z79.01 ON CONTINUOUS ORAL ANTICOAGULATION: ICD-10-CM

## 2022-04-13 DIAGNOSIS — Z79.01 LONG TERM CURRENT USE OF ANTICOAGULANT THERAPY: Primary | ICD-10-CM

## 2022-04-13 DIAGNOSIS — I48.0 PAROXYSMAL ATRIAL FIBRILLATION (H): ICD-10-CM

## 2022-04-13 DIAGNOSIS — I50.32 CHRONIC DIASTOLIC CONGESTIVE HEART FAILURE (H): ICD-10-CM

## 2022-04-13 DIAGNOSIS — Z79.01 LONG TERM CURRENT USE OF ANTICOAGULANT THERAPY: ICD-10-CM

## 2022-04-13 DIAGNOSIS — M79.89 SWELLING OF LOWER EXTREMITY: Primary | ICD-10-CM

## 2022-04-13 DIAGNOSIS — I10 BENIGN ESSENTIAL HYPERTENSION: ICD-10-CM

## 2022-04-13 LAB — INR BLD: 2 (ref 0.9–1.1)

## 2022-04-13 PROCEDURE — 36416 COLLJ CAPILLARY BLOOD SPEC: CPT

## 2022-04-13 PROCEDURE — 99214 OFFICE O/P EST MOD 30 MIN: CPT | Performed by: NURSE PRACTITIONER

## 2022-04-13 PROCEDURE — 85610 PROTHROMBIN TIME: CPT

## 2022-04-13 RX ORDER — CARVEDILOL 6.25 MG/1
6.25 TABLET ORAL 2 TIMES DAILY WITH MEALS
Qty: 180 TABLET | Refills: 3 | Status: SHIPPED | OUTPATIENT
Start: 2022-04-13 | End: 2022-10-17

## 2022-04-13 NOTE — PROGRESS NOTES
ANTICOAGULATION MANAGEMENT     Kevan Connelly 71 year old male is on warfarin with therapeutic INR result. (Goal INR 2.0-3.0)    Recent labs: (last 7 days)     04/13/22  1315   INR 2.0*        ASSESSMENT       Source(s): Chart Review and Patient/Caregiver Call       Warfarin doses taken: Warfarin taken as instructed    Diet: No new diet changes identified    New illness, injury, or hospitalization: No    Medication/supplement changes: None noted    Signs or symptoms of bleeding or clotting: No    Previous INR: Therapeutic last 2(+) visits    Additional findings: None       PLAN     Recommended plan for no diet, medication or health factor changes affecting INR     Dosing Instructions: continue your current warfarin dose with next INR in 3 weeks       Summary  As of 4/13/2022    Full warfarin instructions:  2.5 mg every Mon; 5 mg all other days   Next INR check:  5/10/2022             Telephone call with Keron who agrees to plan and repeated back plan correctly    Patient elected to schedule next visit 5/10/22 because he wanted a early time and none were available until then.    Education provided: Contact [phone]  with any changes, questions or concerns.     Plan made per [facility] anticoagulation protocol    Noris Sher RN  Anticoagulation Clinic  4/13/2022    _______________________________________________________________________     Anticoagulation Episode Summary     Current INR goal:  2.0-3.0   TTR:  87.0 % (1 y)   Target end date:  Indefinite   Send INR reminders to:  [facility]    Indications    Long term current use of anticoagulant therapy [Z79.01]  Paroxysmal atrial fibrillation (H) [I48.0]           Comments:           Anticoagulation Care Providers     Provider Role Specialty Phone number    Davina Reaves MD Referring Internal Medicine [phone]

## 2022-04-13 NOTE — LETTER
4/13/2022    Davina Reaves [place]  [id number] [address] [facility] [address]    RE: Kevan Connelly       Dear Colleague,     I [facility] the [facility].      Cardiology Clinic Progress Note    Service Date: 04/13/22    Primary Cardiologist: Dr. Hannon      Reason for Visit: follow up for LE edema     HPI:   I had the pleasure of seeing . Kevan Connelly in the clinic today and he is a very pleasant 71 year old male with a past medical history notable for    1. Atrial fibrillation.  Diagnosed on 5/30/19 with cardiomyopathy EF was 33%.  Was on amiodarone and changed to flecainide with normal EF.  refractory to flecainide.     Underwent an atrial fibrillation ablation on 1/3/2020. IBETH guided cardioversion on 1/21/2022  2. Nonocclusive coronary artery disease.  Per CT angiogram heart 1/2020 revealed coronary calcification the LAD, circumflex and RCA.  3. Mildly dilated aortic root and ascending aorta.  Per CT angiogram (1/2020) revealed 4.22 x 3.88 cm and 3.64 cm x 3.73 cm respectively.  4. Tachycardia induced cardiomyopathy.  EF 33% (6/2018)  ECHO 50-55% (8/2019)  5. Hypertension  6. Obesity  7. Testicular cancer with subsequent ED      Diagnostics  I have personally reviewed the following reports    IBETH (1/21/2022) The left ventricle is normal in size. The visual ejection fraction is 55-60%. No regional wall motion abnormalities noted. No thrombus is detected in the left atrial appendage. The left atrium is moderate to severely dilated. No significant valvular heart disease. The rhythm was atrial flutter.        In March 2019, he was diagnosed with atrial fibrillation     In June 2019 he was diagnosed with cardiomyopathy, underwent an unsuccessful cardioversion was later started on amiodarone and on 7/2019 underwent a successful cardioversion     In September 2019 his EF improved, he was started on flecainide unfortunately was found to be in atrial  fibrillation via Zio patch     On 1/2020 he underwent PVI, had recurrence of atrial fibrillation was started on amiodarone which was subsequently discontinued on 4/2020     On 8/2020 he presented with atrial fibrillation, was started on flecainide and underwent a cardioversion.  Failed to maintain sinus rhythm and in November 2020 underwent a cardioversion.     In June 2021 he met with Dr. Hannon he was doing well on flecainide 75 mg twice daily and metoprolol XL 50 mg daily.  He was on Coumadin for anticoagulation.  His blood pressure was elevated and his losartan was increased to 100 mg daily.      In January 2022, patient's ECG showed atrial flutter and was recommended to increase his carvedilol from 6.25-9.375 and undergo a cardioversion and was unable to maintain sinus rhythm was started on  amiodarone with a load and underwent a cardioversion on 2/11/2022.  He then met with Dr. Hannon we discussed repeat ablation versus AV node ablation and permanent pacemaker or continue antiarrhythmic therapy with dofetilide admission.   Patient elected not to continue with amiodarone or dofetilide and if he had a reoccurrence of atrial fibrillation would add digoxin or diltiazem for rate control and if therapy failed will consider an AV node ablation and permanent pacemaker.    In March 2022 he was having lower extremity edema and was started on furosemide 20 mg daily his creatinine elevated and he went to furosemide 20 mg every other day.      Today, he reports feeling good with no recurrence of atrial fibrillation, no swelling and he denies chest pain, shortness of breath, dyspnea on exertion, orthopnea, PND, lower extremity edema, palpitations, dizziness.   Reports taking medications as prescribed including warfarin and denies bleeding and sign/symptoms of stroke.     ASSESSMENT AND PLAN:    Atrial fibrillation    For anticoagulation for CHADS VASC 3 (age, HTN, HF) he takes warfarin with goal INR 2-3. His  INRs have been  therapeutic.      His last hemoglobin was 15.8 (3/2022).       Refractory to flecainide, On 1/3/2020, he underwent a PVI ablation, flecainide was discontinued and he was started on amiodarone which was discontinued on 4/2020.   In 8/2020, he had a recurrence of atrial fibrillation, was restarted on flecainide underwent a successful cardioversion.  He had a recurrence and underwent a cardioversion on 11/2020.    In January 2022 patient had a recurrence of atrial fibrillation and underwent a IBETH guided cardioversion but has not been able to maintain sinus rhythm and was started on amiodarone with a load. Due to the potential side effects, amiodarone was discontinued in 2/2022 and if he has a recurrence of atrial fibrillation he will be rate controlled with digoxin and/or diltiazem or undergo an AV node ablation and ppm     Cardiomyopathy    At the time of diagnosis with his atrial fibrillation his EF was found to be 33%.       ECHO (8/2019) revealed EF 50-55%      IBETH (1/2022) revealed normal EF    Continue GDMT of ARB (losartan 100 mg daily) and BB (coreg 6.25 mg BID). Creatinine slightly elevated while taking lasix daily therefore decreased to every other day.  Will repeat BMP    Euvolemic on exam      Hypertension    controlled at home while taking losartan 100 mg daily and coreg 6.25 mg BID.          Non-occlusive coronary artery disease    Per CT angiogram heart 1/2020 revealed coronary calcification the LAD, circumflex and RCA.    Asymptomatic    Continue BB, ARB, statin.  He is not on an aspirin due to being on Warfarin.  His last LDL was 749 (20/2022)      Plan:    BMP today call with results    Continue with current mediations    Follow up with EP BRYAN in 6 months     Please call the clinic if questions or problems arise      Thank you for the opportunity to participate in this pleasant patient's care. We would be happy to see him sooner if needed for any concerns in the meantime.          Hilda Wahl,  APRN CNP  [facility]  Text Page  (M-F 8:00 am - 4:30 pm)    Orders this Visit:  Orders Placed This Encounter   Procedures     Basic metabolic panel     Follow-Up with Cardiology BRYAN     Orders Placed This Encounter   Medications     carvedilol (COREG) 6.25 MG tablet     Sig: Take 1 tablet (6.25 mg) by mouth 2 times daily (with meals)     Dispense:  180 tablet     Refill:  3     Medications Discontinued During This Encounter   Medication Reason     furosemide (LASIX) 20 MG tablet      carvedilol (COREG) 6.25 MG tablet Reorder     melatonin 3 MG CAPS Medication Reconciliation Clean Up     Encounter Diagnoses   Name Primary?     Swelling of lower extremity      Paroxysmal atrial fibrillation (H)        CURRENT MEDICATIONS:  Current Outpatient Medications   Medication Sig Dispense Refill     acetaminophen (TYLENOL) 500 MG tablet Take 500 mg by mouth every 6 hours as needed for mild pain       atorvastatin (LIPITOR) 20 MG tablet Take 1 tablet (20 mg) by mouth daily 90 tablet 3     carvedilol (COREG) 6.25 MG tablet Take 1 tablet (6.25 mg) by mouth 2 times daily (with meals) 180 tablet 3     furosemide (LASIX) 20 MG tablet Take 1 tablet ( 20 mg) every other day 90 tablet 3     losartan (COZAAR) 100 MG tablet Take 1 tablet (100 mg) by mouth daily 90 tablet 3     warfarin ANTICOAGULANT (COUMADIN) 5 MG tablet Current dose (11/18/21): 5 mg daily (1 tablet) or as directed by [facility] team. 95 tablet 1     sildenafil (REVATIO) 20 MG tablet Take 1 tablet (20 mg) by mouth daily as needed (Patient not taking: No sig reported) 30 tablet 3       ALLERGIES  Allergies   Allergen Reactions     Lisinopril Cough     Very persistent cough       PAST MEDICAL, SURGICAL, SOCIAL FAMILY HISTORY:  History was reviewed and updated as needed, see medical record.    Review of Systems:  Skin:  Negative     Eyes:  Negative    ENT:  Negative    Respiratory:  Negative    Cardiovascular:    edema;Positive for  Gastroenterology: Negative    Genitourinary:  not  "assessed    Musculoskeletal:  Positive for joint pain  Neurologic:  Negative    Psychiatric:  Negative    Heme/Lymph/Imm:  Negative    Endocrine:  Negative       Physical Exam:  Vitals: /80   Pulse 77   Ht 1.905 m (6' 3\")   Wt 126.6 kg (279 lb)   BMI 34.87 kg/m     Wt Readings from Last 4 Encounters:   04/13/22 126.6 kg (279 lb)   03/02/22 127 kg (280 lb)   02/17/22 128.2 kg (282 lb 11.2 oz)   02/14/22 129.3 kg (285 lb)     CONSTITUTIONAL: Appears his stated age, well nourished, and in no acute distress.  HEENT: Pupils equal, round. Sclerae nonicteric.    NECK: Supple, no masses appreciated.   C/V:  Regular rate and rhythm, normal S1 and S2, no S3 or S4, no murmur, rub or gallop.   RESP: Respirations are unlabored. Lungs are clear to auscultation bilaterally without wheezing, rales, or rhonchi.  GI: Abdomen soft, non-tender, non-distended.  EXTREM:  No clubbing, cyanosis, or lower extremity edema bilaterally.   NEURO: Alert and oriented, cooperative. Gait steady. No gross focal deficits.   PSYCH: Affect appropriate. Mentation normal. Responds to questions appropriately.  SKIN: Warm and dry. No apparent rashes or bruising.     Recent Lab Results:  LIPID RESULTS:  Lab Results   Component Value Date    CHOL 149 02/17/2022    CHOL 157 03/31/2021    HDL 48 02/17/2022    HDL 47 03/31/2021    LDL 79 02/17/2022    LDL 93 03/31/2021    TRIG 110 02/17/2022    TRIG 84 03/31/2021     LIVER ENZYME RESULTS:  Lab Results   Component Value Date    AST 14 02/17/2022    AST 14 03/31/2021    ALT 32 02/17/2022    ALT 28 03/31/2021     CBC RESULTS:  Lab Results   Component Value Date    WBC 5.7 06/21/2021    RBC 5.09 06/21/2021    HGB 15.8 03/30/2022    HGB 16.4 06/21/2021    HCT 47.2 06/21/2021    MCV 93 06/21/2021    MCH 32.2 06/21/2021    MCHC 34.7 06/21/2021    RDW 12.2 06/21/2021     06/21/2021     BMP RESULTS:  Lab Results   Component Value Date     03/30/2022     06/21/2021    POTASSIUM 4.8 03/30/2022 "    POTASSIUM 4.9 06/21/2021    CHLORIDE 109 03/30/2022    CHLORIDE 108 06/21/2021    CO2 26 03/30/2022    CO2 27 06/21/2021    ANIONGAP 3 03/30/2022    ANIONGAP 3 06/21/2021     (H) 03/30/2022    GLC 94 06/21/2021    BUN 24 03/30/2022    BUN 23 06/21/2021    CR 1.34 (H) 03/30/2022    CR 1.31 (H) 06/21/2021    GFRESTIMATED 57 (L) 03/30/2022    GFRESTIMATED 55 (L) 06/21/2021    GFRESTBLACK 64 06/21/2021    CHU 9.4 03/30/2022    CHU 9.2 06/21/2021      A1C RESULTS:  No results found for: A1C  INR RESULTS:  Lab Results   Component Value Date    INR 2.3 (H) 03/25/2022    INR 2.1 (H) 03/11/2022    INR 2.48 (H) 01/21/2022    INR 2.18 (H) 01/12/2022    INR 2.60 (H) 07/06/2021    INR 1.88 (H) 06/21/2021         This note was completed in part using Dragon voice recognition software. Although reviewed after completion, some word and grammatical errors may occur.          Thank you for allowing me to participate in the care of your patient.    Sincerely,     ANNMARIE Peterson [facility]

## 2022-04-13 NOTE — PROGRESS NOTES
Cardiology Clinic Progress Note    Service Date: 04/13/22    Primary Cardiologist: Dr. Hannon      Reason for Visit: follow up for LE edema     HPI:   I had the pleasure of seeing Mr. Kevan Connelly in the clinic today and he is a very pleasant 71 year old male with a past medical history notable for    1. Atrial fibrillation.  Diagnosed on 5/30/19 with cardiomyopathy EF was 33%.  Was on amiodarone and changed to flecainide with normal EF.  refractory to flecainide.     Underwent an atrial fibrillation ablation on 1/3/2020. IBETH guided cardioversion on 1/21/2022  2. Nonocclusive coronary artery disease.  Per CT angiogram heart 1/2020 revealed coronary calcification the LAD, circumflex and RCA.  3. Mildly dilated aortic root and ascending aorta.  Per CT angiogram (1/2020) revealed 4.22 x 3.88 cm and 3.64 cm x 3.73 cm respectively.  4. Tachycardia induced cardiomyopathy.  EF 33% (6/2018)  ECHO 50-55% (8/2019)  5. Hypertension  6. Obesity  7. Testicular cancer with subsequent ED      Diagnostics  I have personally reviewed the following reports    IBETH (1/21/2022) The left ventricle is normal in size. The visual ejection fraction is 55-60%. No regional wall motion abnormalities noted. No thrombus is detected in the left atrial appendage. The left atrium is moderate to severely dilated. No significant valvular heart disease. The rhythm was atrial flutter.        In March 2019, he was diagnosed with atrial fibrillation     In June 2019 he was diagnosed with cardiomyopathy, underwent an unsuccessful cardioversion was later started on amiodarone and on 7/2019 underwent a successful cardioversion     In September 2019 his EF improved, he was started on flecainide unfortunately was found to be in atrial fibrillation via Zio patch     On 1/2020 he underwent PVI, had recurrence of atrial fibrillation was started on amiodarone which was subsequently discontinued on 4/2020     On 8/2020 he presented with atrial fibrillation,  was started on flecainide and underwent a cardioversion.  Failed to maintain sinus rhythm and in November 2020 underwent a cardioversion.     In June 2021 he met with Dr. Hannon he was doing well on flecainide 75 mg twice daily and metoprolol XL 50 mg daily.  He was on Coumadin for anticoagulation.  His blood pressure was elevated and his losartan was increased to 100 mg daily.      In January 2022, patient's ECG showed atrial flutter and was recommended to increase his carvedilol from 6.25-9.375 and undergo a cardioversion and was unable to maintain sinus rhythm was started on  amiodarone with a load and underwent a cardioversion on 2/11/2022.  He then met with Dr. Hannon we discussed repeat ablation versus AV node ablation and permanent pacemaker or continue antiarrhythmic therapy with dofetilide admission.   Patient elected not to continue with amiodarone or dofetilide and if he had a reoccurrence of atrial fibrillation would add digoxin or diltiazem for rate control and if therapy failed will consider an AV node ablation and permanent pacemaker.    In March 2022 he was having lower extremity edema and was started on furosemide 20 mg daily his creatinine elevated and he went to furosemide 20 mg every other day.      Today, he reports feeling good with no recurrence of atrial fibrillation, no swelling and he denies chest pain, shortness of breath, dyspnea on exertion, orthopnea, PND, lower extremity edema, palpitations, dizziness.   Reports taking medications as prescribed including warfarin and denies bleeding and sign/symptoms of stroke.     ASSESSMENT AND PLAN:    Atrial fibrillation    For anticoagulation for CHADS VASC 3 (age, HTN, HF) he takes warfarin with goal INR 2-3. His  INRs have been therapeutic.      His last hemoglobin was 15.8 (3/2022).       Refractory to flecainide, On 1/3/2020, he underwent a PVI ablation, flecainide was discontinued and he was started on amiodarone which was discontinued on  4/2020.   In 8/2020, he had a recurrence of atrial fibrillation, was restarted on flecainide underwent a successful cardioversion.  He had a recurrence and underwent a cardioversion on 11/2020.    In January 2022 patient had a recurrence of atrial fibrillation and underwent a IBETH guided cardioversion but has not been able to maintain sinus rhythm and was started on amiodarone with a load. Due to the potential side effects, amiodarone was discontinued in 2/2022 and if he has a recurrence of atrial fibrillation he will be rate controlled with digoxin and/or diltiazem or undergo an AV node ablation and ppm     Cardiomyopathy    At the time of diagnosis with his atrial fibrillation his EF was found to be 33%.       ECHO (8/2019) revealed EF 50-55%      IBETH (1/2022) revealed normal EF    Continue GDMT of ARB (losartan 100 mg daily) and BB (coreg 6.25 mg BID). Creatinine slightly elevated while taking lasix daily therefore decreased to every other day.  Will repeat BMP    Euvolemic on exam      Hypertension    controlled at home while taking losartan 100 mg daily and coreg 6.25 mg BID.          Non-occlusive coronary artery disease    Per CT angiogram heart 1/2020 revealed coronary calcification the LAD, circumflex and RCA.    Asymptomatic    Continue BB, ARB, statin.  He is not on an aspirin due to being on Warfarin.  His last LDL was 749 (20/2022)      Plan:    BMP today call with results    Continue with current mediations    Follow up with EP BRYAN in 6 months     Please call the clinic if questions or problems arise      Thank you for the opportunity to participate in this pleasant patient's care. We would be happy to see him sooner if needed for any concerns in the meantime.          ANNMARIE Peterson CNP  [facility]  Text Page  (M-F 8:00 am - 4:30 pm)    Orders this Visit:  Orders Placed This Encounter   Procedures     Basic metabolic panel     Follow-Up with Cardiology BRYAN     Orders Placed This Encounter    Medications     carvedilol (COREG) 6.25 MG tablet     Sig: Take 1 tablet (6.25 mg) by mouth 2 times daily (with meals)     Dispense:  180 tablet     Refill:  3     Medications Discontinued During This Encounter   Medication Reason     furosemide (LASIX) 20 MG tablet      carvedilol (COREG) 6.25 MG tablet Reorder     melatonin 3 MG CAPS Medication Reconciliation Clean Up     Encounter Diagnoses   Name Primary?     Swelling of lower extremity      Paroxysmal atrial fibrillation (H)        CURRENT MEDICATIONS:  Current Outpatient Medications   Medication Sig Dispense Refill     acetaminophen (TYLENOL) 500 MG tablet Take 500 mg by mouth every 6 hours as needed for mild pain       atorvastatin (LIPITOR) 20 MG tablet Take 1 tablet (20 mg) by mouth daily 90 tablet 3     carvedilol (COREG) 6.25 MG tablet Take 1 tablet (6.25 mg) by mouth 2 times daily (with meals) 180 tablet 3     furosemide (LASIX) 20 MG tablet Take 1 tablet ( 20 mg) every other day 90 tablet 3     losartan (COZAAR) 100 MG tablet Take 1 tablet (100 mg) by mouth daily 90 tablet 3     warfarin ANTICOAGULANT (COUMADIN) 5 MG tablet Current dose (11/18/21): 5 mg daily (1 tablet) or as directed by ACC team. 95 tablet 1     sildenafil (REVATIO) 20 MG tablet Take 1 tablet (20 mg) by mouth daily as needed (Patient not taking: No sig reported) 30 tablet 3       ALLERGIES  Allergies   Allergen Reactions     Lisinopril Cough     Very persistent cough       PAST MEDICAL, SURGICAL, SOCIAL FAMILY HISTORY:  History was reviewed and updated as needed, see medical record.    Review of Systems:  Skin:  Negative     Eyes:  Negative    ENT:  Negative    Respiratory:  Negative    Cardiovascular:    edema;Positive for  Gastroenterology: Negative    Genitourinary:  not assessed    Musculoskeletal:  Positive for joint pain  Neurologic:  Negative    Psychiatric:  Negative    Heme/Lymph/Imm:  Negative    Endocrine:  Negative       Physical Exam:  Vitals: /80   Pulse 77   Ht  "1.905 m (6' 3\")   Wt 126.6 kg (279 lb)   BMI 34.87 kg/m     Wt Readings from Last 4 Encounters:   04/13/22 126.6 kg (279 lb)   03/02/22 127 kg (280 lb)   02/17/22 128.2 kg (282 lb 11.2 oz)   02/14/22 129.3 kg (285 lb)     CONSTITUTIONAL: Appears his stated age, well nourished, and in no acute distress.  HEENT: Pupils equal, round. Sclerae nonicteric.    NECK: Supple, no masses appreciated.   C/V:  Regular rate and rhythm, normal S1 and S2, no S3 or S4, no murmur, rub or gallop.   RESP: Respirations are unlabored. Lungs are clear to auscultation bilaterally without wheezing, rales, or rhonchi.  GI: Abdomen soft, non-tender, non-distended.  EXTREM:  No clubbing, cyanosis, or lower extremity edema bilaterally.   NEURO: Alert and oriented, cooperative. Gait steady. No gross focal deficits.   PSYCH: Affect appropriate. Mentation normal. Responds to questions appropriately.  SKIN: Warm and dry. No apparent rashes or bruising.     Recent Lab Results:  LIPID RESULTS:  Lab Results   Component Value Date    CHOL 149 02/17/2022    CHOL 157 03/31/2021    HDL 48 02/17/2022    HDL 47 03/31/2021    LDL 79 02/17/2022    LDL 93 03/31/2021    TRIG 110 02/17/2022    TRIG 84 03/31/2021     LIVER ENZYME RESULTS:  Lab Results   Component Value Date    AST 14 02/17/2022    AST 14 03/31/2021    ALT 32 02/17/2022    ALT 28 03/31/2021     CBC RESULTS:  Lab Results   Component Value Date    WBC 5.7 06/21/2021    RBC 5.09 06/21/2021    HGB 15.8 03/30/2022    HGB 16.4 06/21/2021    HCT 47.2 06/21/2021    MCV 93 06/21/2021    MCH 32.2 06/21/2021    MCHC 34.7 06/21/2021    RDW 12.2 06/21/2021     06/21/2021     BMP RESULTS:  Lab Results   Component Value Date     03/30/2022     06/21/2021    POTASSIUM 4.8 03/30/2022    POTASSIUM 4.9 06/21/2021    CHLORIDE 109 03/30/2022    CHLORIDE 108 06/21/2021    CO2 26 03/30/2022    CO2 27 06/21/2021    ANIONGAP 3 03/30/2022    ANIONGAP 3 06/21/2021     (H) 03/30/2022    GLC 94 " 06/21/2021    BUN 24 03/30/2022    BUN 23 06/21/2021    CR 1.34 (H) 03/30/2022    CR 1.31 (H) 06/21/2021    GFRESTIMATED 57 (L) 03/30/2022    GFRESTIMATED 55 (L) 06/21/2021    GFRESTBLACK 64 06/21/2021    CHU 9.4 03/30/2022    CHU 9.2 06/21/2021      A1C RESULTS:  No results found for: A1C  INR RESULTS:  Lab Results   Component Value Date    INR 2.3 (H) 03/25/2022    INR 2.1 (H) 03/11/2022    INR 2.48 (H) 01/21/2022    INR 2.18 (H) 01/12/2022    INR 2.60 (H) 07/06/2021    INR 1.88 (H) 06/21/2021       CC  Hilda Wahl, APRN CNP  [address]  [place] [address]    This note was completed in part using Dragon voice recognition software. Although reviewed after completion, some word and grammatical errors may occur.

## 2022-04-14 ENCOUNTER — TELEPHONE (OUTPATIENT)
Dept: CARDIOLOGY | Facility: CLINIC | Age: 72
End: 2022-04-14
Payer: COMMERCIAL

## 2022-04-14 DIAGNOSIS — M79.89 SWELLING OF LOWER EXTREMITY: ICD-10-CM

## 2022-04-14 RX ORDER — FUROSEMIDE 20 MG
TABLET ORAL
Qty: 45 TABLET | Refills: 3 | Status: SHIPPED | OUTPATIENT
Start: 2022-04-14

## 2022-05-02 ENCOUNTER — TELEPHONE (OUTPATIENT)
Dept: CARDIOLOGY | Facility: CLINIC | Age: 72
End: 2022-05-02
Payer: COMMERCIAL

## 2022-05-02 DIAGNOSIS — I48.0 PAROXYSMAL ATRIAL FIBRILLATION (H): Primary | ICD-10-CM

## 2022-05-02 NOTE — TELEPHONE ENCOUNTER
"5/2/22 Pt called and reported that he converted back to Afib on Saturday 4/30 . Rate initially was high in the 140-150's with an occasional drop down to 120. Sunday he hovered around 100 and today he is running in the 100's..  He feels his HR is worse when he is laying down and trying to sleep.   His BP cuff cannot get a reading as his HR is fast and irregular.  Spoke w Hilda Wahl NP who stated pt should wear a 48 hour Holter to assess HRS and follow up with her in clinic next week.  Pt states he feels \" ok \" and is able to proceed with recommendations.  He verifies he is taking  Coreg 6.25 mg BID  Losartan 100 mg   Warfarin  Lasix prn   Orders placed, message sent to scheduling to call pt  Pt voiced understanding and agreement with plan.   Bran 922 am       "

## 2022-05-04 ENCOUNTER — TELEPHONE (OUTPATIENT)
Dept: CARDIOLOGY | Facility: CLINIC | Age: 72
End: 2022-05-04
Payer: COMMERCIAL

## 2022-05-04 ENCOUNTER — HOSPITAL ENCOUNTER (OUTPATIENT)
Dept: CARDIOLOGY | Facility: CLINIC | Age: 72
Discharge: HOME | End: 2022-05-04
Attending: NURSE PRACTITIONER | Admitting: NURSE PRACTITIONER
Payer: COMMERCIAL

## 2022-05-04 DIAGNOSIS — I48.0 PAROXYSMAL ATRIAL FIBRILLATION (H): ICD-10-CM

## 2022-05-04 PROCEDURE — 93227 XTRNL ECG REC<48 HR R&I: CPT | Performed by: INTERNAL MEDICINE

## 2022-05-04 PROCEDURE — 93225 XTRNL ECG REC<48 HRS REC: CPT

## 2022-05-04 NOTE — TELEPHONE ENCOUNTER
Received verbal order per Hilda Wahl, BRYAN for patient to take a dose of metoprolol XL 50mg po to help with rate control until seen tomorrow.  Spoke to patient to discuss recommendations.  Patient agreed with plan.  DIO Schrader

## 2022-05-04 NOTE — TELEPHONE ENCOUNTER
Spoke to pt who will come in tomorrow at see Dr Hannon at 1315 in [place].  Pt still has Metoprolol XL 50 mg tabs at home. Will make Hilda aware. Isabel

## 2022-05-04 NOTE — TELEPHONE ENCOUNTER
Can you help set up an appointment with Dr. Hannon hopefully tomorrow.  As patient has had a reoccurrence of atrial flutter.  He was asked to wear a Holter monitor today.  The monitor tach came in and told me his rates were approximately 140 bpm and likely atrial flutter versus atrial fibrillation.    I would like Dr. Hannon and Keron to discuss long-term rate control.      All can you call Keron and ask him besides carvedilol what AV node blockers he has at home.  Such as diltiazem, digoxin, metoprolol etc.  He will likely need to take one of them in addition to his carvedilol today to slow down his heart rate.      Thank you,    ANNMARIE Peterson CNP on 5/4/2022 at 10:04 AM

## 2022-05-05 ENCOUNTER — OFFICE VISIT (OUTPATIENT)
Dept: CARDIOLOGY | Facility: CLINIC | Age: 72
End: 2022-05-05
Payer: COMMERCIAL

## 2022-05-05 VITALS
OXYGEN SATURATION: 99 % | HEIGHT: 75 IN | SYSTOLIC BLOOD PRESSURE: 135 MMHG | BODY MASS INDEX: 35.06 KG/M2 | HEART RATE: 136 BPM | WEIGHT: 282 LBS | DIASTOLIC BLOOD PRESSURE: 88 MMHG

## 2022-05-05 DIAGNOSIS — R00.2 PALPITATION: Primary | ICD-10-CM

## 2022-05-05 PROCEDURE — 93000 ELECTROCARDIOGRAM COMPLETE: CPT | Performed by: INTERNAL MEDICINE

## 2022-05-05 PROCEDURE — 99215 OFFICE O/P EST HI 40 MIN: CPT | Performed by: INTERNAL MEDICINE

## 2022-05-05 RX ORDER — DILTIAZEM HYDROCHLORIDE 240 MG/1
240 CAPSULE, EXTENDED RELEASE ORAL DAILY
Qty: 30 CAPSULE | Refills: 1 | Status: ON HOLD | OUTPATIENT
Start: 2022-05-05 | End: 2022-05-17

## 2022-05-05 RX ORDER — DILTIAZEM HYDROCHLORIDE 180 MG/1
180 CAPSULE, EXTENDED RELEASE ORAL DAILY
Qty: 30 CAPSULE | Refills: 1 | Status: SHIPPED | OUTPATIENT
Start: 2022-05-05 | End: 2022-05-05

## 2022-05-05 RX ORDER — DIGOXIN 125 MCG
125 TABLET ORAL DAILY
Qty: 30 TABLET | Refills: 3 | Status: SHIPPED | OUTPATIENT
Start: 2022-05-05 | End: 2022-05-05

## 2022-05-05 NOTE — LETTER
5/5/2022    Davina Reaves MD  [address]  [facility] [address]    RE: Kevan Connelly       Dear Colleague,     I had the pleasure of seeing Kevan Connelly in the [facility].  Electrophysiology/ Clinic Note         H&P and Plan:     REASON FOR VISIT: Electrophysiology evaluation.      HISTORY OF PRESENT ILLNESS: Mr. Connelly is a pleasant  71-year-old gentleman with a history of hypertension, hyperlipidemia and recurrent persistent atrial fibrillation (previous tachy- mediated cardiomyopathy- resolved; A. fib ablation on 1/3/2020), who is here for team evaluation.     Patient underwent A. fib ablation on 1/3/2020.  At that time, he was found to have significant amount of scar in the left atrium.  He underwent pulmonary vein isolation and posterior left atrial wall isolation.  At the end of procedure, he was found to have atypical flutter.  He exhibited several forms of atypical flutter and no further ablation was performed.     After ablation procedure, he was started on a combination of flecainide and Coreg.  He did well for the last 2 years.  However, he started having recurrent episodes of atypical flutter.  He underwent IBETH/cardioversion in January, and he had recurrence of atrial flutter after cardioversion.  Flecainide was then discontinued and he was started on amiodarone.  He underwent a second successive cardioversion in 2/11/2022.  I saw him in March.  At that time he had discontinue amiodarone as he was concerned about potential side effects related to the medication. We discussed the possibility of admission for dofetilide load, however he was not interested.    Today, he informs he is not feeling great.  He developed atypical flutter couple days ago.  Heart rate is consistently elevated, he and he complains of fatigue and dyspnea on exertion.     PREVIOUS STUDIES (personally reviewed):  -Exercise stress test (08/28/2019): Target heart rate was not achieved.  However  "test was not negative for ischemia or pro-arrhythmias.  -Echo (08/14/2019): Normal LV function.  EF estimated 50-55%.  -IBETH (1/02/2020): Normal LV function.  EF estimated 55-60%.  There was no significant valve disease.  -Chest CT (01/02/2020): Normal pulmonary vein anatomy.  Aortic root was mildly dilated (4.2 x 3.88 cm).  Coronary calcification was noticed in the LAD.  Scattered mediastinal and right heel are calcifications were noticed suggestive of old granulomas.     ASSESSMENT AND PLAN:   1.  Recurrent persistent atrial fibrillation/atypical flutter.  Patient is known to have significant amount of scar in the left atrium.  We discussed options, including admission for dofetilide load, redo ablation or rate control strategy.  Regarding the rate control strategy, we discussed possibility of AV node agents as well as AV node ablation/pacemaker implantation.    We discussed each options in details.  He understands the pros and cons of each option.  He understands in the setting of AV node ablation, he will be pacemaker dependent for the rest of his life.    After discussion, he informs he is tired of dealing with medications or cardioversions.  He would like to attempt rate control strategy.  He is inclined to pursue AV node ablation/pacemaker implantation.  I favor attempt rate control strategy with medication first, but he would like to think definitely schedule pacemaker/AV node ablation.    Recommendations:  -Stop losartan.  -Continue Coreg.  -Start diltiazem 240 mg daily.  -Schedule AV node ablation/pacemaker implantation for 2 weeks.    We will follow-up over the phone in a week.  If heart rates better controlled, we may cancel procedure.    2.  Embolic prevention.  Chads vas score of 2.  Continue anticoagulation with Coumadin indefinitely.     3.  Hypertension.  Blood pressure is well controlled.      Lamont Hannon MD    Physical Exam:  Vitals: /88   Pulse (!) 136   Ht 1.905 m (6' 3\")   Wt 127.9 " kg (282 lb)   SpO2 99%   BMI 35.25 kg/m      Constitutional:  AAO x3.  Pt is in NAD.  HEAD: normocephalic.  SKIN: Skin normal color, texture and turgor with no lesions or eruptions.  Eyes: PERRL, EOMI.  ENT:  Supple, normal JVP. No lymphadenopathy or thyroid enlargement.  Chest:  CTAB.  Cardiac:  RRR, normal  S1 and S2.  No murmurs rubs or gallop.   Abdomen:  Normal BS.  Soft, non-tender and non-distended.  No rebound or guarding.    Extremities:  Pedious pulses palpable B/L.  No LE edema noticed.   Neurological: Strength and sensation grossly symmetric and intact throughout.       CURRENT MEDICATIONS:  Current Outpatient Medications   Medication Sig Dispense Refill     acetaminophen (TYLENOL) 500 MG tablet Take 500 mg by mouth every 6 hours as needed for mild pain       atorvastatin (LIPITOR) 20 MG tablet Take 1 tablet (20 mg) by mouth daily 90 tablet 3     carvedilol (COREG) 6.25 MG tablet Take 1 tablet (6.25 mg) by mouth 2 times daily (with meals) 180 tablet 3     furosemide (LASIX) 20 MG tablet Take 1 tablet ( 20 mg) every other day 45 tablet 3     losartan (COZAAR) 100 MG tablet Take 1 tablet (100 mg) by mouth daily 90 tablet 3     warfarin ANTICOAGULANT (COUMADIN) 5 MG tablet Current dose (11/18/21): 5 mg daily (1 tablet) or as directed by ACC team. 95 tablet 1     sildenafil (REVATIO) 20 MG tablet Take 1 tablet (20 mg) by mouth daily as needed (Patient not taking: No sig reported) 30 tablet 3       ALLERGIES     Allergies   Allergen Reactions     Lisinopril Cough     Very persistent cough       PAST MEDICAL HISTORY:  Past Medical History:   Diagnosis Date     Atrial flutter (H)      Benign essential hypertension 1/18/2017    past use of ACE- Cough;  Had been on ARB-diuretic but experienced lower bp readings;  BLOOD PRESSURE now well controlled on ARB also no longer on diuretic; no low bp readings; may have had slight edema initially but has normalized with bp well controlled on ARB alone.      Cancer (H)      testicular cancer     CHF (congestive heart failure) (H) 6/5/2019     Long term current use of anticoagulant therapy 6/3/2019     Obesity (BMI 35.0-39.9) with comorbidity (H) 2/11/2019     Paroxysmal atrial fibrillation (H) 06/03/2019       PAST SURGICAL HISTORY:  Past Surgical History:   Procedure Laterality Date     ANESTHESIA CARDIOVERSION N/A 6/26/2019    Procedure: ANESTHESIA, FOR CARDIOVERSION DR. LE;  Surgeon: GENERIC ANESTHESIA PROVIDER;  Location:  OR     ANESTHESIA CARDIOVERSION N/A 7/17/2019    Procedure: ANESTHESIA, FOR CARDIOVERSION;  Surgeon: GENERIC ANESTHESIA PROVIDER;  Location: [facility] OR     ANESTHESIA CARDIOVERSION N/A 1/28/2020    Procedure: ANESTHESIA, FOR CARDIOVERSION;  Surgeon: GENERIC ANESTHESIA PROVIDER;  Location:  OR     ANESTHESIA CARDIOVERSION N/A 9/8/2020    Procedure: ANESTHESIA, FOR CARDIOVERSION;  Surgeon: GENERIC ANESTHESIA PROVIDER;  Location:  OR     ANESTHESIA CARDIOVERSION N/A 11/23/2020    Procedure: ANESTHESIA, FOR CARDIOVERSION;  Surgeon: GENERIC ANESTHESIA PROVIDER;  Location: [facility] OR     ANESTHESIA CARDIOVERSION N/A 1/21/2022    Procedure: ANESTHESIA, FOR CARDIOVERSION;  Surgeon: GENERIC ANESTHESIA PROVIDER;  Location: [facility] OR     ANESTHESIA CARDIOVERSION N/A 2/11/2022    Procedure: ANESTHESIA, FOR CARDIOVERSION (DR. MAO);  Surgeon: GENERIC ANESTHESIA PROVIDER;  Location:  OR     CLOSED REDUCTION SHOULDER Left      EP ABLATION FOCAL AFIB N/A 1/3/2020    Procedure: EP Ablation Focal AFIB;  Surgeon: Lamont Hannon MD;  Location:  HEART CARDIAC CATH LAB     GENITOURINARY SURGERY  1990    testicular cancer     ORTHOPEDIC SURGERY  1970's    right knee surgery        FAMILY HISTORY:  Family History   Problem Relation Age of Onset     Colon Cancer Father      Coronary Artery Disease Maternal Grandmother      Prostate Cancer Brother 72       SOCIAL HISTORY:  Social History     Socioeconomic History     Marital status:      Spouse name: None     Number  of children: None     Years of education: None     Highest education level: None   Tobacco Use     Smoking status: Never Smoker     Smokeless tobacco: Never Used   Substance and Sexual Activity     Alcohol use: Yes     Comment: every 1-2 months or less     Drug use: No     Sexual activity: Yes     Partners: Female   Other Topics Concern     Parent/sibling w/ CABG, MI or angioplasty before 65F 55M? Yes       Review of Systems:  Skin:  Negative rash   Eyes:  Negative glasses  ENT:  Negative tinnitus  Respiratory:  Negative dyspnea on exertion;shortness of breath  Cardiovascular:  Negative edema;Positive for;palpitations  Gastroenterology: Negative    Genitourinary:  not assessed urinary frequency  Musculoskeletal:  Positive for joint pain  Neurologic:  Negative    Psychiatric:  Negative    Heme/Lymph/Imm:  Negative allergies  Endocrine:  Negative        Recent Lab Results:  LIPID RESULTS:  Lab Results   Component Value Date    CHOL 149 02/17/2022    CHOL 157 03/31/2021    HDL 48 02/17/2022    HDL 47 03/31/2021    LDL 79 02/17/2022    LDL 93 03/31/2021    TRIG 110 02/17/2022    TRIG 84 03/31/2021       LIVER ENZYME RESULTS:  Lab Results   Component Value Date    AST 14 02/17/2022    AST 14 03/31/2021    ALT 32 02/17/2022    ALT 28 03/31/2021       CBC RESULTS:  Lab Results   Component Value Date    WBC 5.7 06/21/2021    RBC 5.09 06/21/2021    HGB 15.8 03/30/2022    HGB 16.4 06/21/2021    HCT 47.2 06/21/2021    MCV 93 06/21/2021    MCH 32.2 06/21/2021    MCHC 34.7 06/21/2021    RDW 12.2 06/21/2021     06/21/2021       BMP RESULTS:  Lab Results   Component Value Date     03/30/2022     06/21/2021    POTASSIUM 4.8 03/30/2022    POTASSIUM 4.9 06/21/2021    CHLORIDE 109 03/30/2022    CHLORIDE 108 06/21/2021    CO2 26 03/30/2022    CO2 27 06/21/2021    ANIONGAP 3 03/30/2022    ANIONGAP 3 06/21/2021     (H) 03/30/2022    GLC 94 06/21/2021    BUN 24 03/30/2022    BUN 23 06/21/2021    CR 1.34 (H)  03/30/2022    CR 1.31 (H) 06/21/2021    GFRESTIMATED 57 (L) 03/30/2022    GFRESTIMATED 55 (L) 06/21/2021    GFRESTBLACK 64 06/21/2021    CHU 9.4 03/30/2022    CHU 9.2 06/21/2021        A1C RESULTS:  No results found for: A1C    INR RESULTS:  Lab Results   Component Value Date    INR 2.0 (H) 04/13/2022    INR 2.3 (H) 03/25/2022    INR 2.48 (H) 01/21/2022    INR 2.18 (H) 01/12/2022    INR 2.60 (H) 07/06/2021    INR 1.88 (H) 06/21/2021         ECHOCARDIOGRAM  No results found for this or any previous visit (from the past 8760 hour(s)).      Orders Placed This Encounter   Procedures     EKG 12-lead complete w/read - Clinics (performed today)     No orders of the defined types were placed in this encounter.    There are no discontinued medications.      Encounter Diagnosis   Name Primary?     Palpitation Yes         CC  No referring provider defined for this encounter.    Thank you for allowing me to participate in the care of your patient.      Sincerely,     Lamont Hannon MD     [facility]

## 2022-05-05 NOTE — PROGRESS NOTES
Electrophysiology/ Clinic Note         H&P and Plan:     REASON FOR VISIT: Electrophysiology evaluation.      HISTORY OF PRESENT ILLNESS: Mr. Connelly is a pleasant  71-year-old gentleman with a history of hypertension, hyperlipidemia and recurrent persistent atrial fibrillation (previous tachy- mediated cardiomyopathy- resolved; A. fib ablation on 1/3/2020), who is here for team evaluation.     Patient underwent A. fib ablation on 1/3/2020.  At that time, he was found to have significant amount of scar in the left atrium.  He underwent pulmonary vein isolation and posterior left atrial wall isolation.  At the end of procedure, he was found to have atypical flutter.  He exhibited several forms of atypical flutter and no further ablation was performed.     After ablation procedure, he was started on a combination of flecainide and Coreg.  He did well for the last 2 years.  However, he started having recurrent episodes of atypical flutter.  He underwent IBETH/cardioversion in January, and he had recurrence of atrial flutter after cardioversion.  Flecainide was then discontinued and he was started on amiodarone.  He underwent a second successive cardioversion in 2/11/2022.  I saw him in March.  At that time he had discontinue amiodarone as he was concerned about potential side effects related to the medication. We discussed the possibility of admission for dofetilide load, however he was not interested.    Today, he informs he is not feeling great.  He developed atypical flutter couple days ago.  Heart rate is consistently elevated, he and he complains of fatigue and dyspnea on exertion.     PREVIOUS STUDIES (personally reviewed):  -Exercise stress test (08/28/2019): Target heart rate was not achieved.  However test was not negative for ischemia or pro-arrhythmias.  -Echo (08/14/2019): Normal LV function.  EF estimated 50-55%.  -IBETH (1/02/2020): Normal LV function.  EF estimated 55-60%.  There was no significant valve  "disease.  -Chest CT (01/02/2020): Normal pulmonary vein anatomy.  Aortic root was mildly dilated (4.2 x 3.88 cm).  Coronary calcification was noticed in the LAD.  Scattered mediastinal and right heel are calcifications were noticed suggestive of old granulomas.     ASSESSMENT AND PLAN:   1.  Recurrent persistent atrial fibrillation/atypical flutter.  Patient is known to have significant amount of scar in the left atrium.  We discussed options, including admission for dofetilide load, redo ablation or rate control strategy.  Regarding the rate control strategy, we discussed possibility of AV node agents as well as AV node ablation/pacemaker implantation.    We discussed each options in details.  He understands the pros and cons of each option.  He understands in the setting of AV node ablation, he will be pacemaker dependent for the rest of his life.    After discussion, he informs he is tired of dealing with medications or cardioversions.  He would like to attempt rate control strategy.  He is inclined to pursue AV node ablation/pacemaker implantation.  I favor attempt rate control strategy with medication first, but he would like to think definitely schedule pacemaker/AV node ablation.    Recommendations:  -Stop losartan.  -Continue Coreg.  -Start diltiazem 240 mg daily.  -Schedule AV node ablation/pacemaker implantation for 2 weeks.    We will follow-up over the phone in a week.  If heart rates better controlled, we may cancel procedure.    2.  Embolic prevention.  Chads vas score of 2.  Continue anticoagulation with Coumadin indefinitely.     3.  Hypertension.  Blood pressure is well controlled.      Lamont Hannon MD    Physical Exam:  Vitals: /88   Pulse (!) 136   Ht 1.905 m (6' 3\")   Wt 127.9 kg (282 lb)   SpO2 99%   BMI 35.25 kg/m      Constitutional:  AAO x3.  Pt is in NAD.  HEAD: normocephalic.  SKIN: Skin normal color, texture and turgor with no lesions or eruptions.  Eyes: PERRL, EOMI.  ENT:  " Supple, normal JVP. No lymphadenopathy or thyroid enlargement.  Chest:  CTAB.  Cardiac:  RRR, normal  S1 and S2.  No murmurs rubs or gallop.   Abdomen:  Normal BS.  Soft, non-tender and non-distended.  No rebound or guarding.    Extremities:  Pedious pulses palpable B/L.  No LE edema noticed.   Neurological: Strength and sensation grossly symmetric and intact throughout.       CURRENT MEDICATIONS:  Current Outpatient Medications   Medication Sig Dispense Refill     acetaminophen (TYLENOL) 500 MG tablet Take 500 mg by mouth every 6 hours as needed for mild pain       atorvastatin (LIPITOR) 20 MG tablet Take 1 tablet (20 mg) by mouth daily 90 tablet 3     carvedilol (COREG) 6.25 MG tablet Take 1 tablet (6.25 mg) by mouth 2 times daily (with meals) 180 tablet 3     furosemide (LASIX) 20 MG tablet Take 1 tablet ( 20 mg) every other day 45 tablet 3     losartan (COZAAR) 100 MG tablet Take 1 tablet (100 mg) by mouth daily 90 tablet 3     warfarin ANTICOAGULANT (COUMADIN) 5 MG tablet Current dose (11/18/21): 5 mg daily (1 tablet) or as directed by ACC team. 95 tablet 1     sildenafil (REVATIO) 20 MG tablet Take 1 tablet (20 mg) by mouth daily as needed (Patient not taking: No sig reported) 30 tablet 3       ALLERGIES     Allergies   Allergen Reactions     Lisinopril Cough     Very persistent cough       PAST MEDICAL HISTORY:  Past Medical History:   Diagnosis Date     Atrial flutter (H)      Benign essential hypertension 1/18/2017    past use of ACE- Cough;  Had been on ARB-diuretic but experienced lower bp readings;  BLOOD PRESSURE now well controlled on ARB also no longer on diuretic; no low bp readings; may have had slight edema initially but has normalized with bp well controlled on ARB alone.      Cancer (H)     testicular cancer     CHF (congestive heart failure) (H) 6/5/2019     Long term current use of anticoagulant therapy 6/3/2019     Obesity (BMI 35.0-39.9) with comorbidity (H) 2/11/2019     Paroxysmal atrial  fibrillation (H) 06/03/2019       PAST SURGICAL HISTORY:  Past Surgical History:   Procedure Laterality Date     ANESTHESIA CARDIOVERSION N/A 6/26/2019    Procedure: ANESTHESIA, FOR CARDIOVERSION DR. LE;  Surgeon: GENERIC ANESTHESIA PROVIDER;  Location: [facility]     ANESTHESIA CARDIOVERSION N/A 7/17/2019    Procedure: ANESTHESIA, FOR CARDIOVERSION;  Surgeon: GENERIC ANESTHESIA PROVIDER;  Location: [facility] OR     ANESTHESIA CARDIOVERSION N/A 1/28/2020    Procedure: ANESTHESIA, FOR CARDIOVERSION;  Surgeon: GENERIC ANESTHESIA PROVIDER;  Location: [facility]     ANESTHESIA CARDIOVERSION N/A 9/8/2020    Procedure: ANESTHESIA, FOR CARDIOVERSION;  Surgeon: GENERIC ANESTHESIA PROVIDER;  Location: [facility]     ANESTHESIA CARDIOVERSION N/A 11/23/2020    Procedure: ANESTHESIA, FOR CARDIOVERSION;  Surgeon: GENERIC ANESTHESIA PROVIDER;  Location: [facility] OR     ANESTHESIA CARDIOVERSION N/A 1/21/2022    Procedure: ANESTHESIA, FOR CARDIOVERSION;  Surgeon: GENERIC ANESTHESIA PROVIDER;  Location: [facility] OR     ANESTHESIA CARDIOVERSION N/A 2/11/2022    Procedure: ANESTHESIA, FOR CARDIOVERSION (DR. MAO);  Surgeon: GENERIC ANESTHESIA PROVIDER;  Location:  OR     CLOSED REDUCTION SHOULDER Left      EP ABLATION FOCAL AFIB N/A 1/3/2020    Procedure: EP Ablation Focal AFIB;  Surgeon: Lamont Hannon MD;  Location:  [facility]     GENITOURINARY SURGERY  1990    testicular cancer     ORTHOPEDIC SURGERY  1970's    right knee surgery        FAMILY HISTORY:  Family History   Problem Relation Age of Onset     Colon Cancer Father      Coronary Artery Disease Maternal Grandmother      Prostate Cancer Brother 72       SOCIAL HISTORY:  Social History     Socioeconomic History     Marital status:      Spouse name: None     Number of children: None     Years of education: None     Highest education level: None   Tobacco Use     Smoking status: Never Smoker     Smokeless tobacco: Never Used   Substance and Sexual Activity     Alcohol use:  Yes     Comment: every 1-2 months or less     Drug use: No     Sexual activity: Yes     Partners: Female   Other Topics Concern     Parent/sibling w/ CABG, MI or angioplasty before 65F 55M? Yes       Review of Systems:  Skin:  Negative rash   Eyes:  Negative glasses  ENT:  Negative tinnitus  Respiratory:  Negative dyspnea on exertion;shortness of breath  Cardiovascular:  Negative edema;Positive for;palpitations  Gastroenterology: Negative    Genitourinary:  not assessed urinary frequency  Musculoskeletal:  Positive for joint pain  Neurologic:  Negative    Psychiatric:  Negative    Heme/Lymph/Imm:  Negative allergies  Endocrine:  Negative        Recent Lab Results:  LIPID RESULTS:  Lab Results   Component Value Date    CHOL 149 02/17/2022    CHOL 157 03/31/2021    HDL 48 02/17/2022    HDL 47 03/31/2021    LDL 79 02/17/2022    LDL 93 03/31/2021    TRIG 110 02/17/2022    TRIG 84 03/31/2021       LIVER ENZYME RESULTS:  Lab Results   Component Value Date    AST 14 02/17/2022    AST 14 03/31/2021    ALT 32 02/17/2022    ALT 28 03/31/2021       CBC RESULTS:  Lab Results   Component Value Date    WBC 5.7 06/21/2021    RBC 5.09 06/21/2021    HGB 15.8 03/30/2022    HGB 16.4 06/21/2021    HCT 47.2 06/21/2021    MCV 93 06/21/2021    MCH 32.2 06/21/2021    MCHC 34.7 06/21/2021    RDW 12.2 06/21/2021     06/21/2021       BMP RESULTS:  Lab Results   Component Value Date     03/30/2022     06/21/2021    POTASSIUM 4.8 03/30/2022    POTASSIUM 4.9 06/21/2021    CHLORIDE 109 03/30/2022    CHLORIDE 108 06/21/2021    CO2 26 03/30/2022    CO2 27 06/21/2021    ANIONGAP 3 03/30/2022    ANIONGAP 3 06/21/2021     (H) 03/30/2022    GLC 94 06/21/2021    BUN 24 03/30/2022    BUN 23 06/21/2021    CR 1.34 (H) 03/30/2022    CR 1.31 (H) 06/21/2021    GFRESTIMATED 57 (L) 03/30/2022    GFRESTIMATED 55 (L) 06/21/2021    GFRESTBLACK 64 06/21/2021    CHU 9.4 03/30/2022    CHU 9.2 06/21/2021        A1C RESULTS:  No results found  for: A1C    INR RESULTS:  Lab Results   Component Value Date    INR 2.0 (H) 04/13/2022    INR 2.3 (H) 03/25/2022    INR 2.48 (H) 01/21/2022    INR 2.18 (H) 01/12/2022    INR 2.60 (H) 07/06/2021    INR 1.88 (H) 06/21/2021         ECHOCARDIOGRAM  No results found for this or any previous visit (from the past 8760 hour(s)).      Orders Placed This Encounter   Procedures     EKG 12-lead complete w/read - Clinics (performed today)     No orders of the defined types were placed in this encounter.    There are no discontinued medications.      Encounter Diagnosis   Name Primary?     Palpitation Yes         CC  No referring provider defined for this encounter.

## 2022-05-06 ENCOUNTER — TELEPHONE (OUTPATIENT)
Dept: CARDIOLOGY | Facility: CLINIC | Age: 72
End: 2022-05-06
Payer: COMMERCIAL

## 2022-05-06 NOTE — TELEPHONE ENCOUNTER
"5/6/22 Pt called asking whether he needed his appt on 5/11 with Hilda. Spoke w Dr Hannon and he stated that pt does not need appt, we can follow up via phone call on how he is doing next week.    Pt states he is feeling \" ok\" with the new med change as of yesterday.  Stop Losartan  Continue Coreg  Start Diltiazem 240 mg daily     His HR was \" good \" last evening even as low as 40-50, had a good night sleep but then this am his HR was back up in the 140's.  Encouraged pt to try and take the Diltiazem at HS for better coverage during the day.  Pt will call back early next week w update   Appt on 5/11 cancelled.  Pt voiced understanding and agreement with plan.   Bran 418 pm    "

## 2022-05-06 NOTE — TELEPHONE ENCOUNTER
5/6/22 Msg recd from Dr Hannon  I saw patient in clinic today and he is atypical flutter.  We will then attempt a rate control strategy with medications.  However he is also scheduled for AV node ablation/pacemaker implantation in 2 weeks.  Please follow-up with patient next week to see how his heart rate is doing and how he is feeling.  If heart rate is better controlled, we may not need the procedure.      Chart flagged to call pt 5/12  Bran 9 am

## 2022-05-06 NOTE — TELEPHONE ENCOUNTER
Was notified by Dr. Connelly that pt's holter monitor demonstrated HR of 120 bpm.  Pt saw Dr. Hannon on 5/5/22 and will plan for an AV node ablation and ppm.     ANNMARIE Peterson CNP on 5/6/2022 at 5:00 PM

## 2022-05-09 DIAGNOSIS — Z11.59 ENCOUNTER FOR SCREENING FOR OTHER VIRAL DISEASES: Primary | ICD-10-CM

## 2022-05-10 ENCOUNTER — TELEPHONE (OUTPATIENT)
Dept: CARDIOLOGY | Facility: CLINIC | Age: 72
End: 2022-05-10
Payer: COMMERCIAL

## 2022-05-10 NOTE — TELEPHONE ENCOUNTER
Pt called in with questions about the pacemaker procedure. He is scheduled for PPM + AVNA on Monday 5/16.    Pt asked what  the PPM will be. He said Dr. Hannon mentioned either [employer] or [employer]. I told pt the order does not specify so Dr. Hannon does not have a preference. I explained that for the vast majority of patients any device will work fine and all the device companies are very good. He said he has a family friend who works for [employer] so he may prefer to go with a [employer] device.     Pt asked about a leadless pacemaker vs a traditional pacemaker. I told pt that since he is still young we would not want to do a leadless pacemaker because he would probably need several gen changes or the course of his life and we cannot put that many leadless pacemakers in his heart.     Pt asked if the device would be right-sided or left-sided. He said he is right-handed and does shooting sports where the rifle is up against his right shoulder. I told pt we usually use the left side, especially in his case with that specific need we would only use the left side.     Pt states no other questions. I told him tomorrow when we get his paperwork we will call him with pre-procedure instructions, and he can ask more questions at that time if he thinks of anything. Pt agrees with this plan.     I sent message to [place] in scheduling saying that pt prefers a [employer] device if Dr. Hannon has not already specified.

## 2022-05-10 NOTE — TELEPHONE ENCOUNTER
Patient called reports continues to feel unwell.  HR are fluctuating and as high as 140.  Patient requesting that AVNA/PPM procedure be moved up to next week.    Discussed this with Dr Hannon.  He is okay with moving procedure to next week.  Patient is aware that another provider will be performing the procedure.   DIO Schrader

## 2022-05-11 DIAGNOSIS — I49.5 SSS (SICK SINUS SYNDROME) (H): Primary | ICD-10-CM

## 2022-05-11 RX ORDER — SODIUM CHLORIDE 450 MG/100ML
INJECTION, SOLUTION INTRAVENOUS CONTINUOUS
Status: CANCELLED | OUTPATIENT
Start: 2022-05-11

## 2022-05-11 RX ORDER — LIDOCAINE 40 MG/G
CREAM TOPICAL
Status: CANCELLED | OUTPATIENT
Start: 2022-05-11

## 2022-05-11 RX ORDER — CEFAZOLIN SODIUM IN 0.9 % NACL 3 G/100 ML
3 INTRAVENOUS SOLUTION, PIGGYBACK (ML) INTRAVENOUS
Status: CANCELLED | OUTPATIENT
Start: 2022-05-11

## 2022-05-11 NOTE — PROGRESS NOTES
Addendum 5/11/22 at 11:45am: spoke with patient and reviewed instructions below. DIO You     Called patient with pre-procedure instructions for device implant:   Left VM to call back to get pre-op instructions 5/11/22 @11 TK       Anticoagulation: On Warfarin.   Chads vas score of 2.  Hold 3 days prior to procedure per protocol.   Oral diabetes meds: NA  Insulin: NA  Diuretic: On Lasix, Hold morning of procedure.   Contrast allergy: NA  Pt informed to be NPO at midnight  may have clear liquid breakfast before 8am    Instructed pt to shower the morning of the procedure, and then put on a clean shirt in order to help prevent infection.     Pt has post-procedure transportation and 24 hours monitoring set up. Pt reminded to call before coming in if their plans change.  With limited bed availability due to COVID, overnight hospital stays will be allowed for clinical reasons only.    Pt aware of no driving for 24 hours post procedure due to sedation.     INR check scheduled: Morning of procedure (5/16/22)      COVID test scheduled: 5/13/22    Pt reminded to self-quarantine from the time of the COVID test to the procedure.    Asked pt to take temperature the morning of the procedure and call [facility] at [phone] if it is above 100.0    Pt aware of arrival time 1030 and location. Pt verbalized understanding of instructions.

## 2022-05-13 ENCOUNTER — LAB (OUTPATIENT)
Dept: URGENT CARE | Facility: URGENT CARE | Age: 72
End: 2022-05-13
Payer: COMMERCIAL

## 2022-05-13 DIAGNOSIS — Z11.59 ENCOUNTER FOR SCREENING FOR OTHER VIRAL DISEASES: ICD-10-CM

## 2022-05-13 PROCEDURE — U0005 INFEC AGEN DETEC AMPLI PROBE: HCPCS

## 2022-05-13 PROCEDURE — U0003 INFECTIOUS AGENT DETECTION BY NUCLEIC ACID (DNA OR RNA); SEVERE ACUTE RESPIRATORY SYNDROME CORONAVIRUS 2 (SARS-COV-2) (CORONAVIRUS DISEASE [COVID-19]), AMPLIFIED PROBE TECHNIQUE, MAKING USE OF HIGH THROUGHPUT TECHNOLOGIES AS DESCRIBED BY CMS-2020-01-R: HCPCS

## 2022-05-14 LAB — SARS-COV-2 RNA RESP QL NAA+PROBE: NEGATIVE

## 2022-05-16 ENCOUNTER — HOSPITAL ENCOUNTER (OUTPATIENT)
Facility: CLINIC | Age: 72
Setting detail: OBSERVATION
Discharge: HOME | End: 2022-05-17
Admitting: INTERNAL MEDICINE
Payer: COMMERCIAL

## 2022-05-16 DIAGNOSIS — R00.2 PALPITATION: ICD-10-CM

## 2022-05-16 DIAGNOSIS — I49.5 SSS (SICK SINUS SYNDROME) (H): ICD-10-CM

## 2022-05-16 DIAGNOSIS — I48.4 ATYPICAL ATRIAL FLUTTER (H): Primary | ICD-10-CM

## 2022-05-16 LAB
ANION GAP SERPL CALCULATED.3IONS-SCNC: 8 MMOL/L (ref 3–14)
BUN SERPL-MCNC: 21 MG/DL (ref 7–30)
CALCIUM SERPL-MCNC: 8.8 MG/DL (ref 8.5–10.1)
CHLORIDE BLD-SCNC: 108 MMOL/L (ref 94–109)
CO2 SERPL-SCNC: 21 MMOL/L (ref 20–32)
CREAT SERPL-MCNC: 1.31 MG/DL (ref 0.66–1.25)
ERYTHROCYTE [DISTWIDTH] IN BLOOD BY AUTOMATED COUNT: 12.9 % (ref 10–15)
GFR SERPL CREATININE-BSD FRML MDRD: 59 ML/MIN/1.73M2
GLUCOSE BLD-MCNC: 100 MG/DL (ref 70–99)
HCT VFR BLD AUTO: 44.1 % (ref 40–53)
HGB BLD-MCNC: 15.7 G/DL (ref 13.3–17.7)
INR BLD: 1.4 (ref 0.9–1.1)
MCH RBC QN AUTO: 32.8 PG (ref 26.5–33)
MCHC RBC AUTO-ENTMCNC: 35.6 G/DL (ref 31.5–36.5)
MCV RBC AUTO: 92 FL (ref 78–100)
PLATELET # BLD AUTO: 197 10E3/UL (ref 150–450)
POTASSIUM BLD-SCNC: 4.2 MMOL/L (ref 3.4–5.3)
PSA SERPL-MCNC: 2.11 UG/L (ref 0–4)
RBC # BLD AUTO: 4.79 10E6/UL (ref 4.4–5.9)
SODIUM SERPL-SCNC: 137 MMOL/L (ref 133–144)
WBC # BLD AUTO: 6.2 10E3/UL (ref 4–11)

## 2022-05-16 PROCEDURE — C1733 CATH, EP, OTHR THAN COOL-TIP: HCPCS | Performed by: INTERNAL MEDICINE

## 2022-05-16 PROCEDURE — 33207 INSERT HEART PM VENTRICULAR: CPT | Mod: KX | Performed by: INTERNAL MEDICINE

## 2022-05-16 PROCEDURE — 82310 ASSAY OF CALCIUM: CPT | Performed by: INTERNAL MEDICINE

## 2022-05-16 PROCEDURE — 93650 ICAR CATH ABLTJ AV NODE FUNC: CPT | Performed by: INTERNAL MEDICINE

## 2022-05-16 PROCEDURE — 36415 COLL VENOUS BLD VENIPUNCTURE: CPT

## 2022-05-16 PROCEDURE — 99152 MOD SED SAME PHYS/QHP 5/>YRS: CPT | Performed by: INTERNAL MEDICINE

## 2022-05-16 PROCEDURE — 250N000011 HC RX IP 250 OP 636

## 2022-05-16 PROCEDURE — 99153 MOD SED SAME PHYS/QHP EA: CPT | Performed by: INTERNAL MEDICINE

## 2022-05-16 PROCEDURE — 250N000011 HC RX IP 250 OP 636: Performed by: INTERNAL MEDICINE

## 2022-05-16 PROCEDURE — 258N000002 HC RX IP 258 OP 250: Performed by: INTERNAL MEDICINE

## 2022-05-16 PROCEDURE — 36415 COLL VENOUS BLD VENIPUNCTURE: CPT | Performed by: INTERNAL MEDICINE

## 2022-05-16 PROCEDURE — 250N000013 HC RX MED GY IP 250 OP 250 PS 637: Performed by: INTERNAL MEDICINE

## 2022-05-16 PROCEDURE — C1898 LEAD, PMKR, OTHER THAN TRANS: HCPCS | Performed by: INTERNAL MEDICINE

## 2022-05-16 PROCEDURE — G0103 PSA SCREENING: HCPCS | Performed by: INTERNAL MEDICINE

## 2022-05-16 PROCEDURE — 85610 PROTHROMBIN TIME: CPT

## 2022-05-16 PROCEDURE — C1894 INTRO/SHEATH, NON-LASER: HCPCS | Performed by: INTERNAL MEDICINE

## 2022-05-16 PROCEDURE — 250N000009 HC RX 250

## 2022-05-16 PROCEDURE — C1887 CATHETER, GUIDING: HCPCS | Performed by: INTERNAL MEDICINE

## 2022-05-16 PROCEDURE — 272N000001 HC OR GENERAL SUPPLY STERILE: Performed by: INTERNAL MEDICINE

## 2022-05-16 PROCEDURE — C1786 PMKR, SINGLE, RATE-RESP: HCPCS | Performed by: INTERNAL MEDICINE

## 2022-05-16 PROCEDURE — 85027 COMPLETE CBC AUTOMATED: CPT | Performed by: INTERNAL MEDICINE

## 2022-05-16 PROCEDURE — 999N000071 HC STATISTIC HEART CATH LAB OR EP LAB

## 2022-05-16 PROCEDURE — 250N000009 HC RX 250: Performed by: INTERNAL MEDICINE

## 2022-05-16 DEVICE — PACEMAKER AZURE XT SR MRI SYS: Type: IMPLANTABLE DEVICE | Status: FUNCTIONAL

## 2022-05-16 DEVICE — IMP LEAD PACING BIPOLAR CAPSUREFIX NOVUS 58CM 5076-58: Type: IMPLANTABLE DEVICE | Status: FUNCTIONAL

## 2022-05-16 RX ORDER — FENTANYL CITRATE 50 UG/ML
INJECTION, SOLUTION INTRAMUSCULAR; INTRAVENOUS
Status: DISCONTINUED | OUTPATIENT
Start: 2022-05-16 | End: 2022-05-16 | Stop reason: HOSPADM

## 2022-05-16 RX ORDER — ACETAMINOPHEN 500 MG
500 TABLET ORAL EVERY 6 HOURS PRN
Status: DISCONTINUED | OUTPATIENT
Start: 2022-05-16 | End: 2022-05-17 | Stop reason: HOSPADM

## 2022-05-16 RX ORDER — CEFAZOLIN SODIUM 1 G/3ML
1 INJECTION, POWDER, FOR SOLUTION INTRAMUSCULAR; INTRAVENOUS
Status: DISCONTINUED | OUTPATIENT
Start: 2022-05-16 | End: 2022-05-16 | Stop reason: HOSPADM

## 2022-05-16 RX ORDER — CEFAZOLIN SODIUM/WATER 3 G/30 ML
3 SYRINGE (ML) INTRAVENOUS ONCE
Status: COMPLETED | OUTPATIENT
Start: 2022-05-16 | End: 2022-05-16

## 2022-05-16 RX ORDER — CEFAZOLIN SODIUM IN 0.9 % NACL 3 G/100 ML
3 INTRAVENOUS SOLUTION, PIGGYBACK (ML) INTRAVENOUS
Status: DISCONTINUED | OUTPATIENT
Start: 2022-05-16 | End: 2022-05-16

## 2022-05-16 RX ORDER — ACETAMINOPHEN 325 MG/1
650 TABLET ORAL EVERY 4 HOURS PRN
Status: DISCONTINUED | OUTPATIENT
Start: 2022-05-16 | End: 2022-05-17 | Stop reason: HOSPADM

## 2022-05-16 RX ORDER — BUPIVACAINE HYDROCHLORIDE 2.5 MG/ML
INJECTION, SOLUTION EPIDURAL; INFILTRATION; INTRACAUDAL
Status: DISCONTINUED | OUTPATIENT
Start: 2022-05-16 | End: 2022-05-16 | Stop reason: HOSPADM

## 2022-05-16 RX ORDER — CARVEDILOL 3.12 MG/1
6.25 TABLET ORAL 2 TIMES DAILY WITH MEALS
Status: DISCONTINUED | OUTPATIENT
Start: 2022-05-16 | End: 2022-05-17 | Stop reason: HOSPADM

## 2022-05-16 RX ORDER — SODIUM CHLORIDE 450 MG/100ML
INJECTION, SOLUTION INTRAVENOUS CONTINUOUS
Status: DISCONTINUED | OUTPATIENT
Start: 2022-05-16 | End: 2022-05-16 | Stop reason: HOSPADM

## 2022-05-16 RX ORDER — MIDAZOLAM HCL IN 0.9 % NACL/PF 1 MG/ML
.5-6 PLASTIC BAG, INJECTION (ML) INTRAVENOUS CONTINUOUS PRN
Status: DISCONTINUED | OUTPATIENT
Start: 2022-05-16 | End: 2022-05-16 | Stop reason: HOSPADM

## 2022-05-16 RX ORDER — OXYCODONE AND ACETAMINOPHEN 5; 325 MG/1; MG/1
1 TABLET ORAL EVERY 4 HOURS PRN
Status: DISCONTINUED | OUTPATIENT
Start: 2022-05-16 | End: 2022-05-17 | Stop reason: HOSPADM

## 2022-05-16 RX ORDER — LIDOCAINE 40 MG/G
CREAM TOPICAL
Status: DISCONTINUED | OUTPATIENT
Start: 2022-05-16 | End: 2022-05-16 | Stop reason: HOSPADM

## 2022-05-16 RX ADMIN — SODIUM CHLORIDE: 4.5 INJECTION, SOLUTION INTRAVENOUS at 12:15

## 2022-05-16 RX ADMIN — CEFAZOLIN 3 G: 10 INJECTION, POWDER, FOR SOLUTION INTRAVENOUS at 13:01

## 2022-05-16 RX ADMIN — CARVEDILOL 6.25 MG: 3.12 TABLET, FILM COATED ORAL at 19:23

## 2022-05-16 NOTE — PROGRESS NOTES
[facility] Admission Nursing Note    Patient Information  Name: Kevan Connelly  Age: 71 year old  Reason for admission: av node ablation and pacer in  [facility] arrival time: 1030    Visitor Information  Name: Darya Pratt [phone]  Informed of visitor restrictions: Yes  1 visitor allowed per patient   Visitor must screen negative for COVID symptoms   Visitor must wear a mask  Waiting rooms closed to visitors    Patient Admission/Assessment   Pre-procedure assessment complete: Yes  If abnormal assessment/labs, provider notified: N/A  NPO: Yes  Medications held per instructions/orders: Yes  Consent: declined  If applicable, pregnancy test status: declined  Patient oriented to room: Yes  Education/questions answered: Yes  Plan/other: Review labs, obtain consent and proceed with scheduled treatment or intervention      Discharge Planning  Discharge name/phone number: Darya Pratt [phone]  Overnight post sedation caregiver: Darya Pratt [phone]  Discharge location: [place]    Blayne Brantley RN         PATIENT/VISITOR WELLNESS SCREENING    Step 1 Patient Screening    1. In the last month, have you been in contact with someone who was confirmed or suspected to have Coronavirus/COVID-19? No    2. Do you have the following symptoms?  Fever/Chills? No   Cough? No   Shortness of breath? No   New loss of taste or smell? No  Sore throat? No  Muscle or body aches? No  Headaches? No  Fatigue? No  Vomiting or diarrhea? No    Step 2 Visitor Screening    1. Name of Visitor (1 visitor per patient): Darya Pratt [phone]      2. In the last month, have you been in contact with someone who was confirmed or suspected to have Coronavirus/COVID-19? No    3. Do you have the following symptoms?  Fever/Chills? No   Cough? No   Shortness of breath? No   Skin rash? No   Loss of taste or smell? No  Sore throat? No  Runny or stuffy nose? No  Muscle or body aches? No  Headaches? No  Fatigue? No  Vomiting or  diarrhea? No    If the visitor has positive symptoms, notify supervisor/manger  Per policy, the visitor will need to leave the facility     Step 3 Refer to logic grid below for actions    NO SYMPTOM(S)    ACTIONS:  1. Standard rooming process  2. Provider to assess per normal protocol  3. Implement precautions as needed and per guidelines     POSITIVE SYMPTOM(S)  If positive for ANY of the following symptoms: fever, cough, shortness of breath, rash    ACTION:  1. Continue to have the patient wear a mask   2. Room patient as soon as possible  3. Don appropriate PPE when entering room  4. Provider evaluation

## 2022-05-16 NOTE — PRE-PROCEDURE
GENERAL PRE-PROCEDURE:   Procedure:  AVNA + PM  Date/Time:  5/16/2022 1:15 PM    Written consent obtained?: Yes    Risks and benefits: Risks, benefits and alternatives were discussed    Consent given by:  Patient  Patient states understanding of procedure being performed: Yes    Patient's understanding of procedure matches consent: Yes    Procedure consent matches procedure scheduled: Yes    Expected level of sedation:  Moderate  Appropriately NPO:  Yes  ASA Class:  3  Mallampati  :  Grade 2- soft palate, base of uvula, tonsillar pillars, and portion of posterior pharyngeal wall visible  Lungs:  Lungs clear with good breath sounds bilaterally  Heart:  A-flutter  History & Physical reviewed:  History and physical reviewed and no updates needed  Statement of review:  I have reviewed the lab findings, diagnostic data, medications, and the plan for sedation

## 2022-05-16 NOTE — Clinical Note
Hemodynamic equipment used: 5 lead ECG, [place]K With 3 Leads, Machine BP Cuff and pulse oximeter probe.

## 2022-05-16 NOTE — Clinical Note
Conscious sedation is planned for this procedure.    Provider immediate assessment completed.    Attending Only

## 2022-05-16 NOTE — PROGRESS NOTES
[facility] Post Procedure Note    Patient Information  Name: Kevan Connelly  Age: 71 year old    Post Procedure  Time patient returned to [facility]: 1440  Concerns/abnormal assessment: none  If abnormal assessment, provider notified: N/A  Plan/Other: 4 hours bedrest - xray to follow - AVS    Blayne Brantley RN

## 2022-05-16 NOTE — PROGRESS NOTES
Dictated.    1.  Successful single-chamber pacemaker implantation ([employer], VVIR 80 ppm).    2.  Successful AV node ablation resulting in complete AV block with escape junctional rhythm in the 40s.    EBL 20 mL.  No apparent complication.      Plan:  -Monitor overnight given AVNA  -Resume warfarin tomorrow  -Stop diltiazem  -Continue carvedilol

## 2022-05-16 NOTE — Clinical Note
Potential access sites were evaluated for patency using ultrasound.   The left subclavian vein was selected. Access was obtained under with Fluoroscopic guidance using a standard 18 guage needle with direct visualization of needle entry.

## 2022-05-17 ENCOUNTER — DOCUMENTATION ONLY (OUTPATIENT)
Dept: CARDIOLOGY | Facility: CLINIC | Age: 72
End: 2022-05-17
Payer: COMMERCIAL

## 2022-05-17 ENCOUNTER — APPOINTMENT (OUTPATIENT)
Dept: GENERAL RADIOLOGY | Facility: CLINIC | Age: 72
End: 2022-05-17
Attending: INTERNAL MEDICINE
Payer: COMMERCIAL

## 2022-05-17 ENCOUNTER — TELEPHONE (OUTPATIENT)
Dept: CARDIOLOGY | Facility: CLINIC | Age: 72
End: 2022-05-17
Payer: COMMERCIAL

## 2022-05-17 VITALS
SYSTOLIC BLOOD PRESSURE: 134 MMHG | TEMPERATURE: 98.7 F | RESPIRATION RATE: 18 BRPM | HEART RATE: 80 BPM | BODY MASS INDEX: 33.89 KG/M2 | DIASTOLIC BLOOD PRESSURE: 87 MMHG | WEIGHT: 272.6 LBS | OXYGEN SATURATION: 96 % | HEIGHT: 75 IN

## 2022-05-17 DIAGNOSIS — I48.19 PERSISTENT ATRIAL FIBRILLATION (H): Primary | ICD-10-CM

## 2022-05-17 DIAGNOSIS — Z98.890 HX OF ATRIOVENTRICULAR NODE ABLATION: Primary | ICD-10-CM

## 2022-05-17 DIAGNOSIS — Z95.0 CARDIAC PACEMAKER IN SITU: ICD-10-CM

## 2022-05-17 PROBLEM — R00.2 PALPITATION: Status: ACTIVE | Noted: 2022-05-17

## 2022-05-17 PROCEDURE — 99024 POSTOP FOLLOW-UP VISIT: CPT | Performed by: NURSE PRACTITIONER

## 2022-05-17 PROCEDURE — 93010 ELECTROCARDIOGRAM REPORT: CPT | Performed by: INTERNAL MEDICINE

## 2022-05-17 PROCEDURE — G0378 HOSPITAL OBSERVATION PER HR: HCPCS

## 2022-05-17 PROCEDURE — 999N000065 XR CHEST 2 VW

## 2022-05-17 PROCEDURE — 250N000013 HC RX MED GY IP 250 OP 250 PS 637: Performed by: INTERNAL MEDICINE

## 2022-05-17 PROCEDURE — 99024 POSTOP FOLLOW-UP VISIT: CPT | Performed by: INTERNAL MEDICINE

## 2022-05-17 PROCEDURE — 93005 ELECTROCARDIOGRAM TRACING: CPT

## 2022-05-17 RX ADMIN — CARVEDILOL 6.25 MG: 3.12 TABLET, FILM COATED ORAL at 08:30

## 2022-05-17 NOTE — PROGRESS NOTES
Device Discharge  Dressing:  Clean, dry, and intact, pressure dressing removed   Chest XR reviewed:  Yes  Pneumo present?:  No   Lead Measurements per Device Rep:  WNL    Education Provided:  Avoid raising left arm above the level of the shoulder for 3 weeks.  Do not shower until outer occlusive dressing has been removed.  Remove outer dressing in 3 - 4 days.  Leave steri-strips in place, these will be removed at the 1 week check.   Call Device Clinic with increased swelling, drainage, or fever > 101 degrees.   Follow-up with the Device Clinic as scheduled. Pacemaker check scheduled for 5/25 at 11AM in [place].

## 2022-05-17 NOTE — DISCHARGE SUMMARY
Patient discharged to home on 5/17/2022 at 1:19 PM via wheelchair with wife driving . All belongings with patient. Patient education given and all questions answered. Medication regimen discussed with patient. Upcoming appointments also discussed. Patient verbalized understanding.

## 2022-05-17 NOTE — PLAN OF CARE
Goal Outcome Evaluation:    A&Ox4, VSS on RA. Denies pain. R groin and L pacemaker incision CDI. Soft, no hematoma. CMS intact, no numbness/tingling. Up ad estella. Reg diet, voiding adequately. IV SL. Plan to DC today.

## 2022-05-17 NOTE — DISCHARGE SUMMARY
[facility]  Cardiology Progress Note  Date of Service: 05/17/2022  Primary Cardiologist: Dr. Hannon       Kevan Connelly is a 71 year old male with past medical history significant for Atrial fibrillation, Nonocclusive coronary artery disease, mildly dilated aorta, tachybrady cardiomyopathy (Resolved), HTN, obesity, Testicular cancer with subsequent ED admitted on 5/16/2022 for av node ablation and permanent pacemaker      Interval History  Pt feels good with no complaints.   BP controlled      Assessment  Recurrent persistent atrial fibrillation  S/p Medtronic single chamber permanent pacemaker (5/16/22)  No apparent complications   Chest Xray shows good lead placement without evidence pneumothorax and leads appear appropriately placed  ECG shows v paced at 80 bpm  Interrogation check showed stable thresholds.  Incision is covered with clean dry and intact dressing  Device RN education completed     Cardiomyopathy    At the time of diagnosis with his atrial fibrillation his EF was found to be 33%.       ECHO (8/2019) revealed EF 50-55%      IBETH (1/2022) revealed normal EF    Continue coreg     Euvolemic on exam      Hypertension    controlled with coreg 6.25 mg BID.          Non-occlusive coronary artery disease    Per CT angiogram heart 1/2020 revealed coronary calcification the LAD, circumflex and RCA.    Asymptomatic    Continue statin.  He is not on an aspirin due to being on Warfarin.     Plan  1. Follow up with device clinic  2. Follow up with EP BRYAN in 3 months  3. Pt can discharge  4.  This patient was discussed with Dr. Hannon.  Final recommendations are pending his documentation      I spent 20 minutes face-to-face or coordinating care of Kevan Connelly. Over 50% of our time on the unit was spent counseling the patient and/or coordinating care regarding afib in the setting of av node ablation    ANNMARIE Peterson [facility] Heart  Text Page  (M-F 7:30 am - 4:00  "pm)    Clinically Significant Risk Factors Present on Admission              # Coagulation Defect: home medication list includes an anticoagulant medication    # Obesity: Estimated body mass index is 34.07 kg/m  as calculated from the following:    Height as of this encounter: 1.905 m (6' 3\").    Weight as of this encounter: 123.7 kg (272 lb 9.6 oz).          Physical Exam   Temp: 98.7  F (37.1  C) Temp src: Oral BP: 134/87 Pulse: 80   Resp: 18 SpO2: 96 % O2 Device: None (Room air) Oxygen Delivery: 2 LPM  Vitals:    05/16/22 1019 05/17/22 0610   Weight: 125.5 kg (276 lb 9.6 oz) 123.7 kg (272 lb 9.6 oz)       GEN:  In general, this is a well nourished amle in no acute distress on.  Patient ambulatory.  HEENT:  Pupils normal size, equal, and round. Sclerae nonicteric. Clear oropharynx. Mucous membranes moist,  no cyanosis.  NECK: Supple, no asymmetry, masses, or scars appreciated.   C/V:  Regular rate and rhythm, no murmur, rub or gallop. No S3 or RV heave.   RESP: Respirations are unlabored. No use of accessory muscles. Clear to auscultation bilaterally without wheezing, rales, or rhonchi.  GI: Abdomen soft, nontender, nondistended. No hepatosplenomegaly appreciated. No masses palpable, bowel sounds normal.  EXTREM: no LE edema. No cyanosis or clubbing.  MS: Large joints without obvious swelling or erythema.   VASC: Radial and dorsalis pedis are normal in volumes and symmetric bilaterally.   NEURO: Alert and oriented, cooperative.  No obvious focal deficits.   PSYCH: Normal affect.  SKIN: Warm and dry. No rashes or petechiae appreciated. device incision  on upper left chest, covered with clean, dry and intact dressing  No, drainage, ecchymosis    Medications       carvedilol  6.25 mg Oral BID w/meals       Data   Most Recent 3 CBC's:Recent Labs   Lab Test 05/16/22  1112 03/30/22  0753 06/21/21  1228 03/31/21  0858   WBC 6.2  --  5.7 4.8   HGB 15.7 15.8 16.4 15.7   MCV 92  --  93 92     --  171 161     Most " Recent 3 BMP's:Recent Labs   Lab Test 05/16/22  1112 03/30/22  0753 02/17/22  0811    138 139   POTASSIUM 4.2 4.8 4.6   CHLORIDE 108 109 111*   CO2 21 26 21   BUN 21 24 16   CR 1.31* 1.34* 1.25   ANIONGAP 8 3 7   CHU 8.8 9.4 8.7   * 104* 99

## 2022-05-17 NOTE — PROGRESS NOTES
PRIMARY DIAGNOSIS: ACUTE PAIN  OUTPATIENT/OBSERVATION GOALS TO BE MET BEFORE DISCHARGE:  1. Pain Status: Denies pain     2. Return to near baseline physical activity: Met     3. Cleared for discharge by consultants (if involved): Partially met   Discharge Planner Nurse   Safe discharge environment identified: Not met   Barriers to discharge: No

## 2022-05-17 NOTE — PLAN OF CARE
Goal Outcome Evaluation:  Orientation/Cognitive: A&O X4  Observation Goals (Met/ Not Met): Partially met   Mobility Level/Assist Equipment: IND/SBA   Fall Risk (Y/N): Y  Behavior Concerns: None   Pain Management: Denies pain   Tele/VS/O2: Tele: V-Paced, VSS on RA   ABNL Lab/BG: Creatinine -1.31  Diet: Regular   Bowel/Bladder: Regular   Skin Concerns: R groin site, L pacemaker placement site  Drains/Devices: PIV SL   Tests/Procedures for next shift: X-Ray Chest   Anticipated DC date & active delays: 5/17/22  Patient Stated Goal for Today: Rest     PRIMARY DIAGNOSIS: ACUTE PAIN  OUTPATIENT/OBSERVATION GOALS TO BE MET BEFORE DISCHARGE:  1. Pain Status: Denies pain      2. Return to near baseline physical activity: Met      3. Cleared for discharge by consultants (if involved): Partially met   Discharge Planner Nurse   Safe discharge environment identified: Not met   Barriers to discharge: No

## 2022-05-17 NOTE — TELEPHONE ENCOUNTER
Pt called and LM wondering if he should restart the Losartan, now that the Diltiazem has been stopped. LM for pt to call back after talking to Hilda and recommending that pt restart the Losartan 100 mg daily. EstevanRN

## 2022-05-17 NOTE — PROGRESS NOTES
"EP- Cardiology Progress Note           Assessment and Plan:     71-yo M with a Hx of HTN, HL and recurrent persistent A. fib (PVI and posterior LA isolation in 1/3/2020), who p/w recurrence of atypical flutter.  He failed rhythm control strategy with flecainide and was intolerant to amiodarone.  He underwent PPM/AV node ablation yesterday.      Events: No events overnight. Surgical wound is well approximated.  No hematoma noticed. Device interrogation showed good lead parameters.  Chest Xray confirmed good lead position and ruled out pneumothorax.       Previous studies:  -Exercise stress test (08/28/2019): Target heart rate was not achieved.  However test was not negative for ischemia or pro-arrhythmias.  -Echo (08/14/2019): Normal LV function.  EF estimated 50-55%.  -IBETH (1/02/2020): Normal LV function.  EF estimated 55-60%.  There was no significant valve disease.  -Chest CT (01/02/2020): Normal pulmonary vein anatomy.  Aortic root was mildly dilated (4.2 x 3.88 cm).  Coronary calcification was noticed in the LAD.  Scattered mediastinal and right heel are calcifications were noticed suggestive of old granulomas.    Plan:  1.  Afib/flutter. S/p PPM and AVN ablation.   2.  Device care.  Follow up in device clinic in 7-10 days. Avoid vascular access through the left jugular and subclavian veins. Wound care as follows:  a. Do not get incision wet for 3 days.  b. Leave steri strips in place allowing them to come off naturally. If they do not come off in 2 weeks you may remove them.  c. Check your incision and call our office if you notice one of the following: redness, drainage (pus or blood), swelling, warmth or if you develop a fever.  3.  Embolic prevention.  [id number] score of 2.  Content anticoagulation indefinitely.     Physical Exam:  Vitals: /87 (BP Location: Right arm)   Pulse 80   Temp 98.7  F (37.1  C) (Oral)   Resp 18   Ht 1.905 m (6' 3\")   Wt 123.7 kg (272 lb 9.6 oz)   SpO2 96%   BMI 34.07 " kg/m        Intake/Output Summary (Last 24 hours) at 5/17/2022 0904  Last data filed at 5/17/2022 0610  Gross per 24 hour   Intake 830 ml   Output --   Net 830 ml     Vitals:    05/16/22 1019 05/17/22 0610   Weight: 125.5 kg (276 lb 9.6 oz) 123.7 kg (272 lb 9.6 oz)       Constitutional:  AAO x3.  Pt is in NAD.  HEAD: Normocephalic.  SKIN: Skin normal color, texture and turgor with no lesions or eruptions.  Eyes: PERRL, EOMI.  ENT:  Supple, normal JVP. No lymphadenopathy or thyroid enlargement.  Chest:  CTAB.  Cardiac:  RRR, normal  S1 and S2.  No murmurs rubs or gallop.    Abdomen:  Normal BS.  Soft, non-tender and non-distended.  No rebound or guarding.    Extremities:  Pedious pulses palpable B/L.  No LE edema noticed.   Neurological: Strength and sensation grossly symmetric and intact throughout.                            Review of Systems:   As per subjective, otherwise 5 systems reviewed and negative.         Medications:         carvedilol  6.25 mg Oral BID w/meals     PRN Meds: acetaminophen, acetaminophen, HOLD MEDICATION, HOLD MEDICATION, HOLD MEDICATION, oxyCODONE-acetaminophen             Data:     Recent Labs   Lab 05/16/22  1158 05/16/22  1112   WBC  --  6.2   HGB  --  15.7   MCV  --  92   PLT  --  197   INR 1.4*  --    NA  --  137   POTASSIUM  --  4.2   CHLORIDE  --  108   CO2  --  21   BUN  --  21   CR  --  1.31*   ANIONGAP  --  8   CHU  --  8.8   GLC  --  100*

## 2022-05-17 NOTE — DISCHARGE INSTRUCTIONS
Discharge Instructions for Pacemaker Implantation  You have had a procedure to insert a pacemaker. Once inside your body, this small electronic device helps keep your heart from beating too slowly. A pacemaker can t fix existing heart problems. But it can help you feel better and have more energy. As you recover, follow all of the instructions you are given, including those below.  Activity  Follow the instructions you are given about limiting your activity.  Do not raise your arm on the incision side above shoulder level for 3 weeks. This gives the device lead wires time to attach securely inside your heart.  Ask your doctor when you can expect to return to work.  You can still exercise. It s good for your body and your heart. Talk with your doctor about an exercise plan.  Other Precautions  Follow your doctor's directions carefully for wound care. You may remove the outer dressing in 3 days. Leave the steri-strips in place; these will be removed at your 1 week follow-up. Never put any creams, lotions, or products like peroxide on an incision unless your doctor tells you to.   You may shower once the outer dressing has been removed.   Before you receive any treatment, tell all health care providers (including your dentist) that you have a pacemaker.  You will be given an ID card that contains information about your pacemaker. Always carry this card with you. You can show this card if your pacemaker sets off a metal detector. You should also show it to avoid screening with a hand-held security wand.  Keep your cell phone away from your pacemaker. Don t carry the phone in your shirt pocket, even when it s turned off.  Avoid strong magnets. Examples are those used in MRIs or in hand-held security wands.  Avoid strong electrical fields. Examples are those made by radio transmitting towers,  ham  radios, and heavy-duty electrical equipment.  Avoid leaning over the open srivastava of a running car. A running engine creates an  electrical field. Most household and yard appliances will not cause any problems. If you use any large power tools, such as an industrial , talk with your doctor.   Follow-Up  Follow up in the device clinic as scheduled. A device nurse will help you set up a follow up appointment in the device clinic 7-10 days after your AV node ablation and pacemaker procedure.   Make regular follow-up appointments with your device clinic. They will check the pacemaker to make sure it s working properly.  When to Call Your Device Clinic [phone]  Call your doctor immediately if you have any of the following:  Dizziness  Chest pain  Lack of energy  Fainting spells  Shortness of breath  Pain around your pacemaker  Fever above 100.4 F (38 C) or other signs of infection (redness, swelling, drainage, or warmth at the incision site)     1027-2387 [employer]. [address] [place] [address]. All rights reserved. This information is not intended as a substitute for professional medical care. Always follow your healthcare professional's instructions.    [facility] in [place]: [phone]  Device Clinic (Monday to Friday, 8am-4pm): [phone]  *The device clinic is closed on weekends and holidays.  Any calls received during this time will be answered on the next business  day. For any urgent questions after hours, please call the main clinic number and you will be put in contact with the cardiologist on call.

## 2022-05-18 ENCOUNTER — DOCUMENTATION ONLY (OUTPATIENT)
Dept: ANTICOAGULATION | Facility: CLINIC | Age: 72
End: 2022-05-18
Payer: COMMERCIAL

## 2022-05-18 DIAGNOSIS — I48.0 PAROXYSMAL ATRIAL FIBRILLATION (H): ICD-10-CM

## 2022-05-18 DIAGNOSIS — Z79.01 LONG TERM CURRENT USE OF ANTICOAGULANT THERAPY: Primary | ICD-10-CM

## 2022-05-18 RX ORDER — LOSARTAN POTASSIUM 100 MG/1
100 TABLET ORAL DAILY
Qty: 90 TABLET | Refills: 3 | Status: SHIPPED | OUTPATIENT
Start: 2022-05-18 | End: 2023-06-26

## 2022-05-18 NOTE — PROGRESS NOTES
ANTICOAGULATION  MANAGEMENT: Discharge Review    Kevan Connelly chart reviewed for anticoagulation continuity of care    Outpatient surgery/procedure on 5/16/22 for ablation and pacemaker placement.    Discharge disposition: Home    Results:    Recent labs: (last 7 days)     05/16/22  1158   INR 1.4*     Anticoagulation inpatient management:     home regimen resumed post procedure on 5/17/22    Anticoagulation discharge instructions:     Warfarin dosing: home regimen continued   Bridging: No   INR goal change: No      Medication changes affecting anticoagulation: No    Additional factors affecting anticoagulation: No    Plan     Patient states that he forgot to restart the warfarin last evening.  Recommended a boost dose today, then resume his previous warfarin maintenance dose.    Spoke with patient and assisted to schedule follow up.    Anticoagulation Calendar updated    Yarelis Martinez RN

## 2022-05-18 NOTE — TELEPHONE ENCOUNTER
Patient returned call.  Reviewed with patient that Hilda wanted him to resume his losartan 100mg po every day.  Medication list updated in epic.  Updated profile at the pharmacy.  Patient s/p AVNA/PPM 5/16.  Patient inquired about dressing on groin site.  Instructed patient to remove dressing and monitor site.  Patient provided verbal understanding regarding above.  DIO Schrader

## 2022-05-19 LAB
ATRIAL RATE - MUSE: 115 BPM
DIASTOLIC BLOOD PRESSURE - MUSE: NORMAL MMHG
INTERPRETATION ECG - MUSE: NORMAL
P AXIS - MUSE: NORMAL DEGREES
PR INTERVAL - MUSE: NORMAL MS
QRS DURATION - MUSE: 146 MS
QT - MUSE: 444 MS
QTC - MUSE: 512 MS
R AXIS - MUSE: -56 DEGREES
SYSTOLIC BLOOD PRESSURE - MUSE: NORMAL MMHG
T AXIS - MUSE: 87 DEGREES
VENTRICULAR RATE- MUSE: 80 BPM

## 2022-05-25 ENCOUNTER — ANCILLARY PROCEDURE (OUTPATIENT)
Dept: CARDIOLOGY | Facility: CLINIC | Age: 72
End: 2022-05-25
Attending: INTERNAL MEDICINE
Payer: COMMERCIAL

## 2022-05-25 DIAGNOSIS — Z95.0 CARDIAC PACEMAKER IN SITU: ICD-10-CM

## 2022-05-25 DIAGNOSIS — Z98.890 HX OF ATRIOVENTRICULAR NODE ABLATION: ICD-10-CM

## 2022-05-25 PROCEDURE — 93279 PRGRMG DEV EVAL PM/LDLS PM: CPT | Performed by: INTERNAL MEDICINE

## 2022-05-26 LAB
MDC_IDC_MSMT_BATTERY_DTM: NORMAL
MDC_IDC_MSMT_BATTERY_REMAINING_LONGEVITY: 162 MO
MDC_IDC_MSMT_BATTERY_RRT_TRIGGER: 2.62
MDC_IDC_MSMT_BATTERY_STATUS: NORMAL
MDC_IDC_MSMT_BATTERY_VOLTAGE: 3.21 V
MDC_IDC_MSMT_LEADCHNL_RV_IMPEDANCE_VALUE: 475 OHM
MDC_IDC_MSMT_LEADCHNL_RV_IMPEDANCE_VALUE: 570 OHM
MDC_IDC_MSMT_LEADCHNL_RV_PACING_THRESHOLD_AMPLITUDE: 0.5 V
MDC_IDC_MSMT_LEADCHNL_RV_PACING_THRESHOLD_PULSEWIDTH: 0.4 MS
MDC_IDC_MSMT_LEADCHNL_RV_SENSING_INTR_AMPL: 6.38 MV
MDC_IDC_PG_IMPLANT_DTM: NORMAL
MDC_IDC_PG_MFG: NORMAL
MDC_IDC_PG_MODEL: NORMAL
MDC_IDC_PG_SERIAL: NORMAL
MDC_IDC_PG_TYPE: NORMAL
MDC_IDC_SESS_CLINIC_NAME: NORMAL
MDC_IDC_SESS_DTM: NORMAL
MDC_IDC_SESS_TYPE: NORMAL
MDC_IDC_SET_BRADY_HYSTRATE: NORMAL
MDC_IDC_SET_BRADY_LOWRATE: 70 {BEATS}/MIN
MDC_IDC_SET_BRADY_MAX_SENSOR_RATE: 130 {BEATS}/MIN
MDC_IDC_SET_BRADY_MODE: NORMAL
MDC_IDC_SET_LEADCHNL_RV_PACING_AMPLITUDE: 2 V
MDC_IDC_SET_LEADCHNL_RV_PACING_ANODE_ELECTRODE_1: NORMAL
MDC_IDC_SET_LEADCHNL_RV_PACING_ANODE_LOCATION_1: NORMAL
MDC_IDC_SET_LEADCHNL_RV_PACING_CAPTURE_MODE: NORMAL
MDC_IDC_SET_LEADCHNL_RV_PACING_CATHODE_ELECTRODE_1: NORMAL
MDC_IDC_SET_LEADCHNL_RV_PACING_CATHODE_LOCATION_1: NORMAL
MDC_IDC_SET_LEADCHNL_RV_PACING_POLARITY: NORMAL
MDC_IDC_SET_LEADCHNL_RV_PACING_PULSEWIDTH: 0.4 MS
MDC_IDC_SET_LEADCHNL_RV_SENSING_ANODE_ELECTRODE_1: NORMAL
MDC_IDC_SET_LEADCHNL_RV_SENSING_ANODE_LOCATION_1: NORMAL
MDC_IDC_SET_LEADCHNL_RV_SENSING_CATHODE_ELECTRODE_1: NORMAL
MDC_IDC_SET_LEADCHNL_RV_SENSING_CATHODE_LOCATION_1: NORMAL
MDC_IDC_SET_LEADCHNL_RV_SENSING_POLARITY: NORMAL
MDC_IDC_SET_LEADCHNL_RV_SENSING_SENSITIVITY: 0.9 MV
MDC_IDC_SET_ZONE_DETECTION_INTERVAL: 360 MS
MDC_IDC_SET_ZONE_TYPE: NORMAL
MDC_IDC_STAT_BRADY_DTM_END: NORMAL
MDC_IDC_STAT_BRADY_DTM_START: NORMAL
MDC_IDC_STAT_BRADY_RV_PERCENT_PACED: 99.81 %
MDC_IDC_STAT_EPISODE_RECENT_COUNT: 0
MDC_IDC_STAT_EPISODE_RECENT_COUNT: 0
MDC_IDC_STAT_EPISODE_RECENT_COUNT_DTM_END: NORMAL
MDC_IDC_STAT_EPISODE_RECENT_COUNT_DTM_END: NORMAL
MDC_IDC_STAT_EPISODE_RECENT_COUNT_DTM_START: NORMAL
MDC_IDC_STAT_EPISODE_RECENT_COUNT_DTM_START: NORMAL
MDC_IDC_STAT_EPISODE_TOTAL_COUNT: 0
MDC_IDC_STAT_EPISODE_TOTAL_COUNT: 0
MDC_IDC_STAT_EPISODE_TOTAL_COUNT_DTM_END: NORMAL
MDC_IDC_STAT_EPISODE_TOTAL_COUNT_DTM_END: NORMAL
MDC_IDC_STAT_EPISODE_TOTAL_COUNT_DTM_START: NORMAL
MDC_IDC_STAT_EPISODE_TOTAL_COUNT_DTM_START: NORMAL
MDC_IDC_STAT_EPISODE_TYPE: NORMAL

## 2022-05-27 ENCOUNTER — ANTICOAGULATION THERAPY VISIT (OUTPATIENT)
Dept: ANTICOAGULATION | Facility: CLINIC | Age: 72
End: 2022-05-27

## 2022-05-27 ENCOUNTER — LAB (OUTPATIENT)
Dept: LAB | Facility: CLINIC | Age: 72
End: 2022-05-27
Payer: COMMERCIAL

## 2022-05-27 DIAGNOSIS — I48.0 PAROXYSMAL ATRIAL FIBRILLATION (H): ICD-10-CM

## 2022-05-27 DIAGNOSIS — Z79.01 LONG TERM CURRENT USE OF ANTICOAGULANT THERAPY: Primary | ICD-10-CM

## 2022-05-27 DIAGNOSIS — Z79.01 LONG TERM CURRENT USE OF ANTICOAGULANT THERAPY: ICD-10-CM

## 2022-05-27 LAB — INR BLD: 1.5 (ref 0.9–1.1)

## 2022-05-27 PROCEDURE — 36416 COLLJ CAPILLARY BLOOD SPEC: CPT

## 2022-05-27 PROCEDURE — 85610 PROTHROMBIN TIME: CPT

## 2022-05-27 NOTE — PROGRESS NOTES
ASSESSMENT       Source(s): Patient/Caregiver Call       Warfarin doses taken: Warfarin taken as instructed    Diet: No new diet changes identified    New illness, injury, or hospitalization: No additional changes reported by patient other than what was found on chart review.    Medication/supplement changes: no    Signs or symptoms of bleeding or clotting: No    Previous INR: Subtherapeutic    Additional findings: None       PLAN     Recommended plan for temporary change(s) affecting INR     Dosing Instructions: Boost dose today, then Increase your warfarin dose (7.7% change) with next INR in 1 week       Summary  As of 5/27/2022    Full warfarin instructions:  5/27: 10 mg; Otherwise 5 mg every day   Next INR check:  5/31/2022             Telephone call with Keron who agrees to plan and repeated back plan correctly    Lab visit scheduled    Education provided: Please call back if any changes to your diet, medications or how you've been taking warfarin.  Reviewed signs and symptoms of blood clots and advised him to seek care if he is having any unusual symptoms.    Plan made per [facility] anticoagulation protocol    Yarelis Martinez RN  Anticoagulation Clinic  5/27/2022    _______________________________________________________________________     Anticoagulation Episode Summary     Current INR goal:  2.0-3.0   TTR:  75.0 % (1 y)   Target end date:  Indefinite   Send INR reminders to:  [facility]    Indications    Long term current use of anticoagulant therapy [Z79.01]  Paroxysmal atrial fibrillation (H) [I48.0]           Comments:           Anticoagulation Care Providers     Provider Role Specialty Phone number    Davina Reaves MD Referring Internal Medicine [phone]

## 2022-05-27 NOTE — PROGRESS NOTES
ANTICOAGULATION MANAGEMENT     Kevan Connelly 71 year old male is on warfarin with subtherapeutic INR result. (Goal INR 2.0-3.0)    Recent labs: (last 7 days)     05/27/22  1152   INR 1.5*       ASSESSMENT       Source(s): Chart Review       Warfarin doses taken: warfarin was intentionally held for ablation and restarted on 5/18.    Diet: No new diet changes identified    New illness, injury, or hospitalization: Yes: ablation 5/18    Medication/supplement changes: None noted    Signs or symptoms of bleeding or clotting: No    Previous INR: Subtherapeutic.  INR was in the lower end of his therapeutic range prior to holding his warfarin so maintenance dose was increased today.    Additional findings: None       PLAN     Unable to reach Keron today.    Left message to take booster dose of warfarin tonight (10 mg) then change his weekly routine to 5 mg daily.  This is a 7.7% increase    Follow up required to confirm warfarin dose taken and assess for changes and discuss dosing instructions and confirm understanding of instructions    Yarelis Martinez RN  Anticoagulation Clinic  5/27/2022

## 2022-06-07 ENCOUNTER — ANTICOAGULATION THERAPY VISIT (OUTPATIENT)
Dept: ANTICOAGULATION | Facility: CLINIC | Age: 72
End: 2022-06-07

## 2022-06-07 ENCOUNTER — LAB (OUTPATIENT)
Dept: LAB | Facility: CLINIC | Age: 72
End: 2022-06-07
Payer: COMMERCIAL

## 2022-06-07 DIAGNOSIS — Z79.01 LONG TERM CURRENT USE OF ANTICOAGULANT THERAPY: ICD-10-CM

## 2022-06-07 DIAGNOSIS — I48.0 PAROXYSMAL ATRIAL FIBRILLATION (H): ICD-10-CM

## 2022-06-07 DIAGNOSIS — Z79.01 LONG TERM CURRENT USE OF ANTICOAGULANT THERAPY: Primary | ICD-10-CM

## 2022-06-07 LAB — INR BLD: 3.1 (ref 0.9–1.1)

## 2022-06-07 PROCEDURE — 85610 PROTHROMBIN TIME: CPT

## 2022-06-07 PROCEDURE — 36415 COLL VENOUS BLD VENIPUNCTURE: CPT

## 2022-06-07 NOTE — PROGRESS NOTES
ANTICOAGULATION MANAGEMENT     Kevan Connelly 71 year old male is on warfarin with supratherapeutic INR result. (Goal INR 2.0-3.0)    Recent labs: (last 7 days)     06/07/22  1531   INR 3.1*       ASSESSMENT       Source(s): Chart Review    Previous INR was Subtherapeutic x2    Medication, diet, health changes since last INR chart reviewed; none identified           PLAN     Unable to reach Keron today.    Left message to continue current dose of warfarin 5 mg tonight. Request call back for assessment. Left message to call [phone].     Follow up required to confirm warfarin dose taken and assess for changes    Gretta Faith RN  Anticoagulation Clinic  6/7/2022

## 2022-06-07 NOTE — PROGRESS NOTES
ANTICOAGULATION MANAGEMENT     Mathur DHRUV Connelly 71 year old male is on warfarin with supratherapeutic INR result. (Goal INR 2.0-3.0)    Recent labs: (last 7 days)     06/07/22  1531   INR 3.1*       ASSESSMENT       Source(s): Chart Review and Patient/Caregiver Call       Warfarin doses taken: Warfarin taken as instructed    Diet: Decreased appetite, not feeling well but states the amount of green veggies is the same    New illness, injury, or hospitalization: Yes: URI, cough, 2 negative COVID test.    Medication/supplement changes: None noted    Signs or symptoms of bleeding or clotting: Yes: patient report a nose bleed this week d/t blowing nose with URI    Previous INR: Subtherapeutic    Additional findings: None       PLAN     Recommended plan for ongoing change(s) affecting INR     Dosing Instructions: decrease your warfarin dose (7.1% change) with next INR in 1 week       Summary  As of 6/7/2022    Full warfarin instructions:  2.5 mg every Mon; 5 mg all other days   Next INR check:  6/15/2022             Telephone call with Keron who verbalizes understanding and agrees to plan    Lab visit scheduled    Education provided: Please call back if any changes to your diet, medications or how you've been taking warfarin    Plan made per [facility] anticoagulation protocol    Gretta Faith RN  Anticoagulation Clinic  6/7/2022    _______________________________________________________________________     Anticoagulation Episode Summary     Current INR goal:  2.0-3.0   TTR:  73.8 % (1 y)   Target end date:  Indefinite   Send INR reminders to:  [facility]    Indications    Long term current use of anticoagulant therapy [Z79.01]  Paroxysmal atrial fibrillation (H) [I48.0]           Comments:           Anticoagulation Care Providers     Provider Role Specialty Phone number    Davina Reaves MD Referring Internal Medicine [phone]

## 2022-06-08 DIAGNOSIS — I48.91 ATRIAL FIBRILLATION, UNSPECIFIED TYPE (H): ICD-10-CM

## 2022-06-09 RX ORDER — WARFARIN SODIUM 5 MG/1
TABLET ORAL
Qty: 95 TABLET | Refills: 1 | Status: SHIPPED | OUTPATIENT
Start: 2022-06-09 | End: 2022-11-29

## 2022-06-15 ENCOUNTER — LAB (OUTPATIENT)
Dept: LAB | Facility: CLINIC | Age: 72
End: 2022-06-15
Payer: COMMERCIAL

## 2022-06-15 ENCOUNTER — ANTICOAGULATION THERAPY VISIT (OUTPATIENT)
Dept: ANTICOAGULATION | Facility: CLINIC | Age: 72
End: 2022-06-15

## 2022-06-15 DIAGNOSIS — Z79.01 LONG TERM CURRENT USE OF ANTICOAGULANT THERAPY: ICD-10-CM

## 2022-06-15 DIAGNOSIS — I48.0 PAROXYSMAL ATRIAL FIBRILLATION (H): ICD-10-CM

## 2022-06-15 DIAGNOSIS — Z79.01 LONG TERM CURRENT USE OF ANTICOAGULANT THERAPY: Primary | ICD-10-CM

## 2022-06-15 LAB — INR BLD: 3.9 (ref 0.9–1.1)

## 2022-06-15 PROCEDURE — 36415 COLL VENOUS BLD VENIPUNCTURE: CPT

## 2022-06-15 PROCEDURE — 85610 PROTHROMBIN TIME: CPT

## 2022-06-15 NOTE — PROGRESS NOTES
ANTICOAGULATION MANAGEMENT     Kevan Connelly 71 year old male is on warfarin with supratherapeutic INR result. (Goal INR 2.0-3.0)    Recent labs: (last 7 days)     06/15/22  0849   INR 3.9*       ASSESSMENT       Source(s): Chart Review and Patient/Caregiver Call       Warfarin doses taken: Warfarin taken as instructed.  Already took his warfarin today    Diet: Decreased greens/vitamin K in diet; plans to resume previous intake.  Appetite has returned.    New illness, injury, or hospitalization: Yes: URI symptoms have improved.  Still has an occasional cough    Medication/supplement changes: None noted    Signs or symptoms of bleeding or clotting: No    Previous INR: Supratherapeutic.  Maintenance dose was lowered by 7% at last check    Additional findings: None       PLAN     Recommended plan for temporary change(s) affecting INR     Dosing Instructions: hold dose then decrease your warfarin dose (15% change) with next INR in 2 weeks       Summary  As of 6/15/2022    Full warfarin instructions:  6/16: Hold; Otherwise 2.5 mg every Mon, Wed, Fri; 5 mg all other days   Next INR check:  6/29/2022             Telephone call with Keron who agrees to plan and repeated back plan correctly    Lab visit scheduled    Education provided: Please call back if any changes to your diet, medications or how you've been taking warfarin, Monitoring for bleeding signs and symptoms and Contact [phone]  with any changes, questions or concerns.     Plan made per [facility] anticoagulation protocol    Yarelis Martinez RN  Anticoagulation Clinic  6/15/2022    _______________________________________________________________________     Anticoagulation Episode Summary     Current INR goal:  2.0-3.0   TTR:  71.7 % (1 y)   Target end date:  Indefinite   Send INR reminders to:  [facility]    Indications    Long term current use of anticoagulant therapy [Z79.01]  Paroxysmal atrial fibrillation (H) [I48.0]           Comments:            Anticoagulation Care Providers     Provider Role Specialty Phone number    Davina Reaves MD Referring Internal Medicine [phone]

## 2022-06-16 ENCOUNTER — TELEPHONE (OUTPATIENT)
Dept: CARDIOLOGY | Facility: CLINIC | Age: 72
End: 2022-06-16
Payer: COMMERCIAL

## 2022-06-16 NOTE — TELEPHONE ENCOUNTER
Received call from patient regarding activity restrictions post Medtronic PPM implant on 5/16/22 (4.5 weeks ago).  He was wondering if he can resume using his hand weights, curls and extension straight above head.     Reassured patient that this would be okay to do as he is more than 3 weeks post implant.  Explained that the types of exercises we recommend caution around are ones that result in repetitive hyperextension of the lead (such as with the forward or backward crawl with swimming).     Patient verbalized understanding and appreciation for information.  He will call if he has further questions.     DIO Quiñones

## 2022-06-29 ENCOUNTER — LAB (OUTPATIENT)
Dept: LAB | Facility: CLINIC | Age: 72
End: 2022-06-29
Payer: COMMERCIAL

## 2022-06-29 ENCOUNTER — ANTICOAGULATION THERAPY VISIT (OUTPATIENT)
Dept: ANTICOAGULATION | Facility: CLINIC | Age: 72
End: 2022-06-29

## 2022-06-29 DIAGNOSIS — Z79.01 LONG TERM CURRENT USE OF ANTICOAGULANT THERAPY: Primary | ICD-10-CM

## 2022-06-29 DIAGNOSIS — I48.0 PAROXYSMAL ATRIAL FIBRILLATION (H): ICD-10-CM

## 2022-06-29 DIAGNOSIS — Z79.01 LONG TERM CURRENT USE OF ANTICOAGULANT THERAPY: ICD-10-CM

## 2022-06-29 LAB — INR BLD: 1.7 (ref 0.9–1.1)

## 2022-06-29 PROCEDURE — 85610 PROTHROMBIN TIME: CPT

## 2022-06-29 PROCEDURE — 36416 COLLJ CAPILLARY BLOOD SPEC: CPT

## 2022-06-29 NOTE — PROGRESS NOTES
ANTICOAGULATION MANAGEMENT     Kevan Connelly 71 year old male is on warfarin with subtherapeutic INR result. (Goal INR 2.0-3.0)    Recent labs: (last 7 days)     06/29/22  1412   INR 1.7*       ASSESSMENT       Source(s): Chart Review and Patient/Caregiver Call       Warfarin doses taken: Warfarin taken as instructed    Diet: Increased greens/vitamin K in diet; ongoing change.  Has been able to resume his usual diet.    New illness, injury, or hospitalization: Yes: recent URI symptoms have resolved.  Has been slowly increasing his activity level.    Medication/supplement changes: None noted    Signs or symptoms of bleeding or clotting: No    Previous INR: Supratherapeutic    Additional findings: None       PLAN     Recommended plan for temporary change(s) affecting INR     Dosing Instructions: Increase your warfarin dose (9% change) with next INR in 2 weeks       Summary  As of 6/29/2022    Full warfarin instructions:  2.5 mg every Mon, Fri; 5 mg all other days   Next INR check:  7/13/2022             Telephone call with Keron who agrees to plan and repeated back plan correctly    Lab visit scheduled    Education provided: Please call back if any changes to your diet, medications or how you've been taking warfarin and Impact of vitamin K foods on INR    Plan made per [facility] anticoagulation protocol    Yarelis Martinez RN  Anticoagulation Clinic  6/29/2022    _______________________________________________________________________     Anticoagulation Episode Summary     Current INR goal:  2.0-3.0   TTR:  70.6 % (1 y)   Target end date:  Indefinite   Send INR reminders to:  [facility]    Indications    Long term current use of anticoagulant therapy [Z79.01]  Paroxysmal atrial fibrillation (H) [I48.0]           Comments:           Anticoagulation Care Providers     Provider Role Specialty Phone number    Davina Reaves MD Referring Internal Medicine [phone]

## 2022-07-07 ENCOUNTER — ANCILLARY PROCEDURE (OUTPATIENT)
Dept: CARDIOLOGY | Facility: CLINIC | Age: 72
End: 2022-07-07
Attending: INTERNAL MEDICINE
Payer: COMMERCIAL

## 2022-07-07 ENCOUNTER — TELEPHONE (OUTPATIENT)
Dept: CARDIOLOGY | Facility: CLINIC | Age: 72
End: 2022-07-07

## 2022-07-07 DIAGNOSIS — Z98.890 HX OF ATRIOVENTRICULAR NODE ABLATION: ICD-10-CM

## 2022-07-07 DIAGNOSIS — Z95.0 CARDIAC PACEMAKER IN SITU: ICD-10-CM

## 2022-07-07 PROCEDURE — 93279 PRGRMG DEV EVAL PM/LDLS PM: CPT | Performed by: INTERNAL MEDICINE

## 2022-07-07 NOTE — TELEPHONE ENCOUNTER
Pt came in today for a routine 6 week [employer] [place] XT SR MRI (S) Post Pacemaker Device Check    Underlying Rhythm today: Afib with CHB achieved by AVNA with no junctional escape at VVI 30    -Noted first episode of NSVT logged with 1 EGM showing 19 beat run of NSVT with V rates 190-200bpm on 7/6/22 at 0600.  (see below) Pt was awake and does not recall episode.   Last EF 1/21/22, 55-60%    -Will route to Dr. Hannon for any further recommendations.

## 2022-07-13 ENCOUNTER — LAB (OUTPATIENT)
Dept: LAB | Facility: CLINIC | Age: 72
End: 2022-07-13
Payer: COMMERCIAL

## 2022-07-13 ENCOUNTER — ANTICOAGULATION THERAPY VISIT (OUTPATIENT)
Dept: ANTICOAGULATION | Facility: CLINIC | Age: 72
End: 2022-07-13

## 2022-07-13 DIAGNOSIS — Z79.01 LONG TERM CURRENT USE OF ANTICOAGULANT THERAPY: ICD-10-CM

## 2022-07-13 DIAGNOSIS — Z79.01 LONG TERM CURRENT USE OF ANTICOAGULANT THERAPY: Primary | ICD-10-CM

## 2022-07-13 DIAGNOSIS — I48.0 PAROXYSMAL ATRIAL FIBRILLATION (H): ICD-10-CM

## 2022-07-13 LAB — INR BLD: 1.7 (ref 0.9–1.1)

## 2022-07-13 PROCEDURE — 36415 COLL VENOUS BLD VENIPUNCTURE: CPT

## 2022-07-13 PROCEDURE — 85610 PROTHROMBIN TIME: CPT

## 2022-07-13 NOTE — PROGRESS NOTES
ANTICOAGULATION MANAGEMENT     Mathur DHRUV Connelly 71 year old male is on warfarin with subtherapeutic INR result. (Goal INR 2.0-3.0)    Recent labs: (last 7 days)     07/13/22  1412   INR 1.7*       ASSESSMENT       Source(s): Chart Review and Patient/Caregiver Call       Warfarin doses taken: Warfarin taken as instructed    Diet: Decreased greens/vitamin K in diet; plans to resume previous intake    New illness, injury, or hospitalization: No    Medication/supplement changes: None noted    Signs or symptoms of bleeding or clotting: No    Previous INR: Subtherapeutic.  Maintenance dose was increased 10% at last check with no change in INR, so maintenance dose was adjusted again today.    Additional findings: None       PLAN     Recommended plan for no diet, medication or health factor changes affecting INR     Dosing Instructions: continue your current warfarin dose with next INR in 2 weeks       Summary  As of 7/13/2022    Full warfarin instructions:  7/13: 7.5 mg; Otherwise 2.5 mg every Mon; 5 mg all other days   Next INR check:  7/27/2022             Telephone call with Keron who agrees to plan and repeated back plan correctly    Lab visit scheduled    Education provided: Please call back if any changes to your diet, medications or how you've been taking warfarin    Plan made per [facility] anticoagulation protocol    Yarelis Martinez RN  Anticoagulation Clinic  7/13/2022    _______________________________________________________________________     Anticoagulation Episode Summary     Current INR goal:  2.0-3.0   TTR:  66.8 % (1 y)   Target end date:  Indefinite   Send INR reminders to:  [facility]    Indications    Long term current use of anticoagulant therapy [Z79.01]  Paroxysmal atrial fibrillation (H) [I48.0]           Comments:           Anticoagulation Care Providers     Provider Role Specialty Phone number    Davina Reaves MD Referring Internal Medicine [phone]

## 2022-07-17 LAB
MDC_IDC_EPISODE_DTM: NORMAL
MDC_IDC_EPISODE_DURATION: 4 S
MDC_IDC_EPISODE_ID: 1
MDC_IDC_EPISODE_TYPE: NORMAL
MDC_IDC_MSMT_BATTERY_DTM: NORMAL
MDC_IDC_MSMT_BATTERY_REMAINING_LONGEVITY: 159 MO
MDC_IDC_MSMT_BATTERY_RRT_TRIGGER: 2.62
MDC_IDC_MSMT_BATTERY_STATUS: NORMAL
MDC_IDC_MSMT_BATTERY_VOLTAGE: 3.21 V
MDC_IDC_MSMT_LEADCHNL_RV_IMPEDANCE_VALUE: 418 OHM
MDC_IDC_MSMT_LEADCHNL_RV_IMPEDANCE_VALUE: 494 OHM
MDC_IDC_MSMT_LEADCHNL_RV_PACING_THRESHOLD_AMPLITUDE: 0.62 V
MDC_IDC_MSMT_LEADCHNL_RV_PACING_THRESHOLD_PULSEWIDTH: 0.4 MS
MDC_IDC_MSMT_LEADCHNL_RV_SENSING_INTR_AMPL: 6.38 MV
MDC_IDC_PG_IMPLANT_DTM: NORMAL
MDC_IDC_PG_MFG: NORMAL
MDC_IDC_PG_MODEL: NORMAL
MDC_IDC_PG_SERIAL: NORMAL
MDC_IDC_PG_TYPE: NORMAL
MDC_IDC_SESS_CLINIC_NAME: NORMAL
MDC_IDC_SESS_DTM: NORMAL
MDC_IDC_SESS_TYPE: NORMAL
MDC_IDC_SET_BRADY_HYSTRATE: NORMAL
MDC_IDC_SET_BRADY_LOWRATE: 70 {BEATS}/MIN
MDC_IDC_SET_BRADY_MAX_SENSOR_RATE: 130 {BEATS}/MIN
MDC_IDC_SET_BRADY_MODE: NORMAL
MDC_IDC_SET_LEADCHNL_RV_PACING_AMPLITUDE: 2 V
MDC_IDC_SET_LEADCHNL_RV_PACING_ANODE_ELECTRODE_1: NORMAL
MDC_IDC_SET_LEADCHNL_RV_PACING_ANODE_LOCATION_1: NORMAL
MDC_IDC_SET_LEADCHNL_RV_PACING_CAPTURE_MODE: NORMAL
MDC_IDC_SET_LEADCHNL_RV_PACING_CATHODE_ELECTRODE_1: NORMAL
MDC_IDC_SET_LEADCHNL_RV_PACING_CATHODE_LOCATION_1: NORMAL
MDC_IDC_SET_LEADCHNL_RV_PACING_POLARITY: NORMAL
MDC_IDC_SET_LEADCHNL_RV_PACING_PULSEWIDTH: 0.4 MS
MDC_IDC_SET_LEADCHNL_RV_SENSING_ANODE_ELECTRODE_1: NORMAL
MDC_IDC_SET_LEADCHNL_RV_SENSING_ANODE_LOCATION_1: NORMAL
MDC_IDC_SET_LEADCHNL_RV_SENSING_CATHODE_ELECTRODE_1: NORMAL
MDC_IDC_SET_LEADCHNL_RV_SENSING_CATHODE_LOCATION_1: NORMAL
MDC_IDC_SET_LEADCHNL_RV_SENSING_POLARITY: NORMAL
MDC_IDC_SET_LEADCHNL_RV_SENSING_SENSITIVITY: 0.9 MV
MDC_IDC_SET_ZONE_DETECTION_INTERVAL: 360 MS
MDC_IDC_SET_ZONE_TYPE: NORMAL
MDC_IDC_STAT_BRADY_DTM_END: NORMAL
MDC_IDC_STAT_BRADY_DTM_START: NORMAL
MDC_IDC_STAT_BRADY_RV_PERCENT_PACED: 99.32 %
MDC_IDC_STAT_EPISODE_RECENT_COUNT: 0
MDC_IDC_STAT_EPISODE_RECENT_COUNT: 1
MDC_IDC_STAT_EPISODE_RECENT_COUNT_DTM_END: NORMAL
MDC_IDC_STAT_EPISODE_RECENT_COUNT_DTM_END: NORMAL
MDC_IDC_STAT_EPISODE_RECENT_COUNT_DTM_START: NORMAL
MDC_IDC_STAT_EPISODE_RECENT_COUNT_DTM_START: NORMAL
MDC_IDC_STAT_EPISODE_TOTAL_COUNT: 0
MDC_IDC_STAT_EPISODE_TOTAL_COUNT: 1
MDC_IDC_STAT_EPISODE_TOTAL_COUNT_DTM_END: NORMAL
MDC_IDC_STAT_EPISODE_TOTAL_COUNT_DTM_END: NORMAL
MDC_IDC_STAT_EPISODE_TOTAL_COUNT_DTM_START: NORMAL
MDC_IDC_STAT_EPISODE_TOTAL_COUNT_DTM_START: NORMAL
MDC_IDC_STAT_EPISODE_TYPE: NORMAL

## 2022-07-27 ENCOUNTER — LAB (OUTPATIENT)
Dept: LAB | Facility: CLINIC | Age: 72
End: 2022-07-27
Payer: COMMERCIAL

## 2022-07-27 ENCOUNTER — ANTICOAGULATION THERAPY VISIT (OUTPATIENT)
Dept: ANTICOAGULATION | Facility: CLINIC | Age: 72
End: 2022-07-27

## 2022-07-27 DIAGNOSIS — Z79.01 LONG TERM CURRENT USE OF ANTICOAGULANT THERAPY: Primary | ICD-10-CM

## 2022-07-27 DIAGNOSIS — I48.0 PAROXYSMAL ATRIAL FIBRILLATION (H): ICD-10-CM

## 2022-07-27 DIAGNOSIS — Z79.01 LONG TERM CURRENT USE OF ANTICOAGULANT THERAPY: ICD-10-CM

## 2022-07-27 LAB — INR BLD: 1.9 (ref 0.9–1.1)

## 2022-07-27 PROCEDURE — 85610 PROTHROMBIN TIME: CPT

## 2022-07-27 PROCEDURE — 36416 COLLJ CAPILLARY BLOOD SPEC: CPT

## 2022-07-27 NOTE — PROGRESS NOTES
ANTICOAGULATION MANAGEMENT     Mathur DHRUV Connelly 71 year old male is on warfarin with subtherapeutic INR result. (Goal INR 2.0-3.0)    Recent labs: (last 7 days)     07/27/22  0924   INR 1.9*       ASSESSMENT       Source(s): Chart Review and Patient/Caregiver Call       Warfarin doses taken: Warfarin taken as instructed    Diet: No new diet changes identified    New illness, injury, or hospitalization: No    Medication/supplement changes: None noted    Signs or symptoms of bleeding or clotting: No    Previous INR: Subtherapeutic.  INR has been consistently sub therapeutic, so maintenance dose was adjusted today.    Additional findings: None       PLAN     Recommended plan for no diet, medication or health factor changes affecting INR     Dosing Instructions: Increase maintenance dose of warfarin by 8% with next INR in 2 weeks       Summary  As of 7/27/2022    Full warfarin instructions:  5 mg every day   Next INR check:  8/11/2022             Telephone call with Keron who agrees to plan and repeated back plan correctly    Lab visit scheduled    Education provided: Please call back if any changes to your diet, medications or how you've been taking warfarin    Plan made per [facility] anticoagulation protocol    Yarelis Martinez RN  Anticoagulation Clinic  7/27/2022    _______________________________________________________________________     Anticoagulation Episode Summary     Current INR goal:  2.0-3.0   TTR:  63.0 % (1 y)   Target end date:  Indefinite   Send INR reminders to:  [facility]    Indications    Long term current use of anticoagulant therapy [Z79.01]  Paroxysmal atrial fibrillation (H) [I48.0]           Comments:           Anticoagulation Care Providers     Provider Role Specialty Phone number    Davina Reaves MD Referring Internal Medicine [phone]

## 2022-08-11 ENCOUNTER — ANTICOAGULATION THERAPY VISIT (OUTPATIENT)
Dept: ANTICOAGULATION | Facility: CLINIC | Age: 72
End: 2022-08-11

## 2022-08-11 ENCOUNTER — OFFICE VISIT (OUTPATIENT)
Dept: DERMATOLOGY | Facility: CLINIC | Age: 72
End: 2022-08-11
Payer: COMMERCIAL

## 2022-08-11 ENCOUNTER — LAB (OUTPATIENT)
Dept: LAB | Facility: CLINIC | Age: 72
End: 2022-08-11

## 2022-08-11 DIAGNOSIS — L57.0 AK (ACTINIC KERATOSIS): ICD-10-CM

## 2022-08-11 DIAGNOSIS — I48.0 PAROXYSMAL ATRIAL FIBRILLATION (H): ICD-10-CM

## 2022-08-11 DIAGNOSIS — L81.4 LENTIGO: ICD-10-CM

## 2022-08-11 DIAGNOSIS — L82.1 SEBORRHEIC KERATOSIS: Primary | ICD-10-CM

## 2022-08-11 DIAGNOSIS — Z79.01 LONG TERM CURRENT USE OF ANTICOAGULANT THERAPY: ICD-10-CM

## 2022-08-11 DIAGNOSIS — Z79.01 LONG TERM CURRENT USE OF ANTICOAGULANT THERAPY: Primary | ICD-10-CM

## 2022-08-11 LAB — INR BLD: 4 (ref 0.9–1.1)

## 2022-08-11 PROCEDURE — 85610 PROTHROMBIN TIME: CPT

## 2022-08-11 PROCEDURE — 36416 COLLJ CAPILLARY BLOOD SPEC: CPT

## 2022-08-11 PROCEDURE — 99213 OFFICE O/P EST LOW 20 MIN: CPT | Performed by: DERMATOLOGY

## 2022-08-11 ASSESSMENT — PAIN SCALES - GENERAL: PAINLEVEL: NO PAIN (0)

## 2022-08-11 NOTE — LETTER
8/11/2022         RE: Kevan Connelly  [address]  [facility] [address]        Dear Colleague,    Thank you for referring your patient, Kevan Connelly, to the [facility]. Please see a copy of my visit note below.    Kevan Connelly is an extremely pleasant 71 year old year old male patient here today for f/u efudex on arms.  Did well all clear.  HE notes some rough spots on legs.   .   Patient states this has been present for a while.  Patient reports the following symptoms:  scale.  Patient reports the following previous treatments none.  These treatments did not work.  Patient reports the following modifying factors none.  Associated symptoms: none.  Patient has no other skin complaints today.  Remainder of the HPI, Meds, PMH, Allergies, FH, and SH was reviewed in chart.      Past Medical History:   Diagnosis Date     Atrial flutter (H)      Benign essential hypertension 1/18/2017    past use of ACE- Cough;  Had been on ARB-diuretic but experienced lower bp readings;  BLOOD PRESSURE now well controlled on ARB also no longer on diuretic; no low bp readings; may have had slight edema initially but has normalized with bp well controlled on ARB alone.      Cancer (H)     testicular cancer     CHF (congestive heart failure) (H) 6/5/2019     Long term current use of anticoagulant therapy 6/3/2019     Obesity (BMI 35.0-39.9) with comorbidity (H) 2/11/2019     Paroxysmal atrial fibrillation (H) 06/03/2019       Past Surgical History:   Procedure Laterality Date     ANESTHESIA CARDIOVERSION N/A 6/26/2019    Procedure: ANESTHESIA, FOR CARDIOVERSION DR. LE;  Surgeon: GENERIC ANESTHESIA PROVIDER;  Location:  OR     ANESTHESIA CARDIOVERSION N/A 7/17/2019    Procedure: ANESTHESIA, FOR CARDIOVERSION;  Surgeon: GENERIC ANESTHESIA PROVIDER;  Location:  OR     ANESTHESIA CARDIOVERSION N/A 1/28/2020    Procedure: ANESTHESIA, FOR CARDIOVERSION;  Surgeon: GENERIC  ANESTHESIA PROVIDER;  Location: [facility]     ANESTHESIA CARDIOVERSION N/A 9/8/2020    Procedure: ANESTHESIA, FOR CARDIOVERSION;  Surgeon: GENERIC ANESTHESIA PROVIDER;  Location: [facility]     ANESTHESIA CARDIOVERSION N/A 11/23/2020    Procedure: ANESTHESIA, FOR CARDIOVERSION;  Surgeon: GENERIC ANESTHESIA PROVIDER;  Location: [facility] OR     ANESTHESIA CARDIOVERSION N/A 1/21/2022    Procedure: ANESTHESIA, FOR CARDIOVERSION;  Surgeon: GENERIC ANESTHESIA PROVIDER;  Location: [facility] OR     ANESTHESIA CARDIOVERSION N/A 2/11/2022    Procedure: ANESTHESIA, FOR CARDIOVERSION (DR. MAO);  Surgeon: GENERIC ANESTHESIA PROVIDER;  Location: [facility] OR     CLOSED REDUCTION SHOULDER Left      EP ABLATION AV NODE N/A 5/16/2022    Procedure: Ablation Atrioventricular Node;  Surgeon: Chadd Gutierrez MD;  Location:  [facility]     EP ABLATION FOCAL AFIB N/A 1/3/2020    Procedure: EP Ablation Focal AFIB;  Surgeon: Lamont Hannon MD;  Location:  [facility]     EP PACEMAKER DEVICE & LEAD IMPLANT- RIGHT VENTRICULAR Left 5/16/2022    Procedure: Pacemaker Device & Lead Implant- Right ventricular;  Surgeon: Chadd Gutierrez MD;  Location:  [facility]     GENITOURINARY SURGERY  1990    testicular cancer     ORTHOPEDIC SURGERY  1970's    right knee surgery         Family History   Problem Relation Age of Onset     Colon Cancer Father      Coronary Artery Disease Maternal Grandmother      Prostate Cancer Brother 72       Social History     Socioeconomic History     Marital status:      Spouse name: Not on file     Number of children: Not on file     Years of education: Not on file     Highest education level: Not on file   Occupational History     Not on file   Tobacco Use     Smoking status: Never Smoker     Smokeless tobacco: Never Used   Substance and Sexual Activity     Alcohol use: Yes     Comment: every 1-2 months or less     Drug use: No     Sexual activity: Yes     Partners:  Female   Other Topics Concern     Parent/sibling w/ CABG, MI or angioplasty before 65F 55M? Yes   Social History Narrative     Not on file     Social Determinants of Health     Financial Resource Strain: Not on file   Food Insecurity: Not on file   Transportation Needs: Not on file   Physical Activity: Not on file   Stress: Not on file   Social Connections: Not on file   Intimate Partner Violence: Not on file   Housing Stability: Not on file       Outpatient Encounter Medications as of 8/11/2022   Medication Sig Dispense Refill     acetaminophen (TYLENOL) 500 MG tablet Take 500 mg by mouth every 6 hours as needed for mild pain       atorvastatin (LIPITOR) 20 MG tablet Take 1 tablet (20 mg) by mouth daily 90 tablet 3     carvedilol (COREG) 6.25 MG tablet Take 1 tablet (6.25 mg) by mouth 2 times daily (with meals) 180 tablet 3     furosemide (LASIX) 20 MG tablet Take 1 tablet ( 20 mg) every other day 45 tablet 3     losartan (COZAAR) 100 MG tablet Take 1 tablet (100 mg) by mouth daily 90 tablet 3     warfarin ANTICOAGULANT (COUMADIN) 5 MG tablet Take 2.5 mg mon and 5 mg ROW OR  AS  DIRECTED  BY  ACC  TEAM 95 tablet 1     [DISCONTINUED] diltiazem ER (TIAZAC) 240 MG 24 hr ER beaded capsule Take 1 capsule (240 mg) by mouth daily 30 capsule 1     No facility-administered encounter medications on file as of 8/11/2022.             O:   NAD, WDWN, Alert & Oriented, Mood & Affect wnl, Vitals stable   Here today alone   General appearance normal   Vitals stable   Alert, oriented and in no acute distress     Arms clear   Legs with gritty scaly papule      Stuck on papules and brown macules on trunk and ext       Eyes: Conjunctivae/lids:Normal     ENT: Lips, buccal mucosa, tongue: normal    MSK:Normal    Cardiovascular: peripheral edema none    Pulm: Breathing Normal    Neuro/Psych: Orientation:Alert and Orientedx3 ; Mood/Affect:normal       A/P:  1. Seborrheic keratosis, letnigo  2. Hx of actinic keratosis on arms s/p  efudex  Actinic keratosis on legs  Efudex discussed with patient he declines  It was a pleasure speaking to Kevan Connelly today.  Previous clinic notes and pertinent laboratory tests were reviewed prior to Kevan Connelly's visit.  Signs and Symptoms of skin cancer discussed with patient.  Patient encouraged to perform monthly skin exams.  UV precautions reviewed with patient.  Return to clinic 6 months        Again, thank you for allowing me to participate in the care of your patient.        Sincerely,        Patrick Hernandez MD

## 2022-08-11 NOTE — PROGRESS NOTES
Kevan Connelly is an extremely pleasant 71 year old year old male patient here today for f/u efudex on arms.  Did well all clear.  HE notes some rough spots on legs.   .   Patient states this has been present for a while.  Patient reports the following symptoms:  scale.  Patient reports the following previous treatments none.  These treatments did not work.  Patient reports the following modifying factors none.  Associated symptoms: none.  Patient has no other skin complaints today.  Remainder of the HPI, Meds, PMH, Allergies, FH, and SH was reviewed in chart.      Past Medical History:   Diagnosis Date     Atrial flutter (H)      Benign essential hypertension 1/18/2017    past use of ACE- Cough;  Had been on ARB-diuretic but experienced lower bp readings;  BLOOD PRESSURE now well controlled on ARB also no longer on diuretic; no low bp readings; may have had slight edema initially but has normalized with bp well controlled on ARB alone.      Cancer (H)     testicular cancer     CHF (congestive heart failure) (H) 6/5/2019     Long term current use of anticoagulant therapy 6/3/2019     Obesity (BMI 35.0-39.9) with comorbidity (H) 2/11/2019     Paroxysmal atrial fibrillation (H) 06/03/2019       Past Surgical History:   Procedure Laterality Date     ANESTHESIA CARDIOVERSION N/A 6/26/2019    Procedure: ANESTHESIA, FOR CARDIOVERSION DR. LE;  Surgeon: GENERIC ANESTHESIA PROVIDER;  Location:  OR     ANESTHESIA CARDIOVERSION N/A 7/17/2019    Procedure: ANESTHESIA, FOR CARDIOVERSION;  Surgeon: GENERIC ANESTHESIA PROVIDER;  Location:  OR     ANESTHESIA CARDIOVERSION N/A 1/28/2020    Procedure: ANESTHESIA, FOR CARDIOVERSION;  Surgeon: GENERIC ANESTHESIA PROVIDER;  Location:  OR     ANESTHESIA CARDIOVERSION N/A 9/8/2020    Procedure: ANESTHESIA, FOR CARDIOVERSION;  Surgeon: GENERIC ANESTHESIA PROVIDER;  Location:  OR     ANESTHESIA CARDIOVERSION N/A 11/23/2020    Procedure: ANESTHESIA, FOR CARDIOVERSION;   Surgeon: GENERIC ANESTHESIA PROVIDER;  Location: [facility] OR     ANESTHESIA CARDIOVERSION N/A 1/21/2022    Procedure: ANESTHESIA, FOR CARDIOVERSION;  Surgeon: GENERIC ANESTHESIA PROVIDER;  Location: [facility] OR     ANESTHESIA CARDIOVERSION N/A 2/11/2022    Procedure: ANESTHESIA, FOR CARDIOVERSION (DR. MAO);  Surgeon: GENERIC ANESTHESIA PROVIDER;  Location:  OR     CLOSED REDUCTION SHOULDER Left      EP ABLATION AV NODE N/A 5/16/2022    Procedure: Ablation Atrioventricular Node;  Surgeon: Chadd Gutierrez MD;  Location:  [facility]     EP ABLATION FOCAL AFIB N/A 1/3/2020    Procedure: EP Ablation Focal AFIB;  Surgeon: Lamont Hannon MD;  Location:  [facility]     EP PACEMAKER DEVICE & LEAD IMPLANT- RIGHT VENTRICULAR Left 5/16/2022    Procedure: Pacemaker Device & Lead Implant- Right ventricular;  Surgeon: Chadd Gutierrez MD;  Location:  [facility]     GENITOURINARY SURGERY  1990    testicular cancer     ORTHOPEDIC SURGERY  1970's    right knee surgery         Family History   Problem Relation Age of Onset     Colon Cancer Father      Coronary Artery Disease Maternal Grandmother      Prostate Cancer Brother 72       Social History     Socioeconomic History     Marital status:      Spouse name: Not on file     Number of children: Not on file     Years of education: Not on file     Highest education level: Not on file   Occupational History     Not on file   Tobacco Use     Smoking status: Never Smoker     Smokeless tobacco: Never Used   Substance and Sexual Activity     Alcohol use: Yes     Comment: every 1-2 months or less     Drug use: No     Sexual activity: Yes     Partners: Female   Other Topics Concern     Parent/sibling w/ CABG, MI or angioplasty before 65F 55M? Yes   Social History Narrative     Not on file     Social Determinants of Health     Financial Resource Strain: Not on file   Food Insecurity: Not on file   Transportation Needs:  Not on file   Physical Activity: Not on file   Stress: Not on file   Social Connections: Not on file   Intimate Partner Violence: Not on file   Housing Stability: Not on file       Outpatient Encounter Medications as of 8/11/2022   Medication Sig Dispense Refill     acetaminophen (TYLENOL) 500 MG tablet Take 500 mg by mouth every 6 hours as needed for mild pain       atorvastatin (LIPITOR) 20 MG tablet Take 1 tablet (20 mg) by mouth daily 90 tablet 3     carvedilol (COREG) 6.25 MG tablet Take 1 tablet (6.25 mg) by mouth 2 times daily (with meals) 180 tablet 3     furosemide (LASIX) 20 MG tablet Take 1 tablet ( 20 mg) every other day 45 tablet 3     losartan (COZAAR) 100 MG tablet Take 1 tablet (100 mg) by mouth daily 90 tablet 3     warfarin ANTICOAGULANT (COUMADIN) 5 MG tablet Take 2.5 mg mon and 5 mg ROW OR  AS  DIRECTED  BY  ACC  TEAM 95 tablet 1     [DISCONTINUED] diltiazem ER (TIAZAC) 240 MG 24 hr ER beaded capsule Take 1 capsule (240 mg) by mouth daily 30 capsule 1     No facility-administered encounter medications on file as of 8/11/2022.             O:   NAD, WDWN, Alert & Oriented, Mood & Affect wnl, Vitals stable   Here today alone   General appearance normal   Vitals stable   Alert, oriented and in no acute distress     Arms clear   Legs with gritty scaly papule      Stuck on papules and brown macules on trunk and ext       Eyes: Conjunctivae/lids:Normal     ENT: Lips, buccal mucosa, tongue: normal    MSK:Normal    Cardiovascular: peripheral edema none    Pulm: Breathing Normal    Neuro/Psych: Orientation:Alert and Orientedx3 ; Mood/Affect:normal       A/P:  1. Seborrheic keratosis, letnigo  2. Hx of actinic keratosis on arms s/p efudex  Actinic keratosis on legs  Efudex discussed with patient he declines  It was a pleasure speaking to Kevan Connelly today.  Previous clinic notes and pertinent laboratory tests were reviewed prior to Kevan Connelly's visit.  Signs and Symptoms of skin cancer  discussed with patient.  Patient encouraged to perform monthly skin exams.  UV precautions reviewed with patient.  Return to clinic 6 months

## 2022-08-11 NOTE — PROGRESS NOTES
ANTICOAGULATION MANAGEMENT     Kevan Connelly 71 year old male is on warfarin with supratherapeutic INR result. (Goal INR 2.0-3.0)    Recent labs: (last 7 days)     08/11/22  1100   INR 4.0*       ASSESSMENT       Source(s): Chart Review and Patient/Caregiver Call       Warfarin doses taken: Warfarin taken as instructed.  Takes warfarin in the morning, so already took it today.    Diet: Decreased greens/vitamin K in diet; plans to resume previous intake and Change in alcohol intake may be affecting INR. Patient was on vacation last week and had about 1 beer per day while he was away.  This is more than his usual intake.     New illness, injury, or hospitalization: No    Medication/supplement changes: None noted    Signs or symptoms of bleeding or clotting: No    Previous INR: Subtherapeutic.  Maintenance dose was increased by 8% at last check.    Additional findings: None       PLAN     Recommended plan for temporary change(s) affecting INR     Dosing Instructions: hold dose then continue your current warfarin dose with next INR in 1 week       Summary  As of 8/11/2022    Full warfarin instructions:  8/12: Hold; Otherwise 5 mg every day   Next INR check:  8/19/2022             Telephone call with Keron who agrees to plan and repeated back plan correctly    Lab visit scheduled    Education provided: Please call back if any changes to your diet, medications or how you've been taking warfarin, Importance of consistent vitamin K intake, Potential interaction between warfarin and alcohol and Monitoring for bleeding signs and symptoms    Plan made per [facility] anticoagulation protocol    Yarelis Martinez RN  Anticoagulation Clinic  8/11/2022    _______________________________________________________________________     Anticoagulation Episode Summary     Current INR goal:  2.0-3.0   TTR:  60.9 % (1 y)   Target end date:  Indefinite   Send INR reminders to:  [facility]    Indications    Long term current use of  anticoagulant therapy [Z79.01]  Paroxysmal atrial fibrillation (H) [I48.0]           Comments:           Anticoagulation Care Providers     Provider Role Specialty Phone number    Davina Reaves MD Referring Internal Medicine [phone]

## 2022-08-19 ENCOUNTER — TELEPHONE (OUTPATIENT)
Dept: FAMILY MEDICINE | Facility: CLINIC | Age: 72
End: 2022-08-19

## 2022-08-19 ENCOUNTER — ANTICOAGULATION THERAPY VISIT (OUTPATIENT)
Dept: ANTICOAGULATION | Facility: CLINIC | Age: 72
End: 2022-08-19

## 2022-08-19 ENCOUNTER — LAB (OUTPATIENT)
Dept: LAB | Facility: CLINIC | Age: 72
End: 2022-08-19
Payer: COMMERCIAL

## 2022-08-19 DIAGNOSIS — I48.0 PAROXYSMAL ATRIAL FIBRILLATION (H): ICD-10-CM

## 2022-08-19 DIAGNOSIS — Z79.01 LONG TERM CURRENT USE OF ANTICOAGULANT THERAPY: ICD-10-CM

## 2022-08-19 DIAGNOSIS — Z79.01 LONG TERM CURRENT USE OF ANTICOAGULANT THERAPY: Primary | ICD-10-CM

## 2022-08-19 LAB — INR BLD: 2.2 (ref 0.9–1.1)

## 2022-08-19 PROCEDURE — 85610 PROTHROMBIN TIME: CPT

## 2022-08-19 PROCEDURE — 36415 COLL VENOUS BLD VENIPUNCTURE: CPT

## 2022-08-19 NOTE — TELEPHONE ENCOUNTER
Returned call to patient, see 8/19/22 Anticoagulation encounter for warfarin dosing instruction.  Gretta Faith RN, BSN  Anticoagulation Clinic

## 2022-08-19 NOTE — TELEPHONE ENCOUNTER
Reason for Call:  Other call back    Detailed comments: Pt is calling back for the INR nurse.     Phone Number Patient can be reached at: Cell number on file:    Telephone Information:   Mobile [phone]       Best Time: anytime     Can we leave a detailed message on this number? YES    Call taken on 8/19/2022 at 4:55 PM by Ksenia Kim

## 2022-08-19 NOTE — PROGRESS NOTES
ANTICOAGULATION MANAGEMENT     Kevan Connelly 71 year old male is on warfarin with therapeutic INR result. (Goal INR 2.0-3.0)    Recent labs: (last 7 days)     08/19/22  1337   INR 2.2*       ASSESSMENT       Source(s): Chart Review    Previous INR was Supratherapeutic    Medication, diet, health changes since last INR chart reviewed; none identified           PLAN     Unable to reach Keron today.    Left message for Keron to continue maintenance dose of 5 mg/day.     Follow up required to confirm warfarin dose taken and assess for changes    Ksenia Faith RN  Anticoagulation Clinic  8/19/2022

## 2022-08-19 NOTE — PROGRESS NOTES
ANTICOAGULATION MANAGEMENT     Kevan Connelly 71 year old male is on warfarin with therapeutic INR result. (Goal INR 2.0-3.0)    Recent labs: (last 7 days)     08/19/22  1337   INR 2.2*       ASSESSMENT       Source(s): Chart Review and Patient/Caregiver Call       Warfarin doses taken: Warfarin taken as instructed    Diet: No new diet changes identified    New illness, injury, or hospitalization: No    Medication/supplement changes: None noted    Signs or symptoms of bleeding or clotting: No    Previous INR: Supratherapeutic    Additional findings: None       PLAN     Recommended plan for no diet, medication or health factor changes affecting INR     Dosing Instructions: Continue your current warfarin dose with next INR in 2 weeks       Summary  As of 8/19/2022    Full warfarin instructions:  5 mg every day   Next INR check:  9/2/2022             Telephone call with Keron who verbalizes understanding and agrees to plan    Lab visit scheduled    Education provided: Please call back if any changes to your diet, medications or how you've been taking warfarin and Contact [phone]  with any changes, questions or concerns.     Plan made per [facility] anticoagulation protocol    Gretta Faith RN  Anticoagulation Clinic  8/19/2022    _______________________________________________________________________     Anticoagulation Episode Summary     Current INR goal:  2.0-3.0   TTR:  59.6 % (1 y)   Target end date:  Indefinite   Send INR reminders to:  [facility]    Indications    Long term current use of anticoagulant therapy [Z79.01]  Paroxysmal atrial fibrillation (H) [I48.0]           Comments:           Anticoagulation Care Providers     Provider Role Specialty Phone number    Davina Reaves MD Referring Internal Medicine [phone]

## 2022-08-19 NOTE — TELEPHONE ENCOUNTER
Pt returned the INR nurse call today  and she has left no direct number to return pt  call too. Pt would like a return call asap as to he is not sure on the message she left and the dosage of meds is off to him what he believe it should be and they spoke about

## 2022-09-02 ENCOUNTER — LAB (OUTPATIENT)
Dept: LAB | Facility: CLINIC | Age: 72
End: 2022-09-02
Payer: COMMERCIAL

## 2022-09-02 ENCOUNTER — ANTICOAGULATION THERAPY VISIT (OUTPATIENT)
Dept: ANTICOAGULATION | Facility: CLINIC | Age: 72
End: 2022-09-02

## 2022-09-02 ENCOUNTER — TELEPHONE (OUTPATIENT)
Dept: FAMILY MEDICINE | Facility: CLINIC | Age: 72
End: 2022-09-02

## 2022-09-02 DIAGNOSIS — I48.0 PAROXYSMAL ATRIAL FIBRILLATION (H): ICD-10-CM

## 2022-09-02 DIAGNOSIS — Z79.01 LONG TERM CURRENT USE OF ANTICOAGULANT THERAPY: ICD-10-CM

## 2022-09-02 DIAGNOSIS — Z79.01 LONG TERM CURRENT USE OF ANTICOAGULANT THERAPY: Primary | ICD-10-CM

## 2022-09-02 LAB — INR BLD: 2.6 (ref 0.9–1.1)

## 2022-09-02 PROCEDURE — 85610 PROTHROMBIN TIME: CPT

## 2022-09-02 PROCEDURE — 36416 COLLJ CAPILLARY BLOOD SPEC: CPT

## 2022-09-02 NOTE — PROGRESS NOTES
ANTICOAGULATION MANAGEMENT     Kevan Connelly 71 year old male is on warfarin with therapeutic INR result. (Goal INR 2.0-3.0)    Recent labs: (last 7 days)     09/02/22  1256   INR 2.6*       ASSESSMENT       Source(s): Chart Review and Patient/Caregiver Call       Warfarin doses taken: Warfarin taken as instructed    Diet: No new diet changes identified    New illness, injury, or hospitalization: No    Medication/supplement changes: None noted    Signs or symptoms of bleeding or clotting: No    Previous INR: Therapeutic last visit; previously outside of goal range    Additional findings: None       PLAN     Recommended plan for no diet, medication or health factor changes affecting INR     Dosing Instructions: Continue your current warfarin dose with next INR in 4 weeks       Summary  As of 9/2/2022    Full warfarin instructions:  5 mg every day   Next INR check:  9/30/2022             Telephone call with Keron who verbalizes understanding and agrees to plan    Lab visit scheduled    Education provided: Importance of therapeutic range, Importance of following up at instructed interval and Importance of taking warfarin as instructed    Plan made per [facility] anticoagulation protocol    Mitzi Hernandez RN  Anticoagulation Clinic  9/2/2022    _______________________________________________________________________     Anticoagulation Episode Summary     Current INR goal:  2.0-3.0   TTR:  59.6 % (1 y)   Target end date:  Indefinite   Send INR reminders to:  CONNIE HOBBS    Indications    Long term current use of anticoagulant therapy [Z79.01]  Paroxysmal atrial fibrillation (H) [I48.0]           Comments:           Anticoagulation Care Providers     Provider Role Specialty Phone number    Davina Reaves MD Referring Internal Medicine [phone]

## 2022-09-27 ENCOUNTER — TELEPHONE (OUTPATIENT)
Dept: FAMILY MEDICINE | Facility: CLINIC | Age: 72
End: 2022-09-27

## 2022-09-27 NOTE — TELEPHONE ENCOUNTER
Call center left message that Keron was requesting to speak with an INR nurse. Called him back and he thought he had missed a call. No outgoing telephone encounter documented - potentially an old voicemail from last INR check. Has his INR appointment tomorrow.

## 2022-09-28 ENCOUNTER — LAB (OUTPATIENT)
Dept: LAB | Facility: CLINIC | Age: 72
End: 2022-09-28
Payer: COMMERCIAL

## 2022-09-28 ENCOUNTER — ANTICOAGULATION THERAPY VISIT (OUTPATIENT)
Dept: ANTICOAGULATION | Facility: CLINIC | Age: 72
End: 2022-09-28

## 2022-09-28 DIAGNOSIS — I48.0 PAROXYSMAL ATRIAL FIBRILLATION (H): ICD-10-CM

## 2022-09-28 DIAGNOSIS — Z79.01 LONG TERM CURRENT USE OF ANTICOAGULANT THERAPY: ICD-10-CM

## 2022-09-28 DIAGNOSIS — Z79.01 LONG TERM CURRENT USE OF ANTICOAGULANT THERAPY: Primary | ICD-10-CM

## 2022-09-28 LAB — INR BLD: 2.6 (ref 0.9–1.1)

## 2022-09-28 PROCEDURE — 36416 COLLJ CAPILLARY BLOOD SPEC: CPT

## 2022-09-28 PROCEDURE — 85610 PROTHROMBIN TIME: CPT

## 2022-09-28 NOTE — PROGRESS NOTES
ANTICOAGULATION MANAGEMENT     Mathur DHRUV Connelly 71 year old male is on warfarin with therapeutic INR result. (Goal INR 2.0-3.0)    Recent labs: (last 7 days)     09/28/22  0840   INR 2.6*       ASSESSMENT       Source(s): Chart Review and Patient/Caregiver Call       Warfarin doses taken: Warfarin taken as instructed    Diet: No new diet changes identified    New illness, injury, or hospitalization: No    Medication/supplement changes: None noted    Signs or symptoms of bleeding or clotting: No    Previous INR: Therapeutic last 2(+) visits    Additional findings: None       PLAN     Recommended plan for no diet, medication or health factor changes affecting INR     Dosing Instructions: Continue your current warfarin dose with next INR in 5 weeks       Summary  As of 9/28/2022    Full warfarin instructions:  5 mg every day   Next INR check:  11/2/2022             Telephone call with Keron who agrees to plan and repeated back plan correctly    Lab visit scheduled    Education provided: Please call back if any changes to your diet, medications or how you've been taking warfarin    Plan made per [facility] anticoagulation protocol    Yarelis Martinez RN  Anticoagulation Clinic  9/28/2022    _______________________________________________________________________     Anticoagulation Episode Summary     Current INR goal:  2.0-3.0   TTR:  59.6 % (1 y)   Target end date:  Indefinite   Send INR reminders to:  [facility]    Indications    Long term current use of anticoagulant therapy [Z79.01]  Paroxysmal atrial fibrillation (H) [I48.0]           Comments:           Anticoagulation Care Providers     Provider Role Specialty Phone number    Davina Reaves MD Referring Internal Medicine [phone]

## 2022-10-06 ENCOUNTER — ANCILLARY PROCEDURE (OUTPATIENT)
Dept: CARDIOLOGY | Facility: CLINIC | Age: 72
End: 2022-10-06
Attending: INTERNAL MEDICINE
Payer: COMMERCIAL

## 2022-10-06 DIAGNOSIS — Z95.0 CARDIAC PACEMAKER IN SITU: ICD-10-CM

## 2022-10-06 DIAGNOSIS — Z98.890 HX OF ATRIOVENTRICULAR NODE ABLATION: ICD-10-CM

## 2022-10-06 LAB
MDC_IDC_MSMT_BATTERY_DTM: NORMAL
MDC_IDC_MSMT_BATTERY_REMAINING_LONGEVITY: 154 MO
MDC_IDC_MSMT_BATTERY_RRT_TRIGGER: 2.62
MDC_IDC_MSMT_BATTERY_STATUS: NORMAL
MDC_IDC_MSMT_BATTERY_VOLTAGE: 3.18 V
MDC_IDC_MSMT_LEADCHNL_RV_IMPEDANCE_VALUE: 361 OHM
MDC_IDC_MSMT_LEADCHNL_RV_IMPEDANCE_VALUE: 456 OHM
MDC_IDC_MSMT_LEADCHNL_RV_PACING_THRESHOLD_AMPLITUDE: 0.62 V
MDC_IDC_MSMT_LEADCHNL_RV_PACING_THRESHOLD_PULSEWIDTH: 0.4 MS
MDC_IDC_MSMT_LEADCHNL_RV_SENSING_INTR_AMPL: 6.38 MV
MDC_IDC_PG_IMPLANT_DTM: NORMAL
MDC_IDC_PG_MFG: NORMAL
MDC_IDC_PG_MODEL: NORMAL
MDC_IDC_PG_SERIAL: NORMAL
MDC_IDC_PG_TYPE: NORMAL
MDC_IDC_SESS_CLINIC_NAME: NORMAL
MDC_IDC_SESS_DTM: NORMAL
MDC_IDC_SESS_TYPE: NORMAL
MDC_IDC_SET_BRADY_HYSTRATE: NORMAL
MDC_IDC_SET_BRADY_LOWRATE: 70 {BEATS}/MIN
MDC_IDC_SET_BRADY_MAX_SENSOR_RATE: 130 {BEATS}/MIN
MDC_IDC_SET_BRADY_MODE: NORMAL
MDC_IDC_SET_LEADCHNL_RV_PACING_AMPLITUDE: 2 V
MDC_IDC_SET_LEADCHNL_RV_PACING_ANODE_ELECTRODE_1: NORMAL
MDC_IDC_SET_LEADCHNL_RV_PACING_ANODE_LOCATION_1: NORMAL
MDC_IDC_SET_LEADCHNL_RV_PACING_CAPTURE_MODE: NORMAL
MDC_IDC_SET_LEADCHNL_RV_PACING_CATHODE_ELECTRODE_1: NORMAL
MDC_IDC_SET_LEADCHNL_RV_PACING_CATHODE_LOCATION_1: NORMAL
MDC_IDC_SET_LEADCHNL_RV_PACING_POLARITY: NORMAL
MDC_IDC_SET_LEADCHNL_RV_PACING_PULSEWIDTH: 0.4 MS
MDC_IDC_SET_LEADCHNL_RV_SENSING_ANODE_ELECTRODE_1: NORMAL
MDC_IDC_SET_LEADCHNL_RV_SENSING_ANODE_LOCATION_1: NORMAL
MDC_IDC_SET_LEADCHNL_RV_SENSING_CATHODE_ELECTRODE_1: NORMAL
MDC_IDC_SET_LEADCHNL_RV_SENSING_CATHODE_LOCATION_1: NORMAL
MDC_IDC_SET_LEADCHNL_RV_SENSING_POLARITY: NORMAL
MDC_IDC_SET_LEADCHNL_RV_SENSING_SENSITIVITY: 0.9 MV
MDC_IDC_SET_ZONE_DETECTION_INTERVAL: 360 MS
MDC_IDC_SET_ZONE_TYPE: NORMAL
MDC_IDC_STAT_BRADY_DTM_END: NORMAL
MDC_IDC_STAT_BRADY_DTM_START: NORMAL
MDC_IDC_STAT_BRADY_RV_PERCENT_PACED: 99.26 %
MDC_IDC_STAT_EPISODE_RECENT_COUNT: 0
MDC_IDC_STAT_EPISODE_RECENT_COUNT_DTM_END: NORMAL
MDC_IDC_STAT_EPISODE_RECENT_COUNT_DTM_START: NORMAL
MDC_IDC_STAT_EPISODE_TOTAL_COUNT: 0
MDC_IDC_STAT_EPISODE_TOTAL_COUNT: 0
MDC_IDC_STAT_EPISODE_TOTAL_COUNT: 1
MDC_IDC_STAT_EPISODE_TOTAL_COUNT_DTM_END: NORMAL
MDC_IDC_STAT_EPISODE_TOTAL_COUNT_DTM_START: NORMAL
MDC_IDC_STAT_EPISODE_TYPE: NORMAL

## 2022-10-06 PROCEDURE — 93294 REM INTERROG EVL PM/LDLS PM: CPT | Performed by: INTERNAL MEDICINE

## 2022-10-06 PROCEDURE — 93296 REM INTERROG EVL PM/IDS: CPT | Performed by: INTERNAL MEDICINE

## 2022-10-15 ENCOUNTER — HEALTH MAINTENANCE LETTER (OUTPATIENT)
Age: 72
End: 2022-10-15

## 2022-10-17 ENCOUNTER — OFFICE VISIT (OUTPATIENT)
Dept: CARDIOLOGY | Facility: CLINIC | Age: 72
End: 2022-10-17
Payer: COMMERCIAL

## 2022-10-17 VITALS
HEART RATE: 82 BPM | HEIGHT: 75 IN | SYSTOLIC BLOOD PRESSURE: 145 MMHG | BODY MASS INDEX: 34.18 KG/M2 | DIASTOLIC BLOOD PRESSURE: 85 MMHG | WEIGHT: 274.9 LBS

## 2022-10-17 DIAGNOSIS — I48.0 PAROXYSMAL ATRIAL FIBRILLATION (H): ICD-10-CM

## 2022-10-17 DIAGNOSIS — M79.89 SWELLING OF LOWER EXTREMITY: ICD-10-CM

## 2022-10-17 DIAGNOSIS — I10 BENIGN ESSENTIAL HYPERTENSION: Primary | ICD-10-CM

## 2022-10-17 PROCEDURE — 99213 OFFICE O/P EST LOW 20 MIN: CPT | Performed by: NURSE PRACTITIONER

## 2022-10-17 RX ORDER — CARVEDILOL 12.5 MG/1
12.5 TABLET ORAL 2 TIMES DAILY WITH MEALS
Qty: 90 TABLET | Refills: 3 | Status: SHIPPED | OUTPATIENT
Start: 2022-10-17 | End: 2023-04-10

## 2022-10-17 NOTE — PATIENT INSTRUCTIONS
Today's Recommendations    Increase carvedilol to 12.5 mg twice a day for better BP control  Continue all other medications without changes.  Please follow up with electrophysiology in 1 year.    Please send a Speech[employer] message or call [phone] to the RN team with questions or concerns.     Scheduling number [phone]    ANNMARIE Vieyra, CNP

## 2022-10-17 NOTE — PROGRESS NOTES
Electrophysiology Clinic Progress Note  Kevan Connelly MRN# [id number]   YOB: 1950 Age: 72 year old     Primary cardiologist: Dr. Hannon    Reason for visit: 3 month post PPM implant    History of presenting illness:    Kevan Connelly is a pleasant 72 year old patient with past medical history significant for hypertension, hyperlipidemia, and recurrent persistent atrial fibrillation, previous tachy-mediated cardiomyopathy that resolved.     In January 2020 he underwent atrial fibrillation ablation was found to have significant amount of LA scar.  He underwent a pulmonary vein isolation and a posterior left atrial wall isolation.  At the end of the procedure he was found to have atypical flutter and no further ablations were formed.  Post procedure he was placed on carvedilol and flecainide.    In January 2022 he had recurrent episodes of atypical flutter.  He underwent a IBETH cardioversion, but had recurrence post procedure.  Flecainide was discontinued and he was then started on amiodarone and underwent a successful second cardioversion in February 2022.     He was seen in follow-up with Dr. Hannon in May 2022 and they discussed options of dofetilide loading, redo ablation or rate control strategy with AV janessa agents or AV node ablation and pacemaker.  Ultimately it was elected to start him on diltiazem, stop losartan. The patient underwent an AV janessa ablation and pacemaker that was placed on 5/16/2022. Post procedure diltiazem was stopped and losartan restarted.     His six week post PPM check noted Noted first episode of NSVT logged with 1 EGM showing 19 beat run of NSVT with V rates 190-200bpm on 7/6/22 at 0600. Last EF 1/21/22, 55-60%. Ongoing monitoring was recommended.  Device check from 10/6/2022 noticed that he was greater than 99% atrial fibrillation paced with 12.8 years of battery life without significant atrial or ventricular arrhythmias noted.    Today presents for a 3-month  pacemaker follow-up.  He has no concerns with the site and has had significant symptomatic improvement post AV node ablation and pacemaker implant.  Today's blood pressure is significantly elevated at 160/100 and patient states his home BPs run in the 140s over 80s to 90s.            Assessment and Plan:     ASSESSMENT:    1. Recurrent persistent atrial fibrillation    S/p atrial fibrillation ablation 1/2020 (PVI and posterior left atrial wall isolation)    S/p IBETH/DCCV 1/2022 on flecainide with recurrent atrial fibrillation and changed form flecainide and amiodarone    S/p DCCV 2/2022 on amiodarone with recurrent atrial fibrillation    S/p AVN ablation and PPM     2. Hypertension    Suboptimal control    Currently on losartan 100 mg daily and carvedilol 6.25 mg twice daily    3. History of tachy-mediated cardiomyopathy-resolved    LVEF 55-60% 1/2022    4. Hyperlipidemia    FLP (2/2022): LDL 79     Atorvastatin 20 mg daily    PLAN:     1. Increase carvedilol to 12.5 mg twice daily  2. Follow with routine device checks  3. Return to clinic in 1 year or sooner if needed       Orders this Visit:  Orders Placed This Encounter   Procedures     Follow-Up with Cardiology EP     Orders Placed This Encounter   Medications     carvedilol (COREG) 12.5 MG tablet     Sig: Take 1 tablet (12.5 mg) by mouth 2 times daily (with meals)     Dispense:  90 tablet     Refill:  3     Medications Discontinued During This Encounter   Medication Reason     carvedilol (COREG) 6.25 MG tablet Reorder       Today's clinic visit entailed:  Review of the result(s) of each unique test - echo, device checks, IBETH/DCCV  Prescription drug management  15 minutes spent on the date of the encounter doing chart review, history and exam, documentation and further activities per the note  Provider  Link to [facility] Help Grid     The level of medical decision making during this visit was of moderate complexity.           Review of Systems:     Review of  "Systems:  Skin:        Eyes:       ENT:       Respiratory:  Negative    Cardiovascular:    Positive for;edema  Gastroenterology:      Genitourinary:       Musculoskeletal:       Neurologic:       Psychiatric:       Heme/Lymph/Imm:  Positive for allergies  Endocrine:  Negative              Physical Exam:     Vitals: BP (!) 145/85   Pulse 82   Ht 1.905 m (6' 3\")   Wt 124.7 kg (274 lb 14.4 oz)   BMI 34.36 kg/m    Constitutional: Well nourished and in no apparent distress.  Eyes: Pupils equal, round. Sclerae anicteric.   HEENT: Normocephalic, atraumatic.   Neck: Supple. JVD   Respiratory: Breathing non-labored. Lungs clear to auscultation bilaterally. No crackles, wheezes, rhonchi, or rales.  Cardiovascular:  Regular rate and rhythm, normal S1 and S2. No murmur, rub, or gallop.  Skin: Warm, dry. No rashes, cyanosis, or xanthelasma.  Extremities: No edema.  Neurologic: No gross motor deficits. Alert, awake, and oriented to person, place and time.  Psychiatric: Affect appropriate.        CURRENT MEDICATIONS:  Current Outpatient Medications   Medication Sig Dispense Refill     acetaminophen (TYLENOL) 500 MG tablet Take 500 mg by mouth every 6 hours as needed for mild pain       atorvastatin (LIPITOR) 20 MG tablet Take 1 tablet (20 mg) by mouth daily 90 tablet 3     carvedilol (COREG) 12.5 MG tablet Take 1 tablet (12.5 mg) by mouth 2 times daily (with meals) 90 tablet 3     furosemide (LASIX) 20 MG tablet Take 1 tablet ( 20 mg) every other day (Patient taking differently: Take 20 mg by mouth as needed) 45 tablet 3     losartan (COZAAR) 100 MG tablet Take 1 tablet (100 mg) by mouth daily 90 tablet 3     warfarin ANTICOAGULANT (COUMADIN) 5 MG tablet Take 2.5 mg mon and 5 mg ROW OR  AS  DIRECTED  BY  ACC  TEAM 95 tablet 1       ALLERGIES  Allergies   Allergen Reactions     Lisinopril Cough     Very persistent cough         PAST MEDICAL HISTORY:  Past Medical History:   Diagnosis Date     Atrial flutter (H)      Benign " essential hypertension 1/18/2017    past use of ACE- Cough;  Had been on ARB-diuretic but experienced lower bp readings;  BLOOD PRESSURE now well controlled on ARB also no longer on diuretic; no low bp readings; may have had slight edema initially but has normalized with bp well controlled on ARB alone.      Cancer (H)     testicular cancer     CHF (congestive heart failure) (H) 6/5/2019     Long term current use of anticoagulant therapy 6/3/2019     Obesity (BMI 35.0-39.9) with comorbidity (H) 2/11/2019     Paroxysmal atrial fibrillation (H) 06/03/2019       PAST SURGICAL HISTORY:  Past Surgical History:   Procedure Laterality Date     ANESTHESIA CARDIOVERSION N/A 6/26/2019    Procedure: ANESTHESIA, FOR CARDIOVERSION DR. LE;  Surgeon: GENERIC ANESTHESIA PROVIDER;  Location:  OR     ANESTHESIA CARDIOVERSION N/A 7/17/2019    Procedure: ANESTHESIA, FOR CARDIOVERSION;  Surgeon: GENERIC ANESTHESIA PROVIDER;  Location: [facility] OR     ANESTHESIA CARDIOVERSION N/A 1/28/2020    Procedure: ANESTHESIA, FOR CARDIOVERSION;  Surgeon: GENERIC ANESTHESIA PROVIDER;  Location:  OR     ANESTHESIA CARDIOVERSION N/A 9/8/2020    Procedure: ANESTHESIA, FOR CARDIOVERSION;  Surgeon: GENERIC ANESTHESIA PROVIDER;  Location:  OR     ANESTHESIA CARDIOVERSION N/A 11/23/2020    Procedure: ANESTHESIA, FOR CARDIOVERSION;  Surgeon: GENERIC ANESTHESIA PROVIDER;  Location: [facility] OR     ANESTHESIA CARDIOVERSION N/A 1/21/2022    Procedure: ANESTHESIA, FOR CARDIOVERSION;  Surgeon: GENERIC ANESTHESIA PROVIDER;  Location: [facility] OR     ANESTHESIA CARDIOVERSION N/A 2/11/2022    Procedure: ANESTHESIA, FOR CARDIOVERSION (DR. MAO);  Surgeon: GENERIC ANESTHESIA PROVIDER;  Location:  OR     CLOSED REDUCTION SHOULDER Left      EP ABLATION AV NODE N/A 5/16/2022    Procedure: Ablation Atrioventricular Node;  Surgeon: Chadd Gutierrez MD;  Location:  HEART CARDIAC CATH LAB     EP ABLATION FOCAL AFIB N/A 1/3/2020    Procedure: EP Ablation Focal  AFIB;  Surgeon: Lamont Hannon MD;  Location:  HEART CARDIAC CATH LAB     EP PACEMAKER DEVICE & LEAD IMPLANT- RIGHT VENTRICULAR Left 5/16/2022    Procedure: Pacemaker Device & Lead Implant- Right ventricular;  Surgeon: Chadd Gutierrez MD;  Location:  HEART CARDIAC CATH LAB     GENITOURINARY SURGERY  1990    testicular cancer     ORTHOPEDIC SURGERY  1970's    right knee surgery        FAMILY HISTORY:  Family History   Problem Relation Age of Onset     Colon Cancer Father      Coronary Artery Disease Maternal Grandmother      Prostate Cancer Brother 72       SOCIAL HISTORY:  Social History     Socioeconomic History     Marital status:      Spouse name: None     Number of children: None     Years of education: None     Highest education level: None   Tobacco Use     Smoking status: Never     Smokeless tobacco: Never   Substance and Sexual Activity     Alcohol use: Yes     Comment: every 1-2 months or less     Drug use: No     Sexual activity: Yes     Partners: Female   Other Topics Concern     Parent/sibling w/ CABG, MI or angioplasty before 65F 55M? Yes

## 2022-10-17 NOTE — LETTER
10/17/2022    Davina Reaves MD  [address] [facility] [address]    RE: Kevan Connelly       Dear Colleague,     I had the pleasure of seeing Kevan Connelly in the [facility].    Electrophysiology Clinic Progress Note  Kevan Connelly MRN# [id number]   YOB: 1950 Age: 72 year old     Primary cardiologist: Dr. Hannon    Reason for visit: 3 month post PPM implant    History of presenting illness:    Kevan Connelly is a pleasant 72 year old patient with past medical history significant for hypertension, hyperlipidemia, and recurrent persistent atrial fibrillation, previous tachy-mediated cardiomyopathy that resolved.     In January 2020 he underwent atrial fibrillation ablation was found to have significant amount of LA scar.  He underwent a pulmonary vein isolation and a posterior left atrial wall isolation.  At the end of the procedure he was found to have atypical flutter and no further ablations were formed.  Post procedure he was placed on carvedilol and flecainide.    In January 2022 he had recurrent episodes of atypical flutter.  He underwent a IBETH cardioversion, but had recurrence post procedure.  Flecainide was discontinued and he was then started on amiodarone and underwent a successful second cardioversion in February 2022.     He was seen in follow-up with Dr. Hannon in May 2022 and they discussed options of dofetilide loading, redo ablation or rate control strategy with AV janessa agents or AV node ablation and pacemaker.  Ultimately it was elected to start him on diltiazem, stop losartan. The patient underwent an AV janessa ablation and pacemaker that was placed on 5/16/2022. Post procedure diltiazem was stopped and losartan restarted.     His six week post PPM check noted Noted first episode of NSVT logged with 1 EGM showing 19 beat run of NSVT with V rates 190-200bpm on 7/6/22 at 0600. Last EF 1/21/22, 55-60%. Ongoing monitoring was recommended.  Device  check from 10/6/2022 noticed that he was greater than 99% atrial fibrillation paced with 12.8 years of battery life without significant atrial or ventricular arrhythmias noted.    Today presents for a 3-month pacemaker follow-up.  He has no concerns with the site and has had significant symptomatic improvement post AV node ablation and pacemaker implant.  Today's blood pressure is significantly elevated at 160/100 and patient states his home BPs run in the 140s over 80s to 90s.            Assessment and Plan:     ASSESSMENT:    1. Recurrent persistent atrial fibrillation    S/p atrial fibrillation ablation 1/2020 (PVI and posterior left atrial wall isolation)    S/p IBETH/DCCV 1/2022 on flecainide with recurrent atrial fibrillation and changed form flecainide and amiodarone    S/p DCCV 2/2022 on amiodarone with recurrent atrial fibrillation    S/p AVN ablation and PPM     2. Hypertension    Suboptimal control    Currently on losartan 100 mg daily and carvedilol 6.25 mg twice daily    3. History of tachy-mediated cardiomyopathy-resolved    LVEF 55-60% 1/2022    4. Hyperlipidemia    FLP (2/2022): LDL 79     Atorvastatin 20 mg daily    PLAN:     1. Increase carvedilol to 12.5 mg twice daily  2. Follow with routine device checks  3. Return to clinic in 1 year or sooner if needed       Orders this Visit:  Orders Placed This Encounter   Procedures     Follow-Up with Cardiology EP     Orders Placed This Encounter   Medications     carvedilol (COREG) 12.5 MG tablet     Sig: Take 1 tablet (12.5 mg) by mouth 2 times daily (with meals)     Dispense:  90 tablet     Refill:  3     Medications Discontinued During This Encounter   Medication Reason     carvedilol (COREG) 6.25 MG tablet Reorder       Today's clinic visit entailed:  Review of the result(s) of each unique test - echo, device checks, IBETH/DCCV  Prescription drug management  15 minutes spent on the date of the encounter doing chart review, history and exam, documentation  "and further activities per the note  Provider  Link to [facility] Help Grid     The level of medical decision making during this visit was of moderate complexity.           Review of Systems:     Review of Systems:  Skin:        Eyes:       ENT:       Respiratory:  Negative    Cardiovascular:    Positive for;edema  Gastroenterology:      Genitourinary:       Musculoskeletal:       Neurologic:       Psychiatric:       Heme/Lymph/Imm:  Positive for allergies  Endocrine:  Negative              Physical Exam:     Vitals: BP (!) 145/85   Pulse 82   Ht 1.905 m (6' 3\")   Wt 124.7 kg (274 lb 14.4 oz)   BMI 34.36 kg/m    Constitutional: Well nourished and in no apparent distress.  Eyes: Pupils equal, round. Sclerae anicteric.   HEENT: Normocephalic, atraumatic.   Neck: Supple. JVD   Respiratory: Breathing non-labored. Lungs clear to auscultation bilaterally. No crackles, wheezes, rhonchi, or rales.  Cardiovascular:  Regular rate and rhythm, normal S1 and S2. No murmur, rub, or gallop.  Skin: Warm, dry. No rashes, cyanosis, or xanthelasma.  Extremities: No edema.  Neurologic: No gross motor deficits. Alert, awake, and oriented to person, place and time.  Psychiatric: Affect appropriate.        CURRENT MEDICATIONS:  Current Outpatient Medications   Medication Sig Dispense Refill     acetaminophen (TYLENOL) 500 MG tablet Take 500 mg by mouth every 6 hours as needed for mild pain       atorvastatin (LIPITOR) 20 MG tablet Take 1 tablet (20 mg) by mouth daily 90 tablet 3     carvedilol (COREG) 12.5 MG tablet Take 1 tablet (12.5 mg) by mouth 2 times daily (with meals) 90 tablet 3     furosemide (LASIX) 20 MG tablet Take 1 tablet ( 20 mg) every other day (Patient taking differently: Take 20 mg by mouth as needed) 45 tablet 3     losartan (COZAAR) 100 MG tablet Take 1 tablet (100 mg) by mouth daily 90 tablet 3     warfarin ANTICOAGULANT (COUMADIN) 5 MG tablet Take 2.5 mg mon and 5 mg ROW OR  AS  DIRECTED  BY  ACC  TEAM 95 tablet 1 "       ALLERGIES  Allergies   Allergen Reactions     Lisinopril Cough     Very persistent cough         PAST MEDICAL HISTORY:  Past Medical History:   Diagnosis Date     Atrial flutter (H)      Benign essential hypertension 1/18/2017    past use of ACE- Cough;  Had been on ARB-diuretic but experienced lower bp readings;  BLOOD PRESSURE now well controlled on ARB also no longer on diuretic; no low bp readings; may have had slight edema initially but has normalized with bp well controlled on ARB alone.      Cancer (H)     testicular cancer     CHF (congestive heart failure) (H) 6/5/2019     Long term current use of anticoagulant therapy 6/3/2019     Obesity (BMI 35.0-39.9) with comorbidity (H) 2/11/2019     Paroxysmal atrial fibrillation (H) 06/03/2019       PAST SURGICAL HISTORY:  Past Surgical History:   Procedure Laterality Date     ANESTHESIA CARDIOVERSION N/A 6/26/2019    Procedure: ANESTHESIA, FOR CARDIOVERSION DR. LE;  Surgeon: GENERIC ANESTHESIA PROVIDER;  Location: [facility] OR     ANESTHESIA CARDIOVERSION N/A 7/17/2019    Procedure: ANESTHESIA, FOR CARDIOVERSION;  Surgeon: GENERIC ANESTHESIA PROVIDER;  Location: [facility] OR     ANESTHESIA CARDIOVERSION N/A 1/28/2020    Procedure: ANESTHESIA, FOR CARDIOVERSION;  Surgeon: GENERIC ANESTHESIA PROVIDER;  Location: [facility] OR     ANESTHESIA CARDIOVERSION N/A 9/8/2020    Procedure: ANESTHESIA, FOR CARDIOVERSION;  Surgeon: GENERIC ANESTHESIA PROVIDER;  Location: [facility]     ANESTHESIA CARDIOVERSION N/A 11/23/2020    Procedure: ANESTHESIA, FOR CARDIOVERSION;  Surgeon: GENERIC ANESTHESIA PROVIDER;  Location: [facility] OR     ANESTHESIA CARDIOVERSION N/A 1/21/2022    Procedure: ANESTHESIA, FOR CARDIOVERSION;  Surgeon: GENERIC ANESTHESIA PROVIDER;  Location: [facility] OR     ANESTHESIA CARDIOVERSION N/A 2/11/2022    Procedure: ANESTHESIA, FOR CARDIOVERSION (DR. MAO);  Surgeon: GENERIC ANESTHESIA PROVIDER;  Location: [facility] OR     CLOSED REDUCTION SHOULDER Left      EP ABLATION AV NODE N/A 5/16/2022     Procedure: Ablation Atrioventricular Node;  Surgeon: Chadd Gutierrez MD;  Location:  HEART CARDI[facility] CATH LAB     EP ABLATION FOCAL AFIB N/A 1/3/2020    Procedure: EP Ablation Focal AFIB;  Surgeon: Lamont Hannon MD;  Location:  HEART CARDIAC CATH LAB     EP PACEMAKER DEVICE & LEAD IMPLANT- RIGHT VENTRICULAR Left 5/16/2022    Procedure: Pacemaker Device & Lead Implant- Right ventricular;  Surgeon: Chadd Gutierrez MD;  Location:  HEART CARDIAC CATH LAB     GENITOURINARY SURGERY  1990    testicular cancer     ORTHOPEDIC SURGERY  1970's    right knee surgery        FAMILY HISTORY:  Family History   Problem Relation Age of Onset     Colon Cancer Father      Coronary Artery Disease Maternal Grandmother      Prostate Cancer Brother 72       SOCIAL HISTORY:  Social History     Socioeconomic History     Marital status:      Spouse name: None     Number of children: None     Years of education: None     Highest education level: None   Tobacco Use     Smoking status: Never     Smokeless tobacco: Never   Substance and Sexual Activity     Alcohol use: Yes     Comment: every 1-2 months or less     Drug use: No     Sexual activity: Yes     Partners: Female   Other Topics Concern     Parent/sibling w/ CABG, MI or angioplasty before 65F 55M? Yes          Thank you for allowing me to participate in the care of your patient.      Sincerely,     ANNMARIE GUTIÉRREZ CNP     [facility]  cc:   ANNMARIE Dumont CNP  [address]  [place] [address]

## 2022-11-04 ENCOUNTER — ANTICOAGULATION THERAPY VISIT (OUTPATIENT)
Dept: ANTICOAGULATION | Facility: CLINIC | Age: 72
End: 2022-11-04

## 2022-11-04 ENCOUNTER — LAB (OUTPATIENT)
Dept: LAB | Facility: CLINIC | Age: 72
End: 2022-11-04
Payer: COMMERCIAL

## 2022-11-04 DIAGNOSIS — I48.0 PAROXYSMAL ATRIAL FIBRILLATION (H): ICD-10-CM

## 2022-11-04 DIAGNOSIS — Z79.01 LONG TERM CURRENT USE OF ANTICOAGULANT THERAPY: ICD-10-CM

## 2022-11-04 DIAGNOSIS — Z79.01 LONG TERM CURRENT USE OF ANTICOAGULANT THERAPY: Primary | ICD-10-CM

## 2022-11-04 LAB — INR BLD: 2.6 (ref 0.9–1.1)

## 2022-11-04 PROCEDURE — 36416 COLLJ CAPILLARY BLOOD SPEC: CPT

## 2022-11-04 PROCEDURE — 85610 PROTHROMBIN TIME: CPT

## 2022-11-04 NOTE — PROGRESS NOTES
ANTICOAGULATION MANAGEMENT     Mathur DHRUV Connelly 72 year old male is on warfarin with therapeutic INR result. (Goal INR 2.0-3.0)    Recent labs: (last 7 days)     11/04/22  1415   INR 2.6*       ASSESSMENT       Source(s): Chart Review    Previous INR was Therapeutic last 2(+) visits    Medication, diet, health changes since last INR - carvedilol dose increased           PLAN     Recommended plan for no diet, medication or health factor changes affecting INR     Dosing Instructions: Continue your current warfarin dose with next INR in 6 weeks       Summary  As of 11/4/2022    Full warfarin instructions:  5 mg every day; Starting 11/4/2022   Next INR check:  12/16/2022             Detailed voice message left for Keron with dosing instructions and follow up date.     Contact [phone]  to schedule and with any changes, questions or concerns.     Education provided:     Please call back if any changes to your diet, medications or how you've been taking warfarin    Plan made per [facility] anticoagulation protocol    Ksenia Faith RN  Anticoagulation Clinic  11/4/2022    _______________________________________________________________________     Anticoagulation Episode Summary     Current INR goal:  2.0-3.0   TTR:  64.1 % (1 y)   Target end date:  Indefinite   Send INR reminders to:  [facility]    Indications    Long term current use of anticoagulant therapy [Z79.01]  Paroxysmal atrial fibrillation (H) [I48.0]           Comments:           Anticoagulation Care Providers     Provider Role Specialty Phone number    Davina Reaves MD Referring Internal Medicine [phone]

## 2022-11-29 DIAGNOSIS — Z79.01 LONG TERM CURRENT USE OF ANTICOAGULANT THERAPY: Primary | ICD-10-CM

## 2022-11-29 DIAGNOSIS — I48.0 PAROXYSMAL ATRIAL FIBRILLATION (H): ICD-10-CM

## 2022-11-29 DIAGNOSIS — I48.91 ATRIAL FIBRILLATION, UNSPECIFIED TYPE (H): ICD-10-CM

## 2022-11-29 RX ORDER — WARFARIN SODIUM 5 MG/1
TABLET ORAL
Qty: 95 TABLET | Refills: 1 | Status: SHIPPED | OUTPATIENT
Start: 2022-11-29 | End: 2022-11-30

## 2022-11-29 NOTE — TELEPHONE ENCOUNTER
ANTICOAGULATION MANAGEMENT:  Medication Refill    Anticoagulation Summary  As of 11/29/2022    Warfarin maintenance plan:  5 mg (5 mg x 1) every day; Starting 11/29/2022   Next INR check:     Target end date:  Indefinite    Indications    Long term current use of anticoagulant therapy [Z79.01]  Paroxysmal atrial fibrillation (H) [I48.0]             Anticoagulation Care Providers     Provider Role Specialty Phone number    Davina Reaves MD Referring Internal Medicine [phone]          Visit with referring provider/group within last year: Yes    [facility] referral signed within last year: Yes    Mathur meets all criteria for refill (current [facility] referral, office visit with referring provider/group in last year, lab monitoring up to date or not exceeding 2 weeks overdue). Rx instructions and quantity supplied updated to match patient's current dosing plan. Warfarin 90 day supply with 1 refill granted per [facility] protocol     Ksenia Faith RN  Anticoagulation Clinic

## 2022-11-30 DIAGNOSIS — I48.91 ATRIAL FIBRILLATION, UNSPECIFIED TYPE (H): ICD-10-CM

## 2022-11-30 RX ORDER — WARFARIN SODIUM 5 MG/1
TABLET ORAL
Qty: 95 TABLET | Refills: 1 | Status: SHIPPED | OUTPATIENT
Start: 2022-11-30 | End: 2023-06-12

## 2022-11-30 NOTE — TELEPHONE ENCOUNTER
ANTICOAGULATION MANAGEMENT:  Medication Refill    Anticoagulation Summary  As of 11/29/2022    Warfarin maintenance plan:  5 mg (5 mg x 1) every day; Starting 11/29/2022   Next INR check:     Target end date:  Indefinite    Indications    Long term current use of anticoagulant therapy [Z79.01]  Paroxysmal atrial fibrillation (H) [I48.0]             Anticoagulation Care Providers     Provider Role Specialty Phone number    Davina Reaves MD Referring Internal Medicine [phone]          Visit with referring provider/group within last year: Yes    [facility] referral signed within last year: Yes    Mathur meets all criteria for refill (current [facility] referral, office visit with referring provider/group in last year, lab monitoring up to date or not exceeding 2 weeks overdue). Rx instructions and quantity match patient's current dosing plan. Warfarin 90 day supply with 1 refill granted per [facility] protocol     Maribel Hall RN  Anticoagulation Clinic

## 2022-12-16 ENCOUNTER — LAB (OUTPATIENT)
Dept: LAB | Facility: CLINIC | Age: 72
End: 2022-12-16
Payer: COMMERCIAL

## 2022-12-16 ENCOUNTER — ANTICOAGULATION THERAPY VISIT (OUTPATIENT)
Dept: ANTICOAGULATION | Facility: CLINIC | Age: 72
End: 2022-12-16

## 2022-12-16 DIAGNOSIS — Z79.01 LONG TERM CURRENT USE OF ANTICOAGULANT THERAPY: ICD-10-CM

## 2022-12-16 DIAGNOSIS — I48.0 PAROXYSMAL ATRIAL FIBRILLATION (H): ICD-10-CM

## 2022-12-16 DIAGNOSIS — Z79.01 LONG TERM CURRENT USE OF ANTICOAGULANT THERAPY: Primary | ICD-10-CM

## 2022-12-16 LAB — INR BLD: 2.5 (ref 0.9–1.1)

## 2022-12-16 PROCEDURE — 36415 COLL VENOUS BLD VENIPUNCTURE: CPT

## 2022-12-16 PROCEDURE — 85610 PROTHROMBIN TIME: CPT

## 2022-12-16 NOTE — PROGRESS NOTES
ANTICOAGULATION MANAGEMENT     Mathur DHRUV Connelly 72 year old male is on warfarin with therapeutic INR result. (Goal INR 2.0-3.0)    Recent labs: (last 7 days)     12/16/22  0902   INR 2.5*       ASSESSMENT       Source(s): Chart Review and Patient/Caregiver Call       Warfarin doses taken: Warfarin taken as instructed    Diet: No new diet changes identified    New illness, injury, or hospitalization: Yes: patient recently had cough/cold without fever or diarrhe, he states he tested negative for covid and is feeling well now.    Medication/supplement changes: None noted    Signs or symptoms of bleeding or clotting: No    Previous INR: Therapeutic last 2(+) visits    Additional findings: None       PLAN     Recommended plan for temporary change(s) affecting INR     Dosing Instructions: Continue your current warfarin dose with next INR in 6 weeks       Summary  As of 12/16/2022    Full warfarin instructions:  5 mg every day; Starting 12/16/2022   Next INR check:  1/27/2023             Telephone call with Keron who verbalizes understanding and agrees to plan    Lab visit scheduled    Education provided:     Contact [phone]  with any changes, questions or concerns.     Plan made per [facility] anticoagulation protocol    Ramona Delcid RN  Anticoagulation Clinic  12/16/2022    _______________________________________________________________________     Anticoagulation Episode Summary     Current INR goal:  2.0-3.0   TTR:  71.6 % (1 y)   Target end date:  Indefinite   Send INR reminders to:  [facility]    Indications    Long term current use of anticoagulant therapy [Z79.01]  Paroxysmal atrial fibrillation (H) [I48.0]           Comments:           Anticoagulation Care Providers     Provider Role Specialty Phone number    Davina Reaves MD Referring Internal Medicine [phone]

## 2023-01-12 ENCOUNTER — ANCILLARY PROCEDURE (OUTPATIENT)
Dept: CARDIOLOGY | Facility: CLINIC | Age: 73
End: 2023-01-12
Attending: INTERNAL MEDICINE
Payer: COMMERCIAL

## 2023-01-12 DIAGNOSIS — Z95.0 CARDIAC PACEMAKER IN SITU: ICD-10-CM

## 2023-01-12 DIAGNOSIS — Z98.890 HX OF ATRIOVENTRICULAR NODE ABLATION: ICD-10-CM

## 2023-01-12 PROCEDURE — 93296 REM INTERROG EVL PM/IDS: CPT | Performed by: INTERNAL MEDICINE

## 2023-01-12 PROCEDURE — 93294 REM INTERROG EVL PM/LDLS PM: CPT | Performed by: INTERNAL MEDICINE

## 2023-01-13 LAB
MDC_IDC_MSMT_BATTERY_DTM: NORMAL
MDC_IDC_MSMT_BATTERY_REMAINING_LONGEVITY: 150 MO
MDC_IDC_MSMT_BATTERY_RRT_TRIGGER: 2.62
MDC_IDC_MSMT_BATTERY_STATUS: NORMAL
MDC_IDC_MSMT_BATTERY_VOLTAGE: 3.14 V
MDC_IDC_MSMT_LEADCHNL_RV_IMPEDANCE_VALUE: 342 OHM
MDC_IDC_MSMT_LEADCHNL_RV_IMPEDANCE_VALUE: 437 OHM
MDC_IDC_MSMT_LEADCHNL_RV_PACING_THRESHOLD_AMPLITUDE: 0.62 V
MDC_IDC_MSMT_LEADCHNL_RV_PACING_THRESHOLD_PULSEWIDTH: 0.4 MS
MDC_IDC_MSMT_LEADCHNL_RV_SENSING_INTR_AMPL: 11.38 MV
MDC_IDC_MSMT_LEADCHNL_RV_SENSING_INTR_AMPL: 11.38 MV
MDC_IDC_PG_IMPLANT_DTM: NORMAL
MDC_IDC_PG_MFG: NORMAL
MDC_IDC_PG_MODEL: NORMAL
MDC_IDC_PG_SERIAL: NORMAL
MDC_IDC_PG_TYPE: NORMAL
MDC_IDC_SESS_CLINIC_NAME: NORMAL
MDC_IDC_SESS_DTM: NORMAL
MDC_IDC_SESS_TYPE: NORMAL
MDC_IDC_SET_BRADY_HYSTRATE: NORMAL
MDC_IDC_SET_BRADY_LOWRATE: 70 {BEATS}/MIN
MDC_IDC_SET_BRADY_MAX_SENSOR_RATE: 130 {BEATS}/MIN
MDC_IDC_SET_BRADY_MODE: NORMAL
MDC_IDC_SET_LEADCHNL_RV_PACING_AMPLITUDE: 2 V
MDC_IDC_SET_LEADCHNL_RV_PACING_ANODE_ELECTRODE_1: NORMAL
MDC_IDC_SET_LEADCHNL_RV_PACING_ANODE_LOCATION_1: NORMAL
MDC_IDC_SET_LEADCHNL_RV_PACING_CAPTURE_MODE: NORMAL
MDC_IDC_SET_LEADCHNL_RV_PACING_CATHODE_ELECTRODE_1: NORMAL
MDC_IDC_SET_LEADCHNL_RV_PACING_CATHODE_LOCATION_1: NORMAL
MDC_IDC_SET_LEADCHNL_RV_PACING_POLARITY: NORMAL
MDC_IDC_SET_LEADCHNL_RV_PACING_PULSEWIDTH: 0.4 MS
MDC_IDC_SET_LEADCHNL_RV_SENSING_ANODE_ELECTRODE_1: NORMAL
MDC_IDC_SET_LEADCHNL_RV_SENSING_ANODE_LOCATION_1: NORMAL
MDC_IDC_SET_LEADCHNL_RV_SENSING_CATHODE_ELECTRODE_1: NORMAL
MDC_IDC_SET_LEADCHNL_RV_SENSING_CATHODE_LOCATION_1: NORMAL
MDC_IDC_SET_LEADCHNL_RV_SENSING_POLARITY: NORMAL
MDC_IDC_SET_LEADCHNL_RV_SENSING_SENSITIVITY: 0.9 MV
MDC_IDC_SET_ZONE_DETECTION_INTERVAL: 360 MS
MDC_IDC_SET_ZONE_TYPE: NORMAL
MDC_IDC_STAT_BRADY_DTM_END: NORMAL
MDC_IDC_STAT_BRADY_DTM_START: NORMAL
MDC_IDC_STAT_BRADY_RV_PERCENT_PACED: 99.59 %
MDC_IDC_STAT_EPISODE_RECENT_COUNT: 0
MDC_IDC_STAT_EPISODE_RECENT_COUNT_DTM_END: NORMAL
MDC_IDC_STAT_EPISODE_RECENT_COUNT_DTM_START: NORMAL
MDC_IDC_STAT_EPISODE_TOTAL_COUNT: 0
MDC_IDC_STAT_EPISODE_TOTAL_COUNT: 0
MDC_IDC_STAT_EPISODE_TOTAL_COUNT: 1
MDC_IDC_STAT_EPISODE_TOTAL_COUNT_DTM_END: NORMAL
MDC_IDC_STAT_EPISODE_TOTAL_COUNT_DTM_START: NORMAL
MDC_IDC_STAT_EPISODE_TYPE: NORMAL

## 2023-01-26 ENCOUNTER — ANTICOAGULATION THERAPY VISIT (OUTPATIENT)
Dept: ANTICOAGULATION | Facility: CLINIC | Age: 73
End: 2023-01-26

## 2023-01-26 ENCOUNTER — LAB (OUTPATIENT)
Dept: LAB | Facility: CLINIC | Age: 73
End: 2023-01-26
Payer: COMMERCIAL

## 2023-01-26 DIAGNOSIS — I48.0 PAROXYSMAL ATRIAL FIBRILLATION (H): ICD-10-CM

## 2023-01-26 DIAGNOSIS — Z79.01 LONG TERM CURRENT USE OF ANTICOAGULANT THERAPY: ICD-10-CM

## 2023-01-26 DIAGNOSIS — Z79.01 LONG TERM CURRENT USE OF ANTICOAGULANT THERAPY: Primary | ICD-10-CM

## 2023-01-26 LAB — INR BLD: 2.5 (ref 0.9–1.1)

## 2023-01-26 PROCEDURE — 36416 COLLJ CAPILLARY BLOOD SPEC: CPT

## 2023-01-26 PROCEDURE — 85610 PROTHROMBIN TIME: CPT

## 2023-01-26 NOTE — PROGRESS NOTES
ANTICOAGULATION MANAGEMENT     Mathur DHRUV Connelly 72 year old male is on warfarin with therapeutic INR result. (Goal INR 2.0-3.0)    Recent labs: (last 7 days)     01/26/23  1154   INR 2.5*       ASSESSMENT       Source(s): Chart Review and Patient/Caregiver Call       Warfarin doses taken: Warfarin taken as instructed    Diet: No new diet changes identified    New illness, injury, or hospitalization: No    Medication/supplement changes: None noted    Signs or symptoms of bleeding or clotting: No    Previous INR: Therapeutic last 2(+) visits    Additional findings: None       PLAN     Recommended plan for no diet, medication or health factor changes affecting INR     Dosing Instructions: Continue your current warfarin dose with next INR in 6 weeks       Summary  As of 1/26/2023    Full warfarin instructions:  5 mg every day   Next INR check:  3/8/2023             Telephone call with Keron who agrees to plan and repeated back plan correctly    Lab visit scheduled    Education provided:     Please call back if any changes to your diet, medications or how you've been taking warfarin    Plan made per [facility] anticoagulation protocol    Yarelis Martinez RN  Anticoagulation Clinic  1/26/2023    _______________________________________________________________________     Anticoagulation Episode Summary     Current INR goal:  2.0-3.0   TTR:  71.6 % (1 y)   Target end date:  Indefinite   Send INR reminders to:  [facility]    Indications    Long term current use of anticoagulant therapy [Z79.01]  Paroxysmal atrial fibrillation (H) [I48.0]           Comments:           Anticoagulation Care Providers     Provider Role Specialty Phone number    Davina Reaves MD Referring Internal Medicine [phone]

## 2023-02-28 ENCOUNTER — DOCUMENTATION ONLY (OUTPATIENT)
Dept: FAMILY MEDICINE | Facility: CLINIC | Age: 73
End: 2023-02-28
Payer: COMMERCIAL

## 2023-02-28 DIAGNOSIS — I48.0 PAROXYSMAL ATRIAL FIBRILLATION (H): Primary | ICD-10-CM

## 2023-02-28 DIAGNOSIS — I48.92 ATRIAL FLUTTER (H): ICD-10-CM

## 2023-02-28 NOTE — PROGRESS NOTES
ANTICOAGULATION CLINIC REFERRAL RENEWAL REQUEST       An annual renewal order is required for all patients referred to [facility].?  Please review and sign the pended referral order for Kevan Connelly.       ANTICOAGULATION SUMMARY      Warfarin indication(s)   Atrial Fibrillation    Mechanical heart valve present?  NO       Current goal range   INR: 2.0-3.0     Goal appropriate for indication? Goal INR 2-3, standard for indication(s) above     Time in Therapeutic Range (TTR)  (Goal > 60%) 71.6%       Office visit with referring provider's group within last year no on 2/17/22 - due for PCP visit       Ksenia Faith RN  [facility]

## 2023-03-07 ENCOUNTER — ANTICOAGULATION THERAPY VISIT (OUTPATIENT)
Dept: ANTICOAGULATION | Facility: CLINIC | Age: 73
End: 2023-03-07

## 2023-03-07 ENCOUNTER — LAB (OUTPATIENT)
Dept: LAB | Facility: CLINIC | Age: 73
End: 2023-03-07
Payer: COMMERCIAL

## 2023-03-07 DIAGNOSIS — Z79.01 LONG TERM CURRENT USE OF ANTICOAGULANT THERAPY: Primary | ICD-10-CM

## 2023-03-07 DIAGNOSIS — I48.0 PAROXYSMAL ATRIAL FIBRILLATION (H): ICD-10-CM

## 2023-03-07 DIAGNOSIS — Z79.01 LONG TERM CURRENT USE OF ANTICOAGULANT THERAPY: ICD-10-CM

## 2023-03-07 LAB — INR BLD: 2.8 (ref 0.9–1.1)

## 2023-03-07 PROCEDURE — 36415 COLL VENOUS BLD VENIPUNCTURE: CPT

## 2023-03-07 PROCEDURE — 85610 PROTHROMBIN TIME: CPT

## 2023-03-07 NOTE — PROGRESS NOTES
ANTICOAGULATION MANAGEMENT     Mathur DHRUV Connelly 72 year old male is on warfarin with therapeutic INR result. (Goal INR 2.0-3.0)    Recent labs: (last 7 days)     03/07/23  0757   INR 2.8*       ASSESSMENT       Source(s): Chart Review and Patient/Caregiver Call       Warfarin doses taken: Warfarin taken as instructed    Diet: No new diet changes identified    New illness, injury, or hospitalization: No    Medication/supplement changes: None noted    Signs or symptoms of bleeding or clotting: No    Previous INR: Therapeutic last 2(+) visits    Additional findings: None         PLAN     Recommended plan for no diet, medication or health factor changes affecting INR .  Also advised patient that he is due for his annual wellness exam.    Dosing Instructions: Continue your current warfarin dose with next INR in 6 weeks       Summary  As of 3/7/2023    Full warfarin instructions:  5 mg every day   Next INR check:  4/18/2023             Telephone call with Keron who agrees to plan and repeated back plan correctly    Lab visit scheduled    Education provided:     Please call back if any changes to your diet, medications or how you've been taking warfarin    Plan made per [facility] anticoagulation protocol    Yarelis Martinez RN  Anticoagulation Clinic  3/7/2023    _______________________________________________________________________     Anticoagulation Episode Summary     Current INR goal:  2.0-3.0   TTR:  71.6 % (1 y)   Target end date:  Indefinite   Send INR reminders to:  [facility]    Indications    Long term current use of anticoagulant therapy [Z79.01]  Paroxysmal atrial fibrillation (H) [I48.0]           Comments:           Anticoagulation Care Providers     Provider Role Specialty Phone number    Davina Reaves MD Referring Internal Medicine [phone]

## 2023-03-17 DIAGNOSIS — I25.10 CORONARY ARTERY CALCIFICATION: ICD-10-CM

## 2023-03-20 ENCOUNTER — TELEPHONE (OUTPATIENT)
Dept: CARDIOLOGY | Facility: CLINIC | Age: 73
End: 2023-03-20
Payer: COMMERCIAL

## 2023-03-20 RX ORDER — ATORVASTATIN CALCIUM 20 MG/1
20 TABLET, FILM COATED ORAL DAILY
Qty: 90 TABLET | Refills: 1 | Status: SHIPPED | OUTPATIENT
Start: 2023-03-20 | End: 2023-06-26

## 2023-03-20 RX ORDER — ATORVASTATIN CALCIUM 20 MG/1
TABLET, FILM COATED ORAL
Qty: 90 TABLET | Refills: 0 | OUTPATIENT
Start: 2023-03-20

## 2023-03-26 ENCOUNTER — HEALTH MAINTENANCE LETTER (OUTPATIENT)
Age: 73
End: 2023-03-26

## 2023-04-10 DIAGNOSIS — I48.0 PAROXYSMAL ATRIAL FIBRILLATION (H): ICD-10-CM

## 2023-04-10 RX ORDER — CARVEDILOL 12.5 MG/1
12.5 TABLET ORAL 2 TIMES DAILY WITH MEALS
Qty: 180 TABLET | Refills: 1 | Status: SHIPPED | OUTPATIENT
Start: 2023-04-10 | End: 2023-06-26

## 2023-04-18 ENCOUNTER — ANTICOAGULATION THERAPY VISIT (OUTPATIENT)
Dept: ANTICOAGULATION | Facility: CLINIC | Age: 73
End: 2023-04-18

## 2023-04-18 ENCOUNTER — LAB (OUTPATIENT)
Dept: LAB | Facility: CLINIC | Age: 73
End: 2023-04-18
Payer: COMMERCIAL

## 2023-04-18 DIAGNOSIS — Z79.01 LONG TERM CURRENT USE OF ANTICOAGULANT THERAPY: Primary | ICD-10-CM

## 2023-04-18 DIAGNOSIS — I48.0 PAROXYSMAL ATRIAL FIBRILLATION (H): ICD-10-CM

## 2023-04-18 LAB — INR BLD: 2.5 (ref 0.9–1.1)

## 2023-04-18 PROCEDURE — 36415 COLL VENOUS BLD VENIPUNCTURE: CPT

## 2023-04-18 PROCEDURE — 85610 PROTHROMBIN TIME: CPT

## 2023-04-18 NOTE — PROGRESS NOTES
ANTICOAGULATION MANAGEMENT     Kevan Connelly 72 year old male is on warfarin with therapeutic INR result. (Goal INR 2.0-3.0)    Recent labs: (last 7 days)     04/18/23  0834   INR 2.5*       ASSESSMENT     Warfarin Lab Questionnaire    Warfarin Doses Last 7 Days      4/18/2023     8:28 AM   Dose in Tablet or Mg   TAB or MG? tablet (tab)           4/18/2023   Warfarin Lab Questionnaire   Missed doses? No   Changes in diet or alcohol? No   Medication changes? No.  Thinking of adding in a MVI to his diet since he does not eat a lot of fruit and veggies.  Sent [employer] message with a recommendation of a MVI that does not contain vitamin K.     Injuries or illness since last INR? No   Shortness of breath? No   Abnormal bleeding? No   Upcoming surgery, procedure? No        Previous INR: Therapeutic last 2(+) visits  Additional findings: None       PLAN     Recommended plan for no diet, medication or health factor changes affecting INR     Dosing Instructions: Continue your current warfarin dose with next INR in 6 weeks       Summary  As of 4/18/2023    Full warfarin instructions:  5 mg every day   Next INR check:  5/30/2023             Telephone call with Keron who agrees to plan and repeated back plan correctly    Lab visit scheduled    Education provided:     Please call back if any changes to your diet, medications or how you've been taking warfarin    Dietary considerations: importance of consistent vitamin K intake and impact of vitamin K foods on INR    Interaction IS anticipated between warfarin and MVI that contain vitamin K    Plan made per [facility] anticoagulation protocol    Yarelis Martinez RN  Anticoagulation Clinic  4/18/2023    _______________________________________________________________________     Anticoagulation Episode Summary     Current INR goal:  2.0-3.0   TTR:  72.8 % (1 y)   Target end date:  Indefinite   Send INR reminders to:  [facility]    Indications    Long term current use of  anticoagulant therapy [Z79.01]  Paroxysmal atrial fibrillation (H) [I48.0]           Comments:           Anticoagulation Care Providers     Provider Role Specialty Phone number    Davina Reaves MD Referring Internal Medicine [phone]

## 2023-04-20 ENCOUNTER — ANCILLARY PROCEDURE (OUTPATIENT)
Dept: CARDIOLOGY | Facility: CLINIC | Age: 73
End: 2023-04-20
Attending: INTERNAL MEDICINE
Payer: COMMERCIAL

## 2023-04-20 DIAGNOSIS — I49.5 SSS (SICK SINUS SYNDROME) (H): ICD-10-CM

## 2023-04-20 DIAGNOSIS — Z95.0 CARDIAC PACEMAKER IN SITU: ICD-10-CM

## 2023-04-20 DIAGNOSIS — Z98.890 HX OF ATRIOVENTRICULAR NODE ABLATION: ICD-10-CM

## 2023-04-20 DIAGNOSIS — Z95.0 CARDIAC PACEMAKER IN SITU: Primary | ICD-10-CM

## 2023-04-20 PROCEDURE — 93296 REM INTERROG EVL PM/IDS: CPT | Performed by: INTERNAL MEDICINE

## 2023-04-20 PROCEDURE — 93294 REM INTERROG EVL PM/LDLS PM: CPT | Performed by: INTERNAL MEDICINE

## 2023-04-21 LAB
MDC_IDC_MSMT_BATTERY_DTM: NORMAL
MDC_IDC_MSMT_BATTERY_REMAINING_LONGEVITY: 145 MO
MDC_IDC_MSMT_BATTERY_RRT_TRIGGER: 2.62
MDC_IDC_MSMT_BATTERY_STATUS: NORMAL
MDC_IDC_MSMT_BATTERY_VOLTAGE: 3.09 V
MDC_IDC_MSMT_LEADCHNL_RV_IMPEDANCE_VALUE: 342 OHM
MDC_IDC_MSMT_LEADCHNL_RV_IMPEDANCE_VALUE: 418 OHM
MDC_IDC_MSMT_LEADCHNL_RV_PACING_THRESHOLD_AMPLITUDE: 0.62 V
MDC_IDC_MSMT_LEADCHNL_RV_PACING_THRESHOLD_PULSEWIDTH: 0.4 MS
MDC_IDC_MSMT_LEADCHNL_RV_SENSING_INTR_AMPL: 11.38 MV
MDC_IDC_MSMT_LEADCHNL_RV_SENSING_INTR_AMPL: 11.38 MV
MDC_IDC_PG_IMPLANT_DTM: NORMAL
MDC_IDC_PG_MFG: NORMAL
MDC_IDC_PG_MODEL: NORMAL
MDC_IDC_PG_SERIAL: NORMAL
MDC_IDC_PG_TYPE: NORMAL
MDC_IDC_SESS_CLINIC_NAME: NORMAL
MDC_IDC_SESS_DTM: NORMAL
MDC_IDC_SESS_TYPE: NORMAL
MDC_IDC_SET_BRADY_HYSTRATE: NORMAL
MDC_IDC_SET_BRADY_LOWRATE: 70 {BEATS}/MIN
MDC_IDC_SET_BRADY_MAX_SENSOR_RATE: 130 {BEATS}/MIN
MDC_IDC_SET_BRADY_MODE: NORMAL
MDC_IDC_SET_LEADCHNL_RV_PACING_AMPLITUDE: 2 V
MDC_IDC_SET_LEADCHNL_RV_PACING_ANODE_ELECTRODE_1: NORMAL
MDC_IDC_SET_LEADCHNL_RV_PACING_ANODE_LOCATION_1: NORMAL
MDC_IDC_SET_LEADCHNL_RV_PACING_CAPTURE_MODE: NORMAL
MDC_IDC_SET_LEADCHNL_RV_PACING_CATHODE_ELECTRODE_1: NORMAL
MDC_IDC_SET_LEADCHNL_RV_PACING_CATHODE_LOCATION_1: NORMAL
MDC_IDC_SET_LEADCHNL_RV_PACING_POLARITY: NORMAL
MDC_IDC_SET_LEADCHNL_RV_PACING_PULSEWIDTH: 0.4 MS
MDC_IDC_SET_LEADCHNL_RV_SENSING_ANODE_ELECTRODE_1: NORMAL
MDC_IDC_SET_LEADCHNL_RV_SENSING_ANODE_LOCATION_1: NORMAL
MDC_IDC_SET_LEADCHNL_RV_SENSING_CATHODE_ELECTRODE_1: NORMAL
MDC_IDC_SET_LEADCHNL_RV_SENSING_CATHODE_LOCATION_1: NORMAL
MDC_IDC_SET_LEADCHNL_RV_SENSING_POLARITY: NORMAL
MDC_IDC_SET_LEADCHNL_RV_SENSING_SENSITIVITY: 0.9 MV
MDC_IDC_SET_ZONE_DETECTION_INTERVAL: 360 MS
MDC_IDC_SET_ZONE_TYPE: NORMAL
MDC_IDC_STAT_BRADY_DTM_END: NORMAL
MDC_IDC_STAT_BRADY_DTM_START: NORMAL
MDC_IDC_STAT_BRADY_RV_PERCENT_PACED: 99.63 %
MDC_IDC_STAT_EPISODE_RECENT_COUNT: 0
MDC_IDC_STAT_EPISODE_RECENT_COUNT_DTM_END: NORMAL
MDC_IDC_STAT_EPISODE_RECENT_COUNT_DTM_START: NORMAL
MDC_IDC_STAT_EPISODE_TOTAL_COUNT: 0
MDC_IDC_STAT_EPISODE_TOTAL_COUNT: 0
MDC_IDC_STAT_EPISODE_TOTAL_COUNT: 1
MDC_IDC_STAT_EPISODE_TOTAL_COUNT_DTM_END: NORMAL
MDC_IDC_STAT_EPISODE_TOTAL_COUNT_DTM_START: NORMAL
MDC_IDC_STAT_EPISODE_TYPE: NORMAL

## 2023-05-30 ENCOUNTER — LAB (OUTPATIENT)
Dept: LAB | Facility: CLINIC | Age: 73
End: 2023-05-30
Payer: COMMERCIAL

## 2023-05-30 ENCOUNTER — ANTICOAGULATION THERAPY VISIT (OUTPATIENT)
Dept: ANTICOAGULATION | Facility: CLINIC | Age: 73
End: 2023-05-30

## 2023-05-30 DIAGNOSIS — Z79.01 LONG TERM CURRENT USE OF ANTICOAGULANT THERAPY: Primary | ICD-10-CM

## 2023-05-30 DIAGNOSIS — I48.0 PAROXYSMAL ATRIAL FIBRILLATION (H): ICD-10-CM

## 2023-05-30 LAB — INR BLD: 3.1 (ref 0.9–1.1)

## 2023-05-30 PROCEDURE — 85610 PROTHROMBIN TIME: CPT

## 2023-05-30 PROCEDURE — 36415 COLL VENOUS BLD VENIPUNCTURE: CPT

## 2023-05-30 NOTE — PROGRESS NOTES
ANTICOAGULATION MANAGEMENT     Mathur DHRUV Connelly 72 year old male is on warfarin with supratherapeutic INR result. (Goal INR 2.0-3.0)    Recent labs: (last 7 days)     05/30/23  0759   INR 3.1*       ASSESSMENT     Warfarin Lab Questionnaire    Warfarin Doses Last 7 Days          5/30/2023   Warfarin Lab Questionnaire   Missed doses within past 14 days? No   Changes in diet or alcohol within past 14 days? No   Medication changes since last result? No, yes, has increased daily tylenol dose   Injuries or illness since last result? No   New shortness of breath, severe headaches or sudden changes in vision since last result? No   Abnormal bleeding since last result? No   Upcoming surgery, procedure? No        Previous result: Therapeutic last 2(+) visits  Additional findings: takes around 1200 mg daily tylenol for arthritis. enjoys and is willing to have a nuria salad weekly, otherwise does not eat greens       PLAN     Recommended plan for ongoing change(s) affecting INR     Dosing Instructions: Continue your current warfarin dose with next INR in 2 weeks       Summary  As of 5/30/2023    Full warfarin instructions:  5 mg every day   Next INR check:  6/13/2023             Telephone call with Keron who verbalizes understanding and agrees to plan    Lab visit scheduled    Education provided:     Contact [phone] with any changes, questions or concerns.     Plan made per [facility] anticoagulation protocol    Indiana Lowe RN  Anticoagulation Clinic  5/30/2023    _______________________________________________________________________     Anticoagulation Episode Summary     Current INR goal:  2.0-3.0   TTR:  82.4 % (1 y)   Target end date:  Indefinite   Send INR reminders to:  [facility]    Indications    Long term current use of anticoagulant therapy [Z79.01]  Paroxysmal atrial fibrillation (H) [I48.0]           Comments:           Anticoagulation Care Providers     Provider Role Specialty Phone number    Jelly  Davina Moura MD [facility] Internal Medicine [phone]

## 2023-06-12 DIAGNOSIS — Z79.01 LONG TERM CURRENT USE OF ANTICOAGULANT THERAPY: Primary | ICD-10-CM

## 2023-06-12 DIAGNOSIS — I48.91 ATRIAL FIBRILLATION, UNSPECIFIED TYPE (H): ICD-10-CM

## 2023-06-12 DIAGNOSIS — I48.0 PAROXYSMAL ATRIAL FIBRILLATION (H): ICD-10-CM

## 2023-06-12 RX ORDER — WARFARIN SODIUM 5 MG/1
TABLET ORAL
Qty: 90 TABLET | Refills: 0 | Status: SHIPPED | OUTPATIENT
Start: 2023-06-12 | End: 2023-06-26

## 2023-06-12 NOTE — TELEPHONE ENCOUNTER
ANTICOAGULATION MANAGEMENT:  Medication Refill    Anticoagulation Summary  As of 6/12/2023    Warfarin maintenance plan:  5 mg (5 mg x 1) every day   Next INR check:     Target end date:  Indefinite    Indications    Long term current use of anticoagulant therapy [Z79.01]  Paroxysmal atrial fibrillation (H) [I48.0]             Anticoagulation Care Providers     Provider Role Specialty Phone number    Davina Reaves MD Referring Internal Medicine [phone]          Visit with referring provider/group within last year: No, last visit date: 2/17/22    [facility] referral signed last signed: 03/07/2023; within last year: Yes    Mathur does NOT meet all criteria for refill: Visit with referring provider's group was >= 1 year ago. 90 day sandra fill approved; patient notified to schedule with referring provider - has appt scheduled for 6/26/23  per [facility] protocol    Ksenia Faith RN  Anticoagulation Clinic

## 2023-06-15 ENCOUNTER — LAB (OUTPATIENT)
Dept: LAB | Facility: CLINIC | Age: 73
End: 2023-06-15
Payer: COMMERCIAL

## 2023-06-15 ENCOUNTER — TELEPHONE (OUTPATIENT)
Dept: FAMILY MEDICINE | Facility: CLINIC | Age: 73
End: 2023-06-15

## 2023-06-15 ENCOUNTER — ANTICOAGULATION THERAPY VISIT (OUTPATIENT)
Dept: ANTICOAGULATION | Facility: CLINIC | Age: 73
End: 2023-06-15

## 2023-06-15 DIAGNOSIS — I48.0 PAROXYSMAL ATRIAL FIBRILLATION (H): ICD-10-CM

## 2023-06-15 DIAGNOSIS — Z79.01 LONG TERM CURRENT USE OF ANTICOAGULANT THERAPY: Primary | ICD-10-CM

## 2023-06-15 LAB — INR BLD: 3 (ref 0.9–1.1)

## 2023-06-15 PROCEDURE — 85610 PROTHROMBIN TIME: CPT

## 2023-06-15 PROCEDURE — 36415 COLL VENOUS BLD VENIPUNCTURE: CPT

## 2023-06-15 NOTE — PROGRESS NOTES
ANTICOAGULATION MANAGEMENT     Kevan Connelly 72 year old male is on warfarin with therapeutic INR result. (Goal INR 2.0-3.0)    Recent labs: (last 7 days)     06/15/23  0813   INR 3.0*       ASSESSMENT       Source(s): Chart Review and Patient/Caregiver Call       Warfarin doses taken: Warfarin taken as instructed    Diet: Increased greens/vitamin K in diet; ongoing change    Medication/supplement changes: Continues with 1200 mg Tylenol daily    New illness, injury, or hospitalization: No    Signs or symptoms of bleeding or clotting: No    Previous result: Supratherapeutic    Additional findings: None         PLAN     Recommended plan for ongoing change(s) affecting INR     Dosing Instructions: Continue your current warfarin dose with next INR in 3 weeks       Summary  As of 6/15/2023    Full warfarin instructions:  5 mg every day   Next INR check:  7/6/2023             Telephone call with Keron who verbalizes understanding and agrees to plan and ; made aware of patient survey and encouraged to participate.    Lab visit scheduled    Education provided:     Please call back if any changes to your diet, medications or how you've been taking warfarin    Contact [phone]  with any changes, questions or concerns.     Plan made per [facility] anticoagulation protocol    Gretta Faith RN  Anticoagulation Clinic  6/15/2023    _______________________________________________________________________     Anticoagulation Episode Summary     Current INR goal:  2.0-3.0   TTR:  80.6 % (1 y)   Target end date:  Indefinite   Send INR reminders to:  [facility]    Indications    Long term current use of anticoagulant therapy [Z79.01]  Paroxysmal atrial fibrillation (H) [I48.0]           Comments:           Anticoagulation Care Providers     Provider Role Specialty Phone number    Davina Reaves MD Referring Internal Medicine [phone]

## 2023-06-26 ENCOUNTER — OFFICE VISIT (OUTPATIENT)
Dept: FAMILY MEDICINE | Facility: CLINIC | Age: 73
End: 2023-06-26
Payer: COMMERCIAL

## 2023-06-26 VITALS
TEMPERATURE: 98 F | SYSTOLIC BLOOD PRESSURE: 138 MMHG | HEART RATE: 70 BPM | DIASTOLIC BLOOD PRESSURE: 84 MMHG | RESPIRATION RATE: 16 BRPM | WEIGHT: 272 LBS | HEIGHT: 75 IN | BODY MASS INDEX: 33.82 KG/M2 | OXYGEN SATURATION: 97 %

## 2023-06-26 DIAGNOSIS — I77.810 THORACIC AORTIC ECTASIA (H): ICD-10-CM

## 2023-06-26 DIAGNOSIS — Z95.0 CARDIAC PACEMAKER IN SITU: ICD-10-CM

## 2023-06-26 DIAGNOSIS — I25.10 CORONARY ARTERY CALCIFICATION: ICD-10-CM

## 2023-06-26 DIAGNOSIS — I10 BENIGN ESSENTIAL HYPERTENSION: ICD-10-CM

## 2023-06-26 DIAGNOSIS — D12.3 ADENOMATOUS POLYP OF TRANSVERSE COLON: ICD-10-CM

## 2023-06-26 DIAGNOSIS — Z79.01 CHRONIC ANTICOAGULATION: ICD-10-CM

## 2023-06-26 DIAGNOSIS — Z00.00 ENCOUNTER FOR MEDICARE ANNUAL WELLNESS EXAM: Primary | ICD-10-CM

## 2023-06-26 DIAGNOSIS — Z51.81 ENCOUNTER FOR MONITORING DIURETIC THERAPY: ICD-10-CM

## 2023-06-26 DIAGNOSIS — I48.91 ATRIAL FIBRILLATION, UNSPECIFIED TYPE (H): ICD-10-CM

## 2023-06-26 DIAGNOSIS — N18.31 STAGE 3A CHRONIC KIDNEY DISEASE (H): ICD-10-CM

## 2023-06-26 DIAGNOSIS — I50.42 CHRONIC COMBINED SYSTOLIC AND DIASTOLIC CONGESTIVE HEART FAILURE (H): ICD-10-CM

## 2023-06-26 DIAGNOSIS — I48.0 PAROXYSMAL ATRIAL FIBRILLATION (H): ICD-10-CM

## 2023-06-26 DIAGNOSIS — Z79.899 ENCOUNTER FOR MONITORING DIURETIC THERAPY: ICD-10-CM

## 2023-06-26 PROBLEM — E66.01 MORBID OBESITY (H): Status: RESOLVED | Noted: 2019-02-11 | Resolved: 2023-06-26

## 2023-06-26 LAB
ALBUMIN SERPL BCG-MCNC: 4.1 G/DL (ref 3.5–5.2)
ALBUMIN UR-MCNC: NEGATIVE MG/DL
ALP SERPL-CCNC: 51 U/L (ref 40–129)
ALT SERPL W P-5'-P-CCNC: 20 U/L (ref 0–70)
ANION GAP SERPL CALCULATED.3IONS-SCNC: 10 MMOL/L (ref 7–15)
APPEARANCE UR: CLEAR
AST SERPL W P-5'-P-CCNC: 23 U/L (ref 0–45)
BILIRUB SERPL-MCNC: 1.5 MG/DL
BILIRUB UR QL STRIP: NEGATIVE
BUN SERPL-MCNC: 19.2 MG/DL (ref 8–23)
CALCIUM SERPL-MCNC: 9.1 MG/DL (ref 8.8–10.2)
CHLORIDE SERPL-SCNC: 108 MMOL/L (ref 98–107)
CHOLEST SERPL-MCNC: 134 MG/DL
COLOR UR AUTO: YELLOW
CREAT SERPL-MCNC: 1.24 MG/DL (ref 0.67–1.17)
CREAT UR-MCNC: 131 MG/DL
DEPRECATED HCO3 PLAS-SCNC: 22 MMOL/L (ref 22–29)
ERYTHROCYTE [DISTWIDTH] IN BLOOD BY AUTOMATED COUNT: 12.7 % (ref 10–15)
GFR SERPL CREATININE-BSD FRML MDRD: 63 ML/MIN/1.73M2
GLUCOSE SERPL-MCNC: 94 MG/DL (ref 70–99)
GLUCOSE UR STRIP-MCNC: NEGATIVE MG/DL
HCT VFR BLD AUTO: 43.3 % (ref 40–53)
HDLC SERPL-MCNC: 39 MG/DL
HGB BLD-MCNC: 15.6 G/DL (ref 13.3–17.7)
HGB UR QL STRIP: NEGATIVE
KETONES UR STRIP-MCNC: NEGATIVE MG/DL
LDLC SERPL CALC-MCNC: 76 MG/DL
LEUKOCYTE ESTERASE UR QL STRIP: NEGATIVE
MCH RBC QN AUTO: 32.3 PG (ref 26.5–33)
MCHC RBC AUTO-ENTMCNC: 36 G/DL (ref 31.5–36.5)
MCV RBC AUTO: 90 FL (ref 78–100)
MICROALBUMIN UR-MCNC: <12 MG/L
MICROALBUMIN/CREAT UR: NORMAL MG/G{CREAT}
NITRATE UR QL: NEGATIVE
NONHDLC SERPL-MCNC: 95 MG/DL
PH UR STRIP: 7 [PH] (ref 5–7)
PLATELET # BLD AUTO: 141 10E3/UL (ref 150–450)
POTASSIUM SERPL-SCNC: 4.8 MMOL/L (ref 3.4–5.3)
PROT SERPL-MCNC: 6.6 G/DL (ref 6.4–8.3)
RBC # BLD AUTO: 4.83 10E6/UL (ref 4.4–5.9)
SODIUM SERPL-SCNC: 140 MMOL/L (ref 136–145)
SP GR UR STRIP: 1.02 (ref 1–1.03)
TRIGL SERPL-MCNC: 96 MG/DL
TSH SERPL DL<=0.005 MIU/L-ACNC: 1.95 UIU/ML (ref 0.3–4.2)
UROBILINOGEN UR STRIP-ACNC: 1 E.U./DL
WBC # BLD AUTO: 5.2 10E3/UL (ref 4–11)

## 2023-06-26 PROCEDURE — 80061 LIPID PANEL: CPT | Performed by: INTERNAL MEDICINE

## 2023-06-26 PROCEDURE — 81003 URINALYSIS AUTO W/O SCOPE: CPT | Performed by: INTERNAL MEDICINE

## 2023-06-26 PROCEDURE — 99214 OFFICE O/P EST MOD 30 MIN: CPT | Mod: 25 | Performed by: INTERNAL MEDICINE

## 2023-06-26 PROCEDURE — 84443 ASSAY THYROID STIM HORMONE: CPT | Performed by: INTERNAL MEDICINE

## 2023-06-26 PROCEDURE — 85027 COMPLETE CBC AUTOMATED: CPT | Performed by: INTERNAL MEDICINE

## 2023-06-26 PROCEDURE — G0439 PPPS, SUBSEQ VISIT: HCPCS | Performed by: INTERNAL MEDICINE

## 2023-06-26 PROCEDURE — 80053 COMPREHEN METABOLIC PANEL: CPT | Performed by: INTERNAL MEDICINE

## 2023-06-26 PROCEDURE — 36415 COLL VENOUS BLD VENIPUNCTURE: CPT | Performed by: INTERNAL MEDICINE

## 2023-06-26 PROCEDURE — 82043 UR ALBUMIN QUANTITATIVE: CPT | Performed by: INTERNAL MEDICINE

## 2023-06-26 PROCEDURE — 82570 ASSAY OF URINE CREATININE: CPT | Performed by: INTERNAL MEDICINE

## 2023-06-26 RX ORDER — WARFARIN SODIUM 5 MG/1
TABLET ORAL
Qty: 90 TABLET | Refills: 3 | Status: SHIPPED | OUTPATIENT
Start: 2023-06-26 | End: 2024-09-18

## 2023-06-26 RX ORDER — ATORVASTATIN CALCIUM 20 MG/1
20 TABLET, FILM COATED ORAL DAILY
Qty: 90 TABLET | Refills: 3 | Status: SHIPPED | OUTPATIENT
Start: 2023-06-26 | End: 2024-07-03

## 2023-06-26 RX ORDER — LOSARTAN POTASSIUM 100 MG/1
100 TABLET ORAL DAILY
Qty: 90 TABLET | Refills: 3 | Status: SHIPPED | OUTPATIENT
Start: 2023-06-26 | End: 2024-07-23

## 2023-06-26 RX ORDER — CARVEDILOL 12.5 MG/1
12.5 TABLET ORAL 2 TIMES DAILY WITH MEALS
Qty: 180 TABLET | Refills: 3 | Status: SHIPPED | OUTPATIENT
Start: 2023-06-26 | End: 2024-07-23

## 2023-06-26 ASSESSMENT — ENCOUNTER SYMPTOMS
ABDOMINAL PAIN: 0
HEADACHES: 0
NAUSEA: 0
SHORTNESS OF BREATH: 0
ARTHRALGIAS: 1
SORE THROAT: 0
WEAKNESS: 0
HEMATOCHEZIA: 0
DIARRHEA: 0
HEARTBURN: 0
DYSURIA: 0
CONSTIPATION: 0
EYE PAIN: 0
DIZZINESS: 0
FREQUENCY: 0
NERVOUS/ANXIOUS: 0
FEVER: 0
PALPITATIONS: 0
COUGH: 0
CHILLS: 0
MYALGIAS: 0
JOINT SWELLING: 0
PARESTHESIAS: 0
HEMATURIA: 0

## 2023-06-26 ASSESSMENT — PAIN SCALES - GENERAL: PAINLEVEL: MILD PAIN (3)

## 2023-06-26 ASSESSMENT — ACTIVITIES OF DAILY LIVING (ADL): CURRENT_FUNCTION: NO ASSISTANCE NEEDED

## 2023-06-26 NOTE — PATIENT INSTRUCTIONS
Patient Education   Personalized Prevention Plan  You are due for the preventive services outlined below.  Your care team is available to assist you in scheduling these services.  If you have already completed any of these items, please share that information with your care team to update in your medical record.  Health Maintenance Due   Topic Date Due     Heart Failure Action Plan  Never done     COVID-19 Vaccine (1) Never done     Urine Test  Never done     Zoster (Shingles) Vaccine (1 of 2) Never done     AORTIC ANEURYSM SCREENING (SYSTEM ASSIGNED)  Never done     Thyroid Function Lab  01/13/2021     ANNUAL REVIEW OF HM ORDERS  03/31/2022     Basic Metabolic Panel  11/16/2022     Liver Monitoring Lab  02/17/2023     Cholesterol Lab  02/17/2023     Kidney Microalbumin Urine Test  02/25/2023     Complete Blood Count  05/16/2023     Hemoglobin  05/16/2023

## 2023-06-26 NOTE — NURSING NOTE
"Chief Complaint   Patient presents with     Medicare Visit     Blood Draw     Fasting labs.      Forms     INS form.      Medication Refill     Initial BP (!) 152/86 (BP Location: Right arm, Patient Position: Sitting, Cuff Size: Adult Large)   Pulse 70   Temp 98  F (36.7  C) (Oral)   Resp 16   Ht 1.899 m (6' 2.75\")   Wt 123.4 kg (272 lb)   SpO2 97%   BMI 34.23 kg/m   Estimated body mass index is 34.23 kg/m  as calculated from the following:    Height as of this encounter: 1.899 m (6' 2.75\").    Weight as of this encounter: 123.4 kg (272 lb).  BP completed using cuff size large right arm    Lisa Magill, CMA    "

## 2023-06-26 NOTE — PROGRESS NOTES
"Chief Complaint   Patient presents with     Medicare Visit     Blood Draw     Fasting labs.      Forms     INS form.      Medication Refill       SUBJECTIVE:   Keron is a 72 year old who presents for Preventive Visit. Last seen by PCP 2/17/2022 6/26/2023     8:38 AM   Additional Questions   Roomed by Lisa Magill, CMA   Accompanied by self     Forms 6/26/2023   Any forms needing to be completed Yes         6/26/2023     8:38 AM   Patient Reported Additional Medications   Patient reports taking the following new medications NONE     Are you in the first 12 months of your Medicare coverage?  No    Medication Refill  Associated symptoms include arthralgias. Pertinent negatives include no abdominal pain, chest pain, chills, congestion, coughing, fever, headaches, joint swelling, myalgias, nausea, rash, sore throat or weakness.   Healthy Habits:     In general, how would you rate your overall health?  Good    Frequency of exercise:  1 day/week    Duration of exercise:  30-45 minutes    Do you usually eat at least 4 servings of fruit and vegetables a day, include whole grains    & fiber and avoid regularly eating high fat or \"junk\" foods?  No    Taking medications regularly:  Yes    Medication side effects:  None    Ability to successfully perform activities of daily living:  No assistance needed    Home Safety:  No safety concerns identified    Hearing Impairment:  No hearing concerns    In the past 6 months, have you been bothered by leaking of urine?  No    In general, how would you rate your overall mental or emotional health?  Good      PHQ-2 Total Score: 0    Additional concerns today:  No    Have you ever done Advance Care Planning? (For example, a Health Directive, POLST, or a discussion with a medical provider or your loved ones about your wishes): No, advance care planning information given to patient to review.  Advanced care planning was discussed at today's visit.       Fall risk  Fallen 2 or more times " in the past year?: No  Any fall with injury in the past year?: No  click delete button to remove this line now  Cognitive Screening   1) Repeat 3 items (Leader, Season, Table)    2) Clock draw: NORMAL  3) 3 item recall: Recalls 3 objects  Results: 3 items recalled: COGNITIVE IMPAIRMENT LESS LIKELY    Mini-CogTM Copyright RAMÓN Guerin. Licensed by the author for use in [facility]; reprinted with permission (amy@[facility]). All rights reserved.      Do you have sleep apnea, excessive snoring or daytime drowsiness?: no    Reviewed and updated as needed this visit by clinical staff   Tobacco  Allergies  Meds  Problems  Med Hx  Surg Hx  Fam Hx          Reviewed and updated as needed this visit by Provider   Tobacco  Allergies  Meds  Problems  Med Hx  Surg Hx  Fam Hx         Social History     Tobacco Use     Smoking status: Never     Smokeless tobacco: Never   Substance Use Topics     Alcohol use: Yes     Comment: every 1-2 months or less             6/26/2023     8:50 AM   Alcohol Use   Prescreen: >3 drinks/day or >7 drinks/week? No          No data to display              Do you have a current opioid prescription? No  Do you use any other controlled substances or medications that are not prescribed by a provider? None      Current providers sharing in care for this patient include:   Patient Care Team:  Davina Reaves MD as PCP - General (Internal Medicine)  Davina Reaves MD as Assigned PCP  Patrick Hernandez MD as Assigned Surgical Provider  Lamont Hannon MD as Assigned Heart and Vascular Provider    The following health maintenance items are reviewed in Epic and correct as of today:  Health Maintenance   Topic Date Due     HF ACTION PLAN  Never done     COVID-19 Vaccine (1) Never done     URINALYSIS  Never done     ZOSTER IMMUNIZATION (1 of 2) Never done     AORTIC ANEURYSM SCREENING (SYSTEM ASSIGNED)  Never done     TSH W/FREE T4 REFLEX  01/13/2021     ANNUAL  REVIEW OF HM ORDERS  03/31/2022     BMP  11/16/2022     ALT  02/17/2023     LIPID  02/17/2023     MICROALBUMIN  02/25/2023     CBC  05/16/2023     HEMOGLOBIN  05/16/2023     MEDICARE ANNUAL WELLNESS VISIT  06/26/2024     FALL RISK ASSESSMENT  06/26/2024     COLORECTAL CANCER SCREENING  10/27/2025     ADVANCE CARE PLANNING  06/26/2028     DTAP/TDAP/TD IMMUNIZATION (3 - Td or Tdap) 01/22/2029     HEPATITIS C SCREENING  Completed     PHQ-2 (once per calendar year)  Completed     INFLUENZA VACCINE  Completed     Pneumococcal Vaccine: 65+ Years  Completed     IPV IMMUNIZATION  Aged Out     MENINGITIS IMMUNIZATION  Aged Out     Labs reviewed in EPIC  BP Readings from Last 3 Encounters:   06/26/23 (!) 152/86   10/17/22 (!) 145/85   05/17/22 134/87    Wt Readings from Last 3 Encounters:   06/26/23 123.4 kg (272 lb)   10/17/22 124.7 kg (274 lb 14.4 oz)   05/17/22 123.7 kg (272 lb 9.6 oz)                  Patient Active Problem List   Diagnosis     Hx of testicular cancer     Benign essential hypertension     Erectile dysfunction following radiation therapy     Encounter for monitoring diuretic therapy     Colon polyps     Obesity (BMI 35.0-39.9) with comorbidity (H)     Long term current use of anticoagulant therapy     CHF (congestive heart failure) (H)     Paroxysmal atrial fibrillation (H)     Atrial flutter (H)     Chronic kidney disease, stage 3 (moderate)     Long term current use of anticoagulants with INR goal of 2.0-3.0     Thoracic aortic ectasia (H)     Adenomatous polyp of transverse colon     Palpitation     Past Surgical History:   Procedure Laterality Date     ANESTHESIA CARDIOVERSION N/A 6/26/2019    Procedure: ANESTHESIA, FOR CARDIOVERSION DR. LE;  Surgeon: GENERIC ANESTHESIA PROVIDER;  Location:  OR     ANESTHESIA CARDIOVERSION N/A 7/17/2019    Procedure: ANESTHESIA, FOR CARDIOVERSION;  Surgeon: GENERIC ANESTHESIA PROVIDER;  Location:  OR     ANESTHESIA CARDIOVERSION N/A 1/28/2020    Procedure:  ANESTHESIA, FOR CARDIOVERSION;  Surgeon: GENERIC ANESTHESIA PROVIDER;  Location: [facility] OR     ANESTHESIA CARDIOVERSION N/A 9/8/2020    Procedure: ANESTHESIA, FOR CARDIOVERSION;  Surgeon: GENERIC ANESTHESIA PROVIDER;  Location: [facility] OR     ANESTHESIA CARDIOVERSION N/A 11/23/2020    Procedure: ANESTHESIA, FOR CARDIOVERSION;  Surgeon: GENERIC ANESTHESIA PROVIDER;  Location: [facility] OR     ANESTHESIA CARDIOVERSION N/A 1/21/2022    Procedure: ANESTHESIA, FOR CARDIOVERSION;  Surgeon: GENERIC ANESTHESIA PROVIDER;  Location: [facility] OR     ANESTHESIA CARDIOVERSION N/A 2/11/2022    Procedure: ANESTHESIA, FOR CARDIOVERSION (DR. MAO);  Surgeon: GENERIC ANESTHESIA PROVIDER;  Location:  OR     CLOSED REDUCTION SHOULDER Left      EP ABLATION AV NODE N/A 5/16/2022    Procedure: Ablation Atrioventricular Node;  Surgeon: Chadd Gutierrez MD;  Location:  [facility]     EP ABLATION FOCAL AFIB N/A 1/3/2020    Procedure: EP Ablation Focal AFIB;  Surgeon: Lamont Hannon MD;  Location:  [facility] CATH LAB     EP PACEMAKER DEVICE & LEAD IMPLANT- RIGHT VENTRICULAR Left 5/16/2022    Procedure: Pacemaker Device & Lead Implant- Right ventricular;  Surgeon: Chadd Gutierrez MD;  Location:  [facility] CATH LAB     GENITOURINARY SURGERY  1990    testicular cancer     ORTHOPEDIC SURGERY  1970's    right knee surgery        Social History     Tobacco Use     Smoking status: Never     Smokeless tobacco: Never   Substance Use Topics     Alcohol use: Yes     Comment: every 1-2 months or less     Family History   Problem Relation Age of Onset     Colon Cancer Father      Coronary Artery Disease Maternal Grandmother      Prostate Cancer Brother 72         Current Outpatient Medications   Medication Sig Dispense Refill     acetaminophen (TYLENOL) 500 MG tablet Take 500 mg by mouth every 6 hours as needed for mild pain       atorvastatin (LIPITOR) 20 MG tablet Take 1 tablet (20 mg) by mouth daily 90 tablet 1      carvedilol (COREG) 12.5 MG tablet Take 1 tablet (12.5 mg) by mouth 2 times daily (with meals) 180 tablet 1     furosemide (LASIX) 20 MG tablet Take 1 tablet ( 20 mg) every other day (Patient taking differently: Take 20 mg by mouth as needed) 45 tablet 3     losartan (COZAAR) 100 MG tablet Take 1 tablet (100 mg) by mouth daily 90 tablet 3     warfarin ANTICOAGULANT (COUMADIN) 5 MG tablet TAKE 1 TABLET BY MOUTH ONCE DAILY OR  AS  DIRECTED  BY  INR  CLINIC 90 tablet 0     Allergies   Allergen Reactions     Lisinopril Cough     Very persistent cough     Recent Labs   Lab Test 05/16/22  1112 03/30/22  0753 02/17/22  0811 07/19/21  0929 06/21/21  1228 06/18/21  1626 03/31/21  0858 09/08/20  0930 06/01/20  0829 05/19/20  1311 03/24/20  0731 01/28/20  0920 01/13/20  0743 06/05/19  1041 05/30/19  1126   LDL  --   --  79  --   --   --  93  --  75  --  94  --  156*  --   --    HDL  --   --  48  --   --   --  47  --  44  --  43  --  38*  --   --    TRIG  --   --  110  --   --   --  84  --  93  --  119  --  192*  --   --    ALT  --   --  32  --   --   --  28  --   --   --  35  --  23  --   --    CR 1.31* 1.34* 1.25   < > 1.31* 1.42* 1.18  --   --    < >  --   --   --    < > 1.30*   GFRESTIMATED 59* 57* 62   < > 55* 50* 63  --   --    < >  --   --   --    < > 57*   GFRESTBLACK  --   --   --   --  64 58* 73  --   --    < >  --   --   --    < > 66   POTASSIUM 4.2 4.8 4.6   < > 4.9 4.7 4.9   < >  --    < >  --    < >  --    < > 4.6   TSH  --   --   --   --   --   --   --   --   --   --   --   --  2.86  --  1.86    < > = values in this interval not displayed.        Review of Systems   Constitutional: Negative for chills and fever.   HENT: Negative for congestion, ear pain, hearing loss and sore throat.    Eyes: Negative for pain and visual disturbance.   Respiratory: Negative for cough and shortness of breath.    Cardiovascular: Negative for chest pain, palpitations and peripheral edema.   Gastrointestinal: Negative for  "abdominal pain, constipation, diarrhea, heartburn, hematochezia and nausea.   Genitourinary: Positive for impotence. Negative for dysuria, frequency, genital sores, hematuria, penile discharge and urgency.   Musculoskeletal: Positive for arthralgias. Negative for joint swelling and myalgias.   Skin: Negative for rash.   Neurological: Negative for dizziness, weakness, headaches and paresthesias.   Psychiatric/Behavioral: Negative for mood changes. The patient is not nervous/anxious.          OBJECTIVE:   BP (!) 152/86 (BP Location: Right arm, Patient Position: Sitting, Cuff Size: Adult Large)   Pulse 70   Temp 98  F (36.7  C) (Oral)   Resp 16   Ht 1.899 m (6' 2.75\")   Wt 123.4 kg (272 lb)   SpO2 97%   BMI 34.23 kg/m   Estimated body mass index is 34.23 kg/m  as calculated from the following:    Height as of this encounter: 1.899 m (6' 2.75\").    Weight as of this encounter: 123.4 kg (272 lb).  Physical Exam  GENERAL: healthy, alert and no distress  EYES: Eyes grossly normal to inspection  NECK: no adenopathy, no asymmetry, masses, or scars and thyroid normal to palpation  RESP: lungs clear to auscultation - no rales, rhonchi or wheezes  CV: regular rate and rhythm, normal S1 S2, no S3 or S4, no murmur, click or rub, no peripheral edema and peripheral pulses strong  ABDOMEN: soft, nontender, no hepatosplenomegaly, no masses and bowel sounds normal  MS: no gross musculoskeletal defects noted, no edema  NEURO: Normal strength and tone, sensory exam grossly normal, mentation intact, gait normal including heel/toe/tandem walking and Romberg normal  PSYCH: mentation appears normal, affect normal/bright    Diagnostic Test Results:  Labs reviewed in Epic                ASSESSMENT / PLAN:   (Z00.00) Encounter for Medicare annual wellness exam  (primary encounter diagnosis)  Comment: colonoscopy due 2025- 6 mm polyp noted at 2020 colonosocpy; immunizations reviewed; opted for NO COVID; consider Shing[employer]ix.  Plan: "     (I48.0) Paroxysmal atrial fibrillation (H)  Comment: rate controlled - BB, PPM; anticoagulated with Warfarin.  Plan: TSH WITH FREE T4 REFLEX, carvedilol (COREG) 12.5 MG tablet          (N18.31) Stage 3a chronic kidney disease (H)  Comment:   Plan: UA Macroscopic with reflex to Microscopic and         Culture, Albumin Random Urine Quantitative with        Creat Ratio, Hemoglobin, Comprehensive         metabolic panel (BMP + Alb, Alk Phos, ALT, AST,        Total. Bili, TP)          (Z51.81,  Z79.899) Encounter for monitoring diuretic therapy  Comment: He has not needed diuretic past 6 months; improved cardiac function since PPM placed 5/16/2022  Plan: Comprehensive metabolic panel (BMP + Alb, Alk         Phos, ALT, AST, Total. Bili, TP)          (I50.42) Chronic combined systolic and diastolic congestive heart failure (H)  Comment: overall, doing well; he has not used diuretic  Plan: Lipid panel reflex to direct LDL Non-fasting,         CBC with Platelets, Comprehensive metabolic         panel (BMP + Alb, Alk Phos, ALT, AST, Total. Bili, TP)          (I48.91) Atrial fibrillation, unspecified type (H)  Comment: chronic AF- PPM and Warfarin for chronic anticoagulation  Plan: warfarin ANTICOAGULANT (COUMADIN) 5 MG tablet          (I77.810) Thoracic aortic ectasia (H)  Comment: maintain BLOOD PRESSURE meds and lipid meds  Plan:     (Z79.01) Chronic anticoagulation  Comment: warfarin due to chronic AFib  Plan: Warfarin    (D12.3) Adenomatous polyp of transverse colon  Comment: reviewed most recent colonoscopy done 10/27/2020- transverse colon  6mm polyp.  recheck 2025  Plan: due 2025    (Z95.0) Cardiac pacemaker in situ  Comment: 5/16/2023 AV node ablation ; Sick sinus syndrome; Atrial Fibrillation; doing well with PPM  Plan: cardiology monitoring PPM    (I10) Benign essential hypertension  Comment: BLOOD PRESSURE recheck and continue ARB, BB  Plan: losartan (COZAAR) 100 MG tablet, Albumin Random        Urine  "Quantitative with Creat Ratio          (I25.10,  I25.84) Coronary artery calcification  Comment: lipids reviewed; due for labs; meds well tolerated;   Plan: atorvastatin (LIPITOR) 20 MG tablet          Patient has been advised of split billing requirements and indicates understanding: Yes      COUNSELING:  Reviewed preventive health counseling, as reflected in patient instructions       Regular exercise       Healthy diet/nutrition      BMI:   Estimated body mass index is 34.23 kg/m  as calculated from the following:    Height as of this encounter: 1.899 m (6' 2.75\").    Weight as of this encounter: 123.4 kg (272 lb).   Weight management plan: Discussed healthy diet and exercise guidelines      He reports that he has never smoked. He has never used smokeless tobacco.      Appropriate preventive services were discussed with this patient, including applicable screening as appropriate for cardiovascular disease, diabetes, osteopenia/osteoporosis, and glaucoma.  As appropriate for age/gender, discussed screening for colorectal cancer, prostate cancer, breast cancer, and cervical cancer. Checklist reviewing preventive services available has been given to the patient.    Reviewed patients plan of care and provided an AVS. The Complex Care Plan (for patients with higher acuity and needing more deliberate coordination of services) for Kevan meets the Care Plan requirement. This Care Plan has been established and reviewed with the Patient.      Davina Reaves MD  Internal Medicine   [facility] [place]    30 minutes in addition to HEALTH CARE MAINTENANCE are spent with patient, over 50% of that time spent providing counselling, discussing and reviewing medical conditions/concerns, meds and potential side effects.      Identified Health Risks:    I have reviewed Opioid Use Disorder and Substance Use Disorder risk factors and made any needed referrals.     "

## 2023-07-07 ENCOUNTER — ANTICOAGULATION THERAPY VISIT (OUTPATIENT)
Dept: ANTICOAGULATION | Facility: CLINIC | Age: 73
End: 2023-07-07

## 2023-07-07 ENCOUNTER — LAB (OUTPATIENT)
Dept: LAB | Facility: CLINIC | Age: 73
End: 2023-07-07
Payer: COMMERCIAL

## 2023-07-07 DIAGNOSIS — I48.0 PAROXYSMAL ATRIAL FIBRILLATION (H): ICD-10-CM

## 2023-07-07 DIAGNOSIS — Z79.01 LONG TERM CURRENT USE OF ANTICOAGULANT THERAPY: Primary | ICD-10-CM

## 2023-07-07 LAB — INR BLD: 2.8 (ref 0.9–1.1)

## 2023-07-07 PROCEDURE — 36416 COLLJ CAPILLARY BLOOD SPEC: CPT

## 2023-07-07 PROCEDURE — 85610 PROTHROMBIN TIME: CPT

## 2023-07-07 NOTE — PROGRESS NOTES
ANTICOAGULATION MANAGEMENT     Mathur DHRUV Connelly 72 year old male is on warfarin with therapeutic INR result. (Goal INR 2.0-3.0)    Recent labs: (last 7 days)     07/07/23  0747   INR 2.8*       ASSESSMENT     Warfarin Lab Questionnaire    Warfarin Doses Last 7 Days          7/7/2023   Warfarin Lab Questionnaire   Missed doses within past 14 days? No   Changes in diet or alcohol within past 14 days? No   Medication changes since last result? No   Injuries or illness since last result? No   New shortness of breath, severe headaches or sudden changes in vision since last result? No   Abnormal bleeding since last result? No   Upcoming surgery, procedure? No     Previous result: Therapeutic last visit; previously outside of goal range  Additional findings: None       PLAN     Recommended plan for no diet, medication or health factor changes affecting INR     Dosing Instructions: Continue your current warfarin dose with next INR in 4 weeks       Summary  As of 7/7/2023    Full warfarin instructions:  5 mg every day   Next INR check:  8/1/2023             Telephone call with Keron who agrees to plan and repeated back plan correctly    Lab visit scheduled    Education provided:     Please call back if any changes to your diet, medications or how you've been taking warfarin    Plan made per [facility] anticoagulation protocol    Yarelis Martinez RN  Anticoagulation Clinic  7/7/2023    _______________________________________________________________________     Anticoagulation Episode Summary     Current INR goal:  2.0-3.0   TTR:  84.7 % (1 y)   Target end date:  Indefinite   Send INR reminders to:  CONNIE HOBBS    Indications    Long term current use of anticoagulant therapy [Z79.01]  Paroxysmal atrial fibrillation (H) [I48.0]           Comments:           Anticoagulation Care Providers     Provider Role Specialty Phone number    Davina Reaves MD Referring Internal Medicine [phone]

## 2023-07-27 ENCOUNTER — ANCILLARY PROCEDURE (OUTPATIENT)
Dept: CARDIOLOGY | Facility: CLINIC | Age: 73
End: 2023-07-27
Attending: INTERNAL MEDICINE
Payer: COMMERCIAL

## 2023-07-27 DIAGNOSIS — I49.5 SSS (SICK SINUS SYNDROME) (H): ICD-10-CM

## 2023-07-27 DIAGNOSIS — Z95.0 CARDIAC PACEMAKER IN SITU: ICD-10-CM

## 2023-07-27 PROCEDURE — 93294 REM INTERROG EVL PM/LDLS PM: CPT | Performed by: INTERNAL MEDICINE

## 2023-07-27 PROCEDURE — 93296 REM INTERROG EVL PM/IDS: CPT | Performed by: INTERNAL MEDICINE

## 2023-08-01 ENCOUNTER — ANTICOAGULATION THERAPY VISIT (OUTPATIENT)
Dept: ANTICOAGULATION | Facility: CLINIC | Age: 73
End: 2023-08-01

## 2023-08-01 ENCOUNTER — LAB (OUTPATIENT)
Dept: LAB | Facility: CLINIC | Age: 73
End: 2023-08-01
Payer: COMMERCIAL

## 2023-08-01 DIAGNOSIS — I48.0 PAROXYSMAL ATRIAL FIBRILLATION (H): ICD-10-CM

## 2023-08-01 DIAGNOSIS — Z79.01 LONG TERM CURRENT USE OF ANTICOAGULANT THERAPY: Primary | ICD-10-CM

## 2023-08-01 LAB — INR BLD: 2.8 (ref 0.9–1.1)

## 2023-08-01 PROCEDURE — 85610 PROTHROMBIN TIME: CPT

## 2023-08-01 PROCEDURE — 36416 COLLJ CAPILLARY BLOOD SPEC: CPT

## 2023-08-01 NOTE — PROGRESS NOTES
ANTICOAGULATION MANAGEMENT     Mathur DHRUV Connelly 72 year old male is on warfarin with therapeutic INR result. (Goal INR 2.0-3.0)    Recent labs: (last 7 days)     08/01/23  0755   INR 2.8*       ASSESSMENT     Warfarin Lab Questionnaire    Warfarin Doses Last 7 Days-Patient confirmed taking warfarin maintenance dose as listed            8/1/2023   Warfarin Lab Questionnaire   Missed doses within past 14 days? No   Changes in diet or alcohol within past 14 days? No   Medication changes since last result? No   Injuries or illness since last result? No   New shortness of breath, severe headaches or sudden changes in vision since last result? No   Abnormal bleeding since last result? No   Upcoming surgery, procedure? No     Previous result: Therapeutic last 2(+) visits  Additional findings: None       PLAN     Recommended plan for no diet, medication or health factor changes affecting INR     Dosing Instructions: Continue your current warfarin dose with next INR in 5-6 weeks; patient prefers 6 weeks.       Summary  As of 8/1/2023      Full warfarin instructions:  5 mg every day   Next INR check:  9/15/2023               Telephone call with Keron who verbalizes understanding and agrees to plan    Lab visit scheduled    Education provided:   None required    Plan made per [facility] anticoagulation protocol    Ramona Delcid RN  Anticoagulation Clinic  8/1/2023    _______________________________________________________________________     Anticoagulation Episode Summary       Current INR goal:  2.0-3.0   TTR:  90.5 % (1 y)   Target end date:  Indefinite   Send INR reminders to:  CONNIE HOBBS    Indications    Long term current use of anticoagulant therapy [Z79.01]  Paroxysmal atrial fibrillation (H) [I48.0]             Comments:               Anticoagulation Care Providers       Provider Role Specialty Phone number    Davina Reaves MD Referring Internal Medicine [phone]

## 2023-08-09 LAB
MDC_IDC_EPISODE_DTM: NORMAL
MDC_IDC_EPISODE_DURATION: 2 S
MDC_IDC_EPISODE_ID: 2
MDC_IDC_EPISODE_TYPE: NORMAL
MDC_IDC_MSMT_BATTERY_DTM: NORMAL
MDC_IDC_MSMT_BATTERY_REMAINING_LONGEVITY: 142 MO
MDC_IDC_MSMT_BATTERY_RRT_TRIGGER: 2.62
MDC_IDC_MSMT_BATTERY_STATUS: NORMAL
MDC_IDC_MSMT_BATTERY_VOLTAGE: 3.05 V
MDC_IDC_MSMT_LEADCHNL_RV_IMPEDANCE_VALUE: 323 OHM
MDC_IDC_MSMT_LEADCHNL_RV_IMPEDANCE_VALUE: 418 OHM
MDC_IDC_MSMT_LEADCHNL_RV_PACING_THRESHOLD_AMPLITUDE: 0.62 V
MDC_IDC_MSMT_LEADCHNL_RV_PACING_THRESHOLD_PULSEWIDTH: 0.4 MS
MDC_IDC_MSMT_LEADCHNL_RV_SENSING_INTR_AMPL: 11.38 MV
MDC_IDC_MSMT_LEADCHNL_RV_SENSING_INTR_AMPL: 11.38 MV
MDC_IDC_PG_IMPLANT_DTM: NORMAL
MDC_IDC_PG_MFG: NORMAL
MDC_IDC_PG_MODEL: NORMAL
MDC_IDC_PG_SERIAL: NORMAL
MDC_IDC_PG_TYPE: NORMAL
MDC_IDC_SESS_CLINIC_NAME: NORMAL
MDC_IDC_SESS_DTM: NORMAL
MDC_IDC_SESS_TYPE: NORMAL
MDC_IDC_SET_BRADY_HYSTRATE: NORMAL
MDC_IDC_SET_BRADY_LOWRATE: 70 {BEATS}/MIN
MDC_IDC_SET_BRADY_MAX_SENSOR_RATE: 130 {BEATS}/MIN
MDC_IDC_SET_BRADY_MODE: NORMAL
MDC_IDC_SET_LEADCHNL_RV_PACING_AMPLITUDE: 2 V
MDC_IDC_SET_LEADCHNL_RV_PACING_ANODE_ELECTRODE_1: NORMAL
MDC_IDC_SET_LEADCHNL_RV_PACING_ANODE_LOCATION_1: NORMAL
MDC_IDC_SET_LEADCHNL_RV_PACING_CAPTURE_MODE: NORMAL
MDC_IDC_SET_LEADCHNL_RV_PACING_CATHODE_ELECTRODE_1: NORMAL
MDC_IDC_SET_LEADCHNL_RV_PACING_CATHODE_LOCATION_1: NORMAL
MDC_IDC_SET_LEADCHNL_RV_PACING_POLARITY: NORMAL
MDC_IDC_SET_LEADCHNL_RV_PACING_PULSEWIDTH: 0.4 MS
MDC_IDC_SET_LEADCHNL_RV_SENSING_ANODE_ELECTRODE_1: NORMAL
MDC_IDC_SET_LEADCHNL_RV_SENSING_ANODE_LOCATION_1: NORMAL
MDC_IDC_SET_LEADCHNL_RV_SENSING_CATHODE_ELECTRODE_1: NORMAL
MDC_IDC_SET_LEADCHNL_RV_SENSING_CATHODE_LOCATION_1: NORMAL
MDC_IDC_SET_LEADCHNL_RV_SENSING_POLARITY: NORMAL
MDC_IDC_SET_LEADCHNL_RV_SENSING_SENSITIVITY: 0.9 MV
MDC_IDC_SET_ZONE_DETECTION_INTERVAL: 360 MS
MDC_IDC_SET_ZONE_TYPE: NORMAL
MDC_IDC_STAT_BRADY_DTM_END: NORMAL
MDC_IDC_STAT_BRADY_DTM_START: NORMAL
MDC_IDC_STAT_BRADY_RV_PERCENT_PACED: 99.87 %
MDC_IDC_STAT_EPISODE_RECENT_COUNT: 0
MDC_IDC_STAT_EPISODE_RECENT_COUNT: 0
MDC_IDC_STAT_EPISODE_RECENT_COUNT: 1
MDC_IDC_STAT_EPISODE_RECENT_COUNT_DTM_END: NORMAL
MDC_IDC_STAT_EPISODE_RECENT_COUNT_DTM_START: NORMAL
MDC_IDC_STAT_EPISODE_TOTAL_COUNT: 0
MDC_IDC_STAT_EPISODE_TOTAL_COUNT: 0
MDC_IDC_STAT_EPISODE_TOTAL_COUNT: 2
MDC_IDC_STAT_EPISODE_TOTAL_COUNT_DTM_END: NORMAL
MDC_IDC_STAT_EPISODE_TOTAL_COUNT_DTM_START: NORMAL
MDC_IDC_STAT_EPISODE_TYPE: NORMAL

## 2023-08-14 ENCOUNTER — MYC MEDICAL ADVICE (OUTPATIENT)
Dept: FAMILY MEDICINE | Facility: CLINIC | Age: 73
End: 2023-08-14
Payer: COMMERCIAL

## 2023-08-14 ENCOUNTER — TELEPHONE (OUTPATIENT)
Dept: FAMILY MEDICINE | Facility: CLINIC | Age: 73
End: 2023-08-14
Payer: COMMERCIAL

## 2023-08-14 NOTE — TELEPHONE ENCOUNTER
Would advise pt submit an e-visit or some type of visit as provider will have to review chart, medications, labs, etc.      Tried to call, but receive a busy signal.    Will forward to the station, please advise the pt to submit an e-visit or schedule some kind of visit.

## 2023-08-14 NOTE — TELEPHONE ENCOUNTER
Keron, would like to know if he can take Creatine 5 mg supplement and would it have any effect on kidney function? Please call Keron at [phone] and can leave a message or use [facility] [employer] [facility].     Qing

## 2023-08-15 NOTE — TELEPHONE ENCOUNTER
Patient returned call.    Reviewed provider's response to question. Patient verbalized understanding and denies additional questions at this time.    Yarelis JON RN, BSN, PHN

## 2023-08-15 NOTE — TELEPHONE ENCOUNTER
Called and left a message to call us back.  See also mychart of 8/14/23.  Will advise in that note.

## 2023-09-07 NOTE — PATIENT INSTRUCTIONS
If you have problems or questions please call the RNs (Ashlee Tran, & Darya) at [phone]        
Bulb Tadeo-Pharynx Suction with additional procedures

## 2023-09-15 ENCOUNTER — LAB (OUTPATIENT)
Dept: LAB | Facility: CLINIC | Age: 73
End: 2023-09-15
Payer: COMMERCIAL

## 2023-09-15 ENCOUNTER — ALLIED HEALTH/NURSE VISIT (OUTPATIENT)
Dept: FAMILY MEDICINE | Facility: CLINIC | Age: 73
End: 2023-09-15

## 2023-09-15 ENCOUNTER — ANTICOAGULATION THERAPY VISIT (OUTPATIENT)
Dept: ANTICOAGULATION | Facility: CLINIC | Age: 73
End: 2023-09-15

## 2023-09-15 VITALS — DIASTOLIC BLOOD PRESSURE: 78 MMHG | SYSTOLIC BLOOD PRESSURE: 124 MMHG

## 2023-09-15 DIAGNOSIS — I48.0 PAROXYSMAL ATRIAL FIBRILLATION (H): ICD-10-CM

## 2023-09-15 DIAGNOSIS — Z79.01 LONG TERM CURRENT USE OF ANTICOAGULANT THERAPY: Primary | ICD-10-CM

## 2023-09-15 DIAGNOSIS — Z01.30 BP CHECK: Primary | ICD-10-CM

## 2023-09-15 LAB — INR BLD: 2.4 (ref 0.9–1.1)

## 2023-09-15 PROCEDURE — 99207 PR NO CHARGE NURSE ONLY: CPT | Performed by: INTERNAL MEDICINE

## 2023-09-15 PROCEDURE — 85610 PROTHROMBIN TIME: CPT

## 2023-09-15 PROCEDURE — 36416 COLLJ CAPILLARY BLOOD SPEC: CPT

## 2023-09-15 NOTE — PROGRESS NOTES
Kevan BARTLETT Godwin was evaluated at [facility] on September 15, 2023 at which time his blood pressure was:    BP Readings from Last 1 Encounters:   09/15/23 124/78     No data recorded      Reviewed lifestyle modifications for blood pressure control and reduction: including making healthy food choices, managing weight, getting regular exercise, smoking cessation, reducing alcohol consumption, monitoring blood pressure regularly.     Symptoms: None    BP Goal:< 140/90 mmHg    BP Assessment:  BP at goal    Potential Reasons for BP too high: NA - Not applicable    BP Follow-Up Plan: Recheck BP in 6 months at pharmacy    Recommendation to Provider: none    Note completed by: Rickey Cardenas RPH     Thank You   Axel Hinds  [facility]

## 2023-09-15 NOTE — PROGRESS NOTES
ANTICOAGULATION MANAGEMENT     Kevan Connelly 72 year old male is on warfarin with therapeutic INR result. (Goal INR 2.0-3.0)    Recent labs: (last 7 days)     09/15/23  0756   INR 2.4*       ASSESSMENT     Warfarin Lab Questionnaire    Warfarin Doses Last 7 Days      9/15/2023     7:53 AM   Dose in Tablet or Mg   TAB or MG? milligram (mg)     Warfarin dose confirmed with patient and taken as directed          9/15/2023   Warfarin Lab Questionnaire   Missed doses within past 14 days? No   Changes in diet or alcohol within past 14 days? No.  Has been trying to loose weight so has been eating more protein and taking a protein supplement for 28 days. He ran out of the protein supplement so is not on that now, but plans to restart again. Has been eating more nuria lettuce as well, but overall eating about the same amount of vitamin K foods as he previously was.   Medication changes since last result? No   Injuries or illness since last result? No   New shortness of breath, severe headaches or sudden changes in vision since last result? No   Abnormal bleeding since last result? No   Upcoming surgery, procedure? No     Previous result: Therapeutic last 2(+) visits  Additional findings: Patient has lost 4 lbs within the last 28 days.  Plans to try to add in more exercise soon to help with weight loss.       PLAN     Recommended plan for ongoing change(s) affecting INR.  Recommended closer follow up due to diet and activity changes that he plans to make.    Dosing Instructions: Continue your current warfarin dose with next INR in 3 weeks       Summary  As of 9/15/2023      Full warfarin instructions:  5 mg every day   Next INR check:  10/6/2023               Telephone call with Keron who agrees to plan and repeated back plan correctly    Lab visit scheduled    Education provided:   Please call back if any changes to your diet, medications or how you've been taking warfarin  Goal range and lab monitoring: goal range and  significance of current result  Dietary considerations: importance of consistent vitamin K intake, impact of vitamin K foods on INR, vitamin K content of foods, and Impact of protein intake on INR   Healthy lifestyle considerations: impact of exercise/activity level on INR    Plan made per [facility] anticoagulation protocol    Yarelis Martinez RN  Anticoagulation Clinic  9/15/2023    _______________________________________________________________________     Anticoagulation Episode Summary       Current INR goal:  2.0-3.0   TTR:  93.7 % (1 y)   Target end date:  Indefinite   Send INR reminders to:  [facility]    Indications    Long term current use of anticoagulant therapy [Z79.01]  Paroxysmal atrial fibrillation (H) [I48.0]             Comments:               Anticoagulation Care Providers       Provider Role Specialty Phone number    Davina Reaves MD Referring Internal Medicine [phone]

## 2023-10-09 ENCOUNTER — LAB (OUTPATIENT)
Dept: LAB | Facility: CLINIC | Age: 73
End: 2023-10-09
Payer: COMMERCIAL

## 2023-10-09 ENCOUNTER — ANTICOAGULATION THERAPY VISIT (OUTPATIENT)
Dept: ANTICOAGULATION | Facility: CLINIC | Age: 73
End: 2023-10-09

## 2023-10-09 DIAGNOSIS — I48.0 PAROXYSMAL ATRIAL FIBRILLATION (H): ICD-10-CM

## 2023-10-09 DIAGNOSIS — Z79.01 LONG TERM CURRENT USE OF ANTICOAGULANT THERAPY: Primary | ICD-10-CM

## 2023-10-09 LAB — INR BLD: 3.5 (ref 0.9–1.1)

## 2023-10-09 PROCEDURE — 36415 COLL VENOUS BLD VENIPUNCTURE: CPT

## 2023-10-09 PROCEDURE — 85610 PROTHROMBIN TIME: CPT

## 2023-10-09 NOTE — PROGRESS NOTES
ANTICOAGULATION MANAGEMENT     Kevan Connelly 73 year old male is on warfarin with supratherapeutic INR result. (Goal INR 2.0-3.0)    Recent labs: (last 7 days)     10/09/23  0901   INR 3.5*       ASSESSMENT     Source(s): Chart Review and Patient/Caregiver Call     Warfarin doses taken: Warfarin taken as instructed  Diet:  Ongoing diet changes. VitaminK remains stable, but he is continuing to eat mostly protein and high fiber - oatmeal in the morning, adding in more olive oil - working on weight loss . Never did add back in the protein supplement, as he was thinking of doing at last INR visit  Medication/supplement changes: None noted  New illness, injury, or hospitalization: No  Signs or symptoms of bleeding or clotting: No  Previous result: Therapeutic last 2(+) visits  Additional findings: None       PLAN     Recommended plan for ongoing change(s) affecting INR     Dosing Instructions: decrease your warfarin dose (7% change) with next INR in 2 weeks       Summary  As of 10/9/2023      Full warfarin instructions:  2.5 mg every Tue; 5 mg all other days   Next INR check:  10/24/2023               Telephone call with Keron who verbalizes understanding and agrees to plan    Lab visit scheduled    Education provided:   Please call back if any changes to your diet, medications or how you've been taking warfarin  Dietary considerations: Impact of protein intake on INR  and importance of notifying [facility] to changes in diet  Contact [phone]  with any changes, questions or concerns.     Plan made per [facility] anticoagulation protocol    Kaylee Ramey RN  Anticoagulation Clinic  10/9/2023    _______________________________________________________________________     Anticoagulation Episode Summary       Current INR goal:  2.0-3.0   TTR:  90.7 % (1 y)   Target end date:  Indefinite   Send INR reminders to:  [facility]BS    Indications    Long term current use of anticoagulant therapy [Z79.01]  Paroxysmal atrial  fibrillation (H) [I48.0]             Comments:               Anticoagulation Care Providers       Provider Role Specialty Phone number    Davina Reaves MD Referring Internal Medicine [phone]

## 2023-10-11 ENCOUNTER — ANCILLARY PROCEDURE (OUTPATIENT)
Dept: CARDIOLOGY | Facility: CLINIC | Age: 73
End: 2023-10-11
Attending: INTERNAL MEDICINE
Payer: COMMERCIAL

## 2023-10-11 DIAGNOSIS — Z95.0 CARDIAC PACEMAKER IN SITU: Primary | ICD-10-CM

## 2023-10-11 DIAGNOSIS — Z95.0 CARDIAC PACEMAKER IN SITU: ICD-10-CM

## 2023-10-11 LAB
MDC_IDC_MSMT_BATTERY_DTM: NORMAL
MDC_IDC_MSMT_BATTERY_REMAINING_LONGEVITY: 141 MO
MDC_IDC_MSMT_BATTERY_RRT_TRIGGER: 2.62
MDC_IDC_MSMT_BATTERY_STATUS: NORMAL
MDC_IDC_MSMT_BATTERY_VOLTAGE: 3.04 V
MDC_IDC_MSMT_LEADCHNL_RV_IMPEDANCE_VALUE: 361 OHM
MDC_IDC_MSMT_LEADCHNL_RV_IMPEDANCE_VALUE: 437 OHM
MDC_IDC_MSMT_LEADCHNL_RV_PACING_THRESHOLD_AMPLITUDE: 0.62 V
MDC_IDC_MSMT_LEADCHNL_RV_PACING_THRESHOLD_PULSEWIDTH: 0.4 MS
MDC_IDC_MSMT_LEADCHNL_RV_SENSING_INTR_AMPL: 11.38 MV
MDC_IDC_MSMT_LEADCHNL_RV_SENSING_INTR_AMPL: 11.38 MV
MDC_IDC_PG_IMPLANT_DTM: NORMAL
MDC_IDC_PG_MFG: NORMAL
MDC_IDC_PG_MODEL: NORMAL
MDC_IDC_PG_SERIAL: NORMAL
MDC_IDC_PG_TYPE: NORMAL
MDC_IDC_SESS_CLINIC_NAME: NORMAL
MDC_IDC_SESS_DTM: NORMAL
MDC_IDC_SESS_TYPE: NORMAL
MDC_IDC_SET_BRADY_HYSTRATE: NORMAL
MDC_IDC_SET_BRADY_LOWRATE: 70 {BEATS}/MIN
MDC_IDC_SET_BRADY_MAX_SENSOR_RATE: 130 {BEATS}/MIN
MDC_IDC_SET_BRADY_MODE: NORMAL
MDC_IDC_SET_LEADCHNL_RV_PACING_AMPLITUDE: 2 V
MDC_IDC_SET_LEADCHNL_RV_PACING_ANODE_ELECTRODE_1: NORMAL
MDC_IDC_SET_LEADCHNL_RV_PACING_ANODE_LOCATION_1: NORMAL
MDC_IDC_SET_LEADCHNL_RV_PACING_CAPTURE_MODE: NORMAL
MDC_IDC_SET_LEADCHNL_RV_PACING_CATHODE_ELECTRODE_1: NORMAL
MDC_IDC_SET_LEADCHNL_RV_PACING_CATHODE_LOCATION_1: NORMAL
MDC_IDC_SET_LEADCHNL_RV_PACING_POLARITY: NORMAL
MDC_IDC_SET_LEADCHNL_RV_PACING_PULSEWIDTH: 0.4 MS
MDC_IDC_SET_LEADCHNL_RV_SENSING_ANODE_ELECTRODE_1: NORMAL
MDC_IDC_SET_LEADCHNL_RV_SENSING_ANODE_LOCATION_1: NORMAL
MDC_IDC_SET_LEADCHNL_RV_SENSING_CATHODE_ELECTRODE_1: NORMAL
MDC_IDC_SET_LEADCHNL_RV_SENSING_CATHODE_LOCATION_1: NORMAL
MDC_IDC_SET_LEADCHNL_RV_SENSING_POLARITY: NORMAL
MDC_IDC_SET_LEADCHNL_RV_SENSING_SENSITIVITY: 0.9 MV
MDC_IDC_SET_ZONE_DETECTION_INTERVAL: 360 MS
MDC_IDC_SET_ZONE_STATUS: NORMAL
MDC_IDC_SET_ZONE_TYPE: NORMAL
MDC_IDC_SET_ZONE_VENDOR_TYPE: NORMAL
MDC_IDC_STAT_BRADY_DTM_END: NORMAL
MDC_IDC_STAT_BRADY_DTM_START: NORMAL
MDC_IDC_STAT_BRADY_RV_PERCENT_PACED: 99.65 %
MDC_IDC_STAT_EPISODE_RECENT_COUNT: 0
MDC_IDC_STAT_EPISODE_RECENT_COUNT: 0
MDC_IDC_STAT_EPISODE_RECENT_COUNT: 1
MDC_IDC_STAT_EPISODE_RECENT_COUNT_DTM_END: NORMAL
MDC_IDC_STAT_EPISODE_RECENT_COUNT_DTM_START: NORMAL
MDC_IDC_STAT_EPISODE_TOTAL_COUNT: 0
MDC_IDC_STAT_EPISODE_TOTAL_COUNT: 0
MDC_IDC_STAT_EPISODE_TOTAL_COUNT: 2
MDC_IDC_STAT_EPISODE_TOTAL_COUNT_DTM_END: NORMAL
MDC_IDC_STAT_EPISODE_TOTAL_COUNT_DTM_START: NORMAL
MDC_IDC_STAT_EPISODE_TYPE: NORMAL

## 2023-10-11 PROCEDURE — 93279 PRGRMG DEV EVAL PM/LDLS PM: CPT | Performed by: INTERNAL MEDICINE

## 2023-10-18 ENCOUNTER — OFFICE VISIT (OUTPATIENT)
Dept: CARDIOLOGY | Facility: CLINIC | Age: 73
End: 2023-10-18
Attending: NURSE PRACTITIONER
Payer: COMMERCIAL

## 2023-10-18 ENCOUNTER — LAB (OUTPATIENT)
Dept: LAB | Facility: CLINIC | Age: 73
End: 2023-10-18
Payer: COMMERCIAL

## 2023-10-18 VITALS
SYSTOLIC BLOOD PRESSURE: 134 MMHG | HEIGHT: 75 IN | DIASTOLIC BLOOD PRESSURE: 80 MMHG | HEART RATE: 81 BPM | BODY MASS INDEX: 32.58 KG/M2 | WEIGHT: 262 LBS

## 2023-10-18 DIAGNOSIS — I48.20 CHRONIC ATRIAL FIBRILLATION (H): Primary | ICD-10-CM

## 2023-10-18 DIAGNOSIS — I48.0 PAROXYSMAL ATRIAL FIBRILLATION (H): ICD-10-CM

## 2023-10-18 DIAGNOSIS — I10 BENIGN ESSENTIAL HYPERTENSION: ICD-10-CM

## 2023-10-18 LAB
ERYTHROCYTE [DISTWIDTH] IN BLOOD BY AUTOMATED COUNT: 12.7 % (ref 10–15)
HCT VFR BLD AUTO: 42.2 % (ref 40–53)
HGB BLD-MCNC: 15.1 G/DL (ref 13.3–17.7)
MCH RBC QN AUTO: 32.7 PG (ref 26.5–33)
MCHC RBC AUTO-ENTMCNC: 35.8 G/DL (ref 31.5–36.5)
MCV RBC AUTO: 91 FL (ref 78–100)
PLATELET # BLD AUTO: 173 10E3/UL (ref 150–450)
RBC # BLD AUTO: 4.62 10E6/UL (ref 4.4–5.9)
WBC # BLD AUTO: 6.5 10E3/UL (ref 4–11)

## 2023-10-18 PROCEDURE — 99214 OFFICE O/P EST MOD 30 MIN: CPT | Performed by: PHYSICIAN ASSISTANT

## 2023-10-18 PROCEDURE — 85027 COMPLETE CBC AUTOMATED: CPT | Performed by: PHYSICIAN ASSISTANT

## 2023-10-18 PROCEDURE — 36415 COLL VENOUS BLD VENIPUNCTURE: CPT | Performed by: PHYSICIAN ASSISTANT

## 2023-10-18 NOTE — LETTER
10/18/2023    Davina Reaves MD  [address]  [facility] [address]    RE: Kevan Connelly       Dear Colleague,     I had the pleasure of seeing Kevan Connelly in the [facility].    Electrophysiology Clinic Progress Note    Kevan Connelly MRN# [id number]   YOB: 1950 Age: 73 year old     Primary cardiology team: [facility] Dr. Hannon         Assessment and Plan     In summary, Kevan Connelly presents today for a routine annual follow up visit regarding CAF s/p AVNA + PPM placement in 2022.    CAF, s/p AVNA + PPM.  Anticoagulated with warfarin.  Normal device fn.    HTN.  Controlled.     CAC per CT.   On statin with LDL in the 70's per recent panel.  Denies angina.    Mild thrombocytopenia on 6/2023 labs.    Plan:  He wonders if he can get off any of his meds. We reviewed these in detail today. I encouraged him to continue the current regimen.   Repeat CBC today.  Quarterly device checks.    Follow-up:  In one year with a CBC/BMP prior.    It was a pleasure meeting Keron today!    [facility]         History of Presenting Illness     Kevan Connelly is a pleasant 73 year old patient with a pertinent history of the following -   HTN  HLP  Persistent AF, s/p ablation (PVI + posterior wall) in 2020, with development of atypical PAFL end of procedure, placed on flecainide. Ultimately underwent AVNA + PPM placement 5/2022.  NSVT noted on device interrogations. EF preserved (2020 TTE).  Previous tachy-mediated cardiomyopathy that resolved.   Coronary calcification is noted in the left anterior descending artery, circumflex artery and the right coronary artery, per CT 2019. Controlled lipid panel.     Today, I am meeting Keron who is very pleasant. Patient denies chest pain, shortness of breath, PND, orthopnea, edema, claudication, palpitations, near syncope or syncope. No issues with bruising/bleeding. He has been trying to change his  "diet for the better. Does not routinely exercise but is trying to get back into it. Is able to stationary bike for 30 minutes or so without issue.  Device interrogation last week shows 99.7% , pacing/threshold/impedence WNL, no arrhythmias.  Labs in June showed a stable creat of 1.24, normal H/H but slightly low platelet ct at 141.  INR's for the most part have been in goal range, last one slightly high, re-checking next week.         Review of Systems     12-pt ROS is negative except for as noted in the HPI.          Physical Exam     Vitals: /80   Pulse 81   Ht 1.905 m (6' 3\")   Wt 118.8 kg (262 lb)   BMI 32.75 kg/m    Wt Readings from Last 10 Encounters:   10/18/23 118.8 kg (262 lb)   06/26/23 123.4 kg (272 lb)   10/17/22 124.7 kg (274 lb 14.4 oz)   05/17/22 123.7 kg (272 lb 9.6 oz)   05/05/22 127.9 kg (282 lb)   04/13/22 126.6 kg (279 lb)   03/02/22 127 kg (280 lb)   02/17/22 128.2 kg (282 lb 11.2 oz)   02/14/22 129.3 kg (285 lb)   02/11/22 128.1 kg (282 lb 8 oz)       Constitutional:  Patient is pleasant, alert, cooperative, and in NAD.  HEENT:  NCAT. PERRLA. EOM's intact.   Neck:  CVP appears normal. No carotid bruits.   Pulmonary: Normal respiratory effort. CTAB.   Cardiac: RRR, normal S1/S2, no S3/S4, no murmur or rub.   Abdomen:  Non-tender abdomen, no hepatosplenomegaly appreciated.   Vascular: Pulses in the upper and lower extremities are 2+ and equal bilaterally.  Extremities: No edema, erythema, cyanosis or tenderness appreciated.  Skin:  No rashes or lesions appreciated.   Neurological:  No gross motor or sensory deficits.   Psych: Appropriate affect.          Data   Labs reviewed:  Recent Labs   Lab Test 06/26/23  1000 02/17/22  0811 03/31/21  0858 03/24/20  0731 01/13/20  0743 06/05/19  1041 05/30/19  1126   LDL 76 79 93   < > 156*  --   --    HDL 39* 48 47   < > 38*  --   --    NHDL 95 101 110   < > 194*  --   --    CHOL 134 149 157   < > 232*  --   --    TRIG 96 110 84   < > 192*  --   " "--    TSH 1.95  --   --   --  2.86  --  1.86   NTBNP  --   --   --   --   --  4,674* 3,787*    < > = values in this interval not displayed.       Lab Results   Component Value Date    WBC 5.2 06/26/2023    WBC 5.7 06/21/2021    RBC 4.83 06/26/2023    RBC 5.09 06/21/2021    HGB 15.6 06/26/2023    HGB 16.4 06/21/2021    HCT 43.3 06/26/2023    HCT 47.2 06/21/2021    MCV 90 06/26/2023    MCV 93 06/21/2021    MCH 32.3 06/26/2023    MCH 32.2 06/21/2021    MCHC 36.0 06/26/2023    MCHC 34.7 06/21/2021    RDW 12.7 06/26/2023    RDW 12.2 06/21/2021     (L) 06/26/2023     06/21/2021       Lab Results   Component Value Date     06/26/2023     06/21/2021    POTASSIUM 4.8 06/26/2023    POTASSIUM 4.2 05/16/2022    POTASSIUM 4.9 06/21/2021    CHLORIDE 108 (H) 06/26/2023    CHLORIDE 108 05/16/2022    CHLORIDE 108 06/21/2021    CO2 22 06/26/2023    CO2 21 05/16/2022    CO2 27 06/21/2021    ANIONGAP 10 06/26/2023    ANIONGAP 8 05/16/2022    ANIONGAP 3 06/21/2021    GLC 94 06/26/2023     (H) 05/16/2022    GLC 94 06/21/2021    BUN 19.2 06/26/2023    BUN 21 05/16/2022    BUN 23 06/21/2021    CR 1.24 (H) 06/26/2023    CR 1.31 (H) 06/21/2021    GFRESTIMATED 63 06/26/2023    GFRESTIMATED 55 (L) 06/21/2021    GFRESTBLACK 64 06/21/2021    CHU 9.1 06/26/2023    CHU 9.2 06/21/2021      Lab Results   Component Value Date    AST 23 06/26/2023    AST 14 03/31/2021    ALT 20 06/26/2023    ALT 28 03/31/2021       No results found for: \"A1C\"    Lab Results   Component Value Date    INR 3.5 (H) 10/09/2023    INR 2.4 (H) 09/15/2023    INR 2.48 (H) 01/21/2022    INR 2.18 (H) 01/12/2022    INR 2.60 (H) 07/06/2021    INR 1.88 (H) 06/21/2021            Problem List     Patient Active Problem List   Diagnosis    Hx of testicular cancer    Benign essential hypertension    Erectile dysfunction following radiation therapy    Encounter for monitoring diuretic therapy    Colon polyps    Long term current use of anticoagulant " therapy    CHF (congestive heart failure) (H)    Paroxysmal atrial fibrillation (H)    Atrial flutter (H)    Chronic kidney disease, stage 3 (moderate)    Long term current use of anticoagulants with INR goal of 2.0-3.0    Thoracic aortic ectasia (H24)    Adenomatous polyp of transverse colon    Palpitation    Cardiac pacemaker in situ            Medications     Current Outpatient Medications   Medication Sig Dispense Refill    acetaminophen (TYLENOL) 500 MG tablet Take 500 mg by mouth every 6 hours as needed for mild pain      atorvastatin (LIPITOR) 20 MG tablet Take 1 tablet (20 mg) by mouth daily 90 tablet 3    carvedilol (COREG) 12.5 MG tablet Take 1 tablet (12.5 mg) by mouth 2 times daily (with meals) 180 tablet 3    furosemide (LASIX) 20 MG tablet Take 1 tablet ( 20 mg) every other day (Patient taking differently: Take 20 mg by mouth as needed) 45 tablet 3    losartan (COZAAR) 100 MG tablet Take 1 tablet (100 mg) by mouth daily 90 tablet 3    warfarin ANTICOAGULANT (COUMADIN) 5 MG tablet TAKE 1 TABLET BY MOUTH ONCE DAILY OR  AS  DIRECTED  BY  INR  CLINIC 90 tablet 3            Past Medical History     Past Medical History:   Diagnosis Date    Atrial flutter (H)     Benign essential hypertension 1/18/2017    past use of ACE- Cough;  Had been on ARB-diuretic but experienced lower bp readings;  BLOOD PRESSURE now well controlled on ARB also no longer on diuretic; no low bp readings; may have had slight edema initially but has normalized with bp well controlled on ARB alone.     Cancer (H)     testicular cancer    CHF (congestive heart failure) (H) 6/5/2019    Long term current use of anticoagulant therapy 6/3/2019    Obesity (BMI 35.0-39.9) with comorbidity (H) 2/11/2019    Paroxysmal atrial fibrillation (H) 06/03/2019     Past Surgical History:   Procedure Laterality Date    ANESTHESIA CARDIOVERSION N/A 6/26/2019    Procedure: ANESTHESIA, FOR CARDIOVERSION DR. LE;  Surgeon: GENERIC ANESTHESIA PROVIDER;   Location: [facility] OR    ANESTHESIA CARDIOVERSION N/A 7/17/2019    Procedure: ANESTHESIA, FOR CARDIOVERSION;  Surgeon: GENERIC ANESTHESIA PROVIDER;  Location: [facility] OR    ANESTHESIA CARDIOVERSION N/A 1/28/2020    Procedure: ANESTHESIA, FOR CARDIOVERSION;  Surgeon: GENERIC ANESTHESIA PROVIDER;  Location: [facility] OR    ANESTHESIA CARDIOVERSION N/A 9/8/2020    Procedure: ANESTHESIA, FOR CARDIOVERSION;  Surgeon: GENERIC ANESTHESIA PROVIDER;  Location: [facility] OR    ANESTHESIA CARDIOVERSION N/A 11/23/2020    Procedure: ANESTHESIA, FOR CARDIOVERSION;  Surgeon: GENERIC ANESTHESIA PROVIDER;  Location: [facility] OR    ANESTHESIA CARDIOVERSION N/A 1/21/2022    Procedure: ANESTHESIA, FOR CARDIOVERSION;  Surgeon: GENERIC ANESTHESIA PROVIDER;  Location: [facility] OR    ANESTHESIA CARDIOVERSION N/A 2/11/2022    Procedure: ANESTHESIA, FOR CARDIOVERSION (DR. MAO);  Surgeon: GENERIC ANESTHESIA PROVIDER;  Location:  OR    CLOSED REDUCTION SHOULDER Left     EP ABLATION AV NODE N/A 5/16/2022    Procedure: Ablation Atrioventricular Node;  Surgeon: Chadd Gutierrez MD;  Location:  HEART CARDIAC CATH LAB    EP ABLATION FOCAL AFIB N/A 1/3/2020    Procedure: EP Ablation Focal AFIB;  Surgeon: Lamont Hannon MD;  Location:  HEART CARDIAC CATH LAB    EP PACEMAKER DEVICE & LEAD IMPLANT- RIGHT VENTRICULAR Left 5/16/2022    Procedure: Pacemaker Device & Lead Implant- Right ventricular;  Surgeon: Chadd Gutierrez MD;  Location:  HEART CARDIAC CATH LAB    GENITOURINARY SURGERY  1990    testicular cancer    ORTHOPEDIC SURGERY  1970's    right knee surgery      Family History   Problem Relation Age of Onset    Colon Cancer Father     Coronary Artery Disease Maternal Grandmother     Prostate Cancer Brother 72     Social History     Socioeconomic History    Marital status:      Spouse name: Not on file    Number of children: Not on file    Years of education: Not on file    Highest education level: Not on file   Occupational History     Not on file   Tobacco Use    Smoking status: Never    Smokeless tobacco: Never   Vaping Use    Vaping Use: Never used   Substance and Sexual Activity    Alcohol use: Yes     Comment: every 1-2 months or less    Drug use: No    Sexual activity: Yes     Partners: Female   Other Topics Concern    Parent/sibling w/ CABG, MI or angioplasty before 65F 55M? Yes   Social History Narrative    Not on file     Social Determinants of Health     Financial Resource Strain: Not on file   Food Insecurity: Not on file   Transportation Needs: Not on file   Physical Activity: Not on file   Stress: Not on file   Social Connections: Not on file   Interpersonal Safety: Not on file   Housing Stability: Not on file            Allergies   Lisinopril    Today's clinic visit entailed:  Review of the result(s) of each unique test - device interrogation, CTA, echocardiogram, CBC, BMP, lipid panel  I spent a total of 30 minutes on the day of the visit.   Time spent by me doing chart review, history and exam, documentation and further activities per the note  Provider  Link to [facility] Help Grid     The level of medical decision making during this visit was of moderate complexity.      Thank you for allowing me to participate in the care of your patient.      Sincerely,     Meagan Valero PA-C     [facility]  cc:   ANNMARIE Vieyra CNP  [address]  [place] [address]

## 2023-10-18 NOTE — PATIENT INSTRUCTIONS
Today's Plan:   Please have a blood draw on your way out today.   Return to see me in another year.    If you have questions or concerns please call my nurse team at [phone].   Scheduling phone number: [phone]  For after hours urgent concerns call [phone] option 2.   Reminder: Please bring in all current medications, over the counter supplements and vitamin bottles to your next appointment.    It was a pleasure seeing you today!     Meagan Valero PA-C

## 2023-10-18 NOTE — PROGRESS NOTES
Electrophysiology Clinic Progress Note    Kevan Connelly MRN# [id number]   YOB: 1950 Age: 73 year old     Primary cardiology team: Was Dr. Hannon         Assessment and Plan     In summary, Kevan Connelly presents today for a routine annual follow up visit regarding CAF s/p AVNA + PPM placement in 2022.    CAF, s/p AVNA + PPM.  Anticoagulated with warfarin.  Normal device fn.    HTN.  Controlled.     CAC per CT.   On statin with LDL in the 70's per recent panel.  Denies angina.    Mild thrombocytopenia on 6/2023 labs.    Plan:  He wonders if he can get off any of his meds. We reviewed these in detail today. I encouraged him to continue the current regimen.   Repeat CBC today.  Quarterly device checks.    Follow-up:  In one year with a CBC/BMP prior.    It was a pleasure meeting Keron today!    Meagan Valero PA-C  [facility]         History of Presenting Illness     Kevan Connelly is a pleasant 73 year old patient with a pertinent history of the following -   HTN  HLP  Persistent AF, s/p ablation (PVI + posterior wall) in 2020, with development of atypical PAFL end of procedure, placed on flecainide. Ultimately underwent AVNA + PPM placement 5/2022.  NSVT noted on device interrogations. EF preserved (2020 TTE).  Previous tachy-mediated cardiomyopathy that resolved.   Coronary calcification is noted in the left anterior descending artery, circumflex artery and the right coronary artery, per CT 2019. Controlled lipid panel.     Today, I am meeting Keron who is very pleasant. Patient denies chest pain, shortness of breath, PND, orthopnea, edema, claudication, palpitations, near syncope or syncope. No issues with bruising/bleeding. He has been trying to change his diet for the better. Does not routinely exercise but is trying to get back into it. Is able to stationary bike for 30 minutes or so without issue.  Device interrogation last week shows 99.7% ,  "pacing/threshold/impedence WNL, no arrhythmias.  Labs in June showed a stable creat of 1.24, normal H/H but slightly low platelet ct at 141.  INR's for the most part have been in goal range, last one slightly high, re-checking next week.         Review of Systems     12-pt ROS is negative except for as noted in the HPI.          Physical Exam     Vitals: /80   Pulse 81   Ht 1.905 m (6' 3\")   Wt 118.8 kg (262 lb)   BMI 32.75 kg/m    Wt Readings from Last 10 Encounters:   10/18/23 118.8 kg (262 lb)   06/26/23 123.4 kg (272 lb)   10/17/22 124.7 kg (274 lb 14.4 oz)   05/17/22 123.7 kg (272 lb 9.6 oz)   05/05/22 127.9 kg (282 lb)   04/13/22 126.6 kg (279 lb)   03/02/22 127 kg (280 lb)   02/17/22 128.2 kg (282 lb 11.2 oz)   02/14/22 129.3 kg (285 lb)   02/11/22 128.1 kg (282 lb 8 oz)       Constitutional:  Patient is pleasant, alert, cooperative, and in NAD.  HEENT:  NCAT. PERRLA. EOM's intact.   Neck:  CVP appears normal. No carotid bruits.   Pulmonary: Normal respiratory effort. CTAB.   Cardiac: RRR, normal S1/S2, no S3/S4, no murmur or rub.   Abdomen:  Non-tender abdomen, no hepatosplenomegaly appreciated.   Vascular: Pulses in the upper and lower extremities are 2+ and equal bilaterally.  Extremities: No edema, erythema, cyanosis or tenderness appreciated.  Skin:  No rashes or lesions appreciated.   Neurological:  No gross motor or sensory deficits.   Psych: Appropriate affect.          Data   Labs reviewed:  Recent Labs   Lab Test 06/26/23  1000 02/17/22  0811 03/31/21  0858 03/24/20  0731 01/13/20  0743 06/05/19  1041 05/30/19  1126   LDL 76 79 93   < > 156*  --   --    HDL 39* 48 47   < > 38*  --   --    NHDL 95 101 110   < > 194*  --   --    CHOL 134 149 157   < > 232*  --   --    TRIG 96 110 84   < > 192*  --   --    TSH 1.95  --   --   --  2.86  --  1.86   NTBNP  --   --   --   --   --  4,674* 3,787*    < > = values in this interval not displayed.       Lab Results   Component Value Date    WBC 5.2 " "06/26/2023    WBC 5.7 06/21/2021    RBC 4.83 06/26/2023    RBC 5.09 06/21/2021    HGB 15.6 06/26/2023    HGB 16.4 06/21/2021    HCT 43.3 06/26/2023    HCT 47.2 06/21/2021    MCV 90 06/26/2023    MCV 93 06/21/2021    MCH 32.3 06/26/2023    MCH 32.2 06/21/2021    MCHC 36.0 06/26/2023    MCHC 34.7 06/21/2021    RDW 12.7 06/26/2023    RDW 12.2 06/21/2021     (L) 06/26/2023     06/21/2021       Lab Results   Component Value Date     06/26/2023     06/21/2021    POTASSIUM 4.8 06/26/2023    POTASSIUM 4.2 05/16/2022    POTASSIUM 4.9 06/21/2021    CHLORIDE 108 (H) 06/26/2023    CHLORIDE 108 05/16/2022    CHLORIDE 108 06/21/2021    CO2 22 06/26/2023    CO2 21 05/16/2022    CO2 27 06/21/2021    ANIONGAP 10 06/26/2023    ANIONGAP 8 05/16/2022    ANIONGAP 3 06/21/2021    GLC 94 06/26/2023     (H) 05/16/2022    GLC 94 06/21/2021    BUN 19.2 06/26/2023    BUN 21 05/16/2022    BUN 23 06/21/2021    CR 1.24 (H) 06/26/2023    CR 1.31 (H) 06/21/2021    GFRESTIMATED 63 06/26/2023    GFRESTIMATED 55 (L) 06/21/2021    GFRESTBLACK 64 06/21/2021    CHU 9.1 06/26/2023    CHU 9.2 06/21/2021      Lab Results   Component Value Date    AST 23 06/26/2023    AST 14 03/31/2021    ALT 20 06/26/2023    ALT 28 03/31/2021       No results found for: \"A1C\"    Lab Results   Component Value Date    INR 3.5 (H) 10/09/2023    INR 2.4 (H) 09/15/2023    INR 2.48 (H) 01/21/2022    INR 2.18 (H) 01/12/2022    INR 2.60 (H) 07/06/2021    INR 1.88 (H) 06/21/2021            Problem List     Patient Active Problem List   Diagnosis    Hx of testicular cancer    Benign essential hypertension    Erectile dysfunction following radiation therapy    Encounter for monitoring diuretic therapy    Colon polyps    Long term current use of anticoagulant therapy    CHF (congestive heart failure) (H)    Paroxysmal atrial fibrillation (H)    Atrial flutter (H)    Chronic kidney disease, stage 3 (moderate)    Long term current use of anticoagulants " with INR goal of 2.0-3.0    Thoracic aortic ectasia (H24)    Adenomatous polyp of transverse colon    Palpitation    Cardiac pacemaker in situ            Medications     Current Outpatient Medications   Medication Sig Dispense Refill    acetaminophen (TYLENOL) 500 MG tablet Take 500 mg by mouth every 6 hours as needed for mild pain      atorvastatin (LIPITOR) 20 MG tablet Take 1 tablet (20 mg) by mouth daily 90 tablet 3    carvedilol (COREG) 12.5 MG tablet Take 1 tablet (12.5 mg) by mouth 2 times daily (with meals) 180 tablet 3    furosemide (LASIX) 20 MG tablet Take 1 tablet ( 20 mg) every other day (Patient taking differently: Take 20 mg by mouth as needed) 45 tablet 3    losartan (COZAAR) 100 MG tablet Take 1 tablet (100 mg) by mouth daily 90 tablet 3    warfarin ANTICOAGULANT (COUMADIN) 5 MG tablet TAKE 1 TABLET BY MOUTH ONCE DAILY OR  AS  DIRECTED  BY  INR  CLINIC 90 tablet 3            Past Medical History     Past Medical History:   Diagnosis Date    Atrial flutter (H)     Benign essential hypertension 1/18/2017    past use of ACE- Cough;  Had been on ARB-diuretic but experienced lower bp readings;  BLOOD PRESSURE now well controlled on ARB also no longer on diuretic; no low bp readings; may have had slight edema initially but has normalized with bp well controlled on ARB alone.     Cancer (H)     testicular cancer    CHF (congestive heart failure) (H) 6/5/2019    Long term current use of anticoagulant therapy 6/3/2019    Obesity (BMI 35.0-39.9) with comorbidity (H) 2/11/2019    Paroxysmal atrial fibrillation (H) 06/03/2019     Past Surgical History:   Procedure Laterality Date    ANESTHESIA CARDIOVERSION N/A 6/26/2019    Procedure: ANESTHESIA, FOR CARDIOVERSION DR. LE;  Surgeon: GENERIC ANESTHESIA PROVIDER;  Location:  OR    ANESTHESIA CARDIOVERSION N/A 7/17/2019    Procedure: ANESTHESIA, FOR CARDIOVERSION;  Surgeon: GENERIC ANESTHESIA PROVIDER;  Location:  OR    ANESTHESIA CARDIOVERSION N/A  1/28/2020    Procedure: ANESTHESIA, FOR CARDIOVERSION;  Surgeon: GENERIC ANESTHESIA PROVIDER;  Location: [facility] OR    ANESTHESIA CARDIOVERSION N/A 9/8/2020    Procedure: ANESTHESIA, FOR CARDIOVERSION;  Surgeon: GENERIC ANESTHESIA PROVIDER;  Location: [facility] OR    ANESTHESIA CARDIOVERSION N/A 11/23/2020    Procedure: ANESTHESIA, FOR CARDIOVERSION;  Surgeon: GENERIC ANESTHESIA PROVIDER;  Location: [facility] OR    ANESTHESIA CARDIOVERSION N/A 1/21/2022    Procedure: ANESTHESIA, FOR CARDIOVERSION;  Surgeon: GENERIC ANESTHESIA PROVIDER;  Location: [facility] OR    ANESTHESIA CARDIOVERSION N/A 2/11/2022    Procedure: ANESTHESIA, FOR CARDIOVERSION (DR. MAO);  Surgeon: GENERIC ANESTHESIA PROVIDER;  Location:  OR    CLOSED REDUCTION SHOULDER Left     EP ABLATION AV NODE N/A 5/16/2022    Procedure: Ablation Atrioventricular Node;  Surgeon: Chadd Gutierrez MD;  Location:  HEART CARDIAC CATH LAB    EP ABLATION FOCAL AFIB N/A 1/3/2020    Procedure: EP Ablation Focal AFIB;  Surgeon: Lamont Hannon MD;  Location:  HEART CARDI[facility] CATH LAB    EP PACEMAKER DEVICE & LEAD IMPLANT- RIGHT VENTRICULAR Left 5/16/2022    Procedure: Pacemaker Device & Lead Implant- Right ventricular;  Surgeon: Chadd Gutierrez MD;  Location:  HE[facility] CARDI[facility] CATH LAB    GENITOURINARY SURGERY  1990    testicular cancer    ORTHOPEDIC SURGERY  1970's    right knee surgery      Family History   Problem Relation Age of Onset    Colon Cancer Father     Coronary Artery Disease Maternal Grandmother     Prostate Cancer Brother 72     Social History     Socioeconomic History    Marital status:      Spouse name: Not on file    Number of children: Not on file    Years of education: Not on file    Highest education level: Not on file   Occupational History    Not on file   Tobacco Use    Smoking status: Never    Smokeless tobacco: Never   Vaping Use    Vaping Use: Never used   Substance and Sexual Activity    Alcohol use: Yes     Comment: every  1-2 months or less    Drug use: No    Sexual activity: Yes     Partners: Female   Other Topics Concern    Parent/sibling w/ CABG, MI or angioplasty before 65F 55M? Yes   Social History Narrative    Not on file     Social Determinants of Health     Financial Resource Strain: Not on file   Food Insecurity: Not on file   Transportation Needs: Not on file   Physical Activity: Not on file   Stress: Not on file   Social Connections: Not on file   Interpersonal Safety: Not on file   Housing Stability: Not on file            Allergies   Lisinopril    Today's clinic visit entailed:  Review of the result(s) of each unique test - device interrogation, CTA, echocardiogram, CBC, BMP, lipid panel  I spent a total of 30 minutes on the day of the visit.   Time spent by me doing chart review, history and exam, documentation and further activities per the note  Provider  Link to MDM Help Grid     The level of medical decision making during this visit was of moderate complexity.

## 2023-10-30 ENCOUNTER — LAB (OUTPATIENT)
Dept: LAB | Facility: CLINIC | Age: 73
End: 2023-10-30
Payer: COMMERCIAL

## 2023-10-30 ENCOUNTER — TELEPHONE (OUTPATIENT)
Dept: FAMILY MEDICINE | Facility: CLINIC | Age: 73
End: 2023-10-30

## 2023-10-30 ENCOUNTER — ANTICOAGULATION THERAPY VISIT (OUTPATIENT)
Dept: ANTICOAGULATION | Facility: CLINIC | Age: 73
End: 2023-10-30

## 2023-10-30 DIAGNOSIS — I48.0 PAROXYSMAL ATRIAL FIBRILLATION (H): ICD-10-CM

## 2023-10-30 DIAGNOSIS — Z79.01 LONG TERM CURRENT USE OF ANTICOAGULANT THERAPY: Primary | ICD-10-CM

## 2023-10-30 LAB — INR BLD: 2.2 (ref 0.9–1.1)

## 2023-10-30 PROCEDURE — 36416 COLLJ CAPILLARY BLOOD SPEC: CPT

## 2023-10-30 PROCEDURE — 85610 PROTHROMBIN TIME: CPT

## 2023-10-30 NOTE — PROGRESS NOTES
Called patient. No answer. Left vm to return call to [facility] RN. Per chart review and patient's questionnaire, no changes or concerns. Should get verbal consent to leave detailed vm with warfarin dosing and expected date of next INR if all stable.    Kaylee MICHELLE RN  Anticoagulation Team

## 2023-10-30 NOTE — PROGRESS NOTES
ANTICOAGULATION MANAGEMENT     Kevan Connelly 73 year old male is on warfarin with therapeutic INR result. (Goal INR 2.0-3.0)    Recent labs: (last 7 days)     10/30/23  0842   INR 2.2*       ASSESSMENT     Warfarin Lab Questionnaire    Warfarin Doses Last 7 Days      10/30/2023     8:31 AM   Dose in Tablet or Mg   TAB or MG? milligram (mg)           10/30/2023   Warfarin Lab Questionnaire   Missed doses within past 14 days? No   Changes in diet or alcohol within past 14 days? No   Medication changes since last result? No   Injuries or illness since last result? No   New shortness of breath, severe headaches or sudden changes in vision since last result? No   Abnormal bleeding since last result? No   Upcoming surgery, procedure? No     Previous result: Supratherapeutic  Additional findings:  decreased dose 7% at last INR visit       PLAN     Recommended plan for no diet, medication or health factor changes affecting INR     Dosing Instructions: Continue your current warfarin dose with next INR in 4 weeks       Summary  As of 10/30/2023      Full warfarin instructions:  2.5 mg every Tue; 5 mg all other days   Next INR check:  11/28/2023               Telephone call with Keron who verbalizes understanding and agrees to plan    Lab visit scheduled    Education provided:   Please call back if any changes to your diet, medications or how you've been taking warfarin  Contact [phone]  with any changes, questions or concerns.     Plan made per [facility] anticoagulation protocol    Kaylee Ramey RN  Anticoagulation Clinic  10/30/2023    _______________________________________________________________________     Anticoagulation Episode Summary       Current INR goal:  2.0-3.0   TTR:  88.5% (1 y)   Target end date:  Indefinite   Send INR reminders to:  CONNIE HOBBS    Indications    Long term current use of anticoagulant therapy [Z79.01]  Paroxysmal atrial fibrillation (H) [I48.0]             Comments:                Anticoagulation Care Providers       Provider Role Specialty Phone number    Davina Reaves MD Referring Internal Medicine [phone]

## 2023-10-30 NOTE — TELEPHONE ENCOUNTER
General Call      Reason for Call:  anticoagulation    What are your questions or concerns:  returning call to INR nurse Kaylee.  attempted to reach Kaylee at: [phone], but there was no answer. Please call patient back    Date of last appointment with provider: n/a    Could we send this information to you in [employer] or would you prefer to receive a phone call?:   Patient would prefer a phone call   Okay to leave a detailed message?: Yes at Cell number on file:    Telephone Information:   Mobile [phone]

## 2023-11-28 ENCOUNTER — LAB (OUTPATIENT)
Dept: LAB | Facility: CLINIC | Age: 73
End: 2023-11-28
Payer: COMMERCIAL

## 2023-11-28 ENCOUNTER — ANTICOAGULATION THERAPY VISIT (OUTPATIENT)
Dept: ANTICOAGULATION | Facility: CLINIC | Age: 73
End: 2023-11-28

## 2023-11-28 DIAGNOSIS — I48.0 PAROXYSMAL ATRIAL FIBRILLATION (H): ICD-10-CM

## 2023-11-28 DIAGNOSIS — Z79.01 LONG TERM CURRENT USE OF ANTICOAGULANT THERAPY: Primary | ICD-10-CM

## 2023-11-28 LAB — INR BLD: 2.7 (ref 0.9–1.1)

## 2023-11-28 PROCEDURE — 36416 COLLJ CAPILLARY BLOOD SPEC: CPT

## 2023-11-28 PROCEDURE — 85610 PROTHROMBIN TIME: CPT

## 2023-12-05 ENCOUNTER — PATIENT OUTREACH (OUTPATIENT)
Dept: GASTROENTEROLOGY | Facility: CLINIC | Age: 73
End: 2023-12-05
Payer: COMMERCIAL

## 2024-01-01 NOTE — PROGRESS NOTES
ANTICOAGULATION MANAGEMENT     Kevan Connelly 73 year old male is on warfarin with therapeutic INR result. (Goal INR 2.0-3.0)    Recent labs: (last 7 days)     11/28/23  0738   INR 2.7*       ASSESSMENT     Warfarin Lab Questionnaire    Warfarin Doses Last 7 Days      11/28/2023     7:17 AM   Dose in Tablet or Mg   TAB or MG? milligram (mg)     Warfarin dose confirmed with patient and taken as directed          11/28/2023   Warfarin Lab Questionnaire   Missed doses within past 14 days? No   Changes in diet or alcohol within past 14 days? No   Medication changes since last result? No   Injuries or illness since last result? No   New shortness of breath, severe headaches or sudden changes in vision since last result? No   Abnormal bleeding since last result? No   Upcoming surgery, procedure? No     Previous result: Therapeutic last visit; previously outside of goal range  Additional findings: None       PLAN     Recommended plan for no diet, medication or health factor changes affecting INR     Dosing Instructions: Continue your current warfarin dose with next INR in 5 weeks       Summary  As of 11/28/2023      Full warfarin instructions:  2.5 mg every Tue; 5 mg all other days   Next INR check:  1/2/2024               Telephone call with Keron who agrees to plan and repeated back plan correctly    Lab visit scheduled    Education provided:   Please call back if any changes to your diet, medications or how you've been taking warfarin    Plan made per [facility] anticoagulation protocol    Yarelis Martinez RN  Anticoagulation Clinic  11/28/2023    _______________________________________________________________________     Anticoagulation Episode Summary       Current INR goal:  2.0-3.0   TTR:  88.5% (1 y)   Target end date:  Indefinite   Send INR reminders to:  CONNIE HOBBS    Indications    Long term current use of anticoagulant therapy [Z79.01]  Paroxysmal atrial fibrillation (H) [I48.0]             Comments:            This note was copied from the mother's chart.  Routine visit with Dary, FOB and baby.  Baby latched onto the left breast at time of visit.  Lips flanged and deep latch obtained.  Has a pump for home and Outpatient resource phone numbers given. Breastfeeding well per mother.  No further questions at this time. Will follow as needed.  Awilda BENITEZN, RN, PHN, RNC-MNN, IBCLC         Anticoagulation Care Providers       Provider Role Specialty Phone number    Davina Reaves MD Referring Internal Medicine [phone]

## 2024-01-02 ENCOUNTER — ANTICOAGULATION THERAPY VISIT (OUTPATIENT)
Dept: ANTICOAGULATION | Facility: CLINIC | Age: 74
End: 2024-01-02

## 2024-01-02 ENCOUNTER — LAB (OUTPATIENT)
Dept: LAB | Facility: CLINIC | Age: 74
End: 2024-01-02
Payer: COMMERCIAL

## 2024-01-02 DIAGNOSIS — Z79.01 LONG TERM CURRENT USE OF ANTICOAGULANT THERAPY: Primary | ICD-10-CM

## 2024-01-02 DIAGNOSIS — I48.0 PAROXYSMAL ATRIAL FIBRILLATION (H): ICD-10-CM

## 2024-01-02 LAB — INR BLD: 3.1 (ref 0.9–1.1)

## 2024-01-02 PROCEDURE — 85610 PROTHROMBIN TIME: CPT

## 2024-01-02 PROCEDURE — 36416 COLLJ CAPILLARY BLOOD SPEC: CPT

## 2024-01-02 NOTE — PROGRESS NOTES
ANTICOAGULATION MANAGEMENT     Mathur DHRUV Connelly 73 year old male is on warfarin with supratherapeutic INR result. (Goal INR 2.0-3.0)    Recent labs: (last 7 days)     01/02/24  0830   INR 3.1*       ASSESSMENT     Warfarin Lab Questionnaire    Warfarin Doses Last 7 Days-Patient confirmed taking warfarin maintenance dose as listed        1/2/2024     8:27 AM   Dose in Tablet or Mg   TAB or MG? tablet (tab)           1/2/2024   Warfarin Lab Questionnaire   Missed doses within past 14 days? No   Changes in diet or alcohol within past 14 days? No, patient reports less greens over the holidays   Medication changes since last result? No   Injuries or illness since last result? No   New shortness of breath, severe headaches or sudden changes in vision since last result? No   Abnormal bleeding since last result? No   Upcoming surgery, procedure? No     Previous result: Therapeutic last 2(+) visits  Additional findings: INR has been trending up, patient was not aware that each INR is a reflection of the last 7 day, he will try to keep his weekly vitamin K intake consistent.       PLAN     Recommended plan for temporary change(s) affecting INR     Dosing Instructions: Continue your current warfarin dose with next INR in 2 weeks       Summary  As of 1/2/2024      Full warfarin instructions:  2.5 mg every Tue; 5 mg all other days   Next INR check:  1/16/2024               Telephone call with Keron who verbalizes understanding and agrees to plan    Lab visit scheduled    Education provided:   Goal range and lab monitoring: goal range and significance of current result  Dietary considerations: importance of consistent vitamin K intake and impact of vitamin K foods on INR    Plan made per [facility] anticoagulation protocol    Ramona Delcid, RN  Anticoagulation Clinic  1/2/2024    _______________________________________________________________________     Anticoagulation Episode Summary       Current INR goal:  2.0-3.0   TTR:  86.1% (1 y)    Target end date:  Indefinite   Send INR reminders to:  CONNIE HOBBS    Indications    Long term current use of anticoagulant therapy [Z79.01]  Paroxysmal atrial fibrillation (H) [I48.0]             Comments:               Anticoagulation Care Providers       Provider Role Specialty Phone number    Davina eRaves MD Referring Internal Medicine [phone]

## 2024-01-05 NOTE — PLAN OF CARE
Goal Outcome Evaluation:    Orientation/Cognitive: AOx4  Observation Goals (Met/ Not Met): not met  Mobility Level/Assist Equipment: SBA  Fall Risk (Y/N): Y  Behavior Concerns: none  Pain Management: denies  Tele/VS/O2: VSS on RA ex HTN. Tele V. paced  ABNL Lab/BG: creat 1.31, GFR 59, INR 1.4  Diet: Regular  Bowel/Bladder: continent B/B  Skin Concerns: scattered scabs, L chest pacemaker incision site, R groin site  Drains/Devices: PIV SL  Tests/Procedures for next shift: CXR  Anticipated DC date & active delays: TBD pending plan  Patient Stated Goal for Today: Eat       5

## 2024-01-16 ENCOUNTER — DOCUMENTATION ONLY (OUTPATIENT)
Dept: ANTICOAGULATION | Facility: CLINIC | Age: 74
End: 2024-01-16

## 2024-01-16 ENCOUNTER — ANTICOAGULATION THERAPY VISIT (OUTPATIENT)
Dept: ANTICOAGULATION | Facility: CLINIC | Age: 74
End: 2024-01-16

## 2024-01-16 ENCOUNTER — LAB (OUTPATIENT)
Dept: LAB | Facility: CLINIC | Age: 74
End: 2024-01-16
Payer: COMMERCIAL

## 2024-01-16 DIAGNOSIS — Z79.01 LONG TERM CURRENT USE OF ANTICOAGULANT THERAPY: Primary | ICD-10-CM

## 2024-01-16 DIAGNOSIS — I48.0 PAROXYSMAL ATRIAL FIBRILLATION (H): ICD-10-CM

## 2024-01-16 LAB — INR BLD: 2.5 (ref 0.9–1.1)

## 2024-01-16 PROCEDURE — 85610 PROTHROMBIN TIME: CPT

## 2024-01-16 PROCEDURE — 36416 COLLJ CAPILLARY BLOOD SPEC: CPT

## 2024-01-16 NOTE — PROGRESS NOTES
ANTICOAGULATION MANAGEMENT     Kevan Connelly 73 year old male is on warfarin with therapeutic INR result. (Goal INR 2.0-3.0)    Recent labs: (last 7 days)     01/16/24  0837   INR 2.5*       ASSESSMENT     Warfarin Lab Questionnaire    Warfarin Doses Last 7 Days          1/15/2024   Warfarin Lab Questionnaire   Missed doses within past 14 days? No   Changes in diet or alcohol within past 14 days? No   Medication changes since last result? No   Injuries or illness since last result? No   New shortness of breath, severe headaches or sudden changes in vision since last result? No   Abnormal bleeding since last result? No   Upcoming surgery, procedure? No     Previous result: Supratherapeutic  Additional findings: None       PLAN     Recommended plan for no diet, medication or health factor changes affecting INR     Dosing Instructions: Continue your current warfarin dose with next INR in 4 weeks       Summary  As of 1/16/2024      Full warfarin instructions:  2.5 mg every Tue; 5 mg all other days   Next INR check:  2/13/2024               Detailed voice message left for Keron with dosing instructions and follow up date.   Sent [employer] message with dosing and follow up instructions    Contact [phone]  to schedule and with any changes, questions or concerns.     Education provided:   Please call back if any changes to your diet, medications or how you've been taking warfarin    Plan made per [facility] anticoagulation protocol    Yarelis Martinez RN  Anticoagulation Clinic  1/16/2024    _______________________________________________________________________     Anticoagulation Episode Summary       Current INR goal:  2.0-3.0   TTR:  85.5% (1 y)   Target end date:  Indefinite   Send INR reminders to:  [facility]    Indications    Long term current use of anticoagulant therapy [Z79.01]  Paroxysmal atrial fibrillation (H) [I48.0]             Comments:               Anticoagulation Care Providers       Provider Role  Specialty Phone number    Davina Reaves MD Referring Internal Medicine [phone]

## 2024-01-16 NOTE — PROGRESS NOTES
ANTICOAGULATION CLINIC REFERRAL RENEWAL REQUEST       An annual renewal order is required for all patients referred to [facility].?  Please review and sign the pended referral order for Kevan Connelly.       ANTICOAGULATION SUMMARY      Warfarin indication(s)   Atrial Fibrillation    Mechanical heart valve present?  NO       Current goal range   INR: 2.0-3.0     Goal appropriate for indication? Goal INR 2-3, standard for indication(s) above     Time in Therapeutic Range (TTR)  (Goal > 60%) 85.5%       Office visit with referring provider's group within last year yes on 6/26/23       Yarelis Martinez RN  [facility]

## 2024-02-12 ENCOUNTER — ANCILLARY PROCEDURE (OUTPATIENT)
Dept: CARDIOLOGY | Facility: CLINIC | Age: 74
End: 2024-02-12
Attending: INTERNAL MEDICINE
Payer: COMMERCIAL

## 2024-02-12 DIAGNOSIS — I49.5 SSS (SICK SINUS SYNDROME) (H): ICD-10-CM

## 2024-02-12 DIAGNOSIS — Z95.0 CARDIAC PACEMAKER IN SITU: ICD-10-CM

## 2024-02-12 LAB
MDC_IDC_MSMT_BATTERY_DTM: NORMAL
MDC_IDC_MSMT_BATTERY_REMAINING_LONGEVITY: 134 MO
MDC_IDC_MSMT_BATTERY_RRT_TRIGGER: 2.62
MDC_IDC_MSMT_BATTERY_STATUS: NORMAL
MDC_IDC_MSMT_BATTERY_VOLTAGE: 3.03 V
MDC_IDC_MSMT_LEADCHNL_RV_IMPEDANCE_VALUE: 323 OHM
MDC_IDC_MSMT_LEADCHNL_RV_IMPEDANCE_VALUE: 399 OHM
MDC_IDC_MSMT_LEADCHNL_RV_PACING_THRESHOLD_AMPLITUDE: 0.62 V
MDC_IDC_MSMT_LEADCHNL_RV_PACING_THRESHOLD_PULSEWIDTH: 0.4 MS
MDC_IDC_MSMT_LEADCHNL_RV_SENSING_INTR_AMPL: 11.38 MV
MDC_IDC_MSMT_LEADCHNL_RV_SENSING_INTR_AMPL: 11.38 MV
MDC_IDC_PG_IMPLANT_DTM: NORMAL
MDC_IDC_PG_MFG: NORMAL
MDC_IDC_PG_MODEL: NORMAL
MDC_IDC_PG_SERIAL: NORMAL
MDC_IDC_PG_TYPE: NORMAL
MDC_IDC_SESS_CLINIC_NAME: NORMAL
MDC_IDC_SESS_DTM: NORMAL
MDC_IDC_SESS_TYPE: NORMAL
MDC_IDC_SET_BRADY_HYSTRATE: NORMAL
MDC_IDC_SET_BRADY_LOWRATE: 70 {BEATS}/MIN
MDC_IDC_SET_BRADY_MAX_SENSOR_RATE: 130 {BEATS}/MIN
MDC_IDC_SET_BRADY_MODE: NORMAL
MDC_IDC_SET_LEADCHNL_RV_PACING_AMPLITUDE: 2 V
MDC_IDC_SET_LEADCHNL_RV_PACING_ANODE_ELECTRODE_1: NORMAL
MDC_IDC_SET_LEADCHNL_RV_PACING_ANODE_LOCATION_1: NORMAL
MDC_IDC_SET_LEADCHNL_RV_PACING_CAPTURE_MODE: NORMAL
MDC_IDC_SET_LEADCHNL_RV_PACING_CATHODE_ELECTRODE_1: NORMAL
MDC_IDC_SET_LEADCHNL_RV_PACING_CATHODE_LOCATION_1: NORMAL
MDC_IDC_SET_LEADCHNL_RV_PACING_POLARITY: NORMAL
MDC_IDC_SET_LEADCHNL_RV_PACING_PULSEWIDTH: 0.4 MS
MDC_IDC_SET_LEADCHNL_RV_SENSING_ANODE_ELECTRODE_1: NORMAL
MDC_IDC_SET_LEADCHNL_RV_SENSING_ANODE_LOCATION_1: NORMAL
MDC_IDC_SET_LEADCHNL_RV_SENSING_CATHODE_ELECTRODE_1: NORMAL
MDC_IDC_SET_LEADCHNL_RV_SENSING_CATHODE_LOCATION_1: NORMAL
MDC_IDC_SET_LEADCHNL_RV_SENSING_POLARITY: NORMAL
MDC_IDC_SET_LEADCHNL_RV_SENSING_SENSITIVITY: 0.9 MV
MDC_IDC_SET_ZONE_DETECTION_INTERVAL: 360 MS
MDC_IDC_SET_ZONE_STATUS: NORMAL
MDC_IDC_SET_ZONE_TYPE: NORMAL
MDC_IDC_SET_ZONE_VENDOR_TYPE: NORMAL
MDC_IDC_STAT_BRADY_DTM_END: NORMAL
MDC_IDC_STAT_BRADY_DTM_START: NORMAL
MDC_IDC_STAT_BRADY_RV_PERCENT_PACED: 99.96 %
MDC_IDC_STAT_EPISODE_RECENT_COUNT: 0
MDC_IDC_STAT_EPISODE_RECENT_COUNT_DTM_END: NORMAL
MDC_IDC_STAT_EPISODE_RECENT_COUNT_DTM_START: NORMAL
MDC_IDC_STAT_EPISODE_TOTAL_COUNT: 0
MDC_IDC_STAT_EPISODE_TOTAL_COUNT: 0
MDC_IDC_STAT_EPISODE_TOTAL_COUNT: 2
MDC_IDC_STAT_EPISODE_TOTAL_COUNT_DTM_END: NORMAL
MDC_IDC_STAT_EPISODE_TOTAL_COUNT_DTM_START: NORMAL
MDC_IDC_STAT_EPISODE_TYPE: NORMAL

## 2024-02-12 PROCEDURE — 93294 REM INTERROG EVL PM/LDLS PM: CPT | Performed by: INTERNAL MEDICINE

## 2024-02-12 PROCEDURE — 93296 REM INTERROG EVL PM/IDS: CPT | Performed by: INTERNAL MEDICINE

## 2024-02-13 ENCOUNTER — ANTICOAGULATION THERAPY VISIT (OUTPATIENT)
Dept: ANTICOAGULATION | Facility: CLINIC | Age: 74
End: 2024-02-13

## 2024-02-13 ENCOUNTER — LAB (OUTPATIENT)
Dept: LAB | Facility: CLINIC | Age: 74
End: 2024-02-13
Payer: COMMERCIAL

## 2024-02-13 DIAGNOSIS — I48.0 PAROXYSMAL ATRIAL FIBRILLATION (H): ICD-10-CM

## 2024-02-13 DIAGNOSIS — Z79.01 LONG TERM CURRENT USE OF ANTICOAGULANT THERAPY: Primary | ICD-10-CM

## 2024-02-13 DIAGNOSIS — Z79.01 LONG TERM CURRENT USE OF ANTICOAGULANT THERAPY: ICD-10-CM

## 2024-02-13 LAB — INR BLD: 2.7 (ref 0.9–1.1)

## 2024-02-13 PROCEDURE — 85610 PROTHROMBIN TIME: CPT

## 2024-02-13 PROCEDURE — 36416 COLLJ CAPILLARY BLOOD SPEC: CPT

## 2024-02-13 NOTE — PROGRESS NOTES
ANTICOAGULATION MANAGEMENT     Kevan Connelly 73 year old male is on warfarin with therapeutic INR result. (Goal INR 2.0-3.0)    Recent labs: (last 7 days)     02/13/24  0840   INR 2.7*       ASSESSMENT     Source(s): Chart Review and Patient/Caregiver Call     Warfarin doses taken: Warfarin taken as instructed  Diet: No new diet changes identified  Medication/supplement changes: None noted  New illness, injury, or hospitalization: No  Signs or symptoms of bleeding or clotting: No  Previous result: Therapeutic last visit; previously outside of goal range  Additional findings: None       PLAN     Recommended plan for no diet, medication or health factor changes affecting INR     Dosing Instructions: Continue your current warfarin dose with next INR in 5 weeks       Summary  As of 2/13/2024      Full warfarin instructions:  2.5 mg every Tue; 5 mg all other days   Next INR check:  3/19/2024               Telephone call with Keron who verbalizes understanding and agrees to plan    Lab visit scheduled    Education provided:   None required    Plan made per [facility] anticoagulation protocol    Ramona Delcid RN  Anticoagulation Clinic  2/13/2024    _______________________________________________________________________     Anticoagulation Episode Summary       Current INR goal:  2.0-3.0   TTR:  85.5% (1 y)   Target end date:  Indefinite   Send INR reminders to:  CONNIE HOBBS    Indications    Long term current use of anticoagulant therapy [Z79.01]  Paroxysmal atrial fibrillation (H) [I48.0]             Comments:               Anticoagulation Care Providers       Provider Role Specialty Phone number    Davina Reaves MD Referring Internal Medicine [phone]

## 2024-03-19 ENCOUNTER — LAB (OUTPATIENT)
Dept: LAB | Facility: CLINIC | Age: 74
End: 2024-03-19
Payer: COMMERCIAL

## 2024-03-19 ENCOUNTER — ANTICOAGULATION THERAPY VISIT (OUTPATIENT)
Dept: ANTICOAGULATION | Facility: CLINIC | Age: 74
End: 2024-03-19

## 2024-03-19 DIAGNOSIS — Z79.01 LONG TERM CURRENT USE OF ANTICOAGULANT THERAPY: Primary | ICD-10-CM

## 2024-03-19 DIAGNOSIS — Z79.01 LONG TERM CURRENT USE OF ANTICOAGULANT THERAPY: ICD-10-CM

## 2024-03-19 DIAGNOSIS — I48.0 PAROXYSMAL ATRIAL FIBRILLATION (H): ICD-10-CM

## 2024-03-19 LAB — INR BLD: 2.5 (ref 0.9–1.1)

## 2024-03-19 PROCEDURE — 36416 COLLJ CAPILLARY BLOOD SPEC: CPT

## 2024-03-19 PROCEDURE — 85610 PROTHROMBIN TIME: CPT

## 2024-03-19 NOTE — PROGRESS NOTES
ANTICOAGULATION MANAGEMENT     Mathur DHRUV Connelly 73 year old male is on warfarin with therapeutic INR result. (Goal INR 2.0-3.0)    Recent labs: (last 7 days)     03/19/24  0820   INR 2.5*       ASSESSMENT     Source(s): Chart Review and Patient/Caregiver Call     Warfarin doses taken: Warfarin taken as instructed  Diet: No new diet changes identified  Medication/supplement changes: None noted  New illness, injury, or hospitalization: No  Signs or symptoms of bleeding or clotting: No  Previous result: Therapeutic last 2(+) visits  Additional findings: None       PLAN     Recommended plan for no diet, medication or health factor changes affecting INR     Dosing Instructions: Continue your current warfarin dose with next INR in 6 weeks       Summary  As of 3/19/2024      Full warfarin instructions:  2.5 mg every Tue; 5 mg all other days   Next INR check:  4/30/2024               Telephone call with Keron who agrees to plan and repeated back plan correctly    Lab visit scheduled    Education provided:   Please call back if any changes to your diet, medications or how you've been taking warfarin    Plan made per [facility] anticoagulation protocol    Yarelis Martinez RN  Anticoagulation Clinic  3/19/2024    _______________________________________________________________________     Anticoagulation Episode Summary       Current INR goal:  2.0-3.0   TTR:  85.5% (1 y)   Target end date:  Indefinite   Send INR reminders to:  [facility]    Indications    Long term current use of anticoagulant therapy [Z79.01]  Paroxysmal atrial fibrillation (H) [I48.0]             Comments:               Anticoagulation Care Providers       Provider Role Specialty Phone number    Davina Reaves MD Referring Internal Medicine [phone]

## 2024-04-22 ENCOUNTER — TRANSFERRED RECORDS (OUTPATIENT)
Dept: HEALTH INFORMATION MANAGEMENT | Facility: CLINIC | Age: 74
End: 2024-04-22
Payer: COMMERCIAL

## 2024-04-24 ENCOUNTER — THERAPY VISIT (OUTPATIENT)
Dept: PHYSICAL THERAPY | Facility: CLINIC | Age: 74
End: 2024-04-24
Payer: COMMERCIAL

## 2024-04-24 DIAGNOSIS — M50.30 DDD (DEGENERATIVE DISC DISEASE), CERVICAL: Primary | ICD-10-CM

## 2024-04-24 DIAGNOSIS — M54.16 LUMBAR RADICULOPATHY: ICD-10-CM

## 2024-04-24 PROCEDURE — 97110 THERAPEUTIC EXERCISES: CPT | Mod: GP | Performed by: PHYSICAL THERAPIST

## 2024-04-24 PROCEDURE — 97161 PT EVAL LOW COMPLEX 20 MIN: CPT | Mod: GP | Performed by: PHYSICAL THERAPIST

## 2024-04-24 NOTE — PROGRESS NOTES
PHYSICAL THERAPY EVALUATION  Type of Visit: Evaluation    Subjective   Patient reports to PT for lumbar radiculopathy. States that he was on a cruise and got sharp pain in the right low back and weakness, numbness and tingling down the right leg. Had to buy a cane while on vacation to help with walking. Pain was 8/10 originally. Just recently started a steroid pack and that has helped decreased the pain down to 3/10. Pain is still increased with standing, walking and lying down. Last night was the first night he was able to sleep through the night without waking due to pain. Has had a history of low grade back pain mostly in the right for about 6 years.       Presenting condition or subjective complaint: Lumbar Radicular Symptoms  Date of onset: 04/11/24    Relevant medical history: Kidney disease; Cancer; Arthritis; Implanted device; High blood pressure; Pain at night or rest   Dates & types of surgery: Pacemaker 5/16/22, Torn biceps, Testicular Cancer [age], ACL July 1976    Prior diagnostic imaging/testing results:       Prior therapy history for the same diagnosis, illness or injury: No      Living Environment  Social support: With a significant other or spouse   Type of home: Apartment/condo   Stairs to enter the home:         Ramp:     Stairs inside the home:         Help at home: Self Cares (home health aide/personal care attendant, family, etc)  Equipment owned: Straight Cane; Grab bars; Raised toilet seat; Bath bench; Walker with wheels     Employment: Yes   Hobbies/Interests: weight lifting    Patient goals for therapy: sleep through the night and to function normal       Objective   LUMBAR SPINE EVALUATION  GAIT:   Weightbearing Status: WBAT  Assistive Device(s): Cane (single end)  Gait Deviations: Antalgic  ROM:  lumbar flexion WNL, extension mod loss, B rotation min loss no pain   STRENGTH:  fair lower abdominal strength  MYOTOMES:    Left Right   T12-L3 (Hip Flexion) 5 4+   L2-4 (Quads)  5 4+    L4 (Ankle DF) 5 4-   L5 (Great Toe Ext) 5 5   S1 (Toe Raise) 5 4+     DERMATOMES: WNL  NEURAL TENSION: positive slump on R  FLEXIBILITY: WNL  PALPATION: WNL    Assessment & Plan   CLINICAL IMPRESSIONS  Medical Diagnosis: Lumbar degenerative disc disease with radiculopathy    Treatment Diagnosis: Lumbar radiculopathy   Impression/Assessment: Patient is a 73 year old male with low back and radicular complaints.  The following significant findings have been identified: Pain, Decreased ROM/flexibility, Decreased strength, Impaired gait, Impaired muscle performance, and Decreased activity tolerance. These impairments interfere with their ability to perform self care tasks, recreational activities, household chores, household mobility, and community mobility as compared to previous level of function.     Clinical Decision Making (Complexity):  Clinical Presentation: Stable/Uncomplicated  Clinical Presentation Rationale: based on medical and personal factors listed in PT evaluation  Clinical Decision Making (Complexity): Low complexity    PLAN OF CARE  Treatment Interventions:  Interventions: Gait Training, Manual Therapy, Neuromuscular Re-education, Therapeutic Activity, Therapeutic Exercise, Self-Care/Home Management    Long Term Goals     PT Goal 1  Goal Description: Patient will be able to walk without AD 10-20 minutes with pain at worst 1/10  Rationale: to maximize safety and independence with performance of ADLs and functional tasks;to maximize safety and independence within the home;to maximize safety and independence within the community;to maximize safety and independence with transportation;to maximize safety and independence with self cares  Target Date: 06/19/24  PT Goal 2  Goal Description: Be able to sleep through the night without waking due to pain without use of medication to restore normal sleep pattern  Target Date: 06/19/24      Frequency of Treatment: 1 x a week  Duration of Treatment: 8  weeks    Recommended Referrals to Other Professionals:   Education Assessment:   Learner/Method: No Barriers to Learning  Education Comments: PTRX    Risks and benefits of evaluation/treatment have been explained.   Patient/Family/caregiver agrees with Plan of Care.     Evaluation Time:     PT Eval, Low Complexity Minutes (77452): 20       Signing Clinician: TRACEY Matos [facility]                                                                                   OUTPATIENT PHYSICAL THERAPY      PLAN OF TREATMENT FOR OUTPATIENT REHABILITATION   Patient's Last Name, First Name, Kevan Bruce YOB: 1950   Provider's Name   [facility]   Medical Record No.  [id number]     Onset Date: 04/11/24  Start of Care Date: 04/24/24     Medical Diagnosis:  Lumbar degenerative disc disease with radiculopathy      PT Treatment Diagnosis:  Lumbar radiculopathy Plan of Treatment  Frequency/Duration: 1 x a week/ 8 weeks    Certification date from 04/24/24 to 06/19/24         See note for plan of treatment details and functional goals     Matthew Pham, PT                         I CERTIFY THE NEED FOR THESE SERVICES FURNISHED UNDER        THIS PLAN OF TREATMENT AND WHILE UNDER MY CARE .             Physician Signature               Date    X_____________________________________________________                  Referring Provider:  Louis Townsend    Initial Assessment  See Epic Evaluation- Start of Care Date: 04/24/24

## 2024-04-25 ENCOUNTER — TRANSCRIBE ORDERS (OUTPATIENT)
Dept: OTHER | Age: 74
End: 2024-04-25

## 2024-04-30 ENCOUNTER — TELEPHONE (OUTPATIENT)
Dept: FAMILY MEDICINE | Facility: CLINIC | Age: 74
End: 2024-04-30

## 2024-04-30 ENCOUNTER — ANTICOAGULATION THERAPY VISIT (OUTPATIENT)
Dept: ANTICOAGULATION | Facility: CLINIC | Age: 74
End: 2024-04-30

## 2024-04-30 ENCOUNTER — LAB (OUTPATIENT)
Dept: LAB | Facility: CLINIC | Age: 74
End: 2024-04-30
Payer: COMMERCIAL

## 2024-04-30 DIAGNOSIS — I48.0 PAROXYSMAL ATRIAL FIBRILLATION (H): ICD-10-CM

## 2024-04-30 DIAGNOSIS — Z79.01 LONG TERM CURRENT USE OF ANTICOAGULANT THERAPY: Primary | ICD-10-CM

## 2024-04-30 DIAGNOSIS — Z79.01 LONG TERM CURRENT USE OF ANTICOAGULANT THERAPY: ICD-10-CM

## 2024-04-30 LAB — INR BLD: 4.2 (ref 0.9–1.1)

## 2024-04-30 PROCEDURE — 85610 PROTHROMBIN TIME: CPT

## 2024-04-30 PROCEDURE — 36416 COLLJ CAPILLARY BLOOD SPEC: CPT

## 2024-04-30 NOTE — PROGRESS NOTES
ANTICOAGULATION MANAGEMENT     Mathur DHRUV Connelly 73 year old male is on warfarin with supratherapeutic INR result. (Goal INR 2.0-3.0)    Recent labs: (last 7 days)     04/30/24  0807   INR 4.2*       ASSESSMENT     Warfarin Lab Questionnaire    Warfarin Doses Last 7 Days      4/29/2024     4:05 PM   Dose in Tablet or Mg   TAB or MG? milligram (mg)     Pt Rptd Dose SUNDAY MONDAY TUESDAY WED THURS FRIDAY SATURDAY 4/29/2024   4:05 PM 5 5 2.5 5 5 5 5         4/29/2024   Warfarin Lab Questionnaire   Missed doses within past 14 days? No   Changes in diet or alcohol within past 14 days? No   Medication changes since last result? No patient reports completed a medrol dose pack 3 days ago   Injuries or illness since last result? No   New shortness of breath, severe headaches or sudden changes in vision since last result? No   Abnormal bleeding since last result? No   Upcoming surgery, procedure? No     Previous result: Therapeutic last 2(+) visits  Additional findings: None       PLAN     Recommended plan for temporary change(s) affecting INR     Dosing Instructions: hold dose 5/1/24 since you have already taken your warfarin 4/30/24 then continue your current warfarin dose with next INR in 7-10 days       Summary  As of 4/30/2024      Full warfarin instructions:  5/1: Hold; Otherwise 2.5 mg every Tue; 5 mg all other days   Next INR check:  5/10/2024               Telephone call with Keron who verbalizes understanding and agrees to plan    Lab visit scheduled    Education provided:   Please call back if any changes to your diet, medications or how you've been taking warfarin  Importance of notifying anticoagulation clinic for: changes in medications; a sooner lab recheck maybe needed  Contact [phone]  with any changes, questions or concerns.     Plan made per [facility] anticoagulation protocol    Gretta Faith RN  Anticoagulation Clinic  4/30/2024    _______________________________________________________________________      Anticoagulation Episode Summary       Current INR goal:  2.0-3.0   TTR:  77.4% (1 y)   Target end date:  Indefinite   Send INR reminders to:  [facility]    Indications    Long term current use of anticoagulant therapy [Z79.01]  Paroxysmal atrial fibrillation (H) [I48.0]             Comments:               Anticoagulation Care Providers       Provider Role Specialty Phone number    Davina Reaves MD Referring Internal Medicine [phone]

## 2024-04-30 NOTE — TELEPHONE ENCOUNTER
Call returned to patient. Patient wanted to know if melatonin would interfere with warfarin. Per UpToDate, melatonin does not interact with warfarin, patient advised.  Gretta Faith RN, BSN  Anticoagulation Clinic

## 2024-05-03 ENCOUNTER — THERAPY VISIT (OUTPATIENT)
Dept: PHYSICAL THERAPY | Facility: CLINIC | Age: 74
End: 2024-05-03
Payer: COMMERCIAL

## 2024-05-03 DIAGNOSIS — M54.16 LUMBAR RADICULOPATHY: ICD-10-CM

## 2024-05-03 DIAGNOSIS — M50.30 DDD (DEGENERATIVE DISC DISEASE), CERVICAL: Primary | ICD-10-CM

## 2024-05-03 PROCEDURE — 97110 THERAPEUTIC EXERCISES: CPT | Mod: GP | Performed by: PHYSICAL THERAPIST

## 2024-05-10 ENCOUNTER — THERAPY VISIT (OUTPATIENT)
Dept: PHYSICAL THERAPY | Facility: CLINIC | Age: 74
End: 2024-05-10
Payer: COMMERCIAL

## 2024-05-10 ENCOUNTER — ANTICOAGULATION THERAPY VISIT (OUTPATIENT)
Dept: ANTICOAGULATION | Facility: CLINIC | Age: 74
End: 2024-05-10

## 2024-05-10 ENCOUNTER — LAB (OUTPATIENT)
Dept: LAB | Facility: CLINIC | Age: 74
End: 2024-05-10
Payer: COMMERCIAL

## 2024-05-10 DIAGNOSIS — I48.0 PAROXYSMAL ATRIAL FIBRILLATION (H): ICD-10-CM

## 2024-05-10 DIAGNOSIS — M50.30 DDD (DEGENERATIVE DISC DISEASE), CERVICAL: Primary | ICD-10-CM

## 2024-05-10 DIAGNOSIS — Z79.01 LONG TERM CURRENT USE OF ANTICOAGULANT THERAPY: Primary | ICD-10-CM

## 2024-05-10 DIAGNOSIS — Z79.01 LONG TERM CURRENT USE OF ANTICOAGULANT THERAPY: ICD-10-CM

## 2024-05-10 DIAGNOSIS — M54.16 LUMBAR RADICULOPATHY: ICD-10-CM

## 2024-05-10 LAB — INR BLD: 2.8 (ref 0.9–1.1)

## 2024-05-10 PROCEDURE — 85610 PROTHROMBIN TIME: CPT

## 2024-05-10 PROCEDURE — 97110 THERAPEUTIC EXERCISES: CPT | Mod: GP | Performed by: PHYSICAL THERAPIST

## 2024-05-10 PROCEDURE — 36416 COLLJ CAPILLARY BLOOD SPEC: CPT

## 2024-05-10 NOTE — PROGRESS NOTES
ANTICOAGULATION MANAGEMENT     Mathur DHRUV Connelly 73 year old male is on warfarin with therapeutic INR result. (Goal INR 2.0-3.0)    Recent labs: (last 7 days)     05/10/24  0758   INR 2.8*       ASSESSMENT     Source(s): Chart Review and Patient/Caregiver Call     Warfarin doses taken: Warfarin taken as instructed  Diet: No new diet changes identified  Medication/supplement changes: None noted  New illness, injury, or hospitalization: No  Signs or symptoms of bleeding or clotting: No  Previous result: Supratherapeutic  Additional findings: None       PLAN     Recommended plan for no diet, medication or health factor changes affecting INR     Dosing Instructions: Continue your current warfarin dose with next INR in 2 weeks       Summary  As of 5/10/2024      Full warfarin instructions:  2.5 mg every Tue; 5 mg all other days   Next INR check:  5/24/2024               Telephone call with Keron who agrees to plan and repeated back plan correctly    Lab visit scheduled    Education provided:   Please call back if any changes to your diet, medications or how you've been taking warfarin    Plan made per [facility] anticoagulation protocol    Yarelis Martinez RN  Anticoagulation Clinic  5/10/2024    _______________________________________________________________________     Anticoagulation Episode Summary       Current INR goal:  2.0-3.0   TTR:  75.0% (1 y)   Target end date:  Indefinite   Send INR reminders to:  [facility]    Indications    Long term current use of anticoagulant therapy [Z79.01]  Paroxysmal atrial fibrillation (H) [I48.0]             Comments:               Anticoagulation Care Providers       Provider Role Specialty Phone number    Davina Reaves MD Referring Internal Medicine [phone]

## 2024-05-17 ENCOUNTER — TRANSFERRED RECORDS (OUTPATIENT)
Dept: HEALTH INFORMATION MANAGEMENT | Facility: CLINIC | Age: 74
End: 2024-05-17
Payer: COMMERCIAL

## 2024-05-20 ENCOUNTER — ANCILLARY PROCEDURE (OUTPATIENT)
Dept: CARDIOLOGY | Facility: CLINIC | Age: 74
End: 2024-05-20
Attending: INTERNAL MEDICINE
Payer: COMMERCIAL

## 2024-05-20 DIAGNOSIS — I49.5 SSS (SICK SINUS SYNDROME) (H): ICD-10-CM

## 2024-05-20 DIAGNOSIS — Z95.0 CARDIAC PACEMAKER IN SITU: ICD-10-CM

## 2024-05-20 LAB
MDC_IDC_EPISODE_DTM: NORMAL
MDC_IDC_EPISODE_DTM: NORMAL
MDC_IDC_EPISODE_DURATION: 0 S
MDC_IDC_EPISODE_DURATION: 1 S
MDC_IDC_EPISODE_ID: 3
MDC_IDC_EPISODE_ID: 4
MDC_IDC_EPISODE_TYPE: NORMAL
MDC_IDC_EPISODE_TYPE: NORMAL
MDC_IDC_MSMT_BATTERY_DTM: NORMAL
MDC_IDC_MSMT_BATTERY_REMAINING_LONGEVITY: 131 MO
MDC_IDC_MSMT_BATTERY_RRT_TRIGGER: 2.62
MDC_IDC_MSMT_BATTERY_STATUS: NORMAL
MDC_IDC_MSMT_BATTERY_VOLTAGE: 3.03 V
MDC_IDC_MSMT_LEADCHNL_RV_IMPEDANCE_VALUE: 323 OHM
MDC_IDC_MSMT_LEADCHNL_RV_IMPEDANCE_VALUE: 399 OHM
MDC_IDC_MSMT_LEADCHNL_RV_PACING_THRESHOLD_AMPLITUDE: 0.62 V
MDC_IDC_MSMT_LEADCHNL_RV_PACING_THRESHOLD_PULSEWIDTH: 0.4 MS
MDC_IDC_MSMT_LEADCHNL_RV_SENSING_INTR_AMPL: 11.38 MV
MDC_IDC_MSMT_LEADCHNL_RV_SENSING_INTR_AMPL: 11.38 MV
MDC_IDC_PG_IMPLANT_DTM: NORMAL
MDC_IDC_PG_MFG: NORMAL
MDC_IDC_PG_MODEL: NORMAL
MDC_IDC_PG_SERIAL: NORMAL
MDC_IDC_PG_TYPE: NORMAL
MDC_IDC_SESS_CLINIC_NAME: NORMAL
MDC_IDC_SESS_DTM: NORMAL
MDC_IDC_SESS_TYPE: NORMAL
MDC_IDC_SET_BRADY_HYSTRATE: NORMAL
MDC_IDC_SET_BRADY_LOWRATE: 70 {BEATS}/MIN
MDC_IDC_SET_BRADY_MAX_SENSOR_RATE: 130 {BEATS}/MIN
MDC_IDC_SET_BRADY_MODE: NORMAL
MDC_IDC_SET_LEADCHNL_RV_PACING_AMPLITUDE: 2 V
MDC_IDC_SET_LEADCHNL_RV_PACING_ANODE_ELECTRODE_1: NORMAL
MDC_IDC_SET_LEADCHNL_RV_PACING_ANODE_LOCATION_1: NORMAL
MDC_IDC_SET_LEADCHNL_RV_PACING_CAPTURE_MODE: NORMAL
MDC_IDC_SET_LEADCHNL_RV_PACING_CATHODE_ELECTRODE_1: NORMAL
MDC_IDC_SET_LEADCHNL_RV_PACING_CATHODE_LOCATION_1: NORMAL
MDC_IDC_SET_LEADCHNL_RV_PACING_POLARITY: NORMAL
MDC_IDC_SET_LEADCHNL_RV_PACING_PULSEWIDTH: 0.4 MS
MDC_IDC_SET_LEADCHNL_RV_SENSING_ANODE_ELECTRODE_1: NORMAL
MDC_IDC_SET_LEADCHNL_RV_SENSING_ANODE_LOCATION_1: NORMAL
MDC_IDC_SET_LEADCHNL_RV_SENSING_CATHODE_ELECTRODE_1: NORMAL
MDC_IDC_SET_LEADCHNL_RV_SENSING_CATHODE_LOCATION_1: NORMAL
MDC_IDC_SET_LEADCHNL_RV_SENSING_POLARITY: NORMAL
MDC_IDC_SET_LEADCHNL_RV_SENSING_SENSITIVITY: 0.9 MV
MDC_IDC_SET_ZONE_DETECTION_INTERVAL: 360 MS
MDC_IDC_SET_ZONE_STATUS: NORMAL
MDC_IDC_SET_ZONE_TYPE: NORMAL
MDC_IDC_SET_ZONE_VENDOR_TYPE: NORMAL
MDC_IDC_STAT_BRADY_DTM_END: NORMAL
MDC_IDC_STAT_BRADY_DTM_START: NORMAL
MDC_IDC_STAT_BRADY_RV_PERCENT_PACED: 99.94 %
MDC_IDC_STAT_EPISODE_RECENT_COUNT: 0
MDC_IDC_STAT_EPISODE_RECENT_COUNT: 0
MDC_IDC_STAT_EPISODE_RECENT_COUNT: 2
MDC_IDC_STAT_EPISODE_RECENT_COUNT_DTM_END: NORMAL
MDC_IDC_STAT_EPISODE_RECENT_COUNT_DTM_START: NORMAL
MDC_IDC_STAT_EPISODE_TOTAL_COUNT: 0
MDC_IDC_STAT_EPISODE_TOTAL_COUNT: 0
MDC_IDC_STAT_EPISODE_TOTAL_COUNT: 4
MDC_IDC_STAT_EPISODE_TOTAL_COUNT_DTM_END: NORMAL
MDC_IDC_STAT_EPISODE_TOTAL_COUNT_DTM_START: NORMAL
MDC_IDC_STAT_EPISODE_TYPE: NORMAL

## 2024-05-20 PROCEDURE — 93296 REM INTERROG EVL PM/IDS: CPT | Performed by: INTERNAL MEDICINE

## 2024-05-20 PROCEDURE — 93294 REM INTERROG EVL PM/LDLS PM: CPT | Performed by: INTERNAL MEDICINE

## 2024-05-24 ENCOUNTER — ANTICOAGULATION THERAPY VISIT (OUTPATIENT)
Dept: ANTICOAGULATION | Facility: CLINIC | Age: 74
End: 2024-05-24

## 2024-05-24 ENCOUNTER — LAB (OUTPATIENT)
Dept: LAB | Facility: CLINIC | Age: 74
End: 2024-05-24
Payer: COMMERCIAL

## 2024-05-24 DIAGNOSIS — I48.0 PAROXYSMAL ATRIAL FIBRILLATION (H): ICD-10-CM

## 2024-05-24 DIAGNOSIS — Z79.01 LONG TERM CURRENT USE OF ANTICOAGULANT THERAPY: ICD-10-CM

## 2024-05-24 DIAGNOSIS — Z79.01 LONG TERM CURRENT USE OF ANTICOAGULANT THERAPY: Primary | ICD-10-CM

## 2024-05-24 LAB — INR BLD: 2.6 (ref 0.9–1.1)

## 2024-05-24 PROCEDURE — 36416 COLLJ CAPILLARY BLOOD SPEC: CPT

## 2024-05-24 PROCEDURE — 85610 PROTHROMBIN TIME: CPT

## 2024-05-24 NOTE — PROGRESS NOTES
ANTICOAGULATION MANAGEMENT     Kevan Connelly 73 year old male is on warfarin with therapeutic INR result. (Goal INR 2.0-3.0)    Recent labs: (last 7 days)     05/24/24  0832   INR 2.6*       ASSESSMENT     Warfarin Lab Questionnaire    Warfarin Doses Last 7 Days    Confirmed proper maintenance dose.      5/23/2024   Warfarin Lab Questionnaire   Missed doses within past 14 days? No   Changes in diet or alcohol within past 14 days? No   Medication changes since last result? Yes   Please list: Cortisone pills - Prednisone taper prescribed last week for illness, has 4 days left. Not seeing influence on today's INR.    Injuries or illness since last result? No   New shortness of breath, severe headaches or sudden changes in vision since last result? No   Abnormal bleeding since last result? No   Upcoming surgery, procedure? No     Previous result: Therapeutic last visit; previously outside of goal range  Additional findings: None       PLAN     Recommended plan for no diet, medication or health factor changes affecting INR     Dosing Instructions: Continue your current warfarin dose with next INR in 3 weeks       Summary  As of 5/24/2024      Full warfarin instructions:  2.5 mg every Tue; 5 mg all other days   Next INR check:  6/18/2024               Telephone call with Keron who verbalizes understanding and agrees to plan    Lab visit scheduled    Education provided:   Please call back if any changes to your diet, medications or how you've been taking warfarin    Plan made per [facility]agulation protocol    Ksenia Faith RN  Anticoagulation Clinic  5/24/2024    _______________________________________________________________________     Anticoagulation Episode Summary       Current INR goal:  2.0-3.0   TTR:  75.5% (1 y)   Target end date:  Indefinite   Send INR reminders to:  [facility]    Indications    Long term current use of anticoagulant therapy [Z79.01]  Paroxysmal atrial fibrillation (H)  [I48.0]             Comments:               Anticoagulation Care Providers       Provider Role Specialty Phone number    Davina Reaves MD Referring Internal Medicine [phone]

## 2024-06-18 ENCOUNTER — LAB (OUTPATIENT)
Dept: LAB | Facility: CLINIC | Age: 74
End: 2024-06-18
Payer: COMMERCIAL

## 2024-06-18 ENCOUNTER — ANTICOAGULATION THERAPY VISIT (OUTPATIENT)
Dept: ANTICOAGULATION | Facility: CLINIC | Age: 74
End: 2024-06-18

## 2024-06-18 DIAGNOSIS — I48.0 PAROXYSMAL ATRIAL FIBRILLATION (H): ICD-10-CM

## 2024-06-18 DIAGNOSIS — Z79.01 LONG TERM CURRENT USE OF ANTICOAGULANT THERAPY: Primary | ICD-10-CM

## 2024-06-18 DIAGNOSIS — Z79.01 LONG TERM CURRENT USE OF ANTICOAGULANT THERAPY: ICD-10-CM

## 2024-06-18 LAB — INR BLD: 3 (ref 0.9–1.1)

## 2024-06-18 PROCEDURE — 36416 COLLJ CAPILLARY BLOOD SPEC: CPT

## 2024-06-18 PROCEDURE — 85610 PROTHROMBIN TIME: CPT

## 2024-06-18 NOTE — PROGRESS NOTES
ANTICOAGULATION MANAGEMENT     Mathur DHRUV Connelly 73 year old male is on warfarin with therapeutic INR result. (Goal INR 2.0-3.0)    Recent labs: (last 7 days)     06/18/24  0813   INR 3.0*       ASSESSMENT     Source(s): Chart Review and Patient/Caregiver Call     Warfarin doses taken: Warfarin taken as instructed  Diet: No new diet changes identified  Medication/supplement changes: None noted  New illness, injury, or hospitalization: No  Signs or symptoms of bleeding or clotting: No  Previous result: Therapeutic last 2(+) visits  Additional findings: None       PLAN     Recommended plan for no diet, medication or health factor changes affecting INR     Dosing Instructions: Continue your current warfarin dose with next INR in 4 weeks       Summary  As of 6/18/2024      Full warfarin instructions:  2.5 mg every Tue; 5 mg all other days   Next INR check:  7/16/2024               Telephone call with Keron who verbalizes understanding and agrees to plan    Lab visit scheduled    Education provided:   Dietary considerations: importance of consistent vitamin K intake    Plan made per [facility] anticoagulation protocol    Ramona Delcid RN  Anticoagulation Clinic  6/18/2024    _______________________________________________________________________     Anticoagulation Episode Summary       Current INR goal:  2.0-3.0   TTR:  81.3% (1 y)   Target end date:  Indefinite   Send INR reminders to:  [facility]    Indications    Long term current use of anticoagulant therapy [Z79.01]  Paroxysmal atrial fibrillation (H) [I48.0]             Comments:               Anticoagulation Care Providers       Provider Role Specialty Phone number    Davina Reaves MD Referring Internal Medicine [phone]

## 2024-07-02 SDOH — HEALTH STABILITY: PHYSICAL HEALTH: ON AVERAGE, HOW MANY MINUTES DO YOU ENGAGE IN EXERCISE AT THIS LEVEL?: 10 MIN

## 2024-07-02 SDOH — HEALTH STABILITY: PHYSICAL HEALTH: ON AVERAGE, HOW MANY DAYS PER WEEK DO YOU ENGAGE IN MODERATE TO STRENUOUS EXERCISE (LIKE A BRISK WALK)?: 2 DAYS

## 2024-07-02 ASSESSMENT — SOCIAL DETERMINANTS OF HEALTH (SDOH): HOW OFTEN DO YOU GET TOGETHER WITH FRIENDS OR RELATIVES?: ONCE A WEEK

## 2024-07-03 ENCOUNTER — OFFICE VISIT (OUTPATIENT)
Dept: FAMILY MEDICINE | Facility: CLINIC | Age: 74
End: 2024-07-03
Payer: COMMERCIAL

## 2024-07-03 VITALS
SYSTOLIC BLOOD PRESSURE: 135 MMHG | TEMPERATURE: 98 F | DIASTOLIC BLOOD PRESSURE: 87 MMHG | HEIGHT: 75 IN | OXYGEN SATURATION: 97 % | RESPIRATION RATE: 16 BRPM | HEART RATE: 92 BPM | WEIGHT: 260 LBS | BODY MASS INDEX: 32.33 KG/M2

## 2024-07-03 DIAGNOSIS — N18.31 STAGE 3A CHRONIC KIDNEY DISEASE (H): ICD-10-CM

## 2024-07-03 DIAGNOSIS — R17 SERUM TOTAL BILIRUBIN ELEVATED: ICD-10-CM

## 2024-07-03 DIAGNOSIS — I77.810 THORACIC AORTIC ECTASIA (H): ICD-10-CM

## 2024-07-03 DIAGNOSIS — I25.10 CORONARY ARTERY CALCIFICATION: ICD-10-CM

## 2024-07-03 DIAGNOSIS — R17 TOTAL BILIRUBIN, ELEVATED: ICD-10-CM

## 2024-07-03 DIAGNOSIS — I50.42 CHRONIC COMBINED SYSTOLIC AND DIASTOLIC CONGESTIVE HEART FAILURE (H): ICD-10-CM

## 2024-07-03 DIAGNOSIS — I48.0 PAROXYSMAL ATRIAL FIBRILLATION (H): ICD-10-CM

## 2024-07-03 DIAGNOSIS — I48.92 ATRIAL FLUTTER, UNSPECIFIED TYPE (H): ICD-10-CM

## 2024-07-03 DIAGNOSIS — Z00.00 ENCOUNTER FOR MEDICARE ANNUAL WELLNESS EXAM: Primary | ICD-10-CM

## 2024-07-03 LAB
ALBUMIN SERPL BCG-MCNC: 4.1 G/DL (ref 3.5–5.2)
ALP SERPL-CCNC: 57 U/L (ref 40–150)
ALT SERPL W P-5'-P-CCNC: 16 U/L (ref 0–70)
ANION GAP SERPL CALCULATED.3IONS-SCNC: 6 MMOL/L (ref 7–15)
AST SERPL W P-5'-P-CCNC: 19 U/L (ref 0–45)
BILIRUB SERPL-MCNC: 1.5 MG/DL
BUN SERPL-MCNC: 23.2 MG/DL (ref 8–23)
CALCIUM SERPL-MCNC: 9.2 MG/DL (ref 8.8–10.2)
CHLORIDE SERPL-SCNC: 108 MMOL/L (ref 98–107)
CHOLEST SERPL-MCNC: 135 MG/DL
CREAT SERPL-MCNC: 1.22 MG/DL (ref 0.67–1.17)
DEPRECATED HCO3 PLAS-SCNC: 24 MMOL/L (ref 22–29)
EGFRCR SERPLBLD CKD-EPI 2021: 63 ML/MIN/1.73M2
FASTING STATUS PATIENT QL REPORTED: YES
FASTING STATUS PATIENT QL REPORTED: YES
GLUCOSE SERPL-MCNC: 103 MG/DL (ref 70–99)
HDLC SERPL-MCNC: 41 MG/DL
LDLC SERPL CALC-MCNC: 78 MG/DL
NONHDLC SERPL-MCNC: 94 MG/DL
POTASSIUM SERPL-SCNC: 5.3 MMOL/L (ref 3.4–5.3)
PROT SERPL-MCNC: 6.6 G/DL (ref 6.4–8.3)
SODIUM SERPL-SCNC: 138 MMOL/L (ref 135–145)
TRIGL SERPL-MCNC: 78 MG/DL

## 2024-07-03 PROCEDURE — G0439 PPPS, SUBSEQ VISIT: HCPCS | Performed by: STUDENT IN AN ORGANIZED HEALTH CARE EDUCATION/TRAINING PROGRAM

## 2024-07-03 PROCEDURE — 36415 COLL VENOUS BLD VENIPUNCTURE: CPT | Performed by: STUDENT IN AN ORGANIZED HEALTH CARE EDUCATION/TRAINING PROGRAM

## 2024-07-03 PROCEDURE — 82248 BILIRUBIN DIRECT: CPT | Performed by: STUDENT IN AN ORGANIZED HEALTH CARE EDUCATION/TRAINING PROGRAM

## 2024-07-03 PROCEDURE — 80053 COMPREHEN METABOLIC PANEL: CPT | Performed by: STUDENT IN AN ORGANIZED HEALTH CARE EDUCATION/TRAINING PROGRAM

## 2024-07-03 PROCEDURE — 99214 OFFICE O/P EST MOD 30 MIN: CPT | Mod: 25 | Performed by: STUDENT IN AN ORGANIZED HEALTH CARE EDUCATION/TRAINING PROGRAM

## 2024-07-03 PROCEDURE — 80061 LIPID PANEL: CPT | Performed by: STUDENT IN AN ORGANIZED HEALTH CARE EDUCATION/TRAINING PROGRAM

## 2024-07-03 RX ORDER — ATORVASTATIN CALCIUM 20 MG/1
20 TABLET, FILM COATED ORAL DAILY
Qty: 90 TABLET | Refills: 3 | Status: SHIPPED | OUTPATIENT
Start: 2024-07-03

## 2024-07-03 RX ORDER — RESPIRATORY SYNCYTIAL VIRUS VACCINE 120MCG/0.5
0.5 KIT INTRAMUSCULAR ONCE
Qty: 1 EACH | Refills: 0 | Status: CANCELLED | OUTPATIENT
Start: 2024-07-03 | End: 2024-07-03

## 2024-07-03 ASSESSMENT — PAIN SCALES - GENERAL: PAINLEVEL: NO PAIN (0)

## 2024-07-03 NOTE — PROGRESS NOTES
"Preventive Care Visit  [facility]  Rivera Bryan MD, Family Practice  Jul 3, 2024    Assessment & Plan     Encounter for Medicare annual wellness exam  Doing well overall. Fasting thus will obtain lipid and serum glucose. UTD on colonoscopy (q5 yr due to family hx of CRC). Discussed vaccination recommendations.     Paroxysmal atrial fibrillation (H)  Atrial flutter, unspecified type (H)  Following with Cardiology. S/p AVNA and PPM. On carvedilol 12.5mg BID and warfarin anticoagulation.    Chronic combined systolic and diastolic congestive heart failure (H)  Following with Cardiology. On ARB, BB, diuretic prn (hasn't needed in 1.5 years). Obtaining CMP today. Ok to refill 100mg losartan, 12.5mg carvedilol BID for 1 yr.   - Comprehensive metabolic panel (BMP + Alb, Alk Phos, ALT, AST, Total. Bili, TP)    Thoracic aortic ectasia (H24)  Following with Cardiology. Last echo on 2020 noted ascending aorta of 4.2cm; Echo in 2022 noted normal ascending aortic size.     Coronary artery calcification  - atorvastatin (LIPITOR) 20 MG tablet  Dispense: 90 tablet; Refill: 3  - Lipid panel reflex to direct LDL Fasting    Stage 3a chronic kidney disease (H)  Largely stable creatinine since 2017 bordering on CKD2 to 3a. Will repeat BMP today.     BMI  Estimated body mass index is 32.5 kg/m  as calculated from the following:    Height as of this encounter: 1.905 m (6' 3\").    Weight as of this encounter: 117.9 kg (260 lb).     Counseling  Appropriate preventive services were discussed with this patient.  Checklist reviewing preventive services available has been given to the patient.    Follow up for annual physical in one year    Rivera Bryan MD  [facility], [place]  7/3/2024      Hammad Cabrales is a 73 year old, presenting for the following:  Medicare Visit (fasting)        7/3/2024     7:55 AM   Additional Questions   Roomed by kb     Health Care Directive  Patient does not have a Health Care " Directive or Living Will: Patient states has Advance Directive and will bring in a copy to clinic.    HPI    CHF  Follows with Cardiology. Last visit in fall.   Is on losartan, carvedilol and furosemide prn.   Hasn't needed the furosemide in almost 1.5 years though.    Atrial fibrillation/flutter  Is on BB, warfarin for this.     HLD  Taking atorvastatin. Tolerating well.   No issues.    Lumbar radiculopathy  Was following with [facility].   Recently had impaired function but s/p cortisone, PT in April of this year.   Back to pre-incident baseline almost.   Using cane only prn now.        7/2/2024   General Health   How would you rate your overall physical health? Good   Feel stress (tense, anxious, or unable to sleep) Not at all            7/2/2024   Nutrition   Diet: Regular (no restrictions)    Breakfast skipped       Multiple values from one day are sorted in reverse-chronological order         7/2/2024   Exercise   Days per week of moderate/strenous exercise 2 days   Average minutes spent exercising at this level 10 min      (!) EXERCISE CONCERN      7/2/2024   Social Factors   Frequency of gathering with friends or relatives Once a week   Worry food won't last until get money to buy more No   Food not last or not have enough money for food? No   Do you have housing? (Housing is defined as stable permanent housing and does not include staying ouside in a car, in a tent, in an abandoned building, in an overnight shelter, or couch-surfing.) Yes   Are you worried about losing your housing? No   Lack of transportation? No   Unable to get utilities (heat,electricity)? No            7/3/2024   Fall Risk   Gait Speed Test (Document in seconds) 5   Gait Speed Test Interpretation Less than or equal to 5.00 seconds - PASS               7/2/2024   Activities of Daily Living- Home Safety   Needs help with the following daily activites None of the above   Safety concerns in the home None of the above            7/2/2024   Dental    Dentist two times every year? Yes            7/2/2024   Hearing Screening   Hearing concerns? (!) IT'S HARDER TO UNDERSTAND WOMEN'S VOICES THAN MEN'S VOICES.            7/2/2024   Driving Risk Screening   Patient/family members have concerns about driving No            7/2/2024   General Alertness/Fatigue Screening   Have you been more tired than usual lately? No            7/2/2024   Urinary Incontinence Screening   Bothered by leaking urine in past 6 months No            7/2/2024   TB Screening   Were you born outside of the [place]? No            Today's PHQ-2 Score:       7/2/2024     5:05 PM   PHQ-2 ( 1999 Pfizer)   Q1: Little interest or pleasure in doing things 0   Q2: Feeling down, depressed or hopeless 0   PHQ-2 Score 0   Q1: Little interest or pleasure in doing things Not at all   Q2: Feeling down, depressed or hopeless Not at all   PHQ-2 Score 0           7/2/2024   Substance Use   Alcohol more than 3/day or more than 7/wk No   Do you have a current opioid prescription? No   How severe/bad is pain from 1 to 10? 2/10   Do you use any other substances recreationally? No        Social History     Tobacco Use    Smoking status: Never     Passive exposure: Never    Smokeless tobacco: Never   Vaping Use    Vaping status: Never Used   Substance Use Topics    Alcohol use: Not Currently     Comment: every 1-2 months or less    Drug use: No         7/2/2024   AAA Screening   Family history of Abdominal Aortic Aneurysm (AAA)? No      ASCVD Risk   The 10-year ASCVD risk score (Benita HTOMPSON, et al., 2019) is: 22.4%    Values used to calculate the score:      Age: 73 years      Sex: Male      Is Non- : No      Diabetic: No      Tobacco smoker: No      Systolic Blood Pressure: 135 mmHg      Is BP treated: Yes      HDL Cholesterol: 39 mg/dL      Total Cholesterol: 134 mg/dL    Reviewed and updated as needed this visit by Provider   Tobacco  Allergies  Meds   Med Hx  Surg Hx  Fam Hx         "    Current providers sharing in care for this patient include:  Patient Care Team:  Rivera Bryan MD as PCP - General (Family Practice)  Davina Reaves MD as Assigned PCP  Meagan Valero PA-C as Physician Assistant (Cardiovascular Disease)  Meagan Valero PA-C as Assigned Heart and Vascular Provider    The following health maintenance items are reviewed in Epic and correct as of today:  Health Maintenance   Topic Date Due    HF ACTION PLAN  Never done    ZOSTER IMMUNIZATION (1 of 2) Never done    RSV VACCINE (Pregnancy & 60+) (1 - 1-dose 60+ series) Never done    COVID-19 Vaccine (1 - 2023-24 season) Never done    BMP  12/26/2023    ALT  06/26/2024    LIPID  06/26/2024    ANNUAL REVIEW OF HM ORDERS  06/26/2024    INFLUENZA VACCINE (1) 09/01/2024    CBC  10/18/2024    HEMOGLOBIN  10/18/2024    MEDICARE ANNUAL WELLNESS VISIT  07/03/2025    FALL RISK ASSESSMENT  07/03/2025    COLORECTAL CANCER SCREENING  10/27/2025    GLUCOSE  06/26/2026    ADVANCE CARE PLANNING  06/26/2028    DTAP/TDAP/TD IMMUNIZATION (3 - Td or Tdap) 01/22/2029    TSH W/FREE T4 REFLEX  Completed    HEPATITIS C SCREENING  Completed    PHQ-2 (once per calendar year)  Completed    Pneumococcal Vaccine: 65+ Years  Completed    URINALYSIS  Completed    IPV IMMUNIZATION  Aged Out    HPV IMMUNIZATION  Aged Out    MENINGITIS IMMUNIZATION  Aged Out    RSV MONOCLONAL ANTIBODY  Aged Out    MICROALBUMIN  Discontinued        Objective    Exam  /87   Pulse 92   Temp 98  F (36.7  C)   Resp 16   Ht 1.905 m (6' 3\")   Wt 117.9 kg (260 lb)   SpO2 97%   BMI 32.50 kg/m     Estimated body mass index is 32.5 kg/m  as calculated from the following:    Height as of this encounter: 1.905 m (6' 3\").    Weight as of this encounter: 117.9 kg (260 lb).    Physical Exam  GENERAL: healthy, alert and no distress  HEAD: Normocephalic, atraumatic.   EYES: PERRL. Normal conjunctivae, sclera.   ENT: Normal EAC and TMs bilaterally. Normal oropharynx. "   NECK: Supple. No lymphadenopathy appreciated. Trachea midline. Thyroid not enlarged, not TTP.  RESP: lungs clear to auscultation - no rales, rhonchi or wheezes  CV: regular rate and rhythm, normal S1 S2, no murmur, click, rub or gallop. Trace - 1+ pitting edema bilaterally up to mid-calves. Compression stockings in place.  ABDOMEN: soft, no TTP x4 quadrants. No hepatomegaly or masses appreciated. BS normactive.  MSK: no gross musculoskeletal defects noted.  SKIN: no suspicious lesions or rashes.  EXT: Warm and well perfused.  NEURO: CNII-XII grossly intact. No focal deficits.  PSYCH: Groomed, dressed appropriately for weather. Normal mood with consistent affect. '           7/3/2024   Mini Cog   Clock Draw Score 2 Normal   3 Item Recall 3 objects recalled   Mini Cog Total Score 5        Signed Electronically by: Rivera Bryan MD

## 2024-07-03 NOTE — PATIENT INSTRUCTIONS
"Patient Education   Preventive Care Advice   This is general advice we often give to help people stay healthy. Your care team may have specific advice just for you. Please talk to your care team about your own preventive care needs.  Lifestyle  Exercise at least 150 minutes each week (30 minutes a day, 5 days a week).  Do muscle strengthening activities 2 days a week. These help control your weight and prevent disease.  No smoking.  Wear sunscreen to prevent skin cancer.  Have your home tested for radon every 2 to 5 years. Radon is a colorless, odorless gas that can harm your lungs. To learn more, go to www.health.[facility].mn.us and search for \"Radon in Homes.\"  Keep guns unloaded and locked up in a safe place like a safe or gun vault, or, use a gun lock and hide the keys. Always lock away bullets separately. To learn more, visit [employer].mn.gov and search for \"safe gun storage.\"  Nutrition  Eat 5 or more servings of fruits and vegetables each day.  Try wheat bread, brown rice and whole grain pasta (instead of white bread, rice, and pasta).  Get enough calcium and vitamin D. Check the label on foods and aim for 100% of the RDA (recommended daily allowance).  Regular exams  Have a dental exam and cleaning every 6 months.  See your health care team every year to talk about:  Any changes in your health.  Any medicines your care team has prescribed.  Preventive care, family planning, and ways to prevent chronic diseases.  Shots (vaccines)   HPV shots (up to age 26), if you've never had them before.  Hepatitis B shots (up to age 59), if you've never had them before.  COVID-19 shot: Get this shot when it's due.  Flu shot: Get a flu shot every year.  Tetanus shot: Get a tetanus shot every 10 years.  Pneumococcal, hepatitis A, and RSV shots: Ask your care team if you need these based on your risk.  Shingles shot (for age 50 and up).  General health tests  Diabetes screening:  Starting at age 35, Get screened for diabetes at least " every 3 years.  If you are younger than age 35, ask your care team if you should be screened for diabetes.  Cholesterol test: At age 39, start having a cholesterol test every 5 years, or more often if advised.  Bone density scan (DEXA): At age 50, ask your care team if you should have this scan for osteoporosis (brittle bones).  Hepatitis C: Get tested at least once in your life.  Abdominal aortic aneurysm screening: Talk to your doctor about having this screening if you:  Have ever smoked; and  Are biologically male; and  Are between the ages of 65 and 75.  STIs (sexually transmitted infections)  Before age 24: Ask your care team if you should be screened for STIs.  After age 24: Get screened for STIs if you're at risk. You are at risk for STIs (including HIV) if:  You are sexually active with more than one person.  You don't use condoms every time.  You or a partner was diagnosed with a sexually transmitted infection.  If you are at risk for HIV, ask about PrEP medicine to prevent HIV.  Get tested for HIV at least once in your life, whether you are at risk for HIV or not.  Cancer screening tests  Cervical cancer screening: If you have a cervix, begin getting regular cervical cancer screening tests at age 21. Most people who have regular screenings with normal results can stop after age 65. Talk about this with your provider.  Breast cancer scan (mammogram): If you've ever had breasts, begin having regular mammograms starting at age 40. This is a scan to check for breast cancer.  Colon cancer screening: It is important to start screening for colon cancer at age 45.  Have a colonoscopy test every 10 years (or more often if you're at risk) Or, ask your provider about stool tests like a FIT test every year or Cologuard test every 3 years.  To learn more about your testing options, visit: www.SingleFeed/573770.pdf.  For help making a decision, visit: summer/kq09950.  Prostate cancer screening test: If you have a  prostate and are age 55 to 69, ask your provider if you would benefit from a yearly prostate cancer screening test.  Lung cancer screening: If you are a current or former smoker age 50 to 80, ask your care team if ongoing lung cancer screenings are right for you.  For informational purposes only. Not to replace the advice of your health care provider. Copyright   2023 [facility]. All rights reserved. Clinically reviewed by the  [employer] [facility]. [employer] 996359 - REV 04/24.  Preventing Falls: Care Instructions  Injuries and health problems such as trouble walking or poor eyesight can increase your risk of falling. So can some medicines. But there are things you can do to help prevent falls. You can exercise to get stronger. You can also arrange your home to make it safer.    Talk to your doctor about the medicines you take. Ask if any of them increase the risk of falls and whether they can be changed or stopped.   Try to exercise regularly. It can help improve your strength and balance. This can help lower your risk of falling.     Practice fall safety and prevention.    Wear low-heeled shoes that fit well and give your feet good support. Talk to your doctor if you have foot problems that make this hard.  Carry a cellphone or wear a medical alert device that you can use to call for help.  Use stepladders instead of chairs to reach high objects. Don't climb if you're at risk for falls. Ask for help, if needed.  Wear the correct eyeglasses, if you need them.    Make your home safer.    Remove rugs, cords, clutter, and furniture from walkways.  Keep your house well lit. Use night-lights in hallways and bathrooms.  Install and use sturdy handrails on stairways.  Wear nonskid footwear, even inside. Don't walk barefoot or in socks without shoes.    Be safe outside.    Use handrails, curb cuts, and ramps whenever possible.  Keep your hands free by using a shoulder bag or backpack.  Try  "to walk in well-lit areas. Watch out for uneven ground, changes in pavement, and debris.  Be careful in the winter. Walk on the grass or gravel when sidewalks are slippery. Use de-icer on steps and walkways. Add non-slip devices to shoes.    Put grab bars and nonskid mats in your shower or tub and near the toilet. Try to use a shower chair or bath bench when bathing.   Get into a tub or shower by putting in your weaker leg first. Get out with your strong side first. Have a phone or medical alert device in the bathroom with you.   Where can you learn more?  Go to https://www.[employer].net/patiented  Enter G117 in the search box to learn more about \"Preventing Falls: Care Instructions.\"  Current as of: July 17, 2023               Content Version: 14.0    2646-0127 [employer].   Care instructions adapted under license by your healthcare professional. If you have questions about a medical condition or this instruction, always ask your healthcare professional. [employer] disclaims any warranty or liability for your use of this information.      Hearing Loss: Care Instructions  Overview     Hearing loss is a sudden or slow decrease in how well you hear. It can range from slight to profound. Permanent hearing loss can occur with aging. It also can happen when you are exposed long-term to loud noise. Examples include listening to loud music, riding motorcycles, or being around other loud machines.  Hearing loss can affect your work and home life. It can make you feel lonely or depressed. You may feel that you have lost your independence. But hearing aids and other devices can help you hear better and feel connected to others.  Follow-up care is a key part of your treatment and safety. Be sure to make and go to all appointments, and call your doctor if you are having problems. It's also a good idea to know your test results and keep a list of the medicines you take.  How can you care for yourself " at home?  Avoid loud noises whenever possible. This helps keep your hearing from getting worse.  Always wear hearing protection around loud noises.  Wear a hearing aid as directed.  A professional can help you pick a hearing aid that will work best for you.  You can also get hearing aids over the counter for mild to moderate hearing loss.  Have hearing tests as your doctor suggests. They can show whether your hearing has changed. Your hearing aid may need to be adjusted.  Use other devices as needed. These may include:  Telephone amplifiers and hearing aids that can connect to a television, stereo, radio, or microphone.  Devices that use lights or vibrations. These alert you to the doorbell, a ringing telephone, or a baby monitor.  Television closed-captioning. This shows the words at the bottom of the screen. Most new TVs can do this.  TTY (text telephone). This lets you type messages back and forth on the telephone instead of talking or listening. These devices are also called TDD. When messages are typed on the keyboard, they are sent over the phone line to a receiving TTY. The message is shown on a monitor.  Use text messaging, social media, and email if it is hard for you to communicate by telephone.  Try to learn a listening technique called speechreading. It is not lipreading. You pay attention to people's gestures, expressions, posture, and tone of voice. These clues can help you understand what a person is saying. Face the person you are talking to, and have them face you. Make sure the lighting is good. You need to see the other person's face clearly.  Think about counseling if you need help to adjust to your hearing loss.  When should you call for help?  Watch closely for changes in your health, and be sure to contact your doctor if:    You think your hearing is getting worse.     You have new symptoms, such as dizziness or nausea.   Where can you learn more?  Go to  "https://www.[employer]Rank[employer].com.net/patiented  Enter R798 in the search box to learn more about \"Hearing Loss: Care Instructions.\"  Current as of: September 27, 2023               Content Version: 14.0    9588-4502 [employer].   Care instructions adapted under license by your healthcare professional. If you have questions about a medical condition or this instruction, always ask your healthcare professional. [employer] disclaims any warranty or liability for your use of this information.         "

## 2024-07-04 PROBLEM — R73.01 ELEVATED FASTING BLOOD SUGAR: Status: ACTIVE | Noted: 2024-07-04

## 2024-07-04 PROBLEM — R17 TOTAL BILIRUBIN, ELEVATED: Status: ACTIVE | Noted: 2024-07-04

## 2024-07-04 LAB
BILIRUB DIRECT SERPL-MCNC: 0.36 MG/DL (ref 0–0.3)
BILIRUB SERPL-MCNC: 1.5 MG/DL

## 2024-07-11 PROBLEM — M54.16 LUMBAR RADICULOPATHY: Status: RESOLVED | Noted: 2024-04-24 | Resolved: 2024-07-11

## 2024-07-11 PROBLEM — M50.30 DDD (DEGENERATIVE DISC DISEASE), CERVICAL: Status: RESOLVED | Noted: 2024-04-24 | Resolved: 2024-07-11

## 2024-07-11 NOTE — PROGRESS NOTES
DISCHARGE  Reason for Discharge: Patient has failed to schedule further appointments.    Equipment Issued:     Discharge Plan: Patient to continue home program.    Referring Provider:  Louis Townsend

## 2024-07-16 ENCOUNTER — LAB (OUTPATIENT)
Dept: LAB | Facility: CLINIC | Age: 74
End: 2024-07-16
Payer: COMMERCIAL

## 2024-07-16 ENCOUNTER — ANTICOAGULATION THERAPY VISIT (OUTPATIENT)
Dept: ANTICOAGULATION | Facility: CLINIC | Age: 74
End: 2024-07-16

## 2024-07-16 DIAGNOSIS — I48.0 PAROXYSMAL ATRIAL FIBRILLATION (H): Primary | ICD-10-CM

## 2024-07-16 DIAGNOSIS — Z79.01 LONG TERM CURRENT USE OF ANTICOAGULANT THERAPY: ICD-10-CM

## 2024-07-16 DIAGNOSIS — I48.0 PAROXYSMAL ATRIAL FIBRILLATION (H): ICD-10-CM

## 2024-07-16 DIAGNOSIS — R17 TOTAL BILIRUBIN, ELEVATED: ICD-10-CM

## 2024-07-16 LAB
BASOPHILS # BLD AUTO: 0 10E3/UL (ref 0–0.2)
BASOPHILS NFR BLD AUTO: 1 %
EOSINOPHIL # BLD AUTO: 0.1 10E3/UL (ref 0–0.7)
EOSINOPHIL NFR BLD AUTO: 2 %
ERYTHROCYTE [DISTWIDTH] IN BLOOD BY AUTOMATED COUNT: 13.1 % (ref 10–15)
HCT VFR BLD AUTO: 43.6 % (ref 40–53)
HGB BLD-MCNC: 15.3 G/DL (ref 13.3–17.7)
IMM GRANULOCYTES # BLD: 0 10E3/UL
IMM GRANULOCYTES NFR BLD: 0 %
INR BLD: 2.5 (ref 0.9–1.1)
LYMPHOCYTES # BLD AUTO: 1.3 10E3/UL (ref 0.8–5.3)
LYMPHOCYTES NFR BLD AUTO: 23 %
MCH RBC QN AUTO: 32 PG (ref 26.5–33)
MCHC RBC AUTO-ENTMCNC: 35.1 G/DL (ref 31.5–36.5)
MCV RBC AUTO: 91 FL (ref 78–100)
MONOCYTES # BLD AUTO: 0.7 10E3/UL (ref 0–1.3)
MONOCYTES NFR BLD AUTO: 12 %
NEUTROPHILS # BLD AUTO: 3.3 10E3/UL (ref 1.6–8.3)
NEUTROPHILS NFR BLD AUTO: 62 %
PLATELET # BLD AUTO: 164 10E3/UL (ref 150–450)
RBC # BLD AUTO: 4.78 10E6/UL (ref 4.4–5.9)
RETICS # AUTO: 0.08 10E6/UL (ref 0.03–0.1)
RETICS/RBC NFR AUTO: 1.6 % (ref 0.5–2)
WBC # BLD AUTO: 5.4 10E3/UL (ref 4–11)

## 2024-07-16 PROCEDURE — 36415 COLL VENOUS BLD VENIPUNCTURE: CPT

## 2024-07-16 PROCEDURE — 85025 COMPLETE CBC W/AUTO DIFF WBC: CPT

## 2024-07-16 PROCEDURE — 85610 PROTHROMBIN TIME: CPT

## 2024-07-16 PROCEDURE — 99207 BLOOD MORPHOLOGY PATHOLOGIST REVIEW: CPT | Performed by: PATHOLOGY

## 2024-07-16 PROCEDURE — 85045 AUTOMATED RETICULOCYTE COUNT: CPT

## 2024-07-16 NOTE — PROGRESS NOTES
ANTICOAGULATION MANAGEMENT     Kevan Connelly 73 year old male is on warfarin with therapeutic INR result. (Goal INR 2.0-3.0)    Recent labs: (last 7 days)     07/16/24  0803   INR 2.5*     ASSESSMENT     Source(s): Chart Review and Patient/Caregiver Call     Warfarin doses taken: Warfarin taken as instructed  Diet: No new diet changes identified  Medication/supplement changes: None noted  New illness, injury, or hospitalization: No  Signs or symptoms of bleeding or clotting: No  Previous result: Therapeutic last 2(+) visits  Additional findings:  Wellness exam on 7/3/24--no change in care plan       PLAN     Recommended plan for no diet, medication or health factor changes affecting INR     Dosing Instructions: Continue your current warfarin dose with next INR in 5 weeks       Summary  As of 7/16/2024      Full warfarin instructions:  2.5 mg every Tue; 5 mg all other days   Next INR check:  8/20/2024               Telephone call with Keron who verbalizes understanding and agrees to plan    Lab visit scheduled    Education provided: None required    Plan made per [facility] anticoagulation protocol    Ramona Delcid RN  Anticoagulation Clinic  7/16/2024    _______________________________________________________________________     Anticoagulation Episode Summary       Current INR goal:  2.0-3.0   TTR:  81.3% (1 y)   Target end date:  Indefinite   Send INR reminders to:  [facility]    Indications    Paroxysmal atrial fibrillation (H) [I48.0]             Comments:               Anticoagulation Care Providers       Provider Role Specialty Phone number    Davina Reaves MD Referring Internal Medicine [phone]

## 2024-07-17 LAB
PATH REPORT.COMMENTS IMP SPEC: NORMAL
PATH REPORT.COMMENTS IMP SPEC: NORMAL
PATH REPORT.FINAL DX SPEC: NORMAL
PATH REPORT.MICROSCOPIC SPEC OTHER STN: NORMAL
PATH REPORT.MICROSCOPIC SPEC OTHER STN: NORMAL
PATH REPORT.RELEVANT HX SPEC: NORMAL

## 2024-07-23 DIAGNOSIS — I10 BENIGN ESSENTIAL HYPERTENSION: ICD-10-CM

## 2024-07-23 DIAGNOSIS — I48.0 PAROXYSMAL ATRIAL FIBRILLATION (H): ICD-10-CM

## 2024-07-23 RX ORDER — CARVEDILOL 12.5 MG/1
12.5 TABLET ORAL 2 TIMES DAILY WITH MEALS
Qty: 180 TABLET | Refills: 2 | Status: SHIPPED | OUTPATIENT
Start: 2024-07-23

## 2024-07-23 RX ORDER — LOSARTAN POTASSIUM 100 MG/1
100 TABLET ORAL DAILY
Qty: 90 TABLET | Refills: 2 | Status: SHIPPED | OUTPATIENT
Start: 2024-07-23

## 2024-08-20 ENCOUNTER — LAB (OUTPATIENT)
Dept: LAB | Facility: CLINIC | Age: 74
End: 2024-08-20
Payer: COMMERCIAL

## 2024-08-20 ENCOUNTER — ANTICOAGULATION THERAPY VISIT (OUTPATIENT)
Dept: ANTICOAGULATION | Facility: CLINIC | Age: 74
End: 2024-08-20

## 2024-08-20 DIAGNOSIS — I48.0 PAROXYSMAL ATRIAL FIBRILLATION (H): ICD-10-CM

## 2024-08-20 DIAGNOSIS — Z79.01 LONG TERM CURRENT USE OF ANTICOAGULANT THERAPY: ICD-10-CM

## 2024-08-20 DIAGNOSIS — I48.0 PAROXYSMAL ATRIAL FIBRILLATION (H): Primary | ICD-10-CM

## 2024-08-20 LAB — INR BLD: 1.9 (ref 0.9–1.1)

## 2024-08-20 PROCEDURE — 36416 COLLJ CAPILLARY BLOOD SPEC: CPT

## 2024-08-20 PROCEDURE — 85610 PROTHROMBIN TIME: CPT

## 2024-08-20 NOTE — PROGRESS NOTES
ANTICOAGULATION MANAGEMENT     Mathur DHRUV Connelly 73 year old male is on warfarin with subtherapeutic INR result. (Goal INR 2.0-3.0)    Recent labs: (last 7 days)     08/20/24  0826   INR 1.9*       ASSESSMENT     Warfarin Lab Questionnaire    Warfarin Doses Last 7 Days      8/19/2024     2:13 PM   Dose in Tablet or Mg   TAB or MG? tablet (tab)     Pt Rptd Dose SUNDAY MONDAY TUESDAY WED THURS FRIDAY SATURDAY 8/19/2024   2:13 PM 1 1 0.5 1 1 1 1         8/19/2024   Warfarin Lab Questionnaire   Missed doses within past 14 days? No   Changes in diet or alcohol within past 14 days? No   Medication changes since last result? No   Injuries or illness since last result? No   New shortness of breath, severe headaches or sudden changes in vision since last result? No   Abnormal bleeding since last result? No   Upcoming surgery, procedure? No        Previous result: Therapeutic last 2(+) visits  Additional findings: None       PLAN     Recommended plan for no diet, medication or health factor changes and previous 2 INR results within goal affecting INR     Dosing Instructions: Continue your current warfarin dose with next INR in 2 weeks       Summary  As of 8/20/2024      Full warfarin instructions:  2.5 mg every Tue; 5 mg all other days   Next INR check:  9/4/2024               Telephone call with Keron who verbalizes understanding and agrees to plan    Lab visit scheduled    Education provided: Please call back if any changes to your diet, medications or how you've been taking warfarin    Plan made per [facility] anticoagulation protocol    Ksenia Faith RN  Anticoagulation Clinic  8/20/2024    _______________________________________________________________________     Anticoagulation Episode Summary       Current INR goal:  2.0-3.0   TTR:  79.7% (1 y)   Target end date:  Indefinite   Send INR reminders to:  [facility]    Indications    Paroxysmal atrial fibrillation (H) [I48.0]             Comments:                Anticoagulation Care Providers       Provider Role Specialty Phone number    Davina Reaves MD Referring Internal Medicine [phone]

## 2024-08-23 ENCOUNTER — TRANSFERRED RECORDS (OUTPATIENT)
Dept: HEALTH INFORMATION MANAGEMENT | Facility: CLINIC | Age: 74
End: 2024-08-23
Payer: COMMERCIAL

## 2024-09-04 ENCOUNTER — ANTICOAGULATION THERAPY VISIT (OUTPATIENT)
Dept: ANTICOAGULATION | Facility: CLINIC | Age: 74
End: 2024-09-04

## 2024-09-04 ENCOUNTER — LAB (OUTPATIENT)
Dept: LAB | Facility: CLINIC | Age: 74
End: 2024-09-04
Payer: COMMERCIAL

## 2024-09-04 DIAGNOSIS — I48.0 PAROXYSMAL ATRIAL FIBRILLATION (H): ICD-10-CM

## 2024-09-04 DIAGNOSIS — Z79.01 LONG TERM CURRENT USE OF ANTICOAGULANT THERAPY: ICD-10-CM

## 2024-09-04 DIAGNOSIS — I48.0 PAROXYSMAL ATRIAL FIBRILLATION (H): Primary | ICD-10-CM

## 2024-09-04 LAB — INR BLD: 2.3 (ref 0.9–1.1)

## 2024-09-04 PROCEDURE — 85610 PROTHROMBIN TIME: CPT

## 2024-09-04 PROCEDURE — 36416 COLLJ CAPILLARY BLOOD SPEC: CPT

## 2024-09-04 NOTE — PROGRESS NOTES
ANTICOAGULATION MANAGEMENT     Kevan Connelly 73 year old male is on warfarin with therapeutic INR result. (Goal INR 2.0-3.0)    Recent labs: (last 7 days)     09/04/24  0829   INR 2.3*       ASSESSMENT     Warfarin Lab Questionnaire    Warfarin Doses Last 7 Days      9/3/2024     6:03 PM   Dose in Tablet or Mg   TAB or MG? tablet (tab)     Pt Rptd Dose PASHA MONDAY TUESDAY WED THURS FRIDAY SATURDAY   9/3/2024   6:03 PM 1 1 0.5 1 1 1 1         9/3/2024   Warfarin Lab Questionnaire   Missed doses within past 14 days? No   Changes in diet or alcohol within past 14 days? No   Medication changes since last result? No   Injuries or illness since last result? No   New shortness of breath, severe headaches or sudden changes in vision since last result? No   Abnormal bleeding since last result? No   Upcoming surgery, procedure? No   Best number to call with results? [phone]        Previous result: Subtherapeutic  Additional findings: None       PLAN     Recommended plan for no diet, medication or health factor changes affecting INR     Dosing Instructions: Continue your current warfarin dose with next INR in 4 weeks       Summary  As of 9/4/2024      Full warfarin instructions:  2.5 mg every Tue; 5 mg all other days   Next INR check:  10/2/2024               Telephone call with Keron who verbalizes understanding and agrees to plan    Lab visit scheduled    Education provided: Please call back if any changes to your diet, medications or how you've been taking warfarin  Contact [phone] with any changes, questions or concerns.     Plan made per [facility] anticoagulation protocol    Gretta Faith RN  Anticoagulation Clinic  9/4/2024    _______________________________________________________________________     Anticoagulation Episode Summary       Current INR goal:  2.0-3.0   TTR:  78.7% (1 y)   Target end date:  Indefinite   Send INR reminders to:  CONNIE HOBBS    Indications    Paroxysmal atrial fibrillation (H)  [I48.0]             Comments:               Anticoagulation Care Providers       Provider Role Specialty Phone number    Davina Reaves MD Referring Internal Medicine [phone]

## 2024-09-06 ENCOUNTER — TRANSFERRED RECORDS (OUTPATIENT)
Dept: HEALTH INFORMATION MANAGEMENT | Facility: CLINIC | Age: 74
End: 2024-09-06
Payer: COMMERCIAL

## 2024-09-10 ENCOUNTER — ANCILLARY PROCEDURE (OUTPATIENT)
Dept: CARDIOLOGY | Facility: CLINIC | Age: 74
End: 2024-09-10
Attending: INTERNAL MEDICINE
Payer: COMMERCIAL

## 2024-09-10 DIAGNOSIS — I49.5 SSS (SICK SINUS SYNDROME) (H): ICD-10-CM

## 2024-09-10 DIAGNOSIS — Z95.0 CARDIAC PACEMAKER IN SITU: ICD-10-CM

## 2024-09-10 PROCEDURE — 93294 REM INTERROG EVL PM/LDLS PM: CPT | Performed by: INTERNAL MEDICINE

## 2024-09-10 PROCEDURE — 93296 REM INTERROG EVL PM/IDS: CPT | Performed by: INTERNAL MEDICINE

## 2024-09-12 LAB
MDC_IDC_EPISODE_DTM: NORMAL
MDC_IDC_EPISODE_DTM: NORMAL
MDC_IDC_EPISODE_DURATION: 0 S
MDC_IDC_EPISODE_DURATION: 1 S
MDC_IDC_EPISODE_ID: 5
MDC_IDC_EPISODE_ID: 6
MDC_IDC_EPISODE_TYPE: NORMAL
MDC_IDC_EPISODE_TYPE: NORMAL
MDC_IDC_MSMT_BATTERY_DTM: NORMAL
MDC_IDC_MSMT_BATTERY_REMAINING_LONGEVITY: 127 MO
MDC_IDC_MSMT_BATTERY_RRT_TRIGGER: 2.62
MDC_IDC_MSMT_BATTERY_STATUS: NORMAL
MDC_IDC_MSMT_BATTERY_VOLTAGE: 3.03 V
MDC_IDC_MSMT_LEADCHNL_RV_IMPEDANCE_VALUE: 323 OHM
MDC_IDC_MSMT_LEADCHNL_RV_IMPEDANCE_VALUE: 399 OHM
MDC_IDC_MSMT_LEADCHNL_RV_PACING_THRESHOLD_AMPLITUDE: 0.62 V
MDC_IDC_MSMT_LEADCHNL_RV_PACING_THRESHOLD_PULSEWIDTH: 0.4 MS
MDC_IDC_MSMT_LEADCHNL_RV_SENSING_INTR_AMPL: 4.75 MV
MDC_IDC_MSMT_LEADCHNL_RV_SENSING_INTR_AMPL: 4.75 MV
MDC_IDC_PG_IMPLANT_DTM: NORMAL
MDC_IDC_PG_MFG: NORMAL
MDC_IDC_PG_MODEL: NORMAL
MDC_IDC_PG_SERIAL: NORMAL
MDC_IDC_PG_TYPE: NORMAL
MDC_IDC_SESS_CLINIC_NAME: NORMAL
MDC_IDC_SESS_DTM: NORMAL
MDC_IDC_SESS_TYPE: NORMAL
MDC_IDC_SET_BRADY_HYSTRATE: NORMAL
MDC_IDC_SET_BRADY_LOWRATE: 70 {BEATS}/MIN
MDC_IDC_SET_BRADY_MAX_SENSOR_RATE: 130 {BEATS}/MIN
MDC_IDC_SET_BRADY_MODE: NORMAL
MDC_IDC_SET_LEADCHNL_RV_PACING_AMPLITUDE: 2 V
MDC_IDC_SET_LEADCHNL_RV_PACING_ANODE_ELECTRODE_1: NORMAL
MDC_IDC_SET_LEADCHNL_RV_PACING_ANODE_LOCATION_1: NORMAL
MDC_IDC_SET_LEADCHNL_RV_PACING_CAPTURE_MODE: NORMAL
MDC_IDC_SET_LEADCHNL_RV_PACING_CATHODE_ELECTRODE_1: NORMAL
MDC_IDC_SET_LEADCHNL_RV_PACING_CATHODE_LOCATION_1: NORMAL
MDC_IDC_SET_LEADCHNL_RV_PACING_POLARITY: NORMAL
MDC_IDC_SET_LEADCHNL_RV_PACING_PULSEWIDTH: 0.4 MS
MDC_IDC_SET_LEADCHNL_RV_SENSING_ANODE_ELECTRODE_1: NORMAL
MDC_IDC_SET_LEADCHNL_RV_SENSING_ANODE_LOCATION_1: NORMAL
MDC_IDC_SET_LEADCHNL_RV_SENSING_CATHODE_ELECTRODE_1: NORMAL
MDC_IDC_SET_LEADCHNL_RV_SENSING_CATHODE_LOCATION_1: NORMAL
MDC_IDC_SET_LEADCHNL_RV_SENSING_POLARITY: NORMAL
MDC_IDC_SET_LEADCHNL_RV_SENSING_SENSITIVITY: 0.9 MV
MDC_IDC_SET_ZONE_DETECTION_INTERVAL: 360 MS
MDC_IDC_SET_ZONE_STATUS: NORMAL
MDC_IDC_SET_ZONE_TYPE: NORMAL
MDC_IDC_SET_ZONE_VENDOR_TYPE: NORMAL
MDC_IDC_STAT_BRADY_DTM_END: NORMAL
MDC_IDC_STAT_BRADY_DTM_START: NORMAL
MDC_IDC_STAT_BRADY_RV_PERCENT_PACED: 99.95 %
MDC_IDC_STAT_EPISODE_RECENT_COUNT: 0
MDC_IDC_STAT_EPISODE_RECENT_COUNT: 0
MDC_IDC_STAT_EPISODE_RECENT_COUNT: 2
MDC_IDC_STAT_EPISODE_RECENT_COUNT_DTM_END: NORMAL
MDC_IDC_STAT_EPISODE_RECENT_COUNT_DTM_START: NORMAL
MDC_IDC_STAT_EPISODE_TOTAL_COUNT: 0
MDC_IDC_STAT_EPISODE_TOTAL_COUNT: 0
MDC_IDC_STAT_EPISODE_TOTAL_COUNT: 6
MDC_IDC_STAT_EPISODE_TOTAL_COUNT_DTM_END: NORMAL
MDC_IDC_STAT_EPISODE_TOTAL_COUNT_DTM_START: NORMAL
MDC_IDC_STAT_EPISODE_TYPE: NORMAL

## 2024-09-18 DIAGNOSIS — I48.91 ATRIAL FIBRILLATION, UNSPECIFIED TYPE (H): ICD-10-CM

## 2024-09-18 RX ORDER — WARFARIN SODIUM 5 MG/1
TABLET ORAL
Qty: 90 TABLET | Refills: 1 | Status: SHIPPED | OUTPATIENT
Start: 2024-09-18

## 2024-09-18 NOTE — TELEPHONE ENCOUNTER
ANTICOAGULATION MANAGEMENT:  Medication Refill    Anticoagulation Summary  As of 9/4/2024      Warfarin maintenance plan:  2.5 mg (5 mg x 0.5) every Tue; 5 mg (5 mg x 1) all other days   Next INR check:  10/2/2024   Target end date:  Indefinite    Indications    Paroxysmal atrial fibrillation (H) [I48.0]                 Anticoagulation Care Providers       Provider Role Specialty Phone number    Davina Reaves MD Referring Internal Medicine [phone]            Refill Criteria    Visit with referring provider/group: Meets criteria: visit within referring provider group in the last 15 months on 7/3/24    [facility] referral last signed: 01/17/2024; within last year: Yes    Lab monitoring not exceeding 2 weeks overdue: Yes    Mathur meets all criteria for refill. Rx instructions and quantity supplied updated to match patient's current dosing plan. Warfarin 90 day supply with 1 refill granted per [facility] protocol     Yarelis Martinez RN  Anticoagulation Clinic

## 2024-09-23 ENCOUNTER — TELEPHONE (OUTPATIENT)
Dept: CARDIOLOGY | Facility: CLINIC | Age: 74
End: 2024-09-23
Payer: COMMERCIAL

## 2024-09-23 NOTE — TELEPHONE ENCOUNTER
Yarely called from [facility] for approval for complete lumbar rhizotomy with his Medtronic Mariana. Yarely reports that the pt will be on an EKG monitor, vitals monitor and that is it. They do not have a magnet.     Pt has Medtronic [place] implanted for AFL, CHB by ELIE and chrissie JE at [facility] [address].     Called [employer]tr[employer].     Per recommendations:    Cautery if unipolar have return electorode patch turned so it doesn't get with in 6 in of device.     Short intermittent burst at lowest appropriate energy levels.     Place magnet over device or have device programmed before and after procedure.     Called Yarely back and PERRY on secured VM.

## 2024-10-01 ENCOUNTER — LAB (OUTPATIENT)
Dept: LAB | Facility: CLINIC | Age: 74
End: 2024-10-01
Payer: COMMERCIAL

## 2024-10-01 ENCOUNTER — ANTICOAGULATION THERAPY VISIT (OUTPATIENT)
Dept: ANTICOAGULATION | Facility: CLINIC | Age: 74
End: 2024-10-01

## 2024-10-01 DIAGNOSIS — I48.0 PAROXYSMAL ATRIAL FIBRILLATION (H): ICD-10-CM

## 2024-10-01 DIAGNOSIS — I48.0 PAROXYSMAL ATRIAL FIBRILLATION (H): Primary | ICD-10-CM

## 2024-10-01 DIAGNOSIS — Z79.01 LONG TERM CURRENT USE OF ANTICOAGULANT THERAPY: ICD-10-CM

## 2024-10-01 LAB — INR BLD: 2.3 (ref 0.9–1.1)

## 2024-10-01 PROCEDURE — 85610 PROTHROMBIN TIME: CPT

## 2024-10-01 PROCEDURE — 36416 COLLJ CAPILLARY BLOOD SPEC: CPT

## 2024-10-01 NOTE — PROGRESS NOTES
ANTICOAGULATION MANAGEMENT     Kevan Connelly 73 year old male is on warfarin with therapeutic INR result. (Goal INR 2.0-3.0)    Recent labs: (last 7 days)     10/01/24  0826   INR 2.3*       ASSESSMENT     Source(s): Chart Review  Previous INR was Therapeutic last visit; previously outside of goal range  Medication, diet, health changes since last INR chart reviewed; none identified         PLAN     Recommended plan for no diet, medication or health factor changes affecting INR     Dosing Instructions: Continue your current warfarin dose with next INR in 5 weeks       Summary  As of 10/1/2024      Full warfarin instructions:  2.5 mg every Tue; 5 mg all other days   Next INR check:  11/5/2024               Detailed voice message left for Keron with dosing instructions and follow up date.   Sent [employer] message with dosing and follow up instructions    Contact [phone] to schedule and with any changes, questions or concerns.     Education provided: Please call back if any changes to your diet, medications or how you've been taking warfarin    Plan made per [facility] anticoagulation protocol    Yarelis Martinez RN  10/1/2024  Anticoagulation Clinic  [employer] for routing messages: faith HOBBS  [facility] patient phone line: [phone]        _______________________________________________________________________     Anticoagulation Episode Summary       Current INR goal:  2.0-3.0   TTR:  79.7% (1 y)   Target end date:  Indefinite   Send INR reminders to:  CONNIE HOBBS    Indications    Paroxysmal atrial fibrillation (H) [I48.0]             Comments:               Anticoagulation Care Providers       Provider Role Specialty Phone number    Davina Reaves MD Referring Internal Medicine [phone]

## 2024-10-23 ENCOUNTER — OFFICE VISIT (OUTPATIENT)
Dept: FAMILY MEDICINE | Facility: CLINIC | Age: 74
End: 2024-10-23
Payer: COMMERCIAL

## 2024-10-23 VITALS
SYSTOLIC BLOOD PRESSURE: 132 MMHG | BODY MASS INDEX: 32.44 KG/M2 | WEIGHT: 260.9 LBS | RESPIRATION RATE: 16 BRPM | HEIGHT: 75 IN | TEMPERATURE: 97.7 F | DIASTOLIC BLOOD PRESSURE: 94 MMHG | HEART RATE: 80 BPM | OXYGEN SATURATION: 98 %

## 2024-10-23 DIAGNOSIS — R39.9 LOWER URINARY TRACT SYMPTOMS (LUTS): Primary | ICD-10-CM

## 2024-10-23 DIAGNOSIS — Z12.5 ENCOUNTER FOR SCREENING FOR MALIGNANT NEOPLASM OF PROSTATE: ICD-10-CM

## 2024-10-23 LAB
ALBUMIN UR-MCNC: NEGATIVE MG/DL
APPEARANCE UR: CLEAR
BACTERIA #/AREA URNS HPF: ABNORMAL /HPF
BILIRUB UR QL STRIP: NEGATIVE
COLOR UR AUTO: YELLOW
GLUCOSE UR STRIP-MCNC: NEGATIVE MG/DL
HGB UR QL STRIP: ABNORMAL
KETONES UR STRIP-MCNC: NEGATIVE MG/DL
LEUKOCYTE ESTERASE UR QL STRIP: NEGATIVE
MUCOUS THREADS #/AREA URNS LPF: PRESENT /LPF
NITRATE UR QL: NEGATIVE
PH UR STRIP: 5.5 [PH] (ref 5–7)
PSA SERPL DL<=0.01 NG/ML-MCNC: 1.91 NG/ML (ref 0–6.5)
RBC #/AREA URNS AUTO: ABNORMAL /HPF
SP GR UR STRIP: 1.02 (ref 1–1.03)
SQUAMOUS #/AREA URNS AUTO: ABNORMAL /LPF
UROBILINOGEN UR STRIP-ACNC: 0.2 E.U./DL
WBC #/AREA URNS AUTO: ABNORMAL /HPF

## 2024-10-23 PROCEDURE — 81001 URINALYSIS AUTO W/SCOPE: CPT | Performed by: STUDENT IN AN ORGANIZED HEALTH CARE EDUCATION/TRAINING PROGRAM

## 2024-10-23 PROCEDURE — G0103 PSA SCREENING: HCPCS | Performed by: STUDENT IN AN ORGANIZED HEALTH CARE EDUCATION/TRAINING PROGRAM

## 2024-10-23 PROCEDURE — 36415 COLL VENOUS BLD VENIPUNCTURE: CPT | Performed by: STUDENT IN AN ORGANIZED HEALTH CARE EDUCATION/TRAINING PROGRAM

## 2024-10-23 PROCEDURE — 99214 OFFICE O/P EST MOD 30 MIN: CPT | Performed by: STUDENT IN AN ORGANIZED HEALTH CARE EDUCATION/TRAINING PROGRAM

## 2024-10-23 RX ORDER — TIZANIDINE 2 MG/1
2-4 TABLET ORAL
COMMUNITY
Start: 2024-09-19

## 2024-10-23 NOTE — PROGRESS NOTES
"  Assessment & Plan     Lower urinary tract symptoms (LUTS)  New daytime urinary urgency and frequency over the past several months. Initially void with incomplete bladder emptying sensation but this resolves after the first couple urinations. Continues to have increased urgency and frequency throughout the day though. Was on Tylenol PM for the past couple of months which may be contributing to incomplete bladder emptying sensation. IPSS score 11. ZENA unremarkable. Plan to obtain UA to rule out alternative etiologies. If negative, discussed lifestyle management and plan to start tamsulosin for presumed BPH, 0.4mg daily. Follow up in 1 month to reassess.  - UA with Microscopic - lab collect  - PSA, screen  - Urine Microscopic Exam    Encounter for screening for malignant neoplasm of prostate  - PSA, screen    BMI  Estimated body mass index is 32.61 kg/m  as calculated from the following:    Height as of this encounter: 1.905 m (6' 3\").    Weight as of this encounter: 118.3 kg (260 lb 14.4 oz).     Follow up in 1 month to reassess    Rivera Bryan MD  [facility]  10/23/2024      Hammad Cabrales is a 74 year old, presenting for the following health issues:  Urinary Frequency        10/23/2024     2:30 PM   Additional Questions   Roomed by Karla CASTRO   Accompanied by self         10/23/2024     2:30 PM   Patient Reported Additional Medications   Patient reports taking the following new medications n/a     History of Present Illness       Reason for visit:  Check for potential prostate issues  Symptom onset:  3-4 weeks ago  Symptoms include:  Very frequent urination/urge to urinate  Symptom intensity:  Severe  Symptom progression:  Staying the same  Had these symptoms before:  No  What makes it worse:  No  What makes it better:  No   He is taking medications regularly.     Increased urinary urgency/frequency  Seems to have been ongoing since the beginning of Summer, like early June or so.   Very frequent " "now - 15-18 trips during the daytime  Usually goes to bathroom overnight about once. Up to 2x occasionally.  In the AM, bladder feels very full and takes 2-3 trips to completely void.   Even after voiding, feels the urge is still there.  After the initial morning void with incomplete bladder emptying sensation, he no longer feels unable to completely empty his bladder  However, the frequency and urgency sensation is still there. He does urinate a good amount, when he goes.  No double stream, dribbling, weak stream.   No pain at all in the rectum, bladder, penis, testicles or perineal region.   No changes in urinary color. No changes in urinary odor.   No hesitancy at all.     Does have family hx of prostate cancer in brother        Objective    BP (!) 163/104 (BP Location: Right arm, Patient Position: Sitting, Cuff Size: Adult Large)   Pulse 80   Temp 97.7  F (36.5  C) (Oral)   Resp 16   Ht 1.905 m (6' 3\")   Wt 118.3 kg (260 lb 14.4 oz)   SpO2 98%   BMI 32.61 kg/m    Body mass index is 32.61 kg/m .    Physical Exam   GENERAL: healthy, alert and no distress  HEAD: Normocephalic, atraumatic.   EYES: Normal conjunctivae, sclera.   MSK: no gross musculoskeletal defects noted.  RECTAL: ZENA revealing symmetric prostate, not enlarged, no nodules. No TTP.  SKIN: no suspicious lesions or rashes.  NEURO: CNII-XII grossly intact. No focal deficits.  PSYCH: Groomed, dressed appropriately for weather. Normal mood with consistent affect.     Signed Electronically by: Rivera Bryan MD  "

## 2024-10-23 NOTE — PATIENT INSTRUCTIONS
How to Check Your Blood Pressure at Home    Make sure your blood pressure cuff is validated (produces accurate readings). Go to validateBP.org for a list of blood pressure cuffs.    Take your blood pressure right after you wake up (5 minutes after resting) or right before going to bed (5 minutes after resting).  Make sure the readings are before you take your medications, smoke and after you empty your bladder.   Sit with good back support and feet flat on the floor. Have your blood pressure cuff resting at your heart level.      Bladder issue:  Lifestyle modifications include:    ?Limiting fluid intake before bedtime or prior to travel  ?Limiting intake of mild diuretics (eg, caffeine, alcohol)  ?Limiting intake of bladder irritants (eg, highly seasoned or irritative foods)  ?Avoiding constipation  ?Increasing activity, including regular strenuous exercise  ?Weight control

## 2024-10-24 RX ORDER — TAMSULOSIN HYDROCHLORIDE 0.4 MG/1
0.4 CAPSULE ORAL DAILY
Qty: 30 CAPSULE | Refills: 0 | Status: SHIPPED | OUTPATIENT
Start: 2024-10-24

## 2024-11-14 ENCOUNTER — TELEPHONE (OUTPATIENT)
Dept: CARDIOLOGY | Facility: CLINIC | Age: 74
End: 2024-11-14
Payer: COMMERCIAL

## 2024-11-14 NOTE — TELEPHONE ENCOUNTER
Is the implanted device MRI conditional: Yes    Please schedule the MRI: Yes    Does the patient need a CXR prior to MRI: Yes    Device: Medtronic [id number] [place] XT SR MRI

## 2024-11-15 ENCOUNTER — TELEPHONE (OUTPATIENT)
Dept: PALLIATIVE MEDICINE | Facility: CLINIC | Age: 74
End: 2024-11-15

## 2024-11-15 ENCOUNTER — ANTICOAGULATION THERAPY VISIT (OUTPATIENT)
Dept: ANTICOAGULATION | Facility: CLINIC | Age: 74
End: 2024-11-15

## 2024-11-15 ENCOUNTER — LAB (OUTPATIENT)
Dept: LAB | Facility: CLINIC | Age: 74
End: 2024-11-15
Payer: COMMERCIAL

## 2024-11-15 DIAGNOSIS — M47.816 SPONDYLOSIS OF LUMBAR REGION WITHOUT MYELOPATHY OR RADICULOPATHY: Primary | ICD-10-CM

## 2024-11-15 DIAGNOSIS — Z79.01 LONG TERM CURRENT USE OF ANTICOAGULANT THERAPY: ICD-10-CM

## 2024-11-15 DIAGNOSIS — G89.18 DISCOMFORT RELATED TO PROCEDURE: ICD-10-CM

## 2024-11-15 DIAGNOSIS — I48.0 PAROXYSMAL ATRIAL FIBRILLATION (H): Primary | ICD-10-CM

## 2024-11-15 DIAGNOSIS — I48.0 PAROXYSMAL ATRIAL FIBRILLATION (H): ICD-10-CM

## 2024-11-15 LAB — INR BLD: 2.4 (ref 0.9–1.1)

## 2024-11-15 PROCEDURE — 36416 COLLJ CAPILLARY BLOOD SPEC: CPT

## 2024-11-15 PROCEDURE — 85610 PROTHROMBIN TIME: CPT

## 2024-11-15 NOTE — PROGRESS NOTES
ANTICOAGULATION MANAGEMENT     Kevan Connelly 74 year old male is on warfarin with therapeutic INR result. (Goal INR 2.0-3.0)    Recent labs: (last 7 days)     11/15/24  1040   INR 2.4*       ASSESSMENT     Warfarin Lab Questionnaire    Warfarin Doses Last 7 Days          11/14/2024   Warfarin Lab Questionnaire   Missed doses within past 14 days? No   Changes in diet or alcohol within past 14 days? No   Medication changes since last result? No   Injuries or illness since last result? No   New shortness of breath, severe headaches or sudden changes in vision since last result? No   Abnormal bleeding since last result? No   Upcoming surgery, procedure? No        Previous result: Therapeutic last 2(+) visits  Additional findings:  will schedule for radio freq ablation, with IR too be scheduled.    [facility]uddle with [facility],  will send TE today for procedure plan         PLAN     Recommended plan for no diet, medication or health factor changes affecting INR     Dosing Instructions: Continue your current warfarin dose with next INR in 5 weeks       Summary  As of 11/15/2024      Full warfarin instructions:  2.5 mg every Tue; 5 mg all other days   Next INR check:  12/27/2024               Telephone call with Keron who verbalizes understanding and agrees to plan    Lab visit scheduled    Education provided: Please call back if any changes to your diet, medications or how you've been taking warfarin  Goal range and lab monitoring: goal range and significance of current result, Importance of therapeutic range, and Importance of following up at instructed interval  Importance of notifying anticoagulation clinic for: upcoming surgeries and procedures 2 weeks in advance    Plan made per [facility] anticoagulation protocol    Madyson Kirk RN  11/15/2024  Anticoagulation Clinic  [employer] for routing messages: faith HOBBS  [facility] patient phone line:  [phone]        _______________________________________________________________________     Anticoagulation Episode Summary       Current INR goal:  2.0-3.0   TTR:  83.9% (1 y)   Target end date:  Indefinite   Send INR reminders to:  [facility]    Indications    Paroxysmal atrial fibrillation (H) [I48.0]             Comments:  --             Anticoagulation Care Providers       Provider Role Specialty Phone number    Davina Reaves MD Referring Internal Medicine [phone]

## 2024-11-15 NOTE — TELEPHONE ENCOUNTER
Order received- Bilateral radiofrequency ablation for L4-5 and L5-S1 facet arthritis had MDD's with over 80% relief from Rayus has pacemaker needs monitoring. Sx: axial low back pain. Facet arthritis with low back pain. Routing to Marilyn for review to initiate PA. Pt prefers to get this done at the [facility].     Michelle Green      [facility]  Pain Management

## 2024-11-18 ENCOUNTER — TRANSCRIBE ORDERS (OUTPATIENT)
Dept: PALLIATIVE MEDICINE | Facility: CLINIC | Age: 74
End: 2024-11-18

## 2024-11-18 DIAGNOSIS — M54.50 CHRONIC BILATERAL LOW BACK PAIN WITHOUT SCIATICA: Primary | ICD-10-CM

## 2024-11-18 DIAGNOSIS — G89.29 CHRONIC BILATERAL LOW BACK PAIN WITHOUT SCIATICA: Primary | ICD-10-CM

## 2024-11-18 NOTE — TELEPHONE ENCOUNTER
Screening Questions for RFA Procedure      Procedure ordered? Bilateral radiofrequency ablation for L4-5 and L5-S1     What insurance are we billing for this procedure?  ucare  IF SCHEDULING AT [facility] PLEASE SCHEDULE AT LEAST 7-10 BUSINESS DAYS OUT SO A PA CAN BE OBTAINED    Is patient scheduled at [facility]? no  If YES, route every encounter to [facility] CARE NAVIGATION POOL [[phone]]  Has patient had this injection before? No  Any chance of pregnancy? NO   If YES, do NOT schedule and route to RN pool     Is  Needed?: No  Will patient have a ?  Yes   If pt is given sedation meds, no driving for 24 hours.  Is pt taking a cab or transportation service? NO      If so will need to be accompanied by an adult too (friend/family member) in order for IV sedation to be given.    Per [facility] Policy:  Outpatients are to have responsible adult or family member to accompany them at discharge and drive them home. A service providing medically trained drivers or attendants would be acceptable. Public transportation would not be acceptable unless the patient is accompanied by a responsible adult or family member.  Is patient taking any blood thinners (i.e. plavix, coumadin, jantoven, warfarin, heparin, pradaxa or dabigatran, etc)? Yes Warfarin  If YES, do NOT schedule, and route to RN pool     Is patient taking any GLP-1 Antagonist (hold needed for sedation patients only) No   (semaglutide (Ozempic, Wegovy), dulaglutide (Trulicity), exenatide ER (Bydureon), tirzepatide (Mounjaro), Liraglutide (Saxenda, Victoza), semaglutide (Rybelsus),Terzepatide (Zepbound)  If YES, okay to schedule AND route to RN nurse / Dr. Santana's Team    Does the patient have a bleeding or clotting disorder? No   If YES, it it OKAY to schedule AND route to RN pool  **For any patients with platelet count <100, must be forwarded to provider**    Is patient diabetic? No If YES, have them bring their  glucometer.    Does patient have an active infection or treated for one within the past week? No   If YES, do NOT schedule and route to RN nurse pool     Is patient currently taking any antibiotics?  No  For patients on chronic, preventative, or prophylactic antibiotics, procedures may be scheduled.   For patients on antibiotics for active or recent infection:antibiotic course must have been completed for 4 days    Is patient currently taking any steroid medications? (i.e. Prednisone, Medrol)  No   For patients on steroid medications, course must have been completed for 4 days    Is patient actively being treated for cancer or immunocompromised, including the spleen having been removed? No  If YES, do NOT schedule and route to RN pool     Any history of complications with sedation medications?  NO   If YES, OK to schedule AND route to RN pool     Any history of sleep apnea?  NO   If YES, OK to schedule AND route to RN pool     Any cardiac history?  YES   If YES, OK to schedule AND route to RN pool     Do you have an implanted pacemaker, ICD (implanted cardiac device) or AICD (automatic implanted cardiac device)?  YES  If YES, do NOT schedule AND route to RN pool (for all providers including Dr Lopez)   Obtain name of device : AdGrok    Obtain name of cardiologist: [facility]      Do you have an implanted stimulator?  NO  If YES, OK to schedule AND route to nursing.   Instruct patient to bring in the remote to the appointment and it will need to be turned off.  reviewed      Does patient have an allergy to contrast dye, iodine or shellfish?  No   If YES, OK to schedule. Route to RN pool AND add allergy information to appointment notes    Are you able to get on and off an exam table with minimal or no assistance? Yes   If NO, do NOT schedule and route to RN pool    Are you able to roll over and lay on your stomach with minimal or no assistance? Yes   If NO, do NOT schedule and route to RN  pool    Reminders:  If you are started on any steroids or antibiotics between now and your appointment, you must contact us because it may affect our ability to perform your procedure.  Yes  Informed patient that s/he needs to fast for 6 hours before procedure?  YES  Informed patient that it is OK to take normal medications with sips of water, especially blood pressure medications, before the procedure and must hold blood thinners as instructed.  Yes  Informed patient to arrive 30 minutes before procedure time to have an IV inserted.  reviewed   Do NOT schedule at 0745, 0815 or 1245.  reviewed   All radiofrequency ablations are in a 60 minute time slot.If cervical RFA, please schedule each side separate. If okay to do bilateral cervical RFA, schedule for 90 minutes.  reviewed

## 2024-11-18 NOTE — TELEPHONE ENCOUNTER
[employer] Medicare products follow Medicare guidelines  Per Medicare medical policy-No PA required  RFA  Follows Medicare guidelines           Coverage Guidance-No PA required        C. Facet Joint Denervation:      Repeat thermal facet joint RFA at the same anatomic site is considered medically reasonable and necessary provided the patient had a minimum of consistent 50% improvement in pain for at least six (6) months or at least 50% consistent improvement in the ability to perform previously painful movements and ADLs as compared to baseline measurement using the same scale;  Frequency Limitation: For each covered spinal region no more than two (2) radiofrequency sessions will be reimbursed per rolling 12 months.          No PA required, okay to schedule        Marilyn Munoz   [facility]

## 2024-11-19 RX ORDER — DIAZEPAM 5 MG/1
5 TABLET ORAL SEE ADMIN INSTRUCTIONS
Qty: 2 TABLET | Refills: 0 | Status: SHIPPED | OUTPATIENT
Start: 2024-11-19

## 2024-11-19 NOTE — TELEPHONE ENCOUNTER
Signed orders.  Okay to proceed.  Will plan to have magnet available but not place it unless necessary.     Carla Underwood MD

## 2024-11-19 NOTE — TELEPHONE ENCOUNTER
Paola reviewed: likely PO sedation.     Component Value   Units Status   Date Time Interrogation Session [id number]    Final   Implantable Pulse Generator  Medtronic    Final   Implantable Pulse Generator Model [id number] [place] XT SR MRI    Final   Implantable Pulse Generator Serial Number [id number]    Final   Type Interrogation Session Remote Scheduled    Final   Clinic Name [facility]    Final   Implantable Pulse Generator Type Pacemaker    Final   Implantable Pulse Generator Implant Date [id number]    Final   Parrish Setting Mode (NBG Code) VVIR    Final   Parrish Setting Lower Rate Limit 70   /min Final   Parrish Setting Maximum Sensor Rate 130             Per order:  Bilateral radiofrequency ablation for L4-5 and L5-S1 facet arthritis had MDD's with over 80% relief from Rayus has pacemaker needs monitoring. Sx: axial low back pain. Facet arthritis with low back pain     Called pt. Discussed PO sedation. No coumadin hold needed for lumbar RFA. Discussed need for INR one hour prior at [facility] lab 1st floor, INR order placed. Discussed need for . If becomes sick/ill/infection or started on ABX that we will need to be contacted for rescheduling.  Discussed use of Magnet for pacemaker. Discussed PO sedation. States no further questions.     Routing to provider to review INR/PO valium    Celeste BATISTA, RN Care Coordinator  [facility]  Pain Management

## 2024-12-11 ENCOUNTER — OFFICE VISIT (OUTPATIENT)
Dept: FAMILY MEDICINE | Facility: CLINIC | Age: 74
End: 2024-12-11
Payer: COMMERCIAL

## 2024-12-11 VITALS
TEMPERATURE: 98.4 F | DIASTOLIC BLOOD PRESSURE: 86 MMHG | BODY MASS INDEX: 32.33 KG/M2 | RESPIRATION RATE: 16 BRPM | OXYGEN SATURATION: 99 % | WEIGHT: 260 LBS | HEIGHT: 75 IN | HEART RATE: 85 BPM | SYSTOLIC BLOOD PRESSURE: 142 MMHG

## 2024-12-11 DIAGNOSIS — I10 BENIGN ESSENTIAL HYPERTENSION: ICD-10-CM

## 2024-12-11 DIAGNOSIS — R39.9 LOWER URINARY TRACT SYMPTOMS (LUTS): Primary | ICD-10-CM

## 2024-12-11 PROCEDURE — 99214 OFFICE O/P EST MOD 30 MIN: CPT | Performed by: STUDENT IN AN ORGANIZED HEALTH CARE EDUCATION/TRAINING PROGRAM

## 2024-12-11 RX ORDER — TRAMADOL HYDROCHLORIDE 50 MG/1
50 TABLET ORAL EVERY 6 HOURS PRN
COMMUNITY
Start: 2024-11-19

## 2024-12-11 RX ORDER — TAMSULOSIN HYDROCHLORIDE 0.4 MG/1
0.4 CAPSULE ORAL DAILY
Qty: 90 CAPSULE | Refills: 2 | Status: SHIPPED | OUTPATIENT
Start: 2024-12-11

## 2024-12-11 ASSESSMENT — PAIN SCALES - GENERAL: PAINLEVEL_OUTOF10: MILD PAIN (2)

## 2024-12-11 NOTE — PROGRESS NOTES
"  Assessment & Plan     Lower urinary tract symptoms (LUTS)  IPSS of 5; down from 11 prior to initiation of tamsulosin. Tolerating medication well.   - tamsulosin (FLOMAX) 0.4 MG capsule  Dispense: 90 capsule; Refill: 2    Benign essential hypertension  Borderline above goal on 100mg losartan and 12.5mg carvedilol. Plan to transition from losartan to olmesartan. May also notice mild improvement with restarting tamsulosin as above.     BMI  Estimated body mass index is 32.5 kg/m  as calculated from the following:    Height as of this encounter: 1.905 m (6' 3\").    Weight as of this encounter: 117.9 kg (260 lb).     Follow up in 7 months for annual physical    Riveralaurence Bryan MD  [facility]  12/12/2024    Hammad Cabrales is a 74 year old, presenting for the following health issues:  Urinary Problem        12/11/2024    10:36 AM   Additional Questions   Roomed by justice ray   Accompanied by self         12/11/2024    10:36 AM   Patient Reported Additional Medications   Patient reports taking the following new medications na     History of Present Illness       Reason for visit:  Follow up for frequent urination    He eats 0-1 servings of fruits and vegetables daily.He consumes 0 sweetened beverage(s) daily.He exercises with enough effort to increase his heart rate 9 or less minutes per day.  He exercises with enough effort to increase his heart rate 3 or less days per week.   He is taking medications regularly.     LUTS  Has been off the medication for about one week as he ran out of this.   It did help, mostly at night.   Was going to the bathroom about 2-3x per night which reduced to 1x at night with the medication.  During the day, it was helpful to cut back urination a bit but not back to normal. This was not as helpful.  When he is cold, or gets really cold this has always kicked in the urge to urinate.   No side effects from the medication, was tolerating it well.     Tried to cut back on liquids at " "night and has been able to do this.   Does tend to like fluids with flavored water.   Has cut back on the amount of coffee, just not doing this anymore.     Blood pressure  Checks at home and is 144 over high 80s  Pulse is 75 to 79 resting at home.        Objective    BP (!) 142/86   Pulse 85   Temp 98.4  F (36.9  C) (Oral)   Resp 16   Ht 1.905 m (6' 3\")   Wt 117.9 kg (260 lb)   SpO2 99%   BMI 32.50 kg/m    Body mass index is 32.5 kg/m .    Physical Exam   GENERAL: healthy, alert and no distress  HEAD: Normocephalic, atraumatic.   EYES: Normal conjunctivae, sclera.   RESP: Normal respiratory effort.  MSK: no gross musculoskeletal defects noted.  SKIN: no suspicious lesions or rashes.  NEURO: CNII-XII grossly intact. No focal deficits.  PSYCH: Groomed, dressed appropriately for weather. Normal mood with consistent affect.     Signed Electronically by: Rivera Bryan MD    "

## 2024-12-11 NOTE — PATIENT INSTRUCTIONS
Plan is to increase the carvedilol to 25mg twice a day in order to get your blood pressure into a more reasonable range.     Would like to repeat blood pressure in about 2 weeks after the increased dose with nurse only visit.     It was good to see you!    Dr. Stafford

## 2024-12-12 ENCOUNTER — MYC MEDICAL ADVICE (OUTPATIENT)
Dept: FAMILY MEDICINE | Facility: CLINIC | Age: 74
End: 2024-12-12
Payer: COMMERCIAL

## 2024-12-12 RX ORDER — OLMESARTAN MEDOXOMIL 40 MG/1
40 TABLET ORAL DAILY
Qty: 30 TABLET | Refills: 0 | Status: SHIPPED | OUTPATIENT
Start: 2024-12-12

## 2024-12-12 NOTE — TELEPHONE ENCOUNTER
Patient calling back after missing call. Reviewed [employer] message with patient. Patient verbalized understanding and denies questions.     Alix Carey RN 12/12/2024 9:48 AM  [facility]

## 2024-12-27 PROBLEM — Z79.01 LONG TERM CURRENT USE OF ANTICOAGULANT THERAPY: Status: ACTIVE | Noted: 2024-12-27

## 2025-01-13 ENCOUNTER — LAB (OUTPATIENT)
Dept: LAB | Facility: CLINIC | Age: 75
End: 2025-01-13
Payer: COMMERCIAL

## 2025-01-13 DIAGNOSIS — I48.20 CHRONIC ATRIAL FIBRILLATION (H): ICD-10-CM

## 2025-01-13 LAB
ANION GAP SERPL CALCULATED.3IONS-SCNC: 10 MMOL/L (ref 7–15)
BUN SERPL-MCNC: 20.1 MG/DL (ref 8–23)
CALCIUM SERPL-MCNC: 8.6 MG/DL (ref 8.8–10.4)
CHLORIDE SERPL-SCNC: 109 MMOL/L (ref 98–107)
CREAT SERPL-MCNC: 1.28 MG/DL (ref 0.67–1.17)
EGFRCR SERPLBLD CKD-EPI 2021: 59 ML/MIN/1.73M2
ERYTHROCYTE [DISTWIDTH] IN BLOOD BY AUTOMATED COUNT: 13.1 % (ref 10–15)
GLUCOSE SERPL-MCNC: 93 MG/DL (ref 70–99)
HCO3 SERPL-SCNC: 20 MMOL/L (ref 22–29)
HCT VFR BLD AUTO: 42.9 % (ref 40–53)
HGB BLD-MCNC: 15.2 G/DL (ref 13.3–17.7)
MCH RBC QN AUTO: 32.5 PG (ref 26.5–33)
MCHC RBC AUTO-ENTMCNC: 35.4 G/DL (ref 31.5–36.5)
MCV RBC AUTO: 92 FL (ref 78–100)
PLATELET # BLD AUTO: 125 10E3/UL (ref 150–450)
POTASSIUM SERPL-SCNC: 4.1 MMOL/L (ref 3.4–5.3)
RBC # BLD AUTO: 4.68 10E6/UL (ref 4.4–5.9)
SODIUM SERPL-SCNC: 139 MMOL/L (ref 135–145)
WBC # BLD AUTO: 4.2 10E3/UL (ref 4–11)

## 2025-01-15 ENCOUNTER — TELEPHONE (OUTPATIENT)
Dept: CARDIOLOGY | Facility: CLINIC | Age: 75
End: 2025-01-15
Payer: COMMERCIAL

## 2025-01-15 DIAGNOSIS — Z98.890 HX OF ATRIOVENTRICULAR NODE ABLATION: ICD-10-CM

## 2025-01-15 DIAGNOSIS — I10 BENIGN ESSENTIAL HYPERTENSION: ICD-10-CM

## 2025-01-15 DIAGNOSIS — N18.30 STAGE 3 CHRONIC KIDNEY DISEASE (H): ICD-10-CM

## 2025-01-15 DIAGNOSIS — Z95.0 CARDIAC PACEMAKER IN SITU: Primary | ICD-10-CM

## 2025-01-15 DIAGNOSIS — Z79.01 LONG TERM CURRENT USE OF ANTICOAGULANTS WITH INR GOAL OF 2.0-3.0: ICD-10-CM

## 2025-01-15 DIAGNOSIS — I48.20 CHRONIC ATRIAL FIBRILLATION (H): ICD-10-CM

## 2025-01-15 NOTE — TELEPHONE ENCOUNTER
Anjum, CHICO Fitzpatrick [facility] Heart Device Nurse  Labs were done for annual OV that needed to be rescheduled; now scheduled for visit in Feb. Renal fn stable, platelet count is newly reduced.  Please see if we are able to update an echo (to reassess EF in patient completely paced) and repeat a CBC prior to upcoming office visit. Thank you!      Above message received from Meagan Valero regarding recent lab results.    CBC and echo order placed. Message routed to scheduling to assist with getting these scheduled so that results will be available prior to his OV on 2/14/25.    MYRA WHARTON

## 2025-01-21 ENCOUNTER — TELEPHONE (OUTPATIENT)
Dept: FAMILY MEDICINE | Facility: CLINIC | Age: 75
End: 2025-01-21
Payer: COMMERCIAL

## 2025-01-21 NOTE — CONFIDENTIAL NOTE
Patient Quality Outreach    Patient is due for the following:   IVD  -  BP Check    Action(s) Taken:   No follow up needed at this time.    Type of outreach:    Sent [employer] message.    Questions for provider review:    None           Eliazar Amaro MA

## 2025-02-03 NOTE — TELEPHONE ENCOUNTER
Called patient regarding upcoming RFA, discussed INR lab draw, PO sedation, and briefly discussed recovery times.     Emely BATISTA, RN Patient Care Coordinator    [facility]  Pain Management

## 2025-02-03 NOTE — PROGRESS NOTES
[facility] - Procedure Note    Date of Visit: 2/5/2025    Pre procedure Diagnosis: Lumbar spondylosis, lumbar region M47.896, Lumbar facet arthropathy   Post procedure Diagnosis: Same  Procedure performed: RIGHT L3,4,5 radiofrequency ablation   Anesthesia: PO valium 10mg  Complications: NONE  Operators: Carla Underwood MD     Indications:   Kevan Connelly is a 74 year old male was sent by [facility] for lumbar RFA.  They have a history of central low back pain without radiation to the legs.  Exam shows increased discomfort with extension and rotation and they have tried conservative treatment including PT and medications.    Kevan Connelly had right medial branch blocks done @ [facility] on 11/8/2024 & 10/11/2024 showing appropriate pain relief, therefore radiofrequency ablation will be done.     CT LUMBAR SPINE was done @ [facility] on 8/27/2024 which showed   CONCLUSION:  1. Multilevel degenerative changes with S-shaped scoliosis as described. There is multilevel facet arthropathy and degenerative changes at the right transitional L5-S1 articulation.  2. Multilevel foraminal stenosis, most pronounced on the left at L3-4.  3. Baastrup's phenomenon.    Allergies:      Allergies   Allergen Reactions    Lisinopril Cough     Very persistent cough        Vitals:  BP (!) 157/96   Pulse 74   SpO2 99%     Review of Systems: The patient denies recent fever, chills, illness, use of antibiotics or anticoagulants. All other 10-point review of systems negative.     Options/alternatives, benefits and risks were discussed with the patient including bleeding, infection, no pain relief, tissue trauma, exposure to radiation, reaction to medications including seizure, spinal cord injury,increased pain after the procedure, weakness, numbness or sensory changes and headache.   We also discussed risks of sedation, including reaction to medications and cardiovascular collapse.    Questions were answered to his  satisfaction and he agrees to proceed. Voluntary informed consent was obtained and signed.     Medications were reviewed:  Yes   Pre-procedure pain score: 4/10    Procedure:  After getting informed consent, patient was brought into the procedure suite and was placed in a prone position on the procedure table.   A Pause for the Cause was performed.  Patient was prepped and draped in sterile fashion.     Kevan Connelly had an IV line placed prior the procedure.  The C-arm was positioned in the right lateral oblique view to afford optimal view of the L4-L5 vertebral bodies. Lidocaine 1% was used to anesthetize the skin at each level.  At each level, a 100mm, 20G curved radiofrequency cannula with a 10mm active tip was positioned, overlying the intersection of the transverse process and pedicle at L4 & L5, and was advanced under intermittent fluoroscopy until it contacted the transverse process and notch, and the tip slightly overran that process, just lateral to the mamillary process.  The position of each cannula was verified and optimized in the oblique view and AP views.    In the AP view, another cannula was placed at the sacral alar notch.      Each position was tested for motor stimulation.  Motor stimulation was completed at 2Hz, up to 2.5V.  Lidocaine 1% 0.5 ml was injected at each level, and a 90 second, 80 degree Centigrade lesion was generated.    The needles were then rotated within the pathway of the medial branch, and locations were evaluated with repeat imaging.  Motor testing was again completed, and showed appropriate stimulation.  A second lesion was then generated at each location.    The needles were withdrawn. Hemostasis was achieved, the area was cleaned, and bandaids were placed when appropriate.  The patient tolerated the procedure well, and was taken to the recovery room.    Images were saved to PACS.    Post-procedure pain score: 0/10  Follow-up includes:   -post-procedure pain medications:  NONE  -f/u with the referring provider      CARMINE DUTTA MD   Pain Management

## 2025-02-04 ENCOUNTER — ANTICOAGULATION THERAPY VISIT (OUTPATIENT)
Dept: ANTICOAGULATION | Facility: CLINIC | Age: 75
End: 2025-02-04

## 2025-02-04 ENCOUNTER — LAB (OUTPATIENT)
Dept: LAB | Facility: CLINIC | Age: 75
End: 2025-02-04
Payer: COMMERCIAL

## 2025-02-04 DIAGNOSIS — I48.0 PAROXYSMAL ATRIAL FIBRILLATION (H): ICD-10-CM

## 2025-02-04 DIAGNOSIS — Z79.01 LONG TERM CURRENT USE OF ANTICOAGULANT THERAPY: ICD-10-CM

## 2025-02-04 DIAGNOSIS — I48.0 PAROXYSMAL ATRIAL FIBRILLATION (H): Primary | ICD-10-CM

## 2025-02-04 LAB — INR BLD: 2.8 (ref 0.9–1.1)

## 2025-02-04 PROCEDURE — 85610 PROTHROMBIN TIME: CPT

## 2025-02-04 PROCEDURE — 36416 COLLJ CAPILLARY BLOOD SPEC: CPT

## 2025-02-04 NOTE — PROGRESS NOTES
ANTICOAGULATION MANAGEMENT     Kevan Connelly 74 year old male is on warfarin with therapeutic INR result. (Goal INR 2.0-3.0)    Recent labs: (last 7 days)     02/04/25  0734   INR 2.8*       ASSESSMENT     Warfarin Lab Questionnaire    Warfarin Doses Last 7 Days--Patient confirmed taking warfarin maintenance dose as listed          2/3/2025   Warfarin Lab Questionnaire   Missed doses within past 14 days? No   Changes in diet or alcohol within past 14 days? No   Medication changes since last result? No   Injuries or illness since last result? No   New shortness of breath, severe headaches or sudden changes in vision since last result? No   Abnormal bleeding since last result? No   Upcoming surgery, procedure? Yes   Please explain, date scheduled? Lower back radio ablason     Previous result: Therapeutic last 2(+) visits  Additional findings: Upcoming surgery/procedure lumbar ablasion scheduled on 2/5/25, INR goal < 3.0.        PLAN     Recommended plan for no diet, medication or health factor changes affecting INR     Dosing Instructions: Continue your current warfarin dose with next INR in 6 weeks       Summary  As of 2/4/2025      Full warfarin instructions:  2.5 mg every Tue; 5 mg all other days   Next INR check:  3/18/2025               Telephone call with Keron who verbalizes understanding and agrees to plan    Lab visit scheduled    Education provided: Taking warfarin: Importance of taking warfarin as instructed  Dietary considerations: importance of consistent vitamin K intake    Plan made per [facility] anticoagulation protocol    Ramona Delcid RN  2/4/2025  Anticoagulation Clinic  [employer] for routing messages: faith HOBBS  [facility] patient phone line: [phone]        _______________________________________________________________________     Anticoagulation Episode Summary       Current INR goal:  2.0-3.0   TTR:  86.9% (1 y)   Target end date:  Indefinite   Send INR reminders to:  CONNIE HOBBS     Indications    Paroxysmal atrial fibrillation (H) [I48.0]  Long term current use of anticoagulant therapy [Z79.01]             Comments:  --             Anticoagulation Care Providers       Provider Role Specialty Phone number    Davina Reaves MD Referring Internal Medicine [phone]    Rivera Bryan MD Referring Family Medicine [phone]

## 2025-02-05 ENCOUNTER — ANTICOAGULATION THERAPY VISIT (OUTPATIENT)
Dept: ANTICOAGULATION | Facility: CLINIC | Age: 75
End: 2025-02-05

## 2025-02-05 ENCOUNTER — LAB (OUTPATIENT)
Dept: LAB | Facility: CLINIC | Age: 75
End: 2025-02-05
Payer: COMMERCIAL

## 2025-02-05 ENCOUNTER — RADIOLOGY INJECTION OFFICE VISIT (OUTPATIENT)
Dept: PALLIATIVE MEDICINE | Facility: CLINIC | Age: 75
End: 2025-02-05
Payer: COMMERCIAL

## 2025-02-05 VITALS — DIASTOLIC BLOOD PRESSURE: 96 MMHG | HEART RATE: 74 BPM | SYSTOLIC BLOOD PRESSURE: 157 MMHG | OXYGEN SATURATION: 99 %

## 2025-02-05 DIAGNOSIS — Z98.890 HX OF ATRIOVENTRICULAR NODE ABLATION: ICD-10-CM

## 2025-02-05 DIAGNOSIS — I48.0 PAROXYSMAL ATRIAL FIBRILLATION (H): Primary | ICD-10-CM

## 2025-02-05 DIAGNOSIS — M47.816 SPONDYLOSIS OF LUMBAR REGION WITHOUT MYELOPATHY OR RADICULOPATHY: Primary | ICD-10-CM

## 2025-02-05 DIAGNOSIS — Z95.0 CARDIAC PACEMAKER IN SITU: ICD-10-CM

## 2025-02-05 DIAGNOSIS — N18.30 STAGE 3 CHRONIC KIDNEY DISEASE (H): ICD-10-CM

## 2025-02-05 DIAGNOSIS — Z79.01 LONG TERM CURRENT USE OF ANTICOAGULANT THERAPY: ICD-10-CM

## 2025-02-05 DIAGNOSIS — I48.20 CHRONIC ATRIAL FIBRILLATION (H): ICD-10-CM

## 2025-02-05 DIAGNOSIS — Z79.01 LONG TERM CURRENT USE OF ANTICOAGULANTS WITH INR GOAL OF 2.0-3.0: ICD-10-CM

## 2025-02-05 DIAGNOSIS — I10 BENIGN ESSENTIAL HYPERTENSION: ICD-10-CM

## 2025-02-05 LAB
ERYTHROCYTE [DISTWIDTH] IN BLOOD BY AUTOMATED COUNT: 12.7 % (ref 10–15)
HCT VFR BLD AUTO: 43.1 % (ref 40–53)
HGB BLD-MCNC: 15.2 G/DL (ref 13.3–17.7)
INR PPP: 2.46 (ref 0.85–1.15)
MCH RBC QN AUTO: 32 PG (ref 26.5–33)
MCHC RBC AUTO-ENTMCNC: 35.3 G/DL (ref 31.5–36.5)
MCV RBC AUTO: 91 FL (ref 78–100)
PLATELET # BLD AUTO: 133 10E3/UL (ref 150–450)
RBC # BLD AUTO: 4.75 10E6/UL (ref 4.4–5.9)
WBC # BLD AUTO: 4.5 10E3/UL (ref 4–11)

## 2025-02-05 PROCEDURE — 64636 DESTROY L/S FACET JNT ADDL: CPT | Mod: RT | Performed by: ANESTHESIOLOGY

## 2025-02-05 PROCEDURE — 36415 COLL VENOUS BLD VENIPUNCTURE: CPT

## 2025-02-05 PROCEDURE — 64635 DESTROY LUMB/SAC FACET JNT: CPT | Mod: RT | Performed by: ANESTHESIOLOGY

## 2025-02-05 PROCEDURE — 85610 PROTHROMBIN TIME: CPT

## 2025-02-05 PROCEDURE — 85027 COMPLETE CBC AUTOMATED: CPT

## 2025-02-05 ASSESSMENT — PAIN SCALES - GENERAL
PAINLEVEL_OUTOF10: MODERATE PAIN (4)
PAINLEVEL_OUTOF10: NO PAIN (0)

## 2025-02-05 NOTE — PROGRESS NOTES
ANTICOAGULATION MANAGEMENT     Mathur DHRUV Connelly 74 year old male is on warfarin with therapeutic INR result. (Goal INR 2.0-3.0)    Recent labs: (last 7 days)     02/05/25  1213   INR 2.46*       ASSESSMENT     Warfarin Lab Questionnaire    Warfarin Doses Last 7 Days          2/3/2025   Warfarin Lab Questionnaire   Missed doses within past 14 days? No   Changes in diet or alcohol within past 14 days? No   Medication changes since last result? No   Injuries or illness since last result? No   New shortness of breath, severe headaches or sudden changes in vision since last result? No   Abnormal bleeding since last result? No   Upcoming surgery, procedure? Yes   Please explain, date scheduled? Lower back radio ablason     Previous result: Therapeutic last 2(+) visits  Additional findings:  INR on 2/5/25 (today) was a pre-procedure INR,        PLAN     Recommended plan for no diet, medication or health factor changes affecting INR     Dosing Instructions: Continue your current warfarin dose with next INR in 6 weeks       Summary  As of 2/5/2025      Full warfarin instructions:  2.5 mg every Tue; 5 mg all other days   Next INR check:  3/18/2025               Patient was not contacted today, had INR checked 2/4/25 and was provided dosing instruction and next lab INR appointment was scheduled.    Lab visit scheduled    Education provided: None required    Plan made per [facility] anticoagulation protocol    Gretta Faith RN  2/5/2025  Anticoagulation Clinic  [employer] for routing messages: faith HOBBS  [facility] patient phone line: [phone]        _______________________________________________________________________     Anticoagulation Episode Summary       Current INR goal:  2.0-3.0   TTR:  86.9% (1 y)   Target end date:  Indefinite   Send INR reminders to:  CONNIE HOBBS    Indications    Paroxysmal atrial fibrillation (H) [I48.0]  Long term current use of anticoagulant therapy [Z79.01]             Comments:  --              Anticoagulation Care Providers       Provider Role Specialty Phone number    Davina Reaves MD Referring Internal Medicine [phone]    Rivera Bryan MD Referring Family Medicine [phone]

## 2025-02-05 NOTE — NURSING NOTE
Discharge Information    IV Discontiued Time:  NA    Amount of Fluid Infused:  NA    Discharge Criteria = When patient returns to baseline or as per MD order    Consciousness:  Pt is fully awake    Circulation:  BP +/- 20% of pre-procedure level    Respiration:  Patient is able to breathe deeply    O2 Sat:  Patient is able to maintain O2 Sat >92% on room air    Activity:  Moves 4 extremities on command    Ambulation:  Patient is able to stand and walk or stand and pivot into wheelchair    Dressing:  Clean/dry or No Dressing    Notes:   Discharge instructions and AVS given to patient    Patient meets criteria for discharge?  YES    Admitted to PCU?  No    Responsible adult present to accompany patient home?  Yes    Signature/Title:    Emely Rm RN  RN Care Coordinator  [facility] [place]

## 2025-02-05 NOTE — PATIENT INSTRUCTIONS
[facility] Procedure Discharge Instructions       Today you saw:   Dr. Carla Underwood       Your procedure:  Radiofrequency Nerve Ablation     Procedural Medications:  Lidocaine (anesthetic)         Sedation medications:  Lorazepam - (Ativan)    You have received sedation during your procedure; for the next 24 hours you should not:   -Drive   -Operate machinery   -Drink alcohol   -Sign any legal documents   If you were holding your blood thinning medication, please restart taking it: N/A  You may resume your normal diet and medications.   Avoid strenuous activity for the first 24 hours and resume regular activities after that.   Be cautious with walking as numbness and/or weakness in the lower extremities up to 6-8 hours may occur due to effect of local anesthetic   You may shower, however no swimming or tub baths or hot tubs for 24 hours following your procedure   Anticipate pain for up to 2 weeks after this procedure.  You may use ice packs 10-15 minutes three to four times a day at the injection site for comfort   You may use anti-inflammatory medications (such as Ibuprofen or Aleve or Advil) or Tylenol for pain control if necessary         It may take up to 8 weeks to receive relief from the RFA  If you experience any of the following, call the pain center line during work hours at [phone] or on call physician after hours at [phone]:  -Fever over 100 degree F   -Swelling, bleeding, redness, drainage, warmth at the injection site   -Progressive weakness or numbness in your legs or arms   -Loss of bowel or bladder function   -Unusual headache that is not relieved by Tylenol   -Unusual new onset of pain that is not improving

## 2025-02-05 NOTE — NURSING NOTE
Pre-procedure Intake  If YES to any questions or NO to having a   Please complete laminated checklist and leave on the computer keyboard for Provider, verbally inform provider if able.    For SCS Trial, RFA's or any sedation procedure:  Have you been fasting? NA  If yes, for how long?     Are you taking any any blood thinners such as Coumadin, Warfarin, Jantoven, Pradaxa Xarelto, Eliquis, Edoxaban, Enoxaparin, Lovenox, Heparin, Arixtra, Fondaparinux, or Fragmin? OR Antiplatelet medication such as Plavix, Brilinta, or Effient?   Yes -   Coumadin   If yes, when did you take your last dose? 02/04/25    Do you take aspirin?  No  If cervical procedure, have you held aspirin for 6 days?   NA    Is the Pt taking any GLP-1 Antagonist (hold needed for sedation patients only)  (semaglutide (Ozempic, Wegovy), dulaglutide (Trulicity), exenatide ER (Bydureon), tirzepatide (Mounjaro), Liraglutide (Saxenda, Victoza), semaglutide (Rybelsus)     NA  If yes, when did you take your last dose?     Do you have any allergies to contrast dye, iodine, steroid and/or numbing medications?  NO    Are you currently taking antibiotics or have an active infection?  NO    Have you had a fever/elevated temperature within the past week? NO    Are you currently taking oral steroids? NO    Do you have a ? Yes    Are you pregnant or breastfeeding?  Not Applicable    Have you received any vaccinations in the last week? NO    Vitals: B/P 169/103    Notify provider and RNs if systolic BP >170, diastolic BP >100, P >100 or O2 sats < 90%      Blossom Galeas MA  [facility]

## 2025-02-27 DIAGNOSIS — I48.0 PAROXYSMAL ATRIAL FIBRILLATION (H): Primary | ICD-10-CM

## 2025-02-27 DIAGNOSIS — Z98.890 HX OF ATRIOVENTRICULAR NODE ABLATION: ICD-10-CM

## 2025-02-27 DIAGNOSIS — Z95.0 CARDIAC PACEMAKER IN SITU: ICD-10-CM

## 2025-02-28 ENCOUNTER — HOSPITAL ENCOUNTER (OUTPATIENT)
Dept: CARDIOLOGY | Facility: CLINIC | Age: 75
Discharge: HOME | End: 2025-02-28
Attending: PHYSICIAN ASSISTANT | Admitting: PHYSICIAN ASSISTANT
Payer: COMMERCIAL

## 2025-02-28 DIAGNOSIS — Z95.0 CARDIAC PACEMAKER IN SITU: ICD-10-CM

## 2025-02-28 DIAGNOSIS — Z79.01 LONG TERM CURRENT USE OF ANTICOAGULANTS WITH INR GOAL OF 2.0-3.0: ICD-10-CM

## 2025-02-28 DIAGNOSIS — I10 BENIGN ESSENTIAL HYPERTENSION: ICD-10-CM

## 2025-02-28 DIAGNOSIS — Z98.890 HX OF ATRIOVENTRICULAR NODE ABLATION: ICD-10-CM

## 2025-02-28 DIAGNOSIS — I48.20 CHRONIC ATRIAL FIBRILLATION (H): ICD-10-CM

## 2025-02-28 DIAGNOSIS — N18.30 STAGE 3 CHRONIC KIDNEY DISEASE (H): ICD-10-CM

## 2025-02-28 LAB — LVEF ECHO: NORMAL

## 2025-02-28 PROCEDURE — 93321 DOPPLER ECHO F-UP/LMTD STD: CPT | Mod: 26 | Performed by: INTERNAL MEDICINE

## 2025-02-28 PROCEDURE — 93308 TTE F-UP OR LMTD: CPT | Mod: 26 | Performed by: INTERNAL MEDICINE

## 2025-02-28 PROCEDURE — 93325 DOPPLER ECHO COLOR FLOW MAPG: CPT

## 2025-02-28 PROCEDURE — 93325 DOPPLER ECHO COLOR FLOW MAPG: CPT | Mod: 26 | Performed by: INTERNAL MEDICINE

## 2025-03-18 ENCOUNTER — LAB (OUTPATIENT)
Dept: LAB | Facility: CLINIC | Age: 75
End: 2025-03-18
Payer: COMMERCIAL

## 2025-03-18 ENCOUNTER — ANTICOAGULATION THERAPY VISIT (OUTPATIENT)
Dept: ANTICOAGULATION | Facility: CLINIC | Age: 75
End: 2025-03-18

## 2025-03-18 DIAGNOSIS — Z79.01 LONG TERM CURRENT USE OF ANTICOAGULANT THERAPY: ICD-10-CM

## 2025-03-18 DIAGNOSIS — I48.0 PAROXYSMAL ATRIAL FIBRILLATION (H): Primary | ICD-10-CM

## 2025-03-18 DIAGNOSIS — I48.0 PAROXYSMAL ATRIAL FIBRILLATION (H): ICD-10-CM

## 2025-03-18 LAB — INR BLD: 2.5 (ref 0.9–1.1)

## 2025-03-18 PROCEDURE — 85610 PROTHROMBIN TIME: CPT

## 2025-03-18 PROCEDURE — 36416 COLLJ CAPILLARY BLOOD SPEC: CPT

## 2025-03-18 NOTE — PROGRESS NOTES
ANTICOAGULATION MANAGEMENT     Mathur DHRUV Connelly 74 year old male is on warfarin with therapeutic INR result. (Goal INR 2.0-3.0)    Recent labs: (last 7 days)     03/18/25  0759   INR 2.5*       ASSESSMENT     Warfarin Lab Questionnaire    Warfarin Doses Last 7 Days    Warfarin dose confirmed with patient and taken as directed          3/17/2025   Warfarin Lab Questionnaire   Missed doses within past 14 days? No   Changes in diet or alcohol within past 14 days? No   Medication changes since last result? No   Injuries or illness since last result? No   New shortness of breath, severe headaches or sudden changes in vision since last result? No   Abnormal bleeding since last result? No   Upcoming surgery, procedure? No     Previous result: Therapeutic last 2(+) visits  Additional findings: none       PLAN     Recommended plan for no diet, medication or health factor changes affecting INR     Dosing Instructions: Continue your current warfarin dose with next INR in 6 weeks       Summary  As of 3/18/2025      Full warfarin instructions:  2.5 mg every Tue; 5 mg all other days   Next INR check:  4/29/2025               Telephone call with Keron who agrees to plan and repeated back plan correctly    Lab visit scheduled    Education provided: Please call back if any changes to your diet, medications or how you've been taking warfarin    Plan made per [facility] anticoagulation protocol    Yarelis Martinez RN  3/18/2025  Anticoagulation Clinic  [employer] [place] for routing messages: faith HOBBS  [facility] patient phone line: [phone]        _______________________________________________________________________     Anticoagulation Episode Summary       Current INR goal:  2.0-3.0   TTR:  86.9% (1 y)   Target end date:  Indefinite   Send INR reminders to:  CONNIE HOBBS    Indications    Paroxysmal atrial fibrillation (H) [I48.0]  Long term current use of anticoagulant therapy [Z79.01]             Comments:  --              Anticoagulation Care Providers       Provider Role Specialty Phone number    Davina Reaves MD Referring Internal Medicine [phone]    Rivera Bryan MD Referring Family Medicine [phone]

## 2025-04-29 ENCOUNTER — LAB (OUTPATIENT)
Dept: LAB | Facility: CLINIC | Age: 75
End: 2025-04-29
Payer: COMMERCIAL

## 2025-04-29 ENCOUNTER — ANTICOAGULATION THERAPY VISIT (OUTPATIENT)
Dept: ANTICOAGULATION | Facility: CLINIC | Age: 75
End: 2025-04-29

## 2025-04-29 DIAGNOSIS — I48.0 PAROXYSMAL ATRIAL FIBRILLATION (H): ICD-10-CM

## 2025-04-29 DIAGNOSIS — Z79.01 LONG TERM CURRENT USE OF ANTICOAGULANT THERAPY: ICD-10-CM

## 2025-04-29 DIAGNOSIS — I48.0 PAROXYSMAL ATRIAL FIBRILLATION (H): Primary | ICD-10-CM

## 2025-04-29 LAB — INR BLD: 2.7 (ref 0.9–1.1)

## 2025-04-29 PROCEDURE — 36416 COLLJ CAPILLARY BLOOD SPEC: CPT

## 2025-04-29 PROCEDURE — 85610 PROTHROMBIN TIME: CPT

## 2025-04-29 NOTE — PROGRESS NOTES
ANTICOAGULATION MANAGEMENT     Kevan Connelly 74 year old male is on warfarin with therapeutic INR result. (Goal INR 2.0-3.0)    Recent labs: (last 7 days)     04/29/25  0758   INR 2.7*       ASSESSMENT     Warfarin Lab Questionnaire    Warfarin Doses Last 7 Days      4/28/2025     9:50 AM   Dose in Tablet or Mg   TAB or MG? tablet (tab)     Pt Rptd Dose SUNDAY MONDAY TUESDAY WED THURS FRIDAY SATURDAY 4/28/2025   9:50 AM 1 1 0.5 1 1 1 1         4/28/2025   Warfarin Lab Questionnaire   Missed doses within past 14 days? No   Changes in diet or alcohol within past 14 days? No   Medication changes since last result? No   Injuries or illness since last result? No   New shortness of breath, severe headaches or sudden changes in vision since last result? No   Abnormal bleeding since last result? No   Upcoming surgery, procedure? No     Previous result: Therapeutic last 2(+) visits  Additional findings: None       PLAN     Recommended plan for no diet, medication or health factor changes affecting INR     Dosing Instructions: Continue your current warfarin dose with next INR in 6 weeks       Summary  As of 4/29/2025      Full warfarin instructions:  2.5 mg every Tue; 5 mg all other days   Next INR check:  6/9/2025               Telephone call with Keron who verbalizes understanding and agrees to plan    Lab visit scheduled    Education provided: Taking warfarin: Importance of taking warfarin as instructed    Plan made per [facility] anticoagulation protocol    Ramona Delcid, RN  4/29/2025  Anticoagulation Clinic  [employer] for routing messages: faith HOBBS  [facility] patient phone line: [phone]        _______________________________________________________________________     Anticoagulation Episode Summary       Current INR goal:  2.0-3.0   TTR:  94.7% (1 y)   Target end date:  Indefinite   Send INR reminders to:  CONNIE HOBBS    Indications    Paroxysmal atrial fibrillation (H) [I48.0]  Long term current use of  anticoagulant therapy [Z79.01]             Comments:  --             Anticoagulation Care Providers       Provider Role Specialty Phone number    Davina Reaves MD Referring Internal Medicine [phone]    Rivera Bryan MD Referring Family Medicine [phone]

## 2025-05-05 DIAGNOSIS — I48.0 PAROXYSMAL ATRIAL FIBRILLATION (H): ICD-10-CM

## 2025-05-06 RX ORDER — CARVEDILOL 12.5 MG/1
12.5 TABLET ORAL 2 TIMES DAILY WITH MEALS
Qty: 180 TABLET | Refills: 0 | Status: SHIPPED | OUTPATIENT
Start: 2025-05-06

## 2025-05-28 ENCOUNTER — ANCILLARY PROCEDURE (OUTPATIENT)
Dept: CARDIOLOGY | Facility: CLINIC | Age: 75
End: 2025-05-28
Attending: INTERNAL MEDICINE
Payer: COMMERCIAL

## 2025-05-28 DIAGNOSIS — I48.0 PAROXYSMAL ATRIAL FIBRILLATION (H): ICD-10-CM

## 2025-05-28 DIAGNOSIS — Z98.890 HX OF ATRIOVENTRICULAR NODE ABLATION: ICD-10-CM

## 2025-05-28 DIAGNOSIS — Z95.0 CARDIAC PACEMAKER IN SITU: ICD-10-CM

## 2025-05-28 LAB
MDC_IDC_LEAD_CONNECTION_STATUS: NORMAL
MDC_IDC_LEAD_IMPLANT_DT: NORMAL
MDC_IDC_LEAD_LOCATION: NORMAL
MDC_IDC_LEAD_LOCATION_DETAIL_1: NORMAL
MDC_IDC_LEAD_MFG: NORMAL
MDC_IDC_LEAD_MODEL: NORMAL
MDC_IDC_LEAD_POLARITY_TYPE: NORMAL
MDC_IDC_LEAD_SERIAL: NORMAL
MDC_IDC_LEAD_SPECIAL_FUNCTION: NORMAL
MDC_IDC_MSMT_BATTERY_DTM: NORMAL
MDC_IDC_MSMT_BATTERY_REMAINING_LONGEVITY: 119 MO
MDC_IDC_MSMT_BATTERY_RRT_TRIGGER: 2.62
MDC_IDC_MSMT_BATTERY_STATUS: NORMAL
MDC_IDC_MSMT_BATTERY_VOLTAGE: 3.02 V
MDC_IDC_MSMT_LEADCHNL_RV_IMPEDANCE_VALUE: 323 OHM
MDC_IDC_MSMT_LEADCHNL_RV_IMPEDANCE_VALUE: 399 OHM
MDC_IDC_MSMT_LEADCHNL_RV_PACING_THRESHOLD_AMPLITUDE: 0.62 V
MDC_IDC_MSMT_LEADCHNL_RV_PACING_THRESHOLD_PULSEWIDTH: 0.4 MS
MDC_IDC_MSMT_LEADCHNL_RV_SENSING_INTR_AMPL: 4.75 MV
MDC_IDC_MSMT_LEADCHNL_RV_SENSING_INTR_AMPL: 7 MV
MDC_IDC_PG_IMPLANT_DTM: NORMAL
MDC_IDC_PG_MFG: NORMAL
MDC_IDC_PG_MODEL: NORMAL
MDC_IDC_PG_SERIAL: NORMAL
MDC_IDC_PG_TYPE: NORMAL
MDC_IDC_SESS_CLINIC_NAME: NORMAL
MDC_IDC_SESS_DTM: NORMAL
MDC_IDC_SESS_TYPE: NORMAL
MDC_IDC_SET_BRADY_HYSTRATE: NORMAL
MDC_IDC_SET_BRADY_LOWRATE: 70 {BEATS}/MIN
MDC_IDC_SET_BRADY_MAX_SENSOR_RATE: 130 {BEATS}/MIN
MDC_IDC_SET_BRADY_MODE: NORMAL
MDC_IDC_SET_LEADCHNL_RV_PACING_AMPLITUDE: 2 V
MDC_IDC_SET_LEADCHNL_RV_PACING_ANODE_ELECTRODE_1: NORMAL
MDC_IDC_SET_LEADCHNL_RV_PACING_ANODE_LOCATION_1: NORMAL
MDC_IDC_SET_LEADCHNL_RV_PACING_CAPTURE_MODE: NORMAL
MDC_IDC_SET_LEADCHNL_RV_PACING_CATHODE_ELECTRODE_1: NORMAL
MDC_IDC_SET_LEADCHNL_RV_PACING_CATHODE_LOCATION_1: NORMAL
MDC_IDC_SET_LEADCHNL_RV_PACING_POLARITY: NORMAL
MDC_IDC_SET_LEADCHNL_RV_PACING_PULSEWIDTH: 0.4 MS
MDC_IDC_SET_LEADCHNL_RV_SENSING_ANODE_ELECTRODE_1: NORMAL
MDC_IDC_SET_LEADCHNL_RV_SENSING_ANODE_LOCATION_1: NORMAL
MDC_IDC_SET_LEADCHNL_RV_SENSING_CATHODE_ELECTRODE_1: NORMAL
MDC_IDC_SET_LEADCHNL_RV_SENSING_CATHODE_LOCATION_1: NORMAL
MDC_IDC_SET_LEADCHNL_RV_SENSING_POLARITY: NORMAL
MDC_IDC_SET_LEADCHNL_RV_SENSING_SENSITIVITY: 0.9 MV
MDC_IDC_SET_ZONE_DETECTION_INTERVAL: 360 MS
MDC_IDC_SET_ZONE_STATUS: NORMAL
MDC_IDC_SET_ZONE_TYPE: NORMAL
MDC_IDC_SET_ZONE_VENDOR_TYPE: NORMAL
MDC_IDC_STAT_BRADY_DTM_END: NORMAL
MDC_IDC_STAT_BRADY_DTM_START: NORMAL
MDC_IDC_STAT_BRADY_RV_PERCENT_PACED: 99.92 %
MDC_IDC_STAT_EPISODE_RECENT_COUNT: 0
MDC_IDC_STAT_EPISODE_RECENT_COUNT_DTM_END: NORMAL
MDC_IDC_STAT_EPISODE_RECENT_COUNT_DTM_START: NORMAL
MDC_IDC_STAT_EPISODE_TOTAL_COUNT: 0
MDC_IDC_STAT_EPISODE_TOTAL_COUNT: 0
MDC_IDC_STAT_EPISODE_TOTAL_COUNT: 8
MDC_IDC_STAT_EPISODE_TOTAL_COUNT_DTM_END: NORMAL
MDC_IDC_STAT_EPISODE_TOTAL_COUNT_DTM_START: NORMAL
MDC_IDC_STAT_EPISODE_TYPE: NORMAL

## 2025-05-28 PROCEDURE — 93296 REM INTERROG EVL PM/IDS: CPT | Performed by: INTERNAL MEDICINE

## 2025-05-28 PROCEDURE — 93294 REM INTERROG EVL PM/LDLS PM: CPT | Performed by: INTERNAL MEDICINE

## 2025-06-09 ENCOUNTER — ANTICOAGULATION THERAPY VISIT (OUTPATIENT)
Dept: ANTICOAGULATION | Facility: CLINIC | Age: 75
End: 2025-06-09

## 2025-06-09 ENCOUNTER — LAB (OUTPATIENT)
Dept: LAB | Facility: CLINIC | Age: 75
End: 2025-06-09
Payer: COMMERCIAL

## 2025-06-09 ENCOUNTER — RESULTS FOLLOW-UP (OUTPATIENT)
Dept: ANTICOAGULATION | Facility: CLINIC | Age: 75
End: 2025-06-09

## 2025-06-09 DIAGNOSIS — Z79.01 LONG TERM CURRENT USE OF ANTICOAGULANT THERAPY: ICD-10-CM

## 2025-06-09 DIAGNOSIS — I48.0 PAROXYSMAL ATRIAL FIBRILLATION (H): ICD-10-CM

## 2025-06-09 DIAGNOSIS — I48.0 PAROXYSMAL ATRIAL FIBRILLATION (H): Primary | ICD-10-CM

## 2025-06-09 LAB — INR BLD: 2.5 (ref 0.9–1.1)

## 2025-06-09 PROCEDURE — 85610 PROTHROMBIN TIME: CPT

## 2025-06-09 PROCEDURE — 36416 COLLJ CAPILLARY BLOOD SPEC: CPT

## 2025-06-09 NOTE — PROGRESS NOTES
ANTICOAGULATION MANAGEMENT     Kevan Connelly 74 year old male is on warfarin with therapeutic INR result. (Goal INR 2.0-3.0)    Recent labs: (last 7 days)     06/09/25  0823   INR 2.5*       ASSESSMENT     Warfarin Lab Questionnaire    Warfarin Doses Last 7 Days      6/9/2025     7:44 AM   Dose in Tablet or Mg   TAB or MG? tablet (tab)     Pt Rptd Dose SUNDAY MONDAY TUESDAY WED THURS FRIDAY SATURDAY 6/9/2025   7:44 AM 1 1 0.5 1 1 1 1         6/9/2025   Warfarin Lab Questionnaire   Missed doses within past 14 days? No   Changes in diet or alcohol within past 14 days? No   Medication changes since last result? No   Injuries or illness since last result? No   New shortness of breath, severe headaches or sudden changes in vision since last result? No   Abnormal bleeding since last result? No   Upcoming surgery, procedure? No     Previous result: Therapeutic last 2(+) visits  Additional findings: None       PLAN     Recommended plan for no diet, medication or health factor changes affecting INR     Dosing Instructions: Continue your current warfarin dose with next INR in 6 weeks       Summary  As of 6/9/2025      Full warfarin instructions:  2.5 mg every Tue; 5 mg all other days   Next INR check:  7/21/2025               Telephone call with Keron who verbalizes understanding and agrees to plan    Lab visit scheduled    Education provided: Please call back if any changes to your diet, medications or how you've been taking warfarin    Plan made per [facility] anticoagulation protocol    Ksenia Faith RN  6/9/2025  Anticoagulation Clinic  [employer] for routing messages: faith HOBBS  [facility] patient phone line: [phone]        _______________________________________________________________________     Anticoagulation Episode Summary       Current INR goal:  2.0-3.0   TTR:  97.4% (1 y)   Target end date:  Indefinite   Send INR reminders to:  CONNIE HOBBS    Indications    Paroxysmal atrial  fibrillation (H) [I48.0]  Long term current use of anticoagulant therapy [Z79.01]             Comments:  --             Anticoagulation Care Providers       Provider Role Specialty Phone number    Davina Reaves MD Referring Internal Medicine [phone]    Rivera Bryan MD Referring Family Medicine [phone]

## 2025-06-21 DIAGNOSIS — I10 BENIGN ESSENTIAL HYPERTENSION: ICD-10-CM

## 2025-06-23 RX ORDER — OLMESARTAN MEDOXOMIL 40 MG/1
40 TABLET ORAL DAILY
Qty: 90 TABLET | Refills: 0 | Status: SHIPPED | OUTPATIENT
Start: 2025-06-23

## 2025-07-02 SDOH — HEALTH STABILITY: PHYSICAL HEALTH: ON AVERAGE, HOW MANY MINUTES DO YOU ENGAGE IN EXERCISE AT THIS LEVEL?: 20 MIN

## 2025-07-02 SDOH — HEALTH STABILITY: PHYSICAL HEALTH: ON AVERAGE, HOW MANY DAYS PER WEEK DO YOU ENGAGE IN MODERATE TO STRENUOUS EXERCISE (LIKE A BRISK WALK)?: 1 DAY

## 2025-07-02 ASSESSMENT — SOCIAL DETERMINANTS OF HEALTH (SDOH): HOW OFTEN DO YOU GET TOGETHER WITH FRIENDS OR RELATIVES?: TWICE A WEEK

## 2025-07-07 ENCOUNTER — OFFICE VISIT (OUTPATIENT)
Dept: FAMILY MEDICINE | Facility: CLINIC | Age: 75
End: 2025-07-07
Payer: COMMERCIAL

## 2025-07-07 VITALS
RESPIRATION RATE: 20 BRPM | HEART RATE: 91 BPM | HEIGHT: 75 IN | WEIGHT: 267 LBS | TEMPERATURE: 98 F | DIASTOLIC BLOOD PRESSURE: 88 MMHG | OXYGEN SATURATION: 97 % | SYSTOLIC BLOOD PRESSURE: 151 MMHG | BODY MASS INDEX: 33.2 KG/M2

## 2025-07-07 DIAGNOSIS — I48.0 PAROXYSMAL ATRIAL FIBRILLATION (H): ICD-10-CM

## 2025-07-07 DIAGNOSIS — Z12.5 ENCOUNTER FOR SCREENING FOR MALIGNANT NEOPLASM OF PROSTATE: ICD-10-CM

## 2025-07-07 DIAGNOSIS — N18.31 STAGE 3A CHRONIC KIDNEY DISEASE (H): ICD-10-CM

## 2025-07-07 DIAGNOSIS — I10 BENIGN ESSENTIAL HYPERTENSION: ICD-10-CM

## 2025-07-07 DIAGNOSIS — R39.9 LOWER URINARY TRACT SYMPTOMS (LUTS): ICD-10-CM

## 2025-07-07 DIAGNOSIS — I25.10 CORONARY ARTERY CALCIFICATION: ICD-10-CM

## 2025-07-07 DIAGNOSIS — Z12.11 COLON CANCER SCREENING: ICD-10-CM

## 2025-07-07 DIAGNOSIS — E78.5 HYPERLIPIDEMIA LDL GOAL <100: ICD-10-CM

## 2025-07-07 DIAGNOSIS — Z00.00 ENCOUNTER FOR MEDICARE ANNUAL WELLNESS EXAM: Primary | ICD-10-CM

## 2025-07-07 DIAGNOSIS — I50.9 CHRONIC CONGESTIVE HEART FAILURE, UNSPECIFIED HEART FAILURE TYPE (H): ICD-10-CM

## 2025-07-07 DIAGNOSIS — I48.92 ATRIAL FLUTTER, UNSPECIFIED TYPE (H): ICD-10-CM

## 2025-07-07 DIAGNOSIS — R17 TOTAL BILIRUBIN, ELEVATED: ICD-10-CM

## 2025-07-07 LAB
ALBUMIN SERPL BCG-MCNC: 3.9 G/DL (ref 3.5–5.2)
ALP SERPL-CCNC: 57 U/L (ref 40–150)
ALT SERPL W P-5'-P-CCNC: 24 U/L (ref 0–70)
ANION GAP SERPL CALCULATED.3IONS-SCNC: 10 MMOL/L (ref 7–15)
AST SERPL W P-5'-P-CCNC: 22 U/L (ref 0–45)
BILIRUB SERPL-MCNC: 1.7 MG/DL
BUN SERPL-MCNC: 24.1 MG/DL (ref 8–23)
CALCIUM SERPL-MCNC: 9.4 MG/DL (ref 8.8–10.4)
CHLORIDE SERPL-SCNC: 106 MMOL/L (ref 98–107)
CHOLEST SERPL-MCNC: 140 MG/DL
CREAT SERPL-MCNC: 1.49 MG/DL (ref 0.67–1.17)
EGFRCR SERPLBLD CKD-EPI 2021: 50 ML/MIN/1.73M2
FASTING STATUS PATIENT QL REPORTED: YES
FASTING STATUS PATIENT QL REPORTED: YES
GLUCOSE SERPL-MCNC: 100 MG/DL (ref 70–99)
HCO3 SERPL-SCNC: 23 MMOL/L (ref 22–29)
HDLC SERPL-MCNC: 40 MG/DL
LDLC SERPL CALC-MCNC: 82 MG/DL
NONHDLC SERPL-MCNC: 100 MG/DL
POTASSIUM SERPL-SCNC: 5.2 MMOL/L (ref 3.4–5.3)
PROT SERPL-MCNC: 6.5 G/DL (ref 6.4–8.3)
PSA SERPL DL<=0.01 NG/ML-MCNC: 1.75 NG/ML (ref 0–6.5)
SODIUM SERPL-SCNC: 139 MMOL/L (ref 135–145)
TRIGL SERPL-MCNC: 91 MG/DL

## 2025-07-07 PROCEDURE — 99214 OFFICE O/P EST MOD 30 MIN: CPT | Mod: 25 | Performed by: STUDENT IN AN ORGANIZED HEALTH CARE EDUCATION/TRAINING PROGRAM

## 2025-07-07 PROCEDURE — 1126F AMNT PAIN NOTED NONE PRSNT: CPT | Performed by: STUDENT IN AN ORGANIZED HEALTH CARE EDUCATION/TRAINING PROGRAM

## 2025-07-07 PROCEDURE — G2211 COMPLEX E/M VISIT ADD ON: HCPCS | Performed by: STUDENT IN AN ORGANIZED HEALTH CARE EDUCATION/TRAINING PROGRAM

## 2025-07-07 PROCEDURE — 80053 COMPREHEN METABOLIC PANEL: CPT | Performed by: STUDENT IN AN ORGANIZED HEALTH CARE EDUCATION/TRAINING PROGRAM

## 2025-07-07 PROCEDURE — 3077F SYST BP >= 140 MM HG: CPT | Performed by: STUDENT IN AN ORGANIZED HEALTH CARE EDUCATION/TRAINING PROGRAM

## 2025-07-07 PROCEDURE — 80061 LIPID PANEL: CPT | Performed by: STUDENT IN AN ORGANIZED HEALTH CARE EDUCATION/TRAINING PROGRAM

## 2025-07-07 PROCEDURE — 36415 COLL VENOUS BLD VENIPUNCTURE: CPT | Performed by: STUDENT IN AN ORGANIZED HEALTH CARE EDUCATION/TRAINING PROGRAM

## 2025-07-07 PROCEDURE — G0103 PSA SCREENING: HCPCS | Performed by: STUDENT IN AN ORGANIZED HEALTH CARE EDUCATION/TRAINING PROGRAM

## 2025-07-07 PROCEDURE — G0439 PPPS, SUBSEQ VISIT: HCPCS | Performed by: STUDENT IN AN ORGANIZED HEALTH CARE EDUCATION/TRAINING PROGRAM

## 2025-07-07 PROCEDURE — 3079F DIAST BP 80-89 MM HG: CPT | Performed by: STUDENT IN AN ORGANIZED HEALTH CARE EDUCATION/TRAINING PROGRAM

## 2025-07-07 RX ORDER — OLMESARTAN MEDOXOMIL 40 MG/1
40 TABLET ORAL DAILY
Qty: 90 TABLET | Refills: 3 | Status: SHIPPED | OUTPATIENT
Start: 2025-07-07

## 2025-07-07 RX ORDER — CARVEDILOL 25 MG/1
25 TABLET ORAL 2 TIMES DAILY WITH MEALS
Qty: 180 TABLET | Refills: 3 | Status: SHIPPED | OUTPATIENT
Start: 2025-07-07 | End: 2025-07-07

## 2025-07-07 RX ORDER — ATORVASTATIN CALCIUM 20 MG/1
20 TABLET, FILM COATED ORAL DAILY
Qty: 90 TABLET | Refills: 3 | Status: SHIPPED | OUTPATIENT
Start: 2025-07-07

## 2025-07-07 RX ORDER — HYDROCHLOROTHIAZIDE 12.5 MG/1
12.5 TABLET ORAL DAILY
Qty: 30 TABLET | Refills: 0 | Status: SHIPPED | OUTPATIENT
Start: 2025-07-07

## 2025-07-07 RX ORDER — TAMSULOSIN HYDROCHLORIDE 0.4 MG/1
0.4 CAPSULE ORAL DAILY
Qty: 90 CAPSULE | Refills: 3 | Status: SHIPPED | OUTPATIENT
Start: 2025-07-07

## 2025-07-07 ASSESSMENT — PAIN SCALES - GENERAL: PAINLEVEL_OUTOF10: NO PAIN (0)

## 2025-07-07 NOTE — PROGRESS NOTES
"Preventive Care Visit  [facility]  Rivera Bryan MD, Family Medicine  Jul 7, 2025    Assessment & Plan     Encounter for Medicare annual wellness exam  Doing well overall. Due for repeat colonoscopy on 10/2025, ordered. Due for PSA, ordered. Discussed vaccination recommendations.    Colon cancer screening  - Colonoscopy Screening  Referral    Encounter for screening for malignant neoplasm of prostate  Lower urinary tract symptoms (LUTS)  Well controlled on 0.4mg tamsulosin daily. Has family hx of prostate cancer in father. Ok to refill tamsulosin for 1 yr.   - PSA, screen    Chronic congestive heart failure, unspecified heart failure type (H)  Following with Cardiology, last visit on 2/2025 with stable echocardiogram.   - Comprehensive metabolic panel (BMP + Alb, Alk Phos, ALT, AST, Total. Bili, TP)    Atrial flutter, unspecified type (H)  Paroxysmal atrial fibrillation (H)  Following with Cardiology. S/p AVNA and PPM. On carvedilol 12.5mg BID and warfarin anticoagulation.   - carvedilol (COREG) 25 MG tablet  Dispense: 180 tablet; Refill: 3    Benign essential hypertension  Uncontrolled on 40mg olmesartan and 12.5mg carvedilol BID (max dose for HR). Plan to add HCTZ. Follow up in 2 weeks for repeat BP, BMP.   - hydrochlorothiazide 12.5mg daily   - BMP repeat in 2 weeks    Hyperlipidemia LDL goal <100  CAC  On 20mg atorvastatin daily due to CAC noted on CTA from 1/2020. LDL goal <70.  - Lipid panel reflex to direct LDL Non-fasting    Stage 3 chronic kidney disease (H)  Largely stable creatinine since 2017 bordering on CKD2 to 3a.     Total bilirubin, elevated  See problem list for more details.   - Comprehensive metabolic panel (BMP + Alb, Alk Phos, ALT, AST, Total. Bili, TP)    BMI  Estimated body mass index is 33.37 kg/m  as calculated from the following:    Height as of this encounter: 1.905 m (6' 3\").    Weight as of this encounter: 121.1 kg (267 lb).     The longitudinal plan of " care for the diagnosis(es)/condition(s) as documented were addressed during this visit. Due to the added complexity in care, I will continue to support Keron in the subsequent management and with ongoing continuity of care.    Counseling  Appropriate preventive services were addressed with this patient via screening, questionnaire, or discussion as appropriate for fall prevention, nutrition, physical activity, Tobacco-use cessation, social engagement, weight loss and cognition.  Checklist reviewing preventive services available has been given to the patient.  Reviewed patient's diet, addressing concerns and/or questions.   He is at risk for lack of exercise and has been provided with information to increase physical activity for the benefit of his well-being.   The patient was provided with written information regarding signs of hearing loss.     Follow up in two weeks for BP repeat then 1 yr for annual physical    Riveralaurence Bryan MD  [facility]  7/7/2025      Subjective   Keron is a 74 year old, presenting for the following:  Medicare Visit        7/7/2025     7:02 AM   Additional Questions   Roomed by Tia CASTILLO        Pt is fasting in case of labs.    No additional concerns.    HPI    HTN  Taking 40mg olmesartan and 12.5mg carvedilol.   Does have BP cuff at home but BP is still high at home too.   No missed doses.     CHF  Follows with Cardiology.   Hasn't taken the furosemide for at least 3 years.    LUTS  Taking the tamsulosin, working well, no issues.     HLD  Taking the 20mg atorvastatin. No issues.    Afib  Taking warfarin daily.    Advance Care Planning  Document on file is a Health Care Directive or POLST.        7/2/2025   General Health   How would you rate your overall physical health? Good   Feel stress (tense, anxious, or unable to sleep) Not at all         7/2/2025   Nutrition   Diet: Regular (no restrictions)         7/2/2025   Exercise   Days per week of moderate/strenous exercise  1 day   Average minutes spent exercising at this level 20 min   Walking when able - upper body free weights. Pain and mobility is limiting factor.        7/2/2025   Social Factors   Frequency of gathering with friends or relatives Twice a week   Worry food won't last until get money to buy more No   Food not last or not have enough money for food? No   Do you have housing? (Housing is defined as stable permanent housing and does not include staying outside in a car, in a tent, in an abandoned building, in an overnight shelter, or couch-surfing.) Yes   Are you worried about losing your housing? No   Lack of transportation? No   Unable to get utilities (heat,electricity)? No         7/2/2025   Fall Risk   Fallen 2 or more times in the past year? No   Trouble with walking or balance? No          7/2/2025   Activities of Daily Living- Home Safety   Needs help with the following daily activites None of the above   Safety concerns in the home None of the above         7/2/2025   Dental   Dentist two times every year? Yes         7/2/2025   Hearing Screening   Hearing concerns? (!) IT'S HARDER TO UNDERSTAND WOMEN'S VOICES THAN MEN'S VOICES.    (!) TROUBLE UNDERSTANDING SOFT OR WHISPERED SPEECH.   Would you like a referral for hearing testing? No       Multiple values from one day are sorted in reverse-chronological order         7/2/2025   Driving Risk Screening   Patient/family members have concerns about driving No         7/2/2025   General Alertness/Fatigue Screening   Have you been more tired than usual lately? No         7/2/2025   Urinary Incontinence Screening   Bothered by leaking urine in past 6 months No     Today's PHQ-2 Score:       7/7/2025     6:58 AM   PHQ-2 ( 1999 [employer])   Q1: Little interest or pleasure in doing things 0   Q2: Feeling down, depressed or hopeless 0   PHQ-2 Score 0    Q1: Little interest or pleasure in doing things Not at all   Q2: Feeling down, depressed or hopeless Not at all   PHQ-2  Score 0       Patient-reported         7/2/2025   Substance Use   Alcohol more than 3/day or more than 7/wk No   Do you have a current opioid prescription? No   How severe/bad is pain from 1 to 10? 3/10   Do you use any other substances recreationally? No     Social History     Tobacco Use    Smoking status: Never     Passive exposure: Never    Smokeless tobacco: Never   Vaping Use    Vaping status: Never Used   Substance Use Topics    Alcohol use: Not Currently     Comment: every 1-2 months or less    Drug use: No         7/2/2025   AAA Screening   Family history of Abdominal Aortic Aneurysm (AAA)? No     ASCVD Risk   The 10-year ASCVD risk score (Benita THOMPSON, et al., 2019) is: 28.1%    Values used to calculate the score:      Age: 74 years      Sex: Male      Is Non- : No      Diabetic: No      Tobacco smoker: No      Systolic Blood Pressure: 151 mmHg      Is BP treated: Yes      HDL Cholesterol: 41 mg/dL      Total Cholesterol: 135 mg/dL    Reviewed and updated as needed this visit by Provider   Tobacco  Allergies  Meds    Surg Hx  Fam Hx            Current providers sharing in care for this patient include:  Patient Care Team:  Rivera Bryan MD as PCP - General (Family Practice)  Meagan Boggs PA-C as Physician Assistant (Cardiovascular Disease)  Rivera Bryan MD as Assigned PCP  Shira Ortiz PA-C as Assigned Heart and Vascular Provider    The following health maintenance items are reviewed in Epic and correct as of today:  Health Maintenance   Topic Date Due    HF ACTION PLAN  Never done    ZOSTER VACCINE (1 of 2) Never done    RSV VACCINE (1 - Risk 60-74 years 1-dose series) Never done    ANNUAL REVIEW OF HM ORDERS  06/26/2024    COVID-19 VACCINE (1 - 2024-25 season) Never done    ALT  07/03/2025    LIPID  07/03/2025    MEDICARE ANNUAL WELLNESS VISIT  07/03/2025    BMP  07/13/2025    INFLUENZA VACCINE (1) 09/01/2025    COLORECTAL CANCER SCREENING  10/27/2025  "   CBC  02/05/2026    HEMOGLOBIN  02/05/2026    FALL RISK ASSESSMENT  07/07/2026    DIABETES SCREENING  01/13/2028    DTAP/TDAP/TD VACCINE (3 - Td or Tdap) 01/22/2029    ADVANCE CARE PLANNING  07/07/2030    TSH W/FREE T4 REFLEX  Completed    HEPATITIS C SCREENING  Completed    PHQ-2 (once per calendar year)  Completed    PNEUMOCOCCAL VACCINE 50+ YEARS  Completed    URINALYSIS  Completed    HPV VACCINE  Aged Out    MENINGITIS VACCINE  Aged Out    MICROALBUMIN  Discontinued        Objective    Exam  BP (!) 151/88 (BP Location: Right arm, Patient Position: Sitting, Cuff Size: Adult Large)   Pulse 91   Temp 98  F (36.7  C) (Oral)   Resp 20   Ht 1.905 m (6' 3\")   Wt 121.1 kg (267 lb)   SpO2 97%   BMI 33.37 kg/m     Estimated body mass index is 33.37 kg/m  as calculated from the following:    Height as of this encounter: 1.905 m (6' 3\").    Weight as of this encounter: 121.1 kg (267 lb).    Physical Exam  GENERAL: healthy, alert and no distress  HEAD: Normocephalic, atraumatic.   EYES: PERRL. Normal conjunctivae, sclera.   ENT: Normal EAC and TMs bilaterally. Normal oropharynx.   NECK: Supple. No lymphadenopathy appreciated. Trachea midline. Thyroid not enlarged, not TTP.  RESP: lungs clear to auscultation - no rales, rhonchi or wheezes  CV: regular rate and rhythm, normal S1 S2, no murmur, click, rub or gallop.  1+ pitting edema up to mid-calves bilaterally.   ABDOMEN: soft, no TTP x4 quadrants. No hepatomegaly or masses appreciated. BS normactive.  MSK: no gross musculoskeletal defects noted.  SKIN: no suspicious lesions or rashes.  EXT: Warm and well perfused.   NEURO: CNII-XII grossly intact. No focal deficits.  PSYCH: Groomed, dressed appropriately for weather.  Logical, linear thought process. Normal mood with consistent affect.            7/7/2025   Mini Cog   Clock Draw Score 2 Normal   3 Item Recall 3 objects recalled   Mini Cog Total Score 5     Signed Electronically by: Rivera Bryan MD  "

## 2025-07-07 NOTE — PATIENT INSTRUCTIONS
Patient Education   Preventive Care Advice   This is general advice given by our system to help you stay healthy. However, your care team may have specific advice just for you. Please talk to your care team about your preventive care needs.  Nutrition  Eat 5 or more servings of fruits and vegetables each day.  Try wheat bread, brown rice and whole grain pasta (instead of white bread, rice, and pasta).  Get enough calcium and vitamin D. Check the label on foods and aim for 100% of the RDA (recommended daily allowance).  Lifestyle  Exercise at least 150 minutes each week  (30 minutes a day, 5 days a week).  Do muscle strengthening activities 2 days a week. These help control your weight and prevent disease.  No smoking.  Wear sunscreen to prevent skin cancer.  Have a dental exam and cleaning every 6 months.  Yearly exams  See your health care team every year to talk about:  Any changes in your health.  Any medicines your care team has prescribed.  Preventive care, family planning, and ways to prevent chronic diseases.  Shots (vaccines)   HPV shots (up to age 26), if you've never had them before.  Hepatitis B shots (up to age 59), if you've never had them before.  COVID-19 shot: Get this shot when it's due.  Flu shot: Get a flu shot every year.  Tetanus shot: Get a tetanus shot every 10 years.  Pneumococcal, hepatitis A, and RSV shots: Ask your care team if you need these based on your risk.  Shingles shot (for age 50 and up)  General health tests  Diabetes screening:  Starting at age 35, Get screened for diabetes at least every 3 years.  If you are younger than age 35, ask your care team if you should be screened for diabetes.  Cholesterol test: At age 39, start having a cholesterol test every 5 years, or more often if advised.  Bone density scan (DEXA): At age 50, ask your care team if you should have this scan for osteoporosis (brittle bones).  Hepatitis C: Get tested at least once in your life.  STIs (sexually  transmitted infections)  Before age 24: Ask your care team if you should be screened for STIs.  After age 24: Get screened for STIs if you're at risk. You are at risk for STIs (including HIV) if:  You are sexually active with more than one person.  You don't use condoms every time.  You or a partner was diagnosed with a sexually transmitted infection.  If you are at risk for HIV, ask about PrEP medicine to prevent HIV.  Get tested for HIV at least once in your life, whether you are at risk for HIV or not.  Cancer screening tests  Cervical cancer screening: If you have a cervix, begin getting regular cervical cancer screening tests starting at age 21.  Breast cancer scan (mammogram): If you've ever had breasts, begin having regular mammograms starting at age 40. This is a scan to check for breast cancer.  Colon cancer screening: It is important to start screening for colon cancer at age 45.  Have a colonoscopy test every 10 years (or more often if you're at risk) Or, ask your provider about stool tests like a FIT test every year or Cologuard test every 3 years.  To learn more about your testing options, visit:   .  For help making a decision, visit:   https://bit.ly/pj82798.  Prostate cancer screening test: If you have a prostate, ask your care team if a prostate cancer screening test (PSA) at age 55 is right for you.  Lung cancer screening: If you are a current or former smoker ages 50 to 80, ask your care team if ongoing lung cancer screenings are right for you.  For informational purposes only. Not to replace the advice of your health care provider. Copyright   2023 [facility] iodine. All rights reserved. Clinically reviewed by the [facility] Transitions Program. nxtControl 557002 - REV 01/24.  Hearing Loss: Care Instructions  Overview     Hearing loss is a sudden or slow decrease in how well you hear. It can range from slight to profound. Permanent hearing loss can occur with aging. It also can  happen when you are exposed long-term to loud noise. Examples include listening to loud music, riding motorcycles, or being around other loud machines.  Hearing loss can affect your work and home life. It can make you feel lonely or depressed. You may feel that you have lost your independence. But hearing aids and other devices can help you hear better and feel connected to others.  Follow-up care is a key part of your treatment and safety. Be sure to make and go to all appointments, and call your doctor if you are having problems. It's also a good idea to know your test results and keep a list of the medicines you take.  How can you care for yourself at home?  Avoid loud noises whenever possible. This helps keep your hearing from getting worse.  Always wear hearing protection around loud noises.  Wear a hearing aid as directed.  A professional can help you pick a hearing aid that will work best for you.  You can also get hearing aids over the counter for mild to moderate hearing loss.  Have hearing tests as your doctor suggests. They can show whether your hearing has changed. Your hearing aid may need to be adjusted.  Use other devices as needed. These may include:  Telephone amplifiers and hearing aids that can connect to a television, stereo, radio, or microphone.  Devices that use lights or vibrations. These alert you to the doorbell, a ringing telephone, or a baby monitor.  Television closed-captioning. This shows the words at the bottom of the screen. Most new TVs can do this.  TTY (text telephone). This lets you type messages back and forth on the telephone instead of talking or listening. These devices are also called TDD. When messages are typed on the keyboard, they are sent over the phone line to a receiving TTY. The message is shown on a monitor.  Use text messaging, social media, and email if it is hard for you to communicate by telephone.  Try to learn a listening technique called speechreading. It is  "not lipreading. You pay attention to people's gestures, expressions, posture, and tone of voice. These clues can help you understand what a person is saying. Face the person you are talking to, and have them face you. Make sure the lighting is good. You need to see the other person's face clearly.  Think about counseling if you need help to adjust to your hearing loss.  When should you call for help?  Watch closely for changes in your health, and be sure to contact your doctor if:    You think your hearing is getting worse.     You have new symptoms, such as dizziness or nausea.   Where can you learn more?  Go to https://www.[employer].net/patiented  Enter R798 in the search box to learn more about \"Hearing Loss: Care Instructions.\"  Current as of: October 27, 2024  Content Version: 14.5    3821-6358 [employer].   Care instructions adapted under license by your healthcare professional. If you have questions about a medical condition or this instruction, always ask your healthcare professional. [employer] disclaims any warranty or liability for your use of this information.       "

## 2025-07-08 ENCOUNTER — RESULTS FOLLOW-UP (OUTPATIENT)
Dept: FAMILY MEDICINE | Facility: CLINIC | Age: 75
End: 2025-07-08
Payer: COMMERCIAL

## 2025-07-08 DIAGNOSIS — R17 TOTAL BILIRUBIN, ELEVATED: ICD-10-CM

## 2025-07-08 DIAGNOSIS — E78.5 HYPERLIPIDEMIA LDL GOAL <100: Primary | ICD-10-CM

## 2025-07-08 RX ORDER — ATORVASTATIN CALCIUM 40 MG/1
40 TABLET, FILM COATED ORAL DAILY
Qty: 90 TABLET | Refills: 3 | Status: SHIPPED | OUTPATIENT
Start: 2025-07-08

## 2025-07-09 ENCOUNTER — TELEPHONE (OUTPATIENT)
Dept: GASTROENTEROLOGY | Facility: CLINIC | Age: 75
End: 2025-07-09
Payer: COMMERCIAL

## 2025-07-09 DIAGNOSIS — Z12.11 COLON CANCER SCREENING: Primary | ICD-10-CM

## 2025-07-09 RX ORDER — BISACODYL 5 MG
TABLET, DELAYED RELEASE (ENTERIC COATED) ORAL
Qty: 4 TABLET | Refills: 0 | Status: SHIPPED | OUTPATIENT
Start: 2025-07-09

## 2025-07-09 NOTE — TELEPHONE ENCOUNTER
Pre assessment completed for upcoming procedure.   (Please see previous telephone encounter notes for complete details)      Procedure details:    Procedure date 07.15.2025, arrival time 1145 and facility location reviewed.    Pre op exam needed? No.    Designated  policy reviewed. Instructed to have someone stay 6  hours post procedure.       Medication review:    Medications reviewed. Please see supporting documentation below. Holding recommendations discussed (if applicable).   Blood thinner/Anti-platelet medication(s):  Coumadin (Warfarin): Recommended HOLD 5 days before procedure.  Consult with your managing provider.      Prep for procedure:     Procedure prep instructions reviewed.        Any additional information needed:  N/A      Patient verbalized understanding and had no questions or concerns at this time.      Joan Rondon RN  Endoscopy Procedure Pre Assessment   [phone] option 3

## 2025-07-09 NOTE — TELEPHONE ENCOUNTER
Joy Madera Behr  1976    Medication: BUPROPION, METHYLPHENIDATE    Provider: Rudy Ruff MD  Preferred Contact Number: [phone] (mobile)    Pharmacy:  [facility]    Patient instructed to call pharmacy directly for future refills.      Advised patient that the nurse will call if there are questions or concerns, otherwise refill processing may take 48-72 hours.      Pre visit planning completed.      Procedure details:    Patient scheduled for Colonoscopy on 07.15.2025.     Arrival time: 1145. Procedure time 1245 Reason coming in 1 hour early is for INR     Facility location: [facility]; [address] [place] [address]. Check in location: Main entrance, door #1 on the North side of the building under roundabout awning. DO NOT GO TO SURGERY/ED ENTRANCE.     Sedation type: Conscious sedation     Pre op exam needed? No.    Indication for procedure:  Colon cancer screening       Chart review:     Electronic implanted devices? Yes Pacemaker    Recent diagnosis of diverticulitis within the last 6 weeks? No      Medication review:    Diabetic? No    Anticoagulants? Yes Coumadin (Warfarin): Recommended HOLD 5 days before procedure.  Consult with your managing provider.    Weight loss medication/injectable? No GLP-1 medication per patient's medication list. Nursing to verify with pre-assessment call.    Other medication HOLDING recommendations:  N/A      Prep for procedure:     Bowel prep recommendation: Standard Golytely. Bowel prep sent to [facility] [address] [place] [id number] [address]  Due to: CKD noted and add on procedure scheduled within a week    Procedure information and instructions sent via [employer]         Joan Rondon RN  Endoscopy Procedure Pre Assessment   [phone] option 3

## 2025-07-09 NOTE — TELEPHONE ENCOUNTER
"Endoscopy Scheduling Screen    Caller: patient    Have you had any respiratory illness or flu-like symptoms in the last 10 days?  No    Patient is ACTIVE on [employer].  Inform patient that all appointment instructions will be sent via [employer].    Review patient's insurance for any non participating payor.    Ordering/Referring Provider: Rivera Bryan MD   (If ordering provider performs procedure, schedule with ordering provider unless otherwise instructed. )    BMI: Estimated body mass index is 33.37 kg/m  as calculated from the following:    Height as of 7/7/25: 1.905 m (6' 3\").    Weight as of 7/7/25: 121.1 kg (267 lb).     Sedation Ordered  moderate sedation.   If patient BMI > 50 do not schedule in [facility].    If patient BMI > 45 do not schedule at [facility].    Are you taking methadone or Suboxone?  NO, No RN review required.    Have you been diagnosed and are being treated for severe PTSD or severe anxiety?  NO, No RN review required.    Are you taking any prescription medications for pain 3 or more times per week?   NO, No RN review required.    Do you have a history of malignant hyperthermia?  No    (Females) Are you currently pregnant?   No     Have you been diagnosed or told you have pulmonary hypertension?   No    Do you have an LVAD?  No    Have you been told you have moderate to severe sleep apnea?  No.    Have you been told you have COPD, asthma, or any other lung disease?  No    Has your doctor ordered any cardiac tests like echo, angiogram, stress test, ablation, or EKG, that you have not completed yet?  No    Do you  have a history of any heart conditions?  Yes     Have you had any hospitalizations  in the last year for heart related issues, for example a stent placement, heart attack, or cardiomyopathy?  No    Do you have any implantable devices in your body (pacemaker, ICD)?  Pacemaker (schedule colonoscopy in Hospital Only)    Do you take nitroglycerine?  No    Have you ever had or are you waiting for an " "organ transplant?  No. Continue scheduling, no site restrictions.    Have you had a stroke or transient ischemic attack (TIA aka \"mini stroke\") in the last 2 years?   No.    Have you been diagnosed with or been told you have cirrhosis of the liver?   No.    Are you currently on dialysis?   No    Do you need assistance transferring?   No    BMI: Estimated body mass index is 33.37 kg/m  as calculated from the following:    Height as of 7/7/25: 1.905 m (6' 3\").    Weight as of 7/7/25: 121.1 kg (267 lb).     Is patients BMI > 40 and scheduling location [facility]?  No    Do you take an injectable or oral medication for weight loss or diabetes (excluding insulin)?  No    Do you take the medication Naltrexone?  No    Do you take blood thinners?  Yes, you must contact your prescribing provider for directions on holding or bridging with a different medication.       Prep   Are you currently on dialysis or do you have chronic kidney disease?  Yes (Golytely Prep)    Do you have a diagnosis of diabetes?  No    Do you have a diagnosis of cystic fibrosis (CF)?  No    On a regular basis do you go 3 -5 days between bowel movements?  No    BMI > 40?  No    Preferred Pharmacy:    [facility] [address] [place] [id number] [facility]  [address] [facility] [address]  Phone: [phone] Fax: [phone]    Final Scheduling Details     Procedure scheduled  Colonoscopy    Surgeon:  Coleen     Date of procedure:  7/15     Pre-OP / PAC:   No - Not required for this site.    Location  [facility] - Patient preference.    Sedation   Moderate Sedation - Per order.      Patient Reminders:   You will receive a call from a Nurse to review instructions and health history.  This assessment must be completed prior to your procedure.  Failure to complete the Nurse assessment may result in the procedure being cancelled.      On the day of your procedure, please designate an adult(s) who can drive you home stay with you for the next 24 " hours. The medicines used in the exam will make you sleepy. You will not be able to drive.      You cannot take public transportation, ride share services, or non-medical taxi service without a responsible caregiver.  Medical transport services are allowed with the requirement that a responsible caregiver will receive you at your destination.  We require that drivers and caregivers are confirmed prior to your procedure.

## 2025-07-10 NOTE — TELEPHONE ENCOUNTER
Received call from patient at [phone] inquiring why he needs a pre-op to be completed prior to colonoscopy on 7/15 when he just had full physical on 7/7.    Patient currently scheduled for pre-op today 7/10 at 4:40, wondering if this is truly necessary given recent appointment. RN reviewed that the request for pre-op to be scheduled came directly from provider. Patient wondering if she remembers he just had appt 7/7.    Please review and advise.    Angela Mortensen RN   [facility]

## 2025-07-10 NOTE — TELEPHONE ENCOUNTER
This requires a pre-op visit to review anticoagulation recommendations. Please help set up with Berna today (appt held at 440 PM).     Thanks,    Rivera Bryan MD  [facility]  7/10/2025

## 2025-07-15 ENCOUNTER — TELEPHONE (OUTPATIENT)
Dept: FAMILY MEDICINE | Facility: CLINIC | Age: 75
End: 2025-07-15

## 2025-07-15 ENCOUNTER — LAB (OUTPATIENT)
Dept: LAB | Facility: CLINIC | Age: 75
End: 2025-07-15
Attending: INTERNAL MEDICINE
Payer: COMMERCIAL

## 2025-07-15 ENCOUNTER — HOSPITAL ENCOUNTER (OUTPATIENT)
Facility: CLINIC | Age: 75
Discharge: HOME | End: 2025-07-15
Attending: INTERNAL MEDICINE | Admitting: INTERNAL MEDICINE
Payer: COMMERCIAL

## 2025-07-15 ENCOUNTER — TELEPHONE (OUTPATIENT)
Dept: ANTICOAGULATION | Facility: CLINIC | Age: 75
End: 2025-07-15

## 2025-07-15 ENCOUNTER — ANTICOAGULATION THERAPY VISIT (OUTPATIENT)
Dept: ANTICOAGULATION | Facility: CLINIC | Age: 75
End: 2025-07-15

## 2025-07-15 VITALS
SYSTOLIC BLOOD PRESSURE: 125 MMHG | BODY MASS INDEX: 32.7 KG/M2 | RESPIRATION RATE: 16 BRPM | HEIGHT: 75 IN | WEIGHT: 263 LBS | HEART RATE: 70 BPM | TEMPERATURE: 97.8 F | DIASTOLIC BLOOD PRESSURE: 81 MMHG | OXYGEN SATURATION: 97 %

## 2025-07-15 DIAGNOSIS — I48.0 PAROXYSMAL ATRIAL FIBRILLATION (H): Primary | ICD-10-CM

## 2025-07-15 DIAGNOSIS — I48.0 PAROXYSMAL ATRIAL FIBRILLATION (H): ICD-10-CM

## 2025-07-15 DIAGNOSIS — Z79.01 LONG TERM CURRENT USE OF ANTICOAGULANTS WITH INR GOAL OF 2.0-3.0: Primary | ICD-10-CM

## 2025-07-15 LAB
COLONOSCOPY: NORMAL
INR PPP: 1.31 (ref 0.85–1.15)
PROTHROMBIN TIME: 16.3 SECONDS (ref 11.8–14.8)

## 2025-07-15 PROCEDURE — 45384 COLONOSCOPY W/LESION REMOVAL: CPT | Performed by: INTERNAL MEDICINE

## 2025-07-15 PROCEDURE — 45385 COLONOSCOPY W/LESION REMOVAL: CPT | Mod: PT | Performed by: INTERNAL MEDICINE

## 2025-07-15 PROCEDURE — 85610 PROTHROMBIN TIME: CPT

## 2025-07-15 PROCEDURE — 250N000011 HC RX IP 250 OP 636: Performed by: INTERNAL MEDICINE

## 2025-07-15 PROCEDURE — G0500 MOD SEDAT ENDO SERVICE >5YRS: HCPCS | Mod: PT | Performed by: INTERNAL MEDICINE

## 2025-07-15 PROCEDURE — 36415 COLL VENOUS BLD VENIPUNCTURE: CPT

## 2025-07-15 PROCEDURE — 88305 TISSUE EXAM BY PATHOLOGIST: CPT | Mod: TC | Performed by: INTERNAL MEDICINE

## 2025-07-15 RX ORDER — NALOXONE HYDROCHLORIDE 0.4 MG/ML
0.2 INJECTION, SOLUTION INTRAMUSCULAR; INTRAVENOUS; SUBCUTANEOUS
Status: DISCONTINUED | OUTPATIENT
Start: 2025-07-15 | End: 2025-07-15 | Stop reason: HOSPADM

## 2025-07-15 RX ORDER — ONDANSETRON 4 MG/1
4 TABLET, ORALLY DISINTEGRATING ORAL EVERY 6 HOURS PRN
Status: DISCONTINUED | OUTPATIENT
Start: 2025-07-15 | End: 2025-07-15 | Stop reason: HOSPADM

## 2025-07-15 RX ORDER — FENTANYL CITRATE 50 UG/ML
25-100 INJECTION, SOLUTION INTRAMUSCULAR; INTRAVENOUS EVERY 5 MIN PRN
Refills: 0 | Status: DISCONTINUED | OUTPATIENT
Start: 2025-07-15 | End: 2025-07-15 | Stop reason: HOSPADM

## 2025-07-15 RX ORDER — FLUMAZENIL 0.1 MG/ML
0.2 INJECTION, SOLUTION INTRAVENOUS
Status: DISCONTINUED | OUTPATIENT
Start: 2025-07-15 | End: 2025-07-15 | Stop reason: HOSPADM

## 2025-07-15 RX ORDER — ONDANSETRON 2 MG/ML
4 INJECTION INTRAMUSCULAR; INTRAVENOUS
Status: DISCONTINUED | OUTPATIENT
Start: 2025-07-15 | End: 2025-07-15 | Stop reason: HOSPADM

## 2025-07-15 RX ORDER — SIMETHICONE 40MG/0.6ML
133 SUSPENSION, DROPS(FINAL DOSAGE FORM)(ML) ORAL
Status: DISCONTINUED | OUTPATIENT
Start: 2025-07-15 | End: 2025-07-15 | Stop reason: HOSPADM

## 2025-07-15 RX ORDER — DIPHENHYDRAMINE HYDROCHLORIDE 50 MG/ML
25-50 INJECTION, SOLUTION INTRAMUSCULAR; INTRAVENOUS
Status: DISCONTINUED | OUTPATIENT
Start: 2025-07-15 | End: 2025-07-15 | Stop reason: HOSPADM

## 2025-07-15 RX ORDER — ATROPINE SULFATE 0.1 MG/ML
1 INJECTION INTRAVENOUS
Status: DISCONTINUED | OUTPATIENT
Start: 2025-07-15 | End: 2025-07-15 | Stop reason: HOSPADM

## 2025-07-15 RX ORDER — ONDANSETRON 2 MG/ML
4 INJECTION INTRAMUSCULAR; INTRAVENOUS EVERY 6 HOURS PRN
Status: DISCONTINUED | OUTPATIENT
Start: 2025-07-15 | End: 2025-07-15 | Stop reason: HOSPADM

## 2025-07-15 RX ORDER — PROCHLORPERAZINE MALEATE 5 MG/1
5 TABLET ORAL EVERY 6 HOURS PRN
Status: DISCONTINUED | OUTPATIENT
Start: 2025-07-15 | End: 2025-07-15 | Stop reason: HOSPADM

## 2025-07-15 RX ORDER — EPINEPHRINE 1 MG/ML
0.1 INJECTION, SOLUTION, CONCENTRATE INTRAVENOUS
Status: DISCONTINUED | OUTPATIENT
Start: 2025-07-15 | End: 2025-07-15 | Stop reason: HOSPADM

## 2025-07-15 RX ORDER — LIDOCAINE 40 MG/G
CREAM TOPICAL
Status: DISCONTINUED | OUTPATIENT
Start: 2025-07-15 | End: 2025-07-15 | Stop reason: HOSPADM

## 2025-07-15 RX ORDER — NALOXONE HYDROCHLORIDE 0.4 MG/ML
0.4 INJECTION, SOLUTION INTRAMUSCULAR; INTRAVENOUS; SUBCUTANEOUS
Status: DISCONTINUED | OUTPATIENT
Start: 2025-07-15 | End: 2025-07-15 | Stop reason: HOSPADM

## 2025-07-15 RX ADMIN — FENTANYL CITRATE 100 MCG: 50 INJECTION, SOLUTION INTRAMUSCULAR; INTRAVENOUS at 12:39

## 2025-07-15 RX ADMIN — MIDAZOLAM 2 MG: 1 INJECTION INTRAMUSCULAR; INTRAVENOUS at 12:39

## 2025-07-15 ASSESSMENT — ACTIVITIES OF DAILY LIVING (ADL)
ADLS_ACUITY_SCORE: 46
ADLS_ACUITY_SCORE: 46

## 2025-07-15 NOTE — PROGRESS NOTES
Pre-preocedure INR for colonoscopy today, 7/15/25, patient was not contacted.    Next INR is scheduled on 7/21/25    Ramona Delcid RN  Anticoagulation Clinic

## 2025-07-15 NOTE — H&P
Pre-Endoscopy History and Physical     Kevan Connelly MRN# [id number]   YOB: 1950 Age: 74 year old     Date of Procedure: 7/15/2025  Primary care provider: Rivera Bryan  Type of Endoscopy: Colonoscopy with possible biopsy, possible polypectomy  Reason for Procedure: polyp  Type of Anesthesia Anticipated: Conscious Sedation    HPI:    Kevan is a 74 year old male who will be undergoing the above procedure.      A history and physical has been performed. The patient's medications and allergies have been reviewed. The risks and benefits of the procedure and the sedation options and risks were discussed with the patient.  All questions were answered and informed consent was obtained.      He denies a personal or family history of anesthesia complications or bleeding disorders.     Patient Active Problem List   Diagnosis    Hx of testicular cancer    Benign essential hypertension    Erectile dysfunction following radiation therapy    CHF (congestive heart failure) (H)    Paroxysmal atrial fibrillation (H)    Atrial flutter (H)    Chronic kidney disease, stage 3 (moderate)    Long term current use of anticoagulants with INR goal of 2.0-3.0    Thoracic aortic ectasia    Adenomatous polyp of transverse colon    Cardiac pacemaker in situ    Elevated fasting blood sugar    Total bilirubin, elevated        Past Medical History:   Diagnosis Date    Atrial flutter (H)     Benign essential hypertension 01/18/2017    past use of ACE- Cough;  Had been on ARB-diuretic but experienced lower bp readings;  BLOOD PRESSURE now well controlled on ARB also no longer on diuretic; no low bp readings; may have had slight edema initially but has normalized with bp well controlled on ARB alone.     Cancer (H)     testicular cancer    CHF (congestive heart failure) (H) 06/05/2019    Long term current use of anticoagulant therapy 06/03/2019    Obesity (BMI 35.0-39.9) with comorbidity (H) 02/11/2019    Paroxysmal atrial  fibrillation (H) 06/03/2019        Past Surgical History:   Procedure Laterality Date    ANESTHESIA CARDIOVERSION N/A 06/26/2019    Procedure: ANESTHESIA, FOR CARDIOVERSION DR. LE;  Surgeon: GENERIC ANESTHESIA PROVIDER;  Location: [facility] OR    ANESTHESIA CARDIOVERSION N/A 07/17/2019    Procedure: ANESTHESIA, FOR CARDIOVERSION;  Surgeon: GENERIC ANESTHESIA PROVIDER;  Location: [facility] OR    ANESTHESIA CARDIOVERSION N/A 01/28/2020    Procedure: ANESTHESIA, FOR CARDIOVERSION;  Surgeon: GENERIC ANESTHESIA PROVIDER;  Location: [facility] OR    ANESTHESIA CARDIOVERSION N/A 09/08/2020    Procedure: ANESTHESIA, FOR CARDIOVERSION;  Surgeon: GENERIC ANESTHESIA PROVIDER;  Location: [facility] OR    ANESTHESIA CARDIOVERSION N/A 11/23/2020    Procedure: ANESTHESIA, FOR CARDIOVERSION;  Surgeon: GENERIC ANESTHESIA PROVIDER;  Location: [facility] OR    ANESTHESIA CARDIOVERSION N/A 01/21/2022    Procedure: ANESTHESIA, FOR CARDIOVERSION;  Surgeon: GENERIC ANESTHESIA PROVIDER;  Location: [facility] OR    ANESTHESIA CARDIOVERSION N/A 02/11/2022    Procedure: ANESTHESIA, FOR CARDIOVERSION (DR. MAO);  Surgeon: GENERIC ANESTHESIA PROVIDER;  Location:  OR    CLOSED REDUCTION SHOULDER Left     COLONOSCOPY      EP ABLATION AV NODE N/A 05/16/2022    Procedure: Ablation Atrioventricular Node;  Surgeon: Chadd Gutierrez MD;  Location:  [facility]    EP ABLATION FOCAL AFIB N/A 01/03/2020    Procedure: EP Ablation Focal AFIB;  Surgeon: Lamont Hannon MD;  Location:  [facility]    EP PACEMAKER DEVICE & LEAD IMPLANT- RIGHT VENTRICULAR Left 05/16/2022    Procedure: Pacemaker Device & Lead Implant- Right ventricular;  Surgeon: Chadd Gutierrez MD;  Location:  [facility]    GENITOURINARY SURGERY  1990    testicular cancer    ORTHOPEDIC SURGERY  1970's    right knee surgery        Social History     Tobacco Use    Smoking status: Never     Passive exposure: Never    Smokeless tobacco: Never   Substance Use  Topics    Alcohol use: Not Currently     Comment: every 1-2 months or less       Family History   Problem Relation Age of Onset    Colon Cancer Father     Prostate Cancer Father         late 60s to early 70    Prostate Cancer Brother     Coronary Artery Disease Maternal Grandmother         Pass away from heart attack    Diabetes Type 2  Paternal Grandfather        Prior to Admission medications    Medication Sig Start Date End Date Taking? Authorizing Provider   acetaminophen (TYLENOL) 500 MG tablet Take 500 mg by mouth every 6 hours as needed for mild pain    Reported, Patient   atorvastatin (LIPITOR) 20 MG tablet Take 1 tablet (20 mg) by mouth daily. 7/7/25   Rivera Bryan MD   atorvastatin (LIPITOR) 40 MG tablet Take 1 tablet (40 mg) by mouth daily. 7/8/25   Rivera Bryan MD   bisacodyl (DULCOLAX) 5 MG EC tablet Take 2 tablets at 3 pm the day before your procedure. If your procedure is before 11 am, take 2 additional tablets at 11 pm. If your procedure is after 11 am, take 2 additional tablets at 6 am. For additional instructions refer to your colonoscopy prep instructions. 7/9/25   Karl Horne MD   carvedilol (COREG) 12.5 MG tablet TAKE 1 TABLET BY MOUTH TWICE DAILY WITH MEALS 5/6/25   Rivera Bryan MD   diazepam (VALIUM) 5 MG tablet Take 1 tablet (5 mg) by mouth See Admin Instructions. Take one tablet one hour prior to procedure, repeat if needed 11/19/24   Carla Underwood MD   hydroCHLOROthiazide 12.5 MG tablet Take 1 tablet (12.5 mg) by mouth daily. 7/7/25   Rivera Bryan MD   olmesartan (BENICAR) 40 MG tablet Take 1 tablet (40 mg) by mouth daily. 7/7/25   Rivera Bryan MD   polyethylene glycol (GOLYTELY) 236 g suspension The night before the exam at 6 pm drink an 8-ounce glass every 15 minutes until the jug is half empty. If you arrive before 11 AM: Drink the other half of the Golytely jug at 11 PM night before procedure. If you arrive after 11 AM: Drink the other half of the Golytely jug at 6 AM day  "of procedure. For additional instructions refer to your colonoscopy prep instructions. 7/9/25   Karl Horne MD   tamsulosin (FLOMAX) 0.4 MG capsule Take 1 capsule (0.4 mg) by mouth daily. 7/7/25   Rivera Bryan MD   warfarin ANTICOAGULANT (COUMADIN) 5 MG tablet Take 1 tablet (5 mg) by mouth daily except take a half tablet (2.5 mg) Tuesdays or as directed by anticoagulation clinic 4/4/25   Rivera Bryan MD   diltiazem ER (TIAZAC) 240 MG 24 hr ER beaded capsule Take 1 capsule (240 mg) by mouth daily 5/5/22 5/17/22  Lamont Hannon MD       Allergies   Allergen Reactions    Lisinopril Cough     Very persistent cough        REVIEW OF SYSTEMS:   5 point ROS negative except as noted above in HPI, including Gen., Resp., CV, GI &  system review.    PHYSICAL EXAM:   There were no vitals taken for this visit. Estimated body mass index is 33.37 kg/m  as calculated from the following:    Height as of 7/7/25: 1.905 m (6' 3\").    Weight as of 7/7/25: 121.1 kg (267 lb).   GENERAL APPEARANCE: alert, and oriented  MENTAL STATUS: alert  AIRWAY EXAM: Mallampatti Class I (visualization of the soft palate, fauces, uvula, anterior and posterior pillars)  RESP: lungs clear to auscultation - no rales, rhonchi or wheezes  CV: regular rates and rhythm  DIAGNOSTICS:    Not indicated    IMPRESSION   ASA Class 2 - Mild systemic disease    PLAN:   Plan for Colonoscopy with possible biopsy, possible polypectomy. We discussed the risks, benefits and alternatives and the patient wished to proceed.    The above has been forwarded to the consulting provider.      Signed Electronically by: Karl Horne MD  July 15, 2025          "

## 2025-07-15 NOTE — TELEPHONE ENCOUNTER
ANTICOAGULATION  MANAGEMENT: Discharge Review    Kevan Connelly chart reviewed for anticoagulation continuity of care    Outpatient surgery/procedure on 7/15 for colonoscopy.    Discharge disposition: Home    Results:    Recent labs: (last 7 days)     07/15/25  1119   INR 1.31*     Anticoagulation inpatient management:     not applicable     Anticoagulation discharge instructions:     Warfarin dosing: hold warfarin until INR on 7/20   Bridging: No   INR goal change: No      Medication changes affecting anticoagulation: No    Additional factors affecting anticoagulation: Yes: multiple polyps removed resulting in longer period holding warfarin.     PLAN     Agree with holding warfarin an additional 5 days.  Boost dose of 10 mg x2 days upon restarting on 7/20, then resume previous therapeutic warfarin dose.    Recommend to check INR on 7/28    Consulted Katelyn Blue [facility] for dosing recommendations.    Spoke with [facility]    Anticoagulation Calendar updated    Yarelis Martinez RN  7/15/2025  Anticoagulation Clinic  [facility] for routing messages: faith [facility]OB[facility]  [facility] patient phone line: [phone]

## 2025-07-15 NOTE — TELEPHONE ENCOUNTER
Patient Returning Call    Reason for call:  Patient calling in to request speaking with an anticoagulation nurse regarding some recent changes. Patient had colonoscopy today, supposed to restart meds on 7/16/25. Patient had polyps removed and was advised by surgeon to hold medication for another 5 days due to potential bleeding. Please call to discuss medication regimen.     Information relayed to patient:  message placed, await call back    Patient has additional questions:  No      Could we send this information to you in [place] or would you prefer to receive a phone call?:   Patient would prefer a phone call   Okay to leave a detailed message?: Yes at Home number on file [phone] (home)

## 2025-07-16 LAB
PATH REPORT.COMMENTS IMP SPEC: NORMAL
PATH REPORT.COMMENTS IMP SPEC: NORMAL
PATH REPORT.FINAL DX SPEC: NORMAL
PATH REPORT.GROSS SPEC: NORMAL
PATH REPORT.MICROSCOPIC SPEC OTHER STN: NORMAL
PATH REPORT.RELEVANT HX SPEC: NORMAL
PHOTO IMAGE: NORMAL

## 2025-07-21 ENCOUNTER — ALLIED HEALTH/NURSE VISIT (OUTPATIENT)
Dept: FAMILY MEDICINE | Facility: CLINIC | Age: 75
End: 2025-07-21

## 2025-07-21 VITALS — DIASTOLIC BLOOD PRESSURE: 63 MMHG | SYSTOLIC BLOOD PRESSURE: 122 MMHG

## 2025-07-21 DIAGNOSIS — Z01.30 BP CHECK: Primary | ICD-10-CM

## 2025-07-21 PROCEDURE — 3078F DIAST BP <80 MM HG: CPT | Performed by: STUDENT IN AN ORGANIZED HEALTH CARE EDUCATION/TRAINING PROGRAM

## 2025-07-21 PROCEDURE — 99207 PR NO CHARGE NURSE ONLY: CPT | Performed by: STUDENT IN AN ORGANIZED HEALTH CARE EDUCATION/TRAINING PROGRAM

## 2025-07-21 PROCEDURE — 3074F SYST BP LT 130 MM HG: CPT | Performed by: STUDENT IN AN ORGANIZED HEALTH CARE EDUCATION/TRAINING PROGRAM

## 2025-07-21 NOTE — PROGRESS NOTES
Kevan DHRUV Connelly was evaluated at [facility] on July 21, 2025 at which time his blood pressure was:    BP Readings from Last 1 Encounters:   07/21/25 122/63     No data recorded      Reviewed lifestyle modifications for blood pressure control and reduction: including making healthy food choices, managing weight, getting regular exercise, smoking cessation, reducing alcohol consumption, monitoring blood pressure regularly.     Symptoms: None    BP Goal:< 140/90 mmHg    BP Assessment:  BP at goal    Potential Reasons for BP too high: NA - Not applicable    BP Follow-Up Plan: Recheck BP in 6 months at pharmacy    Recommendation to Provider: none    Note completed by: Rickey Cardenas RPH

## 2025-07-26 DIAGNOSIS — I10 BENIGN ESSENTIAL HYPERTENSION: ICD-10-CM

## 2025-07-28 ENCOUNTER — HOSPITAL ENCOUNTER (OUTPATIENT)
Dept: ULTRASOUND IMAGING | Facility: CLINIC | Age: 75
Discharge: HOME | End: 2025-07-28
Attending: STUDENT IN AN ORGANIZED HEALTH CARE EDUCATION/TRAINING PROGRAM | Admitting: STUDENT IN AN ORGANIZED HEALTH CARE EDUCATION/TRAINING PROGRAM
Payer: COMMERCIAL

## 2025-07-28 ENCOUNTER — ANTICOAGULATION THERAPY VISIT (OUTPATIENT)
Dept: ANTICOAGULATION | Facility: CLINIC | Age: 75
End: 2025-07-28

## 2025-07-28 ENCOUNTER — LAB (OUTPATIENT)
Dept: LAB | Facility: CLINIC | Age: 75
End: 2025-07-28
Payer: COMMERCIAL

## 2025-07-28 DIAGNOSIS — I10 BENIGN ESSENTIAL HYPERTENSION: ICD-10-CM

## 2025-07-28 DIAGNOSIS — R17 TOTAL BILIRUBIN, ELEVATED: ICD-10-CM

## 2025-07-28 DIAGNOSIS — I48.0 PAROXYSMAL ATRIAL FIBRILLATION (H): Primary | ICD-10-CM

## 2025-07-28 DIAGNOSIS — Z79.01 LONG TERM CURRENT USE OF ANTICOAGULANT THERAPY: ICD-10-CM

## 2025-07-28 DIAGNOSIS — I48.0 PAROXYSMAL ATRIAL FIBRILLATION (H): ICD-10-CM

## 2025-07-28 LAB
ANION GAP SERPL CALCULATED.3IONS-SCNC: 9 MMOL/L (ref 7–15)
BUN SERPL-MCNC: 28.6 MG/DL (ref 8–23)
CALCIUM SERPL-MCNC: 9.5 MG/DL (ref 8.8–10.4)
CHLORIDE SERPL-SCNC: 107 MMOL/L (ref 98–107)
CREAT SERPL-MCNC: 1.56 MG/DL (ref 0.67–1.17)
EGFRCR SERPLBLD CKD-EPI 2021: 47 ML/MIN/1.73M2
GLUCOSE SERPL-MCNC: 100 MG/DL (ref 70–99)
HCO3 SERPL-SCNC: 25 MMOL/L (ref 22–29)
INR BLD: 2.2 (ref 0.9–1.1)
POTASSIUM SERPL-SCNC: 4.5 MMOL/L (ref 3.4–5.3)
SODIUM SERPL-SCNC: 141 MMOL/L (ref 135–145)

## 2025-07-28 PROCEDURE — 36416 COLLJ CAPILLARY BLOOD SPEC: CPT

## 2025-07-28 PROCEDURE — 85610 PROTHROMBIN TIME: CPT

## 2025-07-28 PROCEDURE — 80048 BASIC METABOLIC PNL TOTAL CA: CPT

## 2025-07-28 PROCEDURE — 36415 COLL VENOUS BLD VENIPUNCTURE: CPT

## 2025-07-28 PROCEDURE — 76705 ECHO EXAM OF ABDOMEN: CPT

## 2025-07-28 RX ORDER — HYDROCHLOROTHIAZIDE 12.5 MG/1
12.5 TABLET ORAL DAILY
Qty: 90 TABLET | Refills: 3 | Status: SHIPPED | OUTPATIENT
Start: 2025-07-28

## 2025-07-28 NOTE — PROGRESS NOTES
ANTICOAGULATION MANAGEMENT     Mathur DHRUV Connelly 74 year old male is on warfarin with therapeutic INR result. (Goal INR 2.0-3.0)    Recent labs: (last 7 days)     07/28/25  0848   INR 2.2*       ASSESSMENT     Warfarin Lab Questionnaire    Warfarin Doses Last 7 Days      7/27/2025    12:25 PM   Dose in Tablet or Mg   TAB or MG? tablet (tab)     Pt Rptd Dose SUNDAY MONDAY TUESDAY WED THURS FRIDAY SATURDAY 7/27/2025  12:25 PM 1 1 0.5 1 1 1 1         7/27/2025   Warfarin Lab Questionnaire   Missed doses within past 14 days? Yes   If yes; please list when: 07/10-07/   Changes in diet or alcohol within past 14 days? No   Medication changes since last result? No   Injuries or illness since last result? No   New shortness of breath, severe headaches or sudden changes in vision since last result? No   Abnormal bleeding since last result? No   Upcoming surgery, procedure? No     Previous result: Subtherapeutic due to hold for procedure, previously therapeutic x 6+  Additional findings: None       PLAN     Recommended plan for no diet, medication or health factor changes affecting INR     Dosing Instructions: Continue your current warfarin dose with next INR in 4 weeks       Summary  As of 7/28/2025      Full warfarin instructions:  2.5 mg every Tue; 5 mg all other days   Next INR check:  8/18/2025               Telephone call with Keron who verbalizes understanding and agrees to plan    Lab visit scheduled    Education provided: Please call back if any changes to your diet, medications or how you've been taking warfarin    Plan made per [facility] anticoagulation protocol    Indiana Lowe RN  7/28/2025  Anticoagulation Clinic  [facility] for routing messages: faith [facility]  [facility] patient phone line: [phone]        _______________________________________________________________________     Anticoagulation Episode Summary       Current INR goal:  2.0-3.0   TTR:  88.9% (1 y)   Target end date:  Indefinite   Send INR  reminders to:  [facility]    Indications    Paroxysmal atrial fibrillation (H) [I48.0]             Comments:  --             Anticoagulation Care Providers       Provider Role Specialty Phone number    Davina Reaves MD Referring Internal Medicine [phone]    Rivera Bryan MD Referring Family Medicine [phone]

## 2025-08-25 ENCOUNTER — LAB (OUTPATIENT)
Dept: LAB | Facility: CLINIC | Age: 75
End: 2025-08-25
Payer: COMMERCIAL

## 2025-08-25 ENCOUNTER — ANTICOAGULATION THERAPY VISIT (OUTPATIENT)
Dept: ANTICOAGULATION | Facility: CLINIC | Age: 75
End: 2025-08-25

## 2025-08-25 DIAGNOSIS — Z79.01 LONG TERM CURRENT USE OF ANTICOAGULANT THERAPY: ICD-10-CM

## 2025-08-25 DIAGNOSIS — I48.0 PAROXYSMAL ATRIAL FIBRILLATION (H): Primary | ICD-10-CM

## 2025-08-25 DIAGNOSIS — I48.0 PAROXYSMAL ATRIAL FIBRILLATION (H): ICD-10-CM

## 2025-08-25 LAB — INR BLD: 2.9 (ref 0.9–1.1)

## 2025-08-25 PROCEDURE — 36416 COLLJ CAPILLARY BLOOD SPEC: CPT

## 2025-08-25 PROCEDURE — 85610 PROTHROMBIN TIME: CPT

## 2025-09-01 DIAGNOSIS — I48.0 PAROXYSMAL ATRIAL FIBRILLATION (H): ICD-10-CM

## 2025-09-02 RX ORDER — CARVEDILOL 12.5 MG/1
12.5 TABLET ORAL 2 TIMES DAILY WITH MEALS
Qty: 180 TABLET | Refills: 2 | Status: SHIPPED | OUTPATIENT
Start: 2025-09-02

## (undated) DEVICE — PACK EP SRG PROC LF DISP SAN32EPFSR

## (undated) DEVICE — ESU GROUND PAD ADULT W/CORD E7507

## (undated) DEVICE — SHEATH PRELUDE SNAP 7FRX13CM PLS-1007

## (undated) DEVICE — ENDO TRAP POLYP QUICK CATCH 710201

## (undated) DEVICE — CATH NAV PENTARAY F CURVE

## (undated) DEVICE — ENDO SNARE POLYPECTOMY OVAL 15MM LOOP SD-240U-15

## (undated) DEVICE — CATH SOUNDSTAR 8FRX90CM 10439011

## (undated) DEVICE — GUIDEWIRE TRNSEPT 0.014 X35CM SAFE SE

## (undated) DEVICE — NDL TRNSEPT 18GA X 71CM 86 DEG

## (undated) DEVICE — INTRODUCER SHEATH FAST-CATH SWARTZ 8.5FRX63CM SL1 CVD 406849

## (undated) DEVICE — KIT ENDO TURNOVER/PROCEDURE W/CLEAN A SCOPE LINERS 103888

## (undated) DEVICE — CABLE PACING ALLIGATOR CLIP 12FT 5833SL

## (undated) DEVICE — CATH THERMOCOOL SMARTTOUCH SF FJ CURVE

## (undated) DEVICE — INTRO CATH 12CM 8.5FR FST-CATH

## (undated) DEVICE — PATCH CARTO 3 EXTERNAL REFERENCE 3D MAPPING CREFP6

## (undated) DEVICE — RAD INTRODUCER KIT MICRO 5FRX10CM .018 NITINOL G/W

## (undated) DEVICE — DEFIB PRO-PADZ LVP LQD GEL ADULT 8900-2105-01

## (undated) DEVICE — INTRODUCER SHEATH GREEN 6.5FRX11CM .038IN PSI-6F-11-038ACT

## (undated) DEVICE — TUBE SET SMARKABLATE IRRIGATION

## (undated) DEVICE — PACK PCMKR PERM SRG PROC LF SAN32PC573

## (undated) DEVICE — INTRO SHEALTH 8.5FRX63CM SRO 406853

## (undated) DEVICE — WIRE GLIDE 0.035"X150CM 3CM ANG REG UWR6035

## (undated) DEVICE — CATH EP 6FR 2MM TIP 2-8-2 115C

## (undated) DEVICE — CATH ABLTN 7FR 1-7-4MM SPACE 115 CML 4MML STRL LF BD7TCFJ4L

## (undated) DEVICE — INTRODUCER SHEATH FAST-CATH 9FRX12CM 406116

## (undated) RX ORDER — FENTANYL CITRATE 50 UG/ML
INJECTION, SOLUTION INTRAMUSCULAR; INTRAVENOUS
Status: DISPENSED
Start: 2020-01-03

## (undated) RX ORDER — FUROSEMIDE 10 MG/ML
INJECTION INTRAMUSCULAR; INTRAVENOUS
Status: DISPENSED
Start: 2020-01-03

## (undated) RX ORDER — FENTANYL CITRATE-0.9 % NACL/PF 10 MCG/ML
PLASTIC BAG, INJECTION (ML) INTRAVENOUS
Status: DISPENSED
Start: 2022-01-21

## (undated) RX ORDER — HEPARIN SODIUM 1000 [USP'U]/ML
INJECTION, SOLUTION INTRAVENOUS; SUBCUTANEOUS
Status: DISPENSED
Start: 2022-05-16

## (undated) RX ORDER — FLUMAZENIL 0.1 MG/ML
INJECTION, SOLUTION INTRAVENOUS
Status: DISPENSED
Start: 2020-01-02

## (undated) RX ORDER — HEPARIN SODIUM 200 [USP'U]/100ML
INJECTION, SOLUTION INTRAVENOUS
Status: DISPENSED
Start: 2020-01-03

## (undated) RX ORDER — HEPARIN SODIUM 1000 [USP'U]/ML
INJECTION, SOLUTION INTRAVENOUS; SUBCUTANEOUS
Status: DISPENSED
Start: 2020-01-03

## (undated) RX ORDER — FENTANYL CITRATE 50 UG/ML
INJECTION, SOLUTION INTRAMUSCULAR; INTRAVENOUS
Status: DISPENSED
Start: 2022-05-16

## (undated) RX ORDER — PROTAMINE SULFATE 10 MG/ML
INJECTION, SOLUTION INTRAVENOUS
Status: DISPENSED
Start: 2020-01-03

## (undated) RX ORDER — FENTANYL CITRATE 50 UG/ML
INJECTION, SOLUTION INTRAMUSCULAR; INTRAVENOUS
Status: DISPENSED
Start: 2020-10-27

## (undated) RX ORDER — FENTANYL CITRATE 50 UG/ML
INJECTION, SOLUTION INTRAMUSCULAR; INTRAVENOUS
Status: DISPENSED
Start: 2025-07-15

## (undated) RX ORDER — NALOXONE HYDROCHLORIDE 0.4 MG/ML
INJECTION, SOLUTION INTRAMUSCULAR; INTRAVENOUS; SUBCUTANEOUS
Status: DISPENSED
Start: 2019-06-26

## (undated) RX ORDER — LIDOCAINE HYDROCHLORIDE 40 MG/ML
SOLUTION TOPICAL
Status: DISPENSED
Start: 2020-01-02

## (undated) RX ORDER — FLUMAZENIL 0.1 MG/ML
INJECTION, SOLUTION INTRAVENOUS
Status: DISPENSED
Start: 2019-06-26

## (undated) RX ORDER — FENTANYL CITRATE 50 UG/ML
INJECTION, SOLUTION INTRAMUSCULAR; INTRAVENOUS
Status: DISPENSED
Start: 2020-01-02

## (undated) RX ORDER — LIDOCAINE HYDROCHLORIDE 10 MG/ML
INJECTION, SOLUTION EPIDURAL; INFILTRATION; INTRACAUDAL; PERINEURAL
Status: DISPENSED
Start: 2020-01-03

## (undated) RX ORDER — LIDOCAINE HYDROCHLORIDE 10 MG/ML
INJECTION, SOLUTION EPIDURAL; INFILTRATION; INTRACAUDAL; PERINEURAL
Status: DISPENSED
Start: 2022-05-16

## (undated) RX ORDER — GLYCOPYRROLATE 0.2 MG/ML
INJECTION, SOLUTION INTRAMUSCULAR; INTRAVENOUS
Status: DISPENSED
Start: 2020-01-02

## (undated) RX ORDER — PROPOFOL 10 MG/ML
INJECTION, EMULSION INTRAVENOUS
Status: DISPENSED
Start: 2022-01-21

## (undated) RX ORDER — GLYCOPYRROLATE 0.2 MG/ML
INJECTION, SOLUTION INTRAMUSCULAR; INTRAVENOUS
Status: DISPENSED
Start: 2019-06-26

## (undated) RX ORDER — BUPIVACAINE HYDROCHLORIDE 2.5 MG/ML
INJECTION, SOLUTION EPIDURAL; INFILTRATION; INTRACAUDAL
Status: DISPENSED
Start: 2022-05-16

## (undated) RX ORDER — FENTANYL CITRATE 50 UG/ML
INJECTION, SOLUTION INTRAMUSCULAR; INTRAVENOUS
Status: DISPENSED
Start: 2017-11-28

## (undated) RX ORDER — NALOXONE HYDROCHLORIDE 0.4 MG/ML
INJECTION, SOLUTION INTRAMUSCULAR; INTRAVENOUS; SUBCUTANEOUS
Status: DISPENSED
Start: 2020-01-02

## (undated) RX ORDER — PROPOFOL 10 MG/ML
INJECTION, EMULSION INTRAVENOUS
Status: DISPENSED
Start: 2020-11-23

## (undated) RX ORDER — LIDOCAINE HYDROCHLORIDE 40 MG/ML
SOLUTION TOPICAL
Status: DISPENSED
Start: 2019-06-26

## (undated) RX ORDER — FENTANYL CITRATE 50 UG/ML
INJECTION, SOLUTION INTRAMUSCULAR; INTRAVENOUS
Status: DISPENSED
Start: 2019-06-26